# Patient Record
Sex: FEMALE | Race: WHITE | NOT HISPANIC OR LATINO | Employment: UNEMPLOYED | URBAN - METROPOLITAN AREA
[De-identification: names, ages, dates, MRNs, and addresses within clinical notes are randomized per-mention and may not be internally consistent; named-entity substitution may affect disease eponyms.]

---

## 2017-11-21 ENCOUNTER — HOSPITAL ENCOUNTER (EMERGENCY)
Facility: HOSPITAL | Age: 62
Discharge: HOME | End: 2017-11-21
Attending: EMERGENCY MEDICINE

## 2017-11-21 VITALS
OXYGEN SATURATION: 98 % | TEMPERATURE: 98 F | DIASTOLIC BLOOD PRESSURE: 78 MMHG | HEIGHT: 67 IN | SYSTOLIC BLOOD PRESSURE: 148 MMHG | WEIGHT: 222 LBS | RESPIRATION RATE: 18 BRPM | BODY MASS INDEX: 34.84 KG/M2 | HEART RATE: 75 BPM

## 2017-11-21 DIAGNOSIS — M25.521 RIGHT ELBOW PAIN: ICD-10-CM
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
## 2017-11-21 DIAGNOSIS — S80.212A ABRASION OF LEFT KNEE, INITIAL ENCOUNTER: ICD-10-CM

## 2017-11-21 DIAGNOSIS — M25.511 ACUTE PAIN OF RIGHT SHOULDER: ICD-10-CM

## 2017-11-21 DIAGNOSIS — S80.02XA CONTUSION OF LEFT KNEE, INITIAL ENCOUNTER: ICD-10-CM

## 2017-11-21 PROCEDURE — 29505 APPLICATION LONG LEG SPLINT: CPT | Mod: LT

## 2017-11-21 PROCEDURE — 25000003 PHARM REV CODE 250: Performed by: EMERGENCY MEDICINE

## 2017-11-21 PROCEDURE — 99284 EMERGENCY DEPT VISIT MOD MDM: CPT | Mod: 25

## 2017-11-21 RX ORDER — HYDROCODONE BITARTRATE AND ACETAMINOPHEN 5; 325 MG/1; MG/1
2 TABLET ORAL
Status: DISCONTINUED | OUTPATIENT
Start: 2017-11-21 | End: 2017-11-21

## 2017-11-21 RX ORDER — MUPIROCIN 20 MG/G
1 OINTMENT TOPICAL
Status: COMPLETED | OUTPATIENT
Start: 2017-11-21 | End: 2017-11-21

## 2017-11-21 RX ORDER — ONDANSETRON 4 MG/1
4 TABLET, ORALLY DISINTEGRATING ORAL
Status: COMPLETED | OUTPATIENT
Start: 2017-11-21 | End: 2017-11-21

## 2017-11-21 RX ORDER — METHOCARBAMOL 500 MG/1
1000 TABLET, FILM COATED ORAL 3 TIMES DAILY
Qty: 30 TABLET | Refills: 0 | Status: SHIPPED | OUTPATIENT
Start: 2017-11-21 | End: 2017-11-26

## 2017-11-21 RX ORDER — ONDANSETRON 4 MG/1
4 TABLET, ORALLY DISINTEGRATING ORAL
Status: DISCONTINUED | OUTPATIENT
Start: 2017-11-21 | End: 2017-11-21

## 2017-11-21 RX ORDER — METHOCARBAMOL 500 MG/1
1000 TABLET, FILM COATED ORAL
Status: DISCONTINUED | OUTPATIENT
Start: 2017-11-21 | End: 2017-11-21

## 2017-11-21 RX ORDER — OXYCODONE AND ACETAMINOPHEN 5; 325 MG/1; MG/1
2 TABLET ORAL
Status: COMPLETED | OUTPATIENT
Start: 2017-11-21 | End: 2017-11-21

## 2017-11-21 RX ADMIN — OXYCODONE AND ACETAMINOPHEN 2 TABLET: 5; 325 TABLET ORAL at 01:11

## 2017-11-21 RX ADMIN — MUPIROCIN 22 G: 20 OINTMENT TOPICAL at 01:11

## 2017-11-21 RX ADMIN — ONDANSETRON 4 MG: 4 TABLET, ORALLY DISINTEGRATING ORAL at 01:11

## 2017-11-21 NOTE — ED PROVIDER NOTES
"Encounter Date: 2017    SCRIBE #1 NOTE: I, Carole Kennedy, am scribing for, and in the presence of,  Katie Sevilla PA-C. I have scribed the following portions of the note - Other sections scribed: HPI and ROS.       History     Chief Complaint   Patient presents with    Fall     No LOC. Tripped down 1 step at a restaurant. Pain to Left knee and skin tear to Right hand.       CC: Fall    HPI: This 62 y.o female who has HTN, Arthritis presents to the ED c/o acute, constant, 10/10 "sharp" right shoulder pain, right elbow pain, and left knee pain s/p a fall that occurred just prior to arrival.  Patient also reports of right forearm pain and resolved right side pain.  Patient also repots of skin tears to her right hand and forearm.  Patient reports fall occurred after she did not notice a step while exiting a restaurant.  Patient denies head trauma, loss of consciousness, neck pain, back pain, chest pain, or any other associated symptoms.  Patient reports her last updated tetanus 2 years ago.  No prior tx.  No alleviating factors.      The history is provided by the patient. No  was used.     Review of patient's allergies indicates:   Allergen Reactions    Ace inhibitors Other (See Comments)     Cough      Sulfa (sulfonamide antibiotics)      Past Medical History:   Diagnosis Date    Allergy     Anxiety     Arthritis     Depressive disorder, not elsewhere classified     Esophageal reflux     Hypertension     Impaired fasting glucose     Joint pain     Obesity, unspecified     Other and unspecified hyperlipidemia      Past Surgical History:   Procedure Laterality Date     SECTION      HYSTERECTOMY      R knee replacement      right  arm  surgery       Family History   Problem Relation Age of Onset    Cancer Mother      lung    Liver disease Father      Social History   Substance Use Topics    Smoking status: Current Every Day Smoker     Packs/day: 1.00     Years: 40.00 "     Types: Cigarettes    Smokeless tobacco: Never Used    Alcohol use 0.0 oz/week      Comment: occ     Review of Systems   Constitutional: Negative for chills and fever.   HENT: Negative for ear pain and sore throat.    Eyes: Negative for pain.   Respiratory: Negative for cough and shortness of breath.    Cardiovascular: Negative for chest pain.   Gastrointestinal: Negative for abdominal pain, diarrhea, nausea and vomiting.   Genitourinary: Negative for dysuria.   Musculoskeletal: Positive for arthralgias. Negative for back pain.   Skin: Positive for wound. Negative for rash.   Neurological: Negative for headaches.        (-) head trauma  (-) loss of consciousness       Physical Exam     Initial Vitals [11/21/17 1224]   BP Pulse Resp Temp SpO2   (!) 150/80 70 16 98.1 °F (36.7 °C) 96 %      MAP       103.33         Physical Exam    Nursing note and vitals reviewed.  Constitutional: She appears well-developed and well-nourished.   This patient is sitting in a wheelchair, she appears in pain.  She is able to walk with a slow, but steady gait.   HENT:   Head: Normocephalic and atraumatic.   Eyes: EOM are normal.   Neck: Normal range of motion. Neck supple.   There is no midline cervical spinal tenderness to palpation.   Cardiovascular: Normal rate and regular rhythm.   No murmur heard.  Pulmonary/Chest: Breath sounds normal. No respiratory distress. She has no wheezes. She has no rhonchi. She has no rales.   Abdominal: Soft. Bowel sounds are normal. She exhibits no distension. There is no tenderness. There is no rebound and no guarding.   Musculoskeletal:        Back:         Hands:  There is limited ROM of the right arm secondary to shoulder pain.  She clutches the right arm and holds it close to her body.  There is tenderness to palpation of the right, medial elbow.      There is pain to palpation of the right knee with an abrasion noted to the anterior patella.  No active bleeding.  There is no ligamentous laxity  with varus or valgus stress.    There are three small, superficial avulsion lacerations noted to the right posterior hand.  No foreign bodies noted.  There is no bony tenderness to palpation of the bilateral wrists and hands.   Neurological: She is alert and oriented to person, place, and time.   Skin: Skin is warm.         ED Course   Procedures  Labs Reviewed - No data to display          Medical Decision Making:   Differential Diagnosis:   This is an emergent evaluation of a 62-year-old female who presents to the emergency department after a fall that occurred just prior to arrival.  She states she had a mechanical trip and fall where she tripped over a step and fell on the right side of her body.  She complains of right shoulder pain, right elbow pain, lacerations on her right hand, left knee pain.  She states that she initially had right hip pain but that improved since arrival.    Previous medical records were obtained and reviewed.  This patient has a history of hypertension, obesity, anxiety, arthritis.    The patient is currently afebrile and nontoxic in appearance.  Her blood pressure is elevated at 150/80.  Her remaining vital signs are stable.  On physical exam, the patient appears in pain.  She is able to walk with a slow but steady gait.  There is no midline cervical spinal tenderness to palpation.  There are 3 small avulsion lacerations noted to the right posterior hand.  Bleeding is controlled.  No foreign bodies noted with thorough exploration of the wounds.  Patient has full range of motion, strength and sensation in the bilateral hands.  There is no bony tenderness noted to palpation of the right hand and right wrist.  There is pain to palpation of the right knee with an abrasion noted to the anterior patella.  There is no active bleeding.  There is no ligamentous laxity with varus or valgus stress.  There is no saddle anesthesia.  There is some minor tenderness to the midline lumbar spine.  There  is no bony step-off noted.  The remaining physical exam is unremarkable.  An x-ray of the lumbar spine, right elbow, right shoulder, left knee was performed which revealed no acute fractures.  The lacerations were cleaned with normal saline.  Bactroban ointment was placed.  I do not believe that these need to be approximated with sutures as they are avulsion lacerations.  X-ray findings were discussed with the patient.  Tetanus is up-to-date.  I will give pain medication and muscle relaxers while in the emergency department.  I will prescribe muscle relaxants.  Orthopedist follow-up is suggested.  The patient verbalized understanding and agreement and the treatment plan and all questions were answered.  She stable for discharge at this time.  This case was discussed with Dr. Higginbotham and he is in agreement with the assessment and treatment plan.            Scribe Attestation:   Scribe #1: I performed the above scribed service and the documentation accurately describes the services I performed. I attest to the accuracy of the note.    Attending Attestation:           Physician Attestation for Scribe:  Physician Attestation Statement for Scribe #1: I, Katie Sevilla PA-C, reviewed documentation, as scribed by Carole Kennedy in my presence, and it is both accurate and complete.                 ED Course      Clinical Impression:   The primary encounter diagnosis was Fall. Diagnoses of Abrasion of left knee, initial encounter, Contusion of left knee, initial encounter, Acute pain of right shoulder, and Right elbow pain were also pertinent to this visit.    Disposition:   Disposition: Discharged  Condition: Stable                        Katie Sevilla PA-C  11/21/17 1501

## 2017-11-21 NOTE — ED TRIAGE NOTES
Pt. stated she fell about an hour ago, stated she was walking out of an area and missed her step. C/o left, knee, right shoulder, right side and right hand. Pain is a 10/10

## 2018-06-18 ENCOUNTER — LAB VISIT (OUTPATIENT)
Dept: LAB | Facility: HOSPITAL | Age: 63
End: 2018-06-18
Attending: NURSE PRACTITIONER

## 2018-06-18 DIAGNOSIS — Z12.11 COLON CANCER SCREENING: ICD-10-CM

## 2018-06-18 LAB — HEMOCCULT STL QL IA: NEGATIVE

## 2018-06-18 PROCEDURE — 82274 ASSAY TEST FOR BLOOD FECAL: CPT

## 2018-08-03 ENCOUNTER — HOSPITAL ENCOUNTER (EMERGENCY)
Facility: HOSPITAL | Age: 63
Discharge: HOME | End: 2018-08-03
Attending: EMERGENCY MEDICINE

## 2018-08-03 VITALS
HEART RATE: 60 BPM | SYSTOLIC BLOOD PRESSURE: 141 MMHG | TEMPERATURE: 98 F | HEIGHT: 66 IN | RESPIRATION RATE: 16 BRPM | BODY MASS INDEX: 35.36 KG/M2 | DIASTOLIC BLOOD PRESSURE: 76 MMHG | WEIGHT: 220 LBS | OXYGEN SATURATION: 96 %

## 2018-08-03 DIAGNOSIS — N39.0 URINARY TRACT INFECTION WITH HEMATURIA, SITE UNSPECIFIED: Primary | ICD-10-CM

## 2018-08-03 DIAGNOSIS — R31.9 URINARY TRACT INFECTION WITH HEMATURIA, SITE UNSPECIFIED: Primary | ICD-10-CM

## 2018-08-03 LAB
BACTERIA #/AREA URNS HPF: ABNORMAL /HPF
BILIRUB UR QL STRIP: NEGATIVE
CLARITY UR: ABNORMAL
COLOR UR: YELLOW
GLUCOSE UR QL STRIP: NEGATIVE
HGB UR QL STRIP: ABNORMAL
HYALINE CASTS #/AREA URNS LPF: 0 /LPF
KETONES UR QL STRIP: NEGATIVE
LEUKOCYTE ESTERASE UR QL STRIP: ABNORMAL
MICROSCOPIC COMMENT: ABNORMAL
NITRITE UR QL STRIP: NEGATIVE
PH UR STRIP: 5 [PH] (ref 5–8)
PROT UR QL STRIP: ABNORMAL
RBC #/AREA URNS HPF: 20 /HPF (ref 0–4)
SP GR UR STRIP: 1.02 (ref 1–1.03)
SQUAMOUS #/AREA URNS HPF: 4 /HPF
URN SPEC COLLECT METH UR: ABNORMAL
UROBILINOGEN UR STRIP-ACNC: NEGATIVE EU/DL
WBC #/AREA URNS HPF: >100 /HPF (ref 0–5)

## 2018-08-03 PROCEDURE — 81000 URINALYSIS NONAUTO W/SCOPE: CPT

## 2018-08-03 PROCEDURE — 87086 URINE CULTURE/COLONY COUNT: CPT

## 2018-08-03 PROCEDURE — 99283 EMERGENCY DEPT VISIT LOW MDM: CPT

## 2018-08-03 RX ORDER — CEPHALEXIN 500 MG/1
500 CAPSULE ORAL EVERY 12 HOURS
Qty: 20 CAPSULE | Refills: 0 | Status: SHIPPED | OUTPATIENT
Start: 2018-08-03 | End: 2018-08-13

## 2018-08-03 RX ORDER — PHENAZOPYRIDINE HYDROCHLORIDE 200 MG/1
200 TABLET, FILM COATED ORAL 3 TIMES DAILY
Qty: 6 TABLET | Refills: 0 | Status: SHIPPED | OUTPATIENT
Start: 2018-08-03 | End: 2018-08-05

## 2018-08-03 NOTE — ED TRIAGE NOTES
"62 y.o. Female presents to the ED with chief complaint of dysuria. Patient states burning while urinating began this am. Patient reports having the "urge to go very badly." Patient currently resting in chair, calm. Patient believes she may have a UTI.  "

## 2018-08-03 NOTE — ED PROVIDER NOTES
"Encounter Date: 8/3/2018        History     Chief Complaint   Patient presents with    Dysuria     pt stated "I think i have a UTI" pt reports painful and more frequent urinations, also reports lower back and abdominla pain     This is a 62 y.o. female complaining of suprapubic pressure and urinary frequency. Denies flank pain. Denies dysuria, fever. Symptoms began this morning. No medications taken for her symptoms prior to arrival.          Review of patient's allergies indicates:   Allergen Reactions    Ace inhibitors Other (See Comments)     Cough      Sulfa (sulfonamide antibiotics)      Past Medical History:   Diagnosis Date    Allergy     Anxiety     Arthritis     Depressive disorder, not elsewhere classified     Esophageal reflux     Hypertension     Impaired fasting glucose     Joint pain     Obesity, unspecified     Other and unspecified hyperlipidemia      Past Surgical History:   Procedure Laterality Date     SECTION      HYSTERECTOMY      R knee replacement      right  arm  surgery       Family History   Problem Relation Age of Onset    Cancer Mother         lung    Liver disease Father      Social History   Substance Use Topics    Smoking status: Current Every Day Smoker     Packs/day: 1.00     Years: 40.00     Types: Cigarettes    Smokeless tobacco: Never Used    Alcohol use 0.0 oz/week      Comment: occ     Review of Systems   Constitutional: Negative for chills, fatigue and fever.   HENT: Negative for congestion, ear pain, nosebleeds, postnasal drip, rhinorrhea, sinus pressure and sore throat.    Eyes: Negative for photophobia and pain.   Respiratory: Negative for apnea, cough, choking, chest tightness, shortness of breath, wheezing and stridor.    Cardiovascular: Negative for chest pain, palpitations and leg swelling.   Gastrointestinal: Negative for abdominal pain, constipation, diarrhea, nausea and vomiting.   Genitourinary: Positive for frequency, pelvic pain " (pressure) and urgency. Negative for decreased urine volume, difficulty urinating, dysuria, flank pain, genital sores, hematuria, vaginal bleeding, vaginal discharge and vaginal pain.   Musculoskeletal: Negative for arthralgias, back pain, gait problem and neck pain.   Skin: Negative for color change, pallor, rash and wound.   Neurological: Negative for dizziness, seizures, syncope, facial asymmetry, light-headedness and headaches.   Hematological: Negative for adenopathy.   Psychiatric/Behavioral: Negative for confusion. The patient is not nervous/anxious.        Physical Exam     Initial Vitals [08/03/18 0947]   BP Pulse Resp Temp SpO2   (!) 188/84 72 16 98.2 °F (36.8 °C) 97 %      MAP       --         Physical Exam    Nursing note and vitals reviewed.  Constitutional: She appears well-developed and well-nourished.   HENT:   Head: Normocephalic and atraumatic.   Right Ear: External ear normal.   Left Ear: External ear normal.   Nose: Nose normal.   Eyes: Conjunctivae and EOM are normal. Right eye exhibits no discharge. Left eye exhibits no discharge.   Neck: Normal range of motion. Neck supple. No tracheal deviation present.   Cardiovascular: Normal rate, regular rhythm and intact distal pulses.   Pulmonary/Chest: Breath sounds normal. No stridor. No respiratory distress.   Abdominal: Soft. Bowel sounds are normal. She exhibits no distension. There is no tenderness. There is no rigidity, no rebound, no guarding, no CVA tenderness, no tenderness at McBurney's point and negative Alvares's sign.   Musculoskeletal: Normal range of motion. She exhibits no tenderness.   Neurological: She is alert and oriented to person, place, and time. She has normal strength. No cranial nerve deficit or sensory deficit.   Skin: Skin is warm and dry.   Psychiatric: She has a normal mood and affect. Her behavior is normal. Judgment and thought content normal.         ED Course   Procedures  Labs Reviewed   URINALYSIS, REFLEX TO URINE  CULTURE - Abnormal; Notable for the following:        Result Value    Appearance, UA Cloudy (*)     Protein, UA 1+ (*)     Occult Blood UA 2+ (*)     Leukocytes, UA 3+ (*)     All other components within normal limits    Narrative:     Preferred Collection Type->Urine, Clean Catch   URINALYSIS MICROSCOPIC - Abnormal; Notable for the following:     RBC, UA 20 (*)     WBC, UA >100 (*)     Bacteria, UA Few (*)     All other components within normal limits    Narrative:     Preferred Collection Type->Urine, Clean Catch   CULTURE, URINE          Imaging Results    None          Medical Decision Making:   Differential Diagnosis:   UTI, pyelonephritis, nephrolithiasis, appendicitis, others  Clinical Tests:   Lab Tests: Ordered and Reviewed  ED Management:  62 y.o. Nontoxic female presenting for evaluation of suprapubic pressure and urinary frequency that began this morning. Reports that symptoms feel similar to past UTIs. Denies dysuria, abdominal pain, fever, flank pain, or any additional symptoms. Afebrile, well-appearing, and in no distress. Abdomen is soft and non-tender. No CVA tenderness. Physical exam is unremarkable. Urinalysis shows signs of infection. Will treat empirically with Keflex. Urine culture in process. Prescribed Pyridium for symptomatic relief. Advised follow up with PCP for urine recheck and further management. ED return precautions given. Patient expressed understanding of diagnosis, discharge instructions, and return precautions.    My attending physician was available for consultation during this case.                      Clinical Impression:   The encounter diagnosis was Urinary tract infection with hematuria, site unspecified.      Disposition:   Disposition: Discharged  Condition: Stable                        Garret Man NP  08/03/18 8890

## 2018-08-03 NOTE — DISCHARGE INSTRUCTIONS
Take antibiotics for 10 days as prescribed until they are gone. You should not have any pills left over.    Follow up with your primary provider for further evaluation and management and urine recheck.    Return to the emergency department for an for any new or worsening symptoms or as needed.

## 2018-08-05 LAB — BACTERIA UR CULT: NORMAL

## 2019-09-04 ENCOUNTER — LAB VISIT (OUTPATIENT)
Dept: LAB | Facility: HOSPITAL | Age: 64
End: 2019-09-04
Attending: NURSE PRACTITIONER

## 2019-09-04 DIAGNOSIS — Z00.00 HEALTH CARE MAINTENANCE: ICD-10-CM

## 2019-09-04 PROCEDURE — 82274 ASSAY TEST FOR BLOOD FECAL: CPT

## 2019-09-13 LAB — HEMOCCULT STL QL IA: NEGATIVE

## 2020-09-17 ENCOUNTER — LAB VISIT (OUTPATIENT)
Dept: LAB | Facility: HOSPITAL | Age: 65
End: 2020-09-17
Attending: NURSE PRACTITIONER
Payer: MEDICARE

## 2020-09-17 DIAGNOSIS — Z00.00 ENCOUNTER FOR PREVENTIVE HEALTH EXAMINATION: ICD-10-CM

## 2020-09-17 PROCEDURE — 82274 ASSAY TEST FOR BLOOD FECAL: CPT

## 2020-09-22 LAB — HEMOCCULT STL QL IA: NEGATIVE

## 2020-10-26 PROBLEM — H02.839 DERMATOCHALASIS: Status: ACTIVE | Noted: 2020-10-26

## 2020-10-26 PROBLEM — H25.13 NUCLEAR SCLEROSIS OF BOTH EYES: Status: ACTIVE | Noted: 2020-10-26

## 2021-03-03 PROBLEM — L71.9 ROSACEA: Status: ACTIVE | Noted: 2021-03-03

## 2021-04-15 ENCOUNTER — PATIENT MESSAGE (OUTPATIENT)
Dept: RESEARCH | Facility: HOSPITAL | Age: 66
End: 2021-04-15

## 2021-06-26 ENCOUNTER — HOSPITAL ENCOUNTER (EMERGENCY)
Facility: HOSPITAL | Age: 66
Discharge: HOME | End: 2021-06-26
Attending: EMERGENCY MEDICINE
Payer: MEDICARE

## 2021-06-26 VITALS
WEIGHT: 230 LBS | HEIGHT: 68 IN | RESPIRATION RATE: 18 BRPM | SYSTOLIC BLOOD PRESSURE: 133 MMHG | TEMPERATURE: 98 F | HEART RATE: 55 BPM | OXYGEN SATURATION: 95 % | BODY MASS INDEX: 34.86 KG/M2 | DIASTOLIC BLOOD PRESSURE: 63 MMHG

## 2021-06-26 DIAGNOSIS — J01.00 ACUTE NON-RECURRENT MAXILLARY SINUSITIS: Primary | ICD-10-CM

## 2021-06-26 PROCEDURE — 96372 THER/PROPH/DIAG INJ SC/IM: CPT

## 2021-06-26 PROCEDURE — 99284 EMERGENCY DEPT VISIT MOD MDM: CPT | Mod: 25

## 2021-06-26 PROCEDURE — 63600175 PHARM REV CODE 636 W HCPCS: Performed by: PHYSICIAN ASSISTANT

## 2021-06-26 RX ORDER — CETIRIZINE HYDROCHLORIDE, PSEUDOEPHEDRINE HYDROCHLORIDE 5; 120 MG/1; MG/1
1 TABLET, FILM COATED, EXTENDED RELEASE ORAL DAILY
Qty: 30 TABLET | Refills: 0 | Status: SHIPPED | OUTPATIENT
Start: 2021-06-26 | End: 2021-07-06

## 2021-06-26 RX ORDER — FLUTICASONE PROPIONATE 50 MCG
1 SPRAY, SUSPENSION (ML) NASAL 2 TIMES DAILY PRN
Qty: 15 G | Refills: 0 | Status: SHIPPED | OUTPATIENT
Start: 2021-06-26 | End: 2021-08-16 | Stop reason: ALTCHOICE

## 2021-06-26 RX ORDER — DEXAMETHASONE SODIUM PHOSPHATE 4 MG/ML
8 INJECTION, SOLUTION INTRA-ARTICULAR; INTRALESIONAL; INTRAMUSCULAR; INTRAVENOUS; SOFT TISSUE
Status: COMPLETED | OUTPATIENT
Start: 2021-06-26 | End: 2021-06-26

## 2021-06-26 RX ORDER — KETOTIFEN FUMARATE 0.35 MG/ML
1 SOLUTION/ DROPS OPHTHALMIC DAILY
Qty: 10 ML | Refills: 0 | Status: SHIPPED | OUTPATIENT
Start: 2021-06-26 | End: 2021-08-16 | Stop reason: ALTCHOICE

## 2021-06-26 RX ORDER — AMOXICILLIN AND CLAVULANATE POTASSIUM 875; 125 MG/1; MG/1
1 TABLET, FILM COATED ORAL 2 TIMES DAILY
Qty: 14 TABLET | Refills: 0 | Status: SHIPPED | OUTPATIENT
Start: 2021-06-26 | End: 2021-08-16 | Stop reason: ALTCHOICE

## 2021-06-26 RX ADMIN — DEXAMETHASONE SODIUM PHOSPHATE 8 MG: 4 INJECTION INTRA-ARTICULAR; INTRALESIONAL; INTRAMUSCULAR; INTRAVENOUS; SOFT TISSUE at 12:06

## 2021-08-16 ENCOUNTER — HOSPITAL ENCOUNTER (EMERGENCY)
Facility: HOSPITAL | Age: 66
Discharge: HOME | End: 2021-08-16
Attending: EMERGENCY MEDICINE
Payer: MEDICARE

## 2021-08-16 VITALS
TEMPERATURE: 99 F | HEIGHT: 68 IN | HEART RATE: 87 BPM | DIASTOLIC BLOOD PRESSURE: 66 MMHG | OXYGEN SATURATION: 98 % | RESPIRATION RATE: 17 BRPM | WEIGHT: 180 LBS | SYSTOLIC BLOOD PRESSURE: 116 MMHG | BODY MASS INDEX: 27.28 KG/M2

## 2021-08-16 DIAGNOSIS — N30.00 ACUTE CYSTITIS WITHOUT HEMATURIA: Primary | ICD-10-CM

## 2021-08-16 LAB
BILIRUB UR QL STRIP: NEGATIVE
CLARITY UR: CLEAR
COLOR UR: YELLOW
GLUCOSE UR QL STRIP: NEGATIVE
HGB UR QL STRIP: ABNORMAL
KETONES UR QL STRIP: NEGATIVE
LEUKOCYTE ESTERASE UR QL STRIP: ABNORMAL
MICROSCOPIC COMMENT: ABNORMAL
NITRITE UR QL STRIP: NEGATIVE
PH UR STRIP: 6 [PH] (ref 5–8)
PROT UR QL STRIP: NEGATIVE
RBC #/AREA URNS HPF: 0 /HPF (ref 0–4)
SP GR UR STRIP: 1.02 (ref 1–1.03)
URN SPEC COLLECT METH UR: ABNORMAL
UROBILINOGEN UR STRIP-ACNC: NEGATIVE EU/DL
WBC #/AREA URNS HPF: 8 /HPF (ref 0–5)

## 2021-08-16 PROCEDURE — 81000 URINALYSIS NONAUTO W/SCOPE: CPT | Performed by: PHYSICIAN ASSISTANT

## 2021-08-16 PROCEDURE — 25000003 PHARM REV CODE 250: Performed by: PHYSICIAN ASSISTANT

## 2021-08-16 PROCEDURE — 99284 EMERGENCY DEPT VISIT MOD MDM: CPT

## 2021-08-16 RX ORDER — CEPHALEXIN 500 MG/1
500 CAPSULE ORAL EVERY 12 HOURS
Qty: 20 CAPSULE | Refills: 0 | Status: SHIPPED | OUTPATIENT
Start: 2021-08-16 | End: 2021-08-26

## 2021-08-16 RX ORDER — IBUPROFEN 600 MG/1
600 TABLET ORAL EVERY 6 HOURS PRN
Qty: 20 TABLET | Refills: 0 | Status: SHIPPED | OUTPATIENT
Start: 2021-08-16 | End: 2021-08-21

## 2021-08-16 RX ORDER — ACETAMINOPHEN 500 MG
500 TABLET ORAL EVERY 4 HOURS PRN
Qty: 20 TABLET | Refills: 0 | Status: SHIPPED | OUTPATIENT
Start: 2021-08-16 | End: 2021-08-21

## 2021-08-16 RX ORDER — ONDANSETRON 4 MG/1
4 TABLET, ORALLY DISINTEGRATING ORAL EVERY 6 HOURS PRN
Qty: 15 TABLET | Refills: 0 | Status: SHIPPED | OUTPATIENT
Start: 2021-08-16 | End: 2021-08-21

## 2021-08-16 RX ORDER — ACETAMINOPHEN 500 MG
1000 TABLET ORAL
Status: COMPLETED | OUTPATIENT
Start: 2021-08-16 | End: 2021-08-16

## 2021-08-16 RX ADMIN — ACETAMINOPHEN 1000 MG: 500 TABLET, FILM COATED ORAL at 08:08

## 2021-10-06 ENCOUNTER — LAB VISIT (OUTPATIENT)
Dept: LAB | Facility: HOSPITAL | Age: 66
End: 2021-10-06
Payer: MEDICARE

## 2021-10-06 DIAGNOSIS — Z00.00 HEALTH CARE MAINTENANCE: ICD-10-CM

## 2021-10-06 PROCEDURE — 82274 ASSAY TEST FOR BLOOD FECAL: CPT | Performed by: NURSE PRACTITIONER

## 2021-10-12 ENCOUNTER — PATIENT OUTREACH (OUTPATIENT)
Dept: ADMINISTRATIVE | Facility: OTHER | Age: 66
End: 2021-10-12

## 2021-10-16 LAB — HEMOCCULT STL QL IA: NEGATIVE

## 2021-10-20 ENCOUNTER — HOSPITAL ENCOUNTER (EMERGENCY)
Facility: HOSPITAL | Age: 66
Discharge: HOME | End: 2021-10-20
Attending: EMERGENCY MEDICINE
Payer: MEDICARE

## 2021-10-20 DIAGNOSIS — M25.532 LEFT WRIST PAIN: Primary | ICD-10-CM

## 2021-10-20 PROCEDURE — 99283 EMERGENCY DEPT VISIT LOW MDM: CPT | Mod: 25

## 2021-10-21 VITALS
WEIGHT: 218 LBS | DIASTOLIC BLOOD PRESSURE: 67 MMHG | SYSTOLIC BLOOD PRESSURE: 136 MMHG | OXYGEN SATURATION: 95 % | TEMPERATURE: 98 F | RESPIRATION RATE: 16 BRPM | HEART RATE: 58 BPM | HEIGHT: 68 IN | BODY MASS INDEX: 33.04 KG/M2

## 2022-02-04 ENCOUNTER — TELEPHONE (OUTPATIENT)
Dept: OPHTHALMOLOGY | Facility: CLINIC | Age: 67
End: 2022-02-04
Payer: MEDICARE

## 2022-02-04 NOTE — TELEPHONE ENCOUNTER
----- Message from Dana Palacio MD sent at 2/4/2022  3:49 PM CST -----  Regarding: Urgent F/U Appt  Hi,    We did a procedure on this patient w Dr. Jaimes in the [facility] clinic. Can she please be booked for the retina clinic at 9 AM for follow up?

## 2022-02-07 ENCOUNTER — OFFICE VISIT (OUTPATIENT)
Dept: OPHTHALMOLOGY | Facility: CLINIC | Age: 67
End: 2022-02-07
Payer: MEDICARE

## 2022-02-07 DIAGNOSIS — H33.22 RETINAL DETACHMENT, LEFT: Primary | ICD-10-CM

## 2022-02-07 PROCEDURE — 1159F MED LIST DOCD IN RCRD: CPT | Mod: CPTII,S$GLB,, | Performed by: OPHTHALMOLOGY

## 2022-02-07 PROCEDURE — 1126F AMNT PAIN NOTED NONE PRSNT: CPT | Mod: CPTII,S$GLB,, | Performed by: OPHTHALMOLOGY

## 2022-02-07 PROCEDURE — 1160F RVW MEDS BY RX/DR IN RCRD: CPT | Mod: CPTII,S$GLB,, | Performed by: OPHTHALMOLOGY

## 2022-02-07 PROCEDURE — 99024 POSTOP FOLLOW-UP VISIT: CPT | Mod: S$GLB,,, | Performed by: OPHTHALMOLOGY

## 2022-02-07 PROCEDURE — 1126F PR PAIN SEVERITY QUANTIFIED, NO PAIN PRESENT: ICD-10-PCS | Mod: CPTII,S$GLB,, | Performed by: OPHTHALMOLOGY

## 2022-02-07 PROCEDURE — 99999 PR PBB SHADOW E&M-EST. PATIENT-LVL III: ICD-10-PCS | Mod: PBBFAC,,, | Performed by: OPHTHALMOLOGY

## 2022-02-07 PROCEDURE — 99024 PR POST-OP FOLLOW-UP VISIT: ICD-10-PCS | Mod: S$GLB,,, | Performed by: OPHTHALMOLOGY

## 2022-02-07 PROCEDURE — 1159F PR MEDICATION LIST DOCUMENTED IN MEDICAL RECORD: ICD-10-PCS | Mod: CPTII,S$GLB,, | Performed by: OPHTHALMOLOGY

## 2022-02-07 PROCEDURE — 1160F PR REVIEW ALL MEDS BY PRESCRIBER/CLIN PHARMACIST DOCUMENTED: ICD-10-PCS | Mod: CPTII,S$GLB,, | Performed by: OPHTHALMOLOGY

## 2022-02-07 PROCEDURE — 99999 PR PBB SHADOW E&M-EST. PATIENT-LVL III: CPT | Mod: PBBFAC,,, | Performed by: OPHTHALMOLOGY

## 2022-02-07 NOTE — PROGRESS NOTES
HPI     65 y/o female presents to clinic for retinal tear repair OS    Pt states seeing the gas bubble sometimes. Seeing a light quiver on the   side of VA OS. VA is very blurry   Using a drop TID OS. (chart states vigamox)    Last edited by Chel Madison on 2/7/2022  8:20 AM. (History)            Assessment /Plan     For exam results, see Encounter Report.    There are no diagnoses linked to this encounter.    1. OS Retinal Tear with Localized RD  - s/p pneumatic retinopexy Friday with C3F8, no flying while bubble in place d/w patient, RD/endophthalmitis precautions  - retina has flattened nicely, IOP OK   - additional laser retinopexy performed today, pt has very blonde fundus making ST small tear difficult to appreciate so PRP pattern performed superotemporally up to ora with depression  - RTC 1w, sooner PRN (discussed RD precautions extensively)    2. PVD OS  - see #1    3. ERM OS  - mild, NVS  - observe    4. NSC OU  - discussed possible progression s/p pneumatic, pt expressed understanding  - observe     5. Dermatochalasis OU  - see eval by Plastics (to return prn)      6. MGD  - LS/WC  - ATs 4-6 times per day      PROCEDURE NOTE  OS Barrier Laser  Indication: OS retinal tear x2  Surgeon: Oziel Jaimes MD  Spot Size: 200 um  Duration: 100 ms  Power: 200-280 mW  Total Shots: 1026  Noted to have good circumferential coverage around tear at 12:30, also PRP pattern of laser performed in superotemporal periphery where there was a small tear seen Friday

## 2022-02-14 ENCOUNTER — OFFICE VISIT (OUTPATIENT)
Dept: OPHTHALMOLOGY | Facility: CLINIC | Age: 67
End: 2022-02-14
Payer: MEDICARE

## 2022-02-14 DIAGNOSIS — H33.22 RETINAL DETACHMENT, LEFT: Primary | ICD-10-CM

## 2022-02-14 PROCEDURE — 1101F PT FALLS ASSESS-DOCD LE1/YR: CPT | Mod: CPTII,S$GLB,, | Performed by: OPHTHALMOLOGY

## 2022-02-14 PROCEDURE — 1125F AMNT PAIN NOTED PAIN PRSNT: CPT | Mod: CPTII,S$GLB,, | Performed by: OPHTHALMOLOGY

## 2022-02-14 PROCEDURE — 1125F PR PAIN SEVERITY QUANTIFIED, PAIN PRESENT: ICD-10-PCS | Mod: CPTII,S$GLB,, | Performed by: OPHTHALMOLOGY

## 2022-02-14 PROCEDURE — 1159F PR MEDICATION LIST DOCUMENTED IN MEDICAL RECORD: ICD-10-PCS | Mod: CPTII,S$GLB,, | Performed by: OPHTHALMOLOGY

## 2022-02-14 PROCEDURE — 99999 PR PBB SHADOW E&M-EST. PATIENT-LVL II: CPT | Mod: PBBFAC,,, | Performed by: OPHTHALMOLOGY

## 2022-02-14 PROCEDURE — 1101F PR PT FALLS ASSESS DOC 0-1 FALLS W/OUT INJ PAST YR: ICD-10-PCS | Mod: CPTII,S$GLB,, | Performed by: OPHTHALMOLOGY

## 2022-02-14 PROCEDURE — 3288F PR FALLS RISK ASSESSMENT DOCUMENTED: ICD-10-PCS | Mod: CPTII,S$GLB,, | Performed by: OPHTHALMOLOGY

## 2022-02-14 PROCEDURE — 1159F MED LIST DOCD IN RCRD: CPT | Mod: CPTII,S$GLB,, | Performed by: OPHTHALMOLOGY

## 2022-02-14 PROCEDURE — 99024 PR POST-OP FOLLOW-UP VISIT: ICD-10-PCS | Mod: S$GLB,,, | Performed by: OPHTHALMOLOGY

## 2022-02-14 PROCEDURE — 99024 POSTOP FOLLOW-UP VISIT: CPT | Mod: S$GLB,,, | Performed by: OPHTHALMOLOGY

## 2022-02-14 PROCEDURE — 3288F FALL RISK ASSESSMENT DOCD: CPT | Mod: CPTII,S$GLB,, | Performed by: OPHTHALMOLOGY

## 2022-02-14 PROCEDURE — 99999 PR PBB SHADOW E&M-EST. PATIENT-LVL II: ICD-10-PCS | Mod: PBBFAC,,, | Performed by: OPHTHALMOLOGY

## 2022-02-14 NOTE — PROGRESS NOTES
Subjective:       Patient ID: Patricia Ramirez is a 66 y.o. female      Chief Complaint   Patient presents with    Post-op Evaluation     History of Present Illness  HPI     1 wk PO laser OS    Pt states her vision is full, has glare off bubbles.  Floaters decreasing.       OS slightly tender with headaches. Pt took her biotic drop for 3 days then   discontinued.    Flashes OS  No flashes  Glare OS  Pain  No gtts      Last edited by Raul Villafana MD on 2/14/2022  9:18 AM. (History)            Assessment/Plan:     1. Retinal detachment, left  Attached, doing well s/p NM  Upright in day.  Can sleep R side or face down until bubble gone    RD precautions discussed in detail, patient expressed understanding  RTC immediately PRN (especially ANY change flashes, floaters, vision, visual field)      Follow up in about 4 days (around 2/18/2022), or if symptoms worsen or fail to improve, for Ana Maria pradoop.

## 2022-02-27 ENCOUNTER — HOSPITAL ENCOUNTER (EMERGENCY)
Facility: HOSPITAL | Age: 67
Discharge: HOME | End: 2022-02-27
Attending: EMERGENCY MEDICINE | Admitting: STUDENT IN AN ORGANIZED HEALTH CARE EDUCATION/TRAINING PROGRAM
Payer: MEDICARE

## 2022-02-27 VITALS
WEIGHT: 219 LBS | RESPIRATION RATE: 18 BRPM | OXYGEN SATURATION: 96 % | HEART RATE: 66 BPM | DIASTOLIC BLOOD PRESSURE: 66 MMHG | TEMPERATURE: 98 F | SYSTOLIC BLOOD PRESSURE: 128 MMHG | BODY MASS INDEX: 33.19 KG/M2 | HEIGHT: 68 IN

## 2022-02-27 DIAGNOSIS — H53.40 VISUAL FIELD DEFECT: ICD-10-CM

## 2022-02-27 DIAGNOSIS — H33.22 LEFT RETINAL DETACHMENT: Primary | ICD-10-CM

## 2022-02-27 DIAGNOSIS — H33.22 RETINAL DETACHMENT, LEFT: ICD-10-CM

## 2022-02-27 DIAGNOSIS — Z86.69 HISTORY OF RETINAL TEAR: ICD-10-CM

## 2022-02-27 LAB
CTP QC/QA: YES
SARS-COV-2 RDRP RESP QL NAA+PROBE: NEGATIVE

## 2022-02-27 PROCEDURE — U0002 COVID-19 LAB TEST NON-CDC: HCPCS | Performed by: PHYSICIAN ASSISTANT

## 2022-02-27 PROCEDURE — 25000003 PHARM REV CODE 250: Performed by: PHYSICIAN ASSISTANT

## 2022-02-27 PROCEDURE — 99283 EMERGENCY DEPT VISIT LOW MDM: CPT | Mod: 25

## 2022-02-27 RX ORDER — TETRACAINE HYDROCHLORIDE 5 MG/ML
1 SOLUTION OPHTHALMIC
Status: CANCELLED | OUTPATIENT
Start: 2022-02-27

## 2022-02-27 RX ORDER — SODIUM CHLORIDE 0.9 % (FLUSH) 0.9 %
10 SYRINGE (ML) INJECTION
Status: CANCELLED | OUTPATIENT
Start: 2022-02-27

## 2022-02-27 RX ORDER — PHENYLEPHRINE HYDROCHLORIDE 25 MG/ML
1 SOLUTION/ DROPS OPHTHALMIC
Status: CANCELLED | OUTPATIENT
Start: 2022-02-27

## 2022-02-27 RX ORDER — ATROPINE SULFATE 10 MG/ML
1 SOLUTION/ DROPS OPHTHALMIC
Status: CANCELLED | OUTPATIENT
Start: 2022-02-27

## 2022-02-27 RX ORDER — MOXIFLOXACIN 5 MG/ML
1 SOLUTION/ DROPS OPHTHALMIC
Status: CANCELLED | OUTPATIENT
Start: 2022-02-27

## 2022-02-27 RX ORDER — PREDNISOLONE ACETATE 10 MG/ML
1 SUSPENSION/ DROPS OPHTHALMIC
Status: CANCELLED | OUTPATIENT
Start: 2022-02-27

## 2022-02-27 RX ORDER — CYCLOPENTOLATE HYDROCHLORIDE 10 MG/ML
1 SOLUTION/ DROPS OPHTHALMIC
Status: CANCELLED | OUTPATIENT
Start: 2022-02-27

## 2022-02-27 RX ORDER — TETRACAINE HYDROCHLORIDE 5 MG/ML
2 SOLUTION OPHTHALMIC
Status: COMPLETED | OUTPATIENT
Start: 2022-02-27 | End: 2022-02-27

## 2022-02-27 RX ADMIN — FLUORESCEIN SODIUM 1 EACH: 1 STRIP OPHTHALMIC at 09:02

## 2022-02-27 RX ADMIN — TETRACAINE HYDROCHLORIDE 2 DROP: 5 SOLUTION OPHTHALMIC at 09:02

## 2022-02-27 NOTE — ED PROVIDER NOTES
"Encounter Date: 2/27/2022    SCRIBE #1 NOTE: I, Leonides Arteaga, am scribing for, and in the presence of,  Yvan Joiner PA-C. I have scribed the following portions of the note - Other sections scribed: HPI, ROS, PE.       History     Chief Complaint   Patient presents with    Blurred Vision     EMS called to 65yo female that had laser surgery for detached retina 3 weeks ago. Has a gas bubble in her left eye that causes a streak in her vision but yesterday she said she started having a shadow at the corner of her eye and her dr told her that any vision loss would start at the inside.      Patricia Ramirez is a 66 y. o female with a PMHx of HTN, anxiety, arthritis, and cataracts, that comes to the ED via EMS complaining of blurred vision beginning yesterday. Patient states she underwent eye surgery 10 days ago for a detached retina, and endorses she started having blurred vision described as a "streak in the corner of my eye" yesterday evening, which she thought was due to a gas bubble in her eye, but states her symptoms exacerbated this morning when she woke up around 5:30am becoming constant and the "streak" covering more of her vision prompting today's visit. No medications taken PTA. No alleviating or exacerbating factors noted. Denies headache, or eye pain. Allergic to sulfa, and ace inhibitors.    The history is provided by the patient. No  was used.     Review of patient's allergies indicates:   Allergen Reactions    Sulfa (sulfonamide antibiotics) Hives    Ace inhibitors Other (See Comments)     Cough       Past Medical History:   Diagnosis Date    Allergy     Anxiety     Arthritis     Cataract     Depressive disorder, not elsewhere classified     Esophageal reflux     Hypertension     Impaired fasting glucose     Joint pain     Obesity, unspecified     Other and unspecified hyperlipidemia      Past Surgical History:   Procedure Laterality Date    BLEPHAROPLASTY Bilateral " 3/3/2021    Procedure: BLEPHAROPLASTY;  Surgeon: Maite Acuna MD;  [facility];  Service: Ophthalmology;  Laterality: Bilateral;     SECTION  1973    HYSTERECTOMY      without BSO    R knee replacement      right  arm  surgery       Family History   Problem Relation Age of Onset    Cancer Mother         lung    Liver disease Father     Thyroid disease Sister     Cervical cancer Sister     Tremor Sister      Social History     Tobacco Use    Smoking status: Current Every Day Smoker     Packs/day: 0.75     Years: 40.00     Pack years: 30.00     Types: Cigarettes    Smokeless tobacco: Never Used   Substance Use Topics    Alcohol use: Yes     Alcohol/week: 0.0 standard drinks     Comment: occasionally    Drug use: No     Review of Systems   Constitutional: Negative for fever.   HENT: Negative for congestion, sore throat and trouble swallowing.    Eyes: Positive for visual disturbance. Negative for pain.   Respiratory: Negative for cough and shortness of breath.    Cardiovascular: Negative for chest pain.   Gastrointestinal: Negative for abdominal pain, constipation, diarrhea, nausea and vomiting.   Genitourinary: Negative for dysuria, flank pain, frequency and urgency.   Musculoskeletal: Negative for back pain.   Skin: Negative for rash.   Neurological: Negative for headaches.   All other systems reviewed and are negative.      Physical Exam     Initial Vitals [22 0900]   BP Pulse Resp Temp SpO2   121/69 63 16 98.3 °F (36.8 °C) 96 %      MAP       --         Physical Exam    Nursing note and vitals reviewed.  Constitutional: She appears well-developed and well-nourished. She is not diaphoretic. No distress.   HENT:   Head: Normocephalic and atraumatic.   Nose: Nose normal.   Eyes:   Visual Acuity:  R:  20/40  L:  Cannot visualize chart  Both: 20/40.     IOP L:  14     L EYE:  Visual field deficits to the superiorly. Visual fields remain intact to the inferiorly.  No tearing,  injection, or flush.  No photophobia.  Pupils equal and reactive to light with direct and consensual light reflexes. No hyphema or subconjunctival hemorrhage. No cells or flares in the anterior chamber.    No periorbital ecchymosis, lacerations, abrasions, swelling, warmth, erythema, or tenderness to palpation. Able to fully open eyelids with extraocular movements intact in all directions.     Neck: No tracheal deviation present. No JVD present.   Normal range of motion.  Cardiovascular: Normal rate, regular rhythm and normal heart sounds. Exam reveals no friction rub.    No murmur heard.  Pulmonary/Chest: Breath sounds normal. No stridor. No respiratory distress. She has no wheezes. She has no rhonchi. She has no rales. She exhibits no tenderness.   Musculoskeletal:         General: No tenderness or edema.      Cervical back: Normal range of motion.     Neurological: She is alert and oriented to person, place, and time.   Skin: Skin is warm and dry. No rash and no abscess noted. No erythema. No pallor.   Psychiatric: She has a normal mood and affect. Thought content normal.         ED Course   Procedures  Labs Reviewed   SARS-COV-2 RDRP GENE          Imaging Results    None          Medications   fluorescein ophthalmic strip 1 each (1 each Left Eye Given 2/27/22 0915)   TETRAcaine HCl (PF) 0.5 % Drop 2 drop (2 drops Left Eye Given 2/27/22 0914)     Medical Decision Making:   History:   Old Medical Records: I decided to obtain old medical records.  Clinical Tests:   Lab Tests: Ordered and Reviewed  ED Management:  Hx of OS Retinal Tear with Localized RD  - s/p pneumatic retinopexy 2/4/22 with C3F8  - s/p laser retinopexy 2/7/22  - Retina remains flat on exam 2/18/22    - new visual disturbance yesterday evening. Superior visual field loss upon awakening this morning and cannot visual eye chart; concerning for worsening RD. Normal IOP. No fluorescin uptake. No pain. Discussed with transfer center; accepted by  ophthalmologist Dr. Martinez. Family/friend will drive her to [facility] ED. Pt agreeable to plan.           Scribe Attestation:   Scribe #1: I performed the above scribed service and the documentation accurately describes the services I performed. I attest to the accuracy of the note.                 Clinical Impression:   Final diagnoses:  [Z86.69] History of retinal tear  [H53.40] Visual field defect (Primary)       I, Rhys Santoro PA-C, personally performed the services described in this documentation. All medical record entries made by the scribe were at my direction and in my presence. I have reviewed the chart and agree that the record reflects my personal performance and is accurate and complete.     ED Disposition Condition    Transfer to Another Facility Stable              Rhys Santoro PA-C  02/27/22 0956

## 2022-02-27 NOTE — ED PROVIDER NOTES
Encounter Date: 2/27/2022       History     Chief Complaint   Patient presents with    Blurred Vision     EMS called to 65yo female that had laser surgery for detached retina 3 weeks ago. Has a gas bubble in her left eye that causes a streak in her vision but yesterday she said she started having a shadow at the corner of her eye and her dr told her that any vision loss would start at the inside.     ophthalmology transfer     Recently had eye surgery secondary to tears of her retina in her left eye. Pt. Was told that if she starts to see shadows to come to the ER. Pt. Started to see shadows on yesterday afternoon. Pt. Denies any trauma. Pt. Denies any pain at this time. Pt was told to come from [facility]      66 y. o female with a PMHx of HTN, anxiety, arthritis, and cataracts presents to the ED as a transfer for for urgent ophthalmology evaluation for acute visual changes.  She has a few weeks postop retinal detachment surgery.  Patient states that her vision has changed more so since this morning.  She reports complete vision loss to the medial visual field in her left eye.  She reports a slight headache, but denies eye pain.     Patient briefly evaluated by Ophthalmology prior to my initial evaluation of the patient.        Review of patient's allergies indicates:   Allergen Reactions    Sulfa (sulfonamide antibiotics) Hives    Ace inhibitors Other (See Comments)     Cough       Past Medical History:   Diagnosis Date    Allergy     Anxiety     Arthritis     Cataract     Depressive disorder, not elsewhere classified     Esophageal reflux     Hypertension     Impaired fasting glucose     Joint pain     Obesity, unspecified     Other and unspecified hyperlipidemia      Past Surgical History:   Procedure Laterality Date    BLEPHAROPLASTY Bilateral 3/3/2021    Procedure: BLEPHAROPLASTY;  Surgeon: Maite Acuna MD;  Location: [facility];  Service: Ophthalmology;  Laterality: Bilateral;      SECTION  1973    HYSTERECTOMY      without BSO    R knee replacement      right  arm  surgery       Family History   Problem Relation Age of Onset    Cancer Mother         lung    Liver disease Father     Thyroid disease Sister     Cervical cancer Sister     Tremor Sister      Social History     Tobacco Use    Smoking status: Current Every Day Smoker     Packs/day: 0.75     Years: 40.00     Pack years: 30.00     Types: Cigarettes    Smokeless tobacco: Never Used   Substance Use Topics    Alcohol use: Yes     Alcohol/week: 0.0 standard drinks     Comment: occasionally    Drug use: No     Review of Systems   Constitutional: Negative for fever.   HENT: Negative for sore throat.    Eyes: Positive for visual disturbance.   Respiratory: Negative for shortness of breath.    Cardiovascular: Negative for chest pain.   Gastrointestinal: Negative for nausea.   Genitourinary: Negative for dysuria.   Musculoskeletal: Negative for back pain.   Skin: Negative for rash.   Neurological: Negative for weakness.   Hematological: Does not bruise/bleed easily.       Physical Exam     Initial Vitals [22 0900]   BP Pulse Resp Temp SpO2   121/69 63 16 98.3 °F (36.8 °C) 96 %      MAP       --         Physical Exam    Nursing note and vitals reviewed.  Constitutional: She appears well-developed and well-nourished. She is not diaphoretic.  Non-toxic appearance. She does not appear ill. No distress.   HENT:   Head: Normocephalic and atraumatic.   Eyes: EOM are normal.   Pupils chemically dilated   Neck: Neck supple.   Cardiovascular: Normal rate and regular rhythm. Exam reveals no gallop and no friction rub.    No murmur heard.  Pulmonary/Chest: Effort normal and breath sounds normal. No accessory muscle usage. No tachypnea. No respiratory distress. She has no decreased breath sounds. She has no wheezes. She has no rhonchi. She has no rales.   Abdominal: She exhibits no distension.   Musculoskeletal:      Cervical  back: Neck supple.     Neurological: She is alert.   Skin: No rash noted.   Psychiatric: She has a normal mood and affect. Her behavior is normal.         ED Course   Procedures  Labs Reviewed   SARS-COV-2 RDRP GENE          Imaging Results    None          Medications   fluorescein ophthalmic strip 1 each (1 each Left Eye Given 2/27/22 0915)   TETRAcaine HCl (PF) 0.5 % Drop 2 drop (2 drops Left Eye Given 2/27/22 0914)     Medical Decision Making:   History:   Old Medical Records: I decided to obtain old medical records.  Initial Assessment:   66-year-old female transferred for urgent ophthalmology evaluation of suspected retinal detachment.  Hemodynamically stable.  No acute distress.  Differential Diagnosis:   My differential diagnosis includes but is not limited to:  Retinal detachment, arterial occlusion, less likely CVA, intracranial mass   ED Management:  See separate full ophthalmology evaluation and consult note.  Developed patient was ultimately stable for discharge to return tomorrow for definitive procedure.    I have reviewed the patient's records and discussed this case with my supervising physician.               Attending Attestation:     Physician Attestation Statement for NP/PA:   I discussed this assessment and plan of this patient with the NP/PA, but I did not personally examine the patient. The face to face encounter was performed by the NP/PA.    Other NP/PA Attestation Additions:      Medical Decision Making: Patient evaluated by ophtho just after arrival due to ophthalmologic concerns (particularly with the retina).                       Clinical Impression:   Final diagnoses:  [Z86.69] History of retinal tear  [H53.40] Visual field defect  [H33.22] Left retinal detachment (Primary)          ED Disposition Condition    Discharge Stable        ED Prescriptions     None        Follow-up Information    None          Yamilet Kimball PA-C  02/28/22 0933       Adriana Manzano MD  03/03/22 1449

## 2022-02-27 NOTE — ED TRIAGE NOTES
Pt. Recently had eye surgery secondary to tears of her retina in her left eye. Pt. Was told that if she starts to see shadows to come to the ER. Pt. Started to see shadows on yesterday afternoon. Pt. Denies any trauma. Pt. Denies any pain at this time.

## 2022-02-27 NOTE — CONSULTS
Consultation Report  Ophthalmology Service    Date: 2022    Chief complaint/Reason for Consult: RD - request per primary     History of Present Illness: Patricia Ramirez is a 66 y.o. female with h/o OS retinal tear w/ localized RD s/p pneumatic retinopexy with C3F8 22 with additional laser retinopexy 22 (POcularHx) who presents with blurred vision on her right half of her visual field in the left eye that has been getting progressively worse since yesterday evening. It is currently obstructing her central vision.    POcularHx: as above    Current eye gtts: Denies     Family Hx: Denies family history of glaucoma, macular degeneration, or blindness. family history includes Cancer in her mother; Cervical cancer in her sister; Liver disease in her father; Thyroid disease in her sister; Tremor in her sister.     PMHx:  has a past medical history of Allergy, Anxiety, Arthritis, Cataract, Depressive disorder, not elsewhere classified, Esophageal reflux, Hypertension, Impaired fasting glucose, Joint pain, Obesity, unspecified, and Other and unspecified hyperlipidemia.     PSurgHx:  has a past surgical history that includes right  arm  surgery; R knee replacement;  section (); Hysterectomy; and Blepharoplasty (Bilateral, 3/3/2021).     Home Medications:   Prior to Admission medications    Medication Sig Start Date End Date Taking? Authorizing Provider   acetaminophen (TYLENOL) 500 MG tablet Take 500 mg by mouth every 6 (six) hours as needed for Pain.    Historical Provider   aspirin (ECOTRIN) 81 MG EC tablet Take 81 mg by mouth once daily.    Historical Provider   atorvastatin (LIPITOR) 40 MG tablet Take 1 tablet by mouth once daily 21   Fabienne Erwin NP   BIOTIN ORAL Take by mouth.    Historical Provider   busPIRone (BUSPAR) 15 MG tablet TAKE 1 TABLET BY MOUTH THREE TIMES DAILY 21   Fabienne Erwin NP   citalopram (CELEXA) 40 MG tablet Take 1 tablet by mouth once daily 21   Fabienne  MADDI Erwin   cyclobenzaprine (FLEXERIL) 5 MG tablet TAKE 1 TABLET BY MOUTH TWICE DAILY AS NEEDED FOR MUSCLE SPASM 1/31/22   Fabienne Erwin NP   ergocalciferol (ERGOCALCIFEROL) 50,000 unit Cap Take 1 capsule by mouth once a week 2/19/22   Fabienne Erwin NP   olmesartan (BENICAR) 20 MG tablet Take 1/2 (one-half) tablet by mouth once daily 11/1/21   Fabienne Erwin NP   traZODone (DESYREL) 100 MG tablet Take 1 tablet (100 mg total) by mouth every evening. 9/16/21 9/16/22  Fabienne Erwin NP        Medications this encounter:     Allergies: is allergic to sulfa (sulfonamide antibiotics) and ace inhibitors.     Social:  reports that she has been smoking cigarettes. She has a 30.00 pack-year smoking history. She has never used smokeless tobacco. She reports current alcohol use. She reports that she does not use drugs.     ROS: As per HPI    Ocular examination/Dilated fundus examination:  Base Eye Exam     Visual Acuity (Snellen - Linear)       Right Left    Dist sc 20/40 HM    Dist ph sc 20/20           Tonometry (Tonopen, 11:44 AM)       Right Left    Pressure 17 11          Pupils       Dark Light Shape React APD    Right 4 2 Round Brisk None    Left 4 2 Round Brisk None          Visual Fields (Counting fingers)       Right Left     Full     Restrictions  Total superior nasal, inferior nasal deficiencies          Extraocular Movement       Right Left     Full, Ortho Full, Ortho          Neuro/Psych     Oriented x3: Yes    Mood/Affect: Normal          Dilation     Both eyes: 1% Mydriacyl, 2.5% Phenylephrine @ 11:44 AM            Slit Lamp and Fundus Exam     External Exam       Right Left    External scarring at previous surgical site scarring at previous surgical site, brow ptosis          Pen Light Exam       Right Left    Lids/Lashes Normal Normal    Conjunctiva/Sclera White and quiet White and quiet    Cornea Clear Clear    Anterior Chamber Deep and quiet Deep and quiet    Iris Round and reactive, iris vessel running  vertically  Round and reactive    Lens 1+ Nuclear sclerosis 1+ Nuclear sclerosis    Anterior Vitreous Vitreous syneresis Vitreous syneresis with PVD. <10% gas fill          Fundus Exam       Right Left    Disc Pink and sharp Normal    C/D Ratio 0.15 0.15    Macula Flat macula off retinal detachment    Vessels WNL Normal    Periphery Flat 360, no holes/tears/detachments Superior laser surrounding tear and scattered ST laser scars, Blonde fundus. Retinal detachment extending from area of lasers over temporal half of retina. Some lattice ST, possible break?                  Assessment/Plan:     1. Retinal detachment, left eye  - h/o OS superior retinal tear w/ localized RD s/p pneumatic retinopexy with C3F8 2/4/22 with additional laser retinopexy 2/8/22  - 2/26/22 late PM noticed the right portion of her left visual axis become darker which progressed over time and now obscures her central vision.  - [facility] HM, IOP 11. No APD.  - Macula off RD on exam. Possible break ST along lattice, but difficult to visualize with blonde fundus.   - R/B/A discussed for PPV/endolaser/gas or fluid exchange. Patient agreed to proceed and consent signed.   - Plan for the above tomorrow PM. NPO per retina.    Seen with Dr. Jaimes.    Patient's Best Contact Number: [phone]    Romeo Loyd MD PGY-2  [employer] Ophthalmology Resident  02/27/2022  11:45 AM

## 2022-02-27 NOTE — ED NOTES
Patient identifiers verified and correct for Patricia Ramirez  C/C: Patient presents to the ED as a transfer for ophthalmology evaluation, seeing shadows left eye, s/p eye surgery   APPEARANCE: awake and alert in NAD.  SKIN: warm, dry and intact. No breakdown or bruising.  MUSCULOSKELETAL: Patient moving all extremities spontaneously, no obvious swelling or deformities noted. Ambulates independently.  RESPIRATORY: Denies shortness of breath.Respirations unlabored.   CARDIAC: Denies CP, 2+ distal pulses; no peripheral edema  ABDOMEN: S/ND/NT, Denies nausea  : voids spontaneously, denies difficulty  Neurologic: AAO x 4; follows commands equal strength in all extremities; denies numbness/tingling. Denies dizziness

## 2022-02-28 ENCOUNTER — ANESTHESIA (OUTPATIENT)
Dept: SURGERY | Facility: HOSPITAL | Age: 67
End: 2022-02-28
Payer: MEDICARE

## 2022-02-28 ENCOUNTER — ANESTHESIA EVENT (OUTPATIENT)
Dept: SURGERY | Facility: HOSPITAL | Age: 67
End: 2022-02-28
Payer: MEDICARE

## 2022-02-28 ENCOUNTER — HOSPITAL ENCOUNTER (OUTPATIENT)
Facility: HOSPITAL | Age: 67
Discharge: HOME | End: 2022-02-28
Attending: OPHTHALMOLOGY | Admitting: OPHTHALMOLOGY
Payer: MEDICARE

## 2022-02-28 VITALS
SYSTOLIC BLOOD PRESSURE: 171 MMHG | DIASTOLIC BLOOD PRESSURE: 73 MMHG | TEMPERATURE: 97 F | OXYGEN SATURATION: 93 % | RESPIRATION RATE: 15 BRPM | HEART RATE: 78 BPM

## 2022-02-28 DIAGNOSIS — H33.22 LEFT RETINAL DETACHMENT: ICD-10-CM

## 2022-02-28 DIAGNOSIS — H33.022 RETINAL DETACHMENT OF LEFT EYE WITH MULTIPLE BREAKS: Primary | ICD-10-CM

## 2022-02-28 PROCEDURE — 27200651 HC AIRWAY, LMA: Performed by: ANESTHESIOLOGY

## 2022-02-28 PROCEDURE — 63600175 PHARM REV CODE 636 W HCPCS: Performed by: NURSE ANESTHETIST, CERTIFIED REGISTERED

## 2022-02-28 PROCEDURE — 25000003 PHARM REV CODE 250: Performed by: OPHTHALMOLOGY

## 2022-02-28 PROCEDURE — 67108 PR REPAIR DETACH RETINA,W VITRECTOMY: ICD-10-PCS | Mod: LT,,, | Performed by: OPHTHALMOLOGY

## 2022-02-28 PROCEDURE — 99499 UNLISTED E&M SERVICE: CPT | Mod: ,,, | Performed by: STUDENT IN AN ORGANIZED HEALTH CARE EDUCATION/TRAINING PROGRAM

## 2022-02-28 PROCEDURE — 25000003 PHARM REV CODE 250: Performed by: NURSE ANESTHETIST, CERTIFIED REGISTERED

## 2022-02-28 PROCEDURE — 37000009 HC ANESTHESIA EA ADD 15 MINS: Performed by: OPHTHALMOLOGY

## 2022-02-28 PROCEDURE — 25000003 PHARM REV CODE 250: Performed by: STUDENT IN AN ORGANIZED HEALTH CARE EDUCATION/TRAINING PROGRAM

## 2022-02-28 PROCEDURE — 63600175 PHARM REV CODE 636 W HCPCS: Performed by: OPHTHALMOLOGY

## 2022-02-28 PROCEDURE — C1784 OCULAR DEV, INTRAOP, DET RET: HCPCS | Performed by: OPHTHALMOLOGY

## 2022-02-28 PROCEDURE — 71000044 HC DOSC ROUTINE RECOVERY FIRST HOUR: Performed by: OPHTHALMOLOGY

## 2022-02-28 PROCEDURE — 67108 REPAIR DETACHED RETINA: CPT | Mod: LT,,, | Performed by: OPHTHALMOLOGY

## 2022-02-28 PROCEDURE — D9220A PRA ANESTHESIA: Mod: CRNA,,, | Performed by: NURSE ANESTHETIST, CERTIFIED REGISTERED

## 2022-02-28 PROCEDURE — D9220A PRA ANESTHESIA: ICD-10-PCS | Mod: ANES,,, | Performed by: ANESTHESIOLOGY

## 2022-02-28 PROCEDURE — 37000008 HC ANESTHESIA 1ST 15 MINUTES: Performed by: OPHTHALMOLOGY

## 2022-02-28 PROCEDURE — 99499 NO LOS: ICD-10-PCS | Mod: ,,, | Performed by: STUDENT IN AN ORGANIZED HEALTH CARE EDUCATION/TRAINING PROGRAM

## 2022-02-28 PROCEDURE — 36000707: Performed by: OPHTHALMOLOGY

## 2022-02-28 PROCEDURE — 36000706: Performed by: OPHTHALMOLOGY

## 2022-02-28 PROCEDURE — D9220A PRA ANESTHESIA: Mod: ANES,,, | Performed by: ANESTHESIOLOGY

## 2022-02-28 PROCEDURE — 27201423 OPTIME MED/SURG SUP & DEVICES STERILE SUPPLY: Performed by: OPHTHALMOLOGY

## 2022-02-28 PROCEDURE — D9220A PRA ANESTHESIA: ICD-10-PCS | Mod: CRNA,,, | Performed by: NURSE ANESTHETIST, CERTIFIED REGISTERED

## 2022-02-28 PROCEDURE — 71000015 HC POSTOP RECOV 1ST HR: Performed by: OPHTHALMOLOGY

## 2022-02-28 RX ORDER — FENTANYL CITRATE 50 UG/ML
INJECTION, SOLUTION INTRAMUSCULAR; INTRAVENOUS
Status: DISCONTINUED | OUTPATIENT
Start: 2022-02-28 | End: 2022-02-28

## 2022-02-28 RX ORDER — ACETAMINOPHEN 325 MG/1
650 TABLET ORAL EVERY 4 HOURS PRN
Status: DISCONTINUED | OUTPATIENT
Start: 2022-02-28 | End: 2022-02-28 | Stop reason: HOSPADM

## 2022-02-28 RX ORDER — ATROPINE SULFATE 10 MG/ML
1 SOLUTION/ DROPS OPHTHALMIC
Status: DISCONTINUED | OUTPATIENT
Start: 2022-02-28 | End: 2022-02-28 | Stop reason: HOSPADM

## 2022-02-28 RX ORDER — DEXAMETHASONE SODIUM PHOSPHATE 4 MG/ML
INJECTION, SOLUTION INTRA-ARTICULAR; INTRALESIONAL; INTRAMUSCULAR; INTRAVENOUS; SOFT TISSUE
Status: DISCONTINUED | OUTPATIENT
Start: 2022-02-28 | End: 2022-02-28 | Stop reason: HOSPADM

## 2022-02-28 RX ORDER — MOXIFLOXACIN 5 MG/ML
1 SOLUTION/ DROPS OPHTHALMIC
Status: DISCONTINUED | OUTPATIENT
Start: 2022-02-28 | End: 2022-02-28 | Stop reason: HOSPADM

## 2022-02-28 RX ORDER — INDOCYANINE GREEN AND WATER 25 MG
KIT INJECTION
Status: DISCONTINUED
Start: 2022-02-28 | End: 2022-02-28 | Stop reason: HOSPADM

## 2022-02-28 RX ORDER — LIDOCAINE HYDROCHLORIDE 20 MG/ML
INJECTION, SOLUTION EPIDURAL; INFILTRATION; INTRACAUDAL; PERINEURAL
Status: DISCONTINUED | OUTPATIENT
Start: 2022-02-28 | End: 2022-02-28 | Stop reason: HOSPADM

## 2022-02-28 RX ORDER — FENTANYL CITRATE 50 UG/ML
25 INJECTION, SOLUTION INTRAMUSCULAR; INTRAVENOUS EVERY 5 MIN PRN
Status: DISCONTINUED | OUTPATIENT
Start: 2022-02-28 | End: 2022-02-28 | Stop reason: HOSPADM

## 2022-02-28 RX ORDER — HYDROCODONE BITARTRATE AND ACETAMINOPHEN 5; 325 MG/1; MG/1
1 TABLET ORAL EVERY 4 HOURS PRN
Status: DISCONTINUED | OUTPATIENT
Start: 2022-02-28 | End: 2022-02-28 | Stop reason: HOSPADM

## 2022-02-28 RX ORDER — BUPIVACAINE HYDROCHLORIDE 7.5 MG/ML
INJECTION, SOLUTION EPIDURAL; RETROBULBAR
Status: DISCONTINUED | OUTPATIENT
Start: 2022-02-28 | End: 2022-02-28 | Stop reason: HOSPADM

## 2022-02-28 RX ORDER — MIDAZOLAM HYDROCHLORIDE 1 MG/ML
INJECTION, SOLUTION INTRAMUSCULAR; INTRAVENOUS
Status: DISCONTINUED | OUTPATIENT
Start: 2022-02-28 | End: 2022-02-28

## 2022-02-28 RX ORDER — PREDNISOLONE ACETATE 10 MG/ML
1 SUSPENSION/ DROPS OPHTHALMIC
Status: DISCONTINUED | OUTPATIENT
Start: 2022-02-28 | End: 2022-02-28 | Stop reason: HOSPADM

## 2022-02-28 RX ORDER — NEOMYCIN SULFATE, POLYMYXIN B SULFATE, AND DEXAMETHASONE 3.5; 10000; 1 MG/G; [USP'U]/G; MG/G
OINTMENT OPHTHALMIC
Status: DISCONTINUED | OUTPATIENT
Start: 2022-02-28 | End: 2022-02-28 | Stop reason: HOSPADM

## 2022-02-28 RX ORDER — EPINEPHRINE 1 MG/ML
INJECTION, SOLUTION INTRACARDIAC; INTRAMUSCULAR; INTRAVENOUS; SUBCUTANEOUS
Status: DISCONTINUED | OUTPATIENT
Start: 2022-02-28 | End: 2022-02-28 | Stop reason: HOSPADM

## 2022-02-28 RX ORDER — ONDANSETRON 2 MG/ML
4 INJECTION INTRAMUSCULAR; INTRAVENOUS DAILY PRN
Status: DISCONTINUED | OUTPATIENT
Start: 2022-02-28 | End: 2022-02-28 | Stop reason: HOSPADM

## 2022-02-28 RX ORDER — LIDOCAINE HYDROCHLORIDE 20 MG/ML
INJECTION, SOLUTION EPIDURAL; INFILTRATION; INTRACAUDAL; PERINEURAL
Status: DISCONTINUED
Start: 2022-02-28 | End: 2022-02-28 | Stop reason: HOSPADM

## 2022-02-28 RX ORDER — DEXAMETHASONE SODIUM PHOSPHATE 4 MG/ML
INJECTION, SOLUTION INTRA-ARTICULAR; INTRALESIONAL; INTRAMUSCULAR; INTRAVENOUS; SOFT TISSUE
Status: DISCONTINUED
Start: 2022-02-28 | End: 2022-02-28 | Stop reason: HOSPADM

## 2022-02-28 RX ORDER — ONDANSETRON 4 MG/1
4 TABLET, FILM COATED ORAL EVERY 8 HOURS PRN
Qty: 12 TABLET | Refills: 0 | Status: SHIPPED | OUTPATIENT
Start: 2022-02-28 | End: 2022-09-06

## 2022-02-28 RX ORDER — ONDANSETRON 2 MG/ML
INJECTION INTRAMUSCULAR; INTRAVENOUS
Status: DISCONTINUED | OUTPATIENT
Start: 2022-02-28 | End: 2022-02-28

## 2022-02-28 RX ORDER — SODIUM CHLORIDE 0.9 % (FLUSH) 0.9 %
10 SYRINGE (ML) INJECTION
Status: DISCONTINUED | OUTPATIENT
Start: 2022-02-28 | End: 2022-02-28 | Stop reason: HOSPADM

## 2022-02-28 RX ORDER — TETRACAINE HYDROCHLORIDE 5 MG/ML
1 SOLUTION OPHTHALMIC
Status: DISCONTINUED | OUTPATIENT
Start: 2022-02-28 | End: 2022-02-28 | Stop reason: HOSPADM

## 2022-02-28 RX ORDER — NEOMYCIN SULFATE, POLYMYXIN B SULFATE, AND DEXAMETHASONE 3.5; 10000; 1 MG/G; [USP'U]/G; MG/G
OINTMENT OPHTHALMIC
Status: DISCONTINUED
Start: 2022-02-28 | End: 2022-02-28 | Stop reason: HOSPADM

## 2022-02-28 RX ORDER — DEXAMETHASONE SODIUM PHOSPHATE 4 MG/ML
INJECTION, SOLUTION INTRA-ARTICULAR; INTRALESIONAL; INTRAMUSCULAR; INTRAVENOUS; SOFT TISSUE
Status: DISCONTINUED | OUTPATIENT
Start: 2022-02-28 | End: 2022-02-28

## 2022-02-28 RX ORDER — ACETAMINOPHEN 10 MG/ML
INJECTION, SOLUTION INTRAVENOUS
Status: DISCONTINUED | OUTPATIENT
Start: 2022-02-28 | End: 2022-02-28

## 2022-02-28 RX ORDER — LIDOCAINE HYDROCHLORIDE 10 MG/ML
INJECTION, SOLUTION INTRAVENOUS
Status: DISCONTINUED | OUTPATIENT
Start: 2022-02-28 | End: 2022-02-28

## 2022-02-28 RX ORDER — OXYCODONE AND ACETAMINOPHEN 5; 325 MG/1; MG/1
1 TABLET ORAL EVERY 6 HOURS PRN
Qty: 12 TABLET | Refills: 0 | Status: SHIPPED | OUTPATIENT
Start: 2022-02-28 | End: 2022-08-08

## 2022-02-28 RX ORDER — BUPIVACAINE HYDROCHLORIDE 7.5 MG/ML
INJECTION, SOLUTION EPIDURAL; RETROBULBAR
Status: DISCONTINUED
Start: 2022-02-28 | End: 2022-02-28 | Stop reason: HOSPADM

## 2022-02-28 RX ORDER — CYCLOPENTOLATE HYDROCHLORIDE 10 MG/ML
1 SOLUTION/ DROPS OPHTHALMIC
Status: DISCONTINUED | OUTPATIENT
Start: 2022-02-28 | End: 2022-02-28 | Stop reason: HOSPADM

## 2022-02-28 RX ORDER — PROPOFOL 10 MG/ML
VIAL (ML) INTRAVENOUS
Status: DISCONTINUED | OUTPATIENT
Start: 2022-02-28 | End: 2022-02-28

## 2022-02-28 RX ORDER — VANCOMYCIN HYDROCHLORIDE 500 MG/10ML
INJECTION, POWDER, LYOPHILIZED, FOR SOLUTION INTRAVENOUS
Status: DISCONTINUED | OUTPATIENT
Start: 2022-02-28 | End: 2022-02-28 | Stop reason: HOSPADM

## 2022-02-28 RX ORDER — PHENYLEPHRINE HYDROCHLORIDE 25 MG/ML
1 SOLUTION/ DROPS OPHTHALMIC
Status: DISCONTINUED | OUTPATIENT
Start: 2022-02-28 | End: 2022-02-28 | Stop reason: HOSPADM

## 2022-02-28 RX ORDER — SODIUM CHLORIDE 0.9 % (FLUSH) 0.9 %
3 SYRINGE (ML) INJECTION EVERY 30 MIN PRN
Status: DISCONTINUED | OUTPATIENT
Start: 2022-02-28 | End: 2022-02-28 | Stop reason: HOSPADM

## 2022-02-28 RX ORDER — VANCOMYCIN HYDROCHLORIDE 500 MG/10ML
INJECTION, POWDER, LYOPHILIZED, FOR SOLUTION INTRAVENOUS
Status: DISCONTINUED
Start: 2022-02-28 | End: 2022-02-28 | Stop reason: HOSPADM

## 2022-02-28 RX ORDER — EPINEPHRINE 1 MG/ML
INJECTION, SOLUTION INTRACARDIAC; INTRAMUSCULAR; INTRAVENOUS; SUBCUTANEOUS
Status: DISCONTINUED
Start: 2022-02-28 | End: 2022-02-28 | Stop reason: HOSPADM

## 2022-02-28 RX ADMIN — LIDOCAINE HYDROCHLORIDE 50 MG: 10 INJECTION, SOLUTION INTRAVENOUS at 04:02

## 2022-02-28 RX ADMIN — CYCLOPENTOLATE HYDROCHLORIDE 1 DROP: 10 SOLUTION OPHTHALMIC at 02:02

## 2022-02-28 RX ADMIN — MOXIFLOXACIN 1 DROP: 5 SOLUTION/ DROPS OPHTHALMIC at 02:02

## 2022-02-28 RX ADMIN — DEXAMETHASONE SODIUM PHOSPHATE 8 MG: 4 INJECTION, SOLUTION INTRAMUSCULAR; INTRAVENOUS at 04:02

## 2022-02-28 RX ADMIN — PHENYLEPHRINE HYDROCHLORIDE 1 DROP: 25 SOLUTION/ DROPS OPHTHALMIC at 02:02

## 2022-02-28 RX ADMIN — ATROPINE SULFATE 1 DROP: 10 SOLUTION OPHTHALMIC at 02:02

## 2022-02-28 RX ADMIN — PREDNISOLONE ACETATE 1 DROP: 10 SUSPENSION OPHTHALMIC at 02:02

## 2022-02-28 RX ADMIN — SODIUM CHLORIDE: 0.9 INJECTION, SOLUTION INTRAVENOUS at 04:02

## 2022-02-28 RX ADMIN — ACETAMINOPHEN 1000 MG: 10 INJECTION INTRAVENOUS at 05:02

## 2022-02-28 RX ADMIN — MIDAZOLAM 2 MG: 1 INJECTION INTRAMUSCULAR; INTRAVENOUS at 04:02

## 2022-02-28 RX ADMIN — FENTANYL CITRATE 50 MCG: 50 INJECTION INTRAMUSCULAR; INTRAVENOUS at 04:02

## 2022-02-28 RX ADMIN — TETRACAINE HYDROCHLORIDE 1 DROP: 5 SOLUTION OPHTHALMIC at 02:02

## 2022-02-28 RX ADMIN — ONDANSETRON 4 MG: 2 INJECTION INTRAMUSCULAR; INTRAVENOUS at 04:02

## 2022-02-28 RX ADMIN — PROPOFOL 200 MG: 10 INJECTION, EMULSION INTRAVENOUS at 04:02

## 2022-02-28 NOTE — OP NOTE
Preoperative diagnosis: RRD multiple breaks,  macula involving OS    Post op Dx: same    Procedure performed:  25g PPV/AFx/EL/SF6 14% OS    Attending surgeons:  MINO Martinez    Anesthesia:  LMA with retrobulbar injection 4.5cc mixture of 2% lidocaine, 0.75% marcaine    Estimated blood loss: minimal    Complications:  None    DOS: 2/28/22    Disposition: stable to recovery then home    Indications for surgery:   This is a 65 yo patient who underwent a pneumatic retinopexy for RRD.  As the gas bubble dissipated, she noted recurrent progressive blind spot and a temporal break inferior to prior laser.  Decision was made to take the patient to surgery to repair the RD, prevent further vision loss and hopefully improved some vision.  Risks, benefits, and alternatives to surgery were discussed in detail. Risks including loss of vision, loss of eye, retina detachment, hemorrhage, infection, lens dislocation, glaucoma, hypotony, ptosis, diplopia    Description of procedure:  After proper informed consent was obtained, the patient was brought back to the operating room at [facility] where monitored anesthesia care was induced.  A retrobulbar injection was provided above without complication.  The patient is prepped draped in normal sterile fashion for ophthalmic surgery and a lid speculum was placed in the left eye.  A standard 3 port 25 gauge pars plana vitrectomy setup with the infusion cannula inserted 4.0 mm posterior to the limbus.  The infusion cannula was turned on after it was observed free and clear of all underlying tissue.  Super nasal and superotemporal trocars were also placed  4.0 mm posterior to the limbus.  The vitrector and light pipe were introduced in the vitreous cavity and a core vitrectomy was performed.   A 360 degree cortical vitrectomy was performed.  Care was taken while removing the vitreous from around the retina breaks superotemporaly.  Inferior wisps of PVR were removed with  the vitrector.  The edges of the breaks were marked with diathermy.  A posterior retinotomy was created ST.  An air fluid exchange was performed.  The retina flattened nicely following this maneuver.  Endolaser was applied around the primary breaks and retinotomy.   The SN trocar was removed and not leaking after gentle massage. A SF6 gas mixture was created.  Approximately 40 cc were cycled through the eye.  The superotemporal and infusion trocars removed and not leaking after gentle massage.  The eye was normal pressure via palpation.  Subconjunctival injections of vancomycin and Decadron were given and  the patient's the drapes removed. the patient was washed free of Betadine prep solution,  ointment was placed in the eye then patched shielded, mac anesthesia was reversed and he was brought to recovery in stable condition tolerating the procedure well.

## 2022-02-28 NOTE — H&P
Pre-Operative History & Physical  Ophthalmology      SUBJECTIVE:     History of Present Illness:  Patient is a 66 y.o. female presents with left retinal detachment, macula off.     MEDICATIONS:   PTA Medications   Medication Sig    acetaminophen (TYLENOL) 500 MG tablet Take 500 mg by mouth every 6 (six) hours as needed for Pain.    aspirin (ECOTRIN) 81 MG EC tablet Take 81 mg by mouth once daily.    atorvastatin (LIPITOR) 40 MG tablet Take 1 tablet by mouth once daily    BIOTIN ORAL Take by mouth.    busPIRone (BUSPAR) 15 MG tablet TAKE 1 TABLET BY MOUTH THREE TIMES DAILY    citalopram (CELEXA) 40 MG tablet Take 1 tablet by mouth once daily    cyclobenzaprine (FLEXERIL) 5 MG tablet TAKE 1 TABLET BY MOUTH TWICE DAILY AS NEEDED FOR MUSCLE SPASM    ergocalciferol (ERGOCALCIFEROL) 50,000 unit Cap Take 1 capsule by mouth once a week    olmesartan (BENICAR) 20 MG tablet Take 1/2 (one-half) tablet by mouth once daily    traZODone (DESYREL) 100 MG tablet Take 1 tablet (100 mg total) by mouth every evening.       ALLERGIES:   Review of patient's allergies indicates:   Allergen Reactions    Sulfa (sulfonamide antibiotics) Hives    Ace inhibitors Other (See Comments)     Cough         PAST MEDICAL HISTORY:   Past Medical History:   Diagnosis Date    Allergy     Anxiety     Arthritis     Cataract     Depressive disorder, not elsewhere classified     Esophageal reflux     Hypertension     Impaired fasting glucose     Joint pain     Obesity, unspecified     Other and unspecified hyperlipidemia      PAST SURGICAL HISTORY:   Past Surgical History:   Procedure Laterality Date    BLEPHAROPLASTY Bilateral 3/3/2021    Procedure: BLEPHAROPLASTY;  Surgeon: Maite Acuna MD;  Location[facility];  Service: Ophthalmology;  Laterality: Bilateral;     SECTION      HYSTERECTOMY      without BSO    R knee replacement      right  arm  surgery       PAST FAMILY HISTORY:   Family History   Problem  Relation Age of Onset    Cancer Mother         lung    Liver disease Father     Thyroid disease Sister     Cervical cancer Sister     Tremor Sister      SOCIAL HISTORY:   Social History     Tobacco Use    Smoking status: Current Every Day Smoker     Packs/day: 0.75     Years: 40.00     Pack years: 30.00     Types: Cigarettes    Smokeless tobacco: Never Used   Substance Use Topics    Alcohol use: Yes     Alcohol/week: 0.0 standard drinks     Comment: occasionally    Drug use: No        MENTAL STATUS: Alert    REVIEW OF SYSTEMS: Negative    OBJECTIVE:     Vital Signs (Most Recent)  BP: 128/66 (02/28/22 1445)    Physical Exam:  General: NAD  HEENT: AT/NC, nuclear sclerosis  Lungs: Adequate respirations  Heart: + pulses  Abdomen: Soft    ASSESSMENT/PLAN:     Patient is a 66 y.o. female with macula off rhegmatogeous retinal detachment left eye     - Risks/benefits/alternatives of the procedure including, but not limited to, infection, bleeding, pain, ptosis, macular edema, corneal edema, persistent corneal defect, retinal tears, retinal detachment, epiretinal membrane, elevated intraocular pressure, hypotony, cataract formation, possible need for strict post-op head positioning, possible temporary avoidance of air travel, loss of vision, loss of the eye, paralysis, and death were discussed with the patient and/or family. The patient/family voiced good understanding, the informed consent was signed, witnessed, and placed in chart. All patient and family questions were answered.   - Will proceed with 25G PPV/AFx/EL/Gas vs oil, any other necessary procedures left eye  - Plan for general anesthesia   - Allergies reviewed:   Review of patient's allergies indicates:   Allergen Reactions    Sulfa (sulfonamide antibiotics) Hives    Ace inhibitors Other (See Comments)     Cough       - patient is taking ASA, OK to continue    Oziel Jaimes MD  Fellow, Retina Service

## 2022-02-28 NOTE — ANESTHESIA PREPROCEDURE EVALUATION
2022  Patricia Ramirez is a 66 y.o., female.    Past Medical History:   Diagnosis Date    Allergy     Anxiety     Arthritis     Cataract     Depressive disorder, not elsewhere classified     Esophageal reflux     Hypertension     Impaired fasting glucose     Joint pain     Obesity, unspecified     Other and unspecified hyperlipidemia        Past Surgical History:   Procedure Laterality Date    BLEPHAROPLASTY Bilateral 3/3/2021    Procedure: BLEPHAROPLASTY;  Surgeon: Maite Acuna MD;  Location: [facility];  Service: Ophthalmology;  Laterality: Bilateral;     SECTION  1973    HYSTERECTOMY      without BSO    R knee replacement      right  arm  surgery             Pre-op Assessment    I have reviewed the Patient Summary Reports.     I have reviewed the Nursing Notes. I have reviewed the NPO Status.      Review of Systems  Anesthesia Hx:  No problems with previous Anesthesia  History of prior surgery of interest to airway management or planning: Denies Family Hx of Anesthesia complications.   Denies Personal Hx of Anesthesia complications.   Social:  Smoker    Cardiovascular:   Hypertension Denies MI.    Denies Angina.    Pulmonary:  Pulmonary Normal  Denies Recent URI.    Hepatic/GI:   GERD (no symptoms currently), well controlled    Neurological:   Headaches    Psych:   depression          Physical Exam  General: Alert, Oriented and Well nourished    Airway:  Mallampati: III   Mouth Opening: Normal  TM Distance: Normal  Neck ROM: Normal ROM    Dental:  Dentures    Chest/Lungs:  Normal Respiratory Rate    Heart:  Rate: Normal  Rhythm: Regular Rhythm        Anesthesia Plan  Type of Anesthesia, risks & benefits discussed:    Anesthesia Type: Gen ETT, Gen Supraglottic Airway  Intra-op Monitoring Plan: Standard ASA Monitors  Post Op Pain Control Plan: multimodal analgesia and IV/PO  Opioids PRN  Informed Consent: Informed consent signed with the Patient and all parties understand the risks and agree with anesthesia plan.  All questions answered.   ASA Score: 3    Ready For Surgery From Anesthesia Perspective.     .

## 2022-02-28 NOTE — BRIEF OP NOTE
Preoperative diagnosis: RRD multiple breaks,  macula involving OS    Post op Dx: same    Procedure performed:  25g PPV/AFx/EL/SF6 14% OS    Attending Surgeon: Michelle    Assistant Surgeon: Theodore    Anesthesia: LMA, retrobulbar injection of 4.0cc mixture 0.75%Marcaine, 2% Xylocaine    Estimated blood loss: Minimal    Complication: None    Specimen: None    Disposition: Stable to recovery    Findings/Outcome: Temporal RRD with small break at 2:45 inferior to prior last.  Inferior wisps of PVR removed with vitrector.  ST posterior retinotomy created.    Date of Discharge: 2/28/22    Discharge Disposition: stable to recovery then home    F/U: tomorrow

## 2022-02-28 NOTE — DISCHARGE SUMMARY
[facility] - [facility] (2nd Fl)  Discharge Note  Short Stay    Procedure(s) (LRB):  REPAIR, RETINAL DETACHMENT, WITH VITRECTOMY (Left)  LASER (Left)    OUTCOME: Patient tolerated treatment/procedure well without complication and is now ready for discharge.    DISPOSITION: Home or Self Care    FINAL DIAGNOSIS:  Retina detachment multiple breaks OS    FOLLOWUP: In clinic    DISCHARGE INSTRUCTIONS:  No discharge procedures on file.     TIME SPENT ON DISCHARGE:  minutes

## 2022-03-01 ENCOUNTER — DOCUMENTATION ONLY (OUTPATIENT)
Dept: OPHTHALMOLOGY | Facility: CLINIC | Age: 67
End: 2022-03-01
Payer: MEDICARE

## 2022-03-01 NOTE — PLAN OF CARE
Patient and famly states they are ready to be discharged. Instructions and prescription given to patient and family. Both verbalize understanding. Patient tolerating po liquids with no difficulty. Patient states pain is at a tolerable level for them. Anesthesia consent and surgical consent in chart upon patient's discharge from [facility].

## 2022-03-01 NOTE — PROGRESS NOTES
Progress Note  Retina Service    Date: 2022    Chief complaint: mild ache left eye     History of Present Illness: Patricia Ramirez is a 66 y.o. female who presents for postop day 1. Was face downper our instructions last night.    PMHx:  has a past medical history of Allergy, Anxiety, Arthritis, Cataract, Depressive disorder, not elsewhere classified, Esophageal reflux, Hypertension, Impaired fasting glucose, Joint pain, Obesity, unspecified, and Other and unspecified hyperlipidemia.     PSHx:  has a past surgical history that includes right  arm  surgery; R knee replacement;  section (); Hysterectomy; and Blepharoplasty (Bilateral, 3/3/2021).     Home Medications:   Prior to Admission medications    Medication Sig Start Date End Date Taking? Authorizing Provider   acetaminophen (TYLENOL) 500 MG tablet Take 500 mg by mouth every 6 (six) hours as needed for Pain.    Historical Provider   aspirin (ECOTRIN) 81 MG EC tablet Take 81 mg by mouth once daily.    Historical Provider   atorvastatin (LIPITOR) 40 MG tablet Take 1 tablet by mouth once daily 21   Fabienne Erwin NP   BIOTIN ORAL Take by mouth.    Historical Provider   busPIRone (BUSPAR) 15 MG tablet TAKE 1 TABLET BY MOUTH THREE TIMES DAILY 21   Fabienne Erwin NP   citalopram (CELEXA) 40 MG tablet Take 1 tablet by mouth once daily 21   Fabienne Erwin NP   cyclobenzaprine (FLEXERIL) 5 MG tablet TAKE 1 TABLET BY MOUTH TWICE DAILY AS NEEDED FOR MUSCLE SPASM 22   Fabienne Erwin NP   ergocalciferol (ERGOCALCIFEROL) 50,000 unit Cap Take 1 capsule by mouth once a week 22   Fabienne Erwin NP   olmesartan (BENICAR) 20 MG tablet Take 1/2 (one-half) tablet by mouth once daily 21   Fabienne Erwin NP   ondansetron (ZOFRAN) 4 MG tablet Take 1 tablet (4 mg total) by mouth every 8 (eight) hours as needed for Nausea. 22   MINO Martinez MD   oxyCODONE-acetaminophen (PERCOCET) 5-325 mg per tablet Take 1 tablet by mouth every 6  (six) hours as needed for Pain. 2/28/22   MINO Martinez MD   traZODone (DESYREL) 100 MG tablet Take 1 tablet (100 mg total) by mouth every evening. 9/16/21 9/16/22  Fabienne Erwin NP        Medications this encounter:     Allergies: is allergic to sulfa (sulfonamide antibiotics) and ace inhibitors.     Social:  reports that she has been smoking cigarettes. She has a 30.00 pack-year smoking history. She has never used smokeless tobacco. She reports current alcohol use. She reports that she does not use drugs.     Family Hx: No family history of glaucoma, macular degeneration, or blindness. family history includes Cancer in her mother; Cervical cancer in her sister; Liver disease in her father; Thyroid disease in her sister; Tremor in her sister.     ROS: Negative x 10 except for complaints as described in HPI; negative for fever, chills, weight loss, nausea, vomiting, diarrhea, shortness of breath, nasal discharge, cough, abdominal pain, dyspnea, difficulty moving arms and legs, confusion, dysuria, palpitations, or chest pain     Ocular examination/Dilated fundus examination: OS only    Va: HM  Pupils: pharm dil  Tp: 14    SLE:  LLA: wnl  CS: WANDA  K: clear  AC: tr cell, tr flare  I: pharm dil  L: NS  V: SF6    DFE: 90D and PanRetinal with Indirect  D: PS  M: flat, good FLR  V: nml  P: superior tear well surrounded with laser, ST small breaks well surrounded with laser, additional laser temporally and IT in areas of pigment, posterior retinotomy flat and well surrounded with laser       Assessment/Plan:   1. POD #1 s/p PPV/AFx/EL/SF6 for RRD macula involving, left eye  - Doing well   - IOP OK at 14  - start PF QID, Vigamox QID, Atropine BID, Maxitrol lisa qHS  - No vigorous activity, no lifting, bending, etc.  - Gil shield when sleeping  - Gil shield, glasses, or sunglasses all times for protection   - No shower   - Face down, right side down positioning  - RD/Endophthalmitis precautions       RTC 1 wk, sooner  PRN if any problems (deepti pain, redness, dimming or loss of vision)      Discussed patient's history, physical, assessment and plan with retina staff.     Oziel Jaimes MD  Fellow, Retina Service  03/01/2022  9:06 AM

## 2022-03-01 NOTE — TRANSFER OF CARE
Anesthesia Transfer of Care Note    Patient: Patricia Ramirez    Procedure(s) Performed: Procedure(s) (LRB):  REPAIR, RETINAL DETACHMENT, WITH VITRECTOMY (Left)  LASER (Left)    Patient location: [facility]    Anesthesia Type: general    Transport from OR: Transported from OR on room air with adequate spontaneous ventilation    Post pain: adequate analgesia    Post assessment: no apparent anesthetic complications    Post vital signs: stable    Level of consciousness: awake, alert and oriented    Nausea/Vomiting: no nausea/vomiting    Complications: none    Transfer of care protocol was followed      Last vitals:   Visit Vitals  BP (!) 165/76   Breastfeeding No

## 2022-03-02 NOTE — ANESTHESIA POSTPROCEDURE EVALUATION
Anesthesia Post Evaluation    Patient: Patricia Ramirez    Procedure(s) Performed: Procedure(s) (LRB):  REPAIR, RETINAL DETACHMENT, WITH VITRECTOMY (Left)  LASER (Left)    Final Anesthesia Type: general      Patient location during evaluation: PACU  Patient participation: Yes- Able to Participate  Level of consciousness: awake and alert  Post-procedure vital signs: reviewed and stable  Pain management: adequate  Airway patency: patent    PONV status at discharge: No PONV  Anesthetic complications: no      Cardiovascular status: blood pressure returned to baseline  Respiratory status: unassisted, room air and spontaneous ventilation  Hydration status: euvolemic  Follow-up not needed.          Vitals Value Taken Time   /73 02/28/22 1817   Temp 36.2 °C (97.2 °F) 02/28/22 1805   Pulse 75 02/28/22 1821   Resp 29 02/28/22 1821   SpO2 90 % 02/28/22 1821   Vitals shown include unvalidated device data.      No case tracking events are documented in the log.      Pain/Ede Score: No data recorded

## 2022-03-08 ENCOUNTER — OFFICE VISIT (OUTPATIENT)
Dept: OPHTHALMOLOGY | Facility: CLINIC | Age: 67
End: 2022-03-08
Payer: MEDICARE

## 2022-03-08 DIAGNOSIS — H33.022 RETINAL DETACHMENT OF LEFT EYE WITH MULTIPLE BREAKS: Primary | ICD-10-CM

## 2022-03-08 PROCEDURE — 1160F RVW MEDS BY RX/DR IN RCRD: CPT | Mod: CPTII,S$GLB,, | Performed by: OPHTHALMOLOGY

## 2022-03-08 PROCEDURE — 3288F FALL RISK ASSESSMENT DOCD: CPT | Mod: CPTII,S$GLB,, | Performed by: OPHTHALMOLOGY

## 2022-03-08 PROCEDURE — 99999 PR PBB SHADOW E&M-EST. PATIENT-LVL III: CPT | Mod: PBBFAC,,, | Performed by: OPHTHALMOLOGY

## 2022-03-08 PROCEDURE — 1160F PR REVIEW ALL MEDS BY PRESCRIBER/CLIN PHARMACIST DOCUMENTED: ICD-10-PCS | Mod: CPTII,S$GLB,, | Performed by: OPHTHALMOLOGY

## 2022-03-08 PROCEDURE — 4010F ACE/ARB THERAPY RXD/TAKEN: CPT | Mod: CPTII,S$GLB,, | Performed by: OPHTHALMOLOGY

## 2022-03-08 PROCEDURE — 1101F PT FALLS ASSESS-DOCD LE1/YR: CPT | Mod: CPTII,S$GLB,, | Performed by: OPHTHALMOLOGY

## 2022-03-08 PROCEDURE — 1101F PR PT FALLS ASSESS DOC 0-1 FALLS W/OUT INJ PAST YR: ICD-10-PCS | Mod: CPTII,S$GLB,, | Performed by: OPHTHALMOLOGY

## 2022-03-08 PROCEDURE — 99024 PR POST-OP FOLLOW-UP VISIT: ICD-10-PCS | Mod: S$GLB,,, | Performed by: OPHTHALMOLOGY

## 2022-03-08 PROCEDURE — 1126F PR PAIN SEVERITY QUANTIFIED, NO PAIN PRESENT: ICD-10-PCS | Mod: CPTII,S$GLB,, | Performed by: OPHTHALMOLOGY

## 2022-03-08 PROCEDURE — 1126F AMNT PAIN NOTED NONE PRSNT: CPT | Mod: CPTII,S$GLB,, | Performed by: OPHTHALMOLOGY

## 2022-03-08 PROCEDURE — 99999 PR PBB SHADOW E&M-EST. PATIENT-LVL III: ICD-10-PCS | Mod: PBBFAC,,, | Performed by: OPHTHALMOLOGY

## 2022-03-08 PROCEDURE — 4010F PR ACE/ARB THEARPY RXD/TAKEN: ICD-10-PCS | Mod: CPTII,S$GLB,, | Performed by: OPHTHALMOLOGY

## 2022-03-08 PROCEDURE — 99024 POSTOP FOLLOW-UP VISIT: CPT | Mod: S$GLB,,, | Performed by: OPHTHALMOLOGY

## 2022-03-08 PROCEDURE — 3288F PR FALLS RISK ASSESSMENT DOCUMENTED: ICD-10-PCS | Mod: CPTII,S$GLB,, | Performed by: OPHTHALMOLOGY

## 2022-03-08 PROCEDURE — 1159F PR MEDICATION LIST DOCUMENTED IN MEDICAL RECORD: ICD-10-PCS | Mod: CPTII,S$GLB,, | Performed by: OPHTHALMOLOGY

## 2022-03-08 PROCEDURE — 1159F MED LIST DOCD IN RCRD: CPT | Mod: CPTII,S$GLB,, | Performed by: OPHTHALMOLOGY

## 2022-03-08 NOTE — PROGRESS NOTES
HPI     1 wk PO    Pt states her va OS has improved since surgery. No pain in eye but does   say she is having associated headaches.    No flashes  No floaters  No pain today  Gtts: Pred Forte QID OS          Atropine BID OS          Moxifloxacin QID OS          Maxitrol Oint QHS OS    Last edited by Mamie Beth on 3/8/2022  1:39 PM. (History)        A/P    1. RRD OS      Assessment/Plan:   1. s/p PPV/AFx/EL/SF6 for RRD macula involving, left eye  2/28/22  - Doing well   - IOP OK at 14  Retina Flat, no PVR    Taper PF 2/1/0  Keep restrictions x 1 week  Then ok for activities when bubble gone      1 month OCT

## 2022-04-12 ENCOUNTER — OFFICE VISIT (OUTPATIENT)
Dept: OPHTHALMOLOGY | Facility: CLINIC | Age: 67
End: 2022-04-12
Payer: MEDICARE

## 2022-04-12 DIAGNOSIS — H35.372 EPIRETINAL MEMBRANE, LEFT: ICD-10-CM

## 2022-04-12 DIAGNOSIS — H33.022 RETINAL DETACHMENT OF LEFT EYE WITH MULTIPLE BREAKS: Primary | ICD-10-CM

## 2022-04-12 PROCEDURE — 3288F FALL RISK ASSESSMENT DOCD: CPT | Mod: CPTII,S$GLB,, | Performed by: OPHTHALMOLOGY

## 2022-04-12 PROCEDURE — 99999 PR PBB SHADOW E&M-EST. PATIENT-LVL III: CPT | Mod: PBBFAC,,, | Performed by: OPHTHALMOLOGY

## 2022-04-12 PROCEDURE — 1126F AMNT PAIN NOTED NONE PRSNT: CPT | Mod: CPTII,S$GLB,, | Performed by: OPHTHALMOLOGY

## 2022-04-12 PROCEDURE — 4010F ACE/ARB THERAPY RXD/TAKEN: CPT | Mod: CPTII,S$GLB,, | Performed by: OPHTHALMOLOGY

## 2022-04-12 PROCEDURE — 1160F RVW MEDS BY RX/DR IN RCRD: CPT | Mod: CPTII,S$GLB,, | Performed by: OPHTHALMOLOGY

## 2022-04-12 PROCEDURE — 99024 POSTOP FOLLOW-UP VISIT: CPT | Mod: S$GLB,,, | Performed by: OPHTHALMOLOGY

## 2022-04-12 PROCEDURE — 3044F HG A1C LEVEL LT 7.0%: CPT | Mod: CPTII,S$GLB,, | Performed by: OPHTHALMOLOGY

## 2022-04-12 PROCEDURE — 1101F PT FALLS ASSESS-DOCD LE1/YR: CPT | Mod: CPTII,S$GLB,, | Performed by: OPHTHALMOLOGY

## 2022-04-12 PROCEDURE — 1101F PR PT FALLS ASSESS DOC 0-1 FALLS W/OUT INJ PAST YR: ICD-10-PCS | Mod: CPTII,S$GLB,, | Performed by: OPHTHALMOLOGY

## 2022-04-12 PROCEDURE — 1160F PR REVIEW ALL MEDS BY PRESCRIBER/CLIN PHARMACIST DOCUMENTED: ICD-10-PCS | Mod: CPTII,S$GLB,, | Performed by: OPHTHALMOLOGY

## 2022-04-12 PROCEDURE — 3044F PR MOST RECENT HEMOGLOBIN A1C LEVEL <7.0%: ICD-10-PCS | Mod: CPTII,S$GLB,, | Performed by: OPHTHALMOLOGY

## 2022-04-12 PROCEDURE — 4010F PR ACE/ARB THEARPY RXD/TAKEN: ICD-10-PCS | Mod: CPTII,S$GLB,, | Performed by: OPHTHALMOLOGY

## 2022-04-12 PROCEDURE — 3288F PR FALLS RISK ASSESSMENT DOCUMENTED: ICD-10-PCS | Mod: CPTII,S$GLB,, | Performed by: OPHTHALMOLOGY

## 2022-04-12 PROCEDURE — 92134 POSTERIOR SEGMENT OCT RETINA (OCULAR COHERENCE TOMOGRAPHY)-BOTH EYES: ICD-10-PCS | Mod: S$GLB,,, | Performed by: OPHTHALMOLOGY

## 2022-04-12 PROCEDURE — 92134 CPTRZ OPH DX IMG PST SGM RTA: CPT | Mod: S$GLB,,, | Performed by: OPHTHALMOLOGY

## 2022-04-12 PROCEDURE — 1159F PR MEDICATION LIST DOCUMENTED IN MEDICAL RECORD: ICD-10-PCS | Mod: CPTII,S$GLB,, | Performed by: OPHTHALMOLOGY

## 2022-04-12 PROCEDURE — 1126F PR PAIN SEVERITY QUANTIFIED, NO PAIN PRESENT: ICD-10-PCS | Mod: CPTII,S$GLB,, | Performed by: OPHTHALMOLOGY

## 2022-04-12 PROCEDURE — 99999 PR PBB SHADOW E&M-EST. PATIENT-LVL III: ICD-10-PCS | Mod: PBBFAC,,, | Performed by: OPHTHALMOLOGY

## 2022-04-12 PROCEDURE — 99024 PR POST-OP FOLLOW-UP VISIT: ICD-10-PCS | Mod: S$GLB,,, | Performed by: OPHTHALMOLOGY

## 2022-04-12 PROCEDURE — 1159F MED LIST DOCD IN RCRD: CPT | Mod: CPTII,S$GLB,, | Performed by: OPHTHALMOLOGY

## 2022-04-12 NOTE — PROGRESS NOTES
"HPI     Post-op Evaluation      Additional comments: 1 mon po chk              Comments     1 mon PO    Pt states her va OS has improved since surgery. Her vision is "clear but   distorted". No pain. No discharge.    No gtts        OCT - OD  - no ME OU  OS -  Superior ERM at [facility]        A/P    1. RRD OS      Assessment/Plan:   1. s/p PPV/AFx/EL/SF6 for RRD macula involving, left eye  2/28/22  2. Prior pneumatic early 2/22 ath [facility]  - Doing well   - IOP OK at 14  Retina Flat - superior PVR/ERM at [facility] - fovea OK    3. NS OU    2 months OCT/MRx          "

## 2022-05-02 ENCOUNTER — PATIENT MESSAGE (OUTPATIENT)
Dept: SMOKING CESSATION | Facility: CLINIC | Age: 67
End: 2022-05-02
Payer: MEDICARE

## 2022-06-14 ENCOUNTER — OFFICE VISIT (OUTPATIENT)
Dept: OPHTHALMOLOGY | Facility: CLINIC | Age: 67
End: 2022-06-14
Payer: MEDICARE

## 2022-06-14 DIAGNOSIS — H25.13 NUCLEAR SCLEROSIS OF BOTH EYES: ICD-10-CM

## 2022-06-14 DIAGNOSIS — H33.022 RETINAL DETACHMENT OF LEFT EYE WITH MULTIPLE BREAKS: Primary | ICD-10-CM

## 2022-06-14 DIAGNOSIS — H35.372 EPIRETINAL MEMBRANE, LEFT: ICD-10-CM

## 2022-06-14 PROCEDURE — 1160F PR REVIEW ALL MEDS BY PRESCRIBER/CLIN PHARMACIST DOCUMENTED: ICD-10-PCS | Mod: CPTII,S$GLB,, | Performed by: OPHTHALMOLOGY

## 2022-06-14 PROCEDURE — 1126F PR PAIN SEVERITY QUANTIFIED, NO PAIN PRESENT: ICD-10-PCS | Mod: CPTII,S$GLB,, | Performed by: OPHTHALMOLOGY

## 2022-06-14 PROCEDURE — 1126F AMNT PAIN NOTED NONE PRSNT: CPT | Mod: CPTII,S$GLB,, | Performed by: OPHTHALMOLOGY

## 2022-06-14 PROCEDURE — 3044F HG A1C LEVEL LT 7.0%: CPT | Mod: CPTII,S$GLB,, | Performed by: OPHTHALMOLOGY

## 2022-06-14 PROCEDURE — 1159F PR MEDICATION LIST DOCUMENTED IN MEDICAL RECORD: ICD-10-PCS | Mod: CPTII,S$GLB,, | Performed by: OPHTHALMOLOGY

## 2022-06-14 PROCEDURE — 1101F PT FALLS ASSESS-DOCD LE1/YR: CPT | Mod: CPTII,S$GLB,, | Performed by: OPHTHALMOLOGY

## 2022-06-14 PROCEDURE — 1160F RVW MEDS BY RX/DR IN RCRD: CPT | Mod: CPTII,S$GLB,, | Performed by: OPHTHALMOLOGY

## 2022-06-14 PROCEDURE — 92134 CPTRZ OPH DX IMG PST SGM RTA: CPT | Mod: S$GLB,,, | Performed by: OPHTHALMOLOGY

## 2022-06-14 PROCEDURE — 4010F ACE/ARB THERAPY RXD/TAKEN: CPT | Mod: CPTII,S$GLB,, | Performed by: OPHTHALMOLOGY

## 2022-06-14 PROCEDURE — 1101F PR PT FALLS ASSESS DOC 0-1 FALLS W/OUT INJ PAST YR: ICD-10-PCS | Mod: CPTII,S$GLB,, | Performed by: OPHTHALMOLOGY

## 2022-06-14 PROCEDURE — 4010F PR ACE/ARB THEARPY RXD/TAKEN: ICD-10-PCS | Mod: CPTII,S$GLB,, | Performed by: OPHTHALMOLOGY

## 2022-06-14 PROCEDURE — 3288F FALL RISK ASSESSMENT DOCD: CPT | Mod: CPTII,S$GLB,, | Performed by: OPHTHALMOLOGY

## 2022-06-14 PROCEDURE — 3044F PR MOST RECENT HEMOGLOBIN A1C LEVEL <7.0%: ICD-10-PCS | Mod: CPTII,S$GLB,, | Performed by: OPHTHALMOLOGY

## 2022-06-14 PROCEDURE — 3288F PR FALLS RISK ASSESSMENT DOCUMENTED: ICD-10-PCS | Mod: CPTII,S$GLB,, | Performed by: OPHTHALMOLOGY

## 2022-06-14 PROCEDURE — 92134 POSTERIOR SEGMENT OCT RETINA (OCULAR COHERENCE TOMOGRAPHY)-BOTH EYES: ICD-10-PCS | Mod: S$GLB,,, | Performed by: OPHTHALMOLOGY

## 2022-06-14 PROCEDURE — 92014 COMPRE OPH EXAM EST PT 1/>: CPT | Mod: S$GLB,,, | Performed by: OPHTHALMOLOGY

## 2022-06-14 PROCEDURE — 1159F MED LIST DOCD IN RCRD: CPT | Mod: CPTII,S$GLB,, | Performed by: OPHTHALMOLOGY

## 2022-06-14 PROCEDURE — 92201 OPSCPY EXTND RTA DRAW UNI/BI: CPT | Mod: S$GLB,,, | Performed by: OPHTHALMOLOGY

## 2022-06-14 PROCEDURE — 99999 PR PBB SHADOW E&M-EST. PATIENT-LVL III: ICD-10-PCS | Mod: PBBFAC,,, | Performed by: OPHTHALMOLOGY

## 2022-06-14 PROCEDURE — 99999 PR PBB SHADOW E&M-EST. PATIENT-LVL III: CPT | Mod: PBBFAC,,, | Performed by: OPHTHALMOLOGY

## 2022-06-14 PROCEDURE — 92014 PR EYE EXAM, EST PATIENT,COMPREHESV: ICD-10-PCS | Mod: S$GLB,,, | Performed by: OPHTHALMOLOGY

## 2022-06-14 PROCEDURE — 92201 PR OPHTHALMOSCOPY, EXT, W/RET DRAW/SCLERAL DEPR, I&R, UNI/BI: ICD-10-PCS | Mod: S$GLB,,, | Performed by: OPHTHALMOLOGY

## 2022-06-14 NOTE — PROGRESS NOTES
HPI     Patient here today for 2 mo f/u. Patient c/o eye strain and difficulty   reading. Patient also reports frequent ocular migraines 2-3 daily. No f/f.        Last edited by Ashly Carter on 6/14/2022  1:24 PM. (History)       OCT - OD  - no ME OU  OS -  Superior ERM at [facility], MPH at foveal, but OS/IS line improving        A/P    1. RRD OS      Assessment/Plan:   1. s/p PPV/AFx/EL/SF6 for RRD macula involving, left eye  2/28/22  2. Prior pneumatic early 2/22 ath [facility]    Doing well, reitna flat    3. ERM OS  With some MPH - though OS/IS improving    4. NS OU  Increasing OS post PPV Consult Dr. KING for CE OS      4 months OCT/MRx

## 2022-07-28 ENCOUNTER — HOSPITAL ENCOUNTER (INPATIENT)
Facility: HOSPITAL | Age: 67
LOS: 2 days | Discharge: HOME | DRG: 816 | End: 2022-07-30
Attending: EMERGENCY MEDICINE | Admitting: EMERGENCY MEDICINE
Payer: MEDICARE

## 2022-07-28 DIAGNOSIS — R07.9 CHEST PAIN: ICD-10-CM

## 2022-07-28 DIAGNOSIS — D68.59 HYPERCOAGULOPATHY: ICD-10-CM

## 2022-07-28 DIAGNOSIS — D73.5 SPLENIC INFARCTION: ICD-10-CM

## 2022-07-28 DIAGNOSIS — I51.7 LEFT ATRIAL ENLARGEMENT: ICD-10-CM

## 2022-07-28 DIAGNOSIS — R10.84 GENERALIZED ABDOMINAL PAIN: Primary | ICD-10-CM

## 2022-07-28 LAB
ALBUMIN SERPL BCP-MCNC: 3.2 G/DL (ref 3.5–5.2)
ALP SERPL-CCNC: 62 U/L (ref 55–135)
ALT SERPL W/O P-5'-P-CCNC: 13 U/L (ref 10–44)
ANION GAP SERPL CALC-SCNC: 9 MMOL/L (ref 8–16)
APTT BLDCRRT: 21.9 SEC (ref 21–32)
AST SERPL-CCNC: 11 U/L (ref 10–40)
BASOPHILS # BLD AUTO: 0.05 K/UL (ref 0–0.2)
BASOPHILS NFR BLD: 0.4 % (ref 0–1.9)
BILIRUB SERPL-MCNC: 0.4 MG/DL (ref 0.1–1)
BILIRUB UR QL STRIP: NEGATIVE
BUN SERPL-MCNC: 10 MG/DL (ref 8–23)
CALCIUM SERPL-MCNC: 8.9 MG/DL (ref 8.7–10.5)
CHLORIDE SERPL-SCNC: 106 MMOL/L (ref 95–110)
CLARITY UR: CLEAR
CO2 SERPL-SCNC: 26 MMOL/L (ref 23–29)
COLOR UR: YELLOW
CREAT SERPL-MCNC: 0.7 MG/DL (ref 0.5–1.4)
CTP QC/QA: YES
DIFFERENTIAL METHOD: ABNORMAL
EOSINOPHIL # BLD AUTO: 0.3 K/UL (ref 0–0.5)
EOSINOPHIL NFR BLD: 2.5 % (ref 0–8)
ERYTHROCYTE [DISTWIDTH] IN BLOOD BY AUTOMATED COUNT: 12.2 % (ref 11.5–14.5)
EST. GFR  (AFRICAN AMERICAN): >60 ML/MIN/1.73 M^2
EST. GFR  (NON AFRICAN AMERICAN): >60 ML/MIN/1.73 M^2
GLUCOSE SERPL-MCNC: 114 MG/DL (ref 70–110)
GLUCOSE UR QL STRIP: NEGATIVE
HCT VFR BLD AUTO: 43 % (ref 37–48.5)
HGB BLD-MCNC: 14.9 G/DL (ref 12–16)
HGB UR QL STRIP: NEGATIVE
IMM GRANULOCYTES # BLD AUTO: 0.04 K/UL (ref 0–0.04)
IMM GRANULOCYTES NFR BLD AUTO: 0.3 % (ref 0–0.5)
INR PPP: 1 (ref 0.8–1.2)
KETONES UR QL STRIP: ABNORMAL
LACTATE SERPL-SCNC: 0.7 MMOL/L (ref 0.5–2.2)
LEUKOCYTE ESTERASE UR QL STRIP: NEGATIVE
LIPASE SERPL-CCNC: 35 U/L (ref 4–60)
LYMPHOCYTES # BLD AUTO: 2.1 K/UL (ref 1–4.8)
LYMPHOCYTES NFR BLD: 18.2 % (ref 18–48)
MCH RBC QN AUTO: 32.5 PG (ref 27–31)
MCHC RBC AUTO-ENTMCNC: 34.7 G/DL (ref 32–36)
MCV RBC AUTO: 94 FL (ref 82–98)
MONOCYTES # BLD AUTO: 0.9 K/UL (ref 0.3–1)
MONOCYTES NFR BLD: 7.5 % (ref 4–15)
NEUTROPHILS # BLD AUTO: 8.2 K/UL (ref 1.8–7.7)
NEUTROPHILS NFR BLD: 71.1 % (ref 38–73)
NITRITE UR QL STRIP: NEGATIVE
NRBC BLD-RTO: 0 /100 WBC
PH UR STRIP: 6 [PH] (ref 5–8)
PLATELET # BLD AUTO: 169 K/UL (ref 150–450)
PMV BLD AUTO: 9.5 FL (ref 9.2–12.9)
POTASSIUM SERPL-SCNC: 3.6 MMOL/L (ref 3.5–5.1)
PROT SERPL-MCNC: 6.5 G/DL (ref 6–8.4)
PROT UR QL STRIP: NEGATIVE
PROTHROMBIN TIME: 10.8 SEC (ref 9–12.5)
RBC # BLD AUTO: 4.59 M/UL (ref 4–5.4)
SARS-COV-2 RDRP RESP QL NAA+PROBE: NEGATIVE
SODIUM SERPL-SCNC: 141 MMOL/L (ref 136–145)
SP GR UR STRIP: >1.03 (ref 1–1.03)
URN SPEC COLLECT METH UR: ABNORMAL
UROBILINOGEN UR STRIP-ACNC: NEGATIVE EU/DL
WBC # BLD AUTO: 11.51 K/UL (ref 3.9–12.7)

## 2022-07-28 PROCEDURE — 96374 THER/PROPH/DIAG INJ IV PUSH: CPT

## 2022-07-28 PROCEDURE — 25000003 PHARM REV CODE 250: Performed by: EMERGENCY MEDICINE

## 2022-07-28 PROCEDURE — 83605 ASSAY OF LACTIC ACID: CPT | Performed by: EMERGENCY MEDICINE

## 2022-07-28 PROCEDURE — 96376 TX/PRO/DX INJ SAME DRUG ADON: CPT

## 2022-07-28 PROCEDURE — U0002 COVID-19 LAB TEST NON-CDC: HCPCS | Performed by: EMERGENCY MEDICINE

## 2022-07-28 PROCEDURE — 85610 PROTHROMBIN TIME: CPT | Performed by: EMERGENCY MEDICINE

## 2022-07-28 PROCEDURE — 25500020 PHARM REV CODE 255: Performed by: EMERGENCY MEDICINE

## 2022-07-28 PROCEDURE — 63600175 PHARM REV CODE 636 W HCPCS: Performed by: EMERGENCY MEDICINE

## 2022-07-28 PROCEDURE — 83690 ASSAY OF LIPASE: CPT | Performed by: EMERGENCY MEDICINE

## 2022-07-28 PROCEDURE — 81003 URINALYSIS AUTO W/O SCOPE: CPT | Performed by: EMERGENCY MEDICINE

## 2022-07-28 PROCEDURE — 85730 THROMBOPLASTIN TIME PARTIAL: CPT | Performed by: EMERGENCY MEDICINE

## 2022-07-28 PROCEDURE — 85025 COMPLETE CBC W/AUTO DIFF WBC: CPT | Performed by: EMERGENCY MEDICINE

## 2022-07-28 PROCEDURE — 80053 COMPREHEN METABOLIC PANEL: CPT | Performed by: EMERGENCY MEDICINE

## 2022-07-28 PROCEDURE — 99285 EMERGENCY DEPT VISIT HI MDM: CPT | Mod: 25

## 2022-07-28 PROCEDURE — 12000002 HC ACUTE/MED SURGE SEMI-PRIVATE ROOM

## 2022-07-28 RX ORDER — PROCHLORPERAZINE EDISYLATE 5 MG/ML
5 INJECTION INTRAMUSCULAR; INTRAVENOUS EVERY 6 HOURS PRN
Status: DISCONTINUED | OUTPATIENT
Start: 2022-07-28 | End: 2022-07-30 | Stop reason: HOSPADM

## 2022-07-28 RX ORDER — CYCLOBENZAPRINE HCL 5 MG
5 TABLET ORAL 2 TIMES DAILY PRN
Status: DISCONTINUED | OUTPATIENT
Start: 2022-07-28 | End: 2022-07-30 | Stop reason: HOSPADM

## 2022-07-28 RX ORDER — MAG HYDROX/ALUMINUM HYD/SIMETH 200-200-20
30 SUSPENSION, ORAL (FINAL DOSE FORM) ORAL 4 TIMES DAILY PRN
Status: DISCONTINUED | OUTPATIENT
Start: 2022-07-28 | End: 2022-07-30 | Stop reason: HOSPADM

## 2022-07-28 RX ORDER — SODIUM CHLORIDE 0.9 % (FLUSH) 0.9 %
10 SYRINGE (ML) INJECTION EVERY 12 HOURS PRN
Status: DISCONTINUED | OUTPATIENT
Start: 2022-07-28 | End: 2022-07-30 | Stop reason: HOSPADM

## 2022-07-28 RX ORDER — ONDANSETRON 2 MG/ML
4 INJECTION INTRAMUSCULAR; INTRAVENOUS EVERY 8 HOURS PRN
Status: DISCONTINUED | OUTPATIENT
Start: 2022-07-28 | End: 2022-07-30 | Stop reason: HOSPADM

## 2022-07-28 RX ORDER — CITALOPRAM 20 MG/1
40 TABLET, FILM COATED ORAL DAILY
Status: DISCONTINUED | OUTPATIENT
Start: 2022-07-29 | End: 2022-07-30 | Stop reason: HOSPADM

## 2022-07-28 RX ORDER — TRAZODONE HYDROCHLORIDE 50 MG/1
100 TABLET ORAL NIGHTLY
Status: DISCONTINUED | OUTPATIENT
Start: 2022-07-28 | End: 2022-07-30 | Stop reason: HOSPADM

## 2022-07-28 RX ORDER — MORPHINE SULFATE 4 MG/ML
4 INJECTION, SOLUTION INTRAMUSCULAR; INTRAVENOUS
Status: COMPLETED | OUTPATIENT
Start: 2022-07-28 | End: 2022-07-28

## 2022-07-28 RX ORDER — ERGOCALCIFEROL 1.25 MG/1
50000 CAPSULE ORAL
Status: DISCONTINUED | OUTPATIENT
Start: 2022-07-29 | End: 2022-07-30 | Stop reason: HOSPADM

## 2022-07-28 RX ORDER — MORPHINE SULFATE 4 MG/ML
4 INJECTION, SOLUTION INTRAMUSCULAR; INTRAVENOUS EVERY 4 HOURS PRN
Status: DISCONTINUED | OUTPATIENT
Start: 2022-07-28 | End: 2022-07-30 | Stop reason: HOSPADM

## 2022-07-28 RX ORDER — ATORVASTATIN CALCIUM 40 MG/1
40 TABLET, FILM COATED ORAL DAILY
Status: DISCONTINUED | OUTPATIENT
Start: 2022-07-29 | End: 2022-07-30 | Stop reason: HOSPADM

## 2022-07-28 RX ORDER — LOSARTAN POTASSIUM 25 MG/1
25 TABLET ORAL DAILY
Refills: 0 | Status: DISCONTINUED | OUTPATIENT
Start: 2022-07-29 | End: 2022-07-30 | Stop reason: HOSPADM

## 2022-07-28 RX ORDER — DICYCLOMINE HYDROCHLORIDE 10 MG/1
20 CAPSULE ORAL
Status: COMPLETED | OUTPATIENT
Start: 2022-07-28 | End: 2022-07-28

## 2022-07-28 RX ORDER — IPRATROPIUM BROMIDE AND ALBUTEROL SULFATE 2.5; .5 MG/3ML; MG/3ML
3 SOLUTION RESPIRATORY (INHALATION) EVERY 4 HOURS PRN
Status: DISCONTINUED | OUTPATIENT
Start: 2022-07-28 | End: 2022-07-30 | Stop reason: HOSPADM

## 2022-07-28 RX ORDER — MORPHINE SULFATE 4 MG/ML
2 INJECTION, SOLUTION INTRAMUSCULAR; INTRAVENOUS EVERY 4 HOURS PRN
Status: DISCONTINUED | OUTPATIENT
Start: 2022-07-28 | End: 2022-07-30 | Stop reason: HOSPADM

## 2022-07-28 RX ORDER — ACETAMINOPHEN 325 MG/1
650 TABLET ORAL EVERY 4 HOURS PRN
Status: DISCONTINUED | OUTPATIENT
Start: 2022-07-28 | End: 2022-07-30 | Stop reason: HOSPADM

## 2022-07-28 RX ORDER — NALOXONE HCL 0.4 MG/ML
0.02 VIAL (ML) INJECTION
Status: DISCONTINUED | OUTPATIENT
Start: 2022-07-28 | End: 2022-07-30 | Stop reason: HOSPADM

## 2022-07-28 RX ORDER — ASPIRIN 81 MG/1
81 TABLET ORAL DAILY
Status: DISCONTINUED | OUTPATIENT
Start: 2022-07-29 | End: 2022-07-30 | Stop reason: HOSPADM

## 2022-07-28 RX ORDER — SIMETHICONE 80 MG
1 TABLET,CHEWABLE ORAL 4 TIMES DAILY PRN
Status: DISCONTINUED | OUTPATIENT
Start: 2022-07-28 | End: 2022-07-30 | Stop reason: HOSPADM

## 2022-07-28 RX ORDER — TALC
6 POWDER (GRAM) TOPICAL NIGHTLY PRN
Status: DISCONTINUED | OUTPATIENT
Start: 2022-07-28 | End: 2022-07-30 | Stop reason: HOSPADM

## 2022-07-28 RX ORDER — HEPARIN SODIUM,PORCINE/D5W 25000/250
0-40 INTRAVENOUS SOLUTION INTRAVENOUS CONTINUOUS
Status: DISCONTINUED | OUTPATIENT
Start: 2022-07-28 | End: 2022-07-30 | Stop reason: HOSPADM

## 2022-07-28 RX ORDER — MORPHINE SULFATE 4 MG/ML
2 INJECTION, SOLUTION INTRAMUSCULAR; INTRAVENOUS
Status: COMPLETED | OUTPATIENT
Start: 2022-07-28 | End: 2022-07-28

## 2022-07-28 RX ORDER — AMOXICILLIN 250 MG
1 CAPSULE ORAL 2 TIMES DAILY PRN
Status: DISCONTINUED | OUTPATIENT
Start: 2022-07-28 | End: 2022-07-30 | Stop reason: HOSPADM

## 2022-07-28 RX ADMIN — MORPHINE SULFATE 2 MG: 4 INJECTION INTRAVENOUS at 05:07

## 2022-07-28 RX ADMIN — MORPHINE SULFATE 4 MG: 4 INJECTION INTRAVENOUS at 09:07

## 2022-07-28 RX ADMIN — DICYCLOMINE HYDROCHLORIDE 20 MG: 10 CAPSULE ORAL at 05:07

## 2022-07-28 RX ADMIN — IOHEXOL 75 ML: 350 INJECTION, SOLUTION INTRAVENOUS at 06:07

## 2022-07-28 RX ADMIN — HEPARIN SODIUM 12 UNITS/KG/HR: 10000 INJECTION, SOLUTION INTRAVENOUS at 10:07

## 2022-07-28 NOTE — ED TRIAGE NOTES
Pt arrives to ED via EMS with complaints of bilateral lower quad  abdominal pain 10/10, and nausea since Sunday   Pt reports she  Has a HX of gastritis  Pt also reports not taking any of her home medication today because of nausea  Pt denies any other symptoms  Pt AAOX4

## 2022-07-28 NOTE — ED PROVIDER NOTES
Encounter Date: 2022       History     Chief Complaint   Patient presents with    Abdominal Pain     Pt arrived via ems, pt chief complaint is abdominal pain. Pt has had abd pain since  and 2 episodes of N/V over the last 6 days. Pt is also hypertensive but has not taken bp meds.      66-year-old female with a history of , hypertension presenting with lower abdominal pain.  Patient reports symptoms were intermittent since .  Started , notes pain across lower abdomen radiating into back.  Reports improved with passage of lattice or stool.  Reports symptoms improved on Tuesday.  Reports over the last 2 days symptoms have again worsened.  Notes intermittent mild nausea, denies vomiting.  Notes small bowel movement yesterday.  Reports no passage of gas since yesterday.  Denies black or bloody stool.  Reports history of similar, last episode many years ago.  Denies black or bloody stool, diarrhea.  Denies fevers, chills.  Otherwise in usual state of health.        Review of patient's allergies indicates:   Allergen Reactions    Sulfa (sulfonamide antibiotics) Hives    Ace inhibitors Other (See Comments)     Cough       Past Medical History:   Diagnosis Date    Allergy     Anxiety     Arthritis     Cataract     Depressive disorder, not elsewhere classified     Esophageal reflux     Hypertension     Impaired fasting glucose     Joint pain     Obesity, unspecified     Other and unspecified hyperlipidemia      Past Surgical History:   Procedure Laterality Date    BLEPHAROPLASTY Bilateral 3/3/2021    Procedure: BLEPHAROPLASTY;  Surgeon: Maite Acuna MD;  Location: [facility];  Service: Ophthalmology;  Laterality: Bilateral;     SECTION  1973    HYSTERECTOMY      without BSO    R knee replacement      REPAIR OF RETINAL DETACHMENT WITH VITRECTOMY Left 2022    Procedure: REPAIR, RETINAL DETACHMENT, WITH VITRECTOMY;  Surgeon: MINO Martinez MD;  Location:  [facility] OR 1ST FLR;  Service: Ophthalmology;  Laterality: Left;  Spoke with SID Garcia. Pt was instructed nothing to eat after 8am and to arrive at 2pm for preop. 430pm case start request.    right  arm  surgery       Family History   Problem Relation Age of Onset    Cancer Mother         lung    Liver disease Father     Thyroid disease Sister     Cervical cancer Sister     Tremor Sister      Social History     Tobacco Use    Smoking status: Current Every Day Smoker     Packs/day: 0.75     Years: 40.00     Pack years: 30.00     Types: Cigarettes    Smokeless tobacco: Never Used   Substance Use Topics    Alcohol use: Yes     Alcohol/week: 0.0 standard drinks     Comment: occasionally    Drug use: Yes     Types: Marijuana     Review of Systems   Constitutional: Negative for chills and fever.   HENT: Negative for sore throat.    Respiratory: Negative for shortness of breath.    Cardiovascular: Negative for chest pain.   Gastrointestinal: Positive for abdominal pain and nausea. Negative for anal bleeding, blood in stool, constipation, diarrhea and vomiting.   Genitourinary: Negative for dysuria.   Musculoskeletal: Negative for back pain.   Skin: Negative for rash.   Neurological: Negative for weakness.   Psychiatric/Behavioral: Negative for confusion.       Physical Exam     Initial Vitals [07/28/22 1711]   BP Pulse Resp Temp SpO2   (!) 189/77 62 16 98.5 °F (36.9 °C) 96 %      MAP       --         Physical Exam    Nursing note and vitals reviewed.  Constitutional: She appears well-developed and well-nourished. She is not diaphoretic. No distress.   HENT:   Head: Normocephalic and atraumatic.   Nose: Nose normal.   Eyes: EOM are normal. Pupils are equal, round, and reactive to light. No scleral icterus.   Neck: Neck supple.   Normal range of motion.  Cardiovascular: Normal rate, regular rhythm, normal heart sounds and intact distal pulses. Exam reveals no gallop and no friction rub.    No murmur  heard.  Pulmonary/Chest: Breath sounds normal. No stridor. No respiratory distress. She has no wheezes. She has no rhonchi. She has no rales.   Abdominal: Abdomen is soft. Bowel sounds are normal. She exhibits no distension. There is no abdominal tenderness.   Nonspecific lower abdominal tenderness without rebound guarding or peritoneal signs There is no rebound and no guarding.   Musculoskeletal:         General: No tenderness or edema. Normal range of motion.      Cervical back: Normal range of motion and neck supple.     Neurological: She is alert and oriented to person, place, and time. No cranial nerve deficit. GCS score is 15. GCS eye subscore is 4. GCS verbal subscore is 5. GCS motor subscore is 6.   Skin: Skin is warm and dry. No rash noted.   Psychiatric: She has a normal mood and affect. Her behavior is normal.         ED Course   Procedures  Labs Reviewed   CBC W/ AUTO DIFFERENTIAL - Abnormal; Notable for the following components:       Result Value    MCH 32.5 (*)     Gran # (ANC) 8.2 (*)     All other components within normal limits   COMPREHENSIVE METABOLIC PANEL - Abnormal; Notable for the following components:    Glucose 114 (*)     Albumin 3.2 (*)     All other components within normal limits   URINALYSIS, REFLEX TO URINE CULTURE - Abnormal; Notable for the following components:    Specific Gravity, UA >1.030 (*)     Ketones, UA 1+ (*)     All other components within normal limits    Narrative:     Specimen Source->Urine   LIPASE   LACTIC ACID, PLASMA   APTT    Narrative:     Draw baseline aPTT prior to starting the heparin bolus or  infusion  (if patient is on warfarin prior to heparin therapy)   PROTIME-INR    Narrative:     Draw baseline aPTT prior to starting the heparin bolus or  infusion  (if patient is on warfarin prior to heparin therapy)   HOMOCYSTEINE, SERUM   FACTOR 5 LEIDEN   ANTIPHOSPHOLIPID AB (ANTICARDIOLIPIN)   ANTITHROMBIN III   ANTITHROMBIN ANTIGEN, P   PROTHROMBIN GENE MUTATION    PROTEIN C ANTIGEN, TOTAL   PROTEIN S ANTIGEN, FREE & TOTAL   PROTEIN S ANTIGEN, TOTAL   COMPREHENSIVE METABOLIC PANEL   MAGNESIUM   PHOSPHORUS   SARS-COV-2 RDRP GENE          Imaging Results          US Mesenteric Ischemia Study (xpd) (Final result)  Result time 07/28/22 23:32:38    Final result by Ernie Aguayo MD (07/28/22 23:32:38)                 Impression:      See above.      Electronically signed by: Ernie Aguayo MD  Date:    07/28/2022  Time:    23:32             Narrative:    EXAMINATION:  US MESENTERIC ISCHEMIA STUDY (XPD)    CLINICAL HISTORY:  Splenic infarction;    TECHNIQUE:  Grayscale and color flow Doppler spectral analysis was performed of the aorta and mesenteric vessels.    COMPARISON:  CT abdomen and pelvis from the same date.    FINDINGS:  Evaluation is partially obscured due to overlying bowel gas.  Aorta, celiac artery, SMA, DUSTIN, and visualized iliac arteries are patent.  Arterial velocities appear within normal limits.  No evidence of significant focal stenosis or occlusion.  No aneurysm is seen.                                CT Abdomen Pelvis With Contrast (Final result)  Result time 07/28/22 18:53:21    Final result by Chandler Brandon MD (07/28/22 18:53:21)                 Impression:      1. Splenic wedge-shaped geographic hypoattenuation suggesting edema related splenic ischemia/infarction.  Recommend clinical correlation and follow-up.  2. Mild-moderate hiatal hernia.  3. Hepatomegaly.  4. Cholelithiasis.  5. Small fat containing umbilical hernia.  6. Diverticulosis of the sigmoid colon.  7.  This report was flagged in Epic as abnormal.      Electronically signed by: Chandler Brandon  Date:    07/28/2022  Time:    18:53             Narrative:    EXAMINATION:  CT ABDOMEN PELVIS WITH CONTRAST    CLINICAL HISTORY:  LLQ abdominal pain;    TECHNIQUE:  Low dose axial images, sagittal and coronal reformations were obtained from the lung bases to the pubic symphysis following the IV  administration of 75 mL of Omnipaque 350 .  Oral contrast was not administered.    COMPARISON:  None.    FINDINGS:  Abdomen:    - Lower thorax:Prominent mitral annular calcification.    Mild to moderate hiatal hernia.    - Lung bases: No infiltrates and no nodules.    - Liver: The liver is enlarged with no focal abnormality.    - Gallbladder: Gallbladder is mildly contracted.  There is mild sludge or small stones in the gallbladder.  No evidence of acute cholecystitis.    - Bile Ducts: No evidence of intra or extra hepatic biliary ductal dilation.    - Spleen: Spleen is normal size.  The geographic area of hypoattenuation suggesting edema related to a splenic ischemia or infarction.  Vascular atherosclerosis noted.  Recommend clinical correlation and follow-up.    - Kidneys: No mass or hydronephrosis.    - Adrenals: Unremarkable.    - Pancreas: No mass or peripancreatic fat stranding.  Pancreatic tail appears shortened.  This could be a postoperative change.  Recommend clinical correlation.    - Retroperitoneum:  No significant adenopathy.    - Vascular: No abdominal aortic aneurysm.    - Abdominal wall:  Small fat containing umbilical hernia.    Pelvis:    Urinary bladder is within normal limits.    Status post hysterectomy.    Bowel/Mesentery:    Diverticulosis of the sigmoid colon.  No evidence of acute diverticulitis.  Nondistention of the left colon.  Mild retained feces predominantly on the right.    The appendix is within normal limits.    Bones:  No acute osseous abnormality and no suspicious lytic or blastic lesion.                                 Medications   heparin 25,000 units in dextrose 5% 250 mL (100 units/mL) infusion LOW INTENSITY nomogram - [facility] (12 Units/kg/hr × 76.8 kg (Adjusted) Intravenous Handoff 7/29/22 0131)   heparin 25,000 units in dextrose 5% (100 units/ml) IV bolus from bag - ADDITIONAL PRN BOLUS - 60 units/kg (has no administration in time range)   heparin 25,000 units in dextrose 5%  (100 units/ml) IV bolus from bag - ADDITIONAL PRN BOLUS - 30 units/kg (has no administration in time range)   aspirin EC tablet 81 mg (has no administration in time range)   atorvastatin tablet 40 mg (has no administration in time range)   busPIRone tablet 15 mg (has no administration in time range)   citalopram tablet 40 mg (has no administration in time range)   cyclobenzaprine tablet 5 mg (has no administration in time range)   ergocalciferol capsule 50,000 Units (has no administration in time range)   losartan tablet 25 mg (has no administration in time range)   traZODone tablet 100 mg (100 mg Oral Given 7/29/22 0042)   sodium chloride 0.9% flush 10 mL (has no administration in time range)   naloxone 0.4 mg/mL injection 0.02 mg (has no administration in time range)   acetaminophen tablet 650 mg (has no administration in time range)   morphine injection 2 mg (has no administration in time range)   morphine injection 4 mg (4 mg Intravenous Given 7/29/22 0147)   senna-docusate 8.6-50 mg per tablet 1 tablet (has no administration in time range)   ondansetron injection 4 mg (has no administration in time range)   prochlorperazine injection Soln 5 mg (has no administration in time range)   albuterol-ipratropium 2.5 mg-0.5 mg/3 mL nebulizer solution 3 mL (has no administration in time range)   melatonin tablet 6 mg (has no administration in time range)   aluminum-magnesium hydroxide-simethicone 200-200-20 mg/5 mL suspension 30 mL (has no administration in time range)   simethicone chewable tablet 80 mg (has no administration in time range)   morphine injection 2 mg (2 mg Intravenous Given 7/28/22 1747)   dicyclomine capsule 20 mg (20 mg Oral Given 7/28/22 1748)   iohexoL (OMNIPAQUE 350) injection 75 mL (75 mLs Intravenous Given 7/28/22 1826)   heparin 25,000 units in dextrose 5% (100 units/ml) IV bolus from bag INITIAL BOLUS (4,610 Units Intravenous Bolus from Bag 7/28/22 2200)   morphine injection 4 mg (4 mg  Intravenous Given 7/28/22 2123)     Medical Decision Making:   Initial Assessment:   66-year-old female presenting with abdominal pain.  On exam patient uncomfortable appearing.  Well hypertension noted.  Nonspecific abdominal tenderness, no rebound guarding or peritoneal signs.  CT scan shows splenic infarct, wedge shaped.  Discussed with vascular surgery on call.  No other evidence of mesenteric ischemia.  Possible embolic source?  Have recommended admission for further evaluation of hypercoagulability and possible embolic source.  Patient started heparin drip.  Patient be admitted to Hospital Medicine for further evaluation and treatment.  Pain control with morphine.                      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

## 2022-07-29 LAB
ALBUMIN SERPL BCP-MCNC: 3.3 G/DL (ref 3.5–5.2)
ALP SERPL-CCNC: 90 U/L (ref 55–135)
ALT SERPL W/O P-5'-P-CCNC: 35 U/L (ref 10–44)
ANION GAP SERPL CALC-SCNC: 8 MMOL/L (ref 8–16)
APTT BLDCRRT: 29.1 SEC (ref 21–32)
APTT BLDCRRT: 31.6 SEC (ref 21–32)
APTT BLDCRRT: 71 SEC (ref 21–32)
AST SERPL-CCNC: 43 U/L (ref 10–40)
AV INDEX (PROSTH): 0.77
AV MEAN GRADIENT: 9 MMHG
AV PEAK GRADIENT: 20 MMHG
AV VALVE AREA: 1.8 CM2
AV VELOCITY RATIO: 0.79
BILIRUB SERPL-MCNC: 0.6 MG/DL (ref 0.1–1)
BSA FOR ECHO PROCEDURE: 2.19 M2
BUN SERPL-MCNC: 7 MG/DL (ref 8–23)
CALCIUM SERPL-MCNC: 9.1 MG/DL (ref 8.7–10.5)
CHLORIDE SERPL-SCNC: 105 MMOL/L (ref 95–110)
CO2 SERPL-SCNC: 29 MMOL/L (ref 23–29)
CREAT SERPL-MCNC: 0.7 MG/DL (ref 0.5–1.4)
CV ECHO LV RWT: 0.35 CM
DOP CALC AO PEAK VEL: 2.22 M/S
DOP CALC AO VTI: 45.03 CM
DOP CALC LVOT AREA: 2.3 CM2
DOP CALC LVOT DIAMETER: 1.73 CM
DOP CALC LVOT PEAK VEL: 1.75 M/S
DOP CALC LVOT STROKE VOLUME: 80.98 CM3
DOP CALCLVOT PEAK VEL VTI: 34.47 CM
E WAVE DECELERATION TIME: 256.63 MSEC
E/A RATIO: 0.91
E/E' RATIO: 17.87 M/S
ECHO LV POSTERIOR WALL: 0.93 CM (ref 0.6–1.1)
EJECTION FRACTION: 65 %
EST. GFR  (AFRICAN AMERICAN): >60 ML/MIN/1.73 M^2
EST. GFR  (NON AFRICAN AMERICAN): >60 ML/MIN/1.73 M^2
FRACTIONAL SHORTENING: 38 % (ref 28–44)
GLUCOSE SERPL-MCNC: 102 MG/DL (ref 70–110)
HCYS SERPL-SCNC: 6.6 UMOL/L (ref 4–15.5)
INTERVENTRICULAR SEPTUM: 1.02 CM (ref 0.6–1.1)
IVRT: 107.27 MSEC
LA MAJOR: 6.14 CM
LA MINOR: 5.84 CM
LA WIDTH: 5.07 CM
LEFT ATRIUM SIZE: 5.68 CM
LEFT ATRIUM VOLUME INDEX: 68.8 ML/M2
LEFT ATRIUM VOLUME: 146.53 CM3
LEFT INTERNAL DIMENSION IN SYSTOLE: 3.32 CM (ref 2.1–4)
LEFT VENTRICLE DIASTOLIC VOLUME INDEX: 65.74 ML/M2
LEFT VENTRICLE DIASTOLIC VOLUME: 140.03 ML
LEFT VENTRICLE MASS INDEX: 93 G/M2
LEFT VENTRICLE SYSTOLIC VOLUME INDEX: 21 ML/M2
LEFT VENTRICLE SYSTOLIC VOLUME: 44.69 ML
LEFT VENTRICULAR INTERNAL DIMENSION IN DIASTOLE: 5.38 CM (ref 3.5–6)
LEFT VENTRICULAR MASS: 198.71 G
LV LATERAL E/E' RATIO: 16.75 M/S
LV SEPTAL E/E' RATIO: 19.14 M/S
MAGNESIUM SERPL-MCNC: 1.9 MG/DL (ref 1.6–2.6)
MV PEAK A VEL: 1.47 M/S
MV PEAK E VEL: 1.34 M/S
MV STENOSIS PRESSURE HALF TIME: 74.42 MS
MV VALVE AREA P 1/2 METHOD: 2.96 CM2
PHOSPHATE SERPL-MCNC: 3.7 MG/DL (ref 2.7–4.5)
POTASSIUM SERPL-SCNC: 3.3 MMOL/L (ref 3.5–5.1)
PROT SERPL-MCNC: 6.8 G/DL (ref 6–8.4)
PULM VEIN S/D RATIO: 1.24
PV PEAK D VEL: 0.51 M/S
PV PEAK S VEL: 0.63 M/S
PV PEAK VELOCITY: 1.27 CM/S
RA MAJOR: 5.46 CM
RA PRESSURE: 3 MMHG
RA WIDTH: 3.9 CM
RIGHT VENTRICULAR END-DIASTOLIC DIMENSION: 3.56 CM
SINUS: 3.04 CM
SODIUM SERPL-SCNC: 142 MMOL/L (ref 136–145)
STJ: 2.23 CM
TDI LATERAL: 0.08 M/S
TDI SEPTAL: 0.07 M/S
TDI: 0.08 M/S
TRICUSPID ANNULAR PLANE SYSTOLIC EXCURSION: 2.3 CM

## 2022-07-29 PROCEDURE — 81241 F5 GENE: CPT | Performed by: INTERNAL MEDICINE

## 2022-07-29 PROCEDURE — 83090 ASSAY OF HOMOCYSTEINE: CPT | Performed by: INTERNAL MEDICINE

## 2022-07-29 PROCEDURE — 85301 ANTITHROMBIN III ANTIGEN: CPT | Performed by: INTERNAL MEDICINE

## 2022-07-29 PROCEDURE — 84100 ASSAY OF PHOSPHORUS: CPT | Performed by: INTERNAL MEDICINE

## 2022-07-29 PROCEDURE — 85306 CLOT INHIBIT PROT S FREE: CPT | Performed by: INTERNAL MEDICINE

## 2022-07-29 PROCEDURE — 83735 ASSAY OF MAGNESIUM: CPT | Performed by: INTERNAL MEDICINE

## 2022-07-29 PROCEDURE — 85300 ANTITHROMBIN III ACTIVITY: CPT | Performed by: INTERNAL MEDICINE

## 2022-07-29 PROCEDURE — 85302 CLOT INHIBIT PROT C ANTIGEN: CPT | Performed by: INTERNAL MEDICINE

## 2022-07-29 PROCEDURE — 86147 CARDIOLIPIN ANTIBODY EA IG: CPT | Mod: 59 | Performed by: INTERNAL MEDICINE

## 2022-07-29 PROCEDURE — 81240 F2 GENE: CPT | Performed by: INTERNAL MEDICINE

## 2022-07-29 PROCEDURE — 85305 CLOT INHIBIT PROT S TOTAL: CPT | Performed by: INTERNAL MEDICINE

## 2022-07-29 PROCEDURE — 25000003 PHARM REV CODE 250: Performed by: INTERNAL MEDICINE

## 2022-07-29 PROCEDURE — 36415 COLL VENOUS BLD VENIPUNCTURE: CPT | Performed by: STUDENT IN AN ORGANIZED HEALTH CARE EDUCATION/TRAINING PROGRAM

## 2022-07-29 PROCEDURE — 85730 THROMBOPLASTIN TIME PARTIAL: CPT | Mod: 91 | Performed by: STUDENT IN AN ORGANIZED HEALTH CARE EDUCATION/TRAINING PROGRAM

## 2022-07-29 PROCEDURE — 63600175 PHARM REV CODE 636 W HCPCS: Performed by: INTERNAL MEDICINE

## 2022-07-29 PROCEDURE — 80053 COMPREHEN METABOLIC PANEL: CPT | Performed by: INTERNAL MEDICINE

## 2022-07-29 PROCEDURE — 11000001 HC ACUTE MED/SURG PRIVATE ROOM

## 2022-07-29 RX ADMIN — BUSPIRONE HYDROCHLORIDE 15 MG: 10 TABLET ORAL at 09:07

## 2022-07-29 RX ADMIN — BUSPIRONE HYDROCHLORIDE 15 MG: 10 TABLET ORAL at 02:07

## 2022-07-29 RX ADMIN — BUSPIRONE HYDROCHLORIDE 15 MG: 10 TABLET ORAL at 08:07

## 2022-07-29 RX ADMIN — LOSARTAN POTASSIUM 25 MG: 25 TABLET, FILM COATED ORAL at 09:07

## 2022-07-29 RX ADMIN — MORPHINE SULFATE 4 MG: 4 INJECTION INTRAVENOUS at 07:07

## 2022-07-29 RX ADMIN — MORPHINE SULFATE 4 MG: 4 INJECTION INTRAVENOUS at 01:07

## 2022-07-29 RX ADMIN — CITALOPRAM HYDROBROMIDE 40 MG: 20 TABLET ORAL at 09:07

## 2022-07-29 RX ADMIN — ATORVASTATIN CALCIUM 40 MG: 40 TABLET, FILM COATED ORAL at 09:07

## 2022-07-29 RX ADMIN — HEPARIN SODIUM 15 UNITS/KG/HR: 10000 INJECTION, SOLUTION INTRAVENOUS at 03:07

## 2022-07-29 RX ADMIN — TRAZODONE HYDROCHLORIDE 100 MG: 50 TABLET ORAL at 08:07

## 2022-07-29 RX ADMIN — ERGOCALCIFEROL 50000 UNITS: 1.25 CAPSULE ORAL at 09:07

## 2022-07-29 RX ADMIN — TRAZODONE HYDROCHLORIDE 100 MG: 50 TABLET ORAL at 12:07

## 2022-07-29 RX ADMIN — MORPHINE SULFATE 4 MG: 4 INJECTION INTRAVENOUS at 08:07

## 2022-07-29 RX ADMIN — ASPIRIN 81 MG: 81 TABLET, COATED ORAL at 09:07

## 2022-07-29 NOTE — ASSESSMENT & PLAN NOTE
"The case was discussed with Dr. Willoughby with Vascular Surgery on call via the ED  He recommended admission with "hypercoagulable work up" per the ED  Hypercoagulable labs drawn (unfortunately iv heparin had already been started about 30 minutes prior)  Ordered mesenteric dopplers  Ordered TTE with bubble  Consult for Vascular Surgery is placed  NPO  On IV heparin protocol for now  ASA 81mg po Qday    "

## 2022-07-29 NOTE — H&P
"[facility] Medicine  History & Physical    Patient Name: Patricia Ramirez  MRN: [id number]  Patient Class: IP- Inpatient  Admission Date: 7/28/2022  Attending Physician: Joseph Cook MD   Primary Care Provider: Fabienne Erwin NP         Patient information was obtained from patient, past medical records and ER records.     Subjective:     Principal Problem:Splenic infarction    Chief Complaint:   Chief Complaint   Patient presents with    Abdominal Pain     Pt arrived via ems, pt chief complaint is abdominal pain. Pt has had abd pain since Sunday and 2 episodes of N/V over the last 6 days. Pt is also hypertensive but has not taken bp meds.         HPI: Ms. Ramirez is a 67yo lady with a past medical history of anxiety, depression, GERD, HTN and obesity.  She has smoked 1ppd x 50 years.    She normally plays cards with her friends on Sunday.  While doing this, she suddenly developed a severe pain to her abdomen, hard to localize, but centered more to the left.  The pain was described as, "a severe hunger pain.  I guess it's like a sharp ache."  It did radiate around her left side to her back as well.  Shortly after the pain started she developed acute nausea.  The pain with nausea continued all week until this past Tuesday she stuck her finger down her throat to try to make herself throw up. Finally the pain became so bad today that she decided to come to the ED.  BM's have been normal.    In the ED her VS's were BP (!) 172/68   Pulse 63   Temp 98.5 °F (36.9 °C) (Oral)   Resp 15   Ht 5' 8" (1.727 m)   Wt 96.2 kg (212 lb)   SpO2 92-96%  RA  Breastfeeding No   BMI 32.23 kg/m².  Labs showed normal CBC, normal PTT and INR, Cr 0.7, normal LFT's.  COVID NEGATIVE. LA 0.7.  UA normal.      CT abdomen and pelvis with contrast showed a splenic wedge-shaped geographic hypoattenuation suggesting edema related splenic ischemia/infarction.    In the ED she was treated with Bentyl 20mg 1748, IV heparin " infusion protocol 0, Morphine 2mg iv 174, and Morphine 4mg iv .              Past Medical History:   Diagnosis Date    Allergy     Anxiety     Arthritis     Cataract     Depressive disorder, not elsewhere classified     Esophageal reflux     Hypertension     Impaired fasting glucose     Joint pain     Obesity, unspecified     Other and unspecified hyperlipidemia        Past Surgical History:   Procedure Laterality Date    BLEPHAROPLASTY Bilateral 3/3/2021    Procedure: BLEPHAROPLASTY;  Surgeon: Maite Acuna MD;  Location: [facility];  Service: Ophthalmology;  Laterality: Bilateral;     SECTION  1973    HYSTERECTOMY      without BSO    R knee replacement      REPAIR OF RETINAL DETACHMENT WITH VITRECTOMY Left 2022    Procedure: REPAIR, RETINAL DETACHMENT, WITH VITRECTOMY;  Surgeon: MINO Martinez MD;  Location: [address];  Service: Ophthalmology;  Laterality: Left;  Spoke with SID Garcia. Pt was instructed nothing to eat after 8am and to arrive at 2pm for preop. 430pm case start request.    right  arm  surgery         Review of patient's allergies indicates:   Allergen Reactions    Sulfa (sulfonamide antibiotics) Hives    Ace inhibitors Other (See Comments)     Cough         No current facility-administered medications on file prior to encounter.     Current Outpatient Medications on File Prior to Encounter   Medication Sig    aspirin (ECOTRIN) 81 MG EC tablet Take 81 mg by mouth once daily.    atorvastatin (LIPITOR) 40 MG tablet Take 1 tablet by mouth once daily    BIOTIN ORAL Take by mouth.    busPIRone (BUSPAR) 15 MG tablet TAKE 1 TABLET BY MOUTH THREE TIMES DAILY    citalopram (CELEXA) 40 MG tablet Take 1 tablet by mouth once daily    cyclobenzaprine (FLEXERIL) 5 MG tablet TAKE 1 TABLET BY MOUTH TWICE DAILY AS NEEDED FOR MUSCLE SPASM    ergocalciferol (ERGOCALCIFEROL) 50,000 unit Cap Take 1 capsule by mouth once a week    olmesartan (BENICAR) 20 MG  tablet Take 1/2 (one-half) tablet by mouth once daily    traZODone (DESYREL) 100 MG tablet Take 1 tablet (100 mg total) by mouth every evening.    acetaminophen (TYLENOL) 500 MG tablet Take 500 mg by mouth every 6 (six) hours as needed for Pain.    ondansetron (ZOFRAN) 4 MG tablet Take 1 tablet (4 mg total) by mouth every 8 (eight) hours as needed for Nausea.    oxyCODONE-acetaminophen (PERCOCET) 5-325 mg per tablet Take 1 tablet by mouth every 6 (six) hours as needed for Pain.     Family History       Problem Relation (Age of Onset)    Cancer Mother    Cervical cancer Sister    Liver disease Father    Thyroid disease Sister    Tremor Sister          Tobacco Use    Smoking status: Current Every Day Smoker     Packs/day: 0.75     Years: 40.00     Pack years: 30.00     Types: Cigarettes    Smokeless tobacco: Never Used   Substance and Sexual Activity    Alcohol use: Yes     Alcohol/week: 0.0 standard drinks     Comment: occasionally    Drug use: Yes     Types: Marijuana    Sexual activity: Yes     Partners: Male     Birth control/protection: See Surgical Hx     Review of Systems   Constitutional:  Positive for diaphoresis. Negative for fever.   HENT:  Negative for congestion.    Eyes:  Negative for visual disturbance.   Respiratory:  Positive for wheezing. Negative for shortness of breath.    Cardiovascular:  Negative for chest pain and leg swelling.   Gastrointestinal:  Positive for abdominal pain, nausea and vomiting.   Endocrine: Negative for heat intolerance.   Genitourinary:  Negative for dysuria.   Musculoskeletal:  Negative for myalgias.   Skin:  Negative for rash.   Allergic/Immunologic: Negative for immunocompromised state.   Neurological:  Negative for light-headedness.   Hematological:  Does not bruise/bleed easily.   Psychiatric/Behavioral:  Negative for dysphoric mood.    Objective:     Vital Signs (Most Recent):  Temp: 98.3 °F (36.8 °C) (07/29/22 0151)  Pulse: (!) 57 (07/29/22 0151)  Resp: 16  (07/29/22 0151)  BP: (!) 159/71 (07/29/22 0221)  SpO2: 96 % (07/29/22 0151)   Vital Signs (24h Range):  Temp:  [98.3 °F (36.8 °C)-98.8 °F (37.1 °C)] 98.3 °F (36.8 °C)  Pulse:  [55-63] 57  Resp:  [13-18] 16  SpO2:  [92 %-96 %] 96 %  BP: (129-189)/(68-78) 159/71     Weight: 99.8 kg (220 lb)  Body mass index is 33.45 kg/m².    Physical Exam  Constitutional:       General: She is not in acute distress.     Appearance: Normal appearance. She is well-developed. She is obese. She is not ill-appearing, toxic-appearing or diaphoretic.   HENT:      Head: Normocephalic and atraumatic.   Eyes:      Conjunctiva/sclera:      Right eye: Right conjunctiva is not injected.      Left eye: Left conjunctiva is not injected.   Neck:      Thyroid: No thyromegaly.   Cardiovascular:      Rate and Rhythm: Normal rate and regular rhythm.      Heart sounds:   No systolic murmur is present.   Pulmonary:      Effort: Pulmonary effort is normal. No tachypnea or bradypnea.      Breath sounds: Wheezing present. No decreased breath sounds, rhonchi or rales.   Chest:      Chest wall: No swelling or tenderness.   Abdominal:      General: Abdomen is protuberant. Bowel sounds are decreased. There is no distension.      Palpations: Abdomen is soft.      Tenderness: There is no abdominal tenderness.   Genitourinary:     Comments: No madden in place  Musculoskeletal:      Cervical back: Neck supple.   Lymphadenopathy:      Comments: No peripheral edema   Skin:     Coloration: Skin is sallow.      Comments: Few tattoos on her leg   Neurological:      General: No focal deficit present.      Mental Status: She is alert and oriented to person, place, and time.      GCS: GCS eye subscore is 4. GCS verbal subscore is 5. GCS motor subscore is 6.   Psychiatric:         Attention and Perception: Attention and perception normal.         Mood and Affect: Mood and affect normal.         Behavior: Behavior is cooperative.         Cognition and Memory: Cognition and  memory normal.           Significant Labs: All pertinent labs within the past 24 hours have been reviewed.    Recent Results (from the past 24 hour(s))   CBC W/ AUTO DIFFERENTIAL    Collection Time: 07/28/22  5:31 PM   Result Value Ref Range    WBC 11.51 3.90 - 12.70 K/uL    RBC 4.59 4.00 - 5.40 M/uL    Hemoglobin 14.9 12.0 - 16.0 g/dL    Hematocrit 43.0 37.0 - 48.5 %    MCV 94 82 - 98 fL    MCH 32.5 (H) 27.0 - 31.0 pg    MCHC 34.7 32.0 - 36.0 g/dL    RDW 12.2 11.5 - 14.5 %    Platelets 169 150 - 450 K/uL    MPV 9.5 9.2 - 12.9 fL    Immature Granulocytes 0.3 0.0 - 0.5 %    Gran # (ANC) 8.2 (H) 1.8 - 7.7 K/uL    Immature Grans (Abs) 0.04 0.00 - 0.04 K/uL    Lymph # 2.1 1.0 - 4.8 K/uL    Mono # 0.9 0.3 - 1.0 K/uL    Eos # 0.3 0.0 - 0.5 K/uL    Baso # 0.05 0.00 - 0.20 K/uL    nRBC 0 0 /100 WBC    Gran % 71.1 38.0 - 73.0 %    Lymph % 18.2 18.0 - 48.0 %    Mono % 7.5 4.0 - 15.0 %    Eosinophil % 2.5 0.0 - 8.0 %    Basophil % 0.4 0.0 - 1.9 %    Differential Method Automated    Comp. Metabolic Panel    Collection Time: 07/28/22  5:31 PM   Result Value Ref Range    Sodium 141 136 - 145 mmol/L    Potassium 3.6 3.5 - 5.1 mmol/L    Chloride 106 95 - 110 mmol/L    CO2 26 23 - 29 mmol/L    Glucose 114 (H) 70 - 110 mg/dL    BUN 10 8 - 23 mg/dL    Creatinine 0.7 0.5 - 1.4 mg/dL    Calcium 8.9 8.7 - 10.5 mg/dL    Total Protein 6.5 6.0 - 8.4 g/dL    Albumin 3.2 (L) 3.5 - 5.2 g/dL    Total Bilirubin 0.4 0.1 - 1.0 mg/dL    Alkaline Phosphatase 62 55 - 135 U/L    AST 11 10 - 40 U/L    ALT 13 10 - 44 U/L    Anion Gap 9 8 - 16 mmol/L    eGFR if African American >60 >60 mL/min/1.73 m^2    eGFR if non African American >60 >60 mL/min/1.73 m^2   Lipase    Collection Time: 07/28/22  5:31 PM   Result Value Ref Range    Lipase 35 4 - 60 U/L   Urinalysis, Reflex to Urine Culture Urine, Clean Catch    Collection Time: 07/28/22  6:46 PM    Specimen: Urine   Result Value Ref Range    Specimen UA Urine, Clean Catch     Color, UA Yellow Yellow,  "Straw, Caro    Appearance, UA Clear Clear    pH, UA 6.0 5.0 - 8.0    Specific Gravity, UA >1.030 (A) 1.005 - 1.030    Protein, UA Negative Negative    Glucose, UA Negative Negative    Ketones, UA 1+ (A) Negative    Bilirubin (UA) Negative Negative    Occult Blood UA Negative Negative    Nitrite, UA Negative Negative    Urobilinogen, UA Negative <2.0 EU/dL    Leukocytes, UA Negative Negative   Lactic acid, plasma    Collection Time: 07/28/22  7:40 PM   Result Value Ref Range    Lactate (Lactic Acid) 0.7 0.5 - 2.2 mmol/L   APTT    Collection Time: 07/28/22  9:20 PM   Result Value Ref Range    aPTT 21.9 21.0 - 32.0 sec   Protime-INR    Collection Time: 07/28/22  9:20 PM   Result Value Ref Range    Prothrombin Time 10.8 9.0 - 12.5 sec    INR 1.0 0.8 - 1.2   POCT COVID-19 Rapid Screening    Collection Time: 07/28/22  9:56 PM   Result Value Ref Range    POC Rapid COVID Negative Negative     Acceptable Yes          Significant Imaging: I have reviewed all pertinent imaging results/findings within the past 24 hours.    Assessment/Plan:     * Splenic infarction  The case was discussed with Dr. Willoughby with Vascular Surgery on call via the ED  He recommended admission with "hypercoagulable work up" per the ED  Hypercoagulable labs drawn (unfortunately iv heparin had already been started about 30 minutes prior)  Ordered mesenteric dopplers  Ordered TTE with bubble  Consult for Vascular Surgery is placed  NPO  On IV heparin protocol for now  ASA 81mg po Qday      Depression with anxiety    busPIRone tablet 15 mg, 15 mg, Oral, TID     citalopram tablet 40 mg, 40 mg, Oral, Daily      traZODone tablet 100 mg, 100 mg, Oral, QHS         Hyperlipidemia    atorvastatin tablet 40 mg, 40 mg, Oral, Daily     Essential hypertension    losartan tablet 25 mg, 25 mg, Oral, Daily         VTE Risk Mitigation (From admission, onward)         Ordered     Place TOD hose  Until discontinued         07/28/22 0970     Reason for " No Pharmacological VTE Prophylaxis  Once        Question:  Reasons:  Answer:  IV Heparin w/in 24 hrs. Pre or Post-Op    07/28/22 2241     IP VTE HIGH RISK PATIENT  Once         07/28/22 2241     Place sequential compression device  Until discontinued         07/28/22 2241     heparin 25,000 units in dextrose 5% (100 units/ml) IV bolus from bag - ADDITIONAL PRN BOLUS - 60 units/kg  As needed (PRN)        Question:  Heparin Infusion Adjustment (DO NOT MODIFY ANSWER)  Answer:  [email or web address]\epic\Images\Pharmacy\HeparinInfusions\heparin LOW INTENSITY nomogram for [facility] [id number]    07/28/22 2026     heparin 25,000 units in dextrose 5% (100 units/ml) IV bolus from bag - ADDITIONAL PRN BOLUS - 30 units/kg  As needed (PRN)        Question:  Heparin Infusion Adjustment (DO NOT MODIFY ANSWER)  Answer:  [email or web address]\epic\Images\Pharmacy\HeparinInfusions\heparin LOW INTENSITY nomogram for [facility] [id number]    07/28/22 2026     heparin 25,000 units in dextrose 5% 250 mL (100 units/mL) infusion LOW INTENSITY nomogram - [facility]  Continuous        Question Answer Comment   Heparin Infusion Adjustment (DO NOT MODIFY ANSWER) [email or web address]\epic\Images\Pharmacy\HeparinInfusions\heparin LOW INTENSITY nomogram for [facility] [id number]    Begin at (in units/kg/hr) 12        07/28/22 2026                   TOM Wren MD  Department of Hospital Medicine   [facility]

## 2022-07-29 NOTE — NURSING
Changed heparin rate per nomogram. PTT 31.6. Rate increased to 18 units per kg with bolus of 60 units per kg. Bleeding  Precautions continues.

## 2022-07-29 NOTE — SUBJECTIVE & OBJECTIVE
Past Medical History:   Diagnosis Date    Allergy     Anxiety     Arthritis     Cataract     Depressive disorder, not elsewhere classified     Esophageal reflux     Hypertension     Impaired fasting glucose     Joint pain     Obesity, unspecified     Other and unspecified hyperlipidemia        Past Surgical History:   Procedure Laterality Date    BLEPHAROPLASTY Bilateral 3/3/2021    Procedure: BLEPHAROPLASTY;  Surgeon: Maite Acuna MD;  Location: [facility];  Service: Ophthalmology;  Laterality: Bilateral;     SECTION      HYSTERECTOMY      without BSO    R knee replacement      REPAIR OF RETINAL DETACHMENT WITH VITRECTOMY Left 2022    Procedure: REPAIR, RETINAL DETACHMENT, WITH VITRECTOMY;  Surgeon: MINO Martinez MD;  Location: [address];  Service: Ophthalmology;  Laterality: Left;  Spoke with SID Garcia. Pt was instructed nothing to eat after 8am and to arrive at 2pm for preop. 430pm case start request.    right  arm  surgery         Review of patient's allergies indicates:   Allergen Reactions    Sulfa (sulfonamide antibiotics) Hives    Ace inhibitors Other (See Comments)     Cough         No current facility-administered medications on file prior to encounter.     Current Outpatient Medications on File Prior to Encounter   Medication Sig    aspirin (ECOTRIN) 81 MG EC tablet Take 81 mg by mouth once daily.    atorvastatin (LIPITOR) 40 MG tablet Take 1 tablet by mouth once daily    BIOTIN ORAL Take by mouth.    busPIRone (BUSPAR) 15 MG tablet TAKE 1 TABLET BY MOUTH THREE TIMES DAILY    citalopram (CELEXA) 40 MG tablet Take 1 tablet by mouth once daily    cyclobenzaprine (FLEXERIL) 5 MG tablet TAKE 1 TABLET BY MOUTH TWICE DAILY AS NEEDED FOR MUSCLE SPASM    ergocalciferol (ERGOCALCIFEROL) 50,000 unit Cap Take 1 capsule by mouth once a week    olmesartan (BENICAR) 20 MG tablet Take 1/2 (one-half) tablet by mouth once daily    traZODone (DESYREL) 100 MG tablet Take 1 tablet (100 mg  total) by mouth every evening.    acetaminophen (TYLENOL) 500 MG tablet Take 500 mg by mouth every 6 (six) hours as needed for Pain.    ondansetron (ZOFRAN) 4 MG tablet Take 1 tablet (4 mg total) by mouth every 8 (eight) hours as needed for Nausea.    oxyCODONE-acetaminophen (PERCOCET) 5-325 mg per tablet Take 1 tablet by mouth every 6 (six) hours as needed for Pain.     Family History       Problem Relation (Age of Onset)    Cancer Mother    Cervical cancer Sister    Liver disease Father    Thyroid disease Sister    Tremor Sister          Tobacco Use    Smoking status: Current Every Day Smoker     Packs/day: 0.75     Years: 40.00     Pack years: 30.00     Types: Cigarettes    Smokeless tobacco: Never Used   Substance and Sexual Activity    Alcohol use: Yes     Alcohol/week: 0.0 standard drinks     Comment: occasionally    Drug use: Yes     Types: Marijuana    Sexual activity: Yes     Partners: Male     Birth control/protection: See Surgical Hx     Review of Systems   Constitutional:  Positive for diaphoresis. Negative for fever.   HENT:  Negative for congestion.    Eyes:  Negative for visual disturbance.   Respiratory:  Positive for wheezing. Negative for shortness of breath.    Cardiovascular:  Negative for chest pain and leg swelling.   Gastrointestinal:  Positive for abdominal pain, nausea and vomiting.   Endocrine: Negative for heat intolerance.   Genitourinary:  Negative for dysuria.   Musculoskeletal:  Negative for myalgias.   Skin:  Negative for rash.   Allergic/Immunologic: Negative for immunocompromised state.   Neurological:  Negative for light-headedness.   Hematological:  Does not bruise/bleed easily.   Psychiatric/Behavioral:  Negative for dysphoric mood.    Objective:     Vital Signs (Most Recent):  Temp: 98.3 °F (36.8 °C) (07/29/22 0151)  Pulse: (!) 57 (07/29/22 0151)  Resp: 16 (07/29/22 0151)  BP: (!) 159/71 (07/29/22 0221)  SpO2: 96 % (07/29/22 0151)   Vital Signs (24h Range):  Temp:  [98.3 °F  (36.8 °C)-98.8 °F (37.1 °C)] 98.3 °F (36.8 °C)  Pulse:  [55-63] 57  Resp:  [13-18] 16  SpO2:  [92 %-96 %] 96 %  BP: (129-189)/(68-78) 159/71     Weight: 99.8 kg (220 lb)  Body mass index is 33.45 kg/m².    Physical Exam  Constitutional:       General: She is not in acute distress.     Appearance: Normal appearance. She is well-developed. She is obese. She is not ill-appearing, toxic-appearing or diaphoretic.   HENT:      Head: Normocephalic and atraumatic.   Eyes:      Conjunctiva/sclera:      Right eye: Right conjunctiva is not injected.      Left eye: Left conjunctiva is not injected.   Neck:      Thyroid: No thyromegaly.   Cardiovascular:      Rate and Rhythm: Normal rate and regular rhythm.      Heart sounds:   No systolic murmur is present.   Pulmonary:      Effort: Pulmonary effort is normal. No tachypnea or bradypnea.      Breath sounds: Wheezing present. No decreased breath sounds, rhonchi or rales.   Chest:      Chest wall: No swelling or tenderness.   Abdominal:      General: Abdomen is protuberant. Bowel sounds are decreased. There is no distension.      Palpations: Abdomen is soft.      Tenderness: There is no abdominal tenderness.   Genitourinary:     Comments: No madden in place  Musculoskeletal:      Cervical back: Neck supple.   Lymphadenopathy:      Comments: No peripheral edema   Skin:     Coloration: Skin is sallow.      Comments: Few tattoos on her leg   Neurological:      General: No focal deficit present.      Mental Status: She is alert and oriented to person, place, and time.      GCS: GCS eye subscore is 4. GCS verbal subscore is 5. GCS motor subscore is 6.   Psychiatric:         Attention and Perception: Attention and perception normal.         Mood and Affect: Mood and affect normal.         Behavior: Behavior is cooperative.         Cognition and Memory: Cognition and memory normal.           Significant Labs: All pertinent labs within the past 24 hours have been reviewed.    Recent  Results (from the past 24 hour(s))   CBC W/ AUTO DIFFERENTIAL    Collection Time: 07/28/22  5:31 PM   Result Value Ref Range    WBC 11.51 3.90 - 12.70 K/uL    RBC 4.59 4.00 - 5.40 M/uL    Hemoglobin 14.9 12.0 - 16.0 g/dL    Hematocrit 43.0 37.0 - 48.5 %    MCV 94 82 - 98 fL    MCH 32.5 (H) 27.0 - 31.0 pg    MCHC 34.7 32.0 - 36.0 g/dL    RDW 12.2 11.5 - 14.5 %    Platelets 169 150 - 450 K/uL    MPV 9.5 9.2 - 12.9 fL    Immature Granulocytes 0.3 0.0 - 0.5 %    Gran # (ANC) 8.2 (H) 1.8 - 7.7 K/uL    Immature Grans (Abs) 0.04 0.00 - 0.04 K/uL    Lymph # 2.1 1.0 - 4.8 K/uL    Mono # 0.9 0.3 - 1.0 K/uL    Eos # 0.3 0.0 - 0.5 K/uL    Baso # 0.05 0.00 - 0.20 K/uL    nRBC 0 0 /100 WBC    Gran % 71.1 38.0 - 73.0 %    Lymph % 18.2 18.0 - 48.0 %    Mono % 7.5 4.0 - 15.0 %    Eosinophil % 2.5 0.0 - 8.0 %    Basophil % 0.4 0.0 - 1.9 %    Differential Method Automated    Comp. Metabolic Panel    Collection Time: 07/28/22  5:31 PM   Result Value Ref Range    Sodium 141 136 - 145 mmol/L    Potassium 3.6 3.5 - 5.1 mmol/L    Chloride 106 95 - 110 mmol/L    CO2 26 23 - 29 mmol/L    Glucose 114 (H) 70 - 110 mg/dL    BUN 10 8 - 23 mg/dL    Creatinine 0.7 0.5 - 1.4 mg/dL    Calcium 8.9 8.7 - 10.5 mg/dL    Total Protein 6.5 6.0 - 8.4 g/dL    Albumin 3.2 (L) 3.5 - 5.2 g/dL    Total Bilirubin 0.4 0.1 - 1.0 mg/dL    Alkaline Phosphatase 62 55 - 135 U/L    AST 11 10 - 40 U/L    ALT 13 10 - 44 U/L    Anion Gap 9 8 - 16 mmol/L    eGFR if African American >60 >60 mL/min/1.73 m^2    eGFR if non African American >60 >60 mL/min/1.73 m^2   Lipase    Collection Time: 07/28/22  5:31 PM   Result Value Ref Range    Lipase 35 4 - 60 U/L   Urinalysis, Reflex to Urine Culture Urine, Clean Catch    Collection Time: 07/28/22  6:46 PM    Specimen: Urine   Result Value Ref Range    Specimen UA Urine, Clean Catch     Color, UA Yellow Yellow, Straw, Caro    Appearance, UA Clear Clear    pH, UA 6.0 5.0 - 8.0    Specific Gravity, UA >1.030 (A) 1.005 - 1.030     Protein, UA Negative Negative    Glucose, UA Negative Negative    Ketones, UA 1+ (A) Negative    Bilirubin (UA) Negative Negative    Occult Blood UA Negative Negative    Nitrite, UA Negative Negative    Urobilinogen, UA Negative <2.0 EU/dL    Leukocytes, UA Negative Negative   Lactic acid, plasma    Collection Time: 07/28/22  7:40 PM   Result Value Ref Range    Lactate (Lactic Acid) 0.7 0.5 - 2.2 mmol/L   APTT    Collection Time: 07/28/22  9:20 PM   Result Value Ref Range    aPTT 21.9 21.0 - 32.0 sec   Protime-INR    Collection Time: 07/28/22  9:20 PM   Result Value Ref Range    Prothrombin Time 10.8 9.0 - 12.5 sec    INR 1.0 0.8 - 1.2   POCT COVID-19 Rapid Screening    Collection Time: 07/28/22  9:56 PM   Result Value Ref Range    POC Rapid COVID Negative Negative     Acceptable Yes          Significant Imaging: I have reviewed all pertinent imaging results/findings within the past 24 hours.

## 2022-07-29 NOTE — PLAN OF CARE
[facility] - Med Surg  Initial Discharge Assessment    Patient independent from home and lives with spouse and son, who will be her help at home. No current dme needs. Per physician, vascular consulted. TN to continue to follow for discharge needs.        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]  Generalized abdominal pain [R10.84]  Chest pain [R07.9]  Hypercoagulopathy [D68.59]    Admission Date: 7/28/2022  Expected Discharge Date:     Discharge Barriers Identified: None    Payor: [employer] MEDICARE / Plan: [place] / Product Type: Medicare Advantage /     Extended Emergency Contact Information  Primary Emergency Contact: Mynor Ramirez   [facility]  Home Phone: [phone]  Mobile Phone: [phone]  Relation: Son  Secondary Emergency Contact: Carmina Dyer   [facility]  Mobile Phone: [phone]  Relation: Sister    Discharge Plan A: Home  Discharge Plan B: Home with family      [id number] [place] - [address]  [address]  [place] [address]  Phone: [phone] Fax: [phone]    [facility] - [place], [place] - [address]  [address]  [place] [address]  Phone: [phone] Fax: [phone]      Initial Assessment (most recent)     Adult Discharge Assessment - 07/29/22 1235        Discharge Assessment    Assessment Type Discharge Planning Assessment     Confirmed/corrected address, phone number and insurance Yes     Confirmed Demographics Correct on Facesheet     Source of Information patient     When was your last doctors appointment? --   Few months ago    Does patient/caregiver understand observation status No     Was observation education provided? No     Communicated STUART with patient/caregiver Yes     Reason For Admission Splenic infarction     Lives With child(kalli), adult;spouse     Facility Arrived From: Home     Do you expect to return to your  current living situation? Yes     Do you have help at home or someone to help you manage your care at home? No     Prior to hospitilization cognitive status: Alert/Oriented     Current cognitive status: Alert/Oriented     Walking or Climbing Stairs Difficulty none     Dressing/Bathing Difficulty none     Equipment Currently Used at Home none     Readmission within 30 days? No     Patient currently being followed by outpatient case management? No     Do you currently have service(s) that help you manage your care at home? No     Do you take prescription medications? Yes     Do you have prescription coverage? Yes     Coverage Medicare     Do you have any problems affording any of your prescribed medications? No     Is the patient taking medications as prescribed? yes     Who is going to help you get home at discharge? Mynor Ramirez (Son)   [phone]     How do you get to doctors appointments? family or friend will provide;car, drives self     Are you on dialysis? No     Do you take coumadin? No     Discharge Plan A Home     Discharge Plan B Home with family     DME Needed Upon Discharge  none     Discharge Plan discussed with: Patient     Discharge Barriers Identified None        Relationship/Environment    Name(s) of Who Lives With Patient Spouse and son

## 2022-07-29 NOTE — HPI
"Ms. Ramirez is a 67yo lady with a past medical history of anxiety, depression, GERD, HTN and obesity.  She has smoked 1ppd x 50 years.    She normally plays cards with her friends on Sunday.  While doing this, she suddenly developed a severe pain to her abdomen, hard to localize, but centered more to the left.  The pain was described as, "a severe hunger pain.  I guess it's like a sharp ache."  It did radiate around her left side to her back as well.  Shortly after the pain started she developed acute nausea.  The pain with nausea continued all week until this past Tuesday she stuck her finger down her throat to try to make herself throw up. Finally the pain became so bad today that she decided to come to the ED.  BM's have been normal.    In the ED her VS's were BP (!) 172/68   Pulse 63   Temp 98.5 °F (36.9 °C) (Oral)   Resp 15   Ht 5' 8" (1.727 m)   Wt 96.2 kg (212 lb)   SpO2 92-96%  RA  Breastfeeding No   BMI 32.23 kg/m².  Labs showed normal CBC, normal PTT and INR, Cr 0.7, normal LFT's.  COVID NEGATIVE. [place] 0.7.  UA normal.      CT abdomen and pelvis with contrast showed a splenic wedge-shaped geographic hypoattenuation suggesting edema related splenic ischemia/infarction.    In the ED she was treated with Bentyl 20mg 1748, IV heparin infusion protocol 2200, Morphine 2mg iv 1747, and Morphine 4mg iv 2126.          "

## 2022-07-29 NOTE — ASSESSMENT & PLAN NOTE
  busPIRone tablet 15 mg, 15 mg, Oral, TID     citalopram tablet 40 mg, 40 mg, Oral, Daily      traZODone tablet 100 mg, 100 mg, Oral, QHS

## 2022-07-29 NOTE — NURSING
Notified Dr. Cook that Dr. Whalen is off and pt may not be seen today and possibly not seen over the weekend.

## 2022-07-29 NOTE — CARE UPDATE
Dr. Wren's H&P reviewed, pt seen and examined.     66y F admitted with abdominal pain, workup shows splenic infarct.     Patient without personal or family hx of blood clots, making hypercoagulability less likely. TTE w/ contrast ordered to evaluate for cardioembolic source. No evidence of infection or significant concern for endocarditis, however will get some visualization of the valves w/ TTE.

## 2022-07-29 NOTE — NURSING
Patient arrived to unit via wheelchair.  No acute changes in status.  Patient oriented to room.  Bed locked in lowest position.  Side rails up x2.  Call bell within reach.  Will continue to monitor.

## 2022-07-30 VITALS
WEIGHT: 220 LBS | TEMPERATURE: 99 F | DIASTOLIC BLOOD PRESSURE: 60 MMHG | SYSTOLIC BLOOD PRESSURE: 132 MMHG | HEART RATE: 55 BPM | OXYGEN SATURATION: 93 % | RESPIRATION RATE: 17 BRPM | HEIGHT: 68 IN | BODY MASS INDEX: 33.34 KG/M2

## 2022-07-30 LAB
ALBUMIN SERPL BCP-MCNC: 2.9 G/DL (ref 3.5–5.2)
ALP SERPL-CCNC: 81 U/L (ref 55–135)
ALT SERPL W/O P-5'-P-CCNC: 61 U/L (ref 10–44)
ANION GAP SERPL CALC-SCNC: 11 MMOL/L (ref 8–16)
APTT BLDCRRT: 51.4 SEC (ref 21–32)
AST SERPL-CCNC: 60 U/L (ref 10–40)
AT III AG ACT/NOR PPP IA: 83 % (ref 80–120)
BILIRUB SERPL-MCNC: 0.5 MG/DL (ref 0.1–1)
BUN SERPL-MCNC: 8 MG/DL (ref 8–23)
CALCIUM SERPL-MCNC: 8.7 MG/DL (ref 8.7–10.5)
CHLORIDE SERPL-SCNC: 103 MMOL/L (ref 95–110)
CO2 SERPL-SCNC: 29 MMOL/L (ref 23–29)
CREAT SERPL-MCNC: 0.7 MG/DL (ref 0.5–1.4)
EST. GFR  (AFRICAN AMERICAN): >60 ML/MIN/1.73 M^2
EST. GFR  (NON AFRICAN AMERICAN): >60 ML/MIN/1.73 M^2
GLUCOSE SERPL-MCNC: 106 MG/DL (ref 70–110)
MAGNESIUM SERPL-MCNC: 1.9 MG/DL (ref 1.6–2.6)
PHOSPHATE SERPL-MCNC: 3.9 MG/DL (ref 2.7–4.5)
POCT GLUCOSE: 97 MG/DL (ref 70–110)
POCT GLUCOSE: 99 MG/DL (ref 70–110)
POTASSIUM SERPL-SCNC: 3.7 MMOL/L (ref 3.5–5.1)
PROT SERPL-MCNC: 6 G/DL (ref 6–8.4)
SODIUM SERPL-SCNC: 143 MMOL/L (ref 136–145)

## 2022-07-30 PROCEDURE — 84100 ASSAY OF PHOSPHORUS: CPT | Performed by: INTERNAL MEDICINE

## 2022-07-30 PROCEDURE — 63600175 PHARM REV CODE 636 W HCPCS: Performed by: INTERNAL MEDICINE

## 2022-07-30 PROCEDURE — 93010 ELECTROCARDIOGRAM REPORT: CPT | Mod: ,,, | Performed by: INTERNAL MEDICINE

## 2022-07-30 PROCEDURE — 93010 EKG 12-LEAD: ICD-10-PCS | Mod: ,,, | Performed by: INTERNAL MEDICINE

## 2022-07-30 PROCEDURE — 36415 COLL VENOUS BLD VENIPUNCTURE: CPT | Performed by: STUDENT IN AN ORGANIZED HEALTH CARE EDUCATION/TRAINING PROGRAM

## 2022-07-30 PROCEDURE — 85730 THROMBOPLASTIN TIME PARTIAL: CPT | Performed by: STUDENT IN AN ORGANIZED HEALTH CARE EDUCATION/TRAINING PROGRAM

## 2022-07-30 PROCEDURE — 80053 COMPREHEN METABOLIC PANEL: CPT | Performed by: INTERNAL MEDICINE

## 2022-07-30 PROCEDURE — 25000003 PHARM REV CODE 250: Performed by: STUDENT IN AN ORGANIZED HEALTH CARE EDUCATION/TRAINING PROGRAM

## 2022-07-30 PROCEDURE — 25000003 PHARM REV CODE 250: Performed by: INTERNAL MEDICINE

## 2022-07-30 PROCEDURE — 93005 ELECTROCARDIOGRAM TRACING: CPT

## 2022-07-30 PROCEDURE — 83735 ASSAY OF MAGNESIUM: CPT | Performed by: INTERNAL MEDICINE

## 2022-07-30 RX ADMIN — BUSPIRONE HYDROCHLORIDE 15 MG: 10 TABLET ORAL at 09:07

## 2022-07-30 RX ADMIN — MORPHINE SULFATE 4 MG: 4 INJECTION INTRAVENOUS at 03:07

## 2022-07-30 RX ADMIN — ATORVASTATIN CALCIUM 40 MG: 40 TABLET, FILM COATED ORAL at 09:07

## 2022-07-30 RX ADMIN — APIXABAN 5 MG: 5 TABLET, FILM COATED ORAL at 01:07

## 2022-07-30 RX ADMIN — CITALOPRAM HYDROBROMIDE 40 MG: 20 TABLET ORAL at 09:07

## 2022-07-30 RX ADMIN — ASPIRIN 81 MG: 81 TABLET, COATED ORAL at 09:07

## 2022-07-30 RX ADMIN — LOSARTAN POTASSIUM 25 MG: 25 TABLET, FILM COATED ORAL at 09:07

## 2022-07-30 RX ADMIN — MORPHINE SULFATE 4 MG: 4 INJECTION INTRAVENOUS at 09:07

## 2022-07-30 NOTE — PLAN OF CARE
[facility] - Med Surg  Discharge Final Note    Primary Care Provider: Fabienne Erwin NP    Expected Discharge Date: 7/30/2022     All Case Management (CM) needs have been addressed; Nurse Amy informed that patient is cleared from CM standpoint      Final Discharge Note (most recent)     Final Note - 07/30/22 1031        Final Note    Assessment Type Final Discharge Note     Anticipated Discharge Disposition Home or Self Care     What phone number can be called within the next 1-3 days to see how you are doing after discharge? [phone]     Hospital Resources/Appts/Education Provided Provided patient/caregiver with written discharge plan information;Appointments scheduled and added to AVS        Post-Acute Status    Post-Acute Authorization Other     Other Status No Post-Acute Service Needs     Discharge Delays None known at this time                 Important Message from Medicare             Contact Info     Fabienne Erwin NP   Specialty: Family Medicine   Relationship: PCP - General    [address]  [place] [address]   Phone: [phone]       Next Steps: Follow up on 8/8/2022    [facility] - Cardiology   Specialty: Cardiology    [address] [facility] [address]  [place] [address]   Phone: [phone]       Next Steps: Follow up    Instructions: Ambualatory referral has been placed to follow up with Cardiology; please expect call from office to arrange; please call office on Tuesday if you have not heard from them by then

## 2022-07-30 NOTE — PLAN OF CARE
Problem: Adult Inpatient Plan of Care  Goal: Plan of Care Review  Outcome: Ongoing, Progressing  Goal: Patient-Specific Goal (Individualized)  Outcome: Ongoing, Progressing  Goal: Absence of Hospital-Acquired Illness or Injury  Outcome: Ongoing, Progressing  Goal: Optimal Comfort and Wellbeing  Outcome: Ongoing, Progressing  Goal: Readiness for Transition of Care  Outcome: Ongoing, Progressing     Problem: Infection  Goal: Absence of Infection Signs and Symptoms  Outcome: Ongoing, Progressing       Patient remained free from falls/injury throughout shift.  No acute changes in status.  Bed locked in lowest position.  Side rails up x2.  Call bell within reach.  Purposeful rounding maintained throughout shift.

## 2022-07-30 NOTE — DISCHARGE INSTRUCTIONS
WRITTEN HEALTHCARE DISCHARGE INFORMATION      Things that YOU are responsible for to Manage Your Care At Home:  1. Getting your prescriptions filled.  2. Taking you medications as directed. DO NOT MISS ANY DOSES!  3. Going to your follow-up doctor appointments. This is important because it allows the doctor to monitor your progress and to determine if any changes need to be made to your treatment plan.     If you are unable to make your follow up appointments, please call the number listed and reschedule this appointment.      After discharge, if you need assistance, you can call [facility] On Call Nurse Care Line for 24/7 assistance at [phone]     If you are experience any signs or symptoms, Call your doctor or Call 911 and come to your nearest Emergency Room.     Thank you for choosing [facility] and allowing us to care for you.        You should receive a call from [facility] Discharge Department within 48-72 hours to help manage your care after discharge. Please try to make sure that you answer your phone for this important phone call.       Follow-up Information       Fabienne Erwin NP Follow up on 8/8/2022.    Specialty: Family Medicine  Contact information:  [address]  [place] [address]  [phone]               [facility] - Cardiology Follow up.    Specialty: Cardiology  Why: Ambualatory referral has been placed to follow up with Cardiology; please expect call from office to arrange; please call office on Tuesday if you have not heard from them by then  Contact information:  [address] [facility]  [facility] [address] [phone]  Additional information:  Please park in garage or Medical Office Bldg. surface lot and use Medical Ofc Bldg elevator. [place] [facility] [address].

## 2022-07-30 NOTE — PLAN OF CARE
WRITTEN HEALTHCARE DISCHARGE INFORMATION      Things that YOU are responsible for to Manage Your Care At Home:  1. Getting your prescriptions filled.  2. Taking you medications as directed. DO NOT MISS ANY DOSES!  3. Going to your follow-up doctor appointments. This is important because it allows the doctor to monitor your progress and to determine if any changes need to be made to your treatment plan.     If you are unable to make your follow up appointments, please call the number listed and reschedule this appointment.      After discharge, if you need assistance, you can call [facility] On Call Nurse Care Line for 24/7 assistance at [phone]     If you are experience any signs or symptoms, Call your doctor or Call 911 and come to your nearest Emergency Room.     Thank you for choosing [facility] and allowing us to care for you.        You should receive a call from [facility] Discharge Department within 48-72 hours to help manage your care after discharge. Please try to make sure that you answer your phone for this important phone call.       Follow-up Information       Fabienne Erwin NP Follow up on 8/8/2022.    Specialty: Family Medicine  Contact information:  [address]  [place] [address]  [phone]               [facility] - Cardiology Follow up.    Specialty: Cardiology  Why: Ambualatory referral has been placed to follow up with Cardiology; please expect call from office to arrange; please call office on Tuesday if you have not heard from them by then  Contact information:  [address] [facility]  [facility] [address] [phone]  Additional information:  Please park in garage or Medical Office Bldg. surface lot and use Medical Ofc Bldg elevator. Check in at [facility] [address].

## 2022-08-01 LAB
AT III ACT/NOR PPP CHRO: 88 % (ref 83–118)
CARDIOLIPIN IGG SER IA-ACNC: <9.4 GPL (ref 0–14.99)
CARDIOLIPIN IGM SER IA-ACNC: <9.4 MPL (ref 0–12.49)
F2 C.20210G>A GENO BLD/T: ABNORMAL
F5 P.R506Q BLD/T QL: NEGATIVE
PROT C AG ACT/NOR PPP IA: 87 % (ref 63–153)
PROT S AG ACT/NOR PPP IA: 100 % (ref 50–140)

## 2022-08-02 LAB — PROT S FREE AG ACT/NOR PPP IA: 71 % (ref 50–147)

## 2022-08-12 NOTE — HOSPITAL COURSE
"Ms Ramirez is a 66y F admitted for splenic infarction. The patient was started on anticoagulation. Vascular surgery not available over the weekend of the patient's admission, however [facility] Vascular recommended "hypercoagulability workup".    The patient was started on a heparin drip. She received an echocardiogram which showed no interatrial shunt but did reveal severe left atrial enlargement. The patient was monitored on telemetry but did not have any episodes of atrial fibrillation. She showed no signs of infection, TTE without vegetation. LV without thrombus.     At discharge she was prescribed eliquis for continued anticoagulation while workup for hypercoagulability and atrial fibrillation was pursued. She was referred to cardiology for ambulatory rhythm monitoring. She will follow up with pcp, vascular, and cardiology in the outpatient setting.    "

## 2022-08-17 NOTE — DISCHARGE SUMMARY
"[facility] Medicine  Discharge Summary      Patient Name: Patricia Ramirez  MRN: [id number]  Patient Class: IP- Inpatient  Admission Date: 7/28/2022  Hospital Length of Stay: 2 days  Discharge Date and Time: 7/30/2022  1:43 PM  Attending Physician: No att. providers found   Discharging Provider: Joseph Cook MD  Primary Care Provider: Fabienne Erwin NP      HPI:   Ms. Ramirez is a 67yo lady with a past medical history of anxiety, depression, GERD, HTN and obesity.  She has smoked 1ppd x 50 years.    She normally plays cards with her friends on Sunday.  While doing this, she suddenly developed a severe pain to her abdomen, hard to localize, but centered more to the left.  The pain was described as, "a severe hunger pain.  I guess it's like a sharp ache."  It did radiate around her left side to her back as well.  Shortly after the pain started she developed acute nausea.  The pain with nausea continued all week until this past Tuesday she stuck her finger down her throat to try to make herself throw up. Finally the pain became so bad today that she decided to come to the ED.  BM's have been normal.    In the ED her VS's were BP (!) 172/68   Pulse 63   Temp 98.5 °F (36.9 °C) (Oral)   Resp 15   Ht 5' 8" (1.727 m)   Wt 96.2 kg (212 lb)   SpO2 92-96%  RA  Breastfeeding No   BMI 32.23 kg/m².  Labs showed normal CBC, normal PTT and INR, Cr 0.7, normal LFT's.  COVID NEGATIVE. [place] 0.7.  UA normal.      CT abdomen and pelvis with contrast showed a splenic wedge-shaped geographic hypoattenuation suggesting edema related splenic ischemia/infarction.    In the ED she was treated with Bentyl 20mg 1748, IV heparin infusion protocol 2200, Morphine 2mg iv 1747, and Morphine 4mg iv 2126.              * No surgery found *      Hospital Course:   Ms Ramirez is a 66y F admitted for splenic infarction. The patient was started on anticoagulation. Vascular surgery not available over the weekend of the patient's " "admission, however [facility] Vascular recommended "hypercoagulability workup".    The patient was started on a heparin drip. She received an echocardiogram which showed no interatrial shunt but did reveal severe left atrial enlargement. The patient was monitored on telemetry but did not have any episodes of atrial fibrillation. She showed no signs of infection, TTE without vegetation. LV without thrombus.     At discharge she was prescribed eliquis for continued anticoagulation while workup for hypercoagulability and atrial fibrillation was pursued. She was referred to cardiology for ambulatory rhythm monitoring. She will follow up with pcp, vascular, and cardiology in the outpatient setting.         Goals of Care Treatment Preferences:  Code Status: Full Code      Consults:     No new Assessment & Plan notes have been filed under this hospital service since the last note was generated.  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    Specialty: Family Medicine  Contact information:  [address]  [place] [address]  [phone]             [facility] - Cardiology Follow up.    Specialty: Cardiology  Why: Ambualatory referral has been placed to follow up with Cardiology; please expect call from office to arrange; please call office on Tuesday if you have not heard from them by then  Contact information:  [address] [facility] [address]  [facility] [address]  [phone]  Additional information:  Please park in garage or Medical Office Bldg. surface lot and use Medical Ofc Bldg elevator. Check in at [facility] [address].                     Patient Instructions:      Ambulatory referral/consult to Cardiology   Standing Status: Future   Referral Priority: Routine Referral Type: Consultation   Referral Reason: Specialty Services Required   Requested Specialty: Cardiology   Number of Visits Requested: 1       Significant Diagnostic Studies: as above    Pending Diagnostic Studies:     None         Medications:  Reconciled Home Medications:      Medication List      START taking these medications    apixaban 5 mg Tab  Commonly known as: ELIQUIS  Take 1 tablet (5 mg total) by mouth 2 (two) times daily.        CONTINUE taking these medications    acetaminophen 500 MG tablet  Commonly known as: TYLENOL  Take 500 mg by mouth every 6 (six) hours as needed for Pain.     aspirin 81 MG EC tablet  Commonly known as: ECOTRIN  Take 81 mg by mouth once daily.     atorvastatin 40 MG tablet  Commonly known as: LIPITOR  Take 1 tablet by mouth once daily     BIOTIN ORAL  Take by mouth.     busPIRone 15 MG tablet  Commonly known as: BUSPAR  TAKE 1 TABLET BY MOUTH THREE TIMES DAILY     citalopram 40 MG tablet  Commonly known as: CeleXA  Take 1 tablet by mouth once daily     ergocalciferol 50,000 unit Cap  Commonly known as: ERGOCALCIFEROL  Take 1 capsule by mouth once a week     olmesartan 20 MG tablet  Commonly known as: BENICAR  Take 1/2 (one-half) tablet by mouth once daily     ondansetron 4 MG tablet  Commonly known as: ZOFRAN  Take 1 tablet (4 mg total) by mouth every 8 (eight) hours as needed for Nausea.     traZODone 100 MG tablet  Commonly known as: DESYREL  Take 1 tablet (100 mg total) by mouth every evening.        STOP taking these medications    cyclobenzaprine 5 MG tablet  Commonly known as: FLEXERIL            Indwelling Lines/Drains at time of discharge:    Lines/Drains/Airways     None                 Time spent on the discharge of patient: 45 minutes         Joseph Cook MD  Department of Hospital Medicine  [facility] Surg

## 2022-08-22 ENCOUNTER — TELEPHONE (OUTPATIENT)
Dept: SMOKING CESSATION | Facility: CLINIC | Age: 67
End: 2022-08-22
Payer: MEDICARE

## 2022-08-23 ENCOUNTER — CLINICAL SUPPORT (OUTPATIENT)
Dept: SMOKING CESSATION | Facility: CLINIC | Age: 67
End: 2022-08-23
Payer: COMMERCIAL

## 2022-08-23 DIAGNOSIS — F17.200 NICOTINE DEPENDENCE: Primary | ICD-10-CM

## 2022-08-23 PROCEDURE — 99999 PR PBB SHADOW E&M-EST. PATIENT-LVL II: ICD-10-PCS | Mod: PBBFAC,,,

## 2022-08-23 PROCEDURE — 99999 PR PBB SHADOW E&M-EST. PATIENT-LVL II: CPT | Mod: PBBFAC,,,

## 2022-08-23 PROCEDURE — 99404 PR PREVENT COUNSEL,INDIV,60 MIN: ICD-10-PCS | Mod: S$GLB,,,

## 2022-08-23 PROCEDURE — 99404 PREV MED CNSL INDIV APPRX 60: CPT | Mod: S$GLB,,,

## 2022-08-23 RX ORDER — IBUPROFEN 200 MG
1 TABLET ORAL DAILY
Qty: 28 PATCH | Refills: 0 | Status: SHIPPED | OUTPATIENT
Start: 2022-08-23 | End: 2022-09-06

## 2022-08-23 RX ORDER — BUPROPION HYDROCHLORIDE 150 MG/1
150 TABLET, EXTENDED RELEASE ORAL 2 TIMES DAILY
Qty: 60 TABLET | Refills: 0 | Status: SHIPPED | OUTPATIENT
Start: 2022-08-23 | End: 2022-09-06

## 2022-08-23 NOTE — PROGRESS NOTES
Patient presented to clinic for initial intake visit, name and date of birth verified as two patient identifiers.  Patients FTND score of 6 is indicative of a high level of nicotine dependence, and her CESD score of 15 is perceived as no mental distress noted.  Patient reported she currently smokes 1/2 to 1 pack of cpd contingent upon her day.  Counselor discussed nicotine replacement options and packet 1 with patient.  Counselor outlined cues and triggers, differentiating between urge and habit, behavior modification, initiating a smoking journal, waiting 15 minutes prior to smoking, and engaging in positive outlets such as purging unwanted things and taking short walks in lieu of smoking.  Patient will begin biweekly smoking cessation counseling sessions, and she will also begin NRT with 21 mg nicotine patch in conjunction with 150 mg Wellbutrin SR.  Counselor allotted time for questions, and she provided patient with her contact information.  Follow-up visit scheduled in one week.  Counselor will remain available should any further needs arise.

## 2022-09-02 ENCOUNTER — OFFICE VISIT (OUTPATIENT)
Dept: OPHTHALMOLOGY | Facility: CLINIC | Age: 67
End: 2022-09-02
Payer: MEDICARE

## 2022-09-02 DIAGNOSIS — H33.022 RETINAL DETACHMENT OF LEFT EYE WITH MULTIPLE BREAKS: ICD-10-CM

## 2022-09-02 DIAGNOSIS — H52.7 REFRACTIVE ERROR: ICD-10-CM

## 2022-09-02 DIAGNOSIS — H25.13 NUCLEAR SCLEROSIS OF BOTH EYES: Primary | ICD-10-CM

## 2022-09-02 DIAGNOSIS — H35.372 EPIRETINAL MEMBRANE, LEFT: ICD-10-CM

## 2022-09-02 PROCEDURE — 1126F PR PAIN SEVERITY QUANTIFIED, NO PAIN PRESENT: ICD-10-PCS | Mod: CPTII,S$GLB,, | Performed by: OPHTHALMOLOGY

## 2022-09-02 PROCEDURE — 92014 COMPRE OPH EXAM EST PT 1/>: CPT | Mod: S$GLB,,, | Performed by: OPHTHALMOLOGY

## 2022-09-02 PROCEDURE — 3288F FALL RISK ASSESSMENT DOCD: CPT | Mod: CPTII,S$GLB,, | Performed by: OPHTHALMOLOGY

## 2022-09-02 PROCEDURE — 99999 PR PBB SHADOW E&M-EST. PATIENT-LVL III: CPT | Mod: PBBFAC,,, | Performed by: OPHTHALMOLOGY

## 2022-09-02 PROCEDURE — 1101F PT FALLS ASSESS-DOCD LE1/YR: CPT | Mod: CPTII,S$GLB,, | Performed by: OPHTHALMOLOGY

## 2022-09-02 PROCEDURE — 4010F PR ACE/ARB THEARPY RXD/TAKEN: ICD-10-PCS | Mod: CPTII,S$GLB,, | Performed by: OPHTHALMOLOGY

## 2022-09-02 PROCEDURE — 99999 PR PBB SHADOW E&M-EST. PATIENT-LVL III: ICD-10-PCS | Mod: PBBFAC,,, | Performed by: OPHTHALMOLOGY

## 2022-09-02 PROCEDURE — 92014 PR EYE EXAM, EST PATIENT,COMPREHESV: ICD-10-PCS | Mod: S$GLB,,, | Performed by: OPHTHALMOLOGY

## 2022-09-02 PROCEDURE — 92136 OPHTHALMIC BIOMETRY: CPT | Mod: LT,S$GLB,, | Performed by: OPHTHALMOLOGY

## 2022-09-02 PROCEDURE — 1160F RVW MEDS BY RX/DR IN RCRD: CPT | Mod: CPTII,S$GLB,, | Performed by: OPHTHALMOLOGY

## 2022-09-02 PROCEDURE — 1159F PR MEDICATION LIST DOCUMENTED IN MEDICAL RECORD: ICD-10-PCS | Mod: CPTII,S$GLB,, | Performed by: OPHTHALMOLOGY

## 2022-09-02 PROCEDURE — 1160F PR REVIEW ALL MEDS BY PRESCRIBER/CLIN PHARMACIST DOCUMENTED: ICD-10-PCS | Mod: CPTII,S$GLB,, | Performed by: OPHTHALMOLOGY

## 2022-09-02 PROCEDURE — 3044F HG A1C LEVEL LT 7.0%: CPT | Mod: CPTII,S$GLB,, | Performed by: OPHTHALMOLOGY

## 2022-09-02 PROCEDURE — 1159F MED LIST DOCD IN RCRD: CPT | Mod: CPTII,S$GLB,, | Performed by: OPHTHALMOLOGY

## 2022-09-02 PROCEDURE — 1126F AMNT PAIN NOTED NONE PRSNT: CPT | Mod: CPTII,S$GLB,, | Performed by: OPHTHALMOLOGY

## 2022-09-02 PROCEDURE — 3288F PR FALLS RISK ASSESSMENT DOCUMENTED: ICD-10-PCS | Mod: CPTII,S$GLB,, | Performed by: OPHTHALMOLOGY

## 2022-09-02 PROCEDURE — 1101F PR PT FALLS ASSESS DOC 0-1 FALLS W/OUT INJ PAST YR: ICD-10-PCS | Mod: CPTII,S$GLB,, | Performed by: OPHTHALMOLOGY

## 2022-09-02 PROCEDURE — 4010F ACE/ARB THERAPY RXD/TAKEN: CPT | Mod: CPTII,S$GLB,, | Performed by: OPHTHALMOLOGY

## 2022-09-02 PROCEDURE — 92136 BIOMETRY: ICD-10-PCS | Mod: LT,S$GLB,, | Performed by: OPHTHALMOLOGY

## 2022-09-02 PROCEDURE — 3044F PR MOST RECENT HEMOGLOBIN A1C LEVEL <7.0%: ICD-10-PCS | Mod: CPTII,S$GLB,, | Performed by: OPHTHALMOLOGY

## 2022-09-02 NOTE — PROGRESS NOTES
Subjective:       Patient ID: Patricia Ramirez is a 67 y.o. female.    Chief Complaint: Cataract (Patricia Ramirez is a 68 y/o female)    HPI     Cataract     Additional comments: Patricia Ramirez is a 68 y/o female           Comments    Pt here for Cataract Evaluation  OS per Dr. Martinez.  Pt denies flashes and floaters. Pt denies itching, tearing and burning.  Pt state blurry vision, distortion..    Eye meds:Na                Last edited by Lela Jacob on 9/2/2022  2:47 PM.             Assessment:       1. Nuclear sclerosis of both eyes    2. 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 Epiretinal membrane, left    4. Refractive error          Plan:       Visually significant cataract OS -Pt. Wants Sx.     Cataract OD- Not visually significant.  RRD OS s/p PPV/AFx/EL/SF6-Doing well per Dr Martinez.  ERM OS-Mild per Dr Martinez.  RE      Cataract Surgery Consent: Patient with a visually significant cataract with difficulties of ADLs, reading, driving, night vision, glare (any and all).  Discussed with Patient/Family/Caregiver: options, risks and benefits, expectations of cataract surgery, utilized an eye model with questions and answers to facilitate discussion.  Discussed lens options and patient understands that glasses may be required for optimal vision for distance and/or near vision after cataract surgery.  The Patient/Family/Caregiver  voice good understanding and patient wishes to proceed with surgery.  The patient will likely benefit from surgery and patient signed consent for Left Eye.   Will call Pt to schedule CE OS [place] 21.0.

## 2022-09-06 ENCOUNTER — TELEPHONE (OUTPATIENT)
Dept: OPHTHALMOLOGY | Facility: CLINIC | Age: 67
End: 2022-09-06
Payer: MEDICARE

## 2022-09-07 ENCOUNTER — CLINICAL SUPPORT (OUTPATIENT)
Dept: SMOKING CESSATION | Facility: CLINIC | Age: 67
End: 2022-09-07
Payer: COMMERCIAL

## 2022-09-07 ENCOUNTER — TELEPHONE (OUTPATIENT)
Dept: OPHTHALMOLOGY | Facility: CLINIC | Age: 67
End: 2022-09-07
Payer: MEDICARE

## 2022-09-07 DIAGNOSIS — H25.12 NS (NUCLEAR SCLEROSIS), LEFT: Primary | ICD-10-CM

## 2022-09-07 DIAGNOSIS — H25.13 NUCLEAR SCLEROTIC CATARACT OF BOTH EYES: Primary | ICD-10-CM

## 2022-09-07 DIAGNOSIS — F17.200 NICOTINE DEPENDENCE: Primary | ICD-10-CM

## 2022-09-07 PROCEDURE — 99999 PR PBB SHADOW E&M-EST. PATIENT-LVL II: CPT | Mod: PBBFAC,,,

## 2022-09-07 PROCEDURE — 99404 PREV MED CNSL INDIV APPRX 60: CPT | Mod: S$PBB,,,

## 2022-09-07 PROCEDURE — 99999 PR PBB SHADOW E&M-EST. PATIENT-LVL II: ICD-10-PCS | Mod: PBBFAC,,,

## 2022-09-07 PROCEDURE — 99404 PR PREVENT COUNSEL,INDIV,60 MIN: ICD-10-PCS | Mod: S$PBB,,,

## 2022-09-07 RX ORDER — VARENICLINE TARTRATE 1 MG/1
TABLET, FILM COATED ORAL
Qty: 56 TABLET | Refills: 0 | Status: SHIPPED | OUTPATIENT
Start: 2022-09-07 | End: 2023-03-31

## 2022-09-07 RX ORDER — PREDNISOLONE ACETATE 10 MG/ML
1 SUSPENSION/ DROPS OPHTHALMIC 4 TIMES DAILY
Qty: 5 ML | Refills: 1 | Status: SHIPPED | OUTPATIENT
Start: 2022-10-27 | End: 2022-11-26

## 2022-09-07 RX ORDER — KETOROLAC TROMETHAMINE 5 MG/ML
1 SOLUTION OPHTHALMIC 4 TIMES DAILY
Qty: 5 ML | Refills: 1 | Status: SHIPPED | OUTPATIENT
Start: 2022-10-24 | End: 2022-11-23

## 2022-09-07 RX ORDER — OFLOXACIN 3 MG/ML
1 SOLUTION/ DROPS OPHTHALMIC 4 TIMES DAILY
Qty: 5 ML | Refills: 1 | Status: SHIPPED | OUTPATIENT
Start: 2022-10-24 | End: 2022-11-03

## 2022-09-07 NOTE — PROGRESS NOTES
Individual Follow-Up Form    9/7/2022    Quit Date: TBD    Clinical Status of Patient: Outpatient    Length of Service: 60 minutes    Continuing Medication: yes  Wellbutrin    Other Medications: None      Target Symptoms: Withdrawal and medication side effects. The following were  rated moderate (3) to severe (4) on TCRS:  Moderate (3): Crave and Desire   Severe (4): None     Comments: Patient presented to clinic for follow-up visit, name and date of birth verified as two patient identifiers.   Patient reported she is still smoking about 17 CPD, and she started using 150 mg Wellbutrin, but she began to have anxiety really bad.  Counselor discussed alternate replacement options for NRT, and patient verbalized understanding.  Counselor will discontinue 150 mg Wellbutrin SR and she will order 1 mg Varenicline as alternate NRT option.  Counselor reviewed medication usage and side effects with patient and she verbalized understanding.  Counselor also reviewed packet 2 with patient outlining the benefits on one's health once she achieves tobacco free status.  Follow-up visit scheduled in two weeks.  Counselor will remain available should any further needs arise.      Diagnosis: F17.200    Next Visit: 2 weeks

## 2022-09-23 ENCOUNTER — TELEPHONE (OUTPATIENT)
Dept: SMOKING CESSATION | Facility: CLINIC | Age: 67
End: 2022-09-23
Payer: MEDICARE

## 2022-09-23 NOTE — TELEPHONE ENCOUNTER
Smoking Cessation counselor attempted to contact patient regarding her smoking progress, but patient was unavailable.  Counselor left a message requesting a return call.      Teresa Olea RRT,MSW,LMSW,TTS  [phone]

## 2022-09-26 PROBLEM — R73.01 IFG (IMPAIRED FASTING GLUCOSE): Status: ACTIVE | Noted: 2022-09-26

## 2022-09-26 PROBLEM — I45.10 RBBB: Status: ACTIVE | Noted: 2022-09-26

## 2022-09-26 PROBLEM — I50.30 HEART FAILURE WITH PRESERVED EJECTION FRACTION: Status: ACTIVE | Noted: 2022-09-26

## 2022-09-26 PROBLEM — R94.31 ABNORMAL EKG: Status: ACTIVE | Noted: 2022-09-26

## 2022-09-26 PROBLEM — E66.811 CLASS 1 OBESITY DUE TO EXCESS CALORIES WITHOUT SERIOUS COMORBIDITY WITH BODY MASS INDEX (BMI) OF 31.0 TO 31.9 IN ADULT: Status: ACTIVE | Noted: 2022-09-26

## 2022-09-26 PROBLEM — Z87.891 HISTORY OF TOBACCO ABUSE: Status: ACTIVE | Noted: 2022-09-26

## 2022-09-26 PROBLEM — I51.89 DIASTOLIC DYSFUNCTION: Status: ACTIVE | Noted: 2022-09-26

## 2022-09-26 PROBLEM — R06.09 DYSPNEA ON EXERTION: Status: ACTIVE | Noted: 2022-09-26

## 2022-09-26 PROBLEM — I51.7 LEFT ATRIAL ENLARGEMENT: Status: ACTIVE | Noted: 2022-09-26

## 2022-09-26 PROBLEM — I44.4 LEFT ANTERIOR FASCICULAR BLOCK: Status: ACTIVE | Noted: 2022-09-26

## 2022-09-26 PROBLEM — D73.5 SPLENIC INFARCT: Status: ACTIVE | Noted: 2022-09-26

## 2022-09-26 PROBLEM — Z91.89 MULTIPLE RISK FACTORS FOR CORONARY ARTERY DISEASE: Status: ACTIVE | Noted: 2022-09-26

## 2022-09-26 PROBLEM — E66.09 CLASS 1 OBESITY DUE TO EXCESS CALORIES WITHOUT SERIOUS COMORBIDITY WITH BODY MASS INDEX (BMI) OF 31.0 TO 31.9 IN ADULT: Status: ACTIVE | Noted: 2022-09-26

## 2022-09-26 PROBLEM — Z79.01 CHRONIC ANTICOAGULATION: Status: ACTIVE | Noted: 2022-09-26

## 2022-09-28 ENCOUNTER — TELEPHONE (OUTPATIENT)
Dept: SMOKING CESSATION | Facility: CLINIC | Age: 67
End: 2022-09-28
Payer: MEDICARE

## 2022-09-28 NOTE — TELEPHONE ENCOUNTER
Smoking Cessation counselor attempted to contact patient regarding her smoking progress, phone was answered but no response was received.         Teresa Olea RRT,MSW,LMSW,TTS  [phone]

## 2022-10-12 ENCOUNTER — CLINICAL SUPPORT (OUTPATIENT)
Dept: SMOKING CESSATION | Facility: CLINIC | Age: 67
End: 2022-10-12
Payer: COMMERCIAL

## 2022-10-12 DIAGNOSIS — F17.200 NICOTINE DEPENDENCE: Primary | ICD-10-CM

## 2022-10-12 PROCEDURE — 99402 PREV MED CNSL INDIV APPRX 30: CPT | Mod: S$GLB,,,

## 2022-10-12 PROCEDURE — 99999 PR PBB SHADOW E&M-EST. PATIENT-LVL I: ICD-10-PCS | Mod: PBBFAC,,,

## 2022-10-12 PROCEDURE — 99999 PR PBB SHADOW E&M-EST. PATIENT-LVL I: CPT | Mod: PBBFAC,,,

## 2022-10-12 PROCEDURE — 99402 PR PREVENT COUNSEL,INDIV,30 MIN: ICD-10-PCS | Mod: S$GLB,,,

## 2022-10-12 NOTE — PROGRESS NOTES
Individual Follow-Up Form    10/12/2022    Quit Date: TBD    Clinical Status of Patient: Outpatient    Length of Service: 30 minutes    Continuing Medication: no    Other Medications: None      Target Symptoms: Withdrawal and medication side effects. The following were  rated moderate (3) to severe (4) on TCRS:  Moderate (3): Crave and Desire   Severe (4): None     Comments: Counselor spoke with patient for telephonic visit, name and date of birth verified as two patient identifiers.   Patient reported she quit smoking for three weeks, but she started smoking again.  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  Counselor acknowledged patient's wish, and she explained to patient services remain available to her if she changes her mind.  Counselor will remain available should any further needs arise.      Diagnosis: F17.200    Next Visit: Patient decided not to continue program

## 2022-10-18 ENCOUNTER — OFFICE VISIT (OUTPATIENT)
Dept: OPHTHALMOLOGY | Facility: CLINIC | Age: 67
End: 2022-10-18
Payer: MEDICARE

## 2022-10-18 DIAGNOSIS — H33.022 RETINAL DETACHMENT OF LEFT EYE WITH MULTIPLE BREAKS: ICD-10-CM

## 2022-10-18 DIAGNOSIS — H25.13 NUCLEAR SCLEROSIS OF BOTH EYES: ICD-10-CM

## 2022-10-18 DIAGNOSIS — H35.372 EPIRETINAL MEMBRANE, LEFT: Primary | ICD-10-CM

## 2022-10-18 PROCEDURE — 92201 OPSCPY EXTND RTA DRAW UNI/BI: CPT | Mod: S$GLB,,, | Performed by: OPHTHALMOLOGY

## 2022-10-18 PROCEDURE — 1159F MED LIST DOCD IN RCRD: CPT | Mod: CPTII,S$GLB,, | Performed by: OPHTHALMOLOGY

## 2022-10-18 PROCEDURE — 4010F PR ACE/ARB THEARPY RXD/TAKEN: ICD-10-PCS | Mod: CPTII,S$GLB,, | Performed by: OPHTHALMOLOGY

## 2022-10-18 PROCEDURE — 92134 CPTRZ OPH DX IMG PST SGM RTA: CPT | Mod: S$GLB,,, | Performed by: OPHTHALMOLOGY

## 2022-10-18 PROCEDURE — 99999 PR PBB SHADOW E&M-EST. PATIENT-LVL III: ICD-10-PCS | Mod: PBBFAC,,, | Performed by: OPHTHALMOLOGY

## 2022-10-18 PROCEDURE — 3044F PR MOST RECENT HEMOGLOBIN A1C LEVEL <7.0%: ICD-10-PCS | Mod: CPTII,S$GLB,, | Performed by: OPHTHALMOLOGY

## 2022-10-18 PROCEDURE — 92201 PR OPHTHALMOSCOPY, EXT, W/RET DRAW/SCLERAL DEPR, I&R, UNI/BI: ICD-10-PCS | Mod: S$GLB,,, | Performed by: OPHTHALMOLOGY

## 2022-10-18 PROCEDURE — 1126F PR PAIN SEVERITY QUANTIFIED, NO PAIN PRESENT: ICD-10-PCS | Mod: CPTII,S$GLB,, | Performed by: OPHTHALMOLOGY

## 2022-10-18 PROCEDURE — 92014 PR EYE EXAM, EST PATIENT,COMPREHESV: ICD-10-PCS | Mod: S$GLB,,, | Performed by: OPHTHALMOLOGY

## 2022-10-18 PROCEDURE — 99999 PR PBB SHADOW E&M-EST. PATIENT-LVL III: CPT | Mod: PBBFAC,,, | Performed by: OPHTHALMOLOGY

## 2022-10-18 PROCEDURE — 4010F ACE/ARB THERAPY RXD/TAKEN: CPT | Mod: CPTII,S$GLB,, | Performed by: OPHTHALMOLOGY

## 2022-10-18 PROCEDURE — 1160F RVW MEDS BY RX/DR IN RCRD: CPT | Mod: CPTII,S$GLB,, | Performed by: OPHTHALMOLOGY

## 2022-10-18 PROCEDURE — 92014 COMPRE OPH EXAM EST PT 1/>: CPT | Mod: S$GLB,,, | Performed by: OPHTHALMOLOGY

## 2022-10-18 PROCEDURE — 1126F AMNT PAIN NOTED NONE PRSNT: CPT | Mod: CPTII,S$GLB,, | Performed by: OPHTHALMOLOGY

## 2022-10-18 PROCEDURE — 3288F FALL RISK ASSESSMENT DOCD: CPT | Mod: CPTII,S$GLB,, | Performed by: OPHTHALMOLOGY

## 2022-10-18 PROCEDURE — 3288F PR FALLS RISK ASSESSMENT DOCUMENTED: ICD-10-PCS | Mod: CPTII,S$GLB,, | Performed by: OPHTHALMOLOGY

## 2022-10-18 PROCEDURE — 3044F HG A1C LEVEL LT 7.0%: CPT | Mod: CPTII,S$GLB,, | Performed by: OPHTHALMOLOGY

## 2022-10-18 PROCEDURE — 92134 POSTERIOR SEGMENT OCT RETINA (OCULAR COHERENCE TOMOGRAPHY)-BOTH EYES: ICD-10-PCS | Mod: S$GLB,,, | Performed by: OPHTHALMOLOGY

## 2022-10-18 PROCEDURE — 1101F PT FALLS ASSESS-DOCD LE1/YR: CPT | Mod: CPTII,S$GLB,, | Performed by: OPHTHALMOLOGY

## 2022-10-18 PROCEDURE — 1159F PR MEDICATION LIST DOCUMENTED IN MEDICAL RECORD: ICD-10-PCS | Mod: CPTII,S$GLB,, | Performed by: OPHTHALMOLOGY

## 2022-10-18 PROCEDURE — 1101F PR PT FALLS ASSESS DOC 0-1 FALLS W/OUT INJ PAST YR: ICD-10-PCS | Mod: CPTII,S$GLB,, | Performed by: OPHTHALMOLOGY

## 2022-10-18 PROCEDURE — 1160F PR REVIEW ALL MEDS BY PRESCRIBER/CLIN PHARMACIST DOCUMENTED: ICD-10-PCS | Mod: CPTII,S$GLB,, | Performed by: OPHTHALMOLOGY

## 2022-10-18 NOTE — PROGRESS NOTES
HPI    Patient here today for 1 mo f/u PPV OS. C/o blurry and distorted VA OS, no   pain or f/f.  Last edited by Ashly Carter on 10/18/2022  1:45 PM.          OCT - OD  - no ME OU  OS -  Superior ERM at arcade, MPH at foveal, but OS/IS line improving        A/P    1. RRD OS      Assessment/Plan:   s/p PPV/AFx/EL/SF6 for RRD macula involving, left eye  2/28/22  Prior pneumatic early 2/22 ath [facility]    Doing well, reitna flat    3. ERM OS  With some MPH - though OS/IS improving  Monitor for now, given macula involving RD, may not improve much with MP    4. NS OU  Increasing OS post PPV Consult Dr. KING for CE OS      6months OCT/MRx

## 2022-10-19 DIAGNOSIS — Z12.11 COLON CANCER SCREENING: ICD-10-CM

## 2022-10-23 NOTE — H&P
[facility] [place] Physical    Subjective:      Chief Complaint/Reason for Admission:     Patricia Ramirez is a 67 y.o. female.    Past Medical History:   Diagnosis Date    Allergy     Anxiety     Arthritis     Cataract     Depressive disorder, not elsewhere classified     Esophageal reflux     Hypertension     Impaired fasting glucose     Joint pain     Obesity, unspecified     Other and unspecified hyperlipidemia      Past Surgical History:   Procedure Laterality Date    BLEPHAROPLASTY Bilateral 3/3/2021    Procedure: BLEPHAROPLASTY;  Surgeon: Maite Acuna MD;  Location: [facility];  Service: Ophthalmology;  Laterality: Bilateral;     SECTION  1973    HYSTERECTOMY      without BSO    R knee replacement      REPAIR OF RETINAL DETACHMENT WITH VITRECTOMY Left 2022    Procedure: REPAIR, RETINAL DETACHMENT, WITH VITRECTOMY;  Surgeon: MINO Martinez MD;  Location: [address];  Service: Ophthalmology;  Laterality: Left;  Spoke with SID Garcia. Pt was instructed nothing to eat after 8am and to arrive at 2pm for preop. 430pm case start request.    right  arm  surgery       Family History   Problem Relation Age of Onset    Cancer Mother         lung    Liver disease Father     Thyroid disease Sister     Cervical cancer Sister     Tremor Sister      Social History     Tobacco Use    Smoking status: Every Day     Packs/day: 0.75     Years: 40.00     Pack years: 30.00     Types: Cigarettes    Smokeless tobacco: Never   Substance Use Topics    Alcohol use: Yes     Alcohol/week: 0.0 standard drinks     Comment: occasionally    Drug use: Yes     Types: Marijuana       No medications prior to admission.     Review of patient's allergies indicates:   Allergen Reactions    Sulfa (sulfonamide antibiotics) Hives    Ace inhibitors Other (See Comments)     Cough          Review of Systems   Eyes:  Positive for blurred vision.   All other systems reviewed and are negative.    Objective:      Vital  Signs (Most Recent)       Vital Signs Range (Last 24H):       Physical Exam  Constitutional:       Appearance: She is well-developed.   HENT:      Head: Normocephalic.   Eyes:      Conjunctiva/sclera: Conjunctivae normal.      Pupils: Pupils are equal, round, and reactive to light.   Cardiovascular:      Rate and Rhythm: Normal rate and regular rhythm.      Heart sounds: Normal heart sounds.   Pulmonary:      Effort: Pulmonary effort is normal.      Breath sounds: Normal breath sounds.   Abdominal:      General: Bowel sounds are normal.      Palpations: Abdomen is soft.   Musculoskeletal:         General: Normal range of motion.      Cervical back: Normal range of motion and neck supple.   Skin:     General: Skin is warm.   Neurological:      Mental Status: She is alert and oriented to person, place, and time.       Data Review:     ECG:     Assessment:      Cataract OS.    Plan:    CE OS.

## 2022-10-26 ENCOUNTER — ANESTHESIA EVENT (OUTPATIENT)
Dept: SURGERY | Facility: HOSPITAL | Age: 67
End: 2022-10-26
Payer: MEDICARE

## 2022-10-27 ENCOUNTER — HOSPITAL ENCOUNTER (OUTPATIENT)
Facility: HOSPITAL | Age: 67
Discharge: HOME | End: 2022-10-27
Attending: OPHTHALMOLOGY | Admitting: OPHTHALMOLOGY
Payer: MEDICARE

## 2022-10-27 ENCOUNTER — ANESTHESIA (OUTPATIENT)
Dept: SURGERY | Facility: HOSPITAL | Age: 67
End: 2022-10-27
Payer: MEDICARE

## 2022-10-27 VITALS
OXYGEN SATURATION: 98 % | RESPIRATION RATE: 16 BRPM | HEIGHT: 67 IN | WEIGHT: 210 LBS | TEMPERATURE: 98 F | DIASTOLIC BLOOD PRESSURE: 76 MMHG | HEART RATE: 54 BPM | BODY MASS INDEX: 32.96 KG/M2 | SYSTOLIC BLOOD PRESSURE: 184 MMHG

## 2022-10-27 DIAGNOSIS — H25.12 NUCLEAR SCLEROTIC CATARACT OF LEFT EYE: Primary | ICD-10-CM

## 2022-10-27 PROCEDURE — 71000015 HC POSTOP RECOV 1ST HR: Performed by: OPHTHALMOLOGY

## 2022-10-27 PROCEDURE — D9220A PRA ANESTHESIA: ICD-10-PCS | Mod: CRNA,,, | Performed by: NURSE ANESTHETIST, CERTIFIED REGISTERED

## 2022-10-27 PROCEDURE — 66984 PR REMOVAL, CATARACT, W/INSRT INTRAOC LENS, W/O ENDO CYCLO: ICD-10-PCS | Mod: LT,,, | Performed by: OPHTHALMOLOGY

## 2022-10-27 PROCEDURE — D9220A PRA ANESTHESIA: ICD-10-PCS | Mod: ANES,,, | Performed by: ANESTHESIOLOGY

## 2022-10-27 PROCEDURE — 63600175 PHARM REV CODE 636 W HCPCS: Performed by: NURSE ANESTHETIST, CERTIFIED REGISTERED

## 2022-10-27 PROCEDURE — 37000009 HC ANESTHESIA EA ADD 15 MINS: Performed by: OPHTHALMOLOGY

## 2022-10-27 PROCEDURE — V2632 POST CHMBR INTRAOCULAR LENS: HCPCS | Performed by: OPHTHALMOLOGY

## 2022-10-27 PROCEDURE — 63600175 PHARM REV CODE 636 W HCPCS: Performed by: OPHTHALMOLOGY

## 2022-10-27 PROCEDURE — 25000003 PHARM REV CODE 250

## 2022-10-27 PROCEDURE — D9220A PRA ANESTHESIA: Mod: CRNA,,, | Performed by: NURSE ANESTHETIST, CERTIFIED REGISTERED

## 2022-10-27 PROCEDURE — 36000706: Performed by: OPHTHALMOLOGY

## 2022-10-27 PROCEDURE — 66984 XCAPSL CTRC RMVL W/O ECP: CPT | Mod: LT,,, | Performed by: OPHTHALMOLOGY

## 2022-10-27 PROCEDURE — D9220A PRA ANESTHESIA: Mod: ANES,,, | Performed by: ANESTHESIOLOGY

## 2022-10-27 PROCEDURE — 25000003 PHARM REV CODE 250: Performed by: ANESTHESIOLOGY

## 2022-10-27 PROCEDURE — 25000003 PHARM REV CODE 250: Performed by: OPHTHALMOLOGY

## 2022-10-27 PROCEDURE — 37000008 HC ANESTHESIA 1ST 15 MINUTES: Performed by: OPHTHALMOLOGY

## 2022-10-27 PROCEDURE — 36000707: Performed by: OPHTHALMOLOGY

## 2022-10-27 DEVICE — LENS CLAREON AUTONOME 21.0D: Type: IMPLANTABLE DEVICE | Site: EYE | Status: FUNCTIONAL

## 2022-10-27 RX ORDER — OFLOXACIN 3 MG/ML
1 SOLUTION/ DROPS OPHTHALMIC
Status: DISPENSED | OUTPATIENT
Start: 2022-10-27

## 2022-10-27 RX ORDER — LIDOCAINE HYDROCHLORIDE 20 MG/ML
INJECTION, SOLUTION INFILTRATION; PERINEURAL
Status: DISCONTINUED | OUTPATIENT
Start: 2022-10-27 | End: 2022-10-27 | Stop reason: HOSPADM

## 2022-10-27 RX ORDER — PHENYLEPHRINE HYDROCHLORIDE 25 MG/ML
1 SOLUTION/ DROPS OPHTHALMIC
Status: COMPLETED | OUTPATIENT
Start: 2022-10-27 | End: 2022-10-27

## 2022-10-27 RX ORDER — ACETAMINOPHEN 325 MG/1
650 TABLET ORAL EVERY 4 HOURS PRN
Status: DISCONTINUED | OUTPATIENT
Start: 2022-10-27 | End: 2022-10-27 | Stop reason: HOSPADM

## 2022-10-27 RX ORDER — SODIUM CHLORIDE 0.9 % (FLUSH) 0.9 %
10 SYRINGE (ML) INJECTION
Status: ACTIVE | OUTPATIENT
Start: 2022-10-27

## 2022-10-27 RX ORDER — FENTANYL CITRATE 50 UG/ML
INJECTION, SOLUTION INTRAMUSCULAR; INTRAVENOUS
Status: DISCONTINUED | OUTPATIENT
Start: 2022-10-27 | End: 2022-10-28

## 2022-10-27 RX ORDER — SODIUM CHLORIDE 9 MG/ML
INJECTION, SOLUTION INTRAVENOUS CONTINUOUS
Status: DISCONTINUED | OUTPATIENT
Start: 2022-10-27 | End: 2022-10-27 | Stop reason: HOSPADM

## 2022-10-27 RX ORDER — CYCLOPENTOLATE HYDROCHLORIDE 10 MG/ML
1 SOLUTION/ DROPS OPHTHALMIC
Status: DISPENSED | OUTPATIENT
Start: 2022-10-27

## 2022-10-27 RX ORDER — HYDROCODONE BITARTRATE AND ACETAMINOPHEN 5; 325 MG/1; MG/1
1 TABLET ORAL EVERY 4 HOURS PRN
Status: DISCONTINUED | OUTPATIENT
Start: 2022-10-27 | End: 2022-10-27 | Stop reason: HOSPADM

## 2022-10-27 RX ORDER — PREDNISOLONE ACETATE 10 MG/ML
SUSPENSION/ DROPS OPHTHALMIC
Status: DISCONTINUED | OUTPATIENT
Start: 2022-10-27 | End: 2022-10-27 | Stop reason: HOSPADM

## 2022-10-27 RX ORDER — LIDOCAINE HYDROCHLORIDE 10 MG/ML
1 INJECTION, SOLUTION EPIDURAL; INFILTRATION; INTRACAUDAL; PERINEURAL ONCE
Status: DISCONTINUED | OUTPATIENT
Start: 2022-10-27 | End: 2022-10-27 | Stop reason: HOSPADM

## 2022-10-27 RX ORDER — TETRACAINE HYDROCHLORIDE 5 MG/ML
1 SOLUTION OPHTHALMIC
Status: COMPLETED | OUTPATIENT
Start: 2022-10-27 | End: 2022-10-27

## 2022-10-27 RX ORDER — TROPICAMIDE 10 MG/ML
1 SOLUTION/ DROPS OPHTHALMIC
Status: COMPLETED | OUTPATIENT
Start: 2022-10-27 | End: 2022-10-27

## 2022-10-27 RX ORDER — POVIDONE-IODINE 5 %
SOLUTION, NON-ORAL OPHTHALMIC (EYE)
Status: DISCONTINUED | OUTPATIENT
Start: 2022-10-27 | End: 2022-10-27 | Stop reason: HOSPADM

## 2022-10-27 RX ADMIN — FENTANYL CITRATE 50 MCG: 50 INJECTION, SOLUTION INTRAMUSCULAR; INTRAVENOUS at 12:10

## 2022-10-27 RX ADMIN — SODIUM CHLORIDE: 0.9 INJECTION, SOLUTION INTRAVENOUS at 09:10

## 2022-10-27 RX ADMIN — TETRACAINE HYDROCHLORIDE 1 DROP: 5 SOLUTION OPHTHALMIC at 09:10

## 2022-10-27 RX ADMIN — TETRACAINE HYDROCHLORIDE 1 DROP: 5 SOLUTION OPHTHALMIC at 10:10

## 2022-10-27 RX ADMIN — OFLOXACIN 1 DROP: 3 SOLUTION OPHTHALMIC at 10:10

## 2022-10-27 RX ADMIN — OFLOXACIN 1 DROP: 3 SOLUTION OPHTHALMIC at 09:10

## 2022-10-27 RX ADMIN — PHENYLEPHRINE HYDROCHLORIDE 1 DROP: 25 SOLUTION/ DROPS OPHTHALMIC at 09:10

## 2022-10-27 RX ADMIN — TROPICAMIDE 1 DROP: 10 SOLUTION/ DROPS OPHTHALMIC at 09:10

## 2022-10-27 RX ADMIN — ACETAMINOPHEN 650 MG: 325 TABLET ORAL at 01:10

## 2022-10-27 RX ADMIN — CYCLOPENTOLATE HYDROCHLORIDE 1 DROP: 10 SOLUTION/ DROPS OPHTHALMIC at 10:10

## 2022-10-27 RX ADMIN — CYCLOPENTOLATE HYDROCHLORIDE 1 DROP: 10 SOLUTION/ DROPS OPHTHALMIC at 09:10

## 2022-10-27 RX ADMIN — TROPICAMIDE 1 DROP: 10 SOLUTION/ DROPS OPHTHALMIC at 10:10

## 2022-10-27 RX ADMIN — PHENYLEPHRINE HYDROCHLORIDE 1 DROP: 25 SOLUTION/ DROPS OPHTHALMIC at 10:10

## 2022-10-27 NOTE — PLAN OF CARE
Pt verbalized readiness to go home and understanding of discharge instructions.  Pt provided with gray pouch, sunglasses, paper tape and implant ID card.

## 2022-10-27 NOTE — DISCHARGE INSTRUCTIONS
ACTIVITY LEVEL:  If you received sedation or an anesthetic, you may feel sleepy for several hours. Rest until you are more awake. Gradually resume your normal activities. Normal activity will cause no undue risk to your eye. The white part of your eye might be red - this is nothing to worry about. Wear your old glasses or sunglasses that were given to you for protection during the day.    RESTRICTIONS - for the next 7 days  · Do not lift anything over 10 pounds.  · When bending, bend at the knees not the waist.  · Do not rub the eye.  · Do not get water in the eye.  · Do not sleep on the side that had surgery.  · Protect your eye at bedtime with the shield provided.    DIET: At home, continue with liquids. If there is no nausea, you may eat a soft diet and gradually resume your normal diet. Limit alcohol intake for 24 hours.    BATHING: You may shower or bathe as normal. You may take a warm wash cloth, which has been wrung out to remove excess water, and gently remove crusting on the lashes.    MEDICATIONS: You may take Extra Strength Tylenol every 4 hours for pain/headache.     Use your drops     Today: Use Ketorolac, Prednisolone, and ofloxacin three times today. At 3pm, 6pm, and 9pm    Tomorrow:  Resume all drops four times a day.      Place one drop in the eye that had surgery from the first bottle. Wait 5 minutes and then use the second bottle. (It does not matter which bottle is used first.) CONTINUE all glaucoma drops.  No driving, alcoholic beverages or signing legal documents for next 24 hours or while taking pain medication      WHEN TO CALL THE DOCTOR:  · Redness that increases significantly  · Pain not relieved by Tylenol  RETURN APPOINTMENT:  You will need to see Dr. Berrios on Friday at the [facility] .  Bring your eye kit with you on your follow-up visit. Your kit contains sunglasses, eye shield, tape.  FOR EMERGENCIES:  If any unusual problems or difficulties occur, contact Dr. Berrios at  the Clinic office at [phone] during normal business hours. If after hours, call [phone].       Fall Prevention  Millions of people fall every year and injure themselves. You may have had anesthesia or sedation which may increase your risk of falling. You may have health issues that put you at an increased risk of falling.     Here are ways to reduce your risk of falling.    Make your home safe by keeping walkways clear of objects you may trip over.  Use non-slip pads under rugs. Do not use area rugs or small throw rugs.  Use non-slip mats in bathtubs and showers.  Install handrails and lights on staircases.  Do not walk in poorly lit areas.  Do not stand on chairs or wobbly ladders.  Use caution when reaching overhead or looking upward. This position can cause a loss of balance.  Be sure your shoes fit properly, have non-slip bottoms and are in good condition.   Wear shoes both inside and out. Avoid going barefoot or wearing slippers.  Be cautious when going up and down stairs, curbs, and when walking on uneven sidewalks.  If your balance is poor, consider using a cane or walker.  If your fall was related to alcohol use, stop or limit alcohol intake.   If your fall was related to use of sleeping medicines, talk to your doctor about this. You may need to reduce your dosage at bedtime if you awaken during the night to go to the bathroom.    To reduce the need for nighttime bathroom trips:  Avoid drinking fluids for several hours before going to bed  Empty your bladder before going to bed  Men can keep a urinal at the bedside  Stay as active as you can. Balance, flexibility, strength, and endurance all come from exercise. They all play a role in preventing falls. Ask your healthcare provider which types of activity are right for you.  Get your vision checked on a regular basis.  If you have pets, know where they are before you stand up or walk so you don't trip over them.  Use night lights.

## 2022-10-27 NOTE — BRIEF OP NOTE
[facility] - Surgery  Brief Operative Note    Surgery Date: 10/27/2022     Surgeon(s) and Role:     * Palmer Berrios MD - Primary    Assisting Surgeon: None    Pre-op Diagnosis:  NS (nuclear sclerosis), left [H25.12]    Post-op Diagnosis:  Post-Op Diagnosis Codes:     * NS (nuclear sclerosis), left [H25.12]    Procedure(s) (LRB):  PHACOEMULSIFICATION, CATARACT (Left)  INSERTION, IOL PROSTHESIS (Left)    Anesthesia: Local MAC    Operative Findings:     Estimated Blood Loss: * No values recorded between 10/27/2022 12:32 PM and 10/27/2022 12:55 PM *         Specimens:   Specimen (24h ago, onward)      None              Discharge Note    OUTCOME: Patient tolerated treatment/procedure well without complication and is now ready for discharge.    DISPOSITION: Home or Self Care    FINAL DIAGNOSIS:  Nuclear sclerotic cataract of left eye    FOLLOWUP: In clinic    DISCHARGE INSTRUCTIONS:    Discharge Procedure Orders   Other restrictions (specify):   Order Comments: No heavy lifting or bending for 1 week.

## 2022-10-27 NOTE — ANESTHESIA PREPROCEDURE EVALUATION
10/27/2022  Patricia Ramirez is a 67 y.o., female.      Pre-op Assessment    I have reviewed the Patient Summary Reports.     I have reviewed the Nursing Notes.       Review of Systems  Anesthesia Hx:  No problems with previous Anesthesia  Denies Family Hx of Anesthesia complications.   Denies Personal Hx of Anesthesia complications.   Social:  Smoker marijuana   Hematology/Oncology:        Hematology Comments: Eliquis for splenic infarct-last dose 10/27   EENT/Dental:   cataracts   Cardiovascular:   Hypertension Dysrhythmias  Denies Angina. CHF 2022 Echo:  ? There is no evidence of intracardiac shunting.  ? The left ventricle is normal in size with normal systolic function.  ? The estimated ejection fraction is 65%.  ? Grade II left ventricular diastolic dysfunction.  ? Severe left atrial enlargement.  ? Normal right ventricular size with normal right ventricular systolic function.  ? Mild right atrial enlargement.  ? Normal central venous pressure (3 mmHg).   Pulmonary:  Pulmonary Normal    Renal/:  Renal/ Normal     Hepatic/GI:   GERD, well controlled Splenic infarction   Neurological:   Headaches    Psych:   Psychiatric History          Physical Exam  General: Well nourished, Cooperative, Alert and Oriented    Airway:  Mallampati: II   Mouth Opening: Normal  TM Distance: 4 - 6 cm  Tongue: Normal  Neck ROM: Normal ROM    Dental:  Edentulous    Chest/Lungs:  Clear to auscultation, Normal Respiratory Rate    Heart:  Rate: Normal  Rhythm: Regular Rhythm      Wt Readings from Last 3 Encounters:   10/27/22 95.3 kg (210 lb)   10/12/22 94.4 kg (208 lb 1.8 oz)   09/26/22 93.5 kg (206 lb 2.1 oz)     Temp Readings from Last 3 Encounters:   10/27/22 36.5 °C (97.7 °F) (Oral)   10/12/22 36.8 °C (98.2 °F) (Oral)   09/06/22 36.7 °C (98 °F) (Oral)     BP Readings from Last 3 Encounters:   10/27/22 132/61   10/12/22  138/74   09/26/22 138/63     Pulse Readings from Last 3 Encounters:   10/27/22 (!) 57   10/12/22 62   09/26/22 (!) 52         Anesthesia Plan  Type of Anesthesia, risks & benefits discussed:    Anesthesia Type: Gen Natural Airway, MAC  Intra-op Monitoring Plan: Standard ASA Monitors  Post Op Pain Control Plan: multimodal analgesia  Induction:  IV  Informed Consent: Informed consent signed with the Patient and all parties understand the risks and agree with anesthesia plan.  All questions answered.   ASA Score: 3  Day of Surgery Review of History & Physical: H&P Update referred to the surgeon/provider.    Ready For Surgery From Anesthesia Perspective.     .

## 2022-10-27 NOTE — OP NOTE
Operative Date:  10/27/2022    Discharge Date:  10/27/2022    Discharge Patient Home    Report Title: Operative Note      SURGEON: Palmer Berrios MD     ASSISTANT:     PREOPERATIVE DIAGNOSIS: Visually significant NSC cataract,  Left Eye.    POSTOPERATIVE DIAGNOSIS: Visually-significant NSC cataract,  Left Eye.    PROCEDURE PERFORMED: Phacoemulsification of the cataract with posterior chamber intraocular lens Left Eye.    ANESTHESIA: Topical with MAC     COMPLICATIONS: None    ESTIMATED BLOOD LOSS: Minimal    INDICATIONS FOR PROCEDURE:   The patient is a pleasant 67 year old woman with increasing difficulties with activities of daily living secondary to a dense visually significant cataract in the Left Eye.  Discussions have been carried out with this patient concerning the options to surgery, risks, benefits and expectations.  The patient voiced good understanding and wished to proceed with the above procedure.    PROCEDURE IN DETAIL: The patient was brought to the operating room and received topical anesthetic to the eye and then was prepped and draped in the usual sterile fashion.  Using the Blount ring and the guarded sunshine blade set at 0.37 mm, a partial thickness clear cornea incision was made temporally.  The paracentesis site was made at the six o'clock position.  Omidria was injected into the anterior chamber through the paracentesis.  Viscoat was then injected into the anterior chamber.  The eye was then reentered at the primary surgical site with a 2.4 mm keratome followed by continuous capsulotomy, hydrodissection, hydrodelineation and phacoemulsification of the cataract.  Residual cortical material was removed using automated irrigation-aspiration technique.  Healon was injected into the posterior chamber and a Clareon 21.0 diopter lens was placed in the bag without difficulty. Residual viscoelastic was removed using automated irrigation-aspiration technique. The eye was re-pressurized using  BSS solution and both the paracentesis site and the primary surgical site were demonstrated to be watertight at the end of the case with Weck--Tracy manipulation.  One drop of Ofloxacin and one drop of Pred acetate 1% was applied to the Left Eye .The eye was closed, patched and a Gil shield placed.  The patient was taken to the recovery room in good and stable condition.  The patient tolerated the procedure well.  The patient was instructed to refrain from any heavy lifting, bending, stooping or straining activities, discharged home  and to follow-up in the morning for routine postoperative care with Palmer Berrios MD.

## 2022-10-28 ENCOUNTER — OFFICE VISIT (OUTPATIENT)
Dept: OPHTHALMOLOGY | Facility: CLINIC | Age: 67
End: 2022-10-28
Payer: MEDICARE

## 2022-10-28 DIAGNOSIS — Z98.890 POST-OPERATIVE STATE: Primary | ICD-10-CM

## 2022-10-28 PROCEDURE — 4010F PR ACE/ARB THEARPY RXD/TAKEN: ICD-10-PCS | Mod: CPTII,S$GLB,, | Performed by: OPHTHALMOLOGY

## 2022-10-28 PROCEDURE — 4010F ACE/ARB THERAPY RXD/TAKEN: CPT | Mod: CPTII,S$GLB,, | Performed by: OPHTHALMOLOGY

## 2022-10-28 PROCEDURE — 1126F AMNT PAIN NOTED NONE PRSNT: CPT | Mod: CPTII,S$GLB,, | Performed by: OPHTHALMOLOGY

## 2022-10-28 PROCEDURE — 99999 PR PBB SHADOW E&M-EST. PATIENT-LVL III: ICD-10-PCS | Mod: PBBFAC,,, | Performed by: OPHTHALMOLOGY

## 2022-10-28 PROCEDURE — 99024 PR POST-OP FOLLOW-UP VISIT: ICD-10-PCS | Mod: S$GLB,,, | Performed by: OPHTHALMOLOGY

## 2022-10-28 PROCEDURE — 1101F PT FALLS ASSESS-DOCD LE1/YR: CPT | Mod: CPTII,S$GLB,, | Performed by: OPHTHALMOLOGY

## 2022-10-28 PROCEDURE — 1159F MED LIST DOCD IN RCRD: CPT | Mod: CPTII,S$GLB,, | Performed by: OPHTHALMOLOGY

## 2022-10-28 PROCEDURE — 1160F RVW MEDS BY RX/DR IN RCRD: CPT | Mod: CPTII,S$GLB,, | Performed by: OPHTHALMOLOGY

## 2022-10-28 PROCEDURE — 1160F PR REVIEW ALL MEDS BY PRESCRIBER/CLIN PHARMACIST DOCUMENTED: ICD-10-PCS | Mod: CPTII,S$GLB,, | Performed by: OPHTHALMOLOGY

## 2022-10-28 PROCEDURE — 3044F PR MOST RECENT HEMOGLOBIN A1C LEVEL <7.0%: ICD-10-PCS | Mod: CPTII,S$GLB,, | Performed by: OPHTHALMOLOGY

## 2022-10-28 PROCEDURE — 1101F PR PT FALLS ASSESS DOC 0-1 FALLS W/OUT INJ PAST YR: ICD-10-PCS | Mod: CPTII,S$GLB,, | Performed by: OPHTHALMOLOGY

## 2022-10-28 PROCEDURE — 99999 PR PBB SHADOW E&M-EST. PATIENT-LVL III: CPT | Mod: PBBFAC,,, | Performed by: OPHTHALMOLOGY

## 2022-10-28 PROCEDURE — 3044F HG A1C LEVEL LT 7.0%: CPT | Mod: CPTII,S$GLB,, | Performed by: OPHTHALMOLOGY

## 2022-10-28 PROCEDURE — 3288F FALL RISK ASSESSMENT DOCD: CPT | Mod: CPTII,S$GLB,, | Performed by: OPHTHALMOLOGY

## 2022-10-28 PROCEDURE — 99024 POSTOP FOLLOW-UP VISIT: CPT | Mod: S$GLB,,, | Performed by: OPHTHALMOLOGY

## 2022-10-28 PROCEDURE — 3288F PR FALLS RISK ASSESSMENT DOCUMENTED: ICD-10-PCS | Mod: CPTII,S$GLB,, | Performed by: OPHTHALMOLOGY

## 2022-10-28 PROCEDURE — 1159F PR MEDICATION LIST DOCUMENTED IN MEDICAL RECORD: ICD-10-PCS | Mod: CPTII,S$GLB,, | Performed by: OPHTHALMOLOGY

## 2022-10-28 PROCEDURE — 1126F PR PAIN SEVERITY QUANTIFIED, NO PAIN PRESENT: ICD-10-PCS | Mod: CPTII,S$GLB,, | Performed by: OPHTHALMOLOGY

## 2022-10-28 NOTE — ANESTHESIA POSTPROCEDURE EVALUATION
Anesthesia Post Evaluation    Patient: Patricia Ramirez    Procedure(s) Performed: Procedure(s) (LRB):  PHACOEMULSIFICATION, CATARACT (Left)  INSERTION, IOL PROSTHESIS (Left)    Final Anesthesia Type: MAC      Patient location during evaluation: PACU  Patient participation: Yes- Able to Participate  Level of consciousness: awake and alert  Post-procedure vital signs: reviewed and stable  Pain management: adequate  Airway patency: patent    PONV status at discharge: No PONV  Anesthetic complications: no      Cardiovascular status: blood pressure returned to baseline and hemodynamically stable  Respiratory status: unassisted and spontaneous ventilation  Hydration status: euvolemic  Follow-up not needed.          Vitals Value Taken Time   /76 10/27/22 1302   Temp 36.5 °C (97.7 °F) 10/27/22 1302   Pulse 54 10/27/22 1302   Resp 16 10/27/22 1302   SpO2 98 % 10/27/22 1302         No case tracking events are documented in the log.      Pain/Ede Score: Pain Rating Prior to Med Admin: 3 (10/27/2022  1:09 PM)  Ede Score: 10 (10/27/2022  1:50 PM)  Modified Ede Score: 19 (10/27/2022  1:50 PM)

## 2022-10-29 NOTE — PROGRESS NOTES
Subjective:       Patient ID: Patricia Ramirez is a 67 y.o. female.    Chief Complaint: No chief complaint on file.    HPI    PO CE OS ck    DLS 10/18/2022 by Dr. KATHLEEN Martinez MD    CC:pt reports  vision is good and seeing well    -eye pain  -blurred Va  -headaches  -diplopia    Eye Meds :PO meds review and instruction attached.     POHx  1. NSC OU   S/P CE OS   Last edited by Bethanie Nuñez MA on 10/28/2022 10:26 AM.             Assessment:       1. Post-operative state          Plan:       S/p CE OS- Doing well.           CPM OS.  RTC 1 wk.

## 2022-10-31 ENCOUNTER — HOSPITAL ENCOUNTER (OUTPATIENT)
Dept: RADIOLOGY | Facility: CLINIC | Age: 67
Discharge: HOME | End: 2022-10-31
Attending: NURSE PRACTITIONER
Payer: MEDICARE

## 2022-10-31 DIAGNOSIS — Z78.0 POST-MENOPAUSAL: ICD-10-CM

## 2022-10-31 PROCEDURE — 77080 DXA BONE DENSITY AXIAL: CPT | Mod: 26,,, | Performed by: INTERNAL MEDICINE

## 2022-10-31 PROCEDURE — 77080 DEXA BONE DENSITY SPINE HIP: ICD-10-PCS | Mod: 26,,, | Performed by: INTERNAL MEDICINE

## 2022-10-31 PROCEDURE — 77080 DXA BONE DENSITY AXIAL: CPT | Mod: TC,PO

## 2022-11-03 ENCOUNTER — OFFICE VISIT (OUTPATIENT)
Dept: OPHTHALMOLOGY | Facility: CLINIC | Age: 67
End: 2022-11-03
Payer: MEDICARE

## 2022-11-03 DIAGNOSIS — H33.022 RETINAL DETACHMENT OF LEFT EYE WITH MULTIPLE BREAKS: ICD-10-CM

## 2022-11-03 DIAGNOSIS — Z98.890 POST-OPERATIVE STATE: Primary | ICD-10-CM

## 2022-11-03 PROCEDURE — 1160F RVW MEDS BY RX/DR IN RCRD: CPT | Mod: CPTII,S$GLB,, | Performed by: OPHTHALMOLOGY

## 2022-11-03 PROCEDURE — 1159F MED LIST DOCD IN RCRD: CPT | Mod: CPTII,S$GLB,, | Performed by: OPHTHALMOLOGY

## 2022-11-03 PROCEDURE — 99999 PR PBB SHADOW E&M-EST. PATIENT-LVL II: ICD-10-PCS | Mod: PBBFAC,,, | Performed by: OPHTHALMOLOGY

## 2022-11-03 PROCEDURE — 3044F PR MOST RECENT HEMOGLOBIN A1C LEVEL <7.0%: ICD-10-PCS | Mod: CPTII,S$GLB,, | Performed by: OPHTHALMOLOGY

## 2022-11-03 PROCEDURE — 4010F ACE/ARB THERAPY RXD/TAKEN: CPT | Mod: CPTII,S$GLB,, | Performed by: OPHTHALMOLOGY

## 2022-11-03 PROCEDURE — 1159F PR MEDICATION LIST DOCUMENTED IN MEDICAL RECORD: ICD-10-PCS | Mod: CPTII,S$GLB,, | Performed by: OPHTHALMOLOGY

## 2022-11-03 PROCEDURE — 99024 PR POST-OP FOLLOW-UP VISIT: ICD-10-PCS | Mod: S$GLB,,, | Performed by: OPHTHALMOLOGY

## 2022-11-03 PROCEDURE — 99024 POSTOP FOLLOW-UP VISIT: CPT | Mod: S$GLB,,, | Performed by: OPHTHALMOLOGY

## 2022-11-03 PROCEDURE — 99999 PR PBB SHADOW E&M-EST. PATIENT-LVL II: CPT | Mod: PBBFAC,,, | Performed by: OPHTHALMOLOGY

## 2022-11-03 PROCEDURE — 3044F HG A1C LEVEL LT 7.0%: CPT | Mod: CPTII,S$GLB,, | Performed by: OPHTHALMOLOGY

## 2022-11-03 PROCEDURE — 4010F PR ACE/ARB THEARPY RXD/TAKEN: ICD-10-PCS | Mod: CPTII,S$GLB,, | Performed by: OPHTHALMOLOGY

## 2022-11-03 PROCEDURE — 1160F PR REVIEW ALL MEDS BY PRESCRIBER/CLIN PHARMACIST DOCUMENTED: ICD-10-PCS | Mod: CPTII,S$GLB,, | Performed by: OPHTHALMOLOGY

## 2022-11-17 ENCOUNTER — TELEPHONE (OUTPATIENT)
Dept: OPHTHALMOLOGY | Facility: CLINIC | Age: 67
End: 2022-11-17
Payer: MEDICARE

## 2022-11-17 ENCOUNTER — OFFICE VISIT (OUTPATIENT)
Dept: OPHTHALMOLOGY | Facility: CLINIC | Age: 67
End: 2022-11-17
Payer: MEDICARE

## 2022-11-17 DIAGNOSIS — H33.022 RETINAL DETACHMENT OF LEFT EYE WITH MULTIPLE BREAKS: ICD-10-CM

## 2022-11-17 DIAGNOSIS — Z98.890 POST-OPERATIVE STATE: Primary | ICD-10-CM

## 2022-11-17 DIAGNOSIS — H16.102 SUPERFICIAL KERATITIS OF LEFT EYE: ICD-10-CM

## 2022-11-17 PROCEDURE — 4010F ACE/ARB THERAPY RXD/TAKEN: CPT | Mod: CPTII,S$GLB,, | Performed by: OPHTHALMOLOGY

## 2022-11-17 PROCEDURE — 1125F PR PAIN SEVERITY QUANTIFIED, PAIN PRESENT: ICD-10-PCS | Mod: CPTII,S$GLB,, | Performed by: OPHTHALMOLOGY

## 2022-11-17 PROCEDURE — 1159F MED LIST DOCD IN RCRD: CPT | Mod: CPTII,S$GLB,, | Performed by: OPHTHALMOLOGY

## 2022-11-17 PROCEDURE — 3044F HG A1C LEVEL LT 7.0%: CPT | Mod: CPTII,S$GLB,, | Performed by: OPHTHALMOLOGY

## 2022-11-17 PROCEDURE — 1101F PR PT FALLS ASSESS DOC 0-1 FALLS W/OUT INJ PAST YR: ICD-10-PCS | Mod: CPTII,S$GLB,, | Performed by: OPHTHALMOLOGY

## 2022-11-17 PROCEDURE — 3288F FALL RISK ASSESSMENT DOCD: CPT | Mod: CPTII,S$GLB,, | Performed by: OPHTHALMOLOGY

## 2022-11-17 PROCEDURE — 1159F PR MEDICATION LIST DOCUMENTED IN MEDICAL RECORD: ICD-10-PCS | Mod: CPTII,S$GLB,, | Performed by: OPHTHALMOLOGY

## 2022-11-17 PROCEDURE — 99999 PR PBB SHADOW E&M-EST. PATIENT-LVL III: CPT | Mod: PBBFAC,,, | Performed by: OPHTHALMOLOGY

## 2022-11-17 PROCEDURE — 99999 PR PBB SHADOW E&M-EST. PATIENT-LVL III: ICD-10-PCS | Mod: PBBFAC,,, | Performed by: OPHTHALMOLOGY

## 2022-11-17 PROCEDURE — 99024 PR POST-OP FOLLOW-UP VISIT: ICD-10-PCS | Mod: S$GLB,,, | Performed by: OPHTHALMOLOGY

## 2022-11-17 PROCEDURE — 3044F PR MOST RECENT HEMOGLOBIN A1C LEVEL <7.0%: ICD-10-PCS | Mod: CPTII,S$GLB,, | Performed by: OPHTHALMOLOGY

## 2022-11-17 PROCEDURE — 99024 POSTOP FOLLOW-UP VISIT: CPT | Mod: S$GLB,,, | Performed by: OPHTHALMOLOGY

## 2022-11-17 PROCEDURE — 1160F PR REVIEW ALL MEDS BY PRESCRIBER/CLIN PHARMACIST DOCUMENTED: ICD-10-PCS | Mod: CPTII,S$GLB,, | Performed by: OPHTHALMOLOGY

## 2022-11-17 PROCEDURE — 1101F PT FALLS ASSESS-DOCD LE1/YR: CPT | Mod: CPTII,S$GLB,, | Performed by: OPHTHALMOLOGY

## 2022-11-17 PROCEDURE — 4010F PR ACE/ARB THEARPY RXD/TAKEN: ICD-10-PCS | Mod: CPTII,S$GLB,, | Performed by: OPHTHALMOLOGY

## 2022-11-17 PROCEDURE — 3288F PR FALLS RISK ASSESSMENT DOCUMENTED: ICD-10-PCS | Mod: CPTII,S$GLB,, | Performed by: OPHTHALMOLOGY

## 2022-11-17 PROCEDURE — 1160F RVW MEDS BY RX/DR IN RCRD: CPT | Mod: CPTII,S$GLB,, | Performed by: OPHTHALMOLOGY

## 2022-11-17 PROCEDURE — 1125F AMNT PAIN NOTED PAIN PRSNT: CPT | Mod: CPTII,S$GLB,, | Performed by: OPHTHALMOLOGY

## 2022-11-17 NOTE — TELEPHONE ENCOUNTER
----- Message from Yara Shaffer sent at 11/17/2022  8:05 AM CST -----  Regarding: Schedule  Patient called about being seen today for pain in left eye with feeling of something in eye. Pt states she had surgery in eye 4 weeks ago with Dr. Herrera      Requesting Call back number:[phone]

## 2022-11-17 NOTE — TELEPHONE ENCOUNTER
----- Message from Yara Shaffer sent at 11/17/2022  8:05 AM CST -----  Regarding: Schedule  Patient called about being seen today for pain in left eye with feeling of something in eye. Pt states she had surgery in eye 4 weeks ago with Dr. Herrera      Requesting Call back number:[phone]

## 2022-11-18 NOTE — PROGRESS NOTES
Subjective:       Patient ID: Patricia Ramirez is a 67 y.o. female.    Chief Complaint: Eye Pain    HPI    Patient is here today for urgent visit    DLS 10/18/2022    CC:pt reports pain left eye ( pain level 7) . Patient stated she woke up   this morning barely could keep her left eye open. Patient vision in left   eye is very blurry she had to hold her eyes open to get it open    +eye pain  +blurred Va  -headaches  -diplopia    Eye drops:Pred forte BID OS                    Ketorolac BID OS      POHx  1. NSC OU   S/P CE OS   Last edited by Palmer Berrios MD on 11/17/2022  7:08 PM.             Assessment:       1. Post-operative state    2. Superficial keratitis of left eye    3. Retinal detachment of left eye with multiple breaks          Plan:       S/p CE OS- Doing well.     Superficial keratitis OS-Most likely from ketorolac gtts.  RRD OS macula involving s/p repair 2/22-Stable per Dr Martinez.      D/c ketorolac OS.  Cont PF bid OS x 1 wk, then 1d x 1 wk.  Start EES lisa qhs OS x 2 nights.  RTC Dr Barros next week as scheduled.

## 2022-11-23 ENCOUNTER — OFFICE VISIT (OUTPATIENT)
Dept: OPTOMETRY | Facility: CLINIC | Age: 67
End: 2022-11-23
Payer: MEDICARE

## 2022-11-23 DIAGNOSIS — H16.102 SUPERFICIAL KERATITIS, LEFT: ICD-10-CM

## 2022-11-23 DIAGNOSIS — Z98.890 POST-OPERATIVE STATE: Primary | ICD-10-CM

## 2022-11-23 PROCEDURE — 4010F ACE/ARB THERAPY RXD/TAKEN: CPT | Mod: CPTII,S$GLB,, | Performed by: OPTOMETRIST

## 2022-11-23 PROCEDURE — 1159F MED LIST DOCD IN RCRD: CPT | Mod: CPTII,S$GLB,, | Performed by: OPTOMETRIST

## 2022-11-23 PROCEDURE — 3044F PR MOST RECENT HEMOGLOBIN A1C LEVEL <7.0%: ICD-10-PCS | Mod: CPTII,S$GLB,, | Performed by: OPTOMETRIST

## 2022-11-23 PROCEDURE — 1160F PR REVIEW ALL MEDS BY PRESCRIBER/CLIN PHARMACIST DOCUMENTED: ICD-10-PCS | Mod: CPTII,S$GLB,, | Performed by: OPTOMETRIST

## 2022-11-23 PROCEDURE — 99024 POSTOP FOLLOW-UP VISIT: CPT | Mod: S$GLB,,, | Performed by: OPTOMETRIST

## 2022-11-23 PROCEDURE — 99024 PR POST-OP FOLLOW-UP VISIT: ICD-10-PCS | Mod: S$GLB,,, | Performed by: OPTOMETRIST

## 2022-11-23 PROCEDURE — 4010F PR ACE/ARB THEARPY RXD/TAKEN: ICD-10-PCS | Mod: CPTII,S$GLB,, | Performed by: OPTOMETRIST

## 2022-11-23 PROCEDURE — 3044F HG A1C LEVEL LT 7.0%: CPT | Mod: CPTII,S$GLB,, | Performed by: OPTOMETRIST

## 2022-11-23 PROCEDURE — 1159F PR MEDICATION LIST DOCUMENTED IN MEDICAL RECORD: ICD-10-PCS | Mod: CPTII,S$GLB,, | Performed by: OPTOMETRIST

## 2022-11-23 PROCEDURE — 1160F RVW MEDS BY RX/DR IN RCRD: CPT | Mod: CPTII,S$GLB,, | Performed by: OPTOMETRIST

## 2022-11-23 NOTE — PROGRESS NOTES
Subjective:       Patient ID: Patricia Ramirez is a 67 y.o. female      Chief Complaint   Patient presents with    Post-op Evaluation     Patricia Ramirez is a 68 y/o female      History of Present Illness     Pt here for 3 week PCIOL OS.  Pt state blurry VA OS. Scratchy OS  Pt state no other complaints at this time     Romycin BID OS  PF BID OS             Assessment/Plan:     1. Post-operative state  S/p CEIOL OS    2. Superficial keratitis, left  Patchy area of central SPK. Continue PF BID til follow up with Dr. Berrios. Can use lisa BID - QID OS - per pt lisa helps. Recommend AT QID OS.     Follow up in about 1 week (around 11/30/2022) for Dr. Berrios.

## 2022-12-01 ENCOUNTER — OFFICE VISIT (OUTPATIENT)
Dept: OPHTHALMOLOGY | Facility: CLINIC | Age: 67
End: 2022-12-01
Payer: MEDICARE

## 2022-12-01 DIAGNOSIS — H33.022 RETINAL DETACHMENT OF LEFT EYE WITH MULTIPLE BREAKS: ICD-10-CM

## 2022-12-01 DIAGNOSIS — H25.11 NUCLEAR SCLEROSIS OF RIGHT EYE: ICD-10-CM

## 2022-12-01 DIAGNOSIS — Z98.890 POST-OPERATIVE STATE: Primary | ICD-10-CM

## 2022-12-01 DIAGNOSIS — H35.372 EPIRETINAL MEMBRANE, LEFT: ICD-10-CM

## 2022-12-01 DIAGNOSIS — H52.7 REFRACTIVE ERROR: ICD-10-CM

## 2022-12-01 PROCEDURE — 1159F PR MEDICATION LIST DOCUMENTED IN MEDICAL RECORD: ICD-10-PCS | Mod: CPTII,S$GLB,, | Performed by: OPHTHALMOLOGY

## 2022-12-01 PROCEDURE — 1160F RVW MEDS BY RX/DR IN RCRD: CPT | Mod: CPTII,S$GLB,, | Performed by: OPHTHALMOLOGY

## 2022-12-01 PROCEDURE — 1160F PR REVIEW ALL MEDS BY PRESCRIBER/CLIN PHARMACIST DOCUMENTED: ICD-10-PCS | Mod: CPTII,S$GLB,, | Performed by: OPHTHALMOLOGY

## 2022-12-01 PROCEDURE — 4010F PR ACE/ARB THEARPY RXD/TAKEN: ICD-10-PCS | Mod: CPTII,S$GLB,, | Performed by: OPHTHALMOLOGY

## 2022-12-01 PROCEDURE — 3044F PR MOST RECENT HEMOGLOBIN A1C LEVEL <7.0%: ICD-10-PCS | Mod: CPTII,S$GLB,, | Performed by: OPHTHALMOLOGY

## 2022-12-01 PROCEDURE — 99024 PR POST-OP FOLLOW-UP VISIT: ICD-10-PCS | Mod: S$GLB,,, | Performed by: OPHTHALMOLOGY

## 2022-12-01 PROCEDURE — 99999 PR PBB SHADOW E&M-EST. PATIENT-LVL I: CPT | Mod: PBBFAC,,, | Performed by: OPHTHALMOLOGY

## 2022-12-01 PROCEDURE — 4010F ACE/ARB THERAPY RXD/TAKEN: CPT | Mod: CPTII,S$GLB,, | Performed by: OPHTHALMOLOGY

## 2022-12-01 PROCEDURE — 1159F MED LIST DOCD IN RCRD: CPT | Mod: CPTII,S$GLB,, | Performed by: OPHTHALMOLOGY

## 2022-12-01 PROCEDURE — 99024 POSTOP FOLLOW-UP VISIT: CPT | Mod: S$GLB,,, | Performed by: OPHTHALMOLOGY

## 2022-12-01 PROCEDURE — 3044F HG A1C LEVEL LT 7.0%: CPT | Mod: CPTII,S$GLB,, | Performed by: OPHTHALMOLOGY

## 2022-12-01 PROCEDURE — 99999 PR PBB SHADOW E&M-EST. PATIENT-LVL I: ICD-10-PCS | Mod: PBBFAC,,, | Performed by: OPHTHALMOLOGY

## 2022-12-01 NOTE — PROGRESS NOTES
Subjective:       Patient ID: Patricia Ramirez is a 67 y.o. female.    Chief Complaint: Post-op Evaluation (Patricia Ramirez is a 68 y/o female )    HPI     Post-op Evaluation     Additional comments: Patricia Ramirez is a 68 y/o female            Comments    Pt here today for 3 week pciol os.    Pt state no complaints at this time\  Pt state little distortion     Romycin BID OS   PF BID OS             Last edited by Lela Jacob on 12/1/2022  2:02 PM.             Assessment:       1. Post-operative state    2. 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 Epiretinal membrane, left    4. Nuclear sclerosis of right eye    5. Refractive error          Plan:       S/p CE OU- Doing well.   Hx/o RRD OS s/p repair per Dr Martinez-Doing well.  ERM OS-Mild per Dr Martinez.  Cataract OD- Not visually significant.    RE-Pt wants MRx.      AT's.  Give MRx.  RTC Dr Martinez in 4 mos as scheduled.  RTC me prn.

## 2022-12-07 ENCOUNTER — CLINICAL SUPPORT (OUTPATIENT)
Dept: SMOKING CESSATION | Facility: CLINIC | Age: 67
End: 2022-12-07
Payer: MEDICARE

## 2022-12-07 DIAGNOSIS — F17.200 NICOTINE DEPENDENCE: Primary | ICD-10-CM

## 2022-12-07 PROCEDURE — 99407 PR TOBACCO USE CESSATION INTENSIVE >10 MINUTES: ICD-10-PCS | Mod: S$GLB,,,

## 2022-12-07 PROCEDURE — 99407 BEHAV CHNG SMOKING > 10 MIN: CPT | Mod: S$GLB,,,

## 2022-12-07 NOTE — PROGRESS NOTES
Spoke with patient today in regard to smoking cessation progress for 3 month telephone follow up, she states not tobacco free. Patient states no interest in returning to the program at this time and would like to be called at a later date. Informed patient of benefit period, future follow ups, and contact information if any further help or support is needed. Will complete smart form for 3 month follow up on Quit attempt #1.

## 2023-03-29 ENCOUNTER — PATIENT OUTREACH (OUTPATIENT)
Dept: ADMINISTRATIVE | Facility: HOSPITAL | Age: 68
End: 2023-03-29
Payer: MEDICARE

## 2023-05-03 ENCOUNTER — PATIENT MESSAGE (OUTPATIENT)
Dept: ADMINISTRATIVE | Facility: HOSPITAL | Age: 68
End: 2023-05-03
Payer: MEDICARE

## 2023-05-19 ENCOUNTER — PATIENT OUTREACH (OUTPATIENT)
Dept: ADMINISTRATIVE | Facility: HOSPITAL | Age: 68
End: 2023-05-19
Payer: MEDICARE

## 2023-05-19 DIAGNOSIS — Z12.31 ENCOUNTER FOR SCREENING MAMMOGRAM FOR BREAST CANCER: Primary | ICD-10-CM

## 2023-05-25 ENCOUNTER — LAB VISIT (OUTPATIENT)
Dept: LAB | Facility: HOSPITAL | Age: 68
End: 2023-05-25
Payer: MEDICARE

## 2023-05-25 DIAGNOSIS — Z12.11 COLON CANCER SCREENING: ICD-10-CM

## 2023-05-25 PROCEDURE — 82274 ASSAY TEST FOR BLOOD FECAL: CPT

## 2023-06-06 LAB — HEMOCCULT STL QL IA: POSITIVE

## 2023-08-09 ENCOUNTER — TELEPHONE (OUTPATIENT)
Dept: GASTROENTEROLOGY | Facility: CLINIC | Age: 68
End: 2023-08-09
Payer: MEDICARE

## 2023-08-09 DIAGNOSIS — Z12.11 COLON CANCER SCREENING: Primary | ICD-10-CM

## 2023-08-10 ENCOUNTER — TELEPHONE (OUTPATIENT)
Dept: GASTROENTEROLOGY | Facility: CLINIC | Age: 68
End: 2023-08-10
Payer: MEDICARE

## 2023-08-10 NOTE — TELEPHONE ENCOUNTER
----- Message from Fabienne Erwin NP sent at 8/10/2023  3:51 PM CDT -----  Regarding: RE: Anticoagulant clearance  Hi, yes that if fine for her to hold. Thanks.   ----- Message -----  From: Missy Duran LPN  Sent: 8/9/2023   9:20 AM CDT  To: Fabienne Erwin NP  Subject: Anticoagulant clearance                          Your patient has a case request for a/an (  colonoscopy  ) due to a positive fit result.     Your permission is needed to hold (  Eliquis  ) for (  2 ) days prior to the procedure.     Thank you,    Dr KATHLEEN Sterling    PH [phone]  Fax [phone]

## 2023-08-16 ENCOUNTER — TELEPHONE (OUTPATIENT)
Dept: SMOKING CESSATION | Facility: CLINIC | Age: 68
End: 2023-08-16
Payer: MEDICARE

## 2023-09-01 ENCOUNTER — TELEPHONE (OUTPATIENT)
Dept: GASTROENTEROLOGY | Facility: CLINIC | Age: 68
End: 2023-09-01
Payer: MEDICARE

## 2023-09-06 ENCOUNTER — ANESTHESIA (OUTPATIENT)
Dept: ENDOSCOPY | Facility: HOSPITAL | Age: 68
End: 2023-09-06
Payer: MEDICARE

## 2023-09-06 ENCOUNTER — HOSPITAL ENCOUNTER (OUTPATIENT)
Facility: HOSPITAL | Age: 68
Discharge: HOME | End: 2023-09-06
Attending: INTERNAL MEDICINE | Admitting: INTERNAL MEDICINE
Payer: MEDICARE

## 2023-09-06 ENCOUNTER — ANESTHESIA EVENT (OUTPATIENT)
Dept: ENDOSCOPY | Facility: HOSPITAL | Age: 68
End: 2023-09-06
Payer: MEDICARE

## 2023-09-06 VITALS
DIASTOLIC BLOOD PRESSURE: 82 MMHG | SYSTOLIC BLOOD PRESSURE: 158 MMHG | RESPIRATION RATE: 18 BRPM | OXYGEN SATURATION: 99 % | TEMPERATURE: 98 F | HEART RATE: 65 BPM

## 2023-09-06 DIAGNOSIS — Z12.11 COLON CANCER SCREENING: ICD-10-CM

## 2023-09-06 PROCEDURE — 45380 PR COLONOSCOPY,BIOPSY: ICD-10-PCS | Mod: PT,59,, | Performed by: INTERNAL MEDICINE

## 2023-09-06 PROCEDURE — 88305 TISSUE EXAM BY PATHOLOGIST: CPT | Mod: 26,,, | Performed by: PATHOLOGY

## 2023-09-06 PROCEDURE — D9220A PRA ANESTHESIA: Mod: PT,ANES,, | Performed by: ANESTHESIOLOGY

## 2023-09-06 PROCEDURE — 88305 TISSUE EXAM BY PATHOLOGIST: CPT | Performed by: PATHOLOGY

## 2023-09-06 PROCEDURE — 27201089 HC SNARE, DISP (ANY): Performed by: INTERNAL MEDICINE

## 2023-09-06 PROCEDURE — D9220A PRA ANESTHESIA: Mod: PT,CRNA,, | Performed by: NURSE ANESTHETIST, CERTIFIED REGISTERED

## 2023-09-06 PROCEDURE — 45385 COLONOSCOPY W/LESION REMOVAL: CPT | Mod: PT | Performed by: INTERNAL MEDICINE

## 2023-09-06 PROCEDURE — 45380 COLONOSCOPY AND BIOPSY: CPT | Mod: PT,59 | Performed by: INTERNAL MEDICINE

## 2023-09-06 PROCEDURE — 63600175 PHARM REV CODE 636 W HCPCS: Performed by: NURSE ANESTHETIST, CERTIFIED REGISTERED

## 2023-09-06 PROCEDURE — 45385 PR COLONOSCOPY,REMV LESN,SNARE: ICD-10-PCS | Mod: PT,,, | Performed by: INTERNAL MEDICINE

## 2023-09-06 PROCEDURE — 88305 TISSUE EXAM BY PATHOLOGIST: ICD-10-PCS | Mod: 26,,, | Performed by: PATHOLOGY

## 2023-09-06 PROCEDURE — 45385 COLONOSCOPY W/LESION REMOVAL: CPT | Mod: PT,,, | Performed by: INTERNAL MEDICINE

## 2023-09-06 PROCEDURE — 45380 COLONOSCOPY AND BIOPSY: CPT | Mod: PT,59,, | Performed by: INTERNAL MEDICINE

## 2023-09-06 PROCEDURE — 25000003 PHARM REV CODE 250: Performed by: INTERNAL MEDICINE

## 2023-09-06 PROCEDURE — D9220A PRA ANESTHESIA: ICD-10-PCS | Mod: PT,CRNA,, | Performed by: NURSE ANESTHETIST, CERTIFIED REGISTERED

## 2023-09-06 PROCEDURE — D9220A PRA ANESTHESIA: ICD-10-PCS | Mod: PT,ANES,, | Performed by: ANESTHESIOLOGY

## 2023-09-06 PROCEDURE — 25000003 PHARM REV CODE 250: Performed by: NURSE ANESTHETIST, CERTIFIED REGISTERED

## 2023-09-06 PROCEDURE — 27201012 HC FORCEPS, HOT/COLD, DISP: Performed by: INTERNAL MEDICINE

## 2023-09-06 PROCEDURE — 37000009 HC ANESTHESIA EA ADD 15 MINS: Performed by: INTERNAL MEDICINE

## 2023-09-06 PROCEDURE — 37000008 HC ANESTHESIA 1ST 15 MINUTES: Performed by: INTERNAL MEDICINE

## 2023-09-06 RX ORDER — PROPOFOL 10 MG/ML
INJECTION, EMULSION INTRAVENOUS
Status: DISCONTINUED
Start: 2023-09-06 | End: 2023-09-06 | Stop reason: HOSPADM

## 2023-09-06 RX ORDER — PROPOFOL 10 MG/ML
VIAL (ML) INTRAVENOUS
Status: DISCONTINUED | OUTPATIENT
Start: 2023-09-06 | End: 2023-09-06

## 2023-09-06 RX ORDER — LIDOCAINE HYDROCHLORIDE 20 MG/ML
INJECTION, SOLUTION EPIDURAL; INFILTRATION; INTRACAUDAL; PERINEURAL
Status: DISCONTINUED
Start: 2023-09-06 | End: 2023-09-06 | Stop reason: HOSPADM

## 2023-09-06 RX ORDER — SODIUM CHLORIDE 9 MG/ML
INJECTION, SOLUTION INTRAVENOUS CONTINUOUS
Status: DISCONTINUED | OUTPATIENT
Start: 2023-09-06 | End: 2023-09-06 | Stop reason: HOSPADM

## 2023-09-06 RX ORDER — LIDOCAINE HYDROCHLORIDE 20 MG/ML
INJECTION INTRAVENOUS
Status: DISCONTINUED | OUTPATIENT
Start: 2023-09-06 | End: 2023-09-06

## 2023-09-06 RX ADMIN — PROPOFOL 20 MG: 10 INJECTION, EMULSION INTRAVENOUS at 10:09

## 2023-09-06 RX ADMIN — LIDOCAINE HYDROCHLORIDE 100 MG: 20 INJECTION, SOLUTION INTRAVENOUS at 09:09

## 2023-09-06 RX ADMIN — PROPOFOL 40 MG: 10 INJECTION, EMULSION INTRAVENOUS at 10:09

## 2023-09-06 RX ADMIN — PROPOFOL 40 MG: 10 INJECTION, EMULSION INTRAVENOUS at 09:09

## 2023-09-06 RX ADMIN — SODIUM CHLORIDE: 0.9 INJECTION, SOLUTION INTRAVENOUS at 09:09

## 2023-09-06 RX ADMIN — PROPOFOL 80 MG: 10 INJECTION, EMULSION INTRAVENOUS at 09:09

## 2023-09-06 NOTE — TRANSFER OF CARE
Anesthesia Transfer of Care Note    Patient: Patricia Ramirez    Procedure(s) Performed: Procedure(s) (LRB):  COLONOSCOPY (N/A)    Patient location: GI    Anesthesia Type: general    Transport from OR: Transported from OR on room air with adequate spontaneous ventilation    Post pain: adequate analgesia    Post assessment: no apparent anesthetic complications and tolerated procedure well    Post vital signs: stable    Level of consciousness: awake    Nausea/Vomiting: no nausea/vomiting    Complications: none    Transfer of care protocol was followed      Last vitals:   Visit Vitals  /66 (BP Location: Left arm, Patient Position: Lying)   Pulse 68   Temp 36.5 °C (97.7 °F) (Oral)   Resp 17   SpO2 100%   Breastfeeding No

## 2023-09-06 NOTE — ANESTHESIA PREPROCEDURE EVALUATION
09/06/2023  Patricia Ramirez is a 68 y.o., female.      Pre-op Assessment    I have reviewed the Patient Summary Reports.     I have reviewed the Nursing Notes.    I have reviewed the Medications.     Review of Systems  Anesthesia Hx:  Denies Family Hx of Anesthesia complications.   Denies Personal Hx of Anesthesia complications.        Patient Active Problem List   Diagnosis    Essential hypertension    Hyperlipidemia    GERD (gastroesophageal reflux disease)    Depression with anxiety    Migraine with aura and without status migrainosus, not intractable    Nuclear sclerosis of both eyes    Dermatochalasis    Rosacea    Brow ptosis, bilateral    Retinal detachment of left eye with multiple breaks    Epiretinal membrane, left    Splenic infarction    Heart failure with preserved ejection fraction    Diastolic dysfunction    IFG (impaired fasting glucose)    Class 1 obesity due to excess calories without serious comorbidity with body mass index (BMI) of 31.0 to 31.9 in adult    RBBB    Left atrial enlargement    Left anterior fascicular block    Abnormal EKG    Dyspnea on exertion    Chronic anticoagulation    Multiple risk factors for coronary artery disease    Splenic infarct    History of tobacco abuse    Nuclear sclerotic cataract of left eye       Past Medical History:   Diagnosis Date    Allergy     Anxiety     Arthritis     Cataract     Depressive disorder, not elsewhere classified     Esophageal reflux     Hypertension     Impaired fasting glucose     Joint pain     Obesity, unspecified     Other and unspecified hyperlipidemia        ECHO: Results for orders placed during the hospital encounter of 07/28/22    Echo Saline Bubble? Yes    Interpretation Summary  · There is no evidence of intracardiac shunting.  · The left ventricle is normal in size with normal systolic  function.  · The estimated ejection fraction is 65%.  · Grade II left ventricular diastolic dysfunction.  · Severe left atrial enlargement.  · Normal right ventricular size with normal right ventricular systolic function.  · Mild right atrial enlargement.  · Normal central venous pressure (3 mmHg).      There is no height or weight on file to calculate BMI.    Tobacco Use: High Risk (9/5/2023)    Patient History     Smoking Tobacco Use: Every Day     Smokeless Tobacco Use: Never     Passive Exposure: Not on file       Social History     Substance and Sexual Activity   Drug Use Yes    Types: Marijuana        Alcohol Use: Not on file       Review of patient's allergies indicates:   Allergen Reactions    Sulfa (sulfonamide antibiotics) Hives    Ace inhibitors Other (See Comments)     Cough           Airway:  No value filed.     Physical Exam  General: Well nourished, Cooperative, Alert and Oriented    Airway:  Mallampati: II   Mouth Opening: Normal  TM Distance: 4 - 6 cm  Tongue: Normal  Neck ROM: Normal ROM    Dental:  Edentulous        Anesthesia Plan  Type of Anesthesia, risks & benefits discussed:    Anesthesia Type: Gen Natural Airway  Intra-op Monitoring Plan: Standard ASA Monitors  Post Op Pain Control Plan: multimodal analgesia  Induction:  IV  Informed Consent: Informed consent signed with the Patient and all parties understand the risks and agree with anesthesia plan.  All questions answered.   ASA Score: 2  Day of Surgery Review of History & Physical: H&P Update referred to the surgeon/provider.    Ready For Surgery From Anesthesia Perspective.     .

## 2023-09-06 NOTE — PROVATION PATIENT INSTRUCTIONS
Discharge Summary/Instructions after an Endoscopic Procedure  Patient Name: Patricia Ramirez  Patient MRN: [id number]  Patient YOB: 1955 Wednesday, September 6, 2023  Briana Sterling MD  Dear patient,  As a result of recent federal legislation (The Federal Cures Act), you may   receive lab or pathology results from your procedure in your [facility]   account before your physician is able to contact you. Your physician or   their representative will relay the results to you with their   recommendations at their soonest availability.  Thank you,  RESTRICTIONS:  During your procedure today, you received medications for sedation.  These   medications may affect your judgment, balance and coordination.  Therefore,   for 24 hours, you have the following restrictions:   - DO NOT drive a car, operate machinery, make legal/financial decisions,   sign important papers or drink alcohol.    ACTIVITY:  Today: no heavy lifting, straining or running due to procedural   sedation/anesthesia.  The following day: return to full activity including work.  DIET:  Eat and drink normally unless instructed otherwise.     TREATMENT FOR COMMON SIDE EFFECTS:  - Mild abdominal pain, nausea, belching, bloating or excessive gas:  rest,   eat lightly and use a heating pad.  - Sore Throat: treat with throat lozenges and/or gargle with warm salt   water.  - Because air was used during the procedure, expelling large amounts of air   from your rectum or belching is normal.  - If a bowel prep was taken, you may not have a bowel movement for 1-3 days.    This is normal.  SYMPTOMS TO WATCH FOR AND REPORT TO YOUR PHYSICIAN:  1. Abdominal pain or bloating, other than gas cramps.  2. Chest pain.  3. Back pain.  4. Signs of infection such as: chills or fever occurring within 24 hours   after the procedure.  5. Rectal bleeding, which would show as bright red, maroon, or black stools.   (A tablespoon of blood from the rectum is not serious,  especially if   hemorrhoids are present.)  6. Vomiting.  7. Weakness or dizziness.  GO DIRECTLY TO THE NEAREST EMERGENCY ROOM IF YOU HAVE ANY OF THE FOLLOWING:      Difficulty breathing              Chills and/or fever over 101 F   Persistent vomiting and/or vomiting blood   Severe abdominal pain   Severe chest pain   Black, tarry stools   Bleeding- more than one tablespoon   Any other symptom or condition that you feel may need urgent attention  Your doctor recommends these additional instructions:  If any biopsies were taken, your doctors clinic will contact you in 1 to 2   weeks with any results.  - Await pathology results.   - Repeat colonoscopy in 1 year for surveillance.   - Discharge patient to home.  For questions, problems or results please call your physician - Briana Sterling MD at Work:  [phone].  [facility] can be reached at [phone]     IF A COMPLICATION OR EMERGENCY SITUATION ARISES AND YOU ARE UNABLE TO REACH   YOUR PHYSICIAN - GO DIRECTLY TO THE EMERGENCY ROOM.  Briana Sterling MD  9/6/2023 11:45:18 AM  This report has been verified and signed electronically.  Dear patient,  As a result of recent federal legislation (The Federal Cures Act), you may   receive lab or pathology results from your procedure in your [facility]   account before your physician is able to contact you. Your physician or   their representative will relay the results to you with their   recommendations at their soonest availability.  Thank you,  PROVATION

## 2023-09-06 NOTE — H&P
Consult Note      Chief complaints:Positive iFOBT    History of Presenting Illness   No complaints today    Review of Systems   Constitutional: Negative for fever and appetite change.   HENT: Negative for sore throat, trouble swallowing and neck pain.   Eyes: Negative for photophobia and visual disturbance.   Respiratory: Negative for wheezing.   Cardiovascular: Negative for chest pain and palpitations.   Gastrointestinal: See HPI for details   Musculoskeletal: Negative for joint swelling and arthralgias.   Skin: Negative for rash and wound.   Neurological: Negative for dizziness, tremors and weakness.   Hematological: Negative.   Psychiatric/Behavioral: Negative for suicidal ideas and behavioral problems.     Past Medical History:   Diagnosis Date    Allergy     Anxiety     Arthritis     Cataract     Depressive disorder, not elsewhere classified     Esophageal reflux     Hypertension     Impaired fasting glucose     Joint pain     Obesity, unspecified     Other and unspecified hyperlipidemia        Past Surgical History:   Procedure Laterality Date    BLEPHAROPLASTY Bilateral 3/3/2021    Procedure: BLEPHAROPLASTY;  Surgeon: Maite Acuna MD;  Location: [facility];  Service: Ophthalmology;  Laterality: Bilateral;     SECTION  1973    HYSTERECTOMY      without BSO    INTRAOCULAR PROSTHESES INSERTION Left 10/27/2022    Procedure: INSERTION, IOL PROSTHESIS;  Surgeon: Palmer Berrios MD;  Location: [facility];  Service: Ophthalmology;  Laterality: Left;    PHACOEMULSIFICATION OF CATARACT Left 10/27/2022    Procedure: PHACOEMULSIFICATION, CATARACT;  Surgeon: Palmer Berrios MD;  Location: [facility];  Service: Ophthalmology;  Laterality: Left;  RN PHONE PREOP 10/21/2022   ARRIVAL 9:00 AM    R knee replacement      REPAIR OF RETINAL DETACHMENT WITH VITRECTOMY Left 2022    Procedure: REPAIR, RETINAL DETACHMENT, WITH VITRECTOMY;  Surgeon: MINO Martinez MD;  Location: [address];   Service: Ophthalmology;  Laterality: Left;  Spoke with SID Garcia. Pt was instructed nothing to eat after 8am and to arrive at 2pm for preop. 430pm case start request.    right  arm  surgery         Family History   Problem Relation Age of Onset    Cancer Mother         lung    Liver disease Father     Thyroid disease Sister     Cervical cancer Sister     Tremor Sister        Social History     Socioeconomic History    Marital status:     Years of education: 12   Tobacco Use    Smoking status: Every Day     Current packs/day: 0.75     Average packs/day: 0.8 packs/day for 40.0 years (30.0 ttl pk-yrs)     Types: Cigarettes    Smokeless tobacco: Never   Substance and Sexual Activity    Alcohol use: Yes     Alcohol/week: 0.0 standard drinks of alcohol     Comment: occasionally    Drug use: Yes     Types: Marijuana    Sexual activity: Yes     Partners: Male     Birth control/protection: See Surgical Hx   Other Topics Concern    Are you pregnant or think you may be? No    Breast-feeding No       Current Facility-Administered Medications   Medication Dose Route Frequency Provider Last Rate Last Admin    0.9%  NaCl infusion   Intravenous Continuous Briana Sterling MD         Facility-Administered Medications Ordered in Other Encounters   Medication Dose Route Frequency Provider Last Rate Last Admin    cyclopentolate 1% ophthalmic solution 1 drop  1 drop Left Eye On Call Procedure Palmer Berrois MD   1 drop at 10/27/22 1003    ofloxacin 0.3 % ophthalmic solution 1 drop  1 drop Left Eye On Call Procedure Palmer Berrios MD   2 drop at 10/27/22 1238    sodium chloride 0.9% flush 10 mL  10 mL Intravenous PRN Palmer Berrios MD           Review of patient's allergies indicates:   Allergen Reactions    Sulfa (sulfonamide antibiotics) Hives    Ace inhibitors Other (See Comments)     Cough       Objective:      Vitals:    09/06/23 0856   BP: (!) 159/67   Pulse: 63   Resp: 18   Temp: 98.1 °F  (36.7 °C)     Physical Exam   Constitutional: Patient is oriented to person, place, and time. Appears well-nourished.   HENT:   Mouth/Throat: Oropharynx is clear and moist.   Eyes: Pupils are equal, round, and reactive to light.   Neck: Neck supple.   Cardiovascular: Normal heart sounds.   Pulmonary/Chest: Effort normal and breath sounds normal.   Abdominal: Soft. Exhibits no mass. There is no tenderness. There is no guarding.   Musculoskeletal: Normal range of motion.   Lymphadenopathy: Has no cervical adenopathy.   Neurological:Alert and oriented to person, place, and time.   Skin: Skin is warm. No rash noted.   Psychiatric: Has a normal mood and affect.     Assessment:  Positive iFOBT    Plan:  Colonoscopy       I have reviewed the patient's medical history in detail and updated the computerized patient record

## 2023-09-06 NOTE — OR NURSING
PROCEDURE AND RECOVERY COMPLETED. DR. MONDRAGON DISCUSSED FINDINGS AND DISCHARGE INSTRUCTIONS GIVEN; UNDERSTANDING VERBALIZED; DENIES ANY CONCERNS; ASSISTED UP WITHOUT UNTOWARD EFFECTS; INSTRUCTED TO RESUME ELIQUIS ON SATURDAY; PATIENT READY FOR DISCHARGE.

## 2023-09-06 NOTE — ANESTHESIA POSTPROCEDURE EVALUATION
Anesthesia Post Evaluation    Patient: Patricia Ramirez    Procedure(s) Performed: Procedure(s) (LRB):  COLONOSCOPY (N/A)    Final Anesthesia Type: general      Patient location during evaluation: PACU  Patient participation: Yes- Able to Participate  Level of consciousness: awake and alert  Post-procedure vital signs: reviewed and stable  Pain management: adequate  Airway patency: patent    PONV status at discharge: No PONV  Anesthetic complications: no      Cardiovascular status: stable  Respiratory status: spontaneous ventilation  Hydration status: euvolemic  Follow-up not needed.          Vitals Value Taken Time   /71 09/06/23 1118   Temp 36.5 °C (97.7 °F) 09/06/23 1103   Pulse 65 09/06/23 1118   Resp 18 09/06/23 1118   SpO2 100 % 09/06/23 1118         No case tracking events are documented in the log.      Pain/Ede Score: Ede Score: 10 (9/6/2023 11:18 AM)

## 2023-09-08 LAB
FINAL PATHOLOGIC DIAGNOSIS: NORMAL
GROSS: NORMAL
Lab: NORMAL

## 2023-09-13 ENCOUNTER — PATIENT MESSAGE (OUTPATIENT)
Dept: GASTROENTEROLOGY | Facility: CLINIC | Age: 68
End: 2023-09-13
Payer: MEDICARE

## 2023-10-24 NOTE — DISCHARGE INSTRUCTIONS
Follow up with your doctor.  Return to the ED for any changing or concerning symptoms.  Rest.  Apply ice and heat to the areas for pain relief.  Follow up with the orthopedist listed.   Shaun Villar

## 2024-08-29 ENCOUNTER — OFFICE VISIT (OUTPATIENT)
Dept: OPHTHALMOLOGY | Facility: CLINIC | Age: 69
End: 2024-08-29
Payer: MEDICARE

## 2024-08-29 DIAGNOSIS — H25.11 NUCLEAR SCLEROSIS OF RIGHT EYE: ICD-10-CM

## 2024-08-29 DIAGNOSIS — H26.492 PCO (POSTERIOR CAPSULAR OPACIFICATION), LEFT: Primary | ICD-10-CM

## 2024-08-29 DIAGNOSIS — H35.372 EPIRETINAL MEMBRANE, LEFT: ICD-10-CM

## 2024-08-29 DIAGNOSIS — H33.022 RETINAL DETACHMENT OF LEFT EYE WITH MULTIPLE BREAKS: ICD-10-CM

## 2024-08-29 PROCEDURE — 92014 COMPRE OPH EXAM EST PT 1/>: CPT | Mod: S$GLB,,, | Performed by: OPHTHALMOLOGY

## 2024-08-29 PROCEDURE — 1101F PT FALLS ASSESS-DOCD LE1/YR: CPT | Mod: CPTII,S$GLB,, | Performed by: OPHTHALMOLOGY

## 2024-08-29 PROCEDURE — 1159F MED LIST DOCD IN RCRD: CPT | Mod: CPTII,S$GLB,, | Performed by: OPHTHALMOLOGY

## 2024-08-29 PROCEDURE — 99999 PR PBB SHADOW E&M-EST. PATIENT-LVL III: CPT | Mod: PBBFAC,,, | Performed by: OPHTHALMOLOGY

## 2024-08-29 PROCEDURE — 4010F ACE/ARB THERAPY RXD/TAKEN: CPT | Mod: CPTII,S$GLB,, | Performed by: OPHTHALMOLOGY

## 2024-08-29 PROCEDURE — 1126F AMNT PAIN NOTED NONE PRSNT: CPT | Mod: CPTII,S$GLB,, | Performed by: OPHTHALMOLOGY

## 2024-08-29 PROCEDURE — 3288F FALL RISK ASSESSMENT DOCD: CPT | Mod: CPTII,S$GLB,, | Performed by: OPHTHALMOLOGY

## 2024-08-29 NOTE — PROGRESS NOTES
Subjective:       Patient ID: Patricia Ramirez is a 69 y.o. female.    Chief Complaint: Concerns About Ocular Health    HPI    Here for Floaters OS and foggy vision    DLS: 12-01-22 Dr Berrios     Eye meds: None    69 year old female states she has clody vision for the last 6 month.   States she sees dark floaters OS>OD Pt states both eyes are itchy and dry.   Pt states she had retinal detachment OS in the past   Last edited by Grisel Sawyer on 8/29/2024  2:40 PM.             Assessment:       1. PCO (posterior capsular opacification), left    2. Nuclear sclerosis of right eye    3. Retinal detachment of left eye with multiple breaks    4. Epiretinal membrane, left        Plan:       Visually significant  PCO OS- Pt. Wants Laser.  NSC OD-Pt is scheduled for cataract eval on 9/6/24.  RD OS with multiple breaks s/p laser tx-Stable.  ERM OS-Mild, NVS.      RTC 9/6/24 for MRx & BAT OU.  Will do YAG CAP OS on 9/6/24 in AM.

## 2024-09-06 ENCOUNTER — OFFICE VISIT (OUTPATIENT)
Dept: OPHTHALMOLOGY | Facility: CLINIC | Age: 69
End: 2024-09-06
Payer: MEDICARE

## 2024-09-06 DIAGNOSIS — H33.022 RETINAL DETACHMENT OF LEFT EYE WITH MULTIPLE BREAKS: ICD-10-CM

## 2024-09-06 DIAGNOSIS — H35.372 EPIRETINAL MEMBRANE, LEFT: ICD-10-CM

## 2024-09-06 DIAGNOSIS — H25.11 NUCLEAR SCLEROSIS OF RIGHT EYE: ICD-10-CM

## 2024-09-06 DIAGNOSIS — H26.492 PCO (POSTERIOR CAPSULAR OPACIFICATION), LEFT: Primary | ICD-10-CM

## 2024-09-06 PROCEDURE — 1101F PT FALLS ASSESS-DOCD LE1/YR: CPT | Mod: CPTII,S$GLB,, | Performed by: OPHTHALMOLOGY

## 2024-09-06 PROCEDURE — 1126F AMNT PAIN NOTED NONE PRSNT: CPT | Mod: CPTII,S$GLB,, | Performed by: OPHTHALMOLOGY

## 2024-09-06 PROCEDURE — 99999 PR PBB SHADOW E&M-EST. PATIENT-LVL III: CPT | Mod: PBBFAC,,, | Performed by: OPHTHALMOLOGY

## 2024-09-06 PROCEDURE — 1159F MED LIST DOCD IN RCRD: CPT | Mod: CPTII,S$GLB,, | Performed by: OPHTHALMOLOGY

## 2024-09-06 PROCEDURE — 4010F ACE/ARB THERAPY RXD/TAKEN: CPT | Mod: CPTII,S$GLB,, | Performed by: OPHTHALMOLOGY

## 2024-09-06 PROCEDURE — 92014 COMPRE OPH EXAM EST PT 1/>: CPT | Mod: 57,S$GLB,, | Performed by: OPHTHALMOLOGY

## 2024-09-06 PROCEDURE — 3288F FALL RISK ASSESSMENT DOCD: CPT | Mod: CPTII,S$GLB,, | Performed by: OPHTHALMOLOGY

## 2024-09-06 PROCEDURE — 66821 AFTER CATARACT LASER SURGERY: CPT | Mod: LT,S$GLB,, | Performed by: OPHTHALMOLOGY

## 2024-09-06 PROCEDURE — 1160F RVW MEDS BY RX/DR IN RCRD: CPT | Mod: CPTII,S$GLB,, | Performed by: OPHTHALMOLOGY

## 2024-09-07 NOTE — PROGRESS NOTES
Subjective:       Patient ID: Patricia Ramirez is a 69 y.o. female.    Chief Complaint: Procedure    HPI    Here for possible yag cap OS    Eye meds: None     69 year old female states her vision in the left eye has been blurry for   a few years. Denies ocular pain. Pt sees floaters OU   Last edited by Palmer Berrios MD on 9/6/2024  8:44 AM.             Assessment:       1. PCO (posterior capsular opacification), left    2. Nuclear sclerosis of right eye    3. Retinal detachment of left eye with multiple breaks    4. Epiretinal membrane, left        Plan:       Visually significant  PCO OS- Pt. Wants Laser.    Cataract OD- Not visually significant.  RD OS with multiple breaks s/p laser tx-Stable.  ERM OS-NVS.      YAG CAP left eye (OS) today. TE=   126 mj, Avg. 2.0 mj, 59 pulses.  PF taper OS.  RTC 2-3 wks.    YAG laser Capsulotomy Procedure Note:  Informed consent was obtained and correct eye was verified with patient.   One drop of topical Proparacaine 1% was instilled.  YAG laser applied to posterior capsule in cruciate pattern.  One drop of Apraclonidine 0.5% and 1 drop of Pred Acetate 1% applied to eye after laser.  Pt tolerated procedure well. No complications.  Follow up in 2-3 weeks.

## 2024-09-27 ENCOUNTER — OFFICE VISIT (OUTPATIENT)
Dept: OPHTHALMOLOGY | Facility: CLINIC | Age: 69
End: 2024-09-27
Payer: MEDICARE

## 2024-09-27 DIAGNOSIS — H52.7 REFRACTIVE ERROR: ICD-10-CM

## 2024-09-27 DIAGNOSIS — Z98.890 POST-OPERATIVE STATE: Primary | ICD-10-CM

## 2024-09-27 DIAGNOSIS — H25.11 NUCLEAR SCLEROSIS OF RIGHT EYE: ICD-10-CM

## 2024-09-27 PROCEDURE — 99999 PR PBB SHADOW E&M-EST. PATIENT-LVL II: CPT | Mod: PBBFAC,,, | Performed by: OPHTHALMOLOGY

## 2024-09-27 NOTE — PROGRESS NOTES
Subjective:       Patient ID: Patricia Ramirez is a 69 y.o. female.    Chief Complaint: Post-op Evaluation    HPI    Here for f/u after yag cap OS    Eye meds: None     69 year old female states her vision has improved in the left eye.    Denies ocular pain. Deneis flashes, floaters an ddiplopia. Pt wants   updated MRX  Last edited by Grisel Sawyer on 9/27/2024 11:16 AM.             Assessment:       1. Post-operative state    2. Nuclear sclerosis of right eye    3. Refractive error        Plan:       S/p YAG CAP OS- Doing well.  Cataract OD- Not visually significant.    RE-Pt wants MRx.      Give MRx.  RTC 6 mos.

## 2024-10-06 ENCOUNTER — HOSPITAL ENCOUNTER (INPATIENT)
Facility: HOSPITAL | Age: 69
LOS: 6 days | Discharge: HOME | DRG: 418 | End: 2024-10-12
Attending: EMERGENCY MEDICINE | Admitting: STUDENT IN AN ORGANIZED HEALTH CARE EDUCATION/TRAINING PROGRAM
Payer: MEDICARE

## 2024-10-06 DIAGNOSIS — F32.1 MODERATE MAJOR DEPRESSION, SINGLE EPISODE: ICD-10-CM

## 2024-10-06 DIAGNOSIS — K81.9 CHOLECYSTITIS: ICD-10-CM

## 2024-10-06 DIAGNOSIS — K80.50 CHOLEDOCHOLITHIASIS: ICD-10-CM

## 2024-10-06 DIAGNOSIS — R07.9 CHEST PAIN: ICD-10-CM

## 2024-10-06 DIAGNOSIS — F32.A DEPRESSION, UNSPECIFIED DEPRESSION TYPE: Primary | ICD-10-CM

## 2024-10-06 DIAGNOSIS — R10.9 ABDOMINAL PAIN: ICD-10-CM

## 2024-10-06 DIAGNOSIS — F43.81 PROLONGED GRIEF DISORDER: ICD-10-CM

## 2024-10-06 DIAGNOSIS — I50.9 CHF (CONGESTIVE HEART FAILURE): ICD-10-CM

## 2024-10-06 PROBLEM — F17.200 TOBACCO DEPENDENCY: Status: ACTIVE | Noted: 2024-10-06

## 2024-10-06 LAB
ALBUMIN SERPL BCP-MCNC: 4 G/DL (ref 3.5–5.2)
ALLENS TEST: NORMAL
ALP SERPL-CCNC: 52 U/L (ref 55–135)
ALT SERPL W/O P-5'-P-CCNC: 24 U/L (ref 10–44)
ANION GAP SERPL CALC-SCNC: 12 MMOL/L (ref 8–16)
ANION GAP SERPL CALC-SCNC: 15 MMOL/L (ref 8–16)
AST SERPL-CCNC: 21 U/L (ref 10–40)
BACTERIA #/AREA URNS HPF: ABNORMAL /HPF
BASOPHILS # BLD AUTO: 0.09 K/UL (ref 0–0.2)
BASOPHILS NFR BLD: 0.9 % (ref 0–1.9)
BILIRUB SERPL-MCNC: 0.5 MG/DL (ref 0.1–1)
BILIRUB UR QL STRIP: NEGATIVE
BUN SERPL-MCNC: 10 MG/DL (ref 6–30)
BUN SERPL-MCNC: 9 MG/DL (ref 8–23)
CALCIUM SERPL-MCNC: 9 MG/DL (ref 8.7–10.5)
CHLORIDE SERPL-SCNC: 101 MMOL/L (ref 95–110)
CHLORIDE SERPL-SCNC: 104 MMOL/L (ref 95–110)
CLARITY UR: ABNORMAL
CO2 SERPL-SCNC: 21 MMOL/L (ref 23–29)
COLOR UR: YELLOW
CREAT SERPL-MCNC: 0.7 MG/DL (ref 0.5–1.4)
CREAT SERPL-MCNC: 0.7 MG/DL (ref 0.5–1.4)
DIFFERENTIAL METHOD BLD: ABNORMAL
EOSINOPHIL # BLD AUTO: 0.2 K/UL (ref 0–0.5)
EOSINOPHIL NFR BLD: 2.4 % (ref 0–8)
ERYTHROCYTE [DISTWIDTH] IN BLOOD BY AUTOMATED COUNT: 13 % (ref 11.5–14.5)
EST. GFR  (NO RACE VARIABLE): >60 ML/MIN/1.73 M^2
GLUCOSE SERPL-MCNC: 100 MG/DL (ref 70–110)
GLUCOSE SERPL-MCNC: 99 MG/DL (ref 70–110)
GLUCOSE UR QL STRIP: NEGATIVE
HCT VFR BLD AUTO: 45.5 % (ref 37–48.5)
HCT VFR BLD CALC: 49 %PCV (ref 36–54)
HGB BLD-MCNC: 15.2 G/DL (ref 12–16)
HGB UR QL STRIP: NEGATIVE
IMM GRANULOCYTES # BLD AUTO: 0.02 K/UL (ref 0–0.04)
IMM GRANULOCYTES NFR BLD AUTO: 0.2 % (ref 0–0.5)
KETONES UR QL STRIP: NEGATIVE
LACTATE SERPL-SCNC: 1.5 MMOL/L (ref 0.5–2.2)
LEUKOCYTE ESTERASE UR QL STRIP: ABNORMAL
LIPASE SERPL-CCNC: 11 U/L (ref 4–60)
LYMPHOCYTES # BLD AUTO: 2.3 K/UL (ref 1–4.8)
LYMPHOCYTES NFR BLD: 23.7 % (ref 18–48)
MCH RBC QN AUTO: 33.1 PG (ref 27–31)
MCHC RBC AUTO-ENTMCNC: 33.4 G/DL (ref 32–36)
MCV RBC AUTO: 99 FL (ref 82–98)
MICROSCOPIC COMMENT: ABNORMAL
MONOCYTES # BLD AUTO: 0.6 K/UL (ref 0.3–1)
MONOCYTES NFR BLD: 6.7 % (ref 4–15)
NEUTROPHILS # BLD AUTO: 6.3 K/UL (ref 1.8–7.7)
NEUTROPHILS NFR BLD: 66.1 % (ref 38–73)
NITRITE UR QL STRIP: NEGATIVE
NRBC BLD-RTO: 0 /100 WBC
PH UR STRIP: 6 [PH] (ref 5–8)
PLATELET # BLD AUTO: 231 K/UL (ref 150–450)
PMV BLD AUTO: 9.8 FL (ref 9.2–12.9)
POC IONIZED CALCIUM: 1.18 MMOL/L (ref 1.06–1.42)
POC TCO2 (MEASURED): 29 MMOL/L (ref 23–29)
POTASSIUM BLD-SCNC: 4.2 MMOL/L (ref 3.5–5.1)
POTASSIUM SERPL-SCNC: 4.3 MMOL/L (ref 3.5–5.1)
PROT SERPL-MCNC: 7.2 G/DL (ref 6–8.4)
PROT UR QL STRIP: ABNORMAL
RBC # BLD AUTO: 4.59 M/UL (ref 4–5.4)
SAMPLE: NORMAL
SITE: NORMAL
SODIUM BLD-SCNC: 140 MMOL/L (ref 136–145)
SODIUM SERPL-SCNC: 137 MMOL/L (ref 136–145)
SP GR UR STRIP: 1.02 (ref 1–1.03)
SQUAMOUS #/AREA URNS HPF: 44 /HPF
T4 FREE SERPL-MCNC: 0.92 NG/DL (ref 0.71–1.51)
TSH SERPL DL<=0.005 MIU/L-ACNC: 2.52 UIU/ML (ref 0.4–4)
URN SPEC COLLECT METH UR: ABNORMAL
UROBILINOGEN UR STRIP-ACNC: ABNORMAL EU/DL
WBC # BLD AUTO: 9.53 K/UL (ref 3.9–12.7)
WBC #/AREA URNS HPF: 16 /HPF (ref 0–5)

## 2024-10-06 PROCEDURE — 87086 URINE CULTURE/COLONY COUNT: CPT | Performed by: EMERGENCY MEDICINE

## 2024-10-06 PROCEDURE — 82607 VITAMIN B-12: CPT | Mod: 91 | Performed by: EMERGENCY MEDICINE

## 2024-10-06 PROCEDURE — 84443 ASSAY THYROID STIM HORMONE: CPT | Performed by: EMERGENCY MEDICINE

## 2024-10-06 PROCEDURE — 85025 COMPLETE CBC W/AUTO DIFF WBC: CPT | Performed by: EMERGENCY MEDICINE

## 2024-10-06 PROCEDURE — 96361 HYDRATE IV INFUSION ADD-ON: CPT

## 2024-10-06 PROCEDURE — 25500020 PHARM REV CODE 255: Performed by: EMERGENCY MEDICINE

## 2024-10-06 PROCEDURE — 84132 ASSAY OF SERUM POTASSIUM: CPT

## 2024-10-06 PROCEDURE — 99285 EMERGENCY DEPT VISIT HI MDM: CPT | Mod: 25

## 2024-10-06 PROCEDURE — 83036 HEMOGLOBIN GLYCOSYLATED A1C: CPT | Performed by: STUDENT IN AN ORGANIZED HEALTH CARE EDUCATION/TRAINING PROGRAM

## 2024-10-06 PROCEDURE — 25500020 PHARM REV CODE 255: Performed by: STUDENT IN AN ORGANIZED HEALTH CARE EDUCATION/TRAINING PROGRAM

## 2024-10-06 PROCEDURE — 83921 ORGANIC ACID SINGLE QUANT: CPT | Performed by: EMERGENCY MEDICINE

## 2024-10-06 PROCEDURE — 21400001 HC TELEMETRY ROOM

## 2024-10-06 PROCEDURE — 63600175 PHARM REV CODE 636 W HCPCS: Performed by: STUDENT IN AN ORGANIZED HEALTH CARE EDUCATION/TRAINING PROGRAM

## 2024-10-06 PROCEDURE — 80053 COMPREHEN METABOLIC PANEL: CPT | Performed by: EMERGENCY MEDICINE

## 2024-10-06 PROCEDURE — 63600175 PHARM REV CODE 636 W HCPCS: Performed by: EMERGENCY MEDICINE

## 2024-10-06 PROCEDURE — 25000003 PHARM REV CODE 250: Performed by: STUDENT IN AN ORGANIZED HEALTH CARE EDUCATION/TRAINING PROGRAM

## 2024-10-06 PROCEDURE — 83690 ASSAY OF LIPASE: CPT | Performed by: EMERGENCY MEDICINE

## 2024-10-06 PROCEDURE — 93010 ELECTROCARDIOGRAM REPORT: CPT | Mod: ,,, | Performed by: INTERNAL MEDICINE

## 2024-10-06 PROCEDURE — 96375 TX/PRO/DX INJ NEW DRUG ADDON: CPT

## 2024-10-06 PROCEDURE — 25000003 PHARM REV CODE 250: Performed by: EMERGENCY MEDICINE

## 2024-10-06 PROCEDURE — 96365 THER/PROPH/DIAG IV INF INIT: CPT

## 2024-10-06 PROCEDURE — 82330 ASSAY OF CALCIUM: CPT

## 2024-10-06 PROCEDURE — 84295 ASSAY OF SERUM SODIUM: CPT

## 2024-10-06 PROCEDURE — 82565 ASSAY OF CREATININE: CPT

## 2024-10-06 PROCEDURE — 93005 ELECTROCARDIOGRAM TRACING: CPT

## 2024-10-06 PROCEDURE — A4216 STERILE WATER/SALINE, 10 ML: HCPCS | Performed by: STUDENT IN AN ORGANIZED HEALTH CARE EDUCATION/TRAINING PROGRAM

## 2024-10-06 PROCEDURE — 85014 HEMATOCRIT: CPT

## 2024-10-06 PROCEDURE — 82607 VITAMIN B-12: CPT | Performed by: EMERGENCY MEDICINE

## 2024-10-06 PROCEDURE — 99900035 HC TECH TIME PER 15 MIN (STAT)

## 2024-10-06 PROCEDURE — 84439 ASSAY OF FREE THYROXINE: CPT | Performed by: EMERGENCY MEDICINE

## 2024-10-06 PROCEDURE — G0426 INPT/ED TELECONSULT50: HCPCS | Mod: GT,,, | Performed by: PSYCHIATRY & NEUROLOGY

## 2024-10-06 PROCEDURE — A9585 GADOBUTROL INJECTION: HCPCS | Performed by: STUDENT IN AN ORGANIZED HEALTH CARE EDUCATION/TRAINING PROGRAM

## 2024-10-06 PROCEDURE — 82746 ASSAY OF FOLIC ACID SERUM: CPT | Performed by: EMERGENCY MEDICINE

## 2024-10-06 PROCEDURE — 83605 ASSAY OF LACTIC ACID: CPT | Performed by: EMERGENCY MEDICINE

## 2024-10-06 PROCEDURE — 87040 BLOOD CULTURE FOR BACTERIA: CPT | Performed by: STUDENT IN AN ORGANIZED HEALTH CARE EDUCATION/TRAINING PROGRAM

## 2024-10-06 PROCEDURE — 81000 URINALYSIS NONAUTO W/SCOPE: CPT | Performed by: EMERGENCY MEDICINE

## 2024-10-06 RX ORDER — IBUPROFEN 200 MG
16 TABLET ORAL
Status: DISCONTINUED | OUTPATIENT
Start: 2024-10-06 | End: 2024-10-12 | Stop reason: HOSPADM

## 2024-10-06 RX ORDER — MORPHINE SULFATE 4 MG/ML
4 INJECTION, SOLUTION INTRAMUSCULAR; INTRAVENOUS
Status: COMPLETED | OUTPATIENT
Start: 2024-10-06 | End: 2024-10-06

## 2024-10-06 RX ORDER — NALOXONE HCL 0.4 MG/ML
0.02 VIAL (ML) INJECTION
Status: DISCONTINUED | OUTPATIENT
Start: 2024-10-06 | End: 2024-10-12 | Stop reason: HOSPADM

## 2024-10-06 RX ORDER — CITALOPRAM 20 MG/1
20 TABLET, FILM COATED ORAL DAILY
Status: DISCONTINUED | OUTPATIENT
Start: 2024-10-07 | End: 2024-10-12 | Stop reason: HOSPADM

## 2024-10-06 RX ORDER — MIRTAZAPINE 15 MG/1
15 TABLET, FILM COATED ORAL NIGHTLY
Qty: 30 TABLET | Refills: 0 | Status: SHIPPED | OUTPATIENT
Start: 2024-10-06 | End: 2024-11-05

## 2024-10-06 RX ORDER — IBUPROFEN 200 MG
1 TABLET ORAL DAILY
Status: DISCONTINUED | OUTPATIENT
Start: 2024-10-07 | End: 2024-10-12 | Stop reason: HOSPADM

## 2024-10-06 RX ORDER — PANTOPRAZOLE SODIUM 40 MG/10ML
40 INJECTION, POWDER, LYOPHILIZED, FOR SOLUTION INTRAVENOUS DAILY
Status: DISCONTINUED | OUTPATIENT
Start: 2024-10-06 | End: 2024-10-12 | Stop reason: HOSPADM

## 2024-10-06 RX ORDER — ONDANSETRON HYDROCHLORIDE 2 MG/ML
8 INJECTION, SOLUTION INTRAVENOUS
Status: COMPLETED | OUTPATIENT
Start: 2024-10-06 | End: 2024-10-06

## 2024-10-06 RX ORDER — SODIUM CHLORIDE 0.9 % (FLUSH) 0.9 %
10 SYRINGE (ML) INJECTION EVERY 12 HOURS PRN
Status: DISCONTINUED | OUTPATIENT
Start: 2024-10-06 | End: 2024-10-12 | Stop reason: HOSPADM

## 2024-10-06 RX ORDER — ALPRAZOLAM 0.5 MG/1
0.5 TABLET ORAL 2 TIMES DAILY PRN
Status: DISCONTINUED | OUTPATIENT
Start: 2024-10-06 | End: 2024-10-12 | Stop reason: HOSPADM

## 2024-10-06 RX ORDER — GLUCAGON 1 MG
1 KIT INJECTION
Status: DISCONTINUED | OUTPATIENT
Start: 2024-10-06 | End: 2024-10-12 | Stop reason: HOSPADM

## 2024-10-06 RX ORDER — ACETAMINOPHEN 500 MG
500 TABLET ORAL EVERY 6 HOURS PRN
Status: DISCONTINUED | OUTPATIENT
Start: 2024-10-06 | End: 2024-10-12 | Stop reason: HOSPADM

## 2024-10-06 RX ORDER — PANTOPRAZOLE SODIUM 40 MG/10ML
40 INJECTION, POWDER, LYOPHILIZED, FOR SOLUTION INTRAVENOUS DAILY
Status: DISCONTINUED | OUTPATIENT
Start: 2024-10-07 | End: 2024-10-06

## 2024-10-06 RX ORDER — HYDROMORPHONE HYDROCHLORIDE 1 MG/ML
1 INJECTION, SOLUTION INTRAMUSCULAR; INTRAVENOUS; SUBCUTANEOUS EVERY 4 HOURS PRN
Status: DISCONTINUED | OUTPATIENT
Start: 2024-10-06 | End: 2024-10-07

## 2024-10-06 RX ORDER — ACETAMINOPHEN 325 MG/1
650 TABLET ORAL EVERY 4 HOURS PRN
Status: DISCONTINUED | OUTPATIENT
Start: 2024-10-06 | End: 2024-10-12 | Stop reason: HOSPADM

## 2024-10-06 RX ORDER — ONDANSETRON HYDROCHLORIDE 2 MG/ML
4 INJECTION, SOLUTION INTRAVENOUS EVERY 8 HOURS PRN
Status: DISCONTINUED | OUTPATIENT
Start: 2024-10-06 | End: 2024-10-12 | Stop reason: HOSPADM

## 2024-10-06 RX ORDER — KETOROLAC TROMETHAMINE 30 MG/ML
15 INJECTION, SOLUTION INTRAMUSCULAR; INTRAVENOUS
Status: COMPLETED | OUTPATIENT
Start: 2024-10-06 | End: 2024-10-06

## 2024-10-06 RX ORDER — IBUPROFEN 200 MG
24 TABLET ORAL
Status: DISCONTINUED | OUTPATIENT
Start: 2024-10-06 | End: 2024-10-12 | Stop reason: HOSPADM

## 2024-10-06 RX ORDER — GADOBUTROL 604.72 MG/ML
6 INJECTION INTRAVENOUS
Status: COMPLETED | OUTPATIENT
Start: 2024-10-06 | End: 2024-10-06

## 2024-10-06 RX ADMIN — SODIUM CHLORIDE 1000 ML: 9 INJECTION, SOLUTION INTRAVENOUS at 09:10

## 2024-10-06 RX ADMIN — IOHEXOL 75 ML: 350 INJECTION, SOLUTION INTRAVENOUS at 11:10

## 2024-10-06 RX ADMIN — GADOBUTROL 6 ML: 604.72 INJECTION INTRAVENOUS at 04:10

## 2024-10-06 RX ADMIN — HYDROMORPHONE HYDROCHLORIDE 1 MG: 1 INJECTION, SOLUTION INTRAMUSCULAR; INTRAVENOUS; SUBCUTANEOUS at 06:10

## 2024-10-06 RX ADMIN — MORPHINE SULFATE 4 MG: 4 INJECTION, SOLUTION INTRAMUSCULAR; INTRAVENOUS at 01:10

## 2024-10-06 RX ADMIN — Medication 10 ML: at 06:10

## 2024-10-06 RX ADMIN — ONDANSETRON 4 MG: 2 INJECTION INTRAMUSCULAR; INTRAVENOUS at 07:10

## 2024-10-06 RX ADMIN — BUSPIRONE HYDROCHLORIDE 15 MG: 10 TABLET ORAL at 09:10

## 2024-10-06 RX ADMIN — CEFTRIAXONE 1 G: 1 INJECTION, POWDER, FOR SOLUTION INTRAMUSCULAR; INTRAVENOUS at 11:10

## 2024-10-06 RX ADMIN — PIPERACILLIN AND TAZOBACTAM 4.5 G: 4; .5 INJECTION, POWDER, LYOPHILIZED, FOR SOLUTION INTRAVENOUS; PARENTERAL at 06:10

## 2024-10-06 RX ADMIN — KETOROLAC TROMETHAMINE 15 MG: 30 INJECTION, SOLUTION INTRAMUSCULAR at 09:10

## 2024-10-06 RX ADMIN — PANTOPRAZOLE SODIUM 40 MG: 40 INJECTION, POWDER, LYOPHILIZED, FOR SOLUTION INTRAVENOUS at 06:10

## 2024-10-06 RX ADMIN — ONDANSETRON 8 MG: 2 INJECTION INTRAMUSCULAR; INTRAVENOUS at 09:10

## 2024-10-06 NOTE — H&P
[facility] Emergency Dept  [facility] Medicine  History & Physical    Patient Name: Patricia Ramirez  MRN: [id number]  Patient Class: IP- Inpatient  Admission Date: 10/6/2024  Attending Physician: Louis Marie,*   Primary Care Provider: Fabienne Erwin NP         Patient information was obtained from patient, past medical records, and ER records.     Subjective:     Principal Problem:Choledocholithiasis    Chief Complaint:   Chief Complaint   Patient presents with    Abdominal Pain     Pt reports bilateral lower abd pain that radiates to her left lower back since yesterday. Pt denies N/V/D, urinary symptoms.         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  Patient reports 10/10 intensity cramping abdominal pain for 1 day duration.  Patient reports relief with Gas-X.  Denied nausea/vomiting/fever/melena/hematochezia/diarrhea/chest pain/shortness of breath.  Patient reports not taking Eliquis for months    During evaluation in the ER, patient was found to be hypertensive (163/67, sinus Jason (58).  Labs revealed WBC 9.53, hemoglobin 15.2, platelets 231, sodium 137, potassium 4.3, CO2 21, creatinine 0.7, AST 21, ALT 24, lipase 11, total bilirubin 0.5, ALP 52.  Lactic acid 1.5.  TSH 2.52.  Urinalysis negative for UTI with rare bacteria and 16 WBC.Cholelithiasis with choledocholithiasis.  There is mild intrahepatic biliary ductal dilatation.  No inflammation about the gallbladder. Small hiatal hernia and small to moderate-sized duodenal diverticulum.  Pending ultrasound abdomen.  General surgery/GI was consulted.  Recommended MRCP.    Patient was given 1 g ceftriaxone, 4 mg IV morphine, 75 mL  iohexol.  Psychiatry was consulted in ED for prolonged grief reaction with significant weight loss.  Admitted to hospital medicine for further management        Past Medical History:   Diagnosis Date    Allergy     Anxiety     Arthritis     Cataract     Depressive disorder, not elsewhere classified     Esophageal reflux     Hypertension     Impaired fasting glucose     Joint pain     Obesity, unspecified     Other and unspecified hyperlipidemia        Past Surgical History:   Procedure Laterality Date    BLEPHAROPLASTY Bilateral 3/3/2021    Procedure: BLEPHAROPLASTY;  Surgeon: Maite Acuna MD;  Location: [facility] OR;  Service: Ophthalmology;  Laterality: Bilateral;     SECTION  1975    COLONOSCOPY N/A 2023    Procedure: COLONOSCOPY;  Surgeon: Briana Sterling MD;  Location: [facility] ENDO;  Service: Endoscopy;  Laterality: N/A;    HYSTERECTOMY      without BSO    INTRAOCULAR PROSTHESES INSERTION Left 10/27/2022    Procedure: INSERTION, IOL PROSTHESIS;  Surgeon: Palmer Berrios MD;  Location: [facility] OR;  Service: Ophthalmology;  Laterality: Left;    PHACOEMULSIFICATION OF CATARACT Left 10/27/2022    Procedure: PHACOEMULSIFICATION, CATARACT;  Surgeon: Palmer Berrios MD;  Location: [facility] OR;  Service: Ophthalmology;  Laterality: Left;  RN PHONE PREOP 10/21/2022   ARRIVAL 9:00 AM    R knee replacement      REPAIR OF RETINAL DETACHMENT WITH VITRECTOMY Left 2022    Procedure: REPAIR, RETINAL DETACHMENT, WITH VITRECTOMY;  Surgeon: MINO Martinez MD;  Location: [address];  Service: Ophthalmology;  Laterality: Left;  Spoke with SID Garcia. Pt was instructed nothing to eat after 8am and to arrive at 2pm for preop. 430pm case start request.    right  arm  surgery         Review of patient's allergies indicates:   Allergen Reactions    Sulfa (sulfonamide antibiotics) Hives    Ace inhibitors Other (See Comments)     Cough         Current Facility-Administered Medications on File Prior  to Encounter   Medication    cyclopentolate 1% ophthalmic solution 1 drop    ofloxacin 0.3 % ophthalmic solution 1 drop    sodium chloride 0.9% flush 10 mL     Current Outpatient Medications on File Prior to Encounter   Medication Sig    ALPRAZolam (XANAX) 0.5 MG tablet Take 1 tablet (0.5 mg total) by mouth 2 (two) times daily as needed for Anxiety.    aspirin (ECOTRIN) 81 MG EC tablet Take 81 mg by mouth once daily.    atorvastatin (LIPITOR) 40 MG tablet Take 1 tablet (40 mg total) by mouth once daily.    busPIRone (BUSPAR) 15 MG tablet TAKE 1 TABLET BY MOUTH THREE TIMES DAILY    citalopram (CELEXA) 40 MG tablet Take 1 tablet by mouth once daily    cyclobenzaprine (FLEXERIL) 5 MG tablet TAKE 1 TABLET BY MOUTH TWICE DAILY AS NEEDED FOR MUSCLE SPASM    ergocalciferol (ERGOCALCIFEROL) 50,000 unit Cap Take 1 capsule (50,000 Units total) by mouth every 7 days.    olmesartan (BENICAR) 20 MG tablet TAKE 1 TABLET BY MOUTH ONCE DAILY IN THE EVENING    acetaminophen (TYLENOL) 500 MG tablet Take 500 mg by mouth every 6 (six) hours as needed for Pain.    BIOTIN ORAL Take by mouth.    [DISCONTINUED] ELIQUIS 5 mg Tab Take 1 tablet by mouth twice daily    [DISCONTINUED] traZODone (DESYREL) 100 MG tablet TAKE 1 TABLET BY MOUTH ONCE DAILY IN THE EVENING    [DISCONTINUED] traZODone (DESYREL) 50 MG tablet Take 1 tablet by mouth once daily with breakfast     Family History       Problem Relation (Age of Onset)    Cancer Mother    Cervical cancer Sister    Liver disease Father    Thyroid disease Sister    Tremor Sister          Tobacco Use    Smoking status: Every Day     Current packs/day: 0.75     Average packs/day: 0.8 packs/day for 40.0 years (30.0 ttl pk-yrs)     Types: Cigarettes    Smokeless tobacco: Never   Substance and Sexual Activity    Alcohol use: Yes     Alcohol/week: 0.0 standard drinks of alcohol     Comment: occasionally    Drug use: Yes     Types: Marijuana    Sexual activity: Yes     Partners: Male     Birth  control/protection: See Surgical Hx     Review of Systems   Constitutional: Negative.    Respiratory: Negative.     Cardiovascular: Negative.    Gastrointestinal:  Positive for abdominal pain.   Genitourinary: Negative.    Musculoskeletal: Negative.    Neurological: Negative.      Objective:     Vital Signs (Most Recent):  Temp: 98.4 °F (36.9 °C) (10/06/24 0920)  Pulse: 60 (10/06/24 1517)  Resp: 18 (10/06/24 1517)  BP: (!) 150/67 (10/06/24 1502)  SpO2: 99 % (10/06/24 1517) Vital Signs (24h Range):  Temp:  [98.4 °F (36.9 °C)] 98.4 °F (36.9 °C)  Pulse:  [53-77] 60  Resp:  [13-22] 18  SpO2:  [95 %-100 %] 99 %  BP: (134-163)/(60-67) 150/67     Weight: 63 kg (139 lb)  Body mass index is 23.86 kg/m².     Physical Exam  Constitutional:       General: She is not in acute distress.  Cardiovascular:      Rate and Rhythm: Normal rate.      Pulses: Normal pulses.   Pulmonary:      Effort: No respiratory distress.      Breath sounds: Normal breath sounds. No wheezing.   Abdominal:      General: Bowel sounds are normal. There is no distension.      Palpations: Abdomen is soft.      Tenderness: There is abdominal tenderness.   Musculoskeletal:      Right lower leg: No edema.   Skin:     General: Skin is warm.   Neurological:      Mental Status: She is alert and oriented to person, place, and time. Mental status is at baseline.   Psychiatric:         Mood and Affect: Mood normal.                Significant Labs: All pertinent labs within the past 24 hours have been reviewed.    Significant Imaging: I have reviewed all pertinent imaging results/findings within the past 24 hours.  Assessment/Plan:     * Choledocholithiasis    Evidence of choledocholithiasis on CT.  GI/general surgery consulted.  Initiate on IV Zosyn.  Plan for MRCP today.  We will likely need further evaluation with ERCP.  Ultrasound abdomen pending.  Obtain blood cultures    Tobacco dependency  Dangers of cigarette smoking were reviewed with patient in detail.  Patient was Counseled for 3-10 minutes. Nicotine replacement options were discussed. 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  She can not afford    Chronic anticoagulation    Patient was supposed to be on Eliquis for history of splenic infarct.  Patient reports not taking Eliquis for months    IFG (impaired fasting glucose)    Obtain A1c    Heart failure with preserved ejection fraction    Obtain echo.  Patient appears euvolemic.  Start being on diuretics at home.  Appears euvolemic.    GERD (gastroesophageal reflux disease)  Initiate on Protonix      Hyperlipidemia    On statin at home, held in the setting of choledocholithiasis    Essential hypertension  Patients blood pressure range in the last 24 hours was: BP  134/60  .The patient's inpatient anti-hypertensive regimen is listed below:  Current Antihypertensives       Plan  BP is controlled.  Hold home antihypertensives for now      VTE Risk Mitigation (From admission, onward)           Ordered     IP VTE HIGH RISK PATIENT  Once         10/06/24 1525     Place sequential compression device  Until discontinued         10/06/24 1525                                    Louis Marie MD  Department of Hospital Medicine  [facility] - Emergency Dept

## 2024-10-06 NOTE — CONSULTS
"[facility]  Psychiatry  Telepsychiatry Consult Note    Please see previous notes:    Patient agreeable to consultation via telepsychiatry.    Tele-Consultation from Psychiatry started: 10/6/2024 at 11:04am  The chief complaint leading to psychiatric consultation is: depression, not eating  This consultation was requested by Dr Correa, the Emergency Department attending physician.  The location of the consulting psychiatrist is  [place] .  The patient location is  [facility] EMERGENCY DEPARTMENT   The patient arrived at the ED at: [facility]    Also present with the patient at the time of the consultation: nobody    Patient Identification:   Patricia Ramirez is a 69 y.o. female.    Patient information was obtained from patient and past medical records.  Patient presented voluntarily to the Emergency Department     Consults  Teleconsult Time Documentation  Subjective:     History of Present Illness:  70yo F with recently diagnosed depression and anxiety presents to the ED with abdominal pain.    Per ED note-  "    Pt reports bilateral lower abd pain that radiates to her left lower back since yesterday. Pt denies N/V/D, urinary symptoms.       Patricia Ramirez is a 69 y.o. female with Hx of HTN, HLD, GERD, splenic infarct, HF w/ preserved EF, chronic anticoagulant use (apixaban,) antiplatelet use (aspirin 81 ECG) who presents to the ED for chief complaint of intermittent abdominal pain that began yesterday.  Patient reports the pain is sharp, crampy, and gassy-feeling. She states the pain is most concentrated in her upper and lower abdomen. Patient reports she suddenly noticed the pain while moving around, reports drinking coffee, it easing up with rest, beginning again with movement, and experiencing temporary relief after taking two GasX pills.  She rates the pain with activity a 10/10, subsides to a 0/10 at rest. Last BM was yesterday and normal. Patient denies associated dysuria, diarrhea, frequent stools, leg swelling, " "black/bloody BM, nausea, vomiting, or other associated symptoms.      Patient reports that she lost her  6 months ago, has lost a lot of weight over 250 lbs to 139, has been feeling depressed. She reports that she has never spoken with a psychiatrist, saw her doctor the end of last month, was given a referral for counseling, put her on citalopram, buspirone, and alprazolam as needed. She reports she sometimes feels anxious as well. Notes that her son is going through similar things and that she doesn't have many others to rely on.      The history is provided by the patient. No  was used."    On interview, patient reports she had abdominal pain x 1 day which has improved since then. Concerning her depression, it started a few months before  passed away in April. Reports she took care of her  OTC prior to him passing away. Since then has had decreased appetite, guilt because she would lose her patience with her , sleep is poor, falls asleep at 8pm and wakes up at 11pm or midnight. Does not sleep past 3-4am, feels sad on some days and other days she feels okay. Denied SI or hopelessness. Cites family as reason to live, fatigue, feeling guilty excessively at times, trouble with focus, anhedonia, medication has helped partially with sx.  Denied HI  Takes xanax 1x per week at most. Helps with anxiety or panic sx when she gets them  Takes trazodone for sleep- does not really help much.  Also takes a smaller dose of trazodone in the AM  Denied AVH. No delsuions noted  Denied hopelessness- has family to live for  Previously was having panic sx after  passed away. Would go outside and throw a coffee cup because she was angry and sad at the time.   Denied reexperiencing sx.  Denied manic sx  This is the extent of patients complaints at this time.  12 pt ros was negative aside from sx noted above      Psychiatric History:   Previous Psychiatric Hospitalizations: No " "  Previous Medication Trials: No   Previous Suicide Attempts: no   History of Violence: no  History of Depression: no  History of Mariana: no  History of Auditory/Visual Hallucination no  History of Delusions: no  Outpatient psychiatrist (current & past): No    Substance Abuse History:  Tobacco:No  Alcohol: No  Illicit Substances:No  Detox/Rehab: No    Legal History: Past charges/incarcerations: denied     Family Psychiatric History: denied      Social History:  2 adult children, one son who lives with her. Retired. Denied access to firearms. 6 grand children. Reports good social support.    Psychiatric Mental Status Exam:  Arousal: alert  Sensorium/Orientation: oriented to grossly intact  Behavior/Cooperation: normal, cooperative   Speech: normal tone, normal rate, normal pitch, normal volume  Language: grossly intact  Mood: " depressed "   Affect: constricted  Thought Process: normal and logical  Thought Content:   Auditory hallucinations: NO  Visual hallucinations: NO  Paranoia: NO  Delusions:  NO  Suicidal ideation: NO  Homicidal ideation: NO  Attention/Concentration:  intact  Memory:    Recent:  Intact   Remote: Intact     Fund of Knowledge: Aware of current events   Abstract reasoning: similarities were abstract  Insight: intact  Judgment: behavior is adequate to circumstances      Past Medical History:   Past Medical History:   Diagnosis Date    Allergy     Anxiety     Arthritis     Cataract     Depressive disorder, not elsewhere classified     Esophageal reflux     Hypertension     Impaired fasting glucose     Joint pain     Obesity, unspecified     Other and unspecified hyperlipidemia       Laboratory Data:   Labs Reviewed   CBC W/ AUTO DIFFERENTIAL - Abnormal       Result Value    WBC 9.53      RBC 4.59      Hemoglobin 15.2      Hematocrit 45.5      MCV 99 (*)     MCH 33.1 (*)     MCHC 33.4      RDW 13.0      Platelets 231      MPV 9.8      Immature Granulocytes 0.2      Gran # (ANC) 6.3      Immature Grans " (Abs) 0.02      Lymph # 2.3      Mono # 0.6      Eos # 0.2      Baso # 0.09      nRBC 0      Gran % 66.1      Lymph % 23.7      Mono % 6.7      Eosinophil % 2.4      Basophil % 0.9      Differential Method Automated     COMPREHENSIVE METABOLIC PANEL - Abnormal    Sodium 137      Potassium 4.3      Chloride 104      CO2 21 (*)     Glucose 99      BUN 9      Creatinine 0.7      Calcium 9.0      Total Protein 7.2      Albumin 4.0      Total Bilirubin 0.5      Alkaline Phosphatase 52 (*)     AST 21      ALT 24      eGFR >60      Anion Gap 12     URINALYSIS, REFLEX TO URINE CULTURE - Abnormal    Specimen UA Urine, Clean Catch      Color, UA Yellow      Appearance, UA Hazy (*)     pH, UA 6.0      Specific Gravity, UA 1.020      Protein, UA Trace (*)     Glucose, UA Negative      Ketones, UA Negative      Bilirubin (UA) Negative      Occult Blood UA Negative      Nitrite, UA Negative      Urobilinogen, UA 2.0-3.0 (*)     Leukocytes, UA Trace (*)     Narrative:     Specimen Source->Urine   URINALYSIS MICROSCOPIC - Abnormal    WBC, UA 16 (*)     Bacteria Rare      Squam Epithel, UA 44      Microscopic Comment SEE COMMENT      Narrative:     Specimen Source->Urine   CULTURE, URINE   LIPASE    Lipase 11     LACTIC ACID, PLASMA    Lactate (Lactic Acid) 1.5     ISTAT PROCEDURE    POC Glucose 100      POC BUN 10      POC Creatinine 0.7      POC Sodium 140      POC Potassium 4.2      POC Chloride 101      POC TCO2 (MEASURED) 29      POC Anion Gap 15      POC Ionized Calcium 1.18      POC Hematocrit 49      Sample VENOUS      Site Other      Allens Test N/A     ISTAT CHEM8       Allergies:   Review of patient's allergies indicates:   Allergen Reactions    Sulfa (sulfonamide antibiotics) Hives    Ace inhibitors Other (See Comments)     Cough         Medications in ER:   Medications   sodium chloride 0.9% bolus 1,000 mL 1,000 mL (0 mLs Intravenous Stopped 10/6/24 1106)   ondansetron injection 8 mg (8 mg Intravenous Given 10/6/24  2978)   ketorolac injection 15 mg (15 mg Intravenous Given 10/6/24 9358)       Medications at home: reviewed with patient and in MAR    No new subjective & objective note has been filed under this hospital service since the last note was generated.      Assessment - Diagnosis - Goals:     Diagnosis/Impression:   MDD-single episode - moderate    Modified SAD PERSONS Scale     Suicide Risk Assessment (if applicable)-  A: Access to lethal means ? denied  Risk Factors:  S: Male sex ? 0  A: Age 15-25 or 59+ years ?0  D: Depression or hopelessness ?1  P: Previous suicidal attempts or psychiatric care ?0  E: Excessive ethanol or drug use ? 0  R: Rational thinking loss (psychotic or organic illness) ?0  S: Single,  or  ?1  O: Organized or serious attempt ? 0  N: No social support ? 0  S: Stated future intent (determined to repeat or ambivalent) ? 0  Protective Factors:  C: Contracts for safety ? yes  H: Hopeful for the future ? yes  O: Oriented to the future ? yes   I:  Intent- denies ?yes has protective factor  R: Reasons not to attempt (namely, impact on loved ones and Baptism) ?family      Rec:   At this time based on data that was available to me at the time of consultation, I believe that with reasonable medical certainty that patient does not appear to be a PEC candidate. Patient does not appear to have SI/HI nor is she gravely disabled. She has a supportive social environment. She did not want to pursue voluntary inpatient psychiatric hospitalization that was discussed as part of informed consent.  Safety planning. Patient contracts for safety.  Stop trazodone given modest effect at best and persistent insomnia  Start Remeron 15mg po qhs for mood, sleep off label. Provide 30 day script. Patient will continue Celexa, buspar, Xanax at current dosing  Please check serum B12, folic acid, vitamin d panel, TSH and Free T4  Informed consent provided. Patient conveyed understanding of risks including worsened  psychiatric sx, falls, pancreatic, cardiac and wanted to proceed with tx plan.  Patient will need referral to outpatient psychiatric service for ongoing management. She reports she already has a referral to counseling.    Plan of Care communicated to: ED provider    Time with patient, coordinating care: 51min      More than 50% of the time was spent counseling/coordinating care    Consulting clinician was informed of the encounter and consult note.    Consultation ended: 10/6/2024 at 12:58pm    Bi Carter MD  Psychiatry  [facility]

## 2024-10-06 NOTE — ED TRIAGE NOTES
Patient reports abdominal pian that started under left breast then moves around abdomen, denies N/V/D, denies fall or injury, per provider patient states poor appetite after loss of spouse, with weight loss,  was given pamphlets by PCP  to assist denies SI, is unsure where to go for grief assistance

## 2024-10-06 NOTE — ED PROVIDER NOTES
Encounter Date: 10/6/2024    SCRIBE #1 NOTE: I, Kinsey Elaine, am scribing for, and in the presence of,  Verna Correa DO. I have scribed the following portions of the note - Other sections scribed: HPI, ROS, PE, MDM.       History     Chief Complaint   Patient presents with    Abdominal Pain     Pt reports bilateral lower abd pain that radiates to her left lower back since yesterday. Pt denies N/V/D, urinary symptoms.      Patricia Ramirez is a 69 y.o. female with Hx of HTN, HLD, GERD, splenic infarct, HF w/ preserved EF, on dapt (eliquis and baby ASA) who presents to the ED for chief complaint of intermittent abdominal pain that began yesterday.  Patient reports the pain is sharp, crampy, and gassy-feeling. She states the pain is most concentrated in her upper and lower abdomen. Patient reports she suddenly noticed the pain while moving around, reports drinking coffee, it easing up with rest, beginning again with movement, and experiencing temporary relief after taking two GasX pills.  She rates the pain with activity a 10/10, subsides to a 0/10 at rest. Last BM was yesterday and normal. Patient denies associated dysuria, diarrhea, frequent stools, leg swelling, black/bloody BM, nausea, vomiting, or other associated symptoms.     Patient reports that she lost her  6 months ago, has lost a lot of weight over 250 lbs to 139, has been feeling depressed. She reports that she has never spoken with a psychiatrist, saw her doctor the end of last month, was given a referral for counseling, put her on citalopram, buspirone, and alprazolam as needed. She reports she sometimes feels anxious as well. Notes that her son is going through similar things and that she doesn't have many others to rely on.     The history is provided by the patient. No  was used.     Review of patient's allergies indicates:   Allergen Reactions    Sulfa (sulfonamide antibiotics) Hives    Ace inhibitors Other (See  Comments)     Cough       Past Medical History:   Diagnosis Date    Allergy     Anxiety     Arthritis     Cataract     Depressive disorder, not elsewhere classified     Esophageal reflux     Hypertension     Impaired fasting glucose     Joint pain     Obesity, unspecified     Other and unspecified hyperlipidemia      Past Surgical History:   Procedure Laterality Date    BLEPHAROPLASTY Bilateral 3/3/2021    Procedure: BLEPHAROPLASTY;  Surgeon: Maite Acuna MD;  Location: [facility] OR;  Service: Ophthalmology;  Laterality: Bilateral;     SECTION  1973    COLONOSCOPY N/A 2023    Procedure: COLONOSCOPY;  Surgeon: Briana Sterling MD;  Location: [facility] ENDO;  Service: Endoscopy;  Laterality: N/A;    HYSTERECTOMY      without BSO    INTRAOCULAR PROSTHESES INSERTION Left 10/27/2022    Procedure: INSERTION, IOL PROSTHESIS;  Surgeon: Palmer Berrios MD;  Location: [facility] OR;  Service: Ophthalmology;  Laterality: Left;    PHACOEMULSIFICATION OF CATARACT Left 10/27/2022    Procedure: PHACOEMULSIFICATION, CATARACT;  Surgeon: Palmer Berrios MD;  Location: [facility] OR;  Service: Ophthalmology;  Laterality: Left;  RN PHONE PREOP 10/21/2022   ARRIVAL 9:00 AM    R knee replacement      REPAIR OF RETINAL DETACHMENT WITH VITRECTOMY Left 2022    Procedure: REPAIR, RETINAL DETACHMENT, WITH VITRECTOMY;  Surgeon: MINO Martinez MD;  Location: [address];  Service: Ophthalmology;  Laterality: Left;  Spoke with SID Garcia. Pt was instructed nothing to eat after 8am and to arrive at 2pm for preop. 430pm case start request.    right  arm  surgery       Family History   Problem Relation Name Age of Onset    Cancer Mother          lung    Liver disease Father      Thyroid disease Sister      Cervical cancer Sister      Tremor Sister       Social History     Tobacco Use    Smoking status: Every Day     Current packs/day: 0.75     Average packs/day: 0.8 packs/day for 40.0 years (30.0 ttl pk-yrs)      Types: Cigarettes    Smokeless tobacco: Never   Substance Use Topics    Alcohol use: Yes     Alcohol/week: 0.0 standard drinks of alcohol     Comment: occasionally    Drug use: Yes     Types: Marijuana     Review of Systems   Constitutional:  Negative for fever.   HENT:  Negative for rhinorrhea and sore throat.    Eyes:  Negative for redness.   Respiratory:  Negative for shortness of breath.    Cardiovascular:  Negative for chest pain and leg swelling.   Gastrointestinal:  Positive for abdominal pain. Negative for blood in stool, constipation, diarrhea, nausea and vomiting.   Genitourinary:  Negative for dysuria.   Musculoskeletal:  Negative for back pain.   Skin:  Negative for rash.   Neurological:  Negative for syncope and headaches.   Psychiatric/Behavioral:  Positive for dysphoric mood. The patient is nervous/anxious (intermittent).    All other systems reviewed and are negative.        Patient gave consent to have physical exam performed.      Physical Exam     Initial Vitals [10/06/24 0920]   BP Pulse Resp Temp SpO2   (!) 154/67 77 18 98.4 °F (36.9 °C) 95 %      MAP       --         Physical Exam    Nursing note and vitals reviewed.  Constitutional: She appears well-developed and well-nourished.   Tearful during exam.    HENT:   Head: Normocephalic and atraumatic.   Right Ear: External ear normal.   Left Ear: External ear normal.   Nose: Nose normal. Mouth/Throat: Oropharynx is clear and moist.   Eyes: Conjunctivae and EOM are normal. Pupils are equal, round, and reactive to light.   Neck: Phonation normal. Neck supple.   Normal range of motion.  Cardiovascular:  Normal rate, regular rhythm, normal heart sounds and intact distal pulses.     Exam reveals no gallop and no friction rub.       No murmur heard.  Pulmonary/Chest: Effort normal and breath sounds normal. No stridor. No respiratory distress. She has no wheezes. She has no rhonchi. She has no rales. She exhibits no tenderness.   Abdominal: Abdomen is  soft. Bowel sounds are normal. She exhibits no distension. There is abdominal tenderness in the right lower quadrant, epigastric area and left lower quadrant. There is no rigidity, no rebound and no guarding.   Musculoskeletal:         General: No tenderness or edema. Normal range of motion.      Cervical back: Normal range of motion and neck supple.     Neurological: She is alert and oriented to person, place, and time. She has normal strength. No cranial nerve deficit or sensory deficit. GCS score is 15. GCS eye subscore is 4. GCS verbal subscore is 5. GCS motor subscore is 6.   Skin: Skin is warm and dry. Capillary refill takes less than 2 seconds. No rash noted.   Psychiatric: She has a normal mood and affect. Her behavior is normal.         ED Course   Critical Care    Date/Time: 10/6/2024 5:23 PM    Performed by: Verna Correa DO  Authorized by: Verna Correa DO  Direct patient critical care time: 6 minutes  Additional history critical care time: 6 minutes  Ordering / reviewing critical care time: 6 minutes  Documentation critical care time: 8 minutes  Consulting other physicians critical care time: 12 minutes  Total critical care time (exclusive of procedural time) : 38 minutes  Critical care was necessary to treat or prevent imminent or life-threatening deterioration of the following conditions: circulatory failure and dehydration.  Critical care was time spent personally by me on the following activities: evaluation of patient's response to treatment, examination of patient, obtaining history from patient or surrogate, ordering and performing treatments and interventions, ordering and review of laboratory studies, ordering and review of radiographic studies, pulse oximetry, re-evaluation of patient's condition and review of old charts.        Labs Reviewed   CBC W/ AUTO DIFFERENTIAL - Abnormal       Result Value    WBC 9.53      RBC 4.59      Hemoglobin 15.2      Hematocrit 45.5      MCV 99 (*)     MCH 33.1  (*)     MCHC 33.4      RDW 13.0      Platelets 231      MPV 9.8      Immature Granulocytes 0.2      Gran # (ANC) 6.3      Immature Grans (Abs) 0.02      Lymph # 2.3      Mono # 0.6      Eos # 0.2      Baso # 0.09      nRBC 0      Gran % 66.1      Lymph % 23.7      Mono % 6.7      Eosinophil % 2.4      Basophil % 0.9      Differential Method Automated     COMPREHENSIVE METABOLIC PANEL - Abnormal    Sodium 137      Potassium 4.3      Chloride 104      CO2 21 (*)     Glucose 99      BUN 9      Creatinine 0.7      Calcium 9.0      Total Protein 7.2      Albumin 4.0      Total Bilirubin 0.5      Alkaline Phosphatase 52 (*)     AST 21      ALT 24      eGFR >60      Anion Gap 12     URINALYSIS, REFLEX TO URINE CULTURE - Abnormal    Specimen UA Urine, Clean Catch      Color, UA Yellow      Appearance, UA Hazy (*)     pH, UA 6.0      Specific Gravity, UA 1.020      Protein, UA Trace (*)     Glucose, UA Negative      Ketones, UA Negative      Bilirubin (UA) Negative      Occult Blood UA Negative      Nitrite, UA Negative      Urobilinogen, UA 2.0-3.0 (*)     Leukocytes, UA Trace (*)     Narrative:     Specimen Source->Urine   URINALYSIS MICROSCOPIC - Abnormal    WBC, UA 16 (*)     Bacteria Rare      Squam Epithel, UA 44      Microscopic Comment SEE COMMENT      Narrative:     Specimen Source->Urine   CULTURE, URINE   CULTURE, BLOOD   CULTURE, BLOOD   LIPASE    Lipase 11     LACTIC ACID, PLASMA    Lactate (Lactic Acid) 1.5     TSH    TSH 2.520     T4, FREE    Free T4 0.92     VITAMIN B12   FOLATE   VITAMIN B12 DEFICIENCY PANEL   HEMOGLOBIN A1C   ISTAT PROCEDURE    POC Glucose 100      POC BUN 10      POC Creatinine 0.7      POC Sodium 140      POC Potassium 4.2      POC Chloride 101      POC TCO2 (MEASURED) 29      POC Anion Gap 15      POC Ionized Calcium 1.18      POC Hematocrit 49      Sample VENOUS      Site Other      Allens Test N/A       EKG Readings: (Independently Interpreted)   EKG independently interpreted by  Foster, Verna, DO reads: No STEMI. Sinus Bradycardia. Ventricular rate of 59. Abnormal EKG. Leftward axis. QTc rate of 443.   External documents reviewed for previous EKG comparison: When compared to previous EKG July 30th, 2022, rate increased by 6 bpmsee ED course.           Imaging Results              MRI MRCP Abdomen W WO Contrast 3D WO Independent WS XPD (In process)                      US Abdomen Limited (Final result)  Result time 10/06/24 15:57:59      Final result by Zee Hines MD (10/06/24 15:57:59)                   Impression:      Cholelithiasis without evidence for cholecystitis..      Electronically signed by: Zee Hines MD  Date:    10/06/2024  Time:    15:57               Narrative:    EXAMINATION:  US ABDOMEN LIMITED    CLINICAL HISTORY:  ruq abd pain;    TECHNIQUE:  Limited ultrasound of the right upper quadrant of the abdomen (including pancreas, liver, gallbladder, common bile duct, and right kidney) was performed.    COMPARISON:  None    FINDINGS:  Liver: Normal in size. The liver demonstrates homogenous echotexture. no focal hepatic lesions are seen.    Biliary system: Multiple gallstones are noted, the largest measuring approximately 1.5 cm.  No gallbladder wall thickening.  No sonographic Alvares sign. No pericholecystic fluid. The common duct is not dilated, measuring 0.59 cm. No intrahepatic ductal dilatation.    Pancreas: Pancreas is obscured.    Right kidney: Normal in size measuring 9.3 cmwith no hydronephrosis, mass, or calculi.    Vascular: The portions of the aorta, vena cava, and portal vein appear free of acute abnormality.                                       CT Abdomen Pelvis With IV Contrast NO Oral Contrast (Final result)  Result time 10/06/24 12:19:59      Final result by Zee Hines MD (10/06/24 12:19:59)                   Impression:      Cholelithiasis with choledocholithiasis.  There is mild intrahepatic biliary ductal dilatation.  No inflammation about  the gallbladder.    Small hiatal hernia and small to moderate-sized duodenal diverticulum.      Electronically signed by: Zee Hines MD  Date:    10/06/2024  Time:    12:19               Narrative:    EXAMINATION:  CT ABDOMEN PELVIS WITH IV CONTRAST    CLINICAL HISTORY:  Epigastric pain;LLQ abdominal pain;Nausea/vomiting;    TECHNIQUE:  Low dose axial images, sagittal and coronal reformations were obtained from the lung bases to the pubic symphysis following the IV administration of 75 mL of Omnipaque 350 .  Oral contrast was not given.    COMPARISON:  07/28/2024    FINDINGS:  The lung bases are clear.  The base of the heart shows calcifications of the mitral annulus and aortic valve.  Calcified atheromatous disease affects the aorta and its branch vessels.    Cholelithiasis.  Mild prominence of the intrahepatic bile ducts, stable.  Suspected choledocholithiasis with high density structure seen in the region of the distal CBD, measuring 1.3 and 0.6 cm respectively as seen on coronal image 84.  The spleen, pancreas, adrenal glands and kidneys are normal in size, shape and contour.  No hydronephrosis or hydroureter.  The urinary bladder is well distended and appears normal.  The uterus has been removed.  Adnexal regions are unremarkable.    Small hiatal hernia.  Small to moderate-sized duodenal diverticulum.  No free air, free fluid or obstruction.  No pathologically enlarged abdominal or pelvic lymph nodes are seen.    The bones are osteopenic and show age-appropriate degenerative change.                                       Medications   nicotine 14 mg/24 hr 1 patch (has no administration in time range)   piperacillin-tazobactam (ZOSYN) 4.5 g in D5W 100 mL IVPB (MB+) (has no administration in time range)   acetaminophen tablet 500 mg (has no administration in time range)   ALPRAZolam tablet 0.5 mg (has no administration in time range)   busPIRone tablet 15 mg (has no administration in time range)   citalopram  tablet 20 mg (has no administration in time range)   sodium chloride 0.9% flush 10 mL (has no administration in time range)   naloxone 0.4 mg/mL injection 0.02 mg (has no administration in time range)   glucose chewable tablet 16 g (has no administration in time range)   glucose chewable tablet 24 g (has no administration in time range)   glucagon (human recombinant) injection 1 mg (has no administration in time range)   ondansetron injection 4 mg (has no administration in time range)   acetaminophen tablet 650 mg (has no administration in time range)   HYDROmorphone injection 1 mg (has no administration in time range)   dextrose 10% bolus 125 mL 125 mL (has no administration in time range)   dextrose 10% bolus 250 mL 250 mL (has no administration in time range)   pantoprazole injection 40 mg (has no administration in time range)   sodium chloride 0.9% bolus 1,000 mL 1,000 mL (0 mLs Intravenous Stopped 10/6/24 1106)   ondansetron injection 8 mg (8 mg Intravenous Given 10/6/24 0953)   ketorolac injection 15 mg (15 mg Intravenous Given 10/6/24 0954)   cefTRIAXone (Rocephin) 1 g in D5W 100 mL IVPB (MB+) (0 g Intravenous Stopped 10/6/24 1245)   iohexoL (OMNIPAQUE 350) injection 75 mL (75 mLs Intravenous Given 10/6/24 1155)   morphine injection 4 mg (4 mg Intravenous Given 10/6/24 1340)   gadobutroL (GADAVIST) injection 6 mL (6 mLs Intravenous Given 10/6/24 1631)     Medical Decision Making  Amount and/or Complexity of Data Reviewed  Labs: ordered. Decision-making details documented in ED Course.  Radiology: ordered.  ECG/medicine tests: ordered and independent interpretation performed. Decision-making details documented in ED Course.     Details:       Risk  Prescription drug management.            Scribe Attestation:   Scribe #1: I performed the above scribed service and the documentation accurately describes the services I performed. I attest to the accuracy of the note.        ED Course as of 10/06/24 6703   [place] Oct  06, 2024   0955 C [RF]   0956 Place tele psych consult.  Awaiting return call and tele psych evaluation by Dr. Carter [RF]   1141 Psych recommends B12,folic acid, tsh, vit panel, free t4 [RF]      ED Course User Index  [RF] Verna Correa DO                       Medical Decision Making:    This is an evaluation of a 69 y.o. female that presents to the Emergency Department for   Chief Complaint   Patient presents with    Abdominal Pain     Pt reports bilateral lower abd pain that radiates to her left lower back since yesterday. Pt denies N/V/D, urinary symptoms.      The patient is a non-toxic and well appearing patient. On physical exam, patient appears well hydrated with moist mucus membranes. Breath sounds are clear and equal bilaterally with no adventitious breath sounds, tachypnea or respiratory distress. Regular rate and rhythm. No murmurs. Abdomen soft .  Patient is tolerating PO without difficulty. Physical exam otherwise as above.     I have reviewed vital signs and nursing notes.   Vital Signs Are Reassuring.     Based on the patient's symptoms, I am considering and evaluating for the following differential diagnoses: abdominal pain, pancreatitis, diverticulitis, diverticulosis, prolonged grief reaction.       ED Course:Treatment in the ED included Physical Exam and medications given in ED  Medications   nicotine 14 mg/24 hr 1 patch (has no administration in time range)   piperacillin-tazobactam (ZOSYN) 4.5 g in D5W 100 mL IVPB (MB+) (has no administration in time range)   acetaminophen tablet 500 mg (has no administration in time range)   ALPRAZolam tablet 0.5 mg (has no administration in time range)   busPIRone tablet 15 mg (has no administration in time range)   citalopram tablet 20 mg (has no administration in time range)   sodium chloride 0.9% flush 10 mL (has no administration in time range)   naloxone 0.4 mg/mL injection 0.02 mg (has no administration in time range)   glucose chewable tablet 16 g  (has no administration in time range)   glucose chewable tablet 24 g (has no administration in time range)   glucagon (human recombinant) injection 1 mg (has no administration in time range)   ondansetron injection 4 mg (has no administration in time range)   acetaminophen tablet 650 mg (has no administration in time range)   HYDROmorphone injection 1 mg (has no administration in time range)   dextrose 10% bolus 125 mL 125 mL (has no administration in time range)   dextrose 10% bolus 250 mL 250 mL (has no administration in time range)   pantoprazole injection 40 mg (has no administration in time range)   sodium chloride 0.9% bolus 1,000 mL 1,000 mL (0 mLs Intravenous Stopped 10/6/24 1106)   ondansetron injection 8 mg (8 mg Intravenous Given 10/6/24 0953)   ketorolac injection 15 mg (15 mg Intravenous Given 10/6/24 0954)   cefTRIAXone (Rocephin) 1 g in D5W 100 mL IVPB (MB+) (0 g Intravenous Stopped 10/6/24 1245)   iohexoL (OMNIPAQUE 350) injection 75 mL (75 mLs Intravenous Given 10/6/24 1155)   morphine injection 4 mg (4 mg Intravenous Given 10/6/24 1340)   gadobutroL (GADAVIST) injection 6 mL (6 mLs Intravenous Given 10/6/24 1631)   .   Patient reports feeling better after treatment in the ER.       External Data/Documents Reviewed: Previous medical records and vital signs reviewed, see HPI and Physical exam.   Labs: ordered and reviewed.  Lactic acid within normal limits at 1.5.  Lipase within normal limits at 11.  Radiology: ordered as indicated and reviewed.  Per Radiology report cholelithiasis  ECG/medicine tests: ordered and independent interpretation performed by Dr. Verna Correa DO. Decision-making details documented in ED Course.   Cardiac monitor placed for abdominal pain. Monitor shows Normal Sinus Rhythm with  rate of 63. Interpreted by Dr. Verna Correa DO.    Risk  Diagnosis or treatment significantly limited by the following social determinants of health: Body mass index is 21.98 kg/m².     In shared  decision making with the patient, we discussed treatment, prescriptions, labs, and imaging results.  Consulted and discussed with general surgery, Dr. Cummings, at 1:20 PM. Patient presents with severe right upper quadrant abdominal pain. CT shows Impression:  Cholelithiasis with choledocholithiasis. There is mild intrahepatic biliary ductal dilatation. Dr Cummings Will see this patient. He needs medical admission and gi consult. Will likely have to take a trip to main [place] for ercp. GI is recommending ERCP today if possible.     I contacted  at time 1:53 PM, requesting consult with Hospital medicine for admission.  Discussed patient's presentation, past medical history, physical exam, labs, radiology results, vital signs, and ED course.      Patient is able to receive ERCP at this facility today per . Patient accepted by Dr. Marie for  for admission at time 2:08 PM.    At this time patient will be admitted.  Patient has been NPO today.     At time of admission patient is awake alert oriented x4 speaking clearly in full sentences and moving all 4 extremities.     The following labs and imaging were reviewed:      Admission on 10/06/2024   Component Date Value Ref Range Status    WBC 10/06/2024 9.53  3.90 - 12.70 K/uL Final    RBC 10/06/2024 4.59  4.00 - 5.40 M/uL Final    Hemoglobin 10/06/2024 15.2  12.0 - 16.0 g/dL Final    Hematocrit 10/06/2024 45.5  37.0 - 48.5 % Final    MCV 10/06/2024 99 (H)  82 - 98 fL Final    MCH 10/06/2024 33.1 (H)  27.0 - 31.0 pg Final    MCHC 10/06/2024 33.4  32.0 - 36.0 g/dL Final    RDW 10/06/2024 13.0  11.5 - 14.5 % Final    Platelets 10/06/2024 231  150 - 450 K/uL Final    MPV 10/06/2024 9.8  9.2 - 12.9 fL Final    Immature Granulocytes 10/06/2024 0.2  0.0 - 0.5 % Final    Gran # (ANC) 10/06/2024 6.3  1.8 - 7.7 K/uL Final    Immature Grans (Abs) 10/06/2024 0.02  0.00 - 0.04 K/uL Final    Comment: Mild elevation in immature granulocytes is non specific and   can be seen in a  variety of conditions including stress response,   acute inflammation, trauma and pregnancy. Correlation with other   laboratory and clinical findings is essential.      Lymph # 10/06/2024 2.3  1.0 - 4.8 K/uL Final    Mono # 10/06/2024 0.6  0.3 - 1.0 K/uL Final    Eos # 10/06/2024 0.2  0.0 - 0.5 K/uL Final    Baso # 10/06/2024 0.09  0.00 - 0.20 K/uL Final    nRBC 10/06/2024 0  0 /100 WBC Final    Gran % 10/06/2024 66.1  38.0 - 73.0 % Final    Lymph % 10/06/2024 23.7  18.0 - 48.0 % Final    Mono % 10/06/2024 6.7  4.0 - 15.0 % Final    Eosinophil % 10/06/2024 2.4  0.0 - 8.0 % Final    Basophil % 10/06/2024 0.9  0.0 - 1.9 % Final    Differential Method 10/06/2024 Automated   Final    Sodium 10/06/2024 137  136 - 145 mmol/L Final    Potassium 10/06/2024 4.3  3.5 - 5.1 mmol/L Final    Specimen moderately hemolyzed    Chloride 10/06/2024 104  95 - 110 mmol/L Final    CO2 10/06/2024 21 (L)  23 - 29 mmol/L Final    Glucose 10/06/2024 99  70 - 110 mg/dL Final    BUN 10/06/2024 9  8 - 23 mg/dL Final    Creatinine 10/06/2024 0.7  0.5 - 1.4 mg/dL Final    Calcium 10/06/2024 9.0  8.7 - 10.5 mg/dL Final    Total Protein 10/06/2024 7.2  6.0 - 8.4 g/dL Final    Albumin 10/06/2024 4.0  3.5 - 5.2 g/dL Final    Total Bilirubin 10/06/2024 0.5  0.1 - 1.0 mg/dL Final    Comment: For infants and newborns, interpretation of results should be based  on gestational age, weight and in agreement with clinical  observations.    Premature Infant recommended reference ranges:  Up to 24 hours.............<8.0 mg/dL  Up to 48 hours............<12.0 mg/dL  3-5 days..................<15.0 mg/dL  6-29 days.................<15.0 mg/dL      Alkaline Phosphatase 10/06/2024 52 (L)  55 - 135 U/L Final    AST 10/06/2024 21  10 - 40 U/L Final    ALT 10/06/2024 24  10 - 44 U/L Final    eGFR 10/06/2024 >60  >60 mL/min/1.73 m^2 Final    Anion Gap 10/06/2024 12  8 - 16 mmol/L Final    Lipase 10/06/2024 11  4 - 60 U/L Final    Specimen UA 10/06/2024 Urine, Clean  Catch   Final    Color, UA 10/06/2024 Yellow  Yellow, Straw, Caro Final    Appearance, UA 10/06/2024 Hazy (A)  Clear Final    pH, UA 10/06/2024 6.0  5.0 - 8.0 Final    Specific Gravity, UA 10/06/2024 1.020  1.005 - 1.030 Final    Protein, UA 10/06/2024 Trace (A)  Negative Final    Comment: Recommend a 24 hour urine protein or a urine   protein/creatinine ratio if globulin induced proteinuria is  clinically suspected.      Glucose, UA 10/06/2024 Negative  Negative Final    Ketones, UA 10/06/2024 Negative  Negative Final    Bilirubin (UA) 10/06/2024 Negative  Negative Final    Occult Blood UA 10/06/2024 Negative  Negative Final    Nitrite, UA 10/06/2024 Negative  Negative Final    Urobilinogen, UA 10/06/2024 2.0-3.0 (A)  <2.0 EU/dL Final    Leukocytes, UA 10/06/2024 Trace (A)  Negative Final    Lactate (Lactic Acid) 10/06/2024 1.5  0.5 - 2.2 mmol/L Final    Comment: Falsely low lactic acid results can be found in samples   containing >=13.0 mg/dL total bilirubin and/or >=3.5 mg/dL   direct bilirubin.      WBC, UA 10/06/2024 16 (H)  0 - 5 /hpf Final    Bacteria 10/06/2024 Rare  None-Occ /hpf Final    Squam Epithel, UA 10/06/2024 44  /hpf Final    Microscopic Comment 10/06/2024 SEE COMMENT   Final    Comment: Other formed elements not mentioned in the report are not   present in the microscopic examination.       POC Glucose 10/06/2024 100  70 - 110 mg/dL Final    POC BUN 10/06/2024 10  6 - 30 mg/dL Final    POC Creatinine 10/06/2024 0.7  0.5 - 1.4 mg/dL Final    POC Sodium 10/06/2024 140  136 - 145 mmol/L Final    POC Potassium 10/06/2024 4.2  3.5 - 5.1 mmol/L Final    POC Chloride 10/06/2024 101  95 - 110 mmol/L Final    POC TCO2 (MEASURED) 10/06/2024 29  23 - 29 mmol/L Final    POC Anion Gap 10/06/2024 15  8 - 16 mmol/L Final    POC Ionized Calcium 10/06/2024 1.18  1.06 - 1.42 mmol/L Final    POC Hematocrit 10/06/2024 49  36 - 54 %PCV Final    Sample 10/06/2024 VENOUS   Final    Site 10/06/2024 Other   Final     Allens Test 10/06/2024 N/A   Final    TSH 10/06/2024 2.520  0.400 - 4.000 uIU/mL Final    Free T4 10/06/2024 0.92  0.71 - 1.51 ng/dL Final        Imaging Results              MRI MRCP Abdomen W WO Contrast 3D WO Independent WS XPD (In process)                      US Abdomen Limited (Final result)  Result time 10/06/24 15:57:59      Final result by Zee Hines MD (10/06/24 15:57:59)                   Impression:      Cholelithiasis without evidence for cholecystitis..      Electronically signed by: Zee Hines MD  Date:    10/06/2024  Time:    15:57               Narrative:    EXAMINATION:  US ABDOMEN LIMITED    CLINICAL HISTORY:  ruq abd pain;    TECHNIQUE:  Limited ultrasound of the right upper quadrant of the abdomen (including pancreas, liver, gallbladder, common bile duct, and right kidney) was performed.    COMPARISON:  None    FINDINGS:  Liver: Normal in size. The liver demonstrates homogenous echotexture. no focal hepatic lesions are seen.    Biliary system: Multiple gallstones are noted, the largest measuring approximately 1.5 cm.  No gallbladder wall thickening.  No sonographic Alvares sign. No pericholecystic fluid. The common duct is not dilated, measuring 0.59 cm. No intrahepatic ductal dilatation.    Pancreas: Pancreas is obscured.    Right kidney: Normal in size measuring 9.3 cmwith no hydronephrosis, mass, or calculi.    Vascular: The portions of the aorta, vena cava, and portal vein appear free of acute abnormality.                                       CT Abdomen Pelvis With IV Contrast NO Oral Contrast (Final result)  Result time 10/06/24 12:19:59      Final result by Zee Hines MD (10/06/24 12:19:59)                   Impression:      Cholelithiasis with choledocholithiasis.  There is mild intrahepatic biliary ductal dilatation.  No inflammation about the gallbladder.    Small hiatal hernia and small to moderate-sized duodenal diverticulum.      Electronically signed by: Zee  MD Donny  Date:    10/06/2024  Time:    12:19               Narrative:    EXAMINATION:  CT ABDOMEN PELVIS WITH IV CONTRAST    CLINICAL HISTORY:  Epigastric pain;LLQ abdominal pain;Nausea/vomiting;    TECHNIQUE:  Low dose axial images, sagittal and coronal reformations were obtained from the lung bases to the pubic symphysis following the IV administration of 75 mL of Omnipaque 350 .  Oral contrast was not given.    COMPARISON:  07/28/2024    FINDINGS:  The lung bases are clear.  The base of the heart shows calcifications of the mitral annulus and aortic valve.  Calcified atheromatous disease affects the aorta and its branch vessels.    Cholelithiasis.  Mild prominence of the intrahepatic bile ducts, stable.  Suspected choledocholithiasis with high density structure seen in the region of the distal CBD, measuring 1.3 and 0.6 cm respectively as seen on coronal image 84.  The spleen, pancreas, adrenal glands and kidneys are normal in size, shape and contour.  No hydronephrosis or hydroureter.  The urinary bladder is well distended and appears normal.  The uterus has been removed.  Adnexal regions are unremarkable.    Small hiatal hernia.  Small to moderate-sized duodenal diverticulum.  No free air, free fluid or obstruction.  No pathologically enlarged abdominal or pelvic lymph nodes are seen.    The bones are osteopenic and show age-appropriate degenerative change.                                          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 Verna Correa, personally performed the services described in this documentation. This document was produced by a scribe under my direction and in my presence. All medical record entries made by the scribe were at my direction and in my presence.  I have reviewed the  chart and agree that the record reflects my personal performance and is accurate and complete. Verna Correa DO.     10/06/2024 5:25 PM       Verna Correa DO  10/06/24 [id number]

## 2024-10-06 NOTE — NURSING
Patient arrived to floor via transporter tech from ED. Patient transferred to bed independently. AAOX4. Patient was oriented to room, information on communication board, and medication regimen. Bed low adequate lighting provided, side rails x2 up, call bell in reach. Admission assessment completed.Vitals per chart. Patient denied having any acute distress at this time. None observed.     Pt placed on telebox [id number]. Notified [place].    [facility] [place]  Nurses Note -- 4 Eyes      10/6/2024       Skin assessed on: Admit      [x] No Pressure Injuries Present    []Prevention Measures Documented    [] Yes LDA  for Pressure Injury Previously documented     [] Yes New Pressure Injury Discovered   [] LDA for New Pressure Injury Added      Attending RN:  Michela Guzman RN     Second RN:  Braulio Tellez RN

## 2024-10-06 NOTE — ASSESSMENT & PLAN NOTE
Patient was supposed to be on Eliquis for history of splenic infarct.  Patient reports not taking Eliquis for months

## 2024-10-06 NOTE — ASSESSMENT & PLAN NOTE
Patients blood pressure range in the last 24 hours was: BP  134/60  .The patient's inpatient anti-hypertensive regimen is listed below:  Current Antihypertensives       Plan  BP is controlled.  Hold home antihypertensives for now

## 2024-10-06 NOTE — SUBJECTIVE & OBJECTIVE
Past Medical History:   Diagnosis Date    Allergy     Anxiety     Arthritis     Cataract     Depressive disorder, not elsewhere classified     Esophageal reflux     Hypertension     Impaired fasting glucose     Joint pain     Obesity, unspecified     Other and unspecified hyperlipidemia        Past Surgical History:   Procedure Laterality Date    BLEPHAROPLASTY Bilateral 3/3/2021    Procedure: BLEPHAROPLASTY;  Surgeon: Maite Acuna MD;  Location: [facility] OR;  Service: Ophthalmology;  Laterality: Bilateral;     SECTION  1973    COLONOSCOPY N/A 2023    Procedure: COLONOSCOPY;  Surgeon: Briana Sterling MD;  Location: [facility] ENDO;  Service: Endoscopy;  Laterality: N/A;    HYSTERECTOMY      without BSO    INTRAOCULAR PROSTHESES INSERTION Left 10/27/2022    Procedure: INSERTION, IOL PROSTHESIS;  Surgeon: Palmer Berrios MD;  Location: [facility] OR;  Service: Ophthalmology;  Laterality: Left;    PHACOEMULSIFICATION OF CATARACT Left 10/27/2022    Procedure: PHACOEMULSIFICATION, CATARACT;  Surgeon: Palmer Berrios MD;  Location: [facility] OR;  Service: Ophthalmology;  Laterality: Left;  RN PHONE PREOP 10/21/2022   ARRIVAL 9:00 AM    R knee replacement      REPAIR OF RETINAL DETACHMENT WITH VITRECTOMY Left 2022    Procedure: REPAIR, RETINAL DETACHMENT, WITH VITRECTOMY;  Surgeon: MINO Martinez MD;  Location: [address];  Service: Ophthalmology;  Laterality: Left;  Spoke with SID Garcia. Pt was instructed nothing to eat after 8am and to arrive at 2pm for preop. 430pm case start request.    right  arm  surgery         Review of patient's allergies indicates:   Allergen Reactions    Sulfa (sulfonamide antibiotics) Hives    Ace inhibitors Other (See Comments)     Cough         Current Facility-Administered Medications on File Prior to Encounter   Medication    cyclopentolate 1% ophthalmic solution 1 drop    ofloxacin 0.3 % ophthalmic solution 1 drop    sodium chloride 0.9% flush 10 mL      Current Outpatient Medications on File Prior to Encounter   Medication Sig    ALPRAZolam (XANAX) 0.5 MG tablet Take 1 tablet (0.5 mg total) by mouth 2 (two) times daily as needed for Anxiety.    aspirin (ECOTRIN) 81 MG EC tablet Take 81 mg by mouth once daily.    atorvastatin (LIPITOR) 40 MG tablet Take 1 tablet (40 mg total) by mouth once daily.    busPIRone (BUSPAR) 15 MG tablet TAKE 1 TABLET BY MOUTH THREE TIMES DAILY    citalopram (CELEXA) 40 MG tablet Take 1 tablet by mouth once daily    cyclobenzaprine (FLEXERIL) 5 MG tablet TAKE 1 TABLET BY MOUTH TWICE DAILY AS NEEDED FOR MUSCLE SPASM    ergocalciferol (ERGOCALCIFEROL) 50,000 unit Cap Take 1 capsule (50,000 Units total) by mouth every 7 days.    olmesartan (BENICAR) 20 MG tablet TAKE 1 TABLET BY MOUTH ONCE DAILY IN THE EVENING    acetaminophen (TYLENOL) 500 MG tablet Take 500 mg by mouth every 6 (six) hours as needed for Pain.    BIOTIN ORAL Take by mouth.    [DISCONTINUED] ELIQUIS 5 mg Tab Take 1 tablet by mouth twice daily    [DISCONTINUED] traZODone (DESYREL) 100 MG tablet TAKE 1 TABLET BY MOUTH ONCE DAILY IN THE EVENING    [DISCONTINUED] traZODone (DESYREL) 50 MG tablet Take 1 tablet by mouth once daily with breakfast     Family History       Problem Relation (Age of Onset)    Cancer Mother    Cervical cancer Sister    Liver disease Father    Thyroid disease Sister    Tremor Sister          Tobacco Use    Smoking status: Every Day     Current packs/day: 0.75     Average packs/day: 0.8 packs/day for 40.0 years (30.0 ttl pk-yrs)     Types: Cigarettes    Smokeless tobacco: Never   Substance and Sexual Activity    Alcohol use: Yes     Alcohol/week: 0.0 standard drinks of alcohol     Comment: occasionally    Drug use: Yes     Types: Marijuana    Sexual activity: Yes     Partners: Male     Birth control/protection: See Surgical Hx     Review of Systems   Constitutional: Negative.    Respiratory: Negative.     Cardiovascular: Negative.    Gastrointestinal:   Positive for abdominal pain.   Genitourinary: Negative.    Musculoskeletal: Negative.    Neurological: Negative.      Objective:     Vital Signs (Most Recent):  Temp: 98.4 °F (36.9 °C) (10/06/24 0920)  Pulse: 60 (10/06/24 1517)  Resp: 18 (10/06/24 1517)  BP: (!) 150/67 (10/06/24 1502)  SpO2: 99 % (10/06/24 1517) Vital Signs (24h Range):  Temp:  [98.4 °F (36.9 °C)] 98.4 °F (36.9 °C)  Pulse:  [53-77] 60  Resp:  [13-22] 18  SpO2:  [95 %-100 %] 99 %  BP: (134-163)/(60-67) 150/67     Weight: 63 kg (139 lb)  Body mass index is 23.86 kg/m².     Physical Exam  Constitutional:       General: She is not in acute distress.  Cardiovascular:      Rate and Rhythm: Normal rate.      Pulses: Normal pulses.   Pulmonary:      Effort: No respiratory distress.      Breath sounds: Normal breath sounds. No wheezing.   Abdominal:      General: Bowel sounds are normal. There is no distension.      Palpations: Abdomen is soft.      Tenderness: There is abdominal tenderness.   Musculoskeletal:      Right lower leg: No edema.   Skin:     General: Skin is warm.   Neurological:      Mental Status: She is alert and oriented to person, place, and time. Mental status is at baseline.   Psychiatric:         Mood and Affect: Mood normal.                Significant Labs: All pertinent labs within the past 24 hours have been reviewed.    Significant Imaging: I have reviewed all pertinent imaging results/findings within the past 24 hours.

## 2024-10-06 NOTE — ASSESSMENT & PLAN NOTE
History of splenic infarct on Eliquis, patient was not taking for the last several months.  She can not afford

## 2024-10-06 NOTE — ASSESSMENT & PLAN NOTE
Evidence of choledocholithiasis on CT.  GI/general surgery consulted.  Initiate on IV Zosyn.  Plan for MRCP today.  We will likely need further evaluation with ERCP.  Ultrasound abdomen pending.  Obtain blood cultures

## 2024-10-06 NOTE — ED NOTES
MRI called to get patient reviewed has TKR in the past, no other metal, US at bedside should be finished when transport comes.

## 2024-10-07 ENCOUNTER — ANESTHESIA EVENT (OUTPATIENT)
Dept: ENDOSCOPY | Facility: HOSPITAL | Age: 69
End: 2024-10-07
Payer: MEDICARE

## 2024-10-07 ENCOUNTER — ANESTHESIA (OUTPATIENT)
Dept: ENDOSCOPY | Facility: HOSPITAL | Age: 69
End: 2024-10-07
Payer: MEDICARE

## 2024-10-07 LAB
ALBUMIN SERPL BCP-MCNC: 3.5 G/DL (ref 3.5–5.2)
ALP SERPL-CCNC: 104 U/L (ref 55–135)
ALT SERPL W/O P-5'-P-CCNC: 186 U/L (ref 10–44)
ANION GAP SERPL CALC-SCNC: 10 MMOL/L (ref 8–16)
ASCENDING AORTA: 2.59 CM
AST SERPL-CCNC: 138 U/L (ref 10–40)
AV INDEX (PROSTH): 0.69
AV MEAN GRADIENT: 8.9 MMHG
AV PEAK GRADIENT: 16 MMHG
AV VALVE AREA BY VELOCITY RATIO: 2.4 CM²
AV VALVE AREA: 2 CM²
AV VELOCITY RATIO: 0.85
BASOPHILS # BLD AUTO: 0.09 K/UL (ref 0–0.2)
BASOPHILS NFR BLD: 1.2 % (ref 0–1.9)
BILIRUB SERPL-MCNC: 0.7 MG/DL (ref 0.1–1)
BSA FOR ECHO PROCEDURE: 1.66 M2
BUN SERPL-MCNC: 7 MG/DL (ref 8–23)
CALCIUM SERPL-MCNC: 8.8 MG/DL (ref 8.7–10.5)
CHLORIDE SERPL-SCNC: 105 MMOL/L (ref 95–110)
CO2 SERPL-SCNC: 26 MMOL/L (ref 23–29)
CREAT SERPL-MCNC: 0.7 MG/DL (ref 0.5–1.4)
CV ECHO LV RWT: 0.33 CM
DIFFERENTIAL METHOD BLD: ABNORMAL
DOP CALC AO PEAK VEL: 2 M/S
DOP CALC AO VTI: 48.5 CM
DOP CALC LVOT AREA: 2.8 CM2
DOP CALC LVOT DIAMETER: 1.9 CM
DOP CALC LVOT PEAK VEL: 1.7 M/S
DOP CALC LVOT STROKE VOLUME: 94.9 CM3
DOP CALC MV VTI: 59.3 CM
DOP CALCLVOT PEAK VEL VTI: 33.5 CM
E WAVE DECELERATION TIME: 306.12 MSEC
E/A RATIO: 1.02
E/E' RATIO: 19.14 M/S
ECHO LV POSTERIOR WALL: 0.8 CM (ref 0.6–1.1)
EOSINOPHIL # BLD AUTO: 0.2 K/UL (ref 0–0.5)
EOSINOPHIL NFR BLD: 3.3 % (ref 0–8)
ERYTHROCYTE [DISTWIDTH] IN BLOOD BY AUTOMATED COUNT: 12.7 % (ref 11.5–14.5)
EST. GFR  (NO RACE VARIABLE): >60 ML/MIN/1.73 M^2
ESTIMATED AVG GLUCOSE: 103 MG/DL (ref 68–131)
FOLATE SERPL-MCNC: 5.8 NG/ML (ref 4–24)
FRACTIONAL SHORTENING: 42.9 % (ref 28–44)
GLUCOSE SERPL-MCNC: 86 MG/DL (ref 70–110)
HBA1C MFR BLD: 5.2 % (ref 4–5.6)
HCT VFR BLD AUTO: 44.9 % (ref 37–48.5)
HGB BLD-MCNC: 14.7 G/DL (ref 12–16)
HR MV ECHO: 58 BPM
IMM GRANULOCYTES # BLD AUTO: 0.01 K/UL (ref 0–0.04)
IMM GRANULOCYTES NFR BLD AUTO: 0.1 % (ref 0–0.5)
INTERVENTRICULAR SEPTUM: 0.8 CM (ref 0.6–1.1)
IVC DIAMETER: 1.34 CM
IVRT: 97.05 MSEC
LA MAJOR: 5.22 CM
LA MINOR: 5.5 CM
LA WIDTH: 4.2 CM
LEFT ATRIUM SIZE: 4.63 CM
LEFT ATRIUM VOLUME INDEX: 53.3 ML/M2
LEFT ATRIUM VOLUME: 88.54 CM3
LEFT INTERNAL DIMENSION IN SYSTOLE: 2.8 CM (ref 2.1–4)
LEFT VENTRICLE DIASTOLIC VOLUME INDEX: 66.64 ML/M2
LEFT VENTRICLE DIASTOLIC VOLUME: 110.62 ML
LEFT VENTRICLE MASS INDEX: 79 G/M2
LEFT VENTRICLE SYSTOLIC VOLUME INDEX: 18.4 ML/M2
LEFT VENTRICLE SYSTOLIC VOLUME: 30.62 ML
LEFT VENTRICULAR INTERNAL DIMENSION IN DIASTOLE: 4.9 CM (ref 3.5–6)
LEFT VENTRICULAR MASS: 131.2 G
LV LATERAL E/E' RATIO: 19.14 M/S
LV SEPTAL E/E' RATIO: 19.14 M/S
LVED V (TEICH): 110.62 ML
LVES V (TEICH): 30.62 ML
LVOT MG: 4.27 MMHG
LVOT MV: 0.96 CM/S
LYMPHOCYTES # BLD AUTO: 1.9 K/UL (ref 1–4.8)
LYMPHOCYTES NFR BLD: 26.5 % (ref 18–48)
MAGNESIUM SERPL-MCNC: 2 MG/DL (ref 1.6–2.6)
MCH RBC QN AUTO: 33.3 PG (ref 27–31)
MCHC RBC AUTO-ENTMCNC: 32.7 G/DL (ref 32–36)
MCV RBC AUTO: 102 FL (ref 82–98)
MONOCYTES # BLD AUTO: 0.6 K/UL (ref 0.3–1)
MONOCYTES NFR BLD: 8.6 % (ref 4–15)
MV MEAN GRADIENT: 3 MMHG
MV PEAK A VEL: 1.31 M/S
MV PEAK E VEL: 1.34 M/S
MV PEAK GRADIENT: 8 MMHG
MV STENOSIS PRESSURE HALF TIME: 88.78 MS
MV VALVE AREA BY CONTINUITY EQUATION: 1.6 CM2
MV VALVE AREA P 1/2 METHOD: 2.48 CM2
NEUTROPHILS # BLD AUTO: 4.4 K/UL (ref 1.8–7.7)
NEUTROPHILS NFR BLD: 60.3 % (ref 38–73)
NRBC BLD-RTO: 0 /100 WBC
OHS CV RV/LV RATIO: 0.65 CM
OHS QRS DURATION: 132 MS
OHS QTC CALCULATION: 493 MS
PHOSPHATE SERPL-MCNC: 4.4 MG/DL (ref 2.7–4.5)
PISA TR MAX VEL: 2.6 M/S
PLATELET # BLD AUTO: 231 K/UL (ref 150–450)
PMV BLD AUTO: 10 FL (ref 9.2–12.9)
POTASSIUM SERPL-SCNC: 3.9 MMOL/L (ref 3.5–5.1)
PROT SERPL-MCNC: 6.2 G/DL (ref 6–8.4)
PULM VEIN S/D RATIO: 1.11
PV PEAK D VEL: 0.47 M/S
PV PEAK GRADIENT: 5 MMHG
PV PEAK S VEL: 0.52 M/S
PV PEAK VELOCITY: 1.14 M/S
RA MAJOR: 4.91 CM
RA PRESSURE ESTIMATED: 8 MMHG
RA WIDTH: 3.5 CM
RBC # BLD AUTO: 4.41 M/UL (ref 4–5.4)
RIGHT VENTRICLE DIASTOLIC BASEL DIMENSION: 3.2 CM
RIGHT VENTRICULAR END-DIASTOLIC DIMENSION: 3.23 CM
RV TB RVSP: 11 MMHG
RV TISSUE DOPPLER FREE WALL SYSTOLIC VELOCITY 1 (APICAL 4 CHAMBER VIEW): 16.03 CM/S
SINUS: 3.08 CM
SODIUM SERPL-SCNC: 141 MMOL/L (ref 136–145)
STJ: 2.38 CM
TDI LATERAL: 0.07 M/S
TDI SEPTAL: 0.07 M/S
TDI: 0.07 M/S
TR MAX PG: 27 MMHG
TRICUSPID ANNULAR PLANE SYSTOLIC EXCURSION: 2.57 CM
TV PEAK GRADIENT: 2 MMHG
TV REST PULMONARY ARTERY PRESSURE: 35 MMHG
VIT B12 SERPL-MCNC: 213 PG/ML (ref 210–950)
WBC # BLD AUTO: 7.22 K/UL (ref 3.9–12.7)
Z-SCORE OF LEFT VENTRICULAR DIMENSION IN END DIASTOLE: 0.52
Z-SCORE OF LEFT VENTRICULAR DIMENSION IN END SYSTOLE: -0.23

## 2024-10-07 PROCEDURE — 27201702 HC BASKET, RETRIEVAL/LITHOTRIPTOR: Performed by: INTERNAL MEDICINE

## 2024-10-07 PROCEDURE — 80053 COMPREHEN METABOLIC PANEL: CPT | Performed by: STUDENT IN AN ORGANIZED HEALTH CARE EDUCATION/TRAINING PROGRAM

## 2024-10-07 PROCEDURE — 63600175 PHARM REV CODE 636 W HCPCS: Performed by: STUDENT IN AN ORGANIZED HEALTH CARE EDUCATION/TRAINING PROGRAM

## 2024-10-07 PROCEDURE — BF131ZZ FLUOROSCOPY OF GALLBLADDER AND BILE DUCTS USING LOW OSMOLAR CONTRAST: ICD-10-PCS | Performed by: INTERNAL MEDICINE

## 2024-10-07 PROCEDURE — 0F798DZ DILATION OF COMMON BILE DUCT WITH INTRALUMINAL DEVICE, VIA NATURAL OR ARTIFICIAL OPENING ENDOSCOPIC: ICD-10-PCS | Performed by: INTERNAL MEDICINE

## 2024-10-07 PROCEDURE — 27201674 HC SPHINCTERTOME: Performed by: INTERNAL MEDICINE

## 2024-10-07 PROCEDURE — 25000003 PHARM REV CODE 250

## 2024-10-07 PROCEDURE — 74328 X-RAY BILE DUCT ENDOSCOPY: CPT | Mod: TC | Performed by: INTERNAL MEDICINE

## 2024-10-07 PROCEDURE — 37000009 HC ANESTHESIA EA ADD 15 MINS: Performed by: INTERNAL MEDICINE

## 2024-10-07 PROCEDURE — 99223 1ST HOSP IP/OBS HIGH 75: CPT | Mod: 25,,, | Performed by: NURSE PRACTITIONER

## 2024-10-07 PROCEDURE — 84100 ASSAY OF PHOSPHORUS: CPT | Performed by: STUDENT IN AN ORGANIZED HEALTH CARE EDUCATION/TRAINING PROGRAM

## 2024-10-07 PROCEDURE — 85025 COMPLETE CBC W/AUTO DIFF WBC: CPT | Performed by: STUDENT IN AN ORGANIZED HEALTH CARE EDUCATION/TRAINING PROGRAM

## 2024-10-07 PROCEDURE — 21400001 HC TELEMETRY ROOM

## 2024-10-07 PROCEDURE — 36415 COLL VENOUS BLD VENIPUNCTURE: CPT | Performed by: STUDENT IN AN ORGANIZED HEALTH CARE EDUCATION/TRAINING PROGRAM

## 2024-10-07 PROCEDURE — C2617 STENT, NON-COR, TEM W/O DEL: HCPCS | Performed by: INTERNAL MEDICINE

## 2024-10-07 PROCEDURE — 0F7D8DZ DILATION OF PANCREATIC DUCT WITH INTRALUMINAL DEVICE, VIA NATURAL OR ARTIFICIAL OPENING ENDOSCOPIC: ICD-10-PCS | Performed by: INTERNAL MEDICINE

## 2024-10-07 PROCEDURE — 25000003 PHARM REV CODE 250: Performed by: STUDENT IN AN ORGANIZED HEALTH CARE EDUCATION/TRAINING PROGRAM

## 2024-10-07 PROCEDURE — 37000008 HC ANESTHESIA 1ST 15 MINUTES: Performed by: INTERNAL MEDICINE

## 2024-10-07 PROCEDURE — 63600175 PHARM REV CODE 636 W HCPCS

## 2024-10-07 PROCEDURE — 83735 ASSAY OF MAGNESIUM: CPT | Performed by: STUDENT IN AN ORGANIZED HEALTH CARE EDUCATION/TRAINING PROGRAM

## 2024-10-07 PROCEDURE — C1769 GUIDE WIRE: HCPCS | Performed by: INTERNAL MEDICINE

## 2024-10-07 PROCEDURE — 25500020 PHARM REV CODE 255: Performed by: INTERNAL MEDICINE

## 2024-10-07 PROCEDURE — 43274 ERCP DUCT STENT PLACEMENT: CPT | Mod: 59 | Performed by: INTERNAL MEDICINE

## 2024-10-07 PROCEDURE — 27202127 HC STENT INTRODUCER: Performed by: INTERNAL MEDICINE

## 2024-10-07 PROCEDURE — 25000003 PHARM REV CODE 250: Performed by: INTERNAL MEDICINE

## 2024-10-07 PROCEDURE — S4991 NICOTINE PATCH NONLEGEND: HCPCS | Performed by: STUDENT IN AN ORGANIZED HEALTH CARE EDUCATION/TRAINING PROGRAM

## 2024-10-07 RX ORDER — PROPOFOL 10 MG/ML
VIAL (ML) INTRAVENOUS
Status: DISCONTINUED | OUTPATIENT
Start: 2024-10-07 | End: 2024-10-07

## 2024-10-07 RX ORDER — SUCCINYLCHOLINE CHLORIDE 20 MG/ML
INJECTION INTRAMUSCULAR; INTRAVENOUS
Status: DISCONTINUED | OUTPATIENT
Start: 2024-10-07 | End: 2024-10-07

## 2024-10-07 RX ORDER — ROCURONIUM BROMIDE 10 MG/ML
INJECTION, SOLUTION INTRAVENOUS
Status: DISCONTINUED | OUTPATIENT
Start: 2024-10-07 | End: 2024-10-07

## 2024-10-07 RX ORDER — DEXAMETHASONE SODIUM PHOSPHATE 4 MG/ML
INJECTION, SOLUTION INTRA-ARTICULAR; INTRALESIONAL; INTRAMUSCULAR; INTRAVENOUS; SOFT TISSUE
Status: DISCONTINUED | OUTPATIENT
Start: 2024-10-07 | End: 2024-10-07

## 2024-10-07 RX ORDER — FENTANYL CITRATE 50 UG/ML
INJECTION, SOLUTION INTRAMUSCULAR; INTRAVENOUS
Status: DISCONTINUED | OUTPATIENT
Start: 2024-10-07 | End: 2024-10-07

## 2024-10-07 RX ORDER — ONDANSETRON HYDROCHLORIDE 4 MG/5ML
4 SOLUTION ORAL ONCE
Status: DISCONTINUED | OUTPATIENT
Start: 2024-10-07 | End: 2024-10-12 | Stop reason: HOSPADM

## 2024-10-07 RX ORDER — ONDANSETRON HYDROCHLORIDE 2 MG/ML
4 INJECTION, SOLUTION INTRAVENOUS ONCE AS NEEDED
Status: DISCONTINUED | OUTPATIENT
Start: 2024-10-07 | End: 2024-10-07 | Stop reason: HOSPADM

## 2024-10-07 RX ORDER — LIDOCAINE HYDROCHLORIDE 20 MG/ML
INJECTION INTRAVENOUS
Status: DISCONTINUED | OUTPATIENT
Start: 2024-10-07 | End: 2024-10-07

## 2024-10-07 RX ORDER — PROPOFOL 10 MG/ML
VIAL (ML) INTRAVENOUS CONTINUOUS PRN
Status: DISCONTINUED | OUTPATIENT
Start: 2024-10-07 | End: 2024-10-07

## 2024-10-07 RX ORDER — INDOMETHACIN 50 MG/1
SUPPOSITORY RECTAL
Status: COMPLETED | OUTPATIENT
Start: 2024-10-07 | End: 2024-10-07

## 2024-10-07 RX ORDER — MORPHINE SULFATE 4 MG/ML
4 INJECTION, SOLUTION INTRAMUSCULAR; INTRAVENOUS EVERY 4 HOURS PRN
Status: DISCONTINUED | OUTPATIENT
Start: 2024-10-07 | End: 2024-10-11

## 2024-10-07 RX ORDER — MORPHINE SULFATE 4 MG/ML
2 INJECTION, SOLUTION INTRAMUSCULAR; INTRAVENOUS EVERY 4 HOURS PRN
Status: DISCONTINUED | OUTPATIENT
Start: 2024-10-07 | End: 2024-10-11

## 2024-10-07 RX ORDER — ONDANSETRON HYDROCHLORIDE 2 MG/ML
INJECTION, SOLUTION INTRAVENOUS
Status: DISCONTINUED | OUTPATIENT
Start: 2024-10-07 | End: 2024-10-07

## 2024-10-07 RX ORDER — ONDANSETRON HYDROCHLORIDE 2 MG/ML
4 INJECTION, SOLUTION INTRAVENOUS DAILY PRN
Status: DISCONTINUED | OUTPATIENT
Start: 2024-10-07 | End: 2024-10-07 | Stop reason: HOSPADM

## 2024-10-07 RX ADMIN — PIPERACILLIN AND TAZOBACTAM 4.5 G: 4; .5 INJECTION, POWDER, FOR SOLUTION INTRAVENOUS; PARENTERAL at 09:10

## 2024-10-07 RX ADMIN — BUSPIRONE HYDROCHLORIDE 15 MG: 10 TABLET ORAL at 09:10

## 2024-10-07 RX ADMIN — NICOTINE 1 PATCH: 14 PATCH TRANSDERMAL at 09:10

## 2024-10-07 RX ADMIN — ROCURONIUM BROMIDE 15 MG: 10 INJECTION INTRAVENOUS at 03:10

## 2024-10-07 RX ADMIN — HYDROMORPHONE HYDROCHLORIDE 1 MG: 1 INJECTION, SOLUTION INTRAMUSCULAR; INTRAVENOUS; SUBCUTANEOUS at 12:10

## 2024-10-07 RX ADMIN — PROPOFOL 150 MCG/KG/MIN: 10 INJECTION, EMULSION INTRAVENOUS at 02:10

## 2024-10-07 RX ADMIN — FENTANYL CITRATE 100 MCG: 50 INJECTION, SOLUTION INTRAMUSCULAR; INTRAVENOUS at 02:10

## 2024-10-07 RX ADMIN — ONDANSETRON 4 MG: 2 INJECTION INTRAMUSCULAR; INTRAVENOUS at 03:10

## 2024-10-07 RX ADMIN — ROCURONIUM BROMIDE 5 MG: 10 INJECTION INTRAVENOUS at 02:10

## 2024-10-07 RX ADMIN — SODIUM CHLORIDE: 9 INJECTION, SOLUTION INTRAVENOUS at 02:10

## 2024-10-07 RX ADMIN — PROPOFOL 20 MG: 10 INJECTION, EMULSION INTRAVENOUS at 03:10

## 2024-10-07 RX ADMIN — CITALOPRAM HYDROBROMIDE 20 MG: 20 TABLET ORAL at 09:10

## 2024-10-07 RX ADMIN — SUGAMMADEX 200 MG: 100 INJECTION, SOLUTION INTRAVENOUS at 03:10

## 2024-10-07 RX ADMIN — ROCURONIUM BROMIDE 30 MG: 10 INJECTION INTRAVENOUS at 03:10

## 2024-10-07 RX ADMIN — DEXAMETHASONE SODIUM PHOSPHATE 4 MG: 4 INJECTION, SOLUTION INTRAMUSCULAR; INTRAVENOUS at 03:10

## 2024-10-07 RX ADMIN — PROPOFOL 150 MG: 10 INJECTION, EMULSION INTRAVENOUS at 02:10

## 2024-10-07 RX ADMIN — PIPERACILLIN AND TAZOBACTAM 4.5 G: 4; .5 INJECTION, POWDER, LYOPHILIZED, FOR SOLUTION INTRAVENOUS; PARENTERAL at 12:10

## 2024-10-07 RX ADMIN — LIDOCAINE HYDROCHLORIDE 100 MG: 20 INJECTION INTRAVENOUS at 02:10

## 2024-10-07 RX ADMIN — SUCCINYLCHOLINE CHLORIDE 100 MG: 20 INJECTION, SOLUTION INTRAMUSCULAR; INTRAVENOUS at 02:10

## 2024-10-07 RX ADMIN — PANTOPRAZOLE SODIUM 40 MG: 40 INJECTION, POWDER, LYOPHILIZED, FOR SOLUTION INTRAVENOUS at 09:10

## 2024-10-07 RX ADMIN — ONDANSETRON 4 MG: 2 INJECTION INTRAMUSCULAR; INTRAVENOUS at 01:10

## 2024-10-07 NOTE — H&P
History & Physical -   Gastroenterology      SUBJECTIVE:     Procedure: ERCP    Chief Complaint/Indication for Procedure: Choledocholithiasis    History of Present Illness:  Patient is a 69 y.o. female presents with abnormal LFT's and choledocholithiasis on MRCP.   MRCP on 1/6/2024  Bile Ducts: There is a 1.8 cm choledocholith within the common bile duct. There is intra and extrahepatic biliary ductal dilation. The common bile duct measures up to approximately 1.4 cm in maximal diameter.   PTA Medications   Medication Sig    ALPRAZolam (XANAX) 0.5 MG tablet Take 1 tablet (0.5 mg total) by mouth 2 (two) times daily as needed for Anxiety.    aspirin (ECOTRIN) 81 MG EC tablet Take 81 mg by mouth once daily.    atorvastatin (LIPITOR) 40 MG tablet Take 1 tablet (40 mg total) by mouth once daily.    busPIRone (BUSPAR) 15 MG tablet TAKE 1 TABLET BY MOUTH THREE TIMES DAILY    citalopram (CELEXA) 40 MG tablet Take 1 tablet by mouth once daily    cyclobenzaprine (FLEXERIL) 5 MG tablet TAKE 1 TABLET BY MOUTH TWICE DAILY AS NEEDED FOR MUSCLE SPASM    ergocalciferol (ERGOCALCIFEROL) 50,000 unit Cap Take 1 capsule (50,000 Units total) by mouth every 7 days.    olmesartan (BENICAR) 20 MG tablet TAKE 1 TABLET BY MOUTH ONCE DAILY IN THE EVENING    acetaminophen (TYLENOL) 500 MG tablet Take 500 mg by mouth every 6 (six) hours as needed for Pain.    BIOTIN ORAL Take by mouth.       Review of patient's allergies indicates:   Allergen Reactions    Sulfa (sulfonamide antibiotics) Hives    Ace inhibitors Other (See Comments)     Cough          Past Medical History:   Diagnosis Date    Allergy     Anxiety     Arthritis     Cataract     Depressive disorder, not elsewhere classified     Esophageal reflux     Hypertension     Impaired fasting glucose     Joint pain     Obesity, unspecified     Other and unspecified hyperlipidemia      Past Surgical History:   Procedure Laterality Date    BLEPHAROPLASTY Bilateral 3/3/2021    Procedure:  BLEPHAROPLASTY;  Surgeon: Maite Acuna MD;  Location: [facility] OR;  Service: Ophthalmology;  Laterality: Bilateral;     SECTION  1973    COLONOSCOPY N/A 2023    Procedure: COLONOSCOPY;  Surgeon: Briana Sterling MD;  Location: [facility] ENDO;  Service: Endoscopy;  Laterality: N/A;    HYSTERECTOMY      without BSO    INTRAOCULAR PROSTHESES INSERTION Left 10/27/2022    Procedure: INSERTION, IOL PROSTHESIS;  Surgeon: Palmer Berrios MD;  Location: [facility] OR;  Service: Ophthalmology;  Laterality: Left;    PHACOEMULSIFICATION OF CATARACT Left 10/27/2022    Procedure: PHACOEMULSIFICATION, CATARACT;  Surgeon: Palmer Berrios MD;  Location: [facility] OR;  Service: Ophthalmology;  Laterality: Left;  RN PHONE PREOP 10/21/2022   ARRIVAL 9:00 AM    R knee replacement      REPAIR OF RETINAL DETACHMENT WITH VITRECTOMY Left 2022    Procedure: REPAIR, RETINAL DETACHMENT, WITH VITRECTOMY;  Surgeon: MINO Martinez MD;  Location: [address];  Service: Ophthalmology;  Laterality: Left;  Spoke with SID Garcia. Pt was instructed nothing to eat after 8am and to arrive at 2pm for preop. 430pm case start request.    right  arm  surgery       Family History   Problem Relation Name Age of Onset    Cancer Mother          lung    Liver disease Father      Thyroid disease Sister      Cervical cancer Sister      Tremor Sister       Social History     Tobacco Use    Smoking status: Every Day     Current packs/day: 0.75     Average packs/day: 0.8 packs/day for 40.0 years (30.0 ttl pk-yrs)     Types: Cigarettes    Smokeless tobacco: Never   Substance Use Topics    Alcohol use: Yes     Alcohol/week: 0.0 standard drinks of alcohol     Comment: occasionally    Drug use: Yes     Types: Marijuana       Review of Systems:  Constitutional: no fever or chills  Respiratory: no cough or shortness of breath  Cardiovascular: no chest pain or palpitations    OBJECTIVE:     Vital Signs (Most Recent)  Temp: 97.1 °F (36.2 °C)  "(10/07/24 1334)  Pulse: 77 (10/07/24 1334)  Resp: 16 (10/07/24 1334)  BP: (!) 195/80 (10/07/24 1334)  SpO2: 95 % (10/07/24 1334)    Physical Exam:  General: well developed, well nourished  Lungs:  normal respiratory effort  Heart: regular rate, S1, S2 normal    Laboratory  CBC:   Recent Labs   Lab 10/07/24  0423   WBC 7.22   RBC 4.41   HGB 14.7   HCT 44.9      *   MCH 33.3*   MCHC 32.7     CMP:   Recent Labs   Lab 10/07/24  0423   GLU 86   CALCIUM 8.8   ALBUMIN 3.5   PROT 6.2      K 3.9   CO2 26      BUN 7*   CREATININE 0.7   ALKPHOS 104   *   *   BILITOT 0.7     Coagulation: No results for input(s): "LABPROT", "INR", "APTT" in the last 168 hours.    Diagnostic Results:      ASSESSMENT/PLAN:     Choledocholithiasis    Plan: ERCP    Anesthesia Plan: MAC    ASA Grade: ASA 3 - Patient with moderate systemic disease with functional limitations    The impression and plan was discussed in detail with the patient. All questions have been answered and the patient voices understanding of our plan at this point. The risk of the procedure was discussed in detail which includes but not limited to bleeding, infection, perforation in some cases requiring surgery with its spectrum of complications.   "

## 2024-10-07 NOTE — NURSING TRANSFER
Nursing Transfer Note      10/7/2024   4:56 PM    Nurse giving handoff:VALERIE Mcconnell  Nurse receiving handoff:VALERIE Abernathy    Reason patient is being transferred: Returning as inpatient to [facility]    Transfer To: [facility] Room 410    Transfer via stretcher    Transfer with cardiac monitoring    Transported by [facility]    Transfer Vital Signs:  Blood Pressure:156/70 (100)  Heart Rate:69  O2:98% room air  Temperature:98.1  Respirations:12      Any special needs or follow-up needed: Patient will return to [facility] for possible lithrotripsy to remove stone.    Patient belongings transferred with patient: Yes    Chart send with patient: Yes    Notified: Floor RN    Patient reassessed at: 10/7/24, 16:30

## 2024-10-07 NOTE — NURSING
[facility]  Nurses Note -- 4 Eyes      10/7/2024       Skin assessed on: Q Shift      [x] No Pressure Injuries Present    []Prevention Measures Documented    [] Yes LDA  for Pressure Injury Previously documented     [] Yes New Pressure Injury Discovered   [] LDA for New Pressure Injury Added      Attending RN:  Michela Guzman, RN     Second RN:  Natalia Carney

## 2024-10-07 NOTE — ANESTHESIA PROCEDURE NOTES
Intubation    Date/Time: 10/7/2024 2:53 PM    Performed by: Khloe Barba CRNA  Authorized by: Raul Arnett MD    Intubation:     Induction:  Rapid sequence induction    Intubated:  Postinduction    Mask Ventilation:  Not attempted    Attempts:  1    Attempted By:  CRNA    Method of Intubation:  Video laryngoscopy    Blade:  Ramos 3    Laryngeal View Grade: Grade I - full view of cords      Difficult Airway Encountered?: No      Complications:  None    Airway Device:  Oral endotracheal tube    Airway Device Size:  7.0    Style/Cuff Inflation:  Cuffed (inflated to minimal occlusive pressure)    Inflation Amount (mL):  10    Tube secured:  21    Secured at:  The lips    Placement Verified By:  Capnometry    Complicating Factors:  None    Findings Post-Intubation:  BS equal bilateral and atraumatic/condition of teeth unchanged

## 2024-10-07 NOTE — ASSESSMENT & PLAN NOTE
Evidence of choledocholithiasis on CT.  GI/general surgery consulted.  Initiate on IV Zosyn.    On empiric Zosyn.  Blood cultures pending.  Noted to have transaminitis  MRCP with evidence of1.8 cm choledocholith in the common bile ducts, with resultant intra and extrahepatic biliary ductal dilation .  Plan for ERCP today

## 2024-10-07 NOTE — NURSING
Pt leaving unit via EMS transport for come and go ERCP procedure at main campus. Pt in no acute distress. IV patent running zosyn.

## 2024-10-07 NOTE — NURSING
End of shift summary:  Pt had an uneventful night.    Pt remained afebrile.  C/o nausea/dry heaving once.  Zofran was given x 1 this shift.  Pain medication given once prior to this shift.  Pt remains npo for possible MRCP

## 2024-10-07 NOTE — TRANSFER OF CARE
"Anesthesia Transfer of Care Note    Patient: Patricia Ramirez    Procedure(s) Performed: Procedure(s) (LRB):  ERCP (ENDOSCOPIC RETROGRADE CHOLANGIOPANCREATOGRAPHY) (N/A)    Patient location: PACU    Anesthesia Type: general    Transport from OR: Transported from OR on 6-10 L/min O2 by face mask with adequate spontaneous ventilation    Post pain: adequate analgesia    Post assessment: no apparent anesthetic complications    Post vital signs: stable    Level of consciousness: awake, alert and oriented    Nausea/Vomiting: no nausea/vomiting    Complications: none    Transfer of care protocol was followed      Last vitals: Visit Vitals  BP (!) 170/88 (BP Location: Right arm, Patient Position: Lying)   Pulse 77   Temp 36 °C (96.8 °F) (Temporal)   Resp 16   Ht 5' 5" (1.651 m)   Wt 59.9 kg (132 lb 0.9 oz)   SpO2 100%   Breastfeeding No   BMI 21.98 kg/m²     "

## 2024-10-07 NOTE — ANESTHESIA POSTPROCEDURE EVALUATION
Anesthesia Post Evaluation    Patient: Patricia Ramirez    Procedure(s) Performed: Procedure(s) (LRB):  ERCP (ENDOSCOPIC RETROGRADE CHOLANGIOPANCREATOGRAPHY) (N/A)    Final Anesthesia Type: general      Patient location during evaluation: PACU  Patient participation: Yes- Able to Participate  Level of consciousness: awake  Post-procedure vital signs: reviewed and stable  Pain management: adequate  Airway patency: patent    PONV status at discharge: No PONV  Anesthetic complications: no      Cardiovascular status: blood pressure returned to baseline  Respiratory status: unassisted  Hydration status: euvolemic  Follow-up not needed.              Vitals Value Taken Time   /70 10/07/24 1630   Temp 36.7 °C (98.1 °F) 10/07/24 1600   Pulse 69 10/07/24 1630   Resp 12 10/07/24 1630   SpO2 98 % 10/07/24 1630         No case tracking events are documented in the log.      Pain/Ede Score: Pain Rating Prior to Med Admin: 8 (10/7/2024 12:01 PM)  Pain Rating Post Med Admin: 0 (10/7/2024 12:27 PM)  Ede Score: 9 (10/7/2024  4:15 PM)

## 2024-10-07 NOTE — SUBJECTIVE & OBJECTIVE
Interval History:  Abdominal pain is improving.  No nausea/vomiting/fever    Review of Systems   Constitutional: Negative.    Cardiovascular: Negative.    Gastrointestinal:  Positive for abdominal pain. Negative for nausea and vomiting.   Genitourinary: Negative.    Musculoskeletal: Negative.    Neurological: Negative.      Objective:     Vital Signs (Most Recent):  Temp: 97.1 °F (36.2 °C) (10/07/24 1334)  Pulse: 77 (10/07/24 1334)  Resp: 16 (10/07/24 1334)  BP: (!) 195/80 (10/07/24 1334)  SpO2: 95 % (10/07/24 1334) Vital Signs (24h Range):  Temp:  [97.1 °F (36.2 °C)-98.5 °F (36.9 °C)] 97.1 °F (36.2 °C)  Pulse:  [49-77] 77  Resp:  [16-20] 16  SpO2:  [91 %-97 %] 95 %  BP: (145-195)/(62-80) 195/80     Weight: 59.9 kg (132 lb 0.9 oz)  Body mass index is 21.98 kg/m².    Intake/Output Summary (Last 24 hours) at 10/7/2024 1541  Last data filed at 10/7/2024 1409  Gross per 24 hour   Intake 220.68 ml   Output --   Net 220.68 ml         Physical Exam  Constitutional:       General: She is not in acute distress.     Appearance: She is normal weight.   Cardiovascular:      Rate and Rhythm: Normal rate.      Pulses: Normal pulses.   Pulmonary:      Effort: No respiratory distress.      Breath sounds: Normal breath sounds. No wheezing.   Abdominal:      General: Bowel sounds are normal. There is no distension.      Palpations: Abdomen is soft.      Tenderness: There is abdominal tenderness.   Musculoskeletal:      Right lower leg: No edema.      Left lower leg: No edema.   Skin:     General: Skin is warm.   Neurological:      Mental Status: She is alert and oriented to person, place, and time. Mental status is at baseline.   Psychiatric:         Mood and Affect: Mood normal.             Significant Labs: All pertinent labs within the past 24 hours have been reviewed.    Significant Imaging: I have reviewed all pertinent imaging results/findings within the past 24 hours.

## 2024-10-07 NOTE — PLAN OF CARE
Case Management Assessment     PCP: Fabienne Erwin NP    Pharmacy:   [id number]  [place] - [address]  [address]  [place] [address]  Phone: [phone] Fax: [phone]    [facility]  [address]  [place] [address]  Phone: [phone] Fax: [phone]    Patient Arrived From: Home  Existing Help at Home: son    Barriers to Discharge: none identified at this time    Discharge Plan:    A. Home with family   B. Home with family      CM met with pt at bedside for discharge planning assessment. Pt lives with her son. Pt reported being independent with ADLs and used no DME. Her son or sister will provide her transportation home on discharge.     10/07/24 1049   Discharge Assessment   Assessment Type Discharge Planning Assessment   Confirmed/corrected address, phone number and insurance Yes   Confirmed Demographics Correct on Facesheet   Source of Information patient   People in Home child(kalli), adult   Do you expect to return to your current living situation? Yes   Do you have help at home or someone to help you manage your care at home? No   Prior to hospitilization cognitive status: Alert/Oriented   Current cognitive status: Alert/Oriented   Walking or Climbing Stairs Difficulty no   Dressing/Bathing Difficulty no   Equipment Currently Used at Home none   Readmission within 30 days? No   Patient currently being followed by outpatient case management? No   Do you currently have service(s) that help you manage your care at home? No   Do you take prescription medications? Yes   Do you have prescription coverage? Yes   Coverage PHN   Do you have any problems affording any of your prescribed medications? No   Is the patient taking medications as prescribed? yes   Who is going to help you get home at discharge? son or sister   How do you get to doctors appointments? car, drives self   Are you on dialysis? No   Do you take coumadin? No   Discharge Plan A  Home with family   Discharge Plan B Home   DME Needed Upon Discharge  none   Discharge Plan discussed with: Patient   Transition of Care Barriers None

## 2024-10-07 NOTE — CONSULTS
"  [facility]  Adult Nutrition  Consult Note    SUMMARY     Recommendations    Recommendation:  1. When medically acceptable, start pt on diet with cardiac restrictions, texture per SLP recs  2. Monitor weight/labs  3. RD to follow to monitor nutrition status    Goals:  Pt will be started on a diet by RD follow-up  Nutrition Goal Status: new  Communication of RD Recs:  (POC)    Assessment and Plan  Nutrition Problem  Inadequate energy intake    Related to (etiology):   Depression    Signs and Symptoms (as evidenced by):   -18.5% wt loss x 7 months     Interventions/Recommendations (treatment strategy):  Collaboration with other providers    Nutrition Diagnosis Status:   New    Malnutrition Assessment  Weight Loss (Malnutrition): greater than 10% in 6 months     Reason for Assessment  Reason For Assessment: consult (decreased)  Diagnosis:  (Choledocholithiasis)  General Information Comments: Pt admitted with abdominal pain, diagnosed with Choledocholithiasis. Pt currentyl NPO. Unable to see pt at this time 2/2 pt off unit for endo. Shay 22- skin intact. Recetn wt loss of 29.9lb x 7 months. Unable to assess NFPE at this time, pt off unit. WIll follow up tomorrow 10/8. (PMH: HTN, HLD, GERD, CHF)  Nutrition Discharge Planning: d/c on cardiac diet per SLP recs    Nutrition Risk Screen  Nutrition Risk Screen: reduced oral intake over the last month    Nutrition/Diet History  Food Preferences: JOSE  Factors Affecting Nutritional Intake: decreased appetite    Anthropometrics  Temp: 97.1 °F (36.2 °C)  Height Method: Stated  Height: 5' 5" (165.1 cm)  Height (inches): 65 in  Weight Method: Bed Scale  Weight: 59.9 kg (132 lb 0.9 oz)  Weight (lb): 132.06 lb  Ideal Body Weight (IBW), Female: 125 lb  % Ideal Body Weight, Female (lb): 105.65 %  BMI (Calculated): 22  Usual Body Weight (UBW), k.5 kg  Weight Change Amount: 29 lb 15.7 oz  % Usual Body Weight: 81.67  % Weight Change From Usual Weight: -18.5 %   "   Lab/Procedures/Meds  Pertinent Labs Reviewed: reviewed  Pertinent Labs Comments: BUN 7(L), (H), (H), MCH 33.3(H), (H)  Pertinent Medications Reviewed: reviewed  Pertinent Medications Comments: pantoprazole, zosyn    Nutrition Related Social Determinants of Health:  SDOH: Unable to assess at this time.      Estimated/Assessed Needs  Weight Used For Calorie Calculations: 59.9 kg (132 lb 0.9 oz)  Energy Calorie Requirements (kcal): 1797kcal (30 kcal/kg)  Energy Need Method: Kcal/kg  Protein Requirements: 60-72g (1.0-1.2 g/kg)  Weight Used For Protein Calculations: 59.9 kg (132 lb 0.9 oz)  Estimated Fluid Requirement Method: RDA Method  RDA Method (mL): 1797     Nutrition Prescription Ordered  Current Diet Order: NPO    Evaluation of Received Nutrient/Fluid Intake  I/O: 1099.2/0  Energy Calories Required: not meeting needs  Protein Required: not meeting needs  Fluid Required: not meeting needs  Comments: LBM: 10/5  Tolerance: not tolerating  % Intake of Estimated Energy Needs: Other: NPO  % Meal Intake: NPO    Nutrition Risk  Level of Risk/Frequency of Follow-up:  (2x weekly)     Monitor and Evaluation  Food and Nutrient Intake: energy intake  Food and Nutrient Adminstration: diet order  Physical Activity and Function: nutrition-related ADLs and IADLs  Anthropometric Measurements: weight  Biochemical Data, Medical Tests and Procedures: inflammatory profile, gastrointestinal profile  Nutrition-Focused Physical Findings: overall appearance     Nutrition Follow-Up  RD Follow-up?: Yes

## 2024-10-07 NOTE — ANESTHESIA PREPROCEDURE EVALUATION
10/07/2024  Patricia Ramirez is a 69 y.o., female.      Pre-op Assessment          Review of Systems  Anesthesia Hx:  No problems with previous Anesthesia                Social:  Smoker       Cardiovascular:     Hypertension    Denies CAD.                                        Pulmonary:     Denies Asthma.     Denies Sleep Apnea.                Renal/:   Denies Chronic Renal Disease.                Hepatic/GI:   Denies PUD.  GERD Denies Liver Disease.            Neurological:    Denies CVA.    Denies Seizures.                                Endocrine:  Denies Diabetes. Denies Hypothyroidism.              Physical Exam  General: Alert    Airway:  Mallampati: II   Mouth Opening: Normal  TM Distance: Normal  Tongue: Normal  Neck ROM: Normal ROM    Dental:  Dentures        Anesthesia Plan  Type of Anesthesia, risks & benefits discussed:    Anesthesia Type: Gen ETT  Intra-op Monitoring Plan: Standard ASA Monitors  Post Op Pain Control Plan: multimodal analgesia and IV/PO Opioids PRN  Induction:  IV  Airway Plan: Direct  Informed Consent: Informed consent signed with the Patient and all parties understand the risks and agree with anesthesia plan.  All questions answered.   ASA Score: 2  Anesthesia Plan Notes:       N/V so will intubate for procedure            Ready For Surgery From Anesthesia Perspective.     .

## 2024-10-07 NOTE — PLAN OF CARE
Recommendation:  1. When medically acceptable, start pt on diet with cardiac restrictions, texture per SLP recs  2. Monitor weight/labs  3. RD to follow to monitor nutrition status    Goals:  Pt will be started on a diet by RD follow-up

## 2024-10-07 NOTE — PROGRESS NOTES
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  Patient reports 10/10 intensity cramping abdominal pain for 1 day duration.  Patient reports relief with Gas-X.  Denied nausea/vomiting/fever/melena/hematochezia/diarrhea/chest pain/shortness of breath.  Patient reports not taking Eliquis for months    During evaluation in the ER, patient was found to be hypertensive (163/67, sinus Jason (58).  Labs revealed WBC 9.53, hemoglobin 15.2, platelets 231, sodium 137, potassium 4.3, CO2 21, creatinine 0.7, AST 21, ALT 24, lipase 11, total bilirubin 0.5, ALP 52.  Lactic acid 1.5.  TSH 2.52.  Urinalysis negative for UTI with rare bacteria and 16 WBC.Cholelithiasis with choledocholithiasis.  There is mild intrahepatic biliary ductal dilatation.  No inflammation about the gallbladder. Small hiatal hernia and small to moderate-sized duodenal diverticulum.  Pending ultrasound abdomen.  General surgery/GI was consulted.  Recommended MRCP.    Patient was given 1 g ceftriaxone, 4 mg IV morphine, 75 mL iohexol.  Psychiatry was consulted in ED for prolonged grief reaction with significant weight loss.  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  GI /general surgery was consulted.  On empiric Zosyn.  Blood cultures pending.  Noted to have transaminitis  MRCP with evidence of1.8 cm choledocholith in the common bile ducts, with resultant intra and extrahepatic biliary ductal dilation .  Plan for ERCP today    Interval History:  Abdominal pain is improving.  No nausea/vomiting/fever    Review of Systems   Constitutional: Negative.    Cardiovascular: Negative.    Gastrointestinal:  Positive for abdominal pain. Negative for nausea and vomiting.   Genitourinary: Negative.    Musculoskeletal: Negative.    Neurological: Negative.      Objective:     Vital Signs (Most Recent):  Temp: 97.1 °F (36.2 °C) (10/07/24 1334)  Pulse: 77 (10/07/24 1334)  Resp: 16 (10/07/24 1334)  BP: (!) 195/80 (10/07/24 1334)  SpO2: 95 % (10/07/24 1334) Vital Signs (24h Range):  Temp:  [97.1 °F (36.2 °C)-98.5 °F (36.9 °C)] 97.1 °F (36.2 °C)  Pulse:  [49-77] 77  Resp:  [16-20] 16  SpO2:  [91 %-97 %] 95 %  BP: (145-195)/(62-80) 195/80     Weight: 59.9 kg (132 lb 0.9 oz)  Body mass index is 21.98 kg/m².    Intake/Output Summary (Last 24 hours) at 10/7/2024 1541  Last data filed at 10/7/2024 1409  Gross per 24 hour   Intake 220.68 ml   Output --   Net 220.68 ml         Physical Exam  Constitutional:       General: She is not in acute distress.     Appearance: She is normal weight.   Cardiovascular:      Rate and Rhythm: Normal rate.      Pulses: Normal pulses.   Pulmonary:      Effort: No respiratory distress.      Breath sounds: Normal breath sounds. No wheezing.   Abdominal:      General: Bowel sounds are normal. There is no distension.      Palpations: Abdomen is soft.      Tenderness: There is abdominal tenderness.   Musculoskeletal:      Right lower leg: No edema.      Left lower  leg: No edema.   Skin:     General: Skin is warm.   Neurological:      Mental Status: She is alert and oriented to person, place, and time. Mental status is at baseline.   Psychiatric:         Mood and Affect: Mood normal.             Significant Labs: All pertinent labs within the past 24 hours have been reviewed.    Significant Imaging: I have reviewed all pertinent imaging results/findings within the past 24 hours.      Assessment/Plan:      * Choledocholithiasis    Evidence of choledocholithiasis on CT.  GI/general surgery consulted.  Initiate on IV Zosyn.    On empiric Zosyn.  Blood cultures pending.  Noted to have transaminitis  MRCP with evidence of1.8 cm choledocholith in the common bile ducts, with resultant intra and extrahepatic biliary ductal dilation .  Plan for ERCP today    Tobacco dependency  Dangers of cigarette smoking were reviewed with patient in detail. Patient was Counseled for 3-10 minutes. Nicotine replacement options were discussed. 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  She can not afford    Chronic anticoagulation    Patient was supposed to be on Eliquis for history of splenic infarct.  Patient reports not taking Eliquis for months    IFG (impaired fasting glucose)    Obtain A1c    Heart failure with preserved ejection fraction    Obtain echo.  Patient appears euvolemic.  Start being on diuretics at home.  Appears euvolemic.    GERD (gastroesophageal reflux disease)  Initiate on Protonix      Hyperlipidemia    On statin at home, held in the setting of choledocholithiasis    Essential hypertension  Patients blood pressure range in the last 24 hours was: BP  134/60  .The patient's inpatient anti-hypertensive regimen is listed below:  Current Antihypertensives       Plan  BP is controlled.  Hold home  antihypertensives for now      VTE Risk Mitigation (From admission, onward)           Ordered     IP VTE HIGH RISK PATIENT  Once         10/06/24 1525     Place sequential compression device  Until discontinued         10/06/24 1525                    Discharge Planning   STUART:      Code Status: Full Code   Is the patient medically ready for discharge?:     Reason for patient still in hospital (select all that apply): Patient trending condition and Treatment  Discharge Plan A: Home with family                  Louis Marie MD  Department of Hospital Medicine   [facility]

## 2024-10-07 NOTE — HOSPITAL COURSE
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  GI /general surgery was consulted.  On empiric Zosyn.  Blood cultures pending.  Noted to have transaminitis  MRCP with evidence of1.8 cm choledocholith in the common bile ducts, with resultant intra and extrahepatic biliary ductal dilation .  Plan for ERCP.  Patient underwent ERCP.  She continued to be on antibiotics.  She was then underwent a cholecystectomy.  The gallbladder was so adherent to the stomach due to the inflammation.  Patient came out of the OR with percutaneous drain.  We continued antibiotics Zosyn.  White blood count trended down.  Patient is hemodynamically stable.  She tolerated oral intake.  Patient was cleared to be discharged on Augmentin for 7 days.  Anticoagulation was held.  Patient will follow up with General surgery for management of percutaneous drain and for the timing of resuming her anticoagulation.  Plan of care discussed with the patient.  Patient voiced an understanding.  Patient will be discharged to home.

## 2024-10-07 NOTE — CONSULTS
[facility] Consultation Note    Patient Complaint: abdominal pain    PCP:   Fabienne Erwin       LOS: 1        Initial History of Present Illness (HPI):  This is a 69 y.o. female consulted to GI service for RUQ pain. 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 Patient complaint of acute onset of mid abdominal pain with associated symptom of nausea that began 3 days ago. Denies fever, chills, dizziness, lightheadedness, vomiting, constipation or diarrhea. Denies any recent anticoagulation use and reports being off eliquis months ago. Denies bariatric surgery.    Lfts 138/186  MRCP with stone seen in cbd        Medical History:  has a past medical history of Allergy, Anxiety, Arthritis, Cataract, Depressive disorder, not elsewhere classified, Esophageal reflux, Hypertension, Impaired fasting glucose, Joint pain, Obesity, unspecified, and Other and unspecified hyperlipidemia.    Surgical History:  has a past surgical history that includes right  arm  surgery; R knee replacement;  section (); Hysterectomy; Blepharoplasty (Bilateral, 3/3/2021); Repair of retinal detachment with vitrectomy (Left, 2022); Phacoemulsification of cataract (Left, 10/27/2022); Intraocular prosthesis insertion (Left, 10/27/2022); and Colonoscopy (N/A, 2023).      Objective Findings:    Vital Signs:  Temp:  [97.9 °F (36.6 °C)-98.5 °F (36.9 °C)]   Pulse:  [49-63]   Resp:  [13-22]   BP: (137-184)/(62-74)   SpO2:  [91 %-100 %]   Body mass index is 21.98 kg/m².      Physical Exam  Vitals and nursing note reviewed.   Constitutional:       Appearance: Normal appearance.   HENT:      Head: Normocephalic.   Pulmonary:      Effort: Pulmonary effort is normal.   Abdominal:      General: Bowel sounds are normal.      Palpations: Abdomen is soft.   Skin:      General: Skin is warm and dry.   Neurological:      Mental Status: She is alert and oriented to person, place, and time.   Psychiatric:         Mood and Affect: Mood normal.         Behavior: Behavior normal.         Thought Content: Thought content normal.         Judgment: Judgment normal.               Labs:  Lab Results   Component Value Date    WBC 7.22 10/07/2024    HGB 14.7 10/07/2024    HCT 44.9 10/07/2024     10/07/2024    CHOL 120 09/17/2024    TRIG 90 09/17/2024    HDL 44 09/17/2024     (H) 10/07/2024     (H) 10/07/2024     10/07/2024    K 3.9 10/07/2024     10/07/2024    CREATININE 0.7 10/07/2024    BUN 7 (L) 10/07/2024    CO2 26 10/07/2024    TSH 2.520 10/06/2024    INR 1.0 07/28/2022    HGBA1C 5.0 09/05/2023             Imaging: 10/7 MRCP- 1.8 cm choledocholith in the common bile ducts, with resultant intra and extrahepatic biliary ductal dilation as above.   Cholelithiasis without evidence of acute cholecystitis.      Endoscopy:              Choledocholithiasis. Abdominal pain. Elevated lfts. Cbd dilation.   Plan/ Recommendations:  1.  Reports some abdominal pain this am, Plan for ERCP today via come n go procedure at [facility] with Dr Mitchell, patient acknowledged plan and voiced understanding. Lfts elevated from admit labs, suspect gallstone is contributory to elevation. INR normal. Acute hep panel ordered. Consider general surgery consult to eval gallbladder.        Thank you so much for allowing us to participate in the care of Patricia Ramirez . Please contact us if you have any additional questions.    Alicia Menjivar NP  Gastroenterology  [facility] [place] [facility]

## 2024-10-08 LAB
ALBUMIN SERPL BCP-MCNC: 3.5 G/DL (ref 3.5–5.2)
ALP SERPL-CCNC: 102 U/L (ref 55–135)
ALT SERPL W/O P-5'-P-CCNC: 174 U/L (ref 10–44)
ANION GAP SERPL CALC-SCNC: 6 MMOL/L (ref 8–16)
AST SERPL-CCNC: 90 U/L (ref 10–40)
BACTERIA UR CULT: NORMAL
BASOPHILS # BLD AUTO: 0.04 K/UL (ref 0–0.2)
BASOPHILS NFR BLD: 0.4 % (ref 0–1.9)
BILIRUB SERPL-MCNC: 0.5 MG/DL (ref 0.1–1)
BUN SERPL-MCNC: 13 MG/DL (ref 8–23)
CALCIUM SERPL-MCNC: 9.1 MG/DL (ref 8.7–10.5)
CHLORIDE SERPL-SCNC: 106 MMOL/L (ref 95–110)
CO2 SERPL-SCNC: 28 MMOL/L (ref 23–29)
CREAT SERPL-MCNC: 0.7 MG/DL (ref 0.5–1.4)
DIFFERENTIAL METHOD BLD: ABNORMAL
EOSINOPHIL # BLD AUTO: 0.1 K/UL (ref 0–0.5)
EOSINOPHIL NFR BLD: 0.5 % (ref 0–8)
ERYTHROCYTE [DISTWIDTH] IN BLOOD BY AUTOMATED COUNT: 12.2 % (ref 11.5–14.5)
EST. GFR  (NO RACE VARIABLE): >60 ML/MIN/1.73 M^2
GLUCOSE SERPL-MCNC: 107 MG/DL (ref 70–110)
HCT VFR BLD AUTO: 44.4 % (ref 37–48.5)
HGB BLD-MCNC: 14.6 G/DL (ref 12–16)
IMM GRANULOCYTES # BLD AUTO: 0.03 K/UL (ref 0–0.04)
IMM GRANULOCYTES NFR BLD AUTO: 0.3 % (ref 0–0.5)
LIPASE SERPL-CCNC: 835 U/L (ref 4–60)
LYMPHOCYTES # BLD AUTO: 1.4 K/UL (ref 1–4.8)
LYMPHOCYTES NFR BLD: 14.1 % (ref 18–48)
MAGNESIUM SERPL-MCNC: 2.1 MG/DL (ref 1.6–2.6)
MCH RBC QN AUTO: 32.7 PG (ref 27–31)
MCHC RBC AUTO-ENTMCNC: 32.9 G/DL (ref 32–36)
MCV RBC AUTO: 100 FL (ref 82–98)
MONOCYTES # BLD AUTO: 1.1 K/UL (ref 0.3–1)
MONOCYTES NFR BLD: 10.6 % (ref 4–15)
NEUTROPHILS # BLD AUTO: 7.3 K/UL (ref 1.8–7.7)
NEUTROPHILS NFR BLD: 74.1 % (ref 38–73)
NRBC BLD-RTO: 0 /100 WBC
PHOSPHATE SERPL-MCNC: 4 MG/DL (ref 2.7–4.5)
PLATELET # BLD AUTO: 217 K/UL (ref 150–450)
PMV BLD AUTO: 9.9 FL (ref 9.2–12.9)
POTASSIUM SERPL-SCNC: 3.9 MMOL/L (ref 3.5–5.1)
PROT SERPL-MCNC: 6.2 G/DL (ref 6–8.4)
RBC # BLD AUTO: 4.46 M/UL (ref 4–5.4)
SODIUM SERPL-SCNC: 140 MMOL/L (ref 136–145)
VIT B12 SERPL-MCNC: 185 NG/L (ref 180–914)
WBC # BLD AUTO: 9.9 K/UL (ref 3.9–12.7)

## 2024-10-08 PROCEDURE — 99232 SBSQ HOSP IP/OBS MODERATE 35: CPT | Mod: ,,, | Performed by: NURSE PRACTITIONER

## 2024-10-08 PROCEDURE — 63600175 PHARM REV CODE 636 W HCPCS: Performed by: INTERNAL MEDICINE

## 2024-10-08 PROCEDURE — 36415 COLL VENOUS BLD VENIPUNCTURE: CPT | Performed by: NURSE PRACTITIONER

## 2024-10-08 PROCEDURE — 25000003 PHARM REV CODE 250: Performed by: STUDENT IN AN ORGANIZED HEALTH CARE EDUCATION/TRAINING PROGRAM

## 2024-10-08 PROCEDURE — 21400001 HC TELEMETRY ROOM

## 2024-10-08 PROCEDURE — 84100 ASSAY OF PHOSPHORUS: CPT | Performed by: STUDENT IN AN ORGANIZED HEALTH CARE EDUCATION/TRAINING PROGRAM

## 2024-10-08 PROCEDURE — 83690 ASSAY OF LIPASE: CPT | Performed by: NURSE PRACTITIONER

## 2024-10-08 PROCEDURE — 36415 COLL VENOUS BLD VENIPUNCTURE: CPT | Performed by: STUDENT IN AN ORGANIZED HEALTH CARE EDUCATION/TRAINING PROGRAM

## 2024-10-08 PROCEDURE — 94761 N-INVAS EAR/PLS OXIMETRY MLT: CPT

## 2024-10-08 PROCEDURE — 83735 ASSAY OF MAGNESIUM: CPT | Performed by: STUDENT IN AN ORGANIZED HEALTH CARE EDUCATION/TRAINING PROGRAM

## 2024-10-08 PROCEDURE — 63600175 PHARM REV CODE 636 W HCPCS: Performed by: STUDENT IN AN ORGANIZED HEALTH CARE EDUCATION/TRAINING PROGRAM

## 2024-10-08 PROCEDURE — S4991 NICOTINE PATCH NONLEGEND: HCPCS | Performed by: STUDENT IN AN ORGANIZED HEALTH CARE EDUCATION/TRAINING PROGRAM

## 2024-10-08 PROCEDURE — 80053 COMPREHEN METABOLIC PANEL: CPT | Performed by: STUDENT IN AN ORGANIZED HEALTH CARE EDUCATION/TRAINING PROGRAM

## 2024-10-08 PROCEDURE — 85025 COMPLETE CBC W/AUTO DIFF WBC: CPT | Performed by: STUDENT IN AN ORGANIZED HEALTH CARE EDUCATION/TRAINING PROGRAM

## 2024-10-08 RX ORDER — SODIUM CHLORIDE, SODIUM LACTATE, POTASSIUM CHLORIDE, CALCIUM CHLORIDE 600; 310; 30; 20 MG/100ML; MG/100ML; MG/100ML; MG/100ML
INJECTION, SOLUTION INTRAVENOUS CONTINUOUS
Status: DISCONTINUED | OUTPATIENT
Start: 2024-10-08 | End: 2024-10-08

## 2024-10-08 RX ADMIN — MORPHINE SULFATE 2 MG: 4 INJECTION, SOLUTION INTRAMUSCULAR; INTRAVENOUS at 05:10

## 2024-10-08 RX ADMIN — PIPERACILLIN AND TAZOBACTAM 4.5 G: 4; .5 INJECTION, POWDER, FOR SOLUTION INTRAVENOUS; PARENTERAL at 05:10

## 2024-10-08 RX ADMIN — PANTOPRAZOLE SODIUM 40 MG: 40 INJECTION, POWDER, LYOPHILIZED, FOR SOLUTION INTRAVENOUS at 10:10

## 2024-10-08 RX ADMIN — BUSPIRONE HYDROCHLORIDE 15 MG: 10 TABLET ORAL at 10:10

## 2024-10-08 RX ADMIN — CITALOPRAM HYDROBROMIDE 20 MG: 20 TABLET ORAL at 10:10

## 2024-10-08 RX ADMIN — MORPHINE SULFATE 4 MG: 4 INJECTION, SOLUTION INTRAMUSCULAR; INTRAVENOUS at 05:10

## 2024-10-08 RX ADMIN — BUSPIRONE HYDROCHLORIDE 15 MG: 10 TABLET ORAL at 02:10

## 2024-10-08 RX ADMIN — NICOTINE 1 PATCH: 14 PATCH TRANSDERMAL at 10:10

## 2024-10-08 RX ADMIN — PIPERACILLIN AND TAZOBACTAM 4.5 G: 4; .5 INJECTION, POWDER, FOR SOLUTION INTRAVENOUS; PARENTERAL at 08:10

## 2024-10-08 RX ADMIN — ONDANSETRON 4 MG: 2 INJECTION INTRAMUSCULAR; INTRAVENOUS at 05:10

## 2024-10-08 RX ADMIN — ACETAMINOPHEN 500 MG: 500 TABLET ORAL at 08:10

## 2024-10-08 RX ADMIN — PIPERACILLIN AND TAZOBACTAM 4.5 G: 4; .5 INJECTION, POWDER, FOR SOLUTION INTRAVENOUS; PARENTERAL at 12:10

## 2024-10-08 RX ADMIN — BUSPIRONE HYDROCHLORIDE 15 MG: 10 TABLET ORAL at 08:10

## 2024-10-08 NOTE — PROVATION PATIENT INSTRUCTIONS
Discharge Summary/Instructions after an Endoscopic Procedure  Patient Name: Patricia Ramirez  Patient MRN: [id number]  Patient YOB: 1955 Monday, October 7, 2024  Catracho Mitchell MD  Dear patient,  As a result of recent federal legislation (The Federal Cures Act), you may   receive lab or pathology results from your procedure in your [facility]   account before your physician is able to contact you. Your physician or   their representative will relay the results to you with their   recommendations at their soonest availability.  Thank you,  RESTRICTIONS:  During your procedure today, you received medications for sedation.  These   medications may affect your judgment, balance and coordination.  Therefore,   for 24 hours, you have the following restrictions:   - DO NOT drive a car, operate machinery, make legal/financial decisions,   sign important papers or drink alcohol.    ACTIVITY:  Today: no heavy lifting, straining or running due to procedural   sedation/anesthesia.  The following day: return to full activity including work.  DIET:  Eat and drink normally unless instructed otherwise.     TREATMENT FOR COMMON SIDE EFFECTS:  - Mild abdominal pain, nausea, belching, bloating or excessive gas:  rest,   eat lightly and use a heating pad.  - Sore Throat: treat with throat lozenges and/or gargle with warm salt   water.  - Because air was used during the procedure, expelling large amounts of air   from your rectum or belching is normal.  - If a bowel prep was taken, you may not have a bowel movement for 1-3 days.    This is normal.  SYMPTOMS TO WATCH FOR AND REPORT TO YOUR PHYSICIAN:  1. Abdominal pain or bloating, other than gas cramps.  2. Chest pain.  3. Back pain.  4. Signs of infection such as: chills or fever occurring within 24 hours   after the procedure.  5. Rectal bleeding, which would show as bright red, maroon, or black stools.   (A tablespoon of blood from the rectum is not serious, especially if    hemorrhoids are present.)  6. Vomiting.  7. Weakness or dizziness.  GO DIRECTLY TO THE NEAREST EMERGENCY ROOM IF YOU HAVE ANY OF THE FOLLOWING:      Difficulty breathing              Chills and/or fever over 101 F   Persistent vomiting and/or vomiting blood   Severe abdominal pain   Severe chest pain   Black, tarry stools   Bleeding- more than one tablespoon   Any other symptom or condition that you feel may need urgent attention  Your doctor recommends these additional instructions:  If any biopsies were taken, your doctors clinic will contact you in 1 to 2   weeks with any results.  - Avoid aspirin and nonsteroidal anti-inflammatory medicines for 2 weeks.   - Return patient to hospital [facility] for ongoing care.   - Watch for pancreatitis, bleeding, perforation, and cholangitis.   - Repeat ERCP in 6 weeks for retreatment may need SpyGlass with [facility].   - Perform a flat plate abdominal x-ray in 2 weeks to confirm the passage of   the PD stent.  For questions, problems or results please call your physician - Catracho Mitchell MD at Work:  [phone].  [facility], EMERGENCY ROOM PHONE NUMBER: [phone]  IF A COMPLICATION OR EMERGENCY SITUATION ARISES AND YOU ARE UNABLE TO REACH   YOUR PHYSICIAN - GO DIRECTLY TO THE EMERGENCY ROOM.  Catracho Mitchell MD  10/7/2024 4:23:40 PM  This report has been verified and signed electronically.  Dear patient,  As a result of recent federal legislation (The Federal Cures Act), you may   receive lab or pathology results from your procedure in your [facility]   account before your physician is able to contact you. Your physician or   their representative will relay the results to you with their   recommendations at their soonest availability.  Thank you,  PROVATION

## 2024-10-08 NOTE — NURSING
[facility] [place] 4 [place]      10/8/2024       Skin assessed on: Q Shift      [x] No Pressure Injuries Present    []Prevention Measures Documented    [] Yes LDA  for Pressure Injury Previously documented     [] Yes New Pressure Injury Discovered   [] LDA for New Pressure Injury Added      Attending RN:  Kim Trent RN     Second LPN: Alonso

## 2024-10-08 NOTE — PROGRESS NOTES
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  Patient reports 10/10 intensity cramping abdominal pain for 1 day duration.  Patient reports relief with Gas-X.  Denied nausea/vomiting/fever/melena/hematochezia/diarrhea/chest pain/shortness of breath.  Patient reports not taking Eliquis for months    During evaluation in the ER, patient was found to be hypertensive (163/67, sinus Jason (58).  Labs revealed WBC 9.53, hemoglobin 15.2, platelets 231, sodium 137, potassium 4.3, CO2 21, creatinine 0.7, AST 21, ALT 24, lipase 11, total bilirubin 0.5, ALP 52.  Lactic acid 1.5.  TSH 2.52.  Urinalysis negative for UTI with rare bacteria and 16 WBC.Cholelithiasis with choledocholithiasis.  There is mild intrahepatic biliary ductal dilatation.  No inflammation about the gallbladder. Small hiatal hernia and small to moderate-sized duodenal diverticulum.  Pending ultrasound abdomen.  General surgery/GI was consulted.  Recommended MRCP.    Patient was given 1 g ceftriaxone, 4 mg IV morphine, 75 mL iohexol.  Psychiatry was consulted in ED for prolonged grief reaction with significant weight loss.  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  GI /general surgery was consulted.  On empiric Zosyn.  Blood cultures pending.  Noted to have transaminitis  MRCP with evidence of1.8 cm choledocholith in the common bile ducts, with resultant intra and extrahepatic biliary ductal dilation .  Plan for ERCP today    Interval History: Plan of care discussed with the patient. She has some discomfort in her abdomen. She is able to tolerate oral intake well    Review of Systems   Constitutional:  Negative for activity change and appetite change.   HENT:  Negative for congestion and dental problem.    Eyes:  Negative for discharge and itching.   Respiratory:  Negative for apnea, cough, choking, chest tightness and shortness of breath.    Cardiovascular:  Negative for chest pain and leg swelling.   Gastrointestinal:  Positive for abdominal pain. Negative for abdominal distention, anal bleeding and constipation.   Endocrine: Negative for cold intolerance and heat intolerance.   Genitourinary:  Negative for difficulty urinating, dyspareunia, dysuria and enuresis.   Musculoskeletal:  Negative for arthralgias and back pain.   Neurological:  Negative for dizziness, facial asymmetry and headaches.   Hematological:  Negative for adenopathy.   Psychiatric/Behavioral:  Negative for agitation and behavioral problems.          Vital Signs (Most Recent):  Temp: 98.6 °F (37 °C) (10/08/24 1158)  Pulse: 75 (10/08/24 1158)  Resp: 20 (10/08/24 1158)  BP: 118/68 (10/08/24 1158)  SpO2: 97 % (10/08/24 1158) Vital Signs (24h Range):  Temp:  [96.8 °F (36 °C)-98.6 °F (37 °C)] 98.6 °F (37 °C)  Pulse:  [59-78] 75  Resp:  [11-20] 20  SpO2:  [94 %-100 %] 97 %  BP: (118-170)/(65-88) 118/68     Weight: 59.9 kg (132 lb 0.9 oz)  Body mass index is 21.98 kg/m².    Intake/Output Summary (Last 24 hours)  at 10/8/2024 1403  Last data filed at 10/8/2024 0811  Gross per 24 hour   Intake 617.51 ml   Output --   Net 617.51 ml         Physical Exam  Constitutional:       General: She is not in acute distress.     Appearance: Normal appearance. She is not ill-appearing.   HENT:      Head: Normocephalic and atraumatic.      Right Ear: There is no impacted cerumen.      Nose: No congestion or rhinorrhea.      Mouth/Throat:      Pharynx: No oropharyngeal exudate or posterior oropharyngeal erythema.   Eyes:      General:         Right eye: No discharge.         Left eye: No discharge.   Cardiovascular:      Rate and Rhythm: Normal rate and regular rhythm.      Heart sounds: No murmur heard.     No friction rub.   Pulmonary:      Effort: No respiratory distress.      Breath sounds: No stridor.   Abdominal:      General: Abdomen is flat. There is no distension.      Palpations: Abdomen is soft. There is no mass.      Tenderness: There is no abdominal tenderness.      Hernia: No hernia is present.   Musculoskeletal:         General: No swelling or tenderness.      Cervical back: No rigidity or tenderness.   Neurological:      Mental Status: She is alert.             Significant Labs: All pertinent labs within the past 24 hours have been reviewed.  BMP:   Recent Labs   Lab 10/08/24  0600         K 3.9      CO2 28   BUN 13   CREATININE 0.7   CALCIUM 9.1   MG 2.1     CBC:   Recent Labs   Lab 10/07/24  0423 10/08/24  0600   WBC 7.22 9.90   HGB 14.7 14.6   HCT 44.9 44.4    217     CMP:   Recent Labs   Lab 10/07/24  0423 10/08/24  0600    140   K 3.9 3.9    106   CO2 26 28   GLU 86 107   BUN 7* 13   CREATININE 0.7 0.7   CALCIUM 8.8 9.1   PROT 6.2 6.2   ALBUMIN 3.5 3.5   BILITOT 0.7 0.5   ALKPHOS 104 102   * 90*   * 174*   ANIONGAP 10 6*       Significant Imaging: I have reviewed all pertinent imaging results/findings within the past 24 hours.    Assessment/Plan:      Today's  assessment and plan:   Choledocholithiasis  Tobacco abuse  Depression   History of hypertension   History of hyperlipidemia  Impaired fasting glucose  GERD    10/08/2024: LFTs are trending down. She is s/p ERCP and post stenting cleared from GI stand point however, Gen surgery would like to undergo Lap diane. Monitor morning AM labs of Lipase, CMP and CBC. Gen surg would like to see lipase trending down to perform the procedure. Labs were reviewed. CW Abx of Zosyn. Advance diet as tolerated.     * Choledocholithiasis    Evidence of choledocholithiasis on CT.  GI/general surgery consulted.  Initiate on IV Zosyn.    On empiric Zosyn.  Blood cultures pending.  Noted to have transaminitis  MRCP with evidence of1.8 cm choledocholith in the common bile ducts, with resultant intra and extrahepatic biliary ductal dilation .  Plan for ERCP       Tobacco dependency  Dangers of cigarette smoking were reviewed with patient in detail. Patient was Counseled for 3-10 minutes. Nicotine replacement options were discussed. 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  She can not afford    Chronic anticoagulation    Patient was supposed to be on Eliquis for history of splenic infarct.  Patient reports not taking Eliquis for months    IFG (impaired fasting glucose)    Obtain A1c    Heart failure with preserved ejection fraction    Obtain echo.  Patient appears euvolemic.  Start being on diuretics at home.  Appears euvolemic.    GERD (gastroesophageal reflux disease)  Initiate on Protonix      Hyperlipidemia    On statin at home, held in the setting of choledocholithiasis    Essential hypertension  Patients blood pressure range in the last 24 hours was: BP  134/60  .The patient's inpatient anti-hypertensive regimen is listed below:  Current  Antihypertensives       Plan  BP is controlled.  Hold home antihypertensives for now      VTE Risk Mitigation (From admission, onward)           Ordered     IP VTE HIGH RISK PATIENT  Once         10/06/24 1525     Place sequential compression device  Until discontinued         10/06/24 1525                    Discharge Planning   STUART: 10/8/2024     Code Status: Full Code   Is the patient medically ready for discharge?:     Reason for patient still in hospital (select all that apply): Treatment  Discharge Plan A: Home with family   Discharge Delays: None known at this time              Ez Reyna DO  Department of Hospital Medicine   [facility]

## 2024-10-08 NOTE — PROGRESS NOTES
[facility] Progress Note    Patient Complaint: abdominal pain    PCP:   Fabienne Erwin       LOS: 2        Initial History of Present Illness (HPI):  This is a 69 y.o. female consulted to GI service for RUQ pain. 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 Patient complaint of acute onset of mid abdominal pain with associated symptom of nausea that began 3 days ago. Denies fever, chills, dizziness, lightheadedness, vomiting, constipation or diarrhea. Denies any recent anticoagulation use and reports being off eliquis months ago. Denies bariatric surgery.    Lfts 138/186  MRCP with stone seen in cbd    Interval Hx  Patient s/p ERCP with stent placement, reports mod pain now relieved after med administration.     Medical History:  has a past medical history of Allergy, Anxiety, Arthritis, Cataract, Depressive disorder, not elsewhere classified, Esophageal reflux, Hypertension, Impaired fasting glucose, Joint pain, Obesity, unspecified, and Other and unspecified hyperlipidemia.    Surgical History:  has a past surgical history that includes right  arm  surgery; R knee replacement;  section (); Hysterectomy; Blepharoplasty (Bilateral, 3/3/2021); Repair of retinal detachment with vitrectomy (Left, 2022); Phacoemulsification of cataract (Left, 10/27/2022); Intraocular prosthesis insertion (Left, 10/27/2022); Colonoscopy (N/A, 2023); and ERCP (N/A, 10/7/2024).      Objective Findings:    Vital Signs:  Temp:  [96.8 °F (36 °C)-98.4 °F (36.9 °C)]   Pulse:  [59-78]   Resp:  [11-20]   BP: (118-195)/(65-88)   SpO2:  [94 %-100 %]   Body mass index is 21.98 kg/m².      Physical Exam  Vitals and nursing note reviewed.   Constitutional:       Appearance: Normal appearance.   HENT:      Head: Normocephalic.   Pulmonary:      Effort:  Pulmonary effort is normal.   Abdominal:      General: Bowel sounds are normal.      Palpations: Abdomen is soft.   Skin:     General: Skin is warm and dry.   Neurological:      Mental Status: She is alert and oriented to person, place, and time.   Psychiatric:         Mood and Affect: Mood normal.         Behavior: Behavior normal.         Thought Content: Thought content normal.         Judgment: Judgment normal.               Labs:  Lab Results   Component Value Date    WBC 9.90 10/08/2024    HGB 14.6 10/08/2024    HCT 44.4 10/08/2024     10/08/2024    CHOL 120 09/17/2024    TRIG 90 09/17/2024    HDL 44 09/17/2024     (H) 10/08/2024    AST 90 (H) 10/08/2024     10/08/2024    K 3.9 10/08/2024     10/08/2024    CREATININE 0.7 10/08/2024    BUN 13 10/08/2024    CO2 28 10/08/2024    TSH 2.520 10/06/2024    INR 1.0 07/28/2022    HGBA1C 5.2 10/06/2024             Imaging: 10/7 MRCP- 1.8 cm choledocholith in the common bile ducts, with resultant intra and extrahepatic biliary ductal dilation as above.   Cholelithiasis without evidence of acute cholecystitis.      Endoscopy: 10/7 ERCP  - The major papilla was on the rim of a                          diverticulum.                          - The common bile duct was dilated.                          - Choledocholithiasis was found. Removal was not                          attempted; a stent was inserted.                          - A biliary sphincterotomy was performed.                          - Lithotripsy was unsuccessful. Unable to get a                          good angle to passed the basket after multiple                          attempts.                          - One biliary stent was placed into the common                          bile duct (10 Fr by 7 cm).                          - One pancreatic stent ( 5 Fr by 4 cm single                          pigtail) was placed into the ventral pancreatic                          duct to  decreased the risk of post ERCP                          pancreatitis.              Choledocholithiasis. Abdominal pain. Elevated lfts. Cbd dilation.   Plan/ Recommendations:  1.  Reports some abdominal pain this am, s/p ERCP with stent placement. Rec   - Avoid aspirin and nonsteroidal anti-inflammatory                          medicines for 2 weeks.                           - Repeat ERCP in 6 weeks for retreatment may need                          SpyGlass with [facility]- appt desk will call patient with appt.                          - Perform a flat plate abdominal x-ray in 2 weeks                          to confirm the passage of the PD stent- please order prior to d/c .   - continue antibiotics for 7 days total.  -rec f/u with general surgery outpatient to eval for cholecystectomy  -if vannessa meals and pain controlled, ok to d/c from GI standpoint.       Thank you so much for allowing us to participate in the care of Patricia Ramirez . Please contact us if you have any additional questions.    Alicia Menjivar NP  Gastroenterology  [facility] - Med Surg

## 2024-10-08 NOTE — CONSULTS
Consult Note    SUBJECTIVE:     History of Present Illness:  Patient is a 69 y.o. female presents with choledocholithiasis. She underwent ERCP at the [facility] with placement of pancreatic and CBD stents. Today she is post procedure day 1. She does not complain of any abdominal pain, nausea or vomiting. She has had normal GI function.     Chief Complaint   Patient presents with    Abdominal Pain     Pt reports bilateral lower abd pain that radiates to her left lower back since yesterday. Pt denies N/V/D, urinary symptoms.        Review of patient's allergies indicates:   Allergen Reactions    Sulfa (sulfonamide antibiotics) Hives    Ace inhibitors Other (See Comments)     Cough         Current Facility-Administered Medications   Medication Dose Route Frequency Provider Last Rate Last Admin    acetaminophen tablet 500 mg  500 mg Oral Q6H PRN Louis Marie MD        acetaminophen tablet 650 mg  650 mg Oral Q4H PRN Louis Marie MD        ALPRAZolam tablet 0.5 mg  0.5 mg Oral BID PRN Louis Marie MD        busPIRone tablet 15 mg  15 mg Oral TID Louis Marie MD   15 mg at 10/08/24 1001    citalopram tablet 20 mg  20 mg Oral Daily Louis Marie MD   20 mg at 10/08/24 1001    dextrose 10% bolus 125 mL 125 mL  12.5 g Intravenous PRN Louis Marie MD        dextrose 10% bolus 250 mL 250 mL  25 g Intravenous PRN Louis Marie MD        glucagon (human recombinant) injection 1 mg  1 mg Intramuscular PRN Louis Marie MD        glucose chewable tablet 16 g  16 g Oral PRN Louis Marie MD        glucose chewable tablet 24 g  24 g Oral PRN Louis Marie MD        morphine injection 2 mg  2 mg Intravenous Q4H PRN ILENE Wren MD   2 mg at 10/08/24 0512    morphine injection 4 mg  4 mg Intravenous Q4H PRN ILENE Wren MD        naloxone 0.4 mg/mL injection 0.02 mg  0.02 mg Intravenous PRN  Louis Marie MD        nicotine 14 mg/24 hr 1 patch  1 patch Transdermal Daily Louis Marie MD   1 patch at 10/08/24 1002    ondansetron 4 mg/5 mL solution 4 mg  4 mg Oral Once Raul Arnett MD        ondansetron injection 4 mg  4 mg Intravenous Q8H PRN Louis Marie MD   4 mg at 10/08/24 0518    pantoprazole injection 40 mg  40 mg Intravenous Daily Louis Marie MD   40 mg at 10/08/24 1001    piperacillin-tazobactam (ZOSYN) 4.5 g in D5W 100 mL IVPB (MB+)  4.5 g Intravenous Q8H Louis Marie MD 25 mL/hr at 10/08/24 1255 4.5 g at 10/08/24 1255    sodium chloride 0.9% flush 10 mL  10 mL Intravenous Q12H PRN Louis Marie MD   10 mL at 10/06/24 1812     Facility-Administered Medications Ordered in Other Encounters   Medication Dose Route Frequency Provider Last Rate Last Admin    cyclopentolate 1% ophthalmic solution 1 drop  1 drop Left Eye On Call Procedure Palmer Berrios MD   1 drop at 10/27/22 1003    ofloxacin 0.3 % ophthalmic solution 1 drop  1 drop Left Eye On Call Procedure Palmer Berrios MD   2 drop at 10/27/22 1238    sodium chloride 0.9% flush 10 mL  10 mL Intravenous PRN Palmer Berrios MD           Past Medical History:   Diagnosis Date    Allergy     Anxiety     Arthritis     Cataract     Depressive disorder, not elsewhere classified     Esophageal reflux     Hypertension     Impaired fasting glucose     Joint pain     Obesity, unspecified     Other and unspecified hyperlipidemia      Past Surgical History:   Procedure Laterality Date    BLEPHAROPLASTY Bilateral 3/3/2021    Procedure: BLEPHAROPLASTY;  Surgeon: Maite Acuna MD;  Location: [facility];  Service: Ophthalmology;  Laterality: Bilateral;     SECTION  1973    COLONOSCOPY N/A 2023    Procedure: COLONOSCOPY;  Surgeon: Briana Sterling MD;  Location: [facility];  Service: Endoscopy;  Laterality: N/A;    ERCP N/A 10/7/2024     "Procedure: ERCP (ENDOSCOPIC RETROGRADE CHOLANGIOPANCREATOGRAPHY);  Surgeon: Catracho Mitchell MD;  Location: [facility] ENDO (2ND FLR);  Service: Endoscopy;  Laterality: N/A;    HYSTERECTOMY      without BSO    INTRAOCULAR PROSTHESES INSERTION Left 10/27/2022    Procedure: INSERTION, IOL PROSTHESIS;  Surgeon: Palmer Berrios MD;  Location: [facility] OR;  Service: Ophthalmology;  Laterality: Left;    PHACOEMULSIFICATION OF CATARACT Left 10/27/2022    Procedure: PHACOEMULSIFICATION, CATARACT;  Surgeon: Palmer Berrios MD;  Location: [facility] OR;  Service: Ophthalmology;  Laterality: Left;  RN PHONE PREOP 10/21/2022   ARRIVAL 9:00 AM    R knee replacement      REPAIR OF RETINAL DETACHMENT WITH VITRECTOMY Left 2/28/2022    Procedure: REPAIR, RETINAL DETACHMENT, WITH VITRECTOMY;  Surgeon: MINO Martinez MD;  Location: [facility] OR 1ST FLR;  Service: Ophthalmology;  Laterality: Left;  Spoke with SID Garcia. Pt was instructed nothing to eat after 8am and to arrive at 2pm for preop. 430pm case start request.    right  arm  surgery       Family History   Problem Relation Name Age of Onset    Cancer Mother          lung    Liver disease Father      Thyroid disease Sister      Cervical cancer Sister      Tremor Sister       Social History     Tobacco Use    Smoking status: Every Day     Current packs/day: 0.75     Average packs/day: 0.8 packs/day for 40.0 years (30.0 ttl pk-yrs)     Types: Cigarettes    Smokeless tobacco: Never   Substance Use Topics    Alcohol use: Yes     Alcohol/week: 0.0 standard drinks of alcohol     Comment: occasionally    Drug use: Yes     Types: Marijuana        Review of Systems:  Review of Systems      OBJECTIVE:     Vital Signs (Most Recent)  Temp: 98.6 °F (37 °C) (10/08/24 1158)  Pulse: 75 (10/08/24 1158)  Resp: 20 (10/08/24 1158)  BP: 118/68 (10/08/24 1158)  SpO2: 97 % (10/08/24 1158)  5' 5" (1.651 m)  59.9 kg (132 lb 0.9 oz)     Physical Exam:  Physical Exam      Laboratory  Component      " Latest Ref [facility] 10/8/2024   WBC      3.90 - 12.70 K/uL 9.90    RBC      4.00 - 5.40 M/uL 4.46    Hemoglobin      12.0 - 16.0 g/dL 14.6    Hematocrit      37.0 - 48.5 % 44.4    MCV      82 - 98 fL 100 (H)    MCH      27.0 - 31.0 pg 32.7 (H)    MCHC      32.0 - 36.0 g/dL 32.9    RDW      11.5 - 14.5 % 12.2    Platelet Count      150 - 450 K/uL 217    MPV      9.2 - 12.9 fL 9.9    Immature Granulocytes      0.0 - 0.5 % 0.3    Gran # (ANC)      1.8 - 7.7 K/uL 7.3    Immature Grans (Abs)      0.00 - 0.04 K/uL 0.03    Lymph #      1.0 - 4.8 K/uL 1.4    Mono #      0.3 - 1.0 K/uL 1.1 (H)    Eos #      0.0 - 0.5 K/uL 0.1    Baso #      0.00 - 0.20 K/uL 0.04    nRBC      0 /100 WBC 0    Gran %      38.0 - 73.0 % 74.1 (H)    Lymph %      18.0 - 48.0 % 14.1 (L)    Mono %      4.0 - 15.0 % 10.6    Eos %      0.0 - 8.0 % 0.5    Basophil %      0.0 - 1.9 % 0.4    Differential Method Automated       Legend:  (H) High  (L) Low  Component      Latest Ref Rn 10/8/2024   Sodium      136 - 145 mmol/L 140    Potassium      3.5 - 5.1 mmol/L 3.9    Chloride      95 - 110 mmol/L 106    CO2      23 - 29 mmol/L 28    Glucose      70 - 110 mg/dL 107    BUN      8 - 23 mg/dL 13    Creatinine      0.5 - 1.4 mg/dL 0.7    Calcium      8.7 - 10.5 mg/dL 9.1    PROTEIN TOTAL      6.0 - 8.4 g/dL 6.2    Albumin      3.5 - 5.2 g/dL 3.5    BILIRUBIN TOTAL      0.1 - 1.0 mg/dL 0.5    ALP      55 - 135 U/L 102    AST      10 - 40 U/L 90 (H)    ALT      10 - 44 U/L 174 (H)    eGFR      >60 mL/min/1.73 m^2 >60    Anion Gap      8 - 16 mmol/L 6 (L)       Legend:  (H) High  (L) Low    Component      Latest Ref Rng 10/8/2024   Lipase      4 - 60 U/L 835 (H)       Legend:  (H) High  Diagnostic Results:  EXAMINATION:  MRI MRCP ABDOMEN W WO CONTRAST 3D WO INDEPENDENT WS XPD     CLINICAL HISTORY:  Cholelithiasis;     TECHNIQUE:  Multiplanar, multisequence magnetic resonance images of the liver and upper abdomen before and after the uncomplicated intravenous  administration of 6 mL Gadavist.  Additionally 2D and 3D MRCP sequences are performed as well as MIP images.     COMPARISON:  CT abdomen and pelvis and ultrasound abdomen from earlier the same date.     FINDINGS:  Pulmonary Bases: No gross abnormality.     Liver: Normal.  No focal hepatic lesion.     Bile Ducts: There is a 1.8 cm choledocholith within the common bile duct.  There is intra and extrahepatic biliary ductal dilation.  The common bile duct measures up to approximately 1.4 cm in maximal diameter.     Gallbladder: There are multiple gallstones.  There is no gallbladder wall thickening or pericholecystic fluid.  There is a Phrygian cap.     Pancreas: Normal.  No pancreatic ductal dilatation.     Spleen: Normal.     Gastrointestinal tract: The small and large bowel loops are normal in caliber without evidence of obstruction.     Retroperitoneum and mesentery: Normal.  No lymphadenopathy.     Adrenal Glands: Normal.     Kidneys: Normal.     Aorta and Abdominal Vasculature: Normal.     Body wall: Normal.     Impression:     1. 1.8 cm choledocholith in the common bile ducts, with resultant intra and extrahepatic biliary ductal dilation as above.  2. Cholelithiasis without evidence of acute cholecystitis.    ASSESSMENT/PLAN:     69 year old woman presents with choledocholithiasis s/p ERCP and stent placement in CBD and pancreatic ducts.    PLAN:Plan   Plan for laparoscopic cholecystectomy on this admission  Trend Lipase, currently elevated at 835  Ideally would perform cholecystectomy following normalization or confirmation of downtrending lipase

## 2024-10-08 NOTE — PLAN OF CARE
Problem: Adult Inpatient Plan of Care  Goal: Plan of Care Review  Outcome: Progressing  Flowsheets (Taken 10/8/2024 0815)  Plan of Care Reviewed With: patient  Goal: Patient-Specific Goal (Individualized)  Outcome: Progressing     Problem: Adult Inpatient Plan of Care  Goal: Plan of Care Review  Outcome: Progressing  Flowsheets (Taken 10/8/2024 0815)  Plan of Care Reviewed With: patient     Problem: Adult Inpatient Plan of Care  Goal: Plan of Care Review  Outcome: Progressing  Flowsheets (Taken 10/8/2024 0815)  Plan of Care Reviewed With: patient     Problem: Adult Inpatient Plan of Care  Goal: Patient-Specific Goal (Individualized)  Outcome: Progressing     Problem: Adult Inpatient Plan of Care  Goal: Patient-Specific Goal (Individualized)  Outcome: Progressing

## 2024-10-08 NOTE — SUBJECTIVE & OBJECTIVE
Interval History: Plan of care discussed with the patient. She has some discomfort in her abdomen. She is able to tolerate oral intake well    Review of Systems   Constitutional:  Negative for activity change and appetite change.   HENT:  Negative for congestion and dental problem.    Eyes:  Negative for discharge and itching.   Respiratory:  Negative for apnea, cough, choking, chest tightness and shortness of breath.    Cardiovascular:  Negative for chest pain and leg swelling.   Gastrointestinal:  Positive for abdominal pain. Negative for abdominal distention, anal bleeding and constipation.   Endocrine: Negative for cold intolerance and heat intolerance.   Genitourinary:  Negative for difficulty urinating, dyspareunia, dysuria and enuresis.   Musculoskeletal:  Negative for arthralgias and back pain.   Neurological:  Negative for dizziness, facial asymmetry and headaches.   Hematological:  Negative for adenopathy.   Psychiatric/Behavioral:  Negative for agitation and behavioral problems.          Vital Signs (Most Recent):  Temp: 98.6 °F (37 °C) (10/08/24 1158)  Pulse: 75 (10/08/24 1158)  Resp: 20 (10/08/24 1158)  BP: 118/68 (10/08/24 1158)  SpO2: 97 % (10/08/24 1158) Vital Signs (24h Range):  Temp:  [96.8 °F (36 °C)-98.6 °F (37 °C)] 98.6 °F (37 °C)  Pulse:  [59-78] 75  Resp:  [11-20] 20  SpO2:  [94 %-100 %] 97 %  BP: (118-170)/(65-88) 118/68     Weight: 59.9 kg (132 lb 0.9 oz)  Body mass index is 21.98 kg/m².    Intake/Output Summary (Last 24 hours) at 10/8/2024 1403  Last data filed at 10/8/2024 0811  Gross per 24 hour   Intake 617.51 ml   Output --   Net 617.51 ml         Physical Exam  Constitutional:       General: She is not in acute distress.     Appearance: Normal appearance. She is not ill-appearing.   HENT:      Head: Normocephalic and atraumatic.      Right Ear: There is no impacted cerumen.      Nose: No congestion or rhinorrhea.      Mouth/Throat:      Pharynx: No oropharyngeal exudate or posterior  oropharyngeal erythema.   Eyes:      General:         Right eye: No discharge.         Left eye: No discharge.   Cardiovascular:      Rate and Rhythm: Normal rate and regular rhythm.      Heart sounds: No murmur heard.     No friction rub.   Pulmonary:      Effort: No respiratory distress.      Breath sounds: No stridor.   Abdominal:      General: Abdomen is flat. There is no distension.      Palpations: Abdomen is soft. There is no mass.      Tenderness: There is no abdominal tenderness.      Hernia: No hernia is present.   Musculoskeletal:         General: No swelling or tenderness.      Cervical back: No rigidity or tenderness.   Neurological:      Mental Status: She is alert.             Significant Labs: All pertinent labs within the past 24 hours have been reviewed.  BMP:   Recent Labs   Lab 10/08/24  0600         K 3.9      CO2 28   BUN 13   CREATININE 0.7   CALCIUM 9.1   MG 2.1     CBC:   Recent Labs   Lab 10/07/24  0423 10/08/24  0600   WBC 7.22 9.90   HGB 14.7 14.6   HCT 44.9 44.4    217     CMP:   Recent Labs   Lab 10/07/24  0423 10/08/24  0600    140   K 3.9 3.9    106   CO2 26 28   GLU 86 107   BUN 7* 13   CREATININE 0.7 0.7   CALCIUM 8.8 9.1   PROT 6.2 6.2   ALBUMIN 3.5 3.5   BILITOT 0.7 0.5   ALKPHOS 104 102   * 90*   * 174*   ANIONGAP 10 6*       Significant Imaging: I have reviewed all pertinent imaging results/findings within the past 24 hours.

## 2024-10-08 NOTE — ASSESSMENT & PLAN NOTE
Evidence of choledocholithiasis on CT.  GI/general surgery consulted.  Initiate on IV Zosyn.    On empiric Zosyn.  Blood cultures pending.  Noted to have transaminitis  MRCP with evidence of1.8 cm choledocholith in the common bile ducts, with resultant intra and extrahepatic biliary ductal dilation .  Plan for ERCP      Left 3rd [employer] IM Ligament Release G56.62, left plantar nerve lesion

## 2024-10-08 NOTE — PLAN OF CARE
10/08/24 0923   Medicare Message   Important Message from Medicare regarding Discharge Appeal Rights Given to patient/caregiver;Explained to patient/caregiver;Signed/date by patient/caregiver   Date IMM was signed 10/08/24   Time IMM was signed 0923

## 2024-10-09 ENCOUNTER — ANESTHESIA EVENT (OUTPATIENT)
Dept: SURGERY | Facility: HOSPITAL | Age: 69
End: 2024-10-09
Payer: MEDICARE

## 2024-10-09 ENCOUNTER — ANESTHESIA (OUTPATIENT)
Dept: SURGERY | Facility: HOSPITAL | Age: 69
End: 2024-10-09
Payer: MEDICARE

## 2024-10-09 PROBLEM — E44.0 MODERATE PROTEIN-CALORIE MALNUTRITION: Chronic | Status: ACTIVE | Noted: 2024-10-09

## 2024-10-09 LAB
ABO + RH BLD: NORMAL
ALBUMIN SERPL BCP-MCNC: 3.5 G/DL (ref 3.5–5.2)
ALP SERPL-CCNC: 83 U/L (ref 55–135)
ALT SERPL W/O P-5'-P-CCNC: 112 U/L (ref 10–44)
ANION GAP SERPL CALC-SCNC: 10 MMOL/L (ref 8–16)
AST SERPL-CCNC: 50 U/L (ref 10–40)
BASOPHILS # BLD AUTO: 0.07 K/UL (ref 0–0.2)
BASOPHILS NFR BLD: 0.6 % (ref 0–1.9)
BILIRUB SERPL-MCNC: 0.6 MG/DL (ref 0.1–1)
BLD GP AB SCN CELLS X3 SERPL QL: NORMAL
BUN SERPL-MCNC: 12 MG/DL (ref 8–23)
CALCIUM SERPL-MCNC: 9 MG/DL (ref 8.7–10.5)
CHLORIDE SERPL-SCNC: 100 MMOL/L (ref 95–110)
CO2 SERPL-SCNC: 30 MMOL/L (ref 23–29)
CREAT SERPL-MCNC: 0.6 MG/DL (ref 0.5–1.4)
DIFFERENTIAL METHOD BLD: ABNORMAL
EOSINOPHIL # BLD AUTO: 0.3 K/UL (ref 0–0.5)
EOSINOPHIL NFR BLD: 2.7 % (ref 0–8)
ERYTHROCYTE [DISTWIDTH] IN BLOOD BY AUTOMATED COUNT: 12.5 % (ref 11.5–14.5)
EST. GFR  (NO RACE VARIABLE): >60 ML/MIN/1.73 M^2
GLUCOSE SERPL-MCNC: 71 MG/DL (ref 70–110)
HCT VFR BLD AUTO: 47.7 % (ref 37–48.5)
HGB BLD-MCNC: 15.9 G/DL (ref 12–16)
IMM GRANULOCYTES # BLD AUTO: 0.03 K/UL (ref 0–0.04)
IMM GRANULOCYTES NFR BLD AUTO: 0.3 % (ref 0–0.5)
LIPASE SERPL-CCNC: 670 U/L (ref 4–60)
LYMPHOCYTES # BLD AUTO: 2.1 K/UL (ref 1–4.8)
LYMPHOCYTES NFR BLD: 19.3 % (ref 18–48)
MAGNESIUM SERPL-MCNC: 2 MG/DL (ref 1.6–2.6)
MCH RBC QN AUTO: 33.3 PG (ref 27–31)
MCHC RBC AUTO-ENTMCNC: 33.3 G/DL (ref 32–36)
MCV RBC AUTO: 100 FL (ref 82–98)
MONOCYTES # BLD AUTO: 1 K/UL (ref 0.3–1)
MONOCYTES NFR BLD: 8.9 % (ref 4–15)
NEUTROPHILS # BLD AUTO: 7.5 K/UL (ref 1.8–7.7)
NEUTROPHILS NFR BLD: 68.2 % (ref 38–73)
NRBC BLD-RTO: 0 /100 WBC
PHOSPHATE SERPL-MCNC: 3.2 MG/DL (ref 2.7–4.5)
PLATELET # BLD AUTO: 207 K/UL (ref 150–450)
PMV BLD AUTO: 10.1 FL (ref 9.2–12.9)
POTASSIUM SERPL-SCNC: 3.5 MMOL/L (ref 3.5–5.1)
PROT SERPL-MCNC: 6.3 G/DL (ref 6–8.4)
RBC # BLD AUTO: 4.78 M/UL (ref 4–5.4)
SODIUM SERPL-SCNC: 140 MMOL/L (ref 136–145)
WBC # BLD AUTO: 10.93 K/UL (ref 3.9–12.7)

## 2024-10-09 PROCEDURE — 47562 LAPAROSCOPIC CHOLECYSTECTOMY: CPT | Mod: 22,,, | Performed by: SURGERY

## 2024-10-09 PROCEDURE — 83690 ASSAY OF LIPASE: CPT | Performed by: INTERNAL MEDICINE

## 2024-10-09 PROCEDURE — 21400001 HC TELEMETRY ROOM

## 2024-10-09 PROCEDURE — 37000008 HC ANESTHESIA 1ST 15 MINUTES: Performed by: SURGERY

## 2024-10-09 PROCEDURE — 88304 TISSUE EXAM BY PATHOLOGIST: CPT | Mod: 26,,, | Performed by: PATHOLOGY

## 2024-10-09 PROCEDURE — 80053 COMPREHEN METABOLIC PANEL: CPT | Performed by: STUDENT IN AN ORGANIZED HEALTH CARE EDUCATION/TRAINING PROGRAM

## 2024-10-09 PROCEDURE — 63600175 PHARM REV CODE 636 W HCPCS

## 2024-10-09 PROCEDURE — 63600175 PHARM REV CODE 636 W HCPCS: Mod: JG | Performed by: SURGERY

## 2024-10-09 PROCEDURE — 88304 TISSUE EXAM BY PATHOLOGIST: CPT | Performed by: PATHOLOGY

## 2024-10-09 PROCEDURE — C1729 CATH, DRAINAGE: HCPCS | Performed by: SURGERY

## 2024-10-09 PROCEDURE — 85025 COMPLETE CBC W/AUTO DIFF WBC: CPT | Performed by: STUDENT IN AN ORGANIZED HEALTH CARE EDUCATION/TRAINING PROGRAM

## 2024-10-09 PROCEDURE — 71000033 HC RECOVERY, INTIAL HOUR: Performed by: SURGERY

## 2024-10-09 PROCEDURE — 25000003 PHARM REV CODE 250: Performed by: STUDENT IN AN ORGANIZED HEALTH CARE EDUCATION/TRAINING PROGRAM

## 2024-10-09 PROCEDURE — 25000003 PHARM REV CODE 250: Performed by: SURGERY

## 2024-10-09 PROCEDURE — 86901 BLOOD TYPING SEROLOGIC RH(D): CPT | Performed by: STUDENT IN AN ORGANIZED HEALTH CARE EDUCATION/TRAINING PROGRAM

## 2024-10-09 PROCEDURE — 27201423 OPTIME MED/SURG SUP & DEVICES STERILE SUPPLY: Performed by: SURGERY

## 2024-10-09 PROCEDURE — 36000710: Performed by: SURGERY

## 2024-10-09 PROCEDURE — 63600175 PHARM REV CODE 636 W HCPCS: Performed by: STUDENT IN AN ORGANIZED HEALTH CARE EDUCATION/TRAINING PROGRAM

## 2024-10-09 PROCEDURE — 0FB44ZZ EXCISION OF GALLBLADDER, PERCUTANEOUS ENDOSCOPIC APPROACH: ICD-10-PCS | Performed by: SURGERY

## 2024-10-09 PROCEDURE — 36415 COLL VENOUS BLD VENIPUNCTURE: CPT | Performed by: STUDENT IN AN ORGANIZED HEALTH CARE EDUCATION/TRAINING PROGRAM

## 2024-10-09 PROCEDURE — S4991 NICOTINE PATCH NONLEGEND: HCPCS | Performed by: STUDENT IN AN ORGANIZED HEALTH CARE EDUCATION/TRAINING PROGRAM

## 2024-10-09 PROCEDURE — 84100 ASSAY OF PHOSPHORUS: CPT | Performed by: STUDENT IN AN ORGANIZED HEALTH CARE EDUCATION/TRAINING PROGRAM

## 2024-10-09 PROCEDURE — 8E0W4CZ ROBOTIC ASSISTED PROCEDURE OF TRUNK REGION, PERCUTANEOUS ENDOSCOPIC APPROACH: ICD-10-PCS | Performed by: SURGERY

## 2024-10-09 PROCEDURE — C9290 INJ, BUPIVACAINE LIPOSOME: HCPCS

## 2024-10-09 PROCEDURE — 37000009 HC ANESTHESIA EA ADD 15 MINS: Performed by: SURGERY

## 2024-10-09 PROCEDURE — 63600175 PHARM REV CODE 636 W HCPCS: Performed by: INTERNAL MEDICINE

## 2024-10-09 PROCEDURE — 86850 RBC ANTIBODY SCREEN: CPT | Performed by: STUDENT IN AN ORGANIZED HEALTH CARE EDUCATION/TRAINING PROGRAM

## 2024-10-09 PROCEDURE — 83735 ASSAY OF MAGNESIUM: CPT | Performed by: STUDENT IN AN ORGANIZED HEALTH CARE EDUCATION/TRAINING PROGRAM

## 2024-10-09 PROCEDURE — 36000711: Performed by: SURGERY

## 2024-10-09 RX ORDER — GLUCAGON 1 MG
1 KIT INJECTION
Status: DISCONTINUED | OUTPATIENT
Start: 2024-10-09 | End: 2024-10-09 | Stop reason: HOSPADM

## 2024-10-09 RX ORDER — DEXAMETHASONE SODIUM PHOSPHATE 4 MG/ML
INJECTION, SOLUTION INTRA-ARTICULAR; INTRALESIONAL; INTRAMUSCULAR; INTRAVENOUS; SOFT TISSUE
Status: DISCONTINUED | OUTPATIENT
Start: 2024-10-09 | End: 2024-10-09

## 2024-10-09 RX ORDER — INDOCYANINE GREEN AND WATER 25 MG
1.25 KIT INJECTION ONCE
Status: COMPLETED | OUTPATIENT
Start: 2024-10-09 | End: 2024-10-09

## 2024-10-09 RX ORDER — LIDOCAINE HYDROCHLORIDE 20 MG/ML
INJECTION INTRAVENOUS
Status: DISCONTINUED | OUTPATIENT
Start: 2024-10-09 | End: 2024-10-09

## 2024-10-09 RX ORDER — ONDANSETRON HYDROCHLORIDE 2 MG/ML
4 INJECTION, SOLUTION INTRAVENOUS DAILY PRN
Status: DISCONTINUED | OUTPATIENT
Start: 2024-10-09 | End: 2024-10-09 | Stop reason: HOSPADM

## 2024-10-09 RX ORDER — HYDROMORPHONE HYDROCHLORIDE 2 MG/ML
0.2 INJECTION, SOLUTION INTRAMUSCULAR; INTRAVENOUS; SUBCUTANEOUS EVERY 5 MIN PRN
Status: DISCONTINUED | OUTPATIENT
Start: 2024-10-09 | End: 2024-10-09 | Stop reason: HOSPADM

## 2024-10-09 RX ORDER — PROCHLORPERAZINE EDISYLATE 5 MG/ML
5 INJECTION INTRAMUSCULAR; INTRAVENOUS EVERY 30 MIN PRN
Status: DISCONTINUED | OUTPATIENT
Start: 2024-10-09 | End: 2024-10-09 | Stop reason: HOSPADM

## 2024-10-09 RX ORDER — PROPOFOL 10 MG/ML
VIAL (ML) INTRAVENOUS
Status: DISCONTINUED | OUTPATIENT
Start: 2024-10-09 | End: 2024-10-09

## 2024-10-09 RX ORDER — ONDANSETRON HYDROCHLORIDE 2 MG/ML
INJECTION, SOLUTION INTRAVENOUS
Status: DISCONTINUED | OUTPATIENT
Start: 2024-10-09 | End: 2024-10-09

## 2024-10-09 RX ORDER — KETOROLAC TROMETHAMINE 30 MG/ML
INJECTION, SOLUTION INTRAMUSCULAR; INTRAVENOUS
Status: DISCONTINUED | OUTPATIENT
Start: 2024-10-09 | End: 2024-10-09

## 2024-10-09 RX ORDER — EPHEDRINE SULFATE 50 MG/ML
INJECTION, SOLUTION INTRAVENOUS
Status: DISCONTINUED | OUTPATIENT
Start: 2024-10-09 | End: 2024-10-09

## 2024-10-09 RX ORDER — BUPIVACAINE HYDROCHLORIDE 2.5 MG/ML
INJECTION, SOLUTION INFILTRATION; PERINEURAL
Status: DISCONTINUED | OUTPATIENT
Start: 2024-10-09 | End: 2024-10-09 | Stop reason: HOSPADM

## 2024-10-09 RX ORDER — SODIUM CHLORIDE 0.9 % (FLUSH) 0.9 %
10 SYRINGE (ML) INJECTION
Status: DISCONTINUED | OUTPATIENT
Start: 2024-10-09 | End: 2024-10-09 | Stop reason: HOSPADM

## 2024-10-09 RX ORDER — MIDAZOLAM HYDROCHLORIDE 1 MG/ML
INJECTION INTRAMUSCULAR; INTRAVENOUS
Status: DISCONTINUED | OUTPATIENT
Start: 2024-10-09 | End: 2024-10-09

## 2024-10-09 RX ORDER — ROCURONIUM BROMIDE 10 MG/ML
INJECTION, SOLUTION INTRAVENOUS
Status: DISCONTINUED | OUTPATIENT
Start: 2024-10-09 | End: 2024-10-09

## 2024-10-09 RX ORDER — FENTANYL CITRATE 50 UG/ML
INJECTION, SOLUTION INTRAMUSCULAR; INTRAVENOUS
Status: DISCONTINUED | OUTPATIENT
Start: 2024-10-09 | End: 2024-10-09

## 2024-10-09 RX ADMIN — FENTANYL CITRATE 75 MCG: 50 INJECTION, SOLUTION INTRAMUSCULAR; INTRAVENOUS at 12:10

## 2024-10-09 RX ADMIN — PROPOFOL 50 MG: 10 INJECTION, EMULSION INTRAVENOUS at 01:10

## 2024-10-09 RX ADMIN — ROCURONIUM BROMIDE 20 MG: 10 INJECTION INTRAVENOUS at 01:10

## 2024-10-09 RX ADMIN — MORPHINE SULFATE 4 MG: 4 INJECTION, SOLUTION INTRAMUSCULAR; INTRAVENOUS at 05:10

## 2024-10-09 RX ADMIN — PROPOFOL 60 MG: 10 INJECTION, EMULSION INTRAVENOUS at 12:10

## 2024-10-09 RX ADMIN — SODIUM CHLORIDE, SODIUM LACTATE, POTASSIUM CHLORIDE, AND CALCIUM CHLORIDE: .6; .31; .03; .02 INJECTION, SOLUTION INTRAVENOUS at 12:10

## 2024-10-09 RX ADMIN — NICOTINE 1 PATCH: 14 PATCH TRANSDERMAL at 08:10

## 2024-10-09 RX ADMIN — BUSPIRONE HYDROCHLORIDE 15 MG: 10 TABLET ORAL at 09:10

## 2024-10-09 RX ADMIN — FENTANYL CITRATE 25 MCG: 50 INJECTION, SOLUTION INTRAMUSCULAR; INTRAVENOUS at 12:10

## 2024-10-09 RX ADMIN — PIPERACILLIN AND TAZOBACTAM 4.5 G: 4; .5 INJECTION, POWDER, FOR SOLUTION INTRAVENOUS; PARENTERAL at 05:10

## 2024-10-09 RX ADMIN — MORPHINE SULFATE 4 MG: 4 INJECTION, SOLUTION INTRAMUSCULAR; INTRAVENOUS at 04:10

## 2024-10-09 RX ADMIN — ONDANSETRON 4 MG: 2 INJECTION, SOLUTION INTRAMUSCULAR; INTRAVENOUS at 02:10

## 2024-10-09 RX ADMIN — PANTOPRAZOLE SODIUM 40 MG: 40 INJECTION, POWDER, LYOPHILIZED, FOR SOLUTION INTRAVENOUS at 08:10

## 2024-10-09 RX ADMIN — SUGAMMADEX 200 MG: 100 INJECTION, SOLUTION INTRAVENOUS at 02:10

## 2024-10-09 RX ADMIN — FENTANYL CITRATE 50 MCG: 50 INJECTION, SOLUTION INTRAMUSCULAR; INTRAVENOUS at 01:10

## 2024-10-09 RX ADMIN — EPHEDRINE SULFATE 10 MG: 50 INJECTION INTRAVENOUS at 12:10

## 2024-10-09 RX ADMIN — LIDOCAINE HYDROCHLORIDE 100 MG: 20 INJECTION, SOLUTION INTRAVENOUS at 12:10

## 2024-10-09 RX ADMIN — PIPERACILLIN AND TAZOBACTAM 4.5 G: 4; .5 INJECTION, POWDER, FOR SOLUTION INTRAVENOUS; PARENTERAL at 09:10

## 2024-10-09 RX ADMIN — ROCURONIUM BROMIDE 50 MG: 10 INJECTION INTRAVENOUS at 12:10

## 2024-10-09 RX ADMIN — PROPOFOL 30 MG: 10 INJECTION, EMULSION INTRAVENOUS at 12:10

## 2024-10-09 RX ADMIN — INDOCYANINE GREEN AND WATER 1.25 MG: KIT at 08:10

## 2024-10-09 RX ADMIN — MIDAZOLAM HYDROCHLORIDE 2 MG: 1 INJECTION INTRAMUSCULAR; INTRAVENOUS at 12:10

## 2024-10-09 RX ADMIN — MORPHINE SULFATE 4 MG: 4 INJECTION, SOLUTION INTRAMUSCULAR; INTRAVENOUS at 08:10

## 2024-10-09 RX ADMIN — BUSPIRONE HYDROCHLORIDE 15 MG: 10 TABLET ORAL at 08:10

## 2024-10-09 RX ADMIN — DEXAMETHASONE SODIUM PHOSPHATE 4 MG: 4 INJECTION, SOLUTION INTRAMUSCULAR; INTRAVENOUS at 12:10

## 2024-10-09 RX ADMIN — CITALOPRAM HYDROBROMIDE 20 MG: 20 TABLET ORAL at 08:10

## 2024-10-09 RX ADMIN — KETOROLAC TROMETHAMINE 30 MG: 30 INJECTION, SOLUTION INTRAMUSCULAR at 02:10

## 2024-10-09 NOTE — NURSING
[facility] [place]  Nurses Note -- 4 Eyes      10/9/2024       Skin assessed on: Q Shift      [x] No Pressure Injuries Present    []Prevention Measures Documented    [] Yes LDA  for Pressure Injury Previously documented     [] Yes New Pressure Injury Discovered   [] LDA for New Pressure Injury Added      Attending RN:  Kim Trent RN     Second RN:  Clarence

## 2024-10-09 NOTE — TRANSFER OF CARE
"Anesthesia Transfer of Care Note    Patient: Patricia Ramirez    Procedure(s) Performed: Procedure(s) (LRB):  XI ROBOTIC SUB TOTAL CHOLECYSTECTOMY; DRAIN PLACEMENT (N/A)    Patient location: PACU    Anesthesia Type: general    Transport from OR: Transported from OR on room air with adequate spontaneous ventilation    Post pain: adequate analgesia    Post assessment: no apparent anesthetic complications and tolerated procedure well    Post vital signs: stable    Level of consciousness: alert, awake and oriented    Nausea/Vomiting: no nausea/vomiting    Complications: none    Transfer of care protocol was followed      Last vitals: Visit Vitals  BP (!) 164/70   Pulse 81   Temp 36.4 °C (97.5 °F)   Resp 13   Ht 5' 5" (1.651 m)   Wt 59.9 kg (132 lb 0.9 oz)   SpO2 95%   Breastfeeding No   BMI 21.98 kg/m²     "

## 2024-10-09 NOTE — PLAN OF CARE
Problem: Adult Inpatient Plan of Care  Goal: Plan of Care Review  Outcome: Progressing  Flowsheets (Taken 10/9/2024 0815)  Plan of Care Reviewed With: patient  Goal: Patient-Specific Goal (Individualized)  Outcome: Progressing     Problem: Skin Injury Risk Increased  Goal: Skin Health and Integrity  Outcome: Progressing  Intervention: Optimize Skin Protection  Flowsheets (Taken 10/9/2024 0815)  Pressure Reduction Techniques: frequent weight shift encouraged  Activity Management: Ambulated to bathroom - L4     Problem: Adult Inpatient Plan of Care  Goal: Plan of Care Review  Outcome: Progressing  Flowsheets (Taken 10/9/2024 0815)  Plan of Care Reviewed With: patient     Problem: Adult Inpatient Plan of Care  Goal: Plan of Care Review  Outcome: Progressing  Flowsheets (Taken 10/9/2024 0815)  Plan of Care Reviewed With: patient     Problem: Adult Inpatient Plan of Care  Goal: Patient-Specific Goal (Individualized)  Outcome: Progressing     Problem: Adult Inpatient Plan of Care  Goal: Patient-Specific Goal (Individualized)  Outcome: Progressing     Problem: Skin Injury Risk Increased  Goal: Skin Health and Integrity  Outcome: Progressing  Intervention: Optimize Skin Protection  Flowsheets (Taken 10/9/2024 0815)  Pressure Reduction Techniques: frequent weight shift encouraged  Activity Management: Ambulated to bathroom - L4     Problem: Skin Injury Risk Increased  Goal: Skin Health and Integrity  Outcome: Progressing     Problem: Skin Injury Risk Increased  Goal: Skin Health and Integrity  Intervention: Optimize Skin Protection  Flowsheets (Taken 10/9/2024 0815)  Pressure Reduction Techniques: frequent weight shift encouraged  Activity Management: Ambulated to bathroom - L4     Problem: Skin Injury Risk Increased  Goal: Skin Health and Integrity  Intervention: Optimize Skin Protection  Flowsheets (Taken 10/9/2024 0815)  Pressure Reduction Techniques: frequent weight shift encouraged  Activity Management: Ambulated to  bathroom - L4

## 2024-10-09 NOTE — ASSESSMENT & PLAN NOTE
Malnutrition Type:  Context: chronic illness  Level: moderate    Related to (etiology):   Depression    Signs and Symptoms (as evidenced by):   Unintended weight loss  30% weight loss x 1 year     Malnutrition Characteristic Summary:  Weight Loss (Malnutrition): greater than 20% in 1 year  Energy Intake (Malnutrition): less than 75% for greater than 7 days (30% wt loss x 1 year)  Subcutaneous Fat (Malnutrition): mild depletion  Muscle Mass (Malnutrition): mild depletion      Interventions:  Collaboration with other providers  [employer]    Nutrition Diagnosis Status:   New

## 2024-10-09 NOTE — SUBJECTIVE & OBJECTIVE
Interval History:  Patient just returned from the operating room.  She was continued to be under anesthesia.  She was awake.  She said that she feels the pressure and the tenderness in her abdomen.  Otherwise she requesting to start on a diet.  Sister was adamant side.  All questions were answered.  Sister was updated about the patient's condition.    Review of Systems   Reason unable to perform ROS: Unable to obtain detailed 1 due to patient coming back from the OR, patient complains of abdominal pain and distention.     Objective:     Vital Signs (Most Recent):  Temp: 98 °F (36.7 °C) (10/09/24 1716)  Pulse: 70 (10/09/24 1716)  Resp: 18 (10/09/24 1716)  BP: (!) 111/57 (10/09/24 1716)  SpO2: (!) 91 % (10/09/24 1716) Vital Signs (24h Range):  Temp:  [97.5 °F (36.4 °C)-98.4 °F (36.9 °C)] 98 °F (36.7 °C)  Pulse:  [54-81] 70  Resp:  [11-24] 18  SpO2:  [91 %-98 %] 91 %  BP: (111-164)/(57-72) 111/57     Weight: 59.9 kg (132 lb 0.9 oz)  Body mass index is 21.98 kg/m².    Intake/Output Summary (Last 24 hours) at 10/9/2024 1716  Last data filed at 10/9/2024 1446  Gross per 24 hour   Intake 1000 ml   Output 100 ml   Net 900 ml         Physical Exam  Constitutional:       General: She is not in acute distress.     Appearance: Normal appearance. She is not ill-appearing.   HENT:      Head: Normocephalic and atraumatic.      Right Ear: There is no impacted cerumen.      Nose: Nose normal. No congestion or rhinorrhea.      Mouth/Throat:      Mouth: Mucous membranes are dry.      Pharynx: No oropharyngeal exudate or posterior oropharyngeal erythema.   Eyes:      General:         Right eye: No discharge.         Left eye: No discharge.   Cardiovascular:      Rate and Rhythm: Normal rate.   Pulmonary:      Effort: No respiratory distress.      Breath sounds: No stridor. No wheezing or rhonchi.   Abdominal:      Palpations: There is no mass.      Tenderness: There is abdominal tenderness.      Hernia: No hernia is present.    Musculoskeletal:         General: No swelling or tenderness.      Cervical back: Neck supple. No rigidity or tenderness.   Neurological:      Mental Status: She is alert.             Significant Labs: All pertinent labs within the past 24 hours have been reviewed.  BMP:   Recent Labs   Lab 10/09/24  0455   GLU 71      K 3.5      CO2 30*   BUN 12   CREATININE 0.6   CALCIUM 9.0   MG 2.0     CBC:   Recent Labs   Lab 10/08/24  0600 10/09/24  0455   WBC 9.90 10.93   HGB 14.6 15.9   HCT 44.4 47.7    207     CMP:   Recent Labs   Lab 10/08/24  0600 10/09/24  0455    140   K 3.9 3.5    100   CO2 28 30*    71   BUN 13 12   CREATININE 0.7 0.6   CALCIUM 9.1 9.0   PROT 6.2 6.3   ALBUMIN 3.5 3.5   BILITOT 0.5 0.6   ALKPHOS 102 83   AST 90* 50*   * 112*   ANIONGAP 6* 10       Significant Imaging: I have reviewed all pertinent imaging results/findings within the past 24 hours.

## 2024-10-09 NOTE — NURSING
Patient arrived to floor in stable condition from surgery. Visualized patient and assessed patient's overall condition and appearance. Patient AAO, follows commands. No complaints. NAD noted. Family at bedside.

## 2024-10-09 NOTE — PROGRESS NOTES
[facility] - Surgery  Adult Nutrition  Progress Note    SUMMARY       Recommendations    Recommendation:  1. Add Boost BID  2. Add cardiac restrictions to diet  3. Monitor weight/labs  4. RD to follow to monitor PO intake    Goals:  Pt will be started on a diet by RD follow-up  Nutrition Goal Status: goal not met  Communication of RD Recs: reviewed with RN    Assessment and Plan  Endocrine  Moderate protein-calorie malnutrition  Malnutrition Type:  Context: chronic illness  Level: moderate    Related to (etiology):   Depression    Signs and Symptoms (as evidenced by):   Unintended weight loss  30% weight loss x 1 year     Malnutrition Characteristic Summary:  Weight Loss (Malnutrition): greater than 20% in 1 year  Energy Intake (Malnutrition): less than 75% for greater than 7 days (30% wt loss x 1 year)  Subcutaneous Fat (Malnutrition): mild depletion  Muscle Mass (Malnutrition): mild depletion    Interventions:  Collaboration with other providers  Commercial Beverage    Nutrition Diagnosis Status:   New    Malnutrition Assessment  Malnutrition Context: chronic illness  Malnutrition Level: moderate  Weight Loss (Malnutrition): greater than 20% in 1 year  Energy Intake (Malnutrition): less than 75% for greater than 7 days (30% wt loss x 1 year)  Subcutaneous Fat (Malnutrition): mild depletion  Muscle Mass (Malnutrition): mild depletion   Orbital Region (Subcutaneous Fat Loss): mild depletion  Upper Arm Region (Subcutaneous Fat Loss): moderate depletion  Thoracic and Lumbar Region: mild depletion   Baptism Region (Muscle Loss): mild depletion  Clavicle Bone Region (Muscle Loss): mild depletion  Clavicle and Acromion Bone Region (Muscle Loss): mild depletion  Scapular Bone Region (Muscle Loss): mild depletion  Dorsal Hand (Muscle Loss): mild depletion  Patellar Region (Muscle Loss): mild depletion  Anterior Thigh Region (Muscle Loss): mild depletion  Posterior Calf Region (Muscle Loss): moderate depletion     Reason for  "Assessment  Reason For Assessment: RD follow-up  Diagnosis:  (Choledocholithiasis)  General Information Comments: Pt presented with abdominal pain, diagnosed with Choledocholithiasis. Pt currently NPO. Shay 18- skin intact. NFPE assessed 10/8, mild depletion. Pt reports losing over 100lbs over the last year, weight history per chart is 29.9lb wt loss x 7 months, 58lb wt loss x 1 year. (PMH: HTN, HLD, GERD, CHF)  Nutrition Discharge Planning: d/c on cardiac diet    Nutrition Risk Screen  Nutrition Risk Screen: reduced oral intake over the last month    Nutrition/Diet History  Food Preferences: JOSE  Spiritual, Cultural Beliefs, [employer]ian Practices, Values that Affect Care: no  Factors Affecting Nutritional Intake: decreased appetite    Anthropometrics  Temp: 97.5 °F (36.4 °C)  Height Method: Stated  Height: 5' 5" (165.1 cm)  Height (inches): 65 in  Weight Method: Bed Scale  Weight: 59.9 kg (132 lb 0.9 oz)  Weight (lb): 132.06 lb  Ideal Body Weight (IBW), Female: 125 lb  % Ideal Body Weight, Female (lb): 105.65 %  BMI (Calculated): 22  Usual Body Weight (UBW), k.6 kg (10/24/23)  Weight Change Amount: 29 lb 15.7 oz  % Usual Body Weight: 69.31  % Weight Change From Usual Weight: -30.83 %     Lab/Procedures/Meds  Pertinent Labs Reviewed: reviewed  Pertinent Labs Comments: CO2 30(H), AST 50(H), (H), lipase 70(H)  Pertinent Medications Reviewed: reviewed  Pertinent Medications Comments: ondansetron, pantoprazole, zosyn    Nutrition Related Social Determinants of Health:  SDOH: Adequate food in home environment    Estimated/Assessed Needs  Weight Used For Calorie Calculations: 59.9 kg (132 lb 0.9 oz)  Energy Calorie Requirements (kcal): 1797kcal (30 kcal/kg)  Energy Need Method: Kcal/kg  Protein Requirements: 75g (1.2 g/kg)  Weight Used For Protein Calculations: 59.9 kg (132 lb 0.9 oz)  Estimated Fluid Requirement Method: RDA Method  RDA Method (mL): 1797     Nutrition Prescription Ordered  Current Diet Order: " NPO    Evaluation of Received Nutrient/Fluid Intake  I/O: 360/0  Energy Calories Required: not meeting needs  Protein Required: not meeting needs  Fluid Required: not meeting needs  Comments: LBM: 10/6  Tolerance: not tolerating  % Intake of Estimated Energy Needs: Other: NPO  % Meal Intake: NPO    Nutrition Risk  Level of Risk/Frequency of Follow-up:  (1x weekly)     Monitor and Evaluation  Food and Nutrient Intake: energy intake  Food and Nutrient Adminstration: diet order  Physical Activity and Function: nutrition-related ADLs and IADLs  Anthropometric Measurements: weight  Biochemical Data, Medical Tests and Procedures: inflammatory profile, gastrointestinal profile  Nutrition-Focused Physical Findings: overall appearance     Nutrition Follow-Up  RD Follow-up?: Yes

## 2024-10-09 NOTE — ANESTHESIA PREPROCEDURE EVALUATION
10/09/2024  Patricia Ramirez is a 69 y.o., female.  To undergo Procedure(s) (LRB):  XI ROBOTIC CHOLECYSTECTOMY (N/A)     Denies CP/SOB/GERD/MI/CVA/URI symptoms.  METS > 4  NPO > 8    Past Medical History:  Past Medical History:   Diagnosis Date    Allergy     Anxiety     Arthritis     Cataract     Depressive disorder, not elsewhere classified     Esophageal reflux     Hypertension     Impaired fasting glucose     Joint pain     Obesity, unspecified     Other and unspecified hyperlipidemia        Past Surgical History:  Past Surgical History:   Procedure Laterality Date    BLEPHAROPLASTY Bilateral 3/3/2021    Procedure: BLEPHAROPLASTY;  Surgeon: Maite Acuna MD;  Location: [facility];  Service: Ophthalmology;  Laterality: Bilateral;     SECTION  1975    COLONOSCOPY N/A 2023    Procedure: COLONOSCOPY;  Surgeon: Briana Sterling MD;  Location: [facility];  Service: Endoscopy;  Laterality: N/A;    ERCP N/A 10/7/2024    Procedure: ERCP (ENDOSCOPIC RETROGRADE CHOLANGIOPANCREATOGRAPHY);  Surgeon: Catracho Mitchell MD;  Location: [facility] (2ND FLR);  Service: Endoscopy;  Laterality: N/A;    HYSTERECTOMY      without BSO    INTRAOCULAR PROSTHESES INSERTION Left 10/27/2022    Procedure: INSERTION, IOL PROSTHESIS;  Surgeon: Palmer Berrios MD;  Location: [facility] OR;  Service: Ophthalmology;  Laterality: Left;    PHACOEMULSIFICATION OF CATARACT Left 10/27/2022    Procedure: PHACOEMULSIFICATION, CATARACT;  Surgeon: Palmer Berrios MD;  Location: [facility] OR;  Service: Ophthalmology;  Laterality: Left;  RN PHONE PREOP 10/21/2022   ARRIVAL 9:00 AM    R knee replacement      REPAIR OF RETINAL DETACHMENT WITH VITRECTOMY Left 2022    Procedure: REPAIR, RETINAL DETACHMENT, WITH VITRECTOMY;  Surgeon: MINO Martinez MD;  Location: [facility] 1ST FLR;  Service: Ophthalmology;  Laterality:  Left;  Spoke with SID Garcia. Pt was instructed nothing to eat after 8am and to arrive at 2pm for preop. 430pm case start request.    right  arm  surgery         Social History:  Social History     Socioeconomic History    Marital status:     Years of education: 12   Tobacco Use    Smoking status: Every Day     Current packs/day: 0.75     Average packs/day: 0.8 packs/day for 40.0 years (30.0 ttl pk-yrs)     Types: Cigarettes    Smokeless tobacco: Never   Substance and Sexual Activity    Alcohol use: Yes     Alcohol/week: 0.0 standard drinks of alcohol     Comment: occasionally    Drug use: Yes     Types: Marijuana    Sexual activity: Yes     Partners: Male     Birth control/protection: See Surgical Hx   Other Topics Concern    Are you pregnant or think you may be? No    Breast-feeding No     Social Drivers of Health     Financial Resource Strain: Medium Risk (10/6/2024)    Overall Financial Resource Strain (CARDIA)     Difficulty of Paying Living Expenses: Somewhat hard   Food Insecurity: No Food Insecurity (10/6/2024)    Hunger Vital Sign     Worried About Running Out of Food in the Last Year: Never true     Ran Out of Food in the Last Year: Never true   Transportation Needs: No Transportation Needs (10/6/2024)    TRANSPORTATION NEEDS     Transportation : No   Stress: Stress Concern Present (10/6/2024)    [employer] - Occupational Stress Questionnaire     Feeling of Stress : To some extent   Housing Stability: Low Risk  (10/6/2024)    Housing Stability Vital Sign     Unable to Pay for Housing in the Last Year: No     Homeless in the Last Year: No       Medications:  Current Facility-Administered Medications on File Prior to Encounter   Medication Dose Route Frequency Provider Last Rate Last Admin    cyclopentolate 1% ophthalmic solution 1 drop  1 drop Left Eye On Call Procedure Palmer Berrios MD   1 drop at 10/27/22 1003    ofloxacin 0.3 % ophthalmic solution 1 drop  1  drop Left Eye On Call Procedure Palmer Berrios MD   2 drop at 10/27/22 1238    sodium chloride 0.9% flush 10 mL  10 mL Intravenous PRN Palmer Berrios MD         Current Outpatient Medications on File Prior to Encounter   Medication Sig Dispense Refill    ALPRAZolam (XANAX) 0.5 MG tablet Take 1 tablet (0.5 mg total) by mouth 2 (two) times daily as needed for Anxiety. 60 tablet 0    aspirin (ECOTRIN) 81 MG EC tablet Take 81 mg by mouth once daily.      atorvastatin (LIPITOR) 40 MG tablet Take 1 tablet (40 mg total) by mouth once daily. 90 tablet 3    busPIRone (BUSPAR) 15 MG tablet TAKE 1 TABLET BY MOUTH THREE TIMES DAILY 90 tablet 5    citalopram (CELEXA) 40 MG tablet Take 1 tablet by mouth once daily 90 tablet 3    cyclobenzaprine (FLEXERIL) 5 MG tablet TAKE 1 TABLET BY MOUTH TWICE DAILY AS NEEDED FOR MUSCLE SPASM 90 tablet 3    ergocalciferol (ERGOCALCIFEROL) 50,000 unit Cap Take 1 capsule (50,000 Units total) by mouth every 7 days. 12 capsule 3    olmesartan (BENICAR) 20 MG tablet TAKE 1 TABLET BY MOUTH ONCE DAILY IN THE EVENING 90 tablet 3    acetaminophen (TYLENOL) 500 MG tablet Take 500 mg by mouth every 6 (six) hours as needed for Pain.      BIOTIN ORAL Take by mouth.         Allergies:  Review of patient's allergies indicates:   Allergen Reactions    Sulfa (sulfonamide antibiotics) Hives    Ace inhibitors Other (See Comments)     Cough         Active Problems:  Patient Active Problem List   Diagnosis    Essential hypertension    Hyperlipidemia    GERD (gastroesophageal reflux disease)    Depression with anxiety    Migraine with aura and without status migrainosus, not intractable    Nuclear sclerosis of both eyes    Dermatochalasis    Rosacea    Brow ptosis, bilateral    Retinal detachment of left eye with multiple breaks    Epiretinal membrane, left    Splenic infarction    Heart failure with preserved ejection fraction    Diastolic dysfunction    IFG (impaired fasting glucose)    Class 1  obesity due to excess calories without serious comorbidity with body mass index (BMI) of 31.0 to 31.9 in adult    RBBB    Left atrial enlargement    Left anterior fascicular block    Abnormal EKG    Dyspnea on exertion    Chronic anticoagulation    Multiple risk factors for coronary artery disease    Splenic infarct    History of tobacco abuse    Nuclear sclerotic cataract of left eye    Moderate major depression, single episode    Tobacco dependency    Choledocholithiasis       Diagnostic Studies:   Latest Reference Range & Units 10/09/24 04:55   WBC 3.90 - 12.70 K/uL 10.93   RBC 4.00 - 5.40 M/uL 4.78   Hemoglobin 12.0 - 16.0 g/dL 15.9   Hematocrit 37.0 - 48.5 % 47.7   MCV 82 - 98 fL 100 (H)   MCH 27.0 - 31.0 pg 33.3 (H)   MCHC 32.0 - 36.0 g/dL 33.3   RDW 11.5 - 14.5 % 12.5   Platelet Count 150 - 450 K/uL 207   MPV 9.2 - 12.9 fL 10.1   Gran % 38.0 - 73.0 % 68.2   Lymph % 18.0 - 48.0 % 19.3   Mono % 4.0 - 15.0 % 8.9   Eos % 0.0 - 8.0 % 2.7   Basophil % 0.0 - 1.9 % 0.6   Immature Granulocytes 0.0 - 0.5 % 0.3   Gran # (ANC) 1.8 - 7.7 K/uL 7.5   Lymph # 1.0 - 4.8 K/uL 2.1   Mono # 0.3 - 1.0 K/uL 1.0   Eos # 0.0 - 0.5 K/uL 0.3   Baso # 0.00 - 0.20 K/uL 0.07   Immature Grans (Abs) 0.00 - 0.04 K/uL 0.03   nRBC 0 /100 WBC 0   Differential Method  Automated      Latest Reference Range & Units 10/09/24 04:55   Sodium 136 - 145 mmol/L 140   Potassium 3.5 - 5.1 mmol/L 3.5   Chloride 95 - 110 mmol/L 100   CO2 23 - 29 mmol/L 30 (H)   Anion Gap 8 - 16 mmol/L 10   BUN 8 - 23 mg/dL 12   Creatinine 0.5 - 1.4 mg/dL 0.6   eGFR >60 mL/min/1.73 m^2 >60   Glucose 70 - 110 mg/dL 71   Calcium 8.7 - 10.5 mg/dL 9.0   Phosphorus Level 2.7 - 4.5 mg/dL 3.2   Magnesium  1.6 - 2.6 mg/dL 2.0   ALP 55 - 135 U/L 83   PROTEIN TOTAL 6.0 - 8.4 g/dL 6.3   Albumin 3.5 - 5.2 g/dL 3.5   BILIRUBIN TOTAL 0.1 - 1.0 mg/dL 0.6   AST 10 - 40 U/L 50 (H)   ALT 10 - 44 U/L 112 (H)   Lipase 4 - 60 U/L 670 (H)     EKG (10/6/24):  Sinus bradycardia   Right bundle  branch block   Left anterior fascicular block    Bifascicular block     TTE (10/7/24):    Left Ventricle: The left ventricle is normal in size. Normal wall thickness. There is normal systolic function with a visually estimated ejection fraction of 55 - 60%. Grade I diastolic dysfunction.    Right Ventricle: Normal right ventricular cavity size. Systolic function is normal.    Mitral Valve: There is severe posterior mitral annular calcification present. There is mild stenosis. The mean pressure gradient across the mitral valve is 3 mmHg at a heart rate of 58 bpm.    Pulmonary Artery: The estimated pulmonary artery systolic pressure is 35 mmHg.    24 Hour Vitals:  Temp:  [36.4 °C (97.6 °F)-37 °C (98.6 °F)] 36.9 °C (98.4 °F)  Pulse:  [54-75] 60  Resp:  [18-20] 18  SpO2:  [95 %-97 %] 95 %  BP: (111-123)/(63-68) 123/64   See Nursing Charting For Additional Vitals      Pre-op Assessment    I have reviewed the Patient Summary Reports.     I have reviewed the Nursing Notes.       Review of Systems  Anesthesia Hx:  No problems with previous Anesthesia               Denies Personal Hx of Anesthesia complications.                    Social:  Smoker, Social Alcohol Use       Cardiovascular:  Exercise tolerance: good   Hypertension    Dysrhythmias   CHF    hyperlipidemia   ECG has been reviewed.                          Pulmonary:  Pulmonary Normal                       Hepatic/GI:        Choledocholithiasis          Musculoskeletal:  Arthritis               Neurological:      Headaches                                 Endocrine:  Endocrine Normal            Psych:   anxiety depression                Physical Exam  General: Well nourished and Cooperative    Airway:  Mallampati: II   Mouth Opening: Normal  TM Distance: Normal    Dental:  Intact    Chest/Lungs:  Clear to auscultation, Normal Respiratory Rate    Heart:  Rate: Normal  Rhythm: Regular Rhythm        Anesthesia Plan  Type of Anesthesia, risks & benefits  discussed:    Anesthesia Type: Gen ETT  Intra-op Monitoring Plan: Standard ASA Monitors  Post Op Pain Control Plan: multimodal analgesia and IV/PO Opioids PRN  Induction:  IV  Airway Plan: Direct and Video, Post-Induction  Informed Consent: Informed consent signed with the Patient and all parties understand the risks and agree with anesthesia plan.  All questions answered. Patient consented to blood products? Yes  ASA Score: 3  Anesthesia Plan Notes:   GA with OETT  Standard ASA monitors  Recovery in PACU  PONV: 2    Ready For Surgery From Anesthesia Perspective.     .

## 2024-10-09 NOTE — ASSESSMENT & PLAN NOTE
Nutrition consulted. Most recent weight and BMI monitored-     Measurements:  Wt Readings from Last 1 Encounters:   10/09/24 59.9 kg (132 lb 0.9 oz)   Body mass index is 21.98 kg/m².    Patient has been screened and assessed by RD.    Malnutrition Type:  Context: chronic illness  Level: moderate    Malnutrition Characteristic Summary:  Weight Loss (Malnutrition): greater than 20% in 1 year  Energy Intake (Malnutrition): less than 75% for greater than 7 days (30% wt loss x 1 year)  Subcutaneous Fat (Malnutrition): mild depletion  Muscle Mass (Malnutrition): mild depletion    Interventions/Recommendations (treatment strategy):  1. Add Boost BID 2. Add cardiac restrictions to diet 3. Monitor weight/labs 4. RD to follow to monitor PO intake

## 2024-10-09 NOTE — ASSESSMENT & PLAN NOTE
Patient was status post lap choly on October 9  Evidence of choledocholithiasis on CT.  GI/general surgery consulted.  Initiate on IV Zosyn.    On empiric Zosyn.  Blood cultures pending.  Noted to have transaminitis  MRCP with evidence of1.8 cm choledocholith in the common bile ducts, with resultant intra and extrahepatic biliary ductal dilation .  Plan for ERCP

## 2024-10-09 NOTE — PROGRESS NOTES
[facility]  Progress Note    Patient Name: Patricia Ramirez  MRN: [id number]  Patient Class: IP- Inpatient   Admission Date: 10/6/2024  Length of Stay: 3 days  Attending Physician: Ez Reyna DO  Primary Care Provider: Fbaienne Erwin NP        Subjective:     Principal Problem:Choledocholithiasis        HPI:    Patient is a 69-year-old female with past medical history of anxiety, depression, hypertension, hyperlipidemia, GERD, CHF EF 65% with G-II DD,  tobacco use (50 pack-year smoking history) marijuana use, 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  Patient reports 10/10 intensity cramping abdominal pain for 1 day duration.  Patient reports relief with Gas-X.  Denied nausea/vomiting/fever/melena/hematochezia/diarrhea/chest pain/shortness of breath.  Patient reports not taking Eliquis for months    During evaluation in the ER, patient was found to be hypertensive (163/67, sinus Jason (58).  Labs revealed WBC 9.53, hemoglobin 15.2, platelets 231, sodium 137, potassium 4.3, CO2 21, creatinine 0.7, AST 21, ALT 24, lipase 11, total bilirubin 0.5, ALP 52.  Lactic acid 1.5.  TSH 2.52.  Urinalysis negative for UTI with rare bacteria and 16 WBC.Cholelithiasis with choledocholithiasis.  There is mild intrahepatic biliary ductal dilatation.  No inflammation about the gallbladder. Small hiatal hernia and small to moderate-sized duodenal diverticulum.  Pending ultrasound abdomen.  General surgery/GI was consulted.  Recommended MRCP.    Patient was given 1 g ceftriaxone, 4 mg IV morphine, 75 mL iohexol.  Psychiatry was consulted in ED for prolonged grief reaction with significant weight loss.  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  GI /general surgery was consulted.  On empiric Zosyn.  Blood cultures pending.  Noted to have transaminitis  MRCP with evidence of1.8 cm choledocholith in the common bile ducts, with resultant intra and extrahepatic biliary ductal dilation .  Plan for ERCP today    Interval History:  Patient just returned from the operating room.  She was continued to be under anesthesia.  She was awake.  She said that she feels the pressure and the tenderness in her abdomen.  Otherwise she requesting to start on a diet.  Sister was adamant side.  All questions were answered.  Sister was updated about the patient's condition.    Review of Systems   Reason unable to perform ROS: Unable to obtain detailed 1 due to patient coming back from the OR, patient complains of abdominal pain and distention.     Objective:     Vital Signs (Most Recent):  Temp: 98 °F (36.7 °C) (10/09/24 1716)  Pulse: 70 (10/09/24 1716)  Resp: 18 (10/09/24 1716)  BP: (!) 111/57 (10/09/24 1716)  SpO2: (!) 91 % (10/09/24 1716) Vital Signs (24h Range):  Temp:  [97.5 °F (36.4 °C)-98.4 °F (36.9 °C)] 98 °F (36.7 °C)  Pulse:  [54-81] 70  Resp:  [11-24] 18  SpO2:  [91 %-98 %] 91 %  BP: (111-164)/(57-72) 111/57     Weight: 59.9 kg (132 lb 0.9 oz)  Body mass index is 21.98 kg/m².    Intake/Output Summary (Last 24 hours) at 10/9/2024 1716  Last data filed at 10/9/2024 1446  Gross per 24 hour   Intake 1000 ml   Output 100 ml   Net 900 ml         Physical Exam  Constitutional:       General: She is not in acute distress.     Appearance: Normal appearance. She is not ill-appearing.   HENT:      Head: Normocephalic and atraumatic.      Right Ear: There is no impacted cerumen.      Nose: Nose normal. No congestion or rhinorrhea.      Mouth/Throat:      Mouth: Mucous membranes  are dry.      Pharynx: No oropharyngeal exudate or posterior oropharyngeal erythema.   Eyes:      General:         Right eye: No discharge.         Left eye: No discharge.   Cardiovascular:      Rate and Rhythm: Normal rate.   Pulmonary:      Effort: No respiratory distress.      Breath sounds: No stridor. No wheezing or rhonchi.   Abdominal:      Palpations: There is no mass.      Tenderness: There is abdominal tenderness.      Hernia: No hernia is present.   Musculoskeletal:         General: No swelling or tenderness.      Cervical back: Neck supple. No rigidity or tenderness.   Neurological:      Mental Status: She is alert.             Significant Labs: All pertinent labs within the past 24 hours have been reviewed.  BMP:   Recent Labs   Lab 10/09/24  0455   GLU 71      K 3.5      CO2 30*   BUN 12   CREATININE 0.6   CALCIUM 9.0   MG 2.0     CBC:   Recent Labs   Lab 10/08/24  0600 10/09/24  0455   WBC 9.90 10.93   HGB 14.6 15.9   HCT 44.4 47.7    207     CMP:   Recent Labs   Lab 10/08/24  0600 10/09/24  0455    140   K 3.9 3.5    100   CO2 28 30*    71   BUN 13 12   CREATININE 0.7 0.6   CALCIUM 9.1 9.0   PROT 6.2 6.3   ALBUMIN 3.5 3.5   BILITOT 0.5 0.6   ALKPHOS 102 83   AST 90* 50*   * 112*   ANIONGAP 6* 10       Significant Imaging: I have reviewed all pertinent imaging results/findings within the past 24 hours.    Assessment/Plan:   Today's assessment and plan  Choledocholithiasis, status post lap diane 10/0 9  Tobacco abuse  Depression   History of hypertension   History of hyperlipidemia  Impaired fasting glucose  GERD    10/09/2024:  Patient was seen after she got back from the OR.  Advance diet as per General surgery recommendations.  LFTs appeared to trend down.  She is status post lap choly on 10/0 9.  She appeared to tolerate the procedure well.  Pain control.  Continue to monitor CBC and BMP in the morning.  Continue to monitor vital signs.   Continue  empirically on Zosyn.    VTE Risk Mitigation (From admission, onward)           Ordered     IP VTE HIGH RISK PATIENT  Once         10/06/24 1525     Place sequential compression device  Until discontinued         10/06/24 1525                    Discharge Planning   STUART: 10/8/2024     Code Status: Full Code   Is the patient medically ready for discharge?:     Reason for patient still in hospital (select all that apply): Treatment  Discharge Plan A: Home with family   Discharge Delays: None known at this time              Ez Reyna DO  Department of Hospital Medicine   [facility]

## 2024-10-09 NOTE — OP NOTE
[facility] - [place] - Surgery  Operative Note    SUMMARY     Surgery Date: 10/9/2024     Surgeons and Role:     * Rigoberto Ponce MD - Primary     * Hai Cutler MD - Resident - Assisting     * Chiki Cummings MD - Co-Surgeon        Pre-op Diagnosis:  Cholecystitis [K81.9]  Choledocholithiasis [K80.50]    Post-op Diagnosis:  Post-Op Diagnosis Codes:     * Cholecystitis [K81.9]     * Choledocholithiasis [K80.50]    Procedure(s) (LRB):  XI ROBOTIC SUB TOTAL CHOLECYSTECTOMY; DRAIN PLACEMENT (N/A)  Modifier 22 for complexity of cholecystectomy, unable to safely peel of stomach, possible biliary enteric fistula, colon stuck to liver edge needing dissection, bleeding tissues on liver, possible cirrhotic, and unable to obtain critical view of safety, leading to need for top down dissection/subtotal cholecystectomy.    Anesthesia: General    Indication for procedure: Patricia Ramirez is 69 y.o. female with hx of choledocholithiasis recent ERCP with stent placement (difficult process and inability to remove stone). After discussion of disease process, we discussed options and have elected for operation to perform robotic vs open cholecystectomy.    Description of Procedure: After consent was obtained, patient was taken to the OR. The abdomen was prepped and draped in a standard sterile fashion after general anesthesia was started. Time out was performed. Antibiotics were started with Zosyn. We began the procedure by using a Veress needle and achieved pneumoperitoneum at dumont's point.  Patient tolerated the pneumoperitoneum well.  We placed robotic trocar at the left upper quadrant and then under direct visualization we ensured that there was no injury to structures in left upper quadrant we placed additional robotic trocars the supraumbilical midline right upper quadrant right flank.  We docked the robot after patient was placed in right side up in reverse Trendelenburg.  We noted that liver had some discoloration possibly  consistent with some liver disease or cirrhosis.  There was: Apparently stuck to the liver edge with a lot of adhesions.  We used the sharp and cautery dissection to peel the sound.  We used the firefly and unfortunately the gallbladder did not fill.  We are able to identify the gallbladder after peeling down the colon we tracked retracted the fundus cephalad.  We did dissect circumferentially to free up some surrounding adhesions and fatty tissue so we could see the edge of the gallbladder.  The infundibulum was difficult to find there was a portion of stomach which was very firmly adherent to possibly the common bile duct.  This was attempted to take down however was very stuck and we returned our attention to a top-down approach.  We did this because we could not identify the critical view of safety.  We took this down the patient did happen to have more bleeding than expected despite staying within the appropriate plane and the patient's liver eventually stopped bleeding with additional cautery and we placed a sheet of Surgicel directly onto it.  We then took down our gallbladder as far as we could at 1 point it appeared that we transition to a common bile duct however was still very dilated were unable to identify critical view of safety.  We opted to perform a subtotal cholecystectomy at this point we used the hook cautery and transected a rim of gallbladder.  We did remove all the stones and sludge found within the gallbladder including going downstream towards the cystic duct.  We irrigated with large amount of saline and ensured hemostasis.  We then used a running 2-0 Stratafix after cauterizing the mucosa of the remnant gallbladder and we closed the cuff with a running suture of note there was no bile seen coming up from the cystic duct space.  Also after opening the gallbladder identified I attempted to identify the anatomy so we can further delineate however when we saw 1 lumen going downstream this was  right where the stomach appeared very firmly adherent.  We removed the gallbladder portion as well as gallstones with the Endo-Catch bag.  We introduced a 10 flat AMBAR drain this exited from the right lateral robotic trocar and sat right under the liver edge at our area of surgery.  We then assured hemostasis used the suction to remain remove any residual blood and bile.  And then we evacuated pneumoperitoneum and removed our trocars.  We secured the drain stitch on the right side.  We then closed the skin in all sites with 4-0 Monocryl covered by Dermabond after injecting the remainder of the Exparel  All counts were correct x2. Patient was awakened from anesthesia and taken to the recovery room in a stable condition having suffered no issues at this time.    Description of the findings of the procedure:   Unable to obtain critical view of safety; stomach firmly adherent to biliary structures. Unable to visualize cystic duct, or it was massively distended.  Subtotal cholecystectomy performed, cuff closed after removal of stones. 10 flat AMBAR drain placed under liver edge.    Estimated Blood Loss: 100 mL         Specimens:   Subtotal cholecystectomy, partial gallbladder      Drains/Implants: 10 flat AMBAR drainNone

## 2024-10-09 NOTE — PLAN OF CARE
Recommendation:  1. Add Boost BID  2. Add cardiac restrictions to diet  3. Monitor weight/labs  4. RD to follow to monitor PO intake    Goals:  Pt will be started on a diet by RD follow-up

## 2024-10-09 NOTE — ANESTHESIA PROCEDURE NOTES
Intubation    Date/Time: 10/9/2024 12:20 PM    Performed by: Leonides Tipton CRNA  Authorized by: Mckinley Winn MD    Intubation:     Induction:  Intravenous    Intubated:  Postinduction    Mask Ventilation:  Easy with oral airway    Attempts:  1    Attempted By:  LEDY and student (LASHAWN Almanza)    Method of Intubation:  Video laryngoscopy    Blade:  Ramos 3    Laryngeal View Grade: Grade I - full view of cords      Difficult Airway Encountered?: No      Complications:  None    Airway Device:  Oral endotracheal tube    Airway Device Size:  7.0    Style/Cuff Inflation:  Cuffed (inflated to minimal occlusive pressure)    Tube secured:  22    Secured at:  The lips    Placement Verified By:  Capnometry    Complicating Factors:  None    Findings Post-Intubation:  BS equal bilateral and atraumatic/condition of teeth unchanged

## 2024-10-10 PROBLEM — Z79.01 CHRONIC ANTICOAGULATION: Status: RESOLVED | Noted: 2022-09-26 | Resolved: 2024-10-10

## 2024-10-10 LAB
ANION GAP SERPL CALC-SCNC: 13 MMOL/L (ref 8–16)
BACTERIA BLD CULT: NORMAL
BACTERIA BLD CULT: NORMAL
BUN SERPL-MCNC: 13 MG/DL (ref 8–23)
CALCIUM SERPL-MCNC: 8.5 MG/DL (ref 8.7–10.5)
CHLORIDE SERPL-SCNC: 101 MMOL/L (ref 95–110)
CO2 SERPL-SCNC: 25 MMOL/L (ref 23–29)
CREAT SERPL-MCNC: 0.6 MG/DL (ref 0.5–1.4)
ERYTHROCYTE [DISTWIDTH] IN BLOOD BY AUTOMATED COUNT: 12.5 % (ref 11.5–14.5)
EST. GFR  (NO RACE VARIABLE): >60 ML/MIN/1.73 M^2
GLUCOSE SERPL-MCNC: 88 MG/DL (ref 70–110)
HCT VFR BLD AUTO: 42.6 % (ref 37–48.5)
HGB BLD-MCNC: 14 G/DL (ref 12–16)
MCH RBC QN AUTO: 32.9 PG (ref 27–31)
MCHC RBC AUTO-ENTMCNC: 32.9 G/DL (ref 32–36)
MCV RBC AUTO: 100 FL (ref 82–98)
METHYLMALONATE SERPL-SCNC: 0.14 NMOL/ML
PLATELET # BLD AUTO: 169 K/UL (ref 150–450)
PMV BLD AUTO: 10.2 FL (ref 9.2–12.9)
POTASSIUM SERPL-SCNC: 3.5 MMOL/L (ref 3.5–5.1)
RBC # BLD AUTO: 4.26 M/UL (ref 4–5.4)
SODIUM SERPL-SCNC: 139 MMOL/L (ref 136–145)
WBC # BLD AUTO: 12.95 K/UL (ref 3.9–12.7)

## 2024-10-10 PROCEDURE — 63600175 PHARM REV CODE 636 W HCPCS: Performed by: STUDENT IN AN ORGANIZED HEALTH CARE EDUCATION/TRAINING PROGRAM

## 2024-10-10 PROCEDURE — 63600175 PHARM REV CODE 636 W HCPCS: Performed by: INTERNAL MEDICINE

## 2024-10-10 PROCEDURE — 36415 COLL VENOUS BLD VENIPUNCTURE: CPT | Performed by: INTERNAL MEDICINE

## 2024-10-10 PROCEDURE — 21400001 HC TELEMETRY ROOM

## 2024-10-10 PROCEDURE — 85027 COMPLETE CBC AUTOMATED: CPT | Performed by: INTERNAL MEDICINE

## 2024-10-10 PROCEDURE — 25000003 PHARM REV CODE 250: Performed by: STUDENT IN AN ORGANIZED HEALTH CARE EDUCATION/TRAINING PROGRAM

## 2024-10-10 PROCEDURE — 80048 BASIC METABOLIC PNL TOTAL CA: CPT | Performed by: INTERNAL MEDICINE

## 2024-10-10 PROCEDURE — S4991 NICOTINE PATCH NONLEGEND: HCPCS | Performed by: STUDENT IN AN ORGANIZED HEALTH CARE EDUCATION/TRAINING PROGRAM

## 2024-10-10 RX ADMIN — MORPHINE SULFATE 4 MG: 4 INJECTION, SOLUTION INTRAMUSCULAR; INTRAVENOUS at 11:10

## 2024-10-10 RX ADMIN — NICOTINE 1 PATCH: 14 PATCH TRANSDERMAL at 09:10

## 2024-10-10 RX ADMIN — PIPERACILLIN AND TAZOBACTAM 4.5 G: 4; .5 INJECTION, POWDER, FOR SOLUTION INTRAVENOUS; PARENTERAL at 01:10

## 2024-10-10 RX ADMIN — BUSPIRONE HYDROCHLORIDE 15 MG: 10 TABLET ORAL at 09:10

## 2024-10-10 RX ADMIN — PIPERACILLIN AND TAZOBACTAM 4.5 G: 4; .5 INJECTION, POWDER, FOR SOLUTION INTRAVENOUS; PARENTERAL at 05:10

## 2024-10-10 RX ADMIN — BUSPIRONE HYDROCHLORIDE 15 MG: 10 TABLET ORAL at 08:10

## 2024-10-10 RX ADMIN — BUSPIRONE HYDROCHLORIDE 15 MG: 10 TABLET ORAL at 03:10

## 2024-10-10 RX ADMIN — MORPHINE SULFATE 4 MG: 4 INJECTION, SOLUTION INTRAMUSCULAR; INTRAVENOUS at 06:10

## 2024-10-10 RX ADMIN — PIPERACILLIN AND TAZOBACTAM 4.5 G: 4; .5 INJECTION, POWDER, FOR SOLUTION INTRAVENOUS; PARENTERAL at 08:10

## 2024-10-10 RX ADMIN — MORPHINE SULFATE 4 MG: 4 INJECTION, SOLUTION INTRAMUSCULAR; INTRAVENOUS at 12:10

## 2024-10-10 RX ADMIN — PANTOPRAZOLE SODIUM 40 MG: 40 INJECTION, POWDER, LYOPHILIZED, FOR SOLUTION INTRAVENOUS at 09:10

## 2024-10-10 RX ADMIN — CITALOPRAM HYDROBROMIDE 20 MG: 20 TABLET ORAL at 09:10

## 2024-10-10 RX ADMIN — MORPHINE SULFATE 4 MG: 4 INJECTION, SOLUTION INTRAMUSCULAR; INTRAVENOUS at 01:10

## 2024-10-10 NOTE — PLAN OF CARE
"Changes in medical condition or discharge plan:    Pt is s/p emeka contreras on 10/9. Plan is to discharge home tomorrow.    9312 update: I attempted to give pt her Important Message from Medicare. Pt stated "You always come when they're giving me medicine." I explained to her that I could come back another time or tomorrow and she stated "Please do."    Does patient need new DME? No    Follow up appts needed: PCP, GI    Medically stable: not at this time    Estimated Discharge Date: 10/11/24     10/10/24 1136   Discharge Reassessment   Assessment Type Discharge Planning Reassessment   Did the patient's condition or plan change since previous assessment? Yes  (pt had a emeka contreras)   Discharge Plan discussed with: Patient   Communicated STUART with patient/caregiver Yes   Discharge Plan A Home with family   Discharge Plan B Home   DME Needed Upon Discharge  none   Transition of Care Barriers None   Why the patient remains in the hospital Requires continued medical care   Post-Acute Status   Coverage N   Hospital Resources/Appts/Education Provided Appointments scheduled and added to AVS   Discharge Delays None known at this time       "

## 2024-10-10 NOTE — ASSESSMENT & PLAN NOTE
Patient was status post lap choly on October 9, she has a drain, intact  Some abdominal discomfort. Cbc and CMP tomorrow morning  Some post operative leukocytosis this morning  Resume diet as tolerated  Pt to resume Eliquis when she is cleared by surgery.   Evidence of choledocholithiasis on CT.  GI/general surgery consulted.    On empiric Zosyn.  Blood cultures pending.  Noted to have transaminitis  MRCP with evidence of1.8 cm choledocholith in the common bile ducts, with resultant intra and extrahepatic biliary ductal dilation .

## 2024-10-10 NOTE — PROGRESS NOTES
Surgery Progress Note    Patricia Ramirez is a 69 y.o. year old female in hospital for choledocholithiasis, s/p ERCP with pancreatic and CBD stents. Reports abdominal pain improving. Tolerated CLD yesterday  No f/c/n/v/sob/cp    ROS:  Negative except above    PE:  Vitals:    10/09/24 1638 10/09/24 1716 10/09/24 1900 10/09/24 1946   BP:  (!) 111/57  115/75   BP Location:  Left arm     Patient Position:  Lying  Lying   Pulse:  70 68 69   Resp: 20 18  18   Temp:  98 °F (36.7 °C)  98.3 °F (36.8 °C)   TempSrc:  Oral  Oral   SpO2:  (!) 91%  (!) 93%   Weight:       Height:           NAD  Aox4  Normal WOB on RA  Normal HR  Abd soft, nd, tender to deep palpation of RUQ    CBC  10.93 15.9 207    47.7     Coag                 BMP  140 100 12 71   3.5 30 0.6     CaMgPhos  9.0   2.0   3.2      Last labs from current encounter as of 10/09/2024-8:06 PM    Lipase 670 from 835      A/P:  Patricia Ramirez is a 69 y.o. year old female  with choledocholithiasis s/p ERCP. Lipase down-trending. Underwent robotic-assisted laparoscopic subtotal cholecystectomy 10/9. Remains admitted postop    - CLD, advance as tolerated  - monitor drain output  - MMPC  - continue ABX  - remainder of care per primary team    Hai Cutler  General Surgery - [facility]  10/9/2024

## 2024-10-10 NOTE — NURSING
[facility]  Nurses Note -- 4 Eyes        Date: 10/10/2024        Skin assessed on: Q shift        [] No Pressure Injuries Present                 [x]Prevention Measures Documented     [] Yes LDA Previously documented      [] Yes New Pressure Injury Discovered              [] LDA Added        Attending RN: VALERIE Jackson     Second RN:  RIMA Gupta

## 2024-10-10 NOTE — PLAN OF CARE
Problem: Skin Injury Risk Increased  Goal: Skin Health and Integrity  Outcome: Progressing  Intervention: Optimize Skin Protection  Flowsheets (Taken 10/10/2024 1838)  Pressure Reduction Devices: pressure-redistributing mattress utilized  Activity Management:   Ambulated to bathroom - L4   Walk with assistive devise and /or staff member - L3  Head of Bed (HOB) Positioning: HOB elevated     Problem: Wound  Goal: Optimal Coping  Outcome: Progressing  Intervention: Support Patient and Family Response  Flowsheets (Taken 10/10/2024 1838)  Supportive Measures:   active listening utilized   positive reinforcement provided   relaxation techniques promoted   self-care encouraged

## 2024-10-10 NOTE — SUBJECTIVE & OBJECTIVE
Interval History: pain is tolerated, some abdominal discomfort and pain. She is tolerating her diet.     Review of Systems   Constitutional:  Positive for fatigue. Negative for activity change and appetite change.   HENT:  Negative for congestion and dental problem.    Eyes:  Negative for discharge and itching.   Respiratory:  Negative for apnea, cough, chest tightness and shortness of breath.    Cardiovascular:  Negative for chest pain, palpitations and leg swelling.   Gastrointestinal:  Positive for abdominal distention, abdominal pain and nausea. Negative for anal bleeding, blood in stool and constipation.   Endocrine: Negative for cold intolerance and heat intolerance.   Genitourinary:  Negative for difficulty urinating and dyspareunia.   Musculoskeletal:  Negative for arthralgias and back pain.   Skin:  Negative for color change and pallor.   Allergic/Immunologic: Negative for environmental allergies and food allergies.   Neurological:  Negative for dizziness, light-headedness, numbness and headaches.   Hematological:  Negative for adenopathy. Does not bruise/bleed easily.   Psychiatric/Behavioral:  Negative for agitation and behavioral problems.      Objective:     Vital Signs (Most Recent):  Temp: 98.4 °F (36.9 °C) (10/10/24 0741)  Pulse: 67 (10/10/24 0741)  Resp: 16 (10/10/24 0741)  BP: 111/67 (10/10/24 0741)  SpO2: (!) 94 % (10/10/24 0741) Vital Signs (24h Range):  Temp:  [97.5 °F (36.4 °C)-98.4 °F (36.9 °C)] 98.4 °F (36.9 °C)  Pulse:  [58-81] 67  Resp:  [11-24] 16  SpO2:  [91 %-98 %] 94 %  BP: (111-164)/(57-77) 111/67     Weight: 59.9 kg (132 lb 0.9 oz)  Body mass index is 21.98 kg/m².    Intake/Output Summary (Last 24 hours) at 10/10/2024 0945  Last data filed at 10/10/2024 0830  Gross per 24 hour   Intake 1000 ml   Output 300 ml   Net 700 ml         Physical Exam  Constitutional:       Appearance: Normal appearance.   HENT:      Head: Normocephalic and atraumatic.      Right Ear: There is no impacted  cerumen.      Left Ear: There is no impacted cerumen.      Nose: No congestion or rhinorrhea.      Mouth/Throat:      Pharynx: No oropharyngeal exudate or posterior oropharyngeal erythema.   Eyes:      General:         Right eye: No discharge.         Left eye: No discharge.   Cardiovascular:      Rate and Rhythm: Normal rate and regular rhythm.   Abdominal:      General: There is no distension.      Palpations: Abdomen is soft. There is no mass.      Tenderness: There is abdominal tenderness.      Hernia: No hernia is present.   Musculoskeletal:         General: No swelling or tenderness.      Cervical back: No rigidity or tenderness.   Skin:     Coloration: Skin is not jaundiced or pale.   Neurological:      Mental Status: She is alert and oriented to person, place, and time. Mental status is at baseline.      Sensory: No sensory deficit.      Motor: No weakness.             Significant Labs: All pertinent labs within the past 24 hours have been reviewed.  BMP:   Recent Labs   Lab 10/09/24  0455 10/10/24  0520   GLU 71 88    139   K 3.5 3.5    101   CO2 30* 25   BUN 12 13   CREATININE 0.6 0.6   CALCIUM 9.0 8.5*   MG 2.0  --      CBC:   Recent Labs   Lab 10/09/24  0455 10/10/24  0520   WBC 10.93 12.95*   HGB 15.9 14.0   HCT 47.7 42.6    169     CMP:   Recent Labs   Lab 10/09/24  0455 10/10/24  0520    139   K 3.5 3.5    101   CO2 30* 25   GLU 71 88   BUN 12 13   CREATININE 0.6 0.6   CALCIUM 9.0 8.5*   PROT 6.3  --    ALBUMIN 3.5  --    BILITOT 0.6  --    ALKPHOS 83  --    AST 50*  --    *  --    ANIONGAP 10 13     Imaging Results              MRI MRCP Abdomen W WO Contrast 3D WO Independent WS XPD (Final result)  Result time 10/06/24 19:31:53      Final result by Delmer Solares DO (10/06/24 19:31:53)                   Impression:      1. 1.8 cm choledocholith in the common bile ducts, with resultant intra and extrahepatic biliary ductal dilation as above.  2. Cholelithiasis  without evidence of acute cholecystitis.      Electronically signed by: Delmer Solares  Date:    10/06/2024  Time:    19:31               Narrative:    EXAMINATION:  MRI MRCP ABDOMEN W WO CONTRAST 3D WO INDEPENDENT WS XPD    CLINICAL HISTORY:  Cholelithiasis;    TECHNIQUE:  Multiplanar, multisequence magnetic resonance images of the liver and upper abdomen before and after the uncomplicated intravenous administration of 6 mL Gadavist.  Additionally 2D and 3D MRCP sequences are performed as well as MIP images.    COMPARISON:  CT abdomen and pelvis and ultrasound abdomen from earlier the same date.    FINDINGS:  Pulmonary Bases: No gross abnormality.    Liver: Normal.  No focal hepatic lesion.    Bile Ducts: There is a 1.8 cm choledocholith within the common bile duct.  There is intra and extrahepatic biliary ductal dilation.  The common bile duct measures up to approximately 1.4 cm in maximal diameter.    Gallbladder: There are multiple gallstones.  There is no gallbladder wall thickening or pericholecystic fluid.  There is a Phrygian cap.    Pancreas: Normal.  No pancreatic ductal dilatation.    Spleen: Normal.    Gastrointestinal tract: The small and large bowel loops are normal in caliber without evidence of obstruction.    Retroperitoneum and mesentery: Normal.  No lymphadenopathy.    Adrenal Glands: Normal.    Kidneys: Normal.    Aorta and Abdominal Vasculature: Normal.    Body wall: Normal.                                       US Abdomen Limited (Final result)  Result time 10/06/24 15:57:59      Final result by Zee Hines MD (10/06/24 15:57:59)                   Impression:      Cholelithiasis without evidence for cholecystitis..      Electronically signed by: Zee Hines MD  Date:    10/06/2024  Time:    15:57               Narrative:    EXAMINATION:  US ABDOMEN LIMITED    CLINICAL HISTORY:  ruq abd pain;    TECHNIQUE:  Limited ultrasound of the right upper quadrant of the abdomen (including  pancreas, liver, gallbladder, common bile duct, and right kidney) was performed.    COMPARISON:  None    FINDINGS:  Liver: Normal in size. The liver demonstrates homogenous echotexture. no focal hepatic lesions are seen.    Biliary system: Multiple gallstones are noted, the largest measuring approximately 1.5 cm.  No gallbladder wall thickening.  No sonographic Alvares sign. No pericholecystic fluid. The common duct is not dilated, measuring 0.59 cm. No intrahepatic ductal dilatation.    Pancreas: Pancreas is obscured.    Right kidney: Normal in size measuring 9.3 cmwith no hydronephrosis, mass, or calculi.    Vascular: The portions of the aorta, vena cava, and portal vein appear free of acute abnormality.                                       CT Abdomen Pelvis With IV Contrast NO Oral Contrast (Final result)  Result time 10/06/24 12:19:59      Final result by Zee Hines MD (10/06/24 12:19:59)                   Impression:      Cholelithiasis with choledocholithiasis.  There is mild intrahepatic biliary ductal dilatation.  No inflammation about the gallbladder.    Small hiatal hernia and small to moderate-sized duodenal diverticulum.      Electronically signed by: Zee Hines MD  Date:    10/06/2024  Time:    12:19               Narrative:    EXAMINATION:  CT ABDOMEN PELVIS WITH IV CONTRAST    CLINICAL HISTORY:  Epigastric pain;LLQ abdominal pain;Nausea/vomiting;    TECHNIQUE:  Low dose axial images, sagittal and coronal reformations were obtained from the lung bases to the pubic symphysis following the IV administration of 75 mL of Omnipaque 350 .  Oral contrast was not given.    COMPARISON:  07/28/2024    FINDINGS:  The lung bases are clear.  The base of the heart shows calcifications of the mitral annulus and aortic valve.  Calcified atheromatous disease affects the aorta and its branch vessels.    Cholelithiasis.  Mild prominence of the intrahepatic bile ducts, stable.  Suspected choledocholithiasis  with high density structure seen in the region of the distal CBD, measuring 1.3 and 0.6 cm respectively as seen on coronal image 84.  The spleen, pancreas, adrenal glands and kidneys are normal in size, shape and contour.  No hydronephrosis or hydroureter.  The urinary bladder is well distended and appears normal.  The uterus has been removed.  Adnexal regions are unremarkable.    Small hiatal hernia.  Small to moderate-sized duodenal diverticulum.  No free air, free fluid or obstruction.  No pathologically enlarged abdominal or pelvic lymph nodes are seen.    The bones are osteopenic and show age-appropriate degenerative change.                                     Significant Imaging: I have reviewed all pertinent imaging results/findings within the past 24 hours.

## 2024-10-10 NOTE — ANESTHESIA POSTPROCEDURE EVALUATION
Anesthesia Post Evaluation    Patient: Patricia Ramirez    Procedure(s) Performed: Procedure(s) (LRB):  XI ROBOTIC SUB TOTAL CHOLECYSTECTOMY; DRAIN PLACEMENT (N/A)    Final Anesthesia Type: general      Patient location during evaluation: PACU  Patient participation: Yes- Able to Participate  Level of consciousness: awake and alert  Post-procedure vital signs: reviewed and stable  Pain management: adequate  Airway patency: patent    PONV status at discharge: No PONV  Anesthetic complications: no      Cardiovascular status: hemodynamically stable and blood pressure returned to baseline  Respiratory status: room air and unassisted  Hydration status: euvolemic  Follow-up not needed.              Vitals Value Taken Time   /77 10/10/24 0329   Temp 36.6 °C (97.9 °F) 10/10/24 0329   Pulse 66 10/10/24 0339   Resp 17 10/10/24 0329   SpO2 95 % 10/10/24 0329         Event Time   Out of Recovery 10/09/2024 16:03:00         Pain/Ede Score: Pain Rating Prior to Med Admin: 8 (10/10/2024  1:51 AM)  Pain Rating Post Med Admin: 4 (10/9/2024  8:56 PM)  Ede Score: 10 (10/9/2024  3:20 PM)

## 2024-10-10 NOTE — PROGRESS NOTES
Surgery Progress Note    Patricia Ramirez is a 69 y.o. year old female in hospital for choledocholithiasis, s/p ERCP with pancreatic and CBD stents. Now s/p subtotal cholecystectomy on 10/9    AF  HDS  Reports abdomen is sore but improving from pre-op  Tolerating CLD, requesting regular diet  Bulb drain with 80cc serosanguinous output  No f/c/n/v/sob/cp    ROS:  Negative except above    PE:  Vitals:    10/10/24 1138 10/10/24 1216 10/10/24 1245 10/10/24 1510   BP:  131/74     BP Location:  Left arm     Patient Position:  Lying     Pulse: 69 73  65   Resp:  16 18    Temp:  98.2 °F (36.8 °C)     TempSrc:  Oral     SpO2:  96%     Weight:       Height:           NAD  Aox4  Normal WOB on RA  Normal HR  Abd soft, nd, appropriately tender  Bulb drain in place with serosanguinous output    CBC  12.95 14.0 169    42.6     Coag                 BMP  139 101 13 88   3.5 25 0.6     CaMgPhos  8.5              Last labs from current encounter as of 10/10/2024-8:06 PM    A/P:  Patricia Ramirez is a 69 y.o. year old female  with choledocholithiasis s/p ERCP. Lipase down-trending. Underwent robotic-assisted laparoscopic subtotal cholecystectomy 10/9. Remains admitted postop. No evidence of bile leak into AMBAR drain.     - Advance diet as tolerated  - monitor drain output  - MMPC  - continue ABX  - dispo pending PO tolerance and pain management  - Patient will likely keep AMBAR drain on discharge, to be removed in clinic  - remainder of care per primary team    Hai Cutler  General Surgery - [facility]  10/10/2024

## 2024-10-11 LAB
ALBUMIN SERPL BCP-MCNC: 2.8 G/DL (ref 3.5–5.2)
ALP SERPL-CCNC: 58 U/L (ref 55–135)
ALT SERPL W/O P-5'-P-CCNC: 58 U/L (ref 10–44)
ANION GAP SERPL CALC-SCNC: 9 MMOL/L (ref 8–16)
AST SERPL-CCNC: 21 U/L (ref 10–40)
BILIRUB SERPL-MCNC: 0.6 MG/DL (ref 0.1–1)
BUN SERPL-MCNC: 10 MG/DL (ref 8–23)
CALCIUM SERPL-MCNC: 9.1 MG/DL (ref 8.7–10.5)
CHLORIDE SERPL-SCNC: 102 MMOL/L (ref 95–110)
CO2 SERPL-SCNC: 31 MMOL/L (ref 23–29)
CREAT SERPL-MCNC: 0.6 MG/DL (ref 0.5–1.4)
ERYTHROCYTE [DISTWIDTH] IN BLOOD BY AUTOMATED COUNT: 12.5 % (ref 11.5–14.5)
EST. GFR  (NO RACE VARIABLE): >60 ML/MIN/1.73 M^2
GLUCOSE SERPL-MCNC: 90 MG/DL (ref 70–110)
HCT VFR BLD AUTO: 40.8 % (ref 37–48.5)
HGB BLD-MCNC: 13.6 G/DL (ref 12–16)
MCH RBC QN AUTO: 33.4 PG (ref 27–31)
MCHC RBC AUTO-ENTMCNC: 33.3 G/DL (ref 32–36)
MCV RBC AUTO: 100 FL (ref 82–98)
PLATELET # BLD AUTO: 164 K/UL (ref 150–450)
PMV BLD AUTO: 10 FL (ref 9.2–12.9)
POTASSIUM SERPL-SCNC: 3.4 MMOL/L (ref 3.5–5.1)
PROT SERPL-MCNC: 5.7 G/DL (ref 6–8.4)
RBC # BLD AUTO: 4.07 M/UL (ref 4–5.4)
SODIUM SERPL-SCNC: 142 MMOL/L (ref 136–145)
WBC # BLD AUTO: 10.88 K/UL (ref 3.9–12.7)

## 2024-10-11 PROCEDURE — 25000003 PHARM REV CODE 250: Performed by: STUDENT IN AN ORGANIZED HEALTH CARE EDUCATION/TRAINING PROGRAM

## 2024-10-11 PROCEDURE — 36415 COLL VENOUS BLD VENIPUNCTURE: CPT | Performed by: INTERNAL MEDICINE

## 2024-10-11 PROCEDURE — 80053 COMPREHEN METABOLIC PANEL: CPT | Performed by: INTERNAL MEDICINE

## 2024-10-11 PROCEDURE — 63600175 PHARM REV CODE 636 W HCPCS: Performed by: STUDENT IN AN ORGANIZED HEALTH CARE EDUCATION/TRAINING PROGRAM

## 2024-10-11 PROCEDURE — 21400001 HC TELEMETRY ROOM

## 2024-10-11 PROCEDURE — 25000003 PHARM REV CODE 250: Performed by: INTERNAL MEDICINE

## 2024-10-11 PROCEDURE — 63600175 PHARM REV CODE 636 W HCPCS: Performed by: INTERNAL MEDICINE

## 2024-10-11 PROCEDURE — 85027 COMPLETE CBC AUTOMATED: CPT | Performed by: INTERNAL MEDICINE

## 2024-10-11 PROCEDURE — S4991 NICOTINE PATCH NONLEGEND: HCPCS | Performed by: STUDENT IN AN ORGANIZED HEALTH CARE EDUCATION/TRAINING PROGRAM

## 2024-10-11 RX ORDER — POLYETHYLENE GLYCOL 3350 17 G/17G
17 POWDER, FOR SOLUTION ORAL DAILY
Status: DISCONTINUED | OUTPATIENT
Start: 2024-10-11 | End: 2024-10-12 | Stop reason: HOSPADM

## 2024-10-11 RX ORDER — POTASSIUM CHLORIDE 20 MEQ/1
40 TABLET, EXTENDED RELEASE ORAL ONCE
Status: COMPLETED | OUTPATIENT
Start: 2024-10-11 | End: 2024-10-11

## 2024-10-11 RX ORDER — HYDROCODONE BITARTRATE AND ACETAMINOPHEN 10; 325 MG/1; MG/1
1 TABLET ORAL EVERY 4 HOURS PRN
Status: DISCONTINUED | OUTPATIENT
Start: 2024-10-11 | End: 2024-10-12 | Stop reason: HOSPADM

## 2024-10-11 RX ORDER — ADHESIVE BANDAGE
30 BANDAGE TOPICAL DAILY
Status: DISCONTINUED | OUTPATIENT
Start: 2024-10-11 | End: 2024-10-12 | Stop reason: HOSPADM

## 2024-10-11 RX ORDER — HYDROCODONE BITARTRATE AND ACETAMINOPHEN 7.5; 325 MG/1; MG/1
1 TABLET ORAL EVERY 4 HOURS PRN
Status: DISCONTINUED | OUTPATIENT
Start: 2024-10-11 | End: 2024-10-11

## 2024-10-11 RX ORDER — HYDROCODONE BITARTRATE AND ACETAMINOPHEN 5; 325 MG/1; MG/1
1 TABLET ORAL EVERY 4 HOURS PRN
Status: DISCONTINUED | OUTPATIENT
Start: 2024-10-11 | End: 2024-10-11

## 2024-10-11 RX ADMIN — CITALOPRAM HYDROBROMIDE 20 MG: 20 TABLET ORAL at 09:10

## 2024-10-11 RX ADMIN — HYDROCODONE BITARTRATE AND ACETAMINOPHEN 1 TABLET: 10; 325 TABLET ORAL at 10:10

## 2024-10-11 RX ADMIN — HYDROCODONE BITARTRATE AND ACETAMINOPHEN 1 TABLET: 5; 325 TABLET ORAL at 01:10

## 2024-10-11 RX ADMIN — MORPHINE SULFATE 4 MG: 4 INJECTION, SOLUTION INTRAMUSCULAR; INTRAVENOUS at 10:10

## 2024-10-11 RX ADMIN — MORPHINE SULFATE 4 MG: 4 INJECTION, SOLUTION INTRAMUSCULAR; INTRAVENOUS at 04:10

## 2024-10-11 RX ADMIN — BUSPIRONE HYDROCHLORIDE 15 MG: 10 TABLET ORAL at 09:10

## 2024-10-11 RX ADMIN — PIPERACILLIN AND TAZOBACTAM 4.5 G: 4; .5 INJECTION, POWDER, FOR SOLUTION INTRAVENOUS; PARENTERAL at 01:10

## 2024-10-11 RX ADMIN — HYDROCODONE BITARTRATE AND ACETAMINOPHEN 1 TABLET: 5; 325 TABLET ORAL at 06:10

## 2024-10-11 RX ADMIN — BUSPIRONE HYDROCHLORIDE 15 MG: 10 TABLET ORAL at 08:10

## 2024-10-11 RX ADMIN — PIPERACILLIN AND TAZOBACTAM 4.5 G: 4; .5 INJECTION, POWDER, FOR SOLUTION INTRAVENOUS; PARENTERAL at 08:10

## 2024-10-11 RX ADMIN — PANTOPRAZOLE SODIUM 40 MG: 40 INJECTION, POWDER, LYOPHILIZED, FOR SOLUTION INTRAVENOUS at 09:10

## 2024-10-11 RX ADMIN — PIPERACILLIN AND TAZOBACTAM 4.5 G: 4; .5 INJECTION, POWDER, FOR SOLUTION INTRAVENOUS; PARENTERAL at 05:10

## 2024-10-11 RX ADMIN — POLYETHYLENE GLYCOL 3350 17 G: 17 POWDER, FOR SOLUTION ORAL at 01:10

## 2024-10-11 RX ADMIN — NICOTINE 1 PATCH: 14 PATCH TRANSDERMAL at 10:10

## 2024-10-11 RX ADMIN — POTASSIUM CHLORIDE 40 MEQ: 1500 TABLET, EXTENDED RELEASE ORAL at 01:10

## 2024-10-11 RX ADMIN — MAGNESIUM HYDROXIDE 2400 MG: 400 SUSPENSION ORAL at 01:10

## 2024-10-11 RX ADMIN — BUSPIRONE HYDROCHLORIDE 15 MG: 10 TABLET ORAL at 03:10

## 2024-10-11 NOTE — ASSESSMENT & PLAN NOTE
10/11/2024: Hypokalemia is noted, we will give the patient 1 dose of 40 mEq of potassium chloride  Postop leukocytosis resolved  Discontinue morphine, start patient on Norco q.4 hours as needed  MiraLax, milk of Mag for constipation, monitor for bowel movement  Follow-up blood work in the morning CBC and BMP  Patient was status post lap choly on October 9, she has a drain, intact  Resume diet as tolerated, tolerated well  Pt to resume Eliquis when she is cleared by surgery.   Evidence of choledocholithiasis on CT.  GI/general surgery consulted.    On empiric Zosyn.  Blood cultures no growth to date.  Noted to have transaminitis  MRCP with evidence of1.8 cm choledocholith in the common bile ducts, with resultant intra and extrahepatic biliary ductal dilation .

## 2024-10-11 NOTE — PLAN OF CARE
Patient laying quietly in bed, resp even and unlabored, alert and oriented, tolerating IV ABX therapy, ambulate to restroom with stand by assistance, bed locked and in low position, call light within reach.         Problem: Wound  Goal: Optimal Coping  Outcome: Progressing  Intervention: Support Patient and Family Response  Flowsheets (Taken 10/11/2024 1845)  Supportive Measures:   active listening utilized   decision-making supported   relaxation techniques promoted   self-care encouraged   verbalization of feelings encouraged  Family/Support System Care: self-care encouraged  Goal: Optimal Functional Ability  Outcome: Progressing  Intervention: Optimize Functional Ability  Flowsheets (Taken 10/11/2024 1845)  Activity Management: Ambulated to bathroom - L4  Activity Assistance Provided: independent  Goal: Absence of Infection Signs and Symptoms  Outcome: Progressing  Intervention: Prevent or Manage Infection  Flowsheets (Taken 10/11/2024 1845)  Fever Reduction/Comfort Measures:   lightweight bedding   lightweight clothing  Infection Management: aseptic technique maintained  Isolation Precautions: precautions maintained

## 2024-10-11 NOTE — PROGRESS NOTES
Surgery Progress Note    Patricia Ramirez is a 69 y.o. year old female in hospital for choledocholithiasis, s/p ERCP with pancreatic and CBD stents. Now s/p subtotal cholecystectomy on 10/9    AF  HDS  Reports abdomen is sore but improving from pre-op  Tolerating regular diet  Bulb drain with 120cc serosanguinous output  No f/c/n/v/sob/cp    ROS:  Negative except above    PE:  Vitals:    10/11/24 0403 10/11/24 0444 10/11/24 0735 10/11/24 1012   BP:   101/62    BP Location:   Left arm    Patient Position:   Lying    Pulse: 60  70    Resp:  16 16 17   Temp:   98.2 °F (36.8 °C)    TempSrc:   Oral    SpO2:   (!) 94%    Weight:       Height:           NAD  Aox4  Normal WOB on RA  Normal HR  Abd soft, nd, appropriately tender  Bulb drain in place with serosanguinous output    CBC  10.88 13.6 164    40.8     Coag                 BMP  142 102 10 90   3.4 31 0.6     CaMgPhos  9.1              Last labs from current encounter as of 10/11/2024-8:06 PM  Component      Latest Ref Rng 10/11/2024   Sodium      136 - 145 mmol/L 142    Potassium      3.5 - 5.1 mmol/L 3.4 (L)    Chloride      95 - 110 mmol/L 102    CO2      23 - 29 mmol/L 31 (H)    Glucose      70 - 110 mg/dL 90    BUN      8 - 23 mg/dL 10    Creatinine      0.5 - 1.4 mg/dL 0.6    Calcium      8.7 - 10.5 mg/dL 9.1    PROTEIN TOTAL      6.0 - 8.4 g/dL 5.7 (L)    Albumin      3.5 - 5.2 g/dL 2.8 (L)    BILIRUBIN TOTAL      0.1 - 1.0 mg/dL 0.6    ALP      55 - 135 U/L 58    AST      10 - 40 U/L 21    ALT      10 - 44 U/L 58 (H)    eGFR      >60 mL/min/1.73 m^2 >60    Anion Gap      8 - 16 mmol/L 9       Legend:  (L) Low  (H) High  A/P:  Patricia Ramirez is a 69 y.o. year old female  with choledocholithiasis s/p ERCP. Lipase down-trending. Underwent robotic-assisted laparoscopic subtotal cholecystectomy 10/9. Remains admitted postop. No evidence of bile leak into AMBAR drain.     - monitor drain output  - continue ABX, transition to Augmentin on DC for total of 7 days  - OK for  discharge, follow up Dr. Rigoberto Ponce in clinic  - Patient will keep AMBAR drain on discharge, to be removed in clinic  - remainder of care per primary team    Chiki Cummings  General Surgery - [facility]  10/11/2024

## 2024-10-11 NOTE — PROGRESS NOTES
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  Patient reports 10/10 intensity cramping abdominal pain for 1 day duration.  Patient reports relief with Gas-X.  Denied nausea/vomiting/fever/melena/hematochezia/diarrhea/chest pain/shortness of breath.  Patient reports not taking Eliquis for months    During evaluation in the ER, patient was found to be hypertensive (163/67, sinus Jason (58).  Labs revealed WBC 9.53, hemoglobin 15.2, platelets 231, sodium 137, potassium 4.3, CO2 21, creatinine 0.7, AST 21, ALT 24, lipase 11, total bilirubin 0.5, ALP 52.  Lactic acid 1.5.  TSH 2.52.  Urinalysis negative for UTI with rare bacteria and 16 WBC.Cholelithiasis with choledocholithiasis.  There is mild intrahepatic biliary ductal dilatation.  No inflammation about the gallbladder. Small hiatal hernia and small to moderate-sized duodenal diverticulum.  Pending ultrasound abdomen.  General surgery/GI was consulted.  Recommended MRCP.    Patient was given 1 g ceftriaxone, 4 mg IV morphine, 75 mL iohexol.  Psychiatry was consulted in ED for prolonged grief reaction with significant weight loss.  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  GI /general surgery was consulted.  On empiric Zosyn.  Blood cultures pending.  Noted to have transaminitis  MRCP with evidence of1.8 cm choledocholith in the common bile ducts, with resultant intra and extrahepatic biliary ductal dilation .  Plan for ERCP today    Interval History: pain is tolerated, some abdominal discomfort and pain. She is tolerating her diet.     Review of Systems   Constitutional:  Positive for fatigue. Negative for activity change and appetite change.   HENT:  Negative for congestion and dental problem.    Eyes:  Negative for discharge and itching.   Respiratory:  Negative for apnea, cough, chest tightness and shortness of breath.    Cardiovascular:  Negative for chest pain, palpitations and leg swelling.   Gastrointestinal:  Positive for abdominal distention, abdominal pain and nausea. Negative for anal bleeding, blood in stool and constipation.   Endocrine: Negative for cold intolerance and heat intolerance.   Genitourinary:  Negative for difficulty urinating and dyspareunia.   Musculoskeletal:  Negative for arthralgias and back pain.   Skin:  Negative for color change and pallor.   Allergic/Immunologic: Negative for environmental allergies and food allergies.   Neurological:  Negative for dizziness, light-headedness, numbness and headaches.   Hematological:  Negative for adenopathy. Does not bruise/bleed easily.   Psychiatric/Behavioral:  Negative for agitation and behavioral problems.      Objective:     Vital Signs (Most Recent):  Temp: 98.4 °F (36.9 °C) (10/10/24 0741)  Pulse: 67 (10/10/24 0741)  Resp: 16 (10/10/24 0741)  BP: 111/67 (10/10/24 0741)  SpO2: (!) 94 % (10/10/24 0741) Vital Signs (24h Range):  Temp:  [97.5 °F (36.4 °C)-98.4 °F (36.9 °C)] 98.4 °F (36.9 °C)  Pulse:  [58-81]  67  Resp:  [11-24] 16  SpO2:  [91 %-98 %] 94 %  BP: (111-164)/(57-77) 111/67     Weight: 59.9 kg (132 lb 0.9 oz)  Body mass index is 21.98 kg/m².    Intake/Output Summary (Last 24 hours) at 10/10/2024 0918  Last data filed at 10/10/2024 0830  Gross per 24 hour   Intake 1000 ml   Output 300 ml   Net 700 ml         Physical Exam  Constitutional:       Appearance: Normal appearance.   HENT:      Head: Normocephalic and atraumatic.      Right Ear: There is no impacted cerumen.      Left Ear: There is no impacted cerumen.      Nose: No congestion or rhinorrhea.      Mouth/Throat:      Pharynx: No oropharyngeal exudate or posterior oropharyngeal erythema.   Eyes:      General:         Right eye: No discharge.         Left eye: No discharge.   Cardiovascular:      Rate and Rhythm: Normal rate and regular rhythm.   Abdominal:      General: There is no distension.      Palpations: Abdomen is soft. There is no mass.      Tenderness: There is abdominal tenderness.      Hernia: No hernia is present.   Musculoskeletal:         General: No swelling or tenderness.      Cervical back: No rigidity or tenderness.   Skin:     Coloration: Skin is not jaundiced or pale.   Neurological:      Mental Status: She is alert and oriented to person, place, and time. Mental status is at baseline.      Sensory: No sensory deficit.      Motor: No weakness.             Significant Labs: All pertinent labs within the past 24 hours have been reviewed.  BMP:   Recent Labs   Lab 10/09/24  0455 10/10/24  0520   GLU 71 88    139   K 3.5 3.5    101   CO2 30* 25   BUN 12 13   CREATININE 0.6 0.6   CALCIUM 9.0 8.5*   MG 2.0  --      CBC:   Recent Labs   Lab 10/09/24  0455 10/10/24  0520   WBC 10.93 12.95*   HGB 15.9 14.0   HCT 47.7 42.6    169     CMP:   Recent Labs   Lab 10/09/24  0455 10/10/24  0520    139   K 3.5 3.5    101   CO2 30* 25   GLU 71 88   BUN 12 13   CREATININE 0.6 0.6   CALCIUM 9.0 8.5*   PROT 6.3  --    ALBUMIN  3.5  --    BILITOT 0.6  --    ALKPHOS 83  --    AST 50*  --    *  --    ANIONGAP 10 13     Imaging Results              MRI MRCP Abdomen W WO Contrast 3D WO Independent WS XPD (Final result)  Result time 10/06/24 19:31:53      Final result by Delmer Solares DO (10/06/24 19:31:53)                   Impression:      1. 1.8 cm choledocholith in the common bile ducts, with resultant intra and extrahepatic biliary ductal dilation as above.  2. Cholelithiasis without evidence of acute cholecystitis.      Electronically signed by: Delmer Solares  Date:    10/06/2024  Time:    19:31               Narrative:    EXAMINATION:  MRI MRCP ABDOMEN W WO CONTRAST 3D WO INDEPENDENT WS XPD    CLINICAL HISTORY:  Cholelithiasis;    TECHNIQUE:  Multiplanar, multisequence magnetic resonance images of the liver and upper abdomen before and after the uncomplicated intravenous administration of 6 mL Gadavist.  Additionally 2D and 3D MRCP sequences are performed as well as MIP images.    COMPARISON:  CT abdomen and pelvis and ultrasound abdomen from earlier the same date.    FINDINGS:  Pulmonary Bases: No gross abnormality.    Liver: Normal.  No focal hepatic lesion.    Bile Ducts: There is a 1.8 cm choledocholith within the common bile duct.  There is intra and extrahepatic biliary ductal dilation.  The common bile duct measures up to approximately 1.4 cm in maximal diameter.    Gallbladder: There are multiple gallstones.  There is no gallbladder wall thickening or pericholecystic fluid.  There is a Phrygian cap.    Pancreas: Normal.  No pancreatic ductal dilatation.    Spleen: Normal.    Gastrointestinal tract: The small and large bowel loops are normal in caliber without evidence of obstruction.    Retroperitoneum and mesentery: Normal.  No lymphadenopathy.    Adrenal Glands: Normal.    Kidneys: Normal.    Aorta and Abdominal Vasculature: Normal.    Body wall: Normal.                                       US Abdomen Limited  (Final result)  Result time 10/06/24 15:57:59      Final result by Zee Hines MD (10/06/24 15:57:59)                   Impression:      Cholelithiasis without evidence for cholecystitis..      Electronically signed by: Zee Hines MD  Date:    10/06/2024  Time:    15:57               Narrative:    EXAMINATION:  US ABDOMEN LIMITED    CLINICAL HISTORY:  ruq abd pain;    TECHNIQUE:  Limited ultrasound of the right upper quadrant of the abdomen (including pancreas, liver, gallbladder, common bile duct, and right kidney) was performed.    COMPARISON:  None    FINDINGS:  Liver: Normal in size. The liver demonstrates homogenous echotexture. no focal hepatic lesions are seen.    Biliary system: Multiple gallstones are noted, the largest measuring approximately 1.5 cm.  No gallbladder wall thickening.  No sonographic Alvares sign. No pericholecystic fluid. The common duct is not dilated, measuring 0.59 cm. No intrahepatic ductal dilatation.    Pancreas: Pancreas is obscured.    Right kidney: Normal in size measuring 9.3 cmwith no hydronephrosis, mass, or calculi.    Vascular: The portions of the aorta, vena cava, and portal vein appear free of acute abnormality.                                       CT Abdomen Pelvis With IV Contrast NO Oral Contrast (Final result)  Result time 10/06/24 12:19:59      Final result by Zee Hines MD (10/06/24 12:19:59)                   Impression:      Cholelithiasis with choledocholithiasis.  There is mild intrahepatic biliary ductal dilatation.  No inflammation about the gallbladder.    Small hiatal hernia and small to moderate-sized duodenal diverticulum.      Electronically signed by: Zee Hines MD  Date:    10/06/2024  Time:    12:19               Narrative:    EXAMINATION:  CT ABDOMEN PELVIS WITH IV CONTRAST    CLINICAL HISTORY:  Epigastric pain;LLQ abdominal pain;Nausea/vomiting;    TECHNIQUE:  Low dose axial images, sagittal and coronal reformations were obtained  from the lung bases to the pubic symphysis following the IV administration of 75 mL of Omnipaque 350 .  Oral contrast was not given.    COMPARISON:  07/28/2024    FINDINGS:  The lung bases are clear.  The base of the heart shows calcifications of the mitral annulus and aortic valve.  Calcified atheromatous disease affects the aorta and its branch vessels.    Cholelithiasis.  Mild prominence of the intrahepatic bile ducts, stable.  Suspected choledocholithiasis with high density structure seen in the region of the distal CBD, measuring 1.3 and 0.6 cm respectively as seen on coronal image 84.  The spleen, pancreas, adrenal glands and kidneys are normal in size, shape and contour.  No hydronephrosis or hydroureter.  The urinary bladder is well distended and appears normal.  The uterus has been removed.  Adnexal regions are unremarkable.    Small hiatal hernia.  Small to moderate-sized duodenal diverticulum.  No free air, free fluid or obstruction.  No pathologically enlarged abdominal or pelvic lymph nodes are seen.    The bones are osteopenic and show age-appropriate degenerative change.                                     Significant Imaging: I have reviewed all pertinent imaging results/findings within the past 24 hours.        Assessment/Plan:      * Choledocholithiasis  Patient was status post lap choly on October 9, she has a drain, intact  Some abdominal discomfort. Cbc and CMP tomorrow morning  Some post operative leukocytosis this morning  Resume diet as tolerated  Pt to resume Eliquis when she is cleared by surgery.   Evidence of choledocholithiasis on CT.  GI/general surgery consulted.    On empiric Zosyn.  Blood cultures pending.  Noted to have transaminitis  MRCP with evidence of1.8 cm choledocholith in the common bile ducts, with resultant intra and extrahepatic biliary ductal dilation .        Moderate protein-calorie malnutrition  Nutrition consulted. Most recent weight and BMI monitored-      Measurements:  Wt Readings from Last 1 Encounters:   10/09/24 59.9 kg (132 lb 0.9 oz)   Body mass index is 21.98 kg/m².    Patient has been screened and assessed by RD.    Malnutrition Type:  Context: chronic illness  Level: moderate    Malnutrition Characteristic Summary:  Weight Loss (Malnutrition): greater than 20% in 1 year  Energy Intake (Malnutrition): less than 75% for greater than 7 days (30% wt loss x 1 year)  Subcutaneous Fat (Malnutrition): mild depletion  Muscle Mass (Malnutrition): mild depletion    Interventions/Recommendations (treatment strategy):  1. Add Boost BID 2. Add cardiac restrictions to diet 3. Monitor weight/labs 4. RD to follow to monitor PO intake      Tobacco dependency  Dangers of cigarette smoking were reviewed with patient in detail. Patient was Counseled for 3-10 minutes. Nicotine replacement options were discussed. 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  She can not afford    IFG (impaired fasting glucose)    Obtain A1c    Heart failure with preserved ejection fraction    Obtain echo.  Patient appears euvolemic.  Start being on diuretics at home.  Appears euvolemic.    GERD (gastroesophageal reflux disease)  Initiate on Protonix      Hyperlipidemia    On statin at home, held in the setting of choledocholithiasis    Essential hypertension  Patients blood pressure range in the last 24 hours was: BP  134/60  .The patient's inpatient anti-hypertensive regimen is listed below:  Current Antihypertensives       Plan  BP is controlled.  Hold home antihypertensives for now      VTE Risk Mitigation (From admission, onward)           Ordered     IP VTE HIGH RISK PATIENT  Once         10/06/24 1525     Place sequential compression device  Until discontinued         10/06/24 1520                     Discharge Planning       Code Status: Full Code   Is the patient medically ready for discharge?:     Reason for patient still in hospital (select all that apply): Treatment  Discharge Plan A: Home with family   Discharge Delays: None known at this time              Ez Reyna DO  Department of Hospital Medicine   [facility]

## 2024-10-11 NOTE — NURSING
Received report care assumed. Patient lying in bed resting, NAD noted. Safety precautions maintained.    [facility]  Nurses Note -- 4 Eyes      10/10/2024       Skin assessed on: Q Shift      [x] No Pressure Injuries Present    []Prevention Measures Documented    [] Yes LDA  for Pressure Injury Previously documented     [] Yes New Pressure Injury Discovered   [] LDA for New Pressure Injury Added      Attending RN:  Brisa Jung LPN     Second RN:  VALERIE Jackson

## 2024-10-11 NOTE — PROGRESS NOTES
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  Patient reports 10/10 intensity cramping abdominal pain for 1 day duration.  Patient reports relief with Gas-X.  Denied nausea/vomiting/fever/melena/hematochezia/diarrhea/chest pain/shortness of breath.  Patient reports not taking Eliquis for months    During evaluation in the ER, patient was found to be hypertensive (163/67, sinus Jason (58).  Labs revealed WBC 9.53, hemoglobin 15.2, platelets 231, sodium 137, potassium 4.3, CO2 21, creatinine 0.7, AST 21, ALT 24, lipase 11, total bilirubin 0.5, ALP 52.  Lactic acid 1.5.  TSH 2.52.  Urinalysis negative for UTI with rare bacteria and 16 WBC.Cholelithiasis with choledocholithiasis.  There is mild intrahepatic biliary ductal dilatation.  No inflammation about the gallbladder. Small hiatal hernia and small to moderate-sized duodenal diverticulum.  Pending ultrasound abdomen.  General surgery/GI was consulted.  Recommended MRCP.    Patient was given 1 g ceftriaxone, 4 mg IV morphine, 75 mL iohexol.  Psychiatry was consulted in ED for prolonged grief reaction with significant weight loss.  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  GI /general surgery was consulted.  On empiric Zosyn.  Blood cultures pending.  Noted to have transaminitis  MRCP with evidence of1.8 cm choledocholith in the common bile ducts, with resultant intra and extrahepatic biliary ductal dilation .  Plan for ERCP today    Interval History:  Pain is controlled.  Continued to have discomfort of the abdomen.  She had a good night's sleep.  She was able to tolerate her meals well.    Review of Systems   Constitutional:  Negative for activity change and appetite change.   HENT:  Negative for congestion and dental problem.    Eyes:  Negative for discharge and itching.   Respiratory:  Negative for apnea and chest tightness.    Cardiovascular:  Negative for chest pain and leg swelling.   Gastrointestinal:  Positive for abdominal pain and constipation.   Endocrine: Negative for cold intolerance and heat intolerance.   Genitourinary:  Negative for difficulty urinating, dyspareunia and dysuria.   Musculoskeletal:  Negative for arthralgias and back pain.   Neurological:  Negative for dizziness and facial asymmetry.   Psychiatric/Behavioral:  Negative for agitation and behavioral problems.      Objective:     Vital Signs (Most Recent):  Temp: 98.2 °F (36.8 °C) (10/11/24 0735)  Pulse: 70 (10/11/24 0735)  Resp: 17 (10/11/24 1012)  BP: 101/62 (10/11/24 0735)  SpO2: (!) 94 % (10/11/24 0735) Vital Signs (24h Range):  Temp:  [97.8 °F (36.6 °C)-98.3 °F (36.8 °C)] 98.2 °F (36.8 °C)  Pulse:  [60-73] 70  Resp:  [16-18] 17  SpO2:  [92 %-96 %] 94 %  BP: (101-131)/(55-74) 101/62     Weight: 59.9 kg (132 lb 0.9 oz)  Body mass index is 21.98 kg/m².    Intake/Output Summary (Last 24 hours) at 10/11/2024 1030  Last data filed at 10/11/2024 0830  Gross per 24 hour   Intake 901.77 ml   Output 120 ml    Net 781.77 ml         Physical Exam  Constitutional:       Appearance: Normal appearance.   HENT:      Head: Normocephalic and atraumatic.      Right Ear: There is no impacted cerumen.      Left Ear: There is no impacted cerumen.      Nose: No congestion or rhinorrhea.      Mouth/Throat:      Pharynx: No oropharyngeal exudate or posterior oropharyngeal erythema.   Eyes:      General:         Right eye: No discharge.         Left eye: No discharge.   Cardiovascular:      Rate and Rhythm: Normal rate and regular rhythm.   Pulmonary:      Effort: Pulmonary effort is normal. No respiratory distress.      Breath sounds: Normal breath sounds. No stridor.   Abdominal:      General: Bowel sounds are normal.      Palpations: Abdomen is soft.      Tenderness: There is abdominal tenderness.      Hernia: No hernia is present.      Comments: Subcutaneous drain is intact.  Not much discharge.   Musculoskeletal:         General: No swelling or tenderness.      Cervical back: No rigidity or tenderness.   Skin:     Coloration: Skin is not jaundiced or pale.   Neurological:      Mental Status: She is alert and oriented to person, place, and time. Mental status is at baseline.      Cranial Nerves: No cranial nerve deficit.      Sensory: No sensory deficit.      Motor: No weakness.             Significant Labs: All pertinent labs within the past 24 hours have been reviewed.  BMP:   Recent Labs   Lab 10/11/24  0426   GLU 90      K 3.4*      CO2 31*   BUN 10   CREATININE 0.6   CALCIUM 9.1     CBC:   Recent Labs   Lab 10/10/24  0520 10/11/24  0426   WBC 12.95* 10.88   HGB 14.0 13.6   HCT 42.6 40.8    164     CMP:   Recent Labs   Lab 10/10/24  0520 10/11/24  0426    142   K 3.5 3.4*    102   CO2 25 31*   GLU 88 90   BUN 13 10   CREATININE 0.6 0.6   CALCIUM 8.5* 9.1   PROT  --  5.7*   ALBUMIN  --  2.8*   BILITOT  --  0.6   ALKPHOS  --  58   AST  --  21   ALT  --  58*   ANIONGAP 13 9     Imaging Results               MRI MRCP Abdomen W WO Contrast 3D WO Independent WS XPD (Final result)  Result time 10/06/24 19:31:53      Final result by Delmer Solares DO (10/06/24 19:31:53)                   Impression:      1. 1.8 cm choledocholith in the common bile ducts, with resultant intra and extrahepatic biliary ductal dilation as above.  2. Cholelithiasis without evidence of acute cholecystitis.      Electronically signed by: Delmer Solares  Date:    10/06/2024  Time:    19:31               Narrative:    EXAMINATION:  MRI MRCP ABDOMEN W WO CONTRAST 3D WO INDEPENDENT WS XPD    CLINICAL HISTORY:  Cholelithiasis;    TECHNIQUE:  Multiplanar, multisequence magnetic resonance images of the liver and upper abdomen before and after the uncomplicated intravenous administration of 6 mL Gadavist.  Additionally 2D and 3D MRCP sequences are performed as well as MIP images.    COMPARISON:  CT abdomen and pelvis and ultrasound abdomen from earlier the same date.    FINDINGS:  Pulmonary Bases: No gross abnormality.    Liver: Normal.  No focal hepatic lesion.    Bile Ducts: There is a 1.8 cm choledocholith within the common bile duct.  There is intra and extrahepatic biliary ductal dilation.  The common bile duct measures up to approximately 1.4 cm in maximal diameter.    Gallbladder: There are multiple gallstones.  There is no gallbladder wall thickening or pericholecystic fluid.  There is a Phrygian cap.    Pancreas: Normal.  No pancreatic ductal dilatation.    Spleen: Normal.    Gastrointestinal tract: The small and large bowel loops are normal in caliber without evidence of obstruction.    Retroperitoneum and mesentery: Normal.  No lymphadenopathy.    Adrenal Glands: Normal.    Kidneys: Normal.    Aorta and Abdominal Vasculature: Normal.    Body wall: Normal.                                       US Abdomen Limited (Final result)  Result time 10/06/24 15:57:59      Final result by Zee Hines MD (10/06/24 15:57:59)                    Impression:      Cholelithiasis without evidence for cholecystitis..      Electronically signed by: Zee Hines MD  Date:    10/06/2024  Time:    15:57               Narrative:    EXAMINATION:  US ABDOMEN LIMITED    CLINICAL HISTORY:  ruq abd pain;    TECHNIQUE:  Limited ultrasound of the right upper quadrant of the abdomen (including pancreas, liver, gallbladder, common bile duct, and right kidney) was performed.    COMPARISON:  None    FINDINGS:  Liver: Normal in size. The liver demonstrates homogenous echotexture. no focal hepatic lesions are seen.    Biliary system: Multiple gallstones are noted, the largest measuring approximately 1.5 cm.  No gallbladder wall thickening.  No sonographic Alvares sign. No pericholecystic fluid. The common duct is not dilated, measuring 0.59 cm. No intrahepatic ductal dilatation.    Pancreas: Pancreas is obscured.    Right kidney: Normal in size measuring 9.3 cmwith no hydronephrosis, mass, or calculi.    Vascular: The portions of the aorta, vena cava, and portal vein appear free of acute abnormality.                                       CT Abdomen Pelvis With IV Contrast NO Oral Contrast (Final result)  Result time 10/06/24 12:19:59      Final result by Zee Hines MD (10/06/24 12:19:59)                   Impression:      Cholelithiasis with choledocholithiasis.  There is mild intrahepatic biliary ductal dilatation.  No inflammation about the gallbladder.    Small hiatal hernia and small to moderate-sized duodenal diverticulum.      Electronically signed by: Zee Hines MD  Date:    10/06/2024  Time:    12:19               Narrative:    EXAMINATION:  CT ABDOMEN PELVIS WITH IV CONTRAST    CLINICAL HISTORY:  Epigastric pain;LLQ abdominal pain;Nausea/vomiting;    TECHNIQUE:  Low dose axial images, sagittal and coronal reformations were obtained from the lung bases to the pubic symphysis following the IV administration of 75 mL of Omnipaque 350 .  Oral contrast was  not given.    COMPARISON:  07/28/2024    FINDINGS:  The lung bases are clear.  The base of the heart shows calcifications of the mitral annulus and aortic valve.  Calcified atheromatous disease affects the aorta and its branch vessels.    Cholelithiasis.  Mild prominence of the intrahepatic bile ducts, stable.  Suspected choledocholithiasis with high density structure seen in the region of the distal CBD, measuring 1.3 and 0.6 cm respectively as seen on coronal image 84.  The spleen, pancreas, adrenal glands and kidneys are normal in size, shape and contour.  No hydronephrosis or hydroureter.  The urinary bladder is well distended and appears normal.  The uterus has been removed.  Adnexal regions are unremarkable.    Small hiatal hernia.  Small to moderate-sized duodenal diverticulum.  No free air, free fluid or obstruction.  No pathologically enlarged abdominal or pelvic lymph nodes are seen.    The bones are osteopenic and show age-appropriate degenerative change.                                     Significant Imaging: I have reviewed all pertinent imaging results/findings within the past 24 hours.    Assessment/Plan:      * Choledocholithiasis  10/11/2024: Hypokalemia is noted, we will give the patient 1 dose of 40 mEq of potassium chloride  Postop leukocytosis resolved  Discontinue morphine, start patient on Norco q.4 hours as needed  MiraLax, milk of Mag for constipation, monitor for bowel movement  Follow-up blood work in the morning CBC and BMP  Patient was status post lap choly on October 9, she has a drain, intact  Resume diet as tolerated, tolerated well  Pt to resume Eliquis when she is cleared by surgery.   Evidence of choledocholithiasis on CT.  GI/general surgery consulted.    On empiric Zosyn.  Blood cultures no growth to date.  Noted to have transaminitis  MRCP with evidence of1.8 cm choledocholith in the common bile ducts, with resultant intra and extrahepatic biliary ductal dilation .         Moderate protein-calorie malnutrition  Nutrition consulted. Most recent weight and BMI monitored-     Measurements:  Wt Readings from Last 1 Encounters:   10/09/24 59.9 kg (132 lb 0.9 oz)   Body mass index is 21.98 kg/m².    Patient has been screened and assessed by RD.    Malnutrition Type:  Context: chronic illness  Level: moderate    Malnutrition Characteristic Summary:  Weight Loss (Malnutrition): greater than 20% in 1 year  Energy Intake (Malnutrition): less than 75% for greater than 7 days (30% wt loss x 1 year)  Subcutaneous Fat (Malnutrition): mild depletion  Muscle Mass (Malnutrition): mild depletion    Interventions/Recommendations (treatment strategy):  1. Add Boost BID 2. Add cardiac restrictions to diet 3. Monitor weight/labs 4. RD to follow to monitor PO intake      Tobacco dependency  Dangers of cigarette smoking were reviewed with patient in detail. Patient was Counseled for 3-10 minutes. Nicotine replacement options were discussed. 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  She can not afford    IFG (impaired fasting glucose)    Obtain A1c    Heart failure with preserved ejection fraction    Obtain echo.  Patient appears euvolemic.  Start being on diuretics at home.  Appears euvolemic.    GERD (gastroesophageal reflux disease)  Initiate on Protonix      Hyperlipidemia    On statin at home, held in the setting of choledocholithiasis    Essential hypertension  Patients blood pressure range in the last 24 hours was: BP  134/60  .The patient's inpatient anti-hypertensive regimen is listed below:  Current Antihypertensives       Plan  BP is controlled.  Hold home antihypertensives for now      VTE Risk Mitigation (From admission, onward)           Ordered     IP VTE HIGH RISK PATIENT  Once         10/06/24 4907      Place sequential compression device  Until discontinued         10/06/24 1525                    Discharge Planning   STUART: 10/11/2024     Code Status: Full Code   Is the patient medically ready for discharge?:     Reason for patient still in hospital (select all that apply): Treatment  Discharge Plan A: Home with family   Discharge Delays: None known at this time              Ez Reyna DO  Department of Hospital Medicine   [facility]

## 2024-10-11 NOTE — SUBJECTIVE & OBJECTIVE
Interval History:  Pain is controlled.  Continued to have discomfort of the abdomen.  She had a good night's sleep.  She was able to tolerate her meals well.    Review of Systems   Constitutional:  Negative for activity change and appetite change.   HENT:  Negative for congestion and dental problem.    Eyes:  Negative for discharge and itching.   Respiratory:  Negative for apnea and chest tightness.    Cardiovascular:  Negative for chest pain and leg swelling.   Gastrointestinal:  Positive for abdominal pain and constipation.   Endocrine: Negative for cold intolerance and heat intolerance.   Genitourinary:  Negative for difficulty urinating, dyspareunia and dysuria.   Musculoskeletal:  Negative for arthralgias and back pain.   Neurological:  Negative for dizziness and facial asymmetry.   Psychiatric/Behavioral:  Negative for agitation and behavioral problems.      Objective:     Vital Signs (Most Recent):  Temp: 98.2 °F (36.8 °C) (10/11/24 0735)  Pulse: 70 (10/11/24 0735)  Resp: 17 (10/11/24 1012)  BP: 101/62 (10/11/24 0735)  SpO2: (!) 94 % (10/11/24 0735) Vital Signs (24h Range):  Temp:  [97.8 °F (36.6 °C)-98.3 °F (36.8 °C)] 98.2 °F (36.8 °C)  Pulse:  [60-73] 70  Resp:  [16-18] 17  SpO2:  [92 %-96 %] 94 %  BP: (101-131)/(55-74) 101/62     Weight: 59.9 kg (132 lb 0.9 oz)  Body mass index is 21.98 kg/m².    Intake/Output Summary (Last 24 hours) at 10/11/2024 1030  Last data filed at 10/11/2024 0830  Gross per 24 hour   Intake 901.77 ml   Output 120 ml   Net 781.77 ml         Physical Exam  Constitutional:       Appearance: Normal appearance.   HENT:      Head: Normocephalic and atraumatic.      Right Ear: There is no impacted cerumen.      Left Ear: There is no impacted cerumen.      Nose: No congestion or rhinorrhea.      Mouth/Throat:      Pharynx: No oropharyngeal exudate or posterior oropharyngeal erythema.   Eyes:      General:         Right eye: No discharge.         Left eye: No discharge.   Cardiovascular:       Rate and Rhythm: Normal rate and regular rhythm.   Pulmonary:      Effort: Pulmonary effort is normal. No respiratory distress.      Breath sounds: Normal breath sounds. No stridor.   Abdominal:      General: Bowel sounds are normal.      Palpations: Abdomen is soft.      Tenderness: There is abdominal tenderness.      Hernia: No hernia is present.      Comments: Subcutaneous drain is intact.  Not much discharge.   Musculoskeletal:         General: No swelling or tenderness.      Cervical back: No rigidity or tenderness.   Skin:     Coloration: Skin is not jaundiced or pale.   Neurological:      Mental Status: She is alert and oriented to person, place, and time. Mental status is at baseline.      Cranial Nerves: No cranial nerve deficit.      Sensory: No sensory deficit.      Motor: No weakness.             Significant Labs: All pertinent labs within the past 24 hours have been reviewed.  BMP:   Recent Labs   Lab 10/11/24  0426   GLU 90      K 3.4*      CO2 31*   BUN 10   CREATININE 0.6   CALCIUM 9.1     CBC:   Recent Labs   Lab 10/10/24  0520 10/11/24  0426   WBC 12.95* 10.88   HGB 14.0 13.6   HCT 42.6 40.8    164     CMP:   Recent Labs   Lab 10/10/24  0520 10/11/24  0426    142   K 3.5 3.4*    102   CO2 25 31*   GLU 88 90   BUN 13 10   CREATININE 0.6 0.6   CALCIUM 8.5* 9.1   PROT  --  5.7*   ALBUMIN  --  2.8*   BILITOT  --  0.6   ALKPHOS  --  58   AST  --  21   ALT  --  58*   ANIONGAP 13 9     Imaging Results              MRI MRCP Abdomen W WO Contrast 3D WO Independent WS XPD (Final result)  Result time 10/06/24 19:31:53      Final result by Delmer Solares DO (10/06/24 19:31:53)                   Impression:      1. 1.8 cm choledocholith in the common bile ducts, with resultant intra and extrahepatic biliary ductal dilation as above.  2. Cholelithiasis without evidence of acute cholecystitis.      Electronically signed by: Delmer Solares  Date:    10/06/2024  Time:    19:31                Narrative:    EXAMINATION:  MRI MRCP ABDOMEN W WO CONTRAST 3D WO INDEPENDENT WS XPD    CLINICAL HISTORY:  Cholelithiasis;    TECHNIQUE:  Multiplanar, multisequence magnetic resonance images of the liver and upper abdomen before and after the uncomplicated intravenous administration of 6 mL Gadavist.  Additionally 2D and 3D MRCP sequences are performed as well as MIP images.    COMPARISON:  CT abdomen and pelvis and ultrasound abdomen from earlier the same date.    FINDINGS:  Pulmonary Bases: No gross abnormality.    Liver: Normal.  No focal hepatic lesion.    Bile Ducts: There is a 1.8 cm choledocholith within the common bile duct.  There is intra and extrahepatic biliary ductal dilation.  The common bile duct measures up to approximately 1.4 cm in maximal diameter.    Gallbladder: There are multiple gallstones.  There is no gallbladder wall thickening or pericholecystic fluid.  There is a Phrygian cap.    Pancreas: Normal.  No pancreatic ductal dilatation.    Spleen: Normal.    Gastrointestinal tract: The small and large bowel loops are normal in caliber without evidence of obstruction.    Retroperitoneum and mesentery: Normal.  No lymphadenopathy.    Adrenal Glands: Normal.    Kidneys: Normal.    Aorta and Abdominal Vasculature: Normal.    Body wall: Normal.                                       US Abdomen Limited (Final result)  Result time 10/06/24 15:57:59      Final result by Zee Hines MD (10/06/24 15:57:59)                   Impression:      Cholelithiasis without evidence for cholecystitis..      Electronically signed by: Zee Hines MD  Date:    10/06/2024  Time:    15:57               Narrative:    EXAMINATION:  US ABDOMEN LIMITED    CLINICAL HISTORY:  ruq abd pain;    TECHNIQUE:  Limited ultrasound of the right upper quadrant of the abdomen (including pancreas, liver, gallbladder, common bile duct, and right kidney) was performed.    COMPARISON:  None    FINDINGS:  Liver: Normal in  size. The liver demonstrates homogenous echotexture. no focal hepatic lesions are seen.    Biliary system: Multiple gallstones are noted, the largest measuring approximately 1.5 cm.  No gallbladder wall thickening.  No sonographic Alvares sign. No pericholecystic fluid. The common duct is not dilated, measuring 0.59 cm. No intrahepatic ductal dilatation.    Pancreas: Pancreas is obscured.    Right kidney: Normal in size measuring 9.3 cmwith no hydronephrosis, mass, or calculi.    Vascular: The portions of the aorta, vena cava, and portal vein appear free of acute abnormality.                                       CT Abdomen Pelvis With IV Contrast NO Oral Contrast (Final result)  Result time 10/06/24 12:19:59      Final result by Zee Hines MD (10/06/24 12:19:59)                   Impression:      Cholelithiasis with choledocholithiasis.  There is mild intrahepatic biliary ductal dilatation.  No inflammation about the gallbladder.    Small hiatal hernia and small to moderate-sized duodenal diverticulum.      Electronically signed by: Zee Hines MD  Date:    10/06/2024  Time:    12:19               Narrative:    EXAMINATION:  CT ABDOMEN PELVIS WITH IV CONTRAST    CLINICAL HISTORY:  Epigastric pain;LLQ abdominal pain;Nausea/vomiting;    TECHNIQUE:  Low dose axial images, sagittal and coronal reformations were obtained from the lung bases to the pubic symphysis following the IV administration of 75 mL of Omnipaque 350 .  Oral contrast was not given.    COMPARISON:  07/28/2024    FINDINGS:  The lung bases are clear.  The base of the heart shows calcifications of the mitral annulus and aortic valve.  Calcified atheromatous disease affects the aorta and its branch vessels.    Cholelithiasis.  Mild prominence of the intrahepatic bile ducts, stable.  Suspected choledocholithiasis with high density structure seen in the region of the distal CBD, measuring 1.3 and 0.6 cm respectively as seen on coronal image 84.   The spleen, pancreas, adrenal glands and kidneys are normal in size, shape and contour.  No hydronephrosis or hydroureter.  The urinary bladder is well distended and appears normal.  The uterus has been removed.  Adnexal regions are unremarkable.    Small hiatal hernia.  Small to moderate-sized duodenal diverticulum.  No free air, free fluid or obstruction.  No pathologically enlarged abdominal or pelvic lymph nodes are seen.    The bones are osteopenic and show age-appropriate degenerative change.                                     Significant Imaging: I have reviewed all pertinent imaging results/findings within the past 24 hours.

## 2024-10-11 NOTE — PLAN OF CARE
10/11/24 1030   Medicare Message   Important Message from Medicare regarding Discharge Appeal Rights Given to patient/caregiver;Explained to patient/caregiver;Signed/date by patient/caregiver   Date IMM was signed 10/11/24   Time IMM was signed 1030

## 2024-10-11 NOTE — PLAN OF CARE
No acute distress noted, patient free from falls or injury this shift.  Bed in low position, wheels locked, call light in reach for assistance, plan of care continued.      Problem: Cholecystectomy  Goal: Effective Bowel Elimination  Outcome: Progressing  Goal: Anesthesia/Sedation Recovery  Outcome: Progressing  Goal: Pain Control and Function  Outcome: Progressing

## 2024-10-11 NOTE — NURSING
[facility]  Nurses Note -- 4 Eyes        Date: 10/11/2024        Skin assessed on: Q shift        [x] No Pressure Injuries Present                 []Prevention Measures Documented     [] Yes LDA Previously documented      [] Yes New Pressure Injury Discovered              [] LDA Added        Attending RN: VALERIE Jackson     Second RN:  RIMA Ledezma

## 2024-10-12 ENCOUNTER — TELEPHONE (OUTPATIENT)
Dept: ENDOSCOPY | Facility: HOSPITAL | Age: 69
End: 2024-10-12
Payer: MEDICARE

## 2024-10-12 VITALS
HEIGHT: 65 IN | TEMPERATURE: 98 F | OXYGEN SATURATION: 94 % | SYSTOLIC BLOOD PRESSURE: 112 MMHG | WEIGHT: 132.06 LBS | RESPIRATION RATE: 18 BRPM | BODY MASS INDEX: 22 KG/M2 | DIASTOLIC BLOOD PRESSURE: 69 MMHG | HEART RATE: 64 BPM

## 2024-10-12 LAB
ANION GAP SERPL CALC-SCNC: 10 MMOL/L (ref 8–16)
BUN SERPL-MCNC: 14 MG/DL (ref 8–23)
CALCIUM SERPL-MCNC: 9.3 MG/DL (ref 8.7–10.5)
CHLORIDE SERPL-SCNC: 103 MMOL/L (ref 95–110)
CO2 SERPL-SCNC: 29 MMOL/L (ref 23–29)
CREAT SERPL-MCNC: 0.6 MG/DL (ref 0.5–1.4)
ERYTHROCYTE [DISTWIDTH] IN BLOOD BY AUTOMATED COUNT: 12.6 % (ref 11.5–14.5)
EST. GFR  (NO RACE VARIABLE): >60 ML/MIN/1.73 M^2
GLUCOSE SERPL-MCNC: 100 MG/DL (ref 70–110)
HCT VFR BLD AUTO: 43.7 % (ref 37–48.5)
HGB BLD-MCNC: 14.4 G/DL (ref 12–16)
MCH RBC QN AUTO: 33.9 PG (ref 27–31)
MCHC RBC AUTO-ENTMCNC: 33 G/DL (ref 32–36)
MCV RBC AUTO: 103 FL (ref 82–98)
PLATELET # BLD AUTO: 206 K/UL (ref 150–450)
PMV BLD AUTO: 9.9 FL (ref 9.2–12.9)
POTASSIUM SERPL-SCNC: 3.7 MMOL/L (ref 3.5–5.1)
RBC # BLD AUTO: 4.25 M/UL (ref 4–5.4)
SODIUM SERPL-SCNC: 142 MMOL/L (ref 136–145)
WBC # BLD AUTO: 11.3 K/UL (ref 3.9–12.7)

## 2024-10-12 PROCEDURE — 80048 BASIC METABOLIC PNL TOTAL CA: CPT | Performed by: INTERNAL MEDICINE

## 2024-10-12 PROCEDURE — 25000003 PHARM REV CODE 250: Performed by: STUDENT IN AN ORGANIZED HEALTH CARE EDUCATION/TRAINING PROGRAM

## 2024-10-12 PROCEDURE — 85027 COMPLETE CBC AUTOMATED: CPT | Performed by: INTERNAL MEDICINE

## 2024-10-12 PROCEDURE — S4991 NICOTINE PATCH NONLEGEND: HCPCS | Performed by: STUDENT IN AN ORGANIZED HEALTH CARE EDUCATION/TRAINING PROGRAM

## 2024-10-12 PROCEDURE — 63600175 PHARM REV CODE 636 W HCPCS: Performed by: STUDENT IN AN ORGANIZED HEALTH CARE EDUCATION/TRAINING PROGRAM

## 2024-10-12 PROCEDURE — 36415 COLL VENOUS BLD VENIPUNCTURE: CPT | Performed by: INTERNAL MEDICINE

## 2024-10-12 PROCEDURE — 25000003 PHARM REV CODE 250: Performed by: INTERNAL MEDICINE

## 2024-10-12 RX ORDER — HYDROCODONE BITARTRATE AND ACETAMINOPHEN 10; 325 MG/1; MG/1
1 TABLET ORAL EVERY 4 HOURS PRN
Qty: 10 TABLET | Refills: 0 | Status: SHIPPED | OUTPATIENT
Start: 2024-10-12 | End: 2024-10-13

## 2024-10-12 RX ORDER — IBUPROFEN 200 MG
1 TABLET ORAL DAILY
Qty: 7 PATCH | Refills: 0 | Status: SHIPPED | OUTPATIENT
Start: 2024-10-12 | End: 2024-10-19

## 2024-10-12 RX ORDER — AMOXICILLIN AND CLAVULANATE POTASSIUM 875; 125 MG/1; MG/1
1 TABLET, FILM COATED ORAL 2 TIMES DAILY
Qty: 14 TABLET | Refills: 0 | Status: SHIPPED | OUTPATIENT
Start: 2024-10-12 | End: 2024-10-19

## 2024-10-12 RX ADMIN — HYDROCODONE BITARTRATE AND ACETAMINOPHEN 1 TABLET: 10; 325 TABLET ORAL at 01:10

## 2024-10-12 RX ADMIN — PIPERACILLIN AND TAZOBACTAM 4.5 G: 4; .5 INJECTION, POWDER, FOR SOLUTION INTRAVENOUS; PARENTERAL at 05:10

## 2024-10-12 RX ADMIN — CITALOPRAM HYDROBROMIDE 20 MG: 20 TABLET ORAL at 09:10

## 2024-10-12 RX ADMIN — NICOTINE 1 PATCH: 14 PATCH TRANSDERMAL at 09:10

## 2024-10-12 RX ADMIN — BUSPIRONE HYDROCHLORIDE 15 MG: 10 TABLET ORAL at 02:10

## 2024-10-12 RX ADMIN — HYDROCODONE BITARTRATE AND ACETAMINOPHEN 1 TABLET: 10; 325 TABLET ORAL at 05:10

## 2024-10-12 RX ADMIN — PANTOPRAZOLE SODIUM 40 MG: 40 INJECTION, POWDER, LYOPHILIZED, FOR SOLUTION INTRAVENOUS at 09:10

## 2024-10-12 RX ADMIN — POLYETHYLENE GLYCOL 3350 17 G: 17 POWDER, FOR SOLUTION ORAL at 09:10

## 2024-10-12 RX ADMIN — HYDROCODONE BITARTRATE AND ACETAMINOPHEN 1 TABLET: 10; 325 TABLET ORAL at 09:10

## 2024-10-12 RX ADMIN — MAGNESIUM HYDROXIDE 2400 MG: 400 SUSPENSION ORAL at 09:10

## 2024-10-12 RX ADMIN — BUSPIRONE HYDROCHLORIDE 15 MG: 10 TABLET ORAL at 09:10

## 2024-10-12 RX ADMIN — PIPERACILLIN AND TAZOBACTAM 4.5 G: 4; .5 INJECTION, POWDER, FOR SOLUTION INTRAVENOUS; PARENTERAL at 02:10

## 2024-10-12 NOTE — PLAN OF CARE
Case Management Final Discharge Note      Discharge Disposition: home    New DME ordered / company name: none    Relevant SDOH / Transition of Care Barriers:  none    Primary Caretaker and contact information: Independent but son able to help at home as needed    Scheduled followup appointment: PCP and Dr Ponce    Referrals placed: per MD    Transportation: private vehicle    Patient and family educated on discharge services and updated on DC plan. Bedside RN notified, patient clear to discharge from Case Management Perspective.    10/12/24 1355   Final Note   Assessment Type Final Discharge Note   Anticipated Discharge Disposition Home   Hospital Resources/Appts/Education Provided Appointments scheduled and added to AVS   Post-Acute Status   Post-Acute Authorization Other   Other Status No Post-Acute Service Needs   Discharge Delays None known at this time

## 2024-10-12 NOTE — DISCHARGE SUMMARY
[facility] Medicine  Discharge Summary      Patient Name: Patricia Ramirez  MRN: [id number]  [facility]: [id number]  Patient Class: IP- Inpatient  Admission Date: 10/6/2024  Hospital Length of Stay: 6 days  Discharge Date and Time:  10/12/2024 3:39 PM  Attending Physician: Ez Reyna DO   Discharging Provider: Ez Reyna DO  Primary Care Provider: Fabienne Erwin NP    Primary Care Team: Networked reference to record PCT     HPI:     Patient is a 69-year-old female with past medical history of anxiety, depression, hypertension, hyperlipidemia, GERD, CHF EF 65% with G-II DD,  tobacco use (50 pack-year smoking history) marijuana use, 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  Patient reports 10/10 intensity cramping abdominal pain for 1 day duration.  Patient reports relief with Gas-X.  Denied nausea/vomiting/fever/melena/hematochezia/diarrhea/chest pain/shortness of breath.  Patient reports not taking Eliquis for months    During evaluation in the ER, patient was found to be hypertensive (163/67, sinus Jason (58).  Labs revealed WBC 9.53, hemoglobin 15.2, platelets 231, sodium 137, potassium 4.3, CO2 21, creatinine 0.7, AST 21, ALT 24, lipase 11, total bilirubin 0.5, ALP 52.  Lactic acid 1.5.  TSH 2.52.  Urinalysis negative for UTI with rare bacteria and 16 WBC.Cholelithiasis with choledocholithiasis.  There is mild intrahepatic biliary ductal dilatation.  No inflammation about the gallbladder. Small hiatal hernia and small to moderate-sized duodenal diverticulum.  Pending ultrasound abdomen.  General surgery/GI was consulted.  Recommended MRCP.    Patient was given 1 g ceftriaxone, 4 mg IV morphine, 75 mL iohexol.  Psychiatry was consulted in ED for prolonged grief reaction with significant weight loss.  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  GI /general surgery was consulted.  On empiric Zosyn.  Blood cultures pending.  Noted to have transaminitis  MRCP with evidence of1.8 cm choledocholith in the common bile ducts, with resultant intra and extrahepatic biliary ductal dilation .  Plan for ERCP.  Patient underwent ERCP.  She continued to be on antibiotics.  She was then underwent a cholecystectomy.  The gallbladder was so adherent to the stomach due to the inflammation.  Patient came out of the OR with percutaneous drain.  We continued antibiotics Zosyn.  White blood count trended down.  Patient is hemodynamically stable.  She tolerated oral intake.  Patient was cleared to be discharged on Augmentin for 7 days.  Anticoagulation was held.  Patient will follow up with General surgery for management of percutaneous drain and for the timing of resuming her anticoagulation.  Plan of care discussed with the patient.  Patient voiced an understanding.  Patient will be discharged to home.     Goals of Care Treatment Preferences:  Code Status: Full Code      SDOH Screening:  The patient was screened for utility difficulties, food insecurity, transport difficulties, housing insecurity, and interpersonal safety and there were no concerns identified this admission.     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  Patient reports 10/10 intensity cramping abdominal pain for 1 day duration.  Patient reports relief with Gas-X.  Denied nausea/vomiting/fever/melena/hematochezia/diarrhea/chest pain/shortness of breath.  Patient reports not taking Eliquis for months     During evaluation in the ER, patient was found to be hypertensive (163/67, sinus Jason (58).  Labs revealed WBC 9.53, hemoglobin 15.2, platelets 231, sodium 137, potassium 4.3, CO2 21, creatinine 0.7, AST 21, ALT 24, lipase 11, total bilirubin 0.5, ALP 52.  Lactic acid 1.5.  TSH 2.52.  Urinalysis negative for UTI with rare bacteria and 16 WBC.Cholelithiasis with choledocholithiasis.  There is mild intrahepatic biliary ductal dilatation.  No inflammation about the gallbladder. Small hiatal hernia and small to moderate-sized duodenal diverticulum.  Pending ultrasound abdomen.  General surgery/GI was consulted.  Recommended MRCP.     Patient was given 1 g ceftriaxone, 4 mg IV morphine, 75 mL iohexol.  Psychiatry was consulted in ED for prolonged grief reaction with significant weight loss.  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  GI /general surgery was consulted.  On empiric Zosyn.  Blood cultures pending.  Noted  to have transaminitis  MRCP with evidence of1.8 cm choledocholith in the common bile ducts, with resultant intra and extrahepatic biliary ductal dilation .  Plan for ERCP today     Interval History:  Pain is controlled.  Continued to have discomfort of the abdomen.  She had a good night's sleep.  She was able to tolerate her meals well.     Review of Systems   Constitutional:  Negative for activity change and appetite change.   HENT:  Negative for congestion and dental problem.    Eyes:  Negative for discharge and itching.   Respiratory:  Negative for apnea and chest tightness.    Cardiovascular:  Negative for chest pain and leg swelling.   Gastrointestinal:  Positive for abdominal pain and constipation.   Endocrine: Negative for cold intolerance and heat intolerance.   Genitourinary:  Negative for difficulty urinating, dyspareunia and dysuria.   Musculoskeletal:  Negative for arthralgias and back pain.   Neurological:  Negative for dizziness and facial asymmetry.   Psychiatric/Behavioral:  Negative for agitation and behavioral problems.       Objective:      Vital Signs (Most Recent):  Temp: 98.2 °F (36.8 °C) (10/11/24 0735)  Pulse: 70 (10/11/24 0735)  Resp: 17 (10/11/24 1012)  BP: 101/62 (10/11/24 0735)  SpO2: (!) 94 % (10/11/24 0735) Vital Signs (24h Range):  Temp:  [97.8 °F (36.6 °C)-98.3 °F (36.8 °C)] 98.2 °F (36.8 °C)  Pulse:  [60-73] 70  Resp:  [16-18] 17  SpO2:  [92 %-96 %] 94 %  BP: (101-131)/(55-74) 101/62      Weight: 59.9 kg (132 lb 0.9 oz)  Body mass index is 21.98 kg/m².     Intake/Output Summary (Last 24 hours) at 10/11/2024 1030  Last data filed at 10/11/2024 0830      Gross per 24 hour   Intake 901.77 ml   Output 120 ml   Net 781.77 ml         Physical Exam  Constitutional:       Appearance: Normal appearance.   HENT:      Head: Normocephalic and atraumatic.      Right Ear: There is no impacted cerumen.      Left Ear: There is no impacted cerumen.      Nose: No congestion or rhinorrhea.       Mouth/Throat:      Pharynx: No oropharyngeal exudate or posterior oropharyngeal erythema.   Eyes:      General:         Right eye: No discharge.         Left eye: No discharge.   Cardiovascular:      Rate and Rhythm: Normal rate and regular rhythm.   Pulmonary:      Effort: Pulmonary effort is normal. No respiratory distress.      Breath sounds: Normal breath sounds. No stridor.   Abdominal:      General: Bowel sounds are normal.      Palpations: Abdomen is soft.      Tenderness: There is abdominal tenderness.      Hernia: No hernia is present.      Comments: Subcutaneous drain is intact.  Not much discharge.   Musculoskeletal:         General: No swelling or tenderness.      Cervical back: No rigidity or tenderness.   Skin:     Coloration: Skin is not jaundiced or pale.   Neurological:      Mental Status: She is alert and oriented to person, place, and time. Mental status is at baseline.      Cranial Nerves: No cranial nerve deficit.      Sensory: No sensory deficit.      Motor: No weakness.       onsults:   Consults (From admission, onward)          Status Ordering Provider     Inpatient consult to Registered Dietitian/Nutritionist  Once        Provider:  (Not yet assigned)    Completed IZABELLA GUERRERO     Inpatient consult to Gastroenterology  Once        Provider:  Alicia Menjivar NP    Completed IZABELLA GUERRERO     Inpatient consult to General surgery  Once        Provider:  Dutch Enriquez MD    Completed IZABELLA GUERRERO     Inpatient consult to Telemedicine - Psychiatry  Once        Provider:  Bi Carter MD    Acknowledged IZABELLA GUERRERO            No new Assessment & Plan notes have been filed under this hospital service since the last note was generated.  Service: Hospital Medicine    Final Active Diagnoses:    Diagnosis Date Noted POA    PRINCIPAL PROBLEM:  Choledocholithiasis [K80.50] 10/06/2024 Yes    Moderate protein-calorie malnutrition [E44.0] 10/09/2024 Yes      Chronic    Moderate major depression, single episode [F32.1] 10/06/2024 Yes    Tobacco dependency [F17.200] 10/06/2024 Yes    Heart failure with preserved ejection fraction [I50.30] 09/26/2022 Yes    IFG (impaired fasting glucose) [R73.01] 09/26/2022 Yes    Splenic infarct [D73.5] 09/26/2022 Yes    GERD (gastroesophageal reflux disease) [K21.9] 12/29/2014 Yes    Hyperlipidemia [E78.5] 12/29/2014 Yes    Essential hypertension [I10] 12/29/2014 Yes      Problems Resolved During this Admission:    Diagnosis Date Noted Date Resolved POA    Chronic anticoagulation [Z79.01] 09/26/2022 10/10/2024 Not Applicable       Discharged Condition: good    Disposition: Home or Self Care    Follow Up:   Follow-up Information       Rigoberto Ponce MD Follow up in 1 week(s).    Specialties: General Surgery, Surgery  Why: when to resume anticoagulation for her history of splenic infarct and resume aspirin  Contact information:  [address]  [address]  [facility] [address]  [phone]               Yaima, Fabienne, NP Follow up in 1 week(s).    Specialty: Family Medicine  Contact information:  [address]  [place] [address]  [phone]                           Patient Instructions:      Ambulatory referral/consult to Psychiatry   Referral Priority: Urgent Referral Type: Psychiatric   Referral Reason: Specialty Services Required   Requested Specialty: Psychiatry   Number of Visits Requested: 1     Ambulatory referral/consult to Smoking Cessation Program   Standing Status: Future   Referral Priority: Routine Referral Type: Consultation   Referral Reason: Specialty Services Required   Requested Specialty: CTTS   Number of Visits Requested: 1     Activity as tolerated       Significant Diagnostic Studies: Labs: BMP:   Recent Labs   Lab 10/11/24  0426 10/12/24  0435   GLU 90 100    142   K 3.4* 3.7    103   CO2 31* 29   BUN 10 14   CREATININE 0.6 0.6   CALCIUM 9.1 9.3   , CMP   Recent Labs   Lab 10/11/24  0426  10/12/24  0435    142   K 3.4* 3.7    103   CO2 31* 29   GLU 90 100   BUN 10 14   CREATININE 0.6 0.6   CALCIUM 9.1 9.3   PROT 5.7*  --    ALBUMIN 2.8*  --    BILITOT 0.6  --    ALKPHOS 58  --    AST 21  --    ALT 58*  --    ANIONGAP 9 10   , CBC   Recent Labs   Lab 10/11/24  0426 10/12/24  0435   WBC 10.88 11.30   HGB 13.6 14.4   HCT 40.8 43.7    206   , and INR   Lab Results   Component Value Date    INR 1.0 07/28/2022       Pending Diagnostic Studies:       Procedure Component Value Units Date/Time    Specimen to Pathology, Surgery General Surgery [[id number]] Collected: 10/09/24 1427    Order Status: Sent Lab Status: In process Updated: 10/10/24 0740    Specimen: Tissue            Medications:  Reconciled Home Medications:      Medication List        START taking these medications      amoxicillin-clavulanate 875-125mg 875-125 mg per tablet  Commonly known as: AUGMENTIN  Take 1 tablet by mouth 2 (two) times daily. for 7 days     HYDROcodone-acetaminophen  mg per tablet  Commonly known as: NORCO  Take 1 tablet by mouth every 4 (four) hours as needed for Pain.     mirtazapine 15 MG tablet  Commonly known as: REMERON  Take 1 tablet (15 mg total) by mouth every evening.     nicotine 14 mg/24 hr  Commonly known as: NICODERM CQ  Place 1 patch onto the skin once daily. for 7 days            CONTINUE taking these medications      acetaminophen 500 MG tablet  Commonly known as: TYLENOL  Take 500 mg by mouth every 6 (six) hours as needed for Pain.     ALPRAZolam 0.5 MG tablet  Commonly known as: XANAX  Take 1 tablet (0.5 mg total) by mouth 2 (two) times daily as needed for Anxiety.     atorvastatin 40 MG tablet  Commonly known as: LIPITOR  Take 1 tablet (40 mg total) by mouth once daily.     BIOTIN ORAL  Take by mouth.     busPIRone 15 MG tablet  Commonly known as: BUSPAR  TAKE 1 TABLET BY MOUTH THREE TIMES DAILY     citalopram 40 MG tablet  Commonly known as: CeleXA  Take 1 tablet by mouth once  daily     cyclobenzaprine 5 MG tablet  Commonly known as: FLEXERIL  TAKE 1 TABLET BY MOUTH TWICE DAILY AS NEEDED FOR MUSCLE SPASM     ergocalciferol 50,000 unit Cap  Commonly known as: ERGOCALCIFEROL  Take 1 capsule (50,000 Units total) by mouth every 7 days.     olmesartan 20 MG tablet  Commonly known as: BENICAR  TAKE 1 TABLET BY MOUTH ONCE DAILY IN THE EVENING            STOP taking these medications      aspirin 81 MG EC tablet  Commonly known as: ECOTRIN     traZODone 100 MG tablet  Commonly known as: DESYREL     traZODone 50 MG tablet  Commonly known as: DESYREL              Indwelling Lines/Drains at time of discharge:   Lines/Drains/Airways       Drain  Duration                  Closed/Suction Drain 10/09/24 1358 Tube - 1 Anterior RUQ Bulb 10 Fr. 3 days                    Time spent on the discharge of patient: 35 minutes         Ez Reyna DO  Department of Hospital Medicine  [facility] Surg

## 2024-10-12 NOTE — NURSING
Received report care assumed. Patient lying in bed resting, NAD noted. Safety precautions maintained.    [facility]  Nurses Note -- 4 Eyes      10/11/2024       Skin assessed on: Q Shift      [x] No Pressure Injuries Present    []Prevention Measures Documented    [] Yes LDA  for Pressure Injury Previously documented     [] Yes New Pressure Injury Discovered   [] LDA for New Pressure Injury Added      Attending RN:  Brisa Jung LPN     Second RN:  Renetta Stratton RN

## 2024-10-12 NOTE — PLAN OF CARE
No acute distress noted, patient free from falls or injury this shift.  Bed in low position, wheels locked, call light in reach for assistance, plan of care continued.      Problem: Cholecystectomy  Goal: Pain Control and Function  Outcome: Progressing  Goal: Effective Oxygenation and Ventilation  Outcome: Progressing

## 2024-10-12 NOTE — PROGRESS NOTES
Staff note     69 F with choledocholithiasis s/p ERCP with stents, subtotal diane 10/9    EVERARDO. Tolerating diet. Having bowel function. Minimal pain     AF VSS  Abdomen soft   Drain with scant output     Labs reviewed     A/P - doing well   - monitor drain output  - continue ABX, transition to Augmentin on DC for total of 7 days  - OK for discharge, follow up Dr. Rigoberto Ponce in clinic  - Patient will keep AMBAR drain on discharge, to be removed in clinic

## 2024-10-12 NOTE — NURSING
[facility]  Nurses Note -- 4 Eyes      10/12/2024       Skin assessed on: Q Shift      [x] No Pressure Injuries Present    [x]Prevention Measures Documented    [] Yes LDA  for Pressure Injury Previously documented     [] Yes New Pressure Injury Discovered   [] LDA for New Pressure Injury Added      Attending RN:  Ashley Horton RN     Second RN:  Brisa Jung LPN

## 2024-10-13 ENCOUNTER — TELEPHONE (OUTPATIENT)
Dept: HEPATOLOGY | Facility: HOSPITAL | Age: 69
End: 2024-10-13
Payer: MEDICARE

## 2024-10-13 RX ORDER — HYDROCODONE BITARTRATE AND ACETAMINOPHEN 10; 325 MG/1; MG/1
1 TABLET ORAL EVERY 6 HOURS PRN
Qty: 10 TABLET | Refills: 0 | Status: SHIPPED | OUTPATIENT
Start: 2024-10-13 | End: 2024-10-18 | Stop reason: SDUPTHER

## 2024-10-13 NOTE — TELEPHONE ENCOUNTER
Patient called.  Her previous [facility] prescription was canceled.  Need another p.r.n. Norco prescription after she was discharged.  I spoke with the patient's sister.  I spoke with the pharmacy.  Pharmacy canceled the prescription.  We will give the patient another Norco prescription 10/325 dispense 10 tablets q.6 hours as needed for pain from 7-10 on pain scale.   No indicators present

## 2024-10-14 LAB
FINAL PATHOLOGIC DIAGNOSIS: NORMAL
GROSS: NORMAL
Lab: NORMAL

## 2024-10-15 ENCOUNTER — TELEPHONE (OUTPATIENT)
Dept: ENDOSCOPY | Facility: HOSPITAL | Age: 69
End: 2024-10-15
Payer: MEDICARE

## 2024-10-15 ENCOUNTER — PATIENT MESSAGE (OUTPATIENT)
Dept: ENDOSCOPY | Facility: HOSPITAL | Age: 69
End: 2024-10-15
Payer: MEDICARE

## 2024-10-15 DIAGNOSIS — Z46.89 ENCOUNTER FOR REMOVAL OF BILIARY STENT: Primary | ICD-10-CM

## 2024-10-15 DIAGNOSIS — R93.2 ABNORMAL FINDINGS ON DIAGNOSTIC IMAGING OF LIVER AND BILIARY TRACT: ICD-10-CM

## 2024-10-15 NOTE — TELEPHONE ENCOUNTER
Spoke to pt to schedule procedure(s) ERCP        Physician to perform procedure(s) Dr. SID Roger  Date of Procedure (s) 11/21/24  Arrival Time 8:30 AM  Time of Procedure(s) 9:30 AM   Location of Procedure(s) [facility] [address] Floor  Type of Rx Prep sent to patient: N/A  Instructions provided to [facility] via [facility]    Patient was informed on the following information and verbalized understanding. Screening questionnaire reviewed with patient and complete. If procedure requires anesthesia, a responsible adult needs to be present to accompany the patient home, patient cannot drive after receiving anesthesia. Appointment details are tentative, especially check-in time. Patient will receive a prep-op call 7 days prior to confirm check-in time for procedure. If applicable the patient should contact their pharmacy to verify Rx for procedure prep is ready for pick-up. Patient was advised to call the scheduling department at [phone] if pharmacy states no Rx is available. Patient was advised to call the endoscopy scheduling department if any questions or concerns arise.      [facility] Scheduling Department

## 2024-10-17 ENCOUNTER — TELEPHONE (OUTPATIENT)
Dept: GASTROENTEROLOGY | Facility: CLINIC | Age: 69
End: 2024-10-17
Payer: MEDICARE

## 2024-10-17 NOTE — TELEPHONE ENCOUNTER
----- Message from Joyce sent at 10/17/2024 11:37 AM CDT -----  Regarding: Sister  Who Called: Sister    Have you contacted your pharmacy:    Refill HYDROcodone-acetaminophen (NORCO)  mg per tablet    RX Name and Strength:    Preferred Pharmacy with phone number:   [id number] - [place] - [address]  [address]  [place] [address]  Phone: [phone] Fax: [phone]      Local or Mail Order: local    Would the patient rather a call back or a response via [facility]? call    Best Call Back Number:  [phone]      Additional Information: pt is out of pain meds    Thank you.

## 2024-10-18 ENCOUNTER — TELEPHONE (OUTPATIENT)
Dept: SURGERY | Facility: CLINIC | Age: 69
End: 2024-10-18
Payer: MEDICARE

## 2024-10-18 RX ORDER — HYDROCODONE BITARTRATE AND ACETAMINOPHEN 10; 325 MG/1; MG/1
1 TABLET ORAL EVERY 6 HOURS PRN
Qty: 10 TABLET | Refills: 0 | Status: SHIPPED | OUTPATIENT
Start: 2024-10-18

## 2024-10-18 NOTE — TELEPHONE ENCOUNTER
Spoke with pt advised her that  did sent a refill to her desired pharmacy. Pt advised to take tylenol in between each medication. Pt verbalized understanding.         ----- Message from Sean sent at 10/17/2024  2:52 PM CDT -----  Regarding: sister  Type: RX Refill Request    Who Called:sister    Have you contacted your pharmacy:    Refill    RX Name and Strength:HYDROcodone-acetaminophen (NORCO)  mg per tablet    Preferred Pharmacy with phone number:  [id number]  [place] - [address] [facility]  [place] [address]  Phone: [phone] Fax: [phone]        Local or Mail Order:local    Would the patient rather a call back or a response via [facility]? olya    Best Call Back Number:[phone]    Additional Information:

## 2024-10-22 ENCOUNTER — HOSPITAL ENCOUNTER (OUTPATIENT)
Dept: RADIOLOGY | Facility: HOSPITAL | Age: 69
Discharge: HOME | End: 2024-10-22
Attending: INTERNAL MEDICINE
Payer: MEDICARE

## 2024-10-22 DIAGNOSIS — Z46.89 ENCOUNTER FOR REMOVAL OF BILIARY STENT: ICD-10-CM

## 2024-10-22 PROCEDURE — 74019 RADEX ABDOMEN 2 VIEWS: CPT | Mod: 26,,, | Performed by: STUDENT IN AN ORGANIZED HEALTH CARE EDUCATION/TRAINING PROGRAM

## 2024-10-22 PROCEDURE — 74019 RADEX ABDOMEN 2 VIEWS: CPT | Mod: TC

## 2024-10-24 ENCOUNTER — OFFICE VISIT (OUTPATIENT)
Dept: SURGERY | Facility: CLINIC | Age: 69
End: 2024-10-24
Payer: MEDICARE

## 2024-10-24 VITALS
SYSTOLIC BLOOD PRESSURE: 155 MMHG | HEART RATE: 76 BPM | HEIGHT: 65 IN | BODY MASS INDEX: 22 KG/M2 | WEIGHT: 132.06 LBS | DIASTOLIC BLOOD PRESSURE: 77 MMHG

## 2024-10-24 DIAGNOSIS — K80.50 CHOLEDOCHOLITHIASIS: Primary | ICD-10-CM

## 2024-10-24 PROCEDURE — 3288F FALL RISK ASSESSMENT DOCD: CPT | Mod: CPTII,S$GLB,, | Performed by: SURGERY

## 2024-10-24 PROCEDURE — 1101F PT FALLS ASSESS-DOCD LE1/YR: CPT | Mod: CPTII,S$GLB,, | Performed by: SURGERY

## 2024-10-24 PROCEDURE — 4010F ACE/ARB THERAPY RXD/TAKEN: CPT | Mod: CPTII,S$GLB,, | Performed by: SURGERY

## 2024-10-24 PROCEDURE — 3044F HG A1C LEVEL LT 7.0%: CPT | Mod: CPTII,S$GLB,, | Performed by: SURGERY

## 2024-10-24 PROCEDURE — 1125F AMNT PAIN NOTED PAIN PRSNT: CPT | Mod: CPTII,S$GLB,, | Performed by: SURGERY

## 2024-10-24 PROCEDURE — 1159F MED LIST DOCD IN RCRD: CPT | Mod: CPTII,S$GLB,, | Performed by: SURGERY

## 2024-10-24 PROCEDURE — 99024 POSTOP FOLLOW-UP VISIT: CPT | Mod: S$GLB,,, | Performed by: SURGERY

## 2024-10-24 PROCEDURE — 99999 PR PBB SHADOW E&M-EST. PATIENT-LVL III: CPT | Mod: PBBFAC,,, | Performed by: SURGERY

## 2024-10-24 PROCEDURE — 3077F SYST BP >= 140 MM HG: CPT | Mod: CPTII,S$GLB,, | Performed by: SURGERY

## 2024-10-24 PROCEDURE — 3078F DIAST BP <80 MM HG: CPT | Mod: CPTII,S$GLB,, | Performed by: SURGERY

## 2024-10-24 NOTE — PROGRESS NOTES
Surgery Clinic Note    Patricia Ramirez is a 69 y.o. year old female in clinic today for follow up of robotic subtotal cholecystectomy w drain placement. She is feeling OK, eating well. Recent constipation. Drain has been irritating but tolerable, she reports it was good for a week then suddenly became green. At this time the output is minimal, I see a small amount of seropurulent fluid.  No f/c/n/v/sob/cp    ROS:  Negative except above    Pathology:  Final Pathologic Diagnosis Gallbladder, cholecystectomy:  Chronic cholecystitis       PE:  Vitals:    10/24/24 1303   BP: (!) 155/77   Pulse: 76     NAD  No belabored breathing  Abd soft. ND. TTP RUQ, mild  RUQ Drain with seropurulent fluid, 3 mL total    A/P:  Patricia Ramirez is a 69 y.o. year old female s/p subtotal cholecystectomy w drain placement    -monitor drain output.  Rtc 1 wk. Keep at least 50 mL of fluid in the bulb so I can evaluate    Rigoberto Ponce  General Surgery - [facility]  10/24/2024

## 2024-11-01 ENCOUNTER — OFFICE VISIT (OUTPATIENT)
Dept: SURGERY | Facility: CLINIC | Age: 69
End: 2024-11-01
Payer: MEDICARE

## 2024-11-01 VITALS
HEART RATE: 85 BPM | BODY MASS INDEX: 22 KG/M2 | HEIGHT: 65 IN | WEIGHT: 132.06 LBS | DIASTOLIC BLOOD PRESSURE: 73 MMHG | SYSTOLIC BLOOD PRESSURE: 149 MMHG

## 2024-11-01 DIAGNOSIS — K80.50 CHOLEDOCHOLITHIASIS: Primary | ICD-10-CM

## 2024-11-01 PROCEDURE — 99999 PR PBB SHADOW E&M-EST. PATIENT-LVL III: CPT | Mod: PBBFAC,,, | Performed by: SURGERY

## 2024-11-20 ENCOUNTER — HOSPITAL ENCOUNTER (INPATIENT)
Facility: HOSPITAL | Age: 69
LOS: 2 days | Discharge: HOME | DRG: 392 | End: 2024-11-22
Attending: STUDENT IN AN ORGANIZED HEALTH CARE EDUCATION/TRAINING PROGRAM | Admitting: STUDENT IN AN ORGANIZED HEALTH CARE EDUCATION/TRAINING PROGRAM
Payer: MEDICARE

## 2024-11-20 ENCOUNTER — TELEPHONE (OUTPATIENT)
Dept: ENDOSCOPY | Facility: HOSPITAL | Age: 69
End: 2024-11-20
Payer: MEDICARE

## 2024-11-20 DIAGNOSIS — R10.9 ABDOMINAL PAIN: ICD-10-CM

## 2024-11-20 DIAGNOSIS — K31.1 GASTRIC OUTLET OBSTRUCTION: ICD-10-CM

## 2024-11-20 DIAGNOSIS — R10.13 EPIGASTRIC PAIN: Primary | ICD-10-CM

## 2024-11-20 DIAGNOSIS — R07.9 CHEST PAIN: ICD-10-CM

## 2024-11-20 PROBLEM — Z90.49 HISTORY OF LAPAROSCOPIC CHOLECYSTECTOMY: Chronic | Status: ACTIVE | Noted: 2024-11-20

## 2024-11-20 LAB
ALBUMIN SERPL BCP-MCNC: 3.4 G/DL (ref 3.5–5.2)
ALP SERPL-CCNC: 98 U/L (ref 40–150)
ALT SERPL W/O P-5'-P-CCNC: 84 U/L (ref 10–44)
ANION GAP SERPL CALC-SCNC: 9 MMOL/L (ref 8–16)
AST SERPL-CCNC: 107 U/L (ref 10–40)
BASOPHILS # BLD AUTO: 0.07 K/UL (ref 0–0.2)
BASOPHILS NFR BLD: 0.6 % (ref 0–1.9)
BILIRUB SERPL-MCNC: 0.4 MG/DL (ref 0.1–1)
BNP SERPL-MCNC: 46 PG/ML (ref 0–99)
BUN SERPL-MCNC: 10 MG/DL (ref 8–23)
CALCIUM SERPL-MCNC: 8.6 MG/DL (ref 8.7–10.5)
CHLORIDE SERPL-SCNC: 104 MMOL/L (ref 95–110)
CO2 SERPL-SCNC: 23 MMOL/L (ref 23–29)
CREAT SERPL-MCNC: 0.6 MG/DL (ref 0.5–1.4)
DIFFERENTIAL METHOD BLD: ABNORMAL
EOSINOPHIL # BLD AUTO: 0.1 K/UL (ref 0–0.5)
EOSINOPHIL NFR BLD: 0.9 % (ref 0–8)
ERYTHROCYTE [DISTWIDTH] IN BLOOD BY AUTOMATED COUNT: 13.3 % (ref 11.5–14.5)
EST. GFR  (NO RACE VARIABLE): >60 ML/MIN/1.73 M^2
GLUCOSE SERPL-MCNC: 117 MG/DL (ref 70–110)
HCT VFR BLD AUTO: 41.3 % (ref 37–48.5)
HGB BLD-MCNC: 13.8 G/DL (ref 12–16)
IMM GRANULOCYTES # BLD AUTO: 0.04 K/UL (ref 0–0.04)
IMM GRANULOCYTES NFR BLD AUTO: 0.4 % (ref 0–0.5)
LIPASE SERPL-CCNC: 32 U/L (ref 4–60)
LYMPHOCYTES # BLD AUTO: 0.7 K/UL (ref 1–4.8)
LYMPHOCYTES NFR BLD: 6.2 % (ref 18–48)
MCH RBC QN AUTO: 33.2 PG (ref 27–31)
MCHC RBC AUTO-ENTMCNC: 33.4 G/DL (ref 32–36)
MCV RBC AUTO: 99 FL (ref 82–98)
MONOCYTES # BLD AUTO: 0.8 K/UL (ref 0.3–1)
MONOCYTES NFR BLD: 7.1 % (ref 4–15)
NEUTROPHILS # BLD AUTO: 9.3 K/UL (ref 1.8–7.7)
NEUTROPHILS NFR BLD: 84.8 % (ref 38–73)
NRBC BLD-RTO: 0 /100 WBC
OHS QRS DURATION: 126 MS
OHS QTC CALCULATION: 454 MS
PLATELET # BLD AUTO: 186 K/UL (ref 150–450)
PMV BLD AUTO: 9.6 FL (ref 9.2–12.9)
POTASSIUM SERPL-SCNC: 3.9 MMOL/L (ref 3.5–5.1)
PROT SERPL-MCNC: 6.6 G/DL (ref 6–8.4)
RBC # BLD AUTO: 4.16 M/UL (ref 4–5.4)
SODIUM SERPL-SCNC: 136 MMOL/L (ref 136–145)
TROPONIN I SERPL DL<=0.01 NG/ML-MCNC: <0.006 NG/ML (ref 0–0.03)
TROPONIN I SERPL DL<=0.01 NG/ML-MCNC: <0.006 NG/ML (ref 0–0.03)
WBC # BLD AUTO: 10.92 K/UL (ref 3.9–12.7)

## 2024-11-20 PROCEDURE — 83880 ASSAY OF NATRIURETIC PEPTIDE: CPT | Performed by: PHYSICIAN ASSISTANT

## 2024-11-20 PROCEDURE — 93005 ELECTROCARDIOGRAM TRACING: CPT

## 2024-11-20 PROCEDURE — 96375 TX/PRO/DX INJ NEW DRUG ADDON: CPT

## 2024-11-20 PROCEDURE — 63600175 PHARM REV CODE 636 W HCPCS: Performed by: STUDENT IN AN ORGANIZED HEALTH CARE EDUCATION/TRAINING PROGRAM

## 2024-11-20 PROCEDURE — 63600175 PHARM REV CODE 636 W HCPCS

## 2024-11-20 PROCEDURE — 83690 ASSAY OF LIPASE: CPT | Performed by: STUDENT IN AN ORGANIZED HEALTH CARE EDUCATION/TRAINING PROGRAM

## 2024-11-20 PROCEDURE — 84484 ASSAY OF TROPONIN QUANT: CPT | Performed by: PHYSICIAN ASSISTANT

## 2024-11-20 PROCEDURE — 80053 COMPREHEN METABOLIC PANEL: CPT | Performed by: STUDENT IN AN ORGANIZED HEALTH CARE EDUCATION/TRAINING PROGRAM

## 2024-11-20 PROCEDURE — 25500020 PHARM REV CODE 255: Performed by: STUDENT IN AN ORGANIZED HEALTH CARE EDUCATION/TRAINING PROGRAM

## 2024-11-20 PROCEDURE — 85025 COMPLETE CBC W/AUTO DIFF WBC: CPT | Performed by: STUDENT IN AN ORGANIZED HEALTH CARE EDUCATION/TRAINING PROGRAM

## 2024-11-20 PROCEDURE — 25000003 PHARM REV CODE 250

## 2024-11-20 PROCEDURE — 99285 EMERGENCY DEPT VISIT HI MDM: CPT | Mod: 25

## 2024-11-20 PROCEDURE — 21400001 HC TELEMETRY ROOM

## 2024-11-20 PROCEDURE — 93010 ELECTROCARDIOGRAM REPORT: CPT | Mod: ,,, | Performed by: INTERNAL MEDICINE

## 2024-11-20 PROCEDURE — 96374 THER/PROPH/DIAG INJ IV PUSH: CPT

## 2024-11-20 RX ORDER — DEXTROMETHORPHAN POLISTIREX 30 MG/5 ML
1 SUSPENSION, EXTENDED RELEASE 12 HR ORAL DAILY PRN
Status: DISCONTINUED | OUTPATIENT
Start: 2024-11-20 | End: 2024-11-22 | Stop reason: HOSPADM

## 2024-11-20 RX ORDER — ALUMINUM HYDROXIDE, MAGNESIUM HYDROXIDE, AND SIMETHICONE 1200; 120; 1200 MG/30ML; MG/30ML; MG/30ML
30 SUSPENSION ORAL 4 TIMES DAILY PRN
Status: DISCONTINUED | OUTPATIENT
Start: 2024-11-20 | End: 2024-11-22 | Stop reason: HOSPADM

## 2024-11-20 RX ORDER — SODIUM CHLORIDE 0.9 % (FLUSH) 0.9 %
10 SYRINGE (ML) INJECTION EVERY 12 HOURS PRN
Status: DISCONTINUED | OUTPATIENT
Start: 2024-11-20 | End: 2024-11-22 | Stop reason: HOSPADM

## 2024-11-20 RX ORDER — MORPHINE SULFATE 4 MG/ML
1 INJECTION, SOLUTION INTRAMUSCULAR; INTRAVENOUS
Status: DISCONTINUED | OUTPATIENT
Start: 2024-11-20 | End: 2024-11-22 | Stop reason: HOSPADM

## 2024-11-20 RX ORDER — MIRTAZAPINE 15 MG/1
15 TABLET, FILM COATED ORAL NIGHTLY
Status: DISCONTINUED | OUTPATIENT
Start: 2024-11-20 | End: 2024-11-22 | Stop reason: HOSPADM

## 2024-11-20 RX ORDER — IBUPROFEN 200 MG
24 TABLET ORAL
Status: DISCONTINUED | OUTPATIENT
Start: 2024-11-20 | End: 2024-11-22 | Stop reason: HOSPADM

## 2024-11-20 RX ORDER — CITALOPRAM 20 MG/1
40 TABLET, FILM COATED ORAL DAILY
Status: DISCONTINUED | OUTPATIENT
Start: 2024-11-21 | End: 2024-11-22 | Stop reason: HOSPADM

## 2024-11-20 RX ORDER — MORPHINE SULFATE 4 MG/ML
4 INJECTION, SOLUTION INTRAMUSCULAR; INTRAVENOUS ONCE
Status: COMPLETED | OUTPATIENT
Start: 2024-11-20 | End: 2024-11-20

## 2024-11-20 RX ORDER — ALPRAZOLAM 0.5 MG/1
0.5 TABLET ORAL 2 TIMES DAILY PRN
Status: DISCONTINUED | OUTPATIENT
Start: 2024-11-20 | End: 2024-11-22 | Stop reason: HOSPADM

## 2024-11-20 RX ORDER — MORPHINE SULFATE 4 MG/ML
6 INJECTION, SOLUTION INTRAMUSCULAR; INTRAVENOUS
Status: COMPLETED | OUTPATIENT
Start: 2024-11-20 | End: 2024-11-20

## 2024-11-20 RX ORDER — LOSARTAN POTASSIUM 25 MG/1
50 TABLET ORAL DAILY
Status: DISCONTINUED | OUTPATIENT
Start: 2024-11-20 | End: 2024-11-22 | Stop reason: HOSPADM

## 2024-11-20 RX ORDER — ATORVASTATIN CALCIUM 10 MG/1
40 TABLET, FILM COATED ORAL DAILY
Status: DISCONTINUED | OUTPATIENT
Start: 2024-11-21 | End: 2024-11-20

## 2024-11-20 RX ORDER — IBUPROFEN 200 MG
16 TABLET ORAL
Status: DISCONTINUED | OUTPATIENT
Start: 2024-11-20 | End: 2024-11-22 | Stop reason: HOSPADM

## 2024-11-20 RX ORDER — MORPHINE SULFATE 4 MG/ML
4 INJECTION, SOLUTION INTRAMUSCULAR; INTRAVENOUS
Status: DISCONTINUED | OUTPATIENT
Start: 2024-11-20 | End: 2024-11-22 | Stop reason: HOSPADM

## 2024-11-20 RX ORDER — ONDANSETRON HYDROCHLORIDE 2 MG/ML
4 INJECTION, SOLUTION INTRAVENOUS EVERY 8 HOURS PRN
Status: DISCONTINUED | OUTPATIENT
Start: 2024-11-20 | End: 2024-11-22 | Stop reason: HOSPADM

## 2024-11-20 RX ORDER — NALOXONE HCL 0.4 MG/ML
0.02 VIAL (ML) INJECTION
Status: DISCONTINUED | OUTPATIENT
Start: 2024-11-20 | End: 2024-11-22 | Stop reason: HOSPADM

## 2024-11-20 RX ORDER — ONDANSETRON HYDROCHLORIDE 2 MG/ML
4 INJECTION, SOLUTION INTRAVENOUS
Status: COMPLETED | OUTPATIENT
Start: 2024-11-20 | End: 2024-11-20

## 2024-11-20 RX ORDER — TALC
6 POWDER (GRAM) TOPICAL NIGHTLY PRN
Status: DISCONTINUED | OUTPATIENT
Start: 2024-11-20 | End: 2024-11-22 | Stop reason: HOSPADM

## 2024-11-20 RX ORDER — POLYETHYLENE GLYCOL 3350 17 G/17G
17 POWDER, FOR SOLUTION ORAL DAILY
Status: DISCONTINUED | OUTPATIENT
Start: 2024-11-21 | End: 2024-11-22 | Stop reason: HOSPADM

## 2024-11-20 RX ORDER — PROCHLORPERAZINE EDISYLATE 5 MG/ML
5 INJECTION INTRAMUSCULAR; INTRAVENOUS EVERY 6 HOURS PRN
Status: DISCONTINUED | OUTPATIENT
Start: 2024-11-20 | End: 2024-11-22 | Stop reason: HOSPADM

## 2024-11-20 RX ORDER — ACETAMINOPHEN 325 MG/1
650 TABLET ORAL EVERY 8 HOURS PRN
Status: DISCONTINUED | OUTPATIENT
Start: 2024-11-20 | End: 2024-11-22 | Stop reason: HOSPADM

## 2024-11-20 RX ORDER — HYDROMORPHONE HYDROCHLORIDE 1 MG/ML
1 INJECTION, SOLUTION INTRAMUSCULAR; INTRAVENOUS; SUBCUTANEOUS
Status: COMPLETED | OUTPATIENT
Start: 2024-11-20 | End: 2024-11-20

## 2024-11-20 RX ORDER — GLUCAGON 1 MG
1 KIT INJECTION
Status: DISCONTINUED | OUTPATIENT
Start: 2024-11-20 | End: 2024-11-22 | Stop reason: HOSPADM

## 2024-11-20 RX ADMIN — MORPHINE SULFATE 6 MG: 4 INJECTION INTRAVENOUS at 01:11

## 2024-11-20 RX ADMIN — HYDROMORPHONE HYDROCHLORIDE 1 MG: 1 INJECTION, SOLUTION INTRAMUSCULAR; INTRAVENOUS; SUBCUTANEOUS at 04:11

## 2024-11-20 RX ADMIN — MORPHINE SULFATE 4 MG: 4 INJECTION INTRAVENOUS at 06:11

## 2024-11-20 RX ADMIN — LOSARTAN POTASSIUM 50 MG: 25 TABLET, FILM COATED ORAL at 08:11

## 2024-11-20 RX ADMIN — IOHEXOL 75 ML: 350 INJECTION, SOLUTION INTRAVENOUS at 04:11

## 2024-11-20 RX ADMIN — MORPHINE SULFATE 4 MG: 4 INJECTION INTRAVENOUS at 11:11

## 2024-11-20 RX ADMIN — MIRTAZAPINE 15 MG: 15 TABLET, FILM COATED ORAL at 09:11

## 2024-11-20 RX ADMIN — BUSPIRONE HYDROCHLORIDE 15 MG: 10 TABLET ORAL at 09:11

## 2024-11-20 RX ADMIN — ONDANSETRON 4 MG: 2 INJECTION INTRAMUSCULAR; INTRAVENOUS at 01:11

## 2024-11-20 NOTE — CONSULTS
"Consult Note    SUBJECTIVE:     History of Present Illness:  Patient is a 69 y.o. female with hx choledocholithiasis who is s/p subtotal cholecystectomy on 10/9 with Dr. Ponce. Patient reports that her recovery was uncomplicated. AMBAR drain removed on  when shown to have no bilious output. She reports that over the past week, she has had episodic severe abdominal discomfort. She describes the sensation as feeling "like a baby kicking in the womb, then it moves up to [mid-epigastrium] and hurts." Associated nausea with that sensation. She has never experienced a similar sensation before this week. Reports BM every 3-4 days, which is normal for her. Patient hypertensive, short of breath, and diaphoretic on arrival, which she attributes to a panic attack.    PMH HTN, HLD, cervical cancer s/p hysterectomy  PSH subtotal cholecystectomy, ERCP,     Chief Complaint   Patient presents with    Chest Pain     SOB and CP sudden onset x1 hour PTA while at rest. Patient reports CP has decreased from 9/10 to 5/10. Pt diaphoretic in triage.        Review of patient's allergies indicates:   Allergen Reactions    Sulfa (sulfonamide antibiotics) Hives    Ace inhibitors Other (See Comments)     Cough         No current facility-administered medications for this encounter.     Current Outpatient Medications   Medication Sig Dispense Refill    acetaminophen (TYLENOL) 500 MG tablet Take 500 mg by mouth every 6 (six) hours as needed for Pain.      ALPRAZolam (XANAX) 0.5 MG tablet Take 1 tablet (0.5 mg total) by mouth 2 (two) times daily as needed for Anxiety. 60 tablet 0    atorvastatin (LIPITOR) 40 MG tablet Take 1 tablet (40 mg total) by mouth once daily. 90 tablet 3    BIOTIN ORAL Take by mouth.      busPIRone (BUSPAR) 15 MG tablet TAKE 1 TABLET BY MOUTH THREE TIMES DAILY 90 tablet 5    citalopram (CELEXA) 40 MG tablet Take 1 tablet by mouth once daily 90 tablet 3    cyclobenzaprine (FLEXERIL) 5 MG tablet TAKE 1 TABLET BY " MOUTH TWICE DAILY AS NEEDED FOR MUSCLE SPASM 90 tablet 3    ergocalciferol (ERGOCALCIFEROL) 50,000 unit Cap Take 1 capsule (50,000 Units total) by mouth every 7 days. 12 capsule 3    mirtazapine (REMERON) 15 MG tablet Take 1 tablet (15 mg total) by mouth every evening. 30 tablet 0    olmesartan (BENICAR) 20 MG tablet TAKE 1 TABLET BY MOUTH ONCE DAILY IN THE EVENING 90 tablet 3    traZODone (DESYREL) 50 MG tablet Take 50 mg by mouth every morning.       Facility-Administered Medications Ordered in Other Encounters   Medication Dose Route Frequency Provider Last Rate Last Admin    cyclopentolate 1% ophthalmic solution 1 drop  1 drop Left Eye On Call Procedure Palmer Berrios MD   1 drop at 10/27/22 1003    ofloxacin 0.3 % ophthalmic solution 1 drop  1 drop Left Eye On Call Procedure Palmer Berrios MD   2 drop at 10/27/22 1238    sodium chloride 0.9% flush 10 mL  10 mL Intravenous PRN Palmer Berrios MD           Past Medical History:   Diagnosis Date    Allergy     Anxiety     Arthritis     Cataract     Depressive disorder, not elsewhere classified     Esophageal reflux     Hypertension     Impaired fasting glucose     Joint pain     Obesity, unspecified     Other and unspecified hyperlipidemia      Past Surgical History:   Procedure Laterality Date    BLEPHAROPLASTY Bilateral 3/3/2021    Procedure: BLEPHAROPLASTY;  Surgeon: Maite Acuna MD;  Location: [facility];  Service: Ophthalmology;  Laterality: Bilateral;     SECTION  1975    COLONOSCOPY N/A 2023    Procedure: COLONOSCOPY;  Surgeon: Briana Sterling MD;  Location: [facility];  Service: Endoscopy;  Laterality: N/A;    ERCP N/A 10/7/2024    Procedure: ERCP (ENDOSCOPIC RETROGRADE CHOLANGIOPANCREATOGRAPHY);  Surgeon: Catracho Mitchell MD;  Location: [facility] ([address] [place] [address]);  Service: Endoscopy;  Laterality: N/A;    HYSTERECTOMY      without BSO    INTRAOCULAR PROSTHESES INSERTION Left 10/27/2022    Procedure: INSERTION,  IOL PROSTHESIS;  Surgeon: Palmer Berrios MD;  Location: [facility];  Service: Ophthalmology;  Laterality: Left;    PHACOEMULSIFICATION OF CATARACT Left 10/27/2022    Procedure: PHACOEMULSIFICATION, CATARACT;  Surgeon: Palmer Berrios MD;  Location: [facility];  Service: Ophthalmology;  Laterality: Left;  RN PHONE PREOP 10/21/2022   ARRIVAL 9:00 AM    R knee replacement      REPAIR OF RETINAL DETACHMENT WITH VITRECTOMY Left 2/28/2022    Procedure: REPAIR, RETINAL DETACHMENT, WITH VITRECTOMY;  Surgeon: MINO Martinez MD;  Location: [address];  Service: Ophthalmology;  Laterality: Left;  Spoke with SID Garcia. Pt was instructed nothing to eat after 8am and to arrive at 2pm for preop. 430pm case start request.    right  arm  surgery      ROBOT-ASSISTED CHOLECYSTECTOMY USING DA GENO XI N/A 10/9/2024    Procedure: XI ROBOTIC SUB TOTAL CHOLECYSTECTOMY; DRAIN PLACEMENT;  Surgeon: Rigoberto Ponce MD;  Location: [facility];  Service: General;  Laterality: N/A;     Family History   Problem Relation Name Age of Onset    Cancer Mother          lung    Liver disease Father      Thyroid disease Sister      Cervical cancer Sister      Tremor Sister       Social History     Tobacco Use    Smoking status: Every Day     Current packs/day: 0.75     Average packs/day: 0.8 packs/day for 40.0 years (30.0 ttl pk-yrs)     Types: Cigarettes    Smokeless tobacco: Never   Substance Use Topics    Alcohol use: Yes     Alcohol/week: 0.0 standard drinks of alcohol     Comment: occasionally    Drug use: Yes     Types: Marijuana        Review of Systems:  Review of Systems   Constitutional:  Negative for activity change, chills and fever.   Respiratory:  Negative for chest tightness and shortness of breath.    Gastrointestinal:  Positive for abdominal pain (mid-epigastrium) and constipation. Negative for diarrhea.   Genitourinary:  Negative for difficulty urinating.   Psychiatric/Behavioral:          Anxiety         OBJECTIVE:  "    Vital Signs (Most Recent)  Temp: 98.1 °F (36.7 °C) (11/20/24 1119)  Pulse: 70 (11/20/24 1602)  Resp: 17 (11/20/24 1602)  BP: (!) 196/85 (11/20/24 1602)  SpO2: 99 % (11/20/24 1602)  5' 5" (1.651 m)  61.2 kg (135 lb)     Physical Exam:  Physical Exam  Constitutional:       General: She is not in acute distress.     Appearance: She is not ill-appearing.   HENT:      Head: Normocephalic and atraumatic.      Mouth/Throat:      Mouth: Mucous membranes are moist.   Eyes:      Extraocular Movements: Extraocular movements intact.   Cardiovascular:      Rate and Rhythm: Normal rate.      Pulses: Normal pulses.   Pulmonary:      Effort: Pulmonary effort is normal.   Abdominal:      General: There is distension (mild distension in LUQ/mid-epigastrium).      Palpations: Abdomen is soft.      Tenderness: There is abdominal tenderness (to moderate palpation of LUQ/midepigastrium). There is no guarding or rebound.      Hernia: No hernia is present.   Skin:     General: Skin is warm and dry.   Neurological:      General: No focal deficit present.      Mental Status: She is alert.   Psychiatric:         Mood and Affect: Mood is anxious.           Laboratory  CBC  10.92 13.8 186    41.3     Coag                 BMP  136 104 10 117   3.9 23 0.6     CaMgPhos  8.6              Last labs from current encounter as of 11/20/2024-5:50 PM    T bili 0.4    ALT 84  Lipase 32    Trop I < 0.006    Imaging:  CXR - no acute cardiopulmonary process    CT AP - severely distended stomach with enhancement of antrum, normal small bowel with areas of wall enhancement, colon dilated with air and stool present, small area of fluid in gallbladder fossa, air in biliary tree, CBD and pancreatic duct stents in place      ASSESSMENT/PLAN:     Ms. Ramirez is a 68yo female who is 6 weeks s/p robotic subtotal cholecystectomy, now with 1 week of episodic upper abdominal discomfort. Noted to have severely distended stomach on CT, concerning for gastric " outlet obstruction. Normal post-operative changes of gallbladder fossa and biliary tree. Patient has scheduled ERCP to remove CBD and pancreatic stents tomorrow, and she will require EGD to assess for gastric outlet obstruction.    Recommendations  - No acute surgical intervention  - Recommend transfer to [facility] so that patient can undergo scheduled ERCP, which could also be diagnostic for gastric outlet obstruction.  - NG tube if nauseous/vomiting  - IV antiemetics  - GI consult for gastric outlet obstruction  - Control blood pressure  - Remainder of care per primary team    Hai Cutler MD  [facility] General Surgery PGY-II  11/20/2024 5:56 PM

## 2024-11-20 NOTE — ED NOTES
Patient presents to ED with Chest pain which pt states started yesterday, states pain stopped and resumed today along with SOB and sweating, pt describes discomfort to mid sternal chest area as sharp, currently rates pain a 9 on 0-10 scale, currently denies SOB and dizziness.

## 2024-11-20 NOTE — TELEPHONE ENCOUNTER
Called pt to confirm ERCP procedure for 11/21/24. Pt currently in the ED for chest pain. Removed from schedule until work up is complete. Reminder set in system to reschedule. Called pt with no answer. Voicemail left with direct phone number for call back.

## 2024-11-20 NOTE — ED PROVIDER NOTES
Encounter Date: 11/20/2024    SCRIBE #1 NOTE: I, Oziel Botello, am scribing for, and in the presence of,  Bhavani Marquez MD.       History     Chief Complaint   Patient presents with    Chest Pain     SOB and CP sudden onset x1 hour PTA while at rest. Patient reports CP has decreased from 9/10 to 5/10. Pt diaphoretic in triage.      Patient is a 69 y.o. 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 Patient reports that the pain starts in the abdomen and radiates into her chest. Reports of associated nausea. States that she had an episode last night, which resolved and then having another episode PTA, which prompted her to come for evaluation. She states that she woke up in a normal state of health this morning. Reports symptoms being alleviated with belching. Reports of a recent cholecystectomy 3-4 weeks ago with biliary stent placed. States that she is supposed to have the stent removed tomorrow. LBM was 2 days ago and states that this is normal for her. No other exacerbating or alleviating factors. Denies vomiting, diarrhea, back pain, SOB, or other associated symptoms.       The history is provided by the patient. No  was used.     Review of patient's allergies indicates:   Allergen Reactions    Sulfa (sulfonamide antibiotics) Hives    Ace inhibitors Other (See Comments)     Cough       Past Medical History:   Diagnosis Date    Allergy     Anxiety     Arthritis     Cataract     Depressive disorder, not elsewhere classified     Esophageal reflux     Hypertension     Impaired fasting glucose     Joint pain     Obesity, unspecified     Other and unspecified hyperlipidemia      Past Surgical History:   Procedure Laterality Date    BLEPHAROPLASTY Bilateral  3/3/2021    Procedure: BLEPHAROPLASTY;  Surgeon: Maite Acuna MD;  Location: [facility] OR;  Service: Ophthalmology;  Laterality: Bilateral;     SECTION  1973    COLONOSCOPY N/A 2023    Procedure: COLONOSCOPY;  Surgeon: Briana Sterling MD;  Location: [facility] ENDO;  Service: Endoscopy;  Laterality: N/A;    ERCP N/A 10/7/2024    Procedure: ERCP (ENDOSCOPIC RETROGRADE CHOLANGIOPANCREATOGRAPHY);  Surgeon: Catracho Mitchell MD;  Location: [facility] (2ND FLR);  Service: Endoscopy;  Laterality: N/A;    HYSTERECTOMY      without BSO    INTRAOCULAR PROSTHESES INSERTION Left 10/27/2022    Procedure: INSERTION, IOL PROSTHESIS;  Surgeon: Palmer Berrios MD;  Location: [facility] OR;  Service: Ophthalmology;  Laterality: Left;    PHACOEMULSIFICATION OF CATARACT Left 10/27/2022    Procedure: PHACOEMULSIFICATION, CATARACT;  Surgeon: Palmer Berrios MD;  Location: [facility] OR;  Service: Ophthalmology;  Laterality: Left;  RN PHONE PREOP 10/21/2022   ARRIVAL 9:00 AM    R knee replacement      REPAIR OF RETINAL DETACHMENT WITH VITRECTOMY Left 2022    Procedure: REPAIR, RETINAL DETACHMENT, WITH VITRECTOMY;  Surgeon: MINO Martinez MD;  [address];  Service: Ophthalmology;  Laterality: Left;  Spoke with SID Garcia. Pt was instructed nothing to eat after 8am and to arrive at 2pm for preop. 430pm case start request.    right  arm  surgery      ROBOT-ASSISTED CHOLECYSTECTOMY USING DA GNEO XI N/A 10/9/2024    Procedure: XI ROBOTIC SUB TOTAL CHOLECYSTECTOMY; DRAIN PLACEMENT;  Surgeon: Rigoberto Ponce MD;  Location: [facility];  Service: General;  Laterality: N/A;     Family History   Problem Relation Name Age of Onset    Cancer Mother          lung    Liver disease Father      Thyroid disease Sister      Cervical cancer Sister      Tremor Sister       Social History     Tobacco Use    Smoking status: Every Day     Current packs/day: 0.75     Average packs/day: 0.8 packs/day for 40.0 years (30.0  ttl pk-yrs)     Types: Cigarettes    Smokeless tobacco: Never   Substance Use Topics    Alcohol use: Yes     Alcohol/week: 0.0 standard drinks of alcohol     Comment: occasionally    Drug use: Yes     Types: Marijuana     Review of Systems   Constitutional:  Negative for chills and fever.   HENT:  Negative for congestion and sore throat.    Eyes:  Negative for visual disturbance.   Respiratory:  Negative for cough and shortness of breath.    Cardiovascular:  Positive for chest pain.   Gastrointestinal:  Positive for abdominal pain and nausea. Negative for diarrhea and vomiting.   Genitourinary:  Negative for dysuria and vaginal discharge.   Musculoskeletal:  Negative for back pain.   Skin:  Negative for rash.   Neurological:  Negative for headaches.   Psychiatric/Behavioral:  Negative for decreased concentration.        Physical Exam     Initial Vitals [11/20/24 1119]   BP Pulse Resp Temp SpO2   (!) 140/60 79 20 98.1 °F (36.7 °C) 100 %      MAP       --         Physical Exam    Nursing note and vitals reviewed.  Constitutional: She appears well-developed and well-nourished. She is not diaphoretic. No distress.   HENT: Mouth/Throat: Oropharynx is clear and moist.   Eyes: Pupils are equal, round, and reactive to light.   Neck: Neck supple.   Cardiovascular:  Normal rate and regular rhythm.           Pulmonary/Chest: Breath sounds normal. She exhibits no tenderness.   Abdominal: Abdomen is soft. There is abdominal tenderness in the right upper quadrant and epigastric area.   Musculoskeletal:         General: No edema.      Cervical back: Neck supple.     Neurological: She is alert and oriented to person, place, and time.   Skin: Skin is warm and dry.   Psychiatric: She has a normal mood and affect.         ED Course   Procedures  Labs Reviewed   CBC W/ AUTO DIFFERENTIAL - Abnormal       Result Value    WBC 10.92      RBC 4.16      Hemoglobin 13.8      Hematocrit 41.3      MCV 99 (*)     MCH 33.2 (*)     MCHC 33.4       RDW 13.3      Platelets 186      MPV 9.6      Immature Granulocytes 0.4      Gran # (ANC) 9.3 (*)     Immature Grans (Abs) 0.04      Lymph # 0.7 (*)     Mono # 0.8      Eos # 0.1      Baso # 0.07      nRBC 0      Gran % 84.8 (*)     Lymph % 6.2 (*)     Mono % 7.1      Eosinophil % 0.9      Basophil % 0.6      Differential Method Automated     COMPREHENSIVE METABOLIC PANEL - Abnormal    Sodium 136      Potassium 3.9      Chloride 104      CO2 23      Glucose 117 (*)     BUN 10      Creatinine 0.6      Calcium 8.6 (*)     Total Protein 6.6      Albumin 3.4 (*)     Total Bilirubin 0.4      Alkaline Phosphatase 98       (*)     ALT 84 (*)     eGFR >60      Anion Gap 9     TROPONIN I    Troponin I <0.006     TROPONIN I    Troponin I <0.006     B-TYPE NATRIURETIC PEPTIDE    BNP 46     LIPASE   LIPASE    Lipase 32       EKG Readings: (Independently Interpreted)    Sinus bradycardia, rate of 53, left axis deviation noted  and right bundle branch block       Imaging Results               CT Abdomen Pelvis With IV Contrast NO Oral Contrast (Final result)  Result time 11/20/24 16:43:47      Final result by Fran Jett MD (11/20/24 16:43:47)                   Impression:      Dilatation of the distal esophagus and proximal stomach with thickening of the wall of the distal stomach.  Findings could be indicative of gastric outlet obstruction and either postoperative reactive changes of the distal stomach or gastritis.    Status post cholecystectomy with sphincterotomy and common bile duct stent placement.  Small amounts of air are seen within the bile ducts and there is a small amount of fluid within the tima hepatis without drainable fluid collection.    Periportal edema.    This report was flagged in Epic as abnormal..      Electronically signed by: Fran Jett  Date:    11/20/2024  Time:    16:43               Narrative:    EXAMINATION:  CT ABDOMEN PELVIS WITH IV CONTRAST    CLINICAL HISTORY:  Abdominal pain,  post-op;Nausea/vomiting;    TECHNIQUE:  Low dose axial images, sagittal and coronal reformations were obtained from the lung bases to the pubic symphysis following the IV administration of 75 mL of Omnipaque 350 .  Oral contrast was not administered.    COMPARISON:  10/06/2024    FINDINGS:  Abdomen:    - Lower thorax:Similar calcification of the mitral annulus and coronary arteries.    - Lung bases: No change.    - Liver: Normal size.  There is periportal edema.  No focal collection or mass.    - Gallbladder: The gallbladder has been removed.    - Bile Ducts: There is a common bile duct stent in place.  There is intrahepatic and extrahepatic biliary air.    - Spleen: Negative.    - Kidneys: No mass or hydronephrosis.    - Adrenals: Unremarkable.    - Pancreas: Small amount of air within the pancreatic duct.  The pancreas otherwise appears normal.    - Retroperitoneum:  No significant adenopathy.    - Vascular: Similar atherosclerotic disease of the abdominal aorta and iliac arteries without aneurysm.    - Abdominal wall:  There is anasarca.  No drainable fluid collection.    Pelvis:    No pelvic mass, adenopathy, or free fluid.    Bowel/Mesentery:    There is a small amount of fluid and air within the gallbladder fossa likely related to recent surgery without drainable fluid collection.    The distal esophagus is moderately dilated with an air-fluid layer.  There is a small hiatal hernia.  The stomach is moderately to severely distended proximally.  The distal stomach appears thickened which could be related to gastritis or postoperative reactive changes.  The small bowel and colon are normal in caliber.    Bones:  No change.                                       X-Ray Chest AP Portable (Final result)  Result time 11/20/24 12:16:32      Final result by Dutch Strickland MD (11/20/24 12:16:32)                   Impression:      1. Chronic appearing interstitial findings, no large focal consolidation.  Superimposed  edema is a consideration.      Electronically signed by: Dutch Strickland MD  Date:    11/20/2024  Time:    12:16               Narrative:    EXAMINATION:  XR CHEST AP PORTABLE    CLINICAL HISTORY:  Chest Pain;    TECHNIQUE:  Single frontal view of the chest was performed.    COMPARISON:  11/15/2024    FINDINGS:  The cardiomediastinal silhouette is not enlarged noting calcification of the aorta.  There is suspected hiatal hernia..  There is no pleural effusion.  The trachea is midline.  The lungs are symmetrically expanded bilaterally with mildly coarse interstitial attenuation and bilateral basilar subsegmental atelectasis..  No large focal consolidation seen.  There is no pneumothorax.  The osseous structures are remarkable for degenerative change..                                       Medications   ondansetron injection 4 mg (4 mg Intravenous Given 11/20/24 1353)   morphine injection 6 mg (6 mg Intravenous Given 11/20/24 1335)   HYDROmorphone injection 1 mg (1 mg Intravenous Given 11/20/24 1602)   iohexoL (OMNIPAQUE 350) injection 75 mL (75 mLs Intravenous Given 11/20/24 1622)   morphine injection 4 mg (4 mg Intravenous Given 11/20/24 1807)     Medical Decision Making  Patricia Ramirez is a 69 y.o. 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    Initial vitals notable for hypertension. Physical exam reveals epigastric and RUQ abdominal tenderness.     DDx of abdominal pain is broad and includes pancreatitis, GERD,  choledocholithiasis, stent malpositioning, appendicitis, pyelonephritis, kidney stone, diverticulitis, diverticulosis, constipation and UTI. Also considered SBO, abdominal aortic aneurysm, aortic dissection.     I will obtain the following to better assess:  CBC, CMP, lipase, BNP,  troponin, chest x-ray, CTA/P. Immediate interventions include  morphine, Zofran    DISCLAIMER: This note was prepared with [employer] voice recognition transcription software. Garbled syntax, mangled pronouns, and other bizarre constructions may be attributed to that software system.      Amount and/or Complexity of Data Reviewed  Labs: ordered. Decision-making details documented in ED Course.  Radiology: ordered. Decision-making details documented in ED Course.  ECG/medicine tests: ordered and independent interpretation performed. Decision-making details documented in ED Course.    Risk  Prescription drug management.  Decision regarding hospitalization.            Scribe Attestation:   Scribe #1: I performed the above scribed service and the documentation accurately describes the services I performed. I attest to the accuracy of the note.        ED Course as of 11/20/24 1919 Wed Nov 20, 2024   1625  Patient's CMP notable for new mild AST/ALT elevations [KI]   1652 CT scan notable for possible gastric outlet obstruction versus gastritis. Surgery to evaluate patient, they recommended NG tube if patient is having persistent nausea and vomiting [KI]   1730  Given significant abdominal pain requiring multiple doses of IV narcotics, findings on CT scan concerning for gastric outlet obstruction, need for likely ERCP, patient has been admitted to Hospital Medicine.  Surgery and GI has been consulted [KI]      ED Course User Index  [KI] Bhavani Marquez MD                         I, Evonne Marquez MD, personally performed the services described in this documentation. All medical record entries made by the scribe were at my direction and in my presence. I have reviewed the chart and agree that the record reflects my personal performance and is accurate and complete.      Clinical Impression:  Final diagnoses:  [R07.9] Chest pain  [R10.9] Abdominal pain          ED Disposition Condition    Admit                  Bhavani Marquez MD  11/20/24 1920

## 2024-11-21 ENCOUNTER — ANESTHESIA EVENT (OUTPATIENT)
Dept: ENDOSCOPY | Facility: HOSPITAL | Age: 69
End: 2024-11-21
Payer: MEDICARE

## 2024-11-21 ENCOUNTER — TELEPHONE (OUTPATIENT)
Dept: ENDOSCOPY | Facility: HOSPITAL | Age: 69
End: 2024-11-21
Payer: MEDICARE

## 2024-11-21 ENCOUNTER — ANESTHESIA (OUTPATIENT)
Dept: ENDOSCOPY | Facility: HOSPITAL | Age: 69
End: 2024-11-21
Payer: MEDICARE

## 2024-11-21 LAB
ALBUMIN SERPL BCP-MCNC: 3 G/DL (ref 3.5–5.2)
ALP SERPL-CCNC: 108 U/L (ref 40–150)
ALT SERPL W/O P-5'-P-CCNC: 143 U/L (ref 10–44)
ANION GAP SERPL CALC-SCNC: 9 MMOL/L (ref 8–16)
AST SERPL-CCNC: 112 U/L (ref 10–40)
BASOPHILS # BLD AUTO: 0.04 K/UL (ref 0–0.2)
BASOPHILS NFR BLD: 0.2 % (ref 0–1.9)
BILIRUB SERPL-MCNC: 0.6 MG/DL (ref 0.1–1)
BUN SERPL-MCNC: 10 MG/DL (ref 8–23)
CALCIUM SERPL-MCNC: 8.6 MG/DL (ref 8.7–10.5)
CHLORIDE SERPL-SCNC: 103 MMOL/L (ref 95–110)
CO2 SERPL-SCNC: 26 MMOL/L (ref 23–29)
CREAT SERPL-MCNC: 0.6 MG/DL (ref 0.5–1.4)
DIFFERENTIAL METHOD BLD: ABNORMAL
EOSINOPHIL # BLD AUTO: 0.1 K/UL (ref 0–0.5)
EOSINOPHIL NFR BLD: 0.6 % (ref 0–8)
ERYTHROCYTE [DISTWIDTH] IN BLOOD BY AUTOMATED COUNT: 13.2 % (ref 11.5–14.5)
EST. GFR  (NO RACE VARIABLE): >60 ML/MIN/1.73 M^2
GLUCOSE SERPL-MCNC: 142 MG/DL (ref 70–110)
HCT VFR BLD AUTO: 41.8 % (ref 37–48.5)
HGB BLD-MCNC: 13.8 G/DL (ref 12–16)
IMM GRANULOCYTES # BLD AUTO: 0.06 K/UL (ref 0–0.04)
IMM GRANULOCYTES NFR BLD AUTO: 0.3 % (ref 0–0.5)
LYMPHOCYTES # BLD AUTO: 0.5 K/UL (ref 1–4.8)
LYMPHOCYTES NFR BLD: 2.8 % (ref 18–48)
MAGNESIUM SERPL-MCNC: 1.9 MG/DL (ref 1.6–2.6)
MCH RBC QN AUTO: 32.4 PG (ref 27–31)
MCHC RBC AUTO-ENTMCNC: 33 G/DL (ref 32–36)
MCV RBC AUTO: 98 FL (ref 82–98)
MONOCYTES # BLD AUTO: 0.9 K/UL (ref 0.3–1)
MONOCYTES NFR BLD: 5.1 % (ref 4–15)
NEUTROPHILS # BLD AUTO: 16.4 K/UL (ref 1.8–7.7)
NEUTROPHILS NFR BLD: 91 % (ref 38–73)
NRBC BLD-RTO: 0 /100 WBC
PHOSPHATE SERPL-MCNC: 3.8 MG/DL (ref 2.7–4.5)
PLATELET # BLD AUTO: 215 K/UL (ref 150–450)
PMV BLD AUTO: 9.6 FL (ref 9.2–12.9)
POTASSIUM SERPL-SCNC: 3.7 MMOL/L (ref 3.5–5.1)
PROT SERPL-MCNC: 6.3 G/DL (ref 6–8.4)
RBC # BLD AUTO: 4.26 M/UL (ref 4–5.4)
SODIUM SERPL-SCNC: 138 MMOL/L (ref 136–145)
WBC # BLD AUTO: 17.98 K/UL (ref 3.9–12.7)

## 2024-11-21 PROCEDURE — 83735 ASSAY OF MAGNESIUM: CPT

## 2024-11-21 PROCEDURE — 80053 COMPREHEN METABOLIC PANEL: CPT

## 2024-11-21 PROCEDURE — 63600175 PHARM REV CODE 636 W HCPCS

## 2024-11-21 PROCEDURE — 63600175 PHARM REV CODE 636 W HCPCS: Performed by: NURSE ANESTHETIST, CERTIFIED REGISTERED

## 2024-11-21 PROCEDURE — 0DJ08ZZ INSPECTION OF UPPER INTESTINAL TRACT, VIA NATURAL OR ARTIFICIAL OPENING ENDOSCOPIC: ICD-10-PCS | Performed by: STUDENT IN AN ORGANIZED HEALTH CARE EDUCATION/TRAINING PROGRAM

## 2024-11-21 PROCEDURE — 21400001 HC TELEMETRY ROOM

## 2024-11-21 PROCEDURE — 43235 EGD DIAGNOSTIC BRUSH WASH: CPT | Performed by: STUDENT IN AN ORGANIZED HEALTH CARE EDUCATION/TRAINING PROGRAM

## 2024-11-21 PROCEDURE — 37000008 HC ANESTHESIA 1ST 15 MINUTES: Performed by: STUDENT IN AN ORGANIZED HEALTH CARE EDUCATION/TRAINING PROGRAM

## 2024-11-21 PROCEDURE — 99223 1ST HOSP IP/OBS HIGH 75: CPT | Mod: 25,,, | Performed by: NURSE PRACTITIONER

## 2024-11-21 PROCEDURE — 36415 COLL VENOUS BLD VENIPUNCTURE: CPT

## 2024-11-21 PROCEDURE — 25000003 PHARM REV CODE 250: Performed by: NURSE ANESTHETIST, CERTIFIED REGISTERED

## 2024-11-21 PROCEDURE — 85025 COMPLETE CBC W/AUTO DIFF WBC: CPT

## 2024-11-21 PROCEDURE — 43235 EGD DIAGNOSTIC BRUSH WASH: CPT | Mod: ,,, | Performed by: STUDENT IN AN ORGANIZED HEALTH CARE EDUCATION/TRAINING PROGRAM

## 2024-11-21 PROCEDURE — 63600175 PHARM REV CODE 636 W HCPCS: Performed by: STUDENT IN AN ORGANIZED HEALTH CARE EDUCATION/TRAINING PROGRAM

## 2024-11-21 PROCEDURE — 25000003 PHARM REV CODE 250

## 2024-11-21 PROCEDURE — 84100 ASSAY OF PHOSPHORUS: CPT

## 2024-11-21 PROCEDURE — 25000003 PHARM REV CODE 250: Performed by: STUDENT IN AN ORGANIZED HEALTH CARE EDUCATION/TRAINING PROGRAM

## 2024-11-21 PROCEDURE — 37000009 HC ANESTHESIA EA ADD 15 MINS: Performed by: STUDENT IN AN ORGANIZED HEALTH CARE EDUCATION/TRAINING PROGRAM

## 2024-11-21 RX ORDER — LIDOCAINE HYDROCHLORIDE 20 MG/ML
INJECTION, SOLUTION EPIDURAL; INFILTRATION; INTRACAUDAL; PERINEURAL
Status: DISPENSED
Start: 2024-11-21 | End: 2024-11-21

## 2024-11-21 RX ORDER — LORAZEPAM 2 MG/ML
0.25 INJECTION INTRAMUSCULAR ONCE AS NEEDED
OUTPATIENT
Start: 2024-11-21 | End: 2036-04-19

## 2024-11-21 RX ORDER — HYDRALAZINE HYDROCHLORIDE 20 MG/ML
10 INJECTION INTRAMUSCULAR; INTRAVENOUS EVERY 6 HOURS PRN
Status: DISCONTINUED | OUTPATIENT
Start: 2024-11-21 | End: 2024-11-22 | Stop reason: HOSPADM

## 2024-11-21 RX ORDER — DIPHENHYDRAMINE HYDROCHLORIDE 50 MG/ML
25 INJECTION INTRAMUSCULAR; INTRAVENOUS EVERY 6 HOURS PRN
OUTPATIENT
Start: 2024-11-21

## 2024-11-21 RX ORDER — ISOFLURANE 1 ML/ML
LIQUID RESPIRATORY (INHALATION)
Status: DISPENSED
Start: 2024-11-21 | End: 2024-11-21

## 2024-11-21 RX ORDER — PANTOPRAZOLE SODIUM 40 MG/1
40 TABLET, DELAYED RELEASE ORAL DAILY
Status: DISCONTINUED | OUTPATIENT
Start: 2024-11-21 | End: 2024-11-22 | Stop reason: HOSPADM

## 2024-11-21 RX ORDER — HYDROMORPHONE HYDROCHLORIDE 2 MG/ML
0.2 INJECTION, SOLUTION INTRAMUSCULAR; INTRAVENOUS; SUBCUTANEOUS EVERY 5 MIN PRN
OUTPATIENT
Start: 2024-11-21

## 2024-11-21 RX ORDER — PROPOFOL 10 MG/ML
VIAL (ML) INTRAVENOUS
Status: DISPENSED
Start: 2024-11-21 | End: 2024-11-21

## 2024-11-21 RX ORDER — SUCCINYLCHOLINE CHLORIDE 20 MG/ML INJECTION SOLUTION
SOLUTION
Status: DISPENSED
Start: 2024-11-21 | End: 2024-11-21

## 2024-11-21 RX ORDER — LIDOCAINE HYDROCHLORIDE 20 MG/ML
INJECTION INTRAVENOUS
Status: DISCONTINUED | OUTPATIENT
Start: 2024-11-21 | End: 2024-11-21

## 2024-11-21 RX ORDER — MEPERIDINE HYDROCHLORIDE 25 MG/ML
12.5 INJECTION INTRAMUSCULAR; INTRAVENOUS; SUBCUTANEOUS EVERY 10 MIN PRN
OUTPATIENT
Start: 2024-11-21 | End: 2024-11-21

## 2024-11-21 RX ORDER — SUCCINYLCHOLINE CHLORIDE 20 MG/ML
INJECTION INTRAMUSCULAR; INTRAVENOUS
Status: DISCONTINUED | OUTPATIENT
Start: 2024-11-21 | End: 2024-11-21

## 2024-11-21 RX ORDER — PROPOFOL 10 MG/ML
VIAL (ML) INTRAVENOUS
Status: DISCONTINUED | OUTPATIENT
Start: 2024-11-21 | End: 2024-11-21

## 2024-11-21 RX ADMIN — MORPHINE SULFATE 4 MG: 4 INJECTION INTRAVENOUS at 09:11

## 2024-11-21 RX ADMIN — BUSPIRONE HYDROCHLORIDE 15 MG: 10 TABLET ORAL at 08:11

## 2024-11-21 RX ADMIN — LIDOCAINE HYDROCHLORIDE 100 MG: 20 INJECTION, SOLUTION INTRAVENOUS at 10:11

## 2024-11-21 RX ADMIN — MORPHINE SULFATE 4 MG: 4 INJECTION INTRAVENOUS at 03:11

## 2024-11-21 RX ADMIN — MORPHINE SULFATE 1 MG: 4 INJECTION INTRAVENOUS at 07:11

## 2024-11-21 RX ADMIN — CITALOPRAM HYDROBROMIDE 40 MG: 20 TABLET ORAL at 09:11

## 2024-11-21 RX ADMIN — ACETAMINOPHEN 650 MG: 325 TABLET ORAL at 05:11

## 2024-11-21 RX ADMIN — PROPOFOL 130 MG: 10 INJECTION, EMULSION INTRAVENOUS at 10:11

## 2024-11-21 RX ADMIN — SODIUM CHLORIDE: 0.9 INJECTION, SOLUTION INTRAVENOUS at 10:11

## 2024-11-21 RX ADMIN — BUSPIRONE HYDROCHLORIDE 15 MG: 10 TABLET ORAL at 09:11

## 2024-11-21 RX ADMIN — PANTOPRAZOLE SODIUM 40 MG: 40 TABLET, DELAYED RELEASE ORAL at 02:11

## 2024-11-21 RX ADMIN — MORPHINE SULFATE 4 MG: 4 INJECTION INTRAVENOUS at 10:11

## 2024-11-21 RX ADMIN — BUSPIRONE HYDROCHLORIDE 15 MG: 10 TABLET ORAL at 02:11

## 2024-11-21 RX ADMIN — MIRTAZAPINE 15 MG: 15 TABLET, FILM COATED ORAL at 08:11

## 2024-11-21 RX ADMIN — PROPOFOL 40 MG: 10 INJECTION, EMULSION INTRAVENOUS at 11:11

## 2024-11-21 RX ADMIN — SUCCINYLCHOLINE CHLORIDE 120 MG: 20 INJECTION, SOLUTION INTRAMUSCULAR; INTRAVENOUS at 10:11

## 2024-11-21 RX ADMIN — LOSARTAN POTASSIUM 50 MG: 25 TABLET, FILM COATED ORAL at 09:11

## 2024-11-21 RX ADMIN — PIPERACILLIN SODIUM AND TAZOBACTAM SODIUM 4.5 G: 4; .5 INJECTION, POWDER, FOR SOLUTION INTRAVENOUS at 09:11

## 2024-11-21 RX ADMIN — PIPERACILLIN SODIUM AND TAZOBACTAM SODIUM 4.5 G: 4; .5 INJECTION, POWDER, FOR SOLUTION INTRAVENOUS at 05:11

## 2024-11-21 NOTE — NURSING
Patient transferred to room 409 via stretcher from [facility]. No acute distress. Patient oriented to board. Vitals in flowsheets. Bed low, locked and call light in reach.

## 2024-11-21 NOTE — PLAN OF CARE
Problem: Adult Inpatient Plan of Care  Goal: Optimal Comfort and Wellbeing  Outcome: Progressing  Intervention: Monitor Pain and Promote Comfort  Flowsheets (Taken 11/21/2024 0157)  Pain Management Interventions:   medication offered   quiet environment facilitated   relaxation techniques promoted  Intervention: Provide Person-Centered Care  Flowsheets (Taken 11/21/2024 0157)  Trust Relationship/Rapport:   emotional support provided   empathic listening provided   questions answered   thoughts/feelings acknowledged     Problem: Pain Acute  Goal: Optimal Pain Control and Function  Outcome: Progressing  Intervention: Develop Pain Management Plan  Flowsheets (Taken 11/21/2024 0157)  Pain Management Interventions:   medication offered   quiet environment facilitated   relaxation techniques promoted  Intervention: Prevent or Manage Pain  Flowsheets (Taken 11/21/2024 0157)  Sleep/Rest Enhancement:   regular sleep/rest pattern promoted   relaxation techniques promoted   room darkened  Sensory Stimulation Regulation:   quiet environment promoted   lighting decreased  Medication Review/Management: medications reviewed  Intervention: Optimize Psychosocial Wellbeing  Flowsheets (Taken 11/21/2024 0157)  Supportive Measures:   active listening utilized   positive reinforcement provided   relaxation techniques promoted   verbalization of feelings encouraged     Problem: Infection  Goal: Absence of Infection Signs and Symptoms  Outcome: Progressing  Intervention: Prevent or Manage Infection  Flowsheets (Taken 11/21/2024 0157)  Infection Management: (hand hygiene promoted) other (see comments)  Isolation Precautions: protective  Pt AAO x 4, free from falls/injury, and able to make needs known during shift. VSS on room air. Pt had c/o abdominal pain during shift. PRN meds given as ordered. Protective precautions maintained. Telemetry monitoring initiated on tele box#[id number]. Sinus rhythm noted. No acute distress noted. Bed locked and  in lowest position. Bedside table and call light in reach. Care ongoing.

## 2024-11-21 NOTE — ANESTHESIA POSTPROCEDURE EVALUATION
Anesthesia Post Evaluation    Patient: Patricia Ramirez    Procedure(s) Performed: Procedure(s) (LRB):  EGD (ESOPHAGOGASTRODUODENOSCOPY) (N/A)    Final Anesthesia Type: general      Patient location during evaluation: PACU  Patient participation: Yes- Able to Participate  Level of consciousness: awake and alert  Post-procedure vital signs: reviewed and stable  Pain management: adequate  Airway patency: patent    PONV status at discharge: No PONV  Anesthetic complications: no      Respiratory status: spontaneous ventilation and room air  Hydration status: euvolemic  Follow-up not needed.              Vitals Value Taken Time   /74 11/21/24 1229   Temp 37.3 °C (99.1 °F) 11/21/24 1229   Pulse 82 11/21/24 1229   Resp 17 11/21/24 1229   SpO2 95 % 11/21/24 1229         Event Time   Out of Recovery 11/21/2024 12:00:00         Pain/Ede Score: Pain Rating Prior to Med Admin: 8 (11/21/2024  9:42 AM)  Pain Rating Post Med Admin: 5 (11/21/2024  8:03 AM)  Ede Score: 10 (11/21/2024 11:49 AM)

## 2024-11-21 NOTE — TRANSFER OF CARE
"Anesthesia Transfer of Care Note    Patient: Patricia Ramirez    Procedure(s) Performed: Procedure(s) (LRB):  EGD (ESOPHAGOGASTRODUODENOSCOPY) (N/A)    Patient location: GI    Anesthesia Type: general    Transport from OR: Transported from OR on 6-10 L/min O2 by face mask with adequate spontaneous ventilation    Post pain: adequate analgesia    Post assessment: no apparent anesthetic complications and tolerated procedure well    Post vital signs: stable    Level of consciousness: sedated    Nausea/Vomiting: no nausea/vomiting    Complications: none    Transfer of care protocol was followed      Last vitals: Visit Vitals  BP (!) 148/70 (BP Location: Left arm, Patient Position: Lying)   Pulse 94   Temp 37.1 °C (98.7 °F) (Skin)   Resp 20   Ht 5' 6" (1.676 m)   Wt 72 kg (158 lb 11.7 oz)   SpO2 99%   Breastfeeding No   BMI 25.62 kg/m²     "

## 2024-11-21 NOTE — PLAN OF CARE
Problem: Adult Inpatient Plan of Care  Goal: Plan of Care Review  Outcome: Progressing  Flowsheets (Taken 11/21/2024 2218)  Plan of Care Reviewed With: patient  Goal: Patient-Specific Goal (Individualized)  Outcome: Progressing  Goal: Absence of Hospital-Acquired Illness or Injury  Outcome: Progressing  Goal: Optimal Comfort and Wellbeing  Outcome: Progressing  Goal: Readiness for Transition of Care  Outcome: Progressing     Problem: Wound  Goal: Optimal Coping  Outcome: Progressing  Goal: Optimal Functional Ability  Outcome: Progressing  Goal: Absence of Infection Signs and Symptoms  Outcome: Progressing  Goal: Improved Oral Intake  Outcome: Progressing  Goal: Optimal Pain Control and Function  Outcome: Progressing  Goal: Skin Health and Integrity  Outcome: Progressing  Goal: Optimal Wound Healing  Outcome: Progressing     Problem: Pain Acute  Goal: Optimal Pain Control and Function  Outcome: Progressing     Problem: Infection  Goal: Absence of Infection Signs and Symptoms  Outcome: Progressing

## 2024-11-21 NOTE — ASSESSMENT & PLAN NOTE
Hx lap-diane    69F who is 6 weeks s/p robotic subtotal cholecystectomy, now with 1 week of episodic upper abdominal discomfort. Noted to have severely distended stomach on CT, concerning for gastric outlet obstruction. Normal post-operative changes of gallbladder fossa and biliary tree. Patient has scheduled ERCP to remove CBD and pancreatic stents tomorrow at [facility], which [facility] believes could be diagnostic for gastric outlet obstruction.    Admit to IP  Seeking transfer to [facility] for ERCP, since this process is usually initiated by GI and then booked but her case is already on the books for tomorrow.  Following further [facility] recs, which we appreciate  NGT if nauseated  Zofran  Morphine for pain given transaminases  CLD until MN, then NPO

## 2024-11-21 NOTE — ED NOTES
Pt placed on portable telemetry monitoring box # [id number].  Ability to visualize pt's rhythm confirmed with Mary Ann of telemetry.

## 2024-11-21 NOTE — ASSESSMENT & PLAN NOTE
Hx lap-diane    69F who is 6 weeks s/p robotic subtotal cholecystectomy, now with 1 week of episodic upper abdominal discomfort. Noted to have severely distended stomach on CT, concerning for gastric outlet obstruction. Normal post-operative changes of gallbladder fossa and biliary tree. Patient has scheduled ERCP to remove CBD and pancreatic stents tomorrow at [facility].    - GI consulted, EGD today with no evidence of GOO however with hiatal hernia  - Discussed with GI at [facility], plan for outpatient stent removal. No plans for come/go ERCP during this admit  - continue with PPI daily per GI  - start on clear liquids  - covering for intra-abdominal with elevated WBC, on zosyn

## 2024-11-21 NOTE — TELEPHONE ENCOUNTER
Patient is currently admitted into hospital. Will contact to reschedule outpatient endoscopy procedure once discharged.

## 2024-11-21 NOTE — H&P
"    [facility] Emergency Dept  [facility]  History & Physical    Patient Name: Patricia Ramirez  MRN: [id number]  Patient Class: IP- Inpatient  Admission Date: 11/20/2024  Attending Physician: Moise Collins MD   Primary Care Provider: Fabienne Erwin NP         Patient information was obtained from patient, past medical records, and ER records.     Subjective:     Principal Problem:Epigastric pain    Chief Complaint:   Chief Complaint   Patient presents with    Chest Pain     SOB and CP sudden onset x1 hour PTA while at rest. Patient reports CP has decreased from 9/10 to 5/10. Pt diaphoretic in triage.         HPI: Patricia Ramirez is a 69 y.o. female with hx choledocholithiasis who is s/p subtotal cholecystectomy on 10/9 with Dr. Kris alex subsequent CBD and pancreatic stents, HFpEF, HTN, HLD, and current smoker who presented to [facility] ED on 11/20/2024 for eval and treatment of abdominal pain.  They describe their pain as "like a baby kicking", 8/10, located in the epigastric area with radiation down to her mid-abdomen.  It started over the past week and has been worsening since.  There is associated nausea and dyspnea.  They deny associated fever, chills, diarrhea.  Symptoms are alleviated by none of the OTC NSAIDs she's tried.  Symptoms are worsened by movement.  She was previously scheduled for ERCP on 11/21 at [facility] to remove her CBD and pancreatic stents.    ED course notable for the following: Very anxious on arrival, hypertensive, dyspneic, an diaphoretic.  Exam with abdominal tenderness and fullness on palpation, worst in epigastric area.  Labs Lipase and WBC WNL.  AST ALT elevated.  Imaging: CT AP severely distended stomach with enhancement of antrum, normal small bowel with areas of wall enhancement, colon dilated with air and stool present, small area of fluid in gallbladder fossa, air in biliary tree, CBD and pancreatic duct stents in place.  ED therapy/stabilization measures: morphine, " Zofran.  [facility]GY consulted, who recommend pt be transferred to [facility] for her previously-planned ERCP since that could also be diagnostic for gastric outlet obstruction as suspected by CT.  They were admitted to [facility] for further evaluation and treatment.        Past Medical History:   Diagnosis Date    Allergy     Anxiety     Arthritis     Cataract     Depressive disorder, not elsewhere classified     Esophageal reflux     Hypertension     Impaired fasting glucose     Joint pain     Obesity, unspecified     Other and unspecified hyperlipidemia        Past Surgical History:   Procedure Laterality Date    BLEPHAROPLASTY Bilateral 3/3/2021    Procedure: BLEPHAROPLASTY;  Surgeon: Maite Acuna MD;  Location: [facility] OR;  Service: Ophthalmology;  Laterality: Bilateral;     SECTION  1975    COLONOSCOPY N/A 2023    Procedure: COLONOSCOPY;  Surgeon: Briana Sterling MD;  Location: [facility];  Service: Endoscopy;  Laterality: N/A;    ERCP N/A 10/7/2024    Procedure: ERCP (ENDOSCOPIC RETROGRADE CHOLANGIOPANCREATOGRAPHY);  Surgeon: Catracho Mitchell MD;  Location: [facility] (2ND FLR);  Service: Endoscopy;  Laterality: N/A;    HYSTERECTOMY      without BSO    INTRAOCULAR PROSTHESES INSERTION Left 10/27/2022    Procedure: INSERTION, IOL PROSTHESIS;  Surgeon: Palmer Berrios MD;  Location: [facility] OR;  Service: Ophthalmology;  Laterality: Left;    PHACOEMULSIFICATION OF CATARACT Left 10/27/2022    Procedure: PHACOEMULSIFICATION, CATARACT;  Surgeon: Palmer Berrios MD;  Location: [facility] OR;  Service: Ophthalmology;  Laterality: Left;  RN PHONE PREOP 10/21/2022   ARRIVAL 9:00 AM    R knee replacement      REPAIR OF RETINAL DETACHMENT WITH VITRECTOMY Left 2022    Procedure: REPAIR, RETINAL DETACHMENT, WITH VITRECTOMY;  Surgeon: MINO Martinez MD;  Location: [facility] 1ST FLR;  Service: Ophthalmology;  Laterality: Left;  Spoke with SID Garcia. Pt was instructed nothing  to eat after 8am and to arrive at 2pm for preop. 430pm case start request.    right  arm  surgery      ROBOT-ASSISTED CHOLECYSTECTOMY USING DA GENO XI N/A 10/9/2024    Procedure: XI ROBOTIC SUB TOTAL CHOLECYSTECTOMY; DRAIN PLACEMENT;  Surgeon: Rigoberto Ponce MD;  Location: [facility];  Service: General;  Laterality: N/A;       Review of patient's allergies indicates:   Allergen Reactions    Sulfa (sulfonamide antibiotics) Hives    Ace inhibitors Other (See Comments)     Cough         Current Facility-Administered Medications on File Prior to Encounter   Medication    cyclopentolate 1% ophthalmic solution 1 drop    ofloxacin 0.3 % ophthalmic solution 1 drop    sodium chloride 0.9% flush 10 mL     Current Outpatient Medications on File Prior to Encounter   Medication Sig    acetaminophen (TYLENOL) 500 MG tablet Take 500 mg by mouth every 6 (six) hours as needed for Pain.    ALPRAZolam (XANAX) 0.5 MG tablet Take 1 tablet (0.5 mg total) by mouth 2 (two) times daily as needed for Anxiety.    atorvastatin (LIPITOR) 40 MG tablet Take 1 tablet (40 mg total) by mouth once daily.    BIOTIN ORAL Take by mouth.    busPIRone (BUSPAR) 15 MG tablet TAKE 1 TABLET BY MOUTH THREE TIMES DAILY    citalopram (CELEXA) 40 MG tablet Take 1 tablet by mouth once daily    cyclobenzaprine (FLEXERIL) 5 MG tablet TAKE 1 TABLET BY MOUTH TWICE DAILY AS NEEDED FOR MUSCLE SPASM    ergocalciferol (ERGOCALCIFEROL) 50,000 unit Cap Take 1 capsule (50,000 Units total) by mouth every 7 days.    mirtazapine (REMERON) 15 MG tablet Take 1 tablet (15 mg total) by mouth every evening.    olmesartan (BENICAR) 20 MG tablet TAKE 1 TABLET BY MOUTH ONCE DAILY IN THE EVENING    traZODone (DESYREL) 50 MG tablet Take 50 mg by mouth every morning.     Family History       Problem Relation (Age of Onset)    Cancer Mother    Cervical cancer Sister    Liver disease Father    Thyroid disease Sister    Tremor Sister          Tobacco Use    Smoking status: Every Day      Current packs/day: 0.75     Average packs/day: 0.8 packs/day for 40.0 years (30.0 ttl pk-yrs)     Types: Cigarettes    Smokeless tobacco: Never   Substance and Sexual Activity    Alcohol use: Yes     Alcohol/week: 0.0 standard drinks of alcohol     Comment: occasionally    Drug use: Yes     Types: Marijuana    Sexual activity: Yes     Partners: Male     Birth control/protection: See Surgical Hx     Review of Systems   Reason unable to perform ROS: ROS was performed and pertinent +s and -s are listed in HPI.     Objective:     Vital Signs (Most Recent):  Temp: 98.1 °F (36.7 °C) (11/20/24 1119)  Pulse: 70 (11/20/24 1602)  Resp: 17 (11/20/24 1807)  BP: (!) 196/85 (11/20/24 1602)  SpO2: 99 % (11/20/24 1602) Vital Signs (24h Range):  Temp:  [98.1 °F (36.7 °C)] 98.1 °F (36.7 °C)  Pulse:  [59-79] 70  Resp:  [16-20] 17  SpO2:  [89 %-100 %] 99 %  BP: (140-206)/(60-85) 196/85     Weight: 61.2 kg (135 lb)  Body mass index is 22.47 kg/m².     Physical Exam  Constitutional:       General: She is not in acute distress.     Appearance: Normal appearance. She is ill-appearing.   HENT:      Head: Normocephalic.   Neck:      Comments: JVP not elevated  Cardiovascular:      Rate and Rhythm: Normal rate and regular rhythm.      Pulses: Normal pulses.      Heart sounds: Normal heart sounds.   Pulmonary:      Effort: Pulmonary effort is normal.      Breath sounds: Normal breath sounds.   Abdominal:      General: Abdomen is flat. There is distension.      Tenderness: There is abdominal tenderness. There is guarding.   Musculoskeletal:      Right lower leg: No edema.      Left lower leg: No edema.   Skin:     General: Skin is warm and dry.      Capillary Refill: Capillary refill takes less than 2 seconds.      Coloration: Skin is not jaundiced.   Neurological:      General: No focal deficit present.      Mental Status: She is alert and oriented to person, place, and time.   Psychiatric:         Mood and Affect: Mood normal.          Behavior: Behavior normal.                Significant Labs: All pertinent labs within the past 24 hours have been reviewed.  CBC:   Recent Labs   Lab 11/20/24  1303   WBC 10.92   HGB 13.8   HCT 41.3        CMP:   Recent Labs   Lab 11/20/24  1525      K 3.9      CO2 23   *   BUN 10   CREATININE 0.6   CALCIUM 8.6*   PROT 6.6   ALBUMIN 3.4*   BILITOT 0.4   ALKPHOS 98   *   ALT 84*   ANIONGAP 9     Cardiac Markers:   Recent Labs   Lab 11/20/24  1229   BNP 46     Lipase:   Recent Labs   Lab 11/20/24  1525   LIPASE 32     Troponin:   Recent Labs   Lab 11/20/24  1229   TROPONINI <0.006  <0.006       Significant Imaging: I have reviewed all pertinent imaging results/findings within the past 24 hours.    Results for orders placed or performed during the hospital encounter of 11/20/24 (from the past 2160 hours)   CT Abdomen Pelvis With IV Contrast NO Oral Contrast    Narrative    EXAMINATION:  CT ABDOMEN PELVIS WITH IV CONTRAST    CLINICAL HISTORY:  Abdominal pain, post-op;Nausea/vomiting;    TECHNIQUE:  Low dose axial images, sagittal and coronal reformations were obtained from the lung bases to the pubic symphysis following the IV administration of 75 mL of Omnipaque 350 .  Oral contrast was not administered.    COMPARISON:  10/06/2024    FINDINGS:  Abdomen:    - Lower thorax:Similar calcification of the mitral annulus and coronary arteries.    - Lung bases: No change.    - Liver: Normal size.  There is periportal edema.  No focal collection or mass.    - Gallbladder: The gallbladder has been removed.    - Bile Ducts: There is a common bile duct stent in place.  There is intrahepatic and extrahepatic biliary air.    - Spleen: Negative.    - Kidneys: No mass or hydronephrosis.    - Adrenals: Unremarkable.    - Pancreas: Small amount of air within the pancreatic duct.  The pancreas otherwise appears normal.    - Retroperitoneum:  No significant adenopathy.    - Vascular: Similar  atherosclerotic disease of the abdominal aorta and iliac arteries without aneurysm.    - Abdominal wall:  There is anasarca.  No drainable fluid collection.    Pelvis:    No pelvic mass, adenopathy, or free fluid.    Bowel/Mesentery:    There is a small amount of fluid and air within the gallbladder fossa likely related to recent surgery without drainable fluid collection.    The distal esophagus is moderately dilated with an air-fluid layer.  There is a small hiatal hernia.  The stomach is moderately to severely distended proximally.  The distal stomach appears thickened which could be related to gastritis or postoperative reactive changes.  The small bowel and colon are normal in caliber.    Bones:  No change.      Impression    Dilatation of the distal esophagus and proximal stomach with thickening of the wall of the distal stomach.  Findings could be indicative of gastric outlet obstruction and either postoperative reactive changes of the distal stomach or gastritis.    Status post cholecystectomy with sphincterotomy and common bile duct stent placement.  Small amounts of air are seen within the bile ducts and there is a small amount of fluid within the tima hepatis without drainable fluid collection.    Periportal edema.    This report was flagged in Epic as abnormal..      Electronically signed by: Fran Jett  Date:    11/20/2024  Time:    16:43       Assessment/Plan:     * Epigastric pain  Hx lap-diane    69F who is 6 weeks s/p robotic subtotal cholecystectomy, now with 1 week of episodic upper abdominal discomfort. Noted to have severely distended stomach on CT, concerning for gastric outlet obstruction. Normal post-operative changes of gallbladder fossa and biliary tree. Patient has scheduled ERCP to remove CBD and pancreatic stents tomorrow at [facility], which [facility]GY believes could be diagnostic for gastric outlet obstruction.    Admit to   GI consulted   Spoke with Dr. Roger on [facility] Biliary team, who is  aware of need for ERCP and will coordinate with our GI team as per usual process of there-and-back ERCP at [facility]  Next management steps per GI/AES recs  Following further GSGY recs, which we appreciate  NGT if nauseated  Zofran  Morphine for pain given transaminases  CLD until MN, then NPO      Heart failure with preserved ejection fraction  Well compensated.  WCTM and diurese as appropriate.      Depression with anxiety  Stable.  Continue home meds.         Hyperlipidemia  Stable.  Holding home statin at least until AST ALT come back down towards normal.      Essential hypertension  Patients blood pressure range in the last 24 hours was: BP  Min: 140/60  Max: 206/81.The patient's inpatient anti-hypertensive regimen is listed below:  Current Antihypertensives  losartan tablet 50 mg, Daily, Oral    Plan  - BP is uncontrolled, will adjust as follows: adding PRN  - Giving home dose on admission  - Pain and anxiety control      VTE Risk Mitigation (From admission, onward)           Ordered     Reason for No Pharmacological VTE Prophylaxis  Once        Question:  Reasons:  Answer:  Patient is Ambulatory    11/20/24 1823     IP VTE HIGH RISK PATIENT  Once         11/20/24 1823     Place sequential compression device  Until discontinued         11/20/24 1823                                    Gatito Sosa MD  Department of Hospital Medicine  [facility] - Emergency Dept

## 2024-11-21 NOTE — ANESTHESIA PREPROCEDURE EVALUATION
11/21/2024  Patricia Ramirez is a 69 y.o., female.      Pre-op Assessment    I have reviewed the Patient Summary Reports.     I have reviewed the Nursing Notes. I have reviewed the NPO Status.   I have reviewed the Medications.     Review of Systems  Anesthesia Hx:  No problems with previous Anesthesia                Social:  Non-Smoker, No Alcohol Use       Hematology/Oncology:    Oncology Normal                                   EENT/Dental:  EENT/Dental Normal           Cardiovascular:     Hypertension           hyperlipidemia                               Renal/:  Renal/ Normal                 Hepatic/GI:     GERD   ? Gastric outlet obstruction             Endocrine:  Endocrine Normal            Psych:   anxiety                 Physical Exam  General: Well nourished, Cooperative, Alert and Oriented    Airway:  Mallampati: II / II  Mouth Opening: Normal  TM Distance: 4 - 6 cm  Tongue: Normal  Neck ROM: Normal ROM    Dental:  Dentures, Edentulous    Chest/Lungs:  Clear to auscultation, Normal Respiratory Rate    Heart:  Rate: Normal  Rhythm: Regular Rhythm  Sounds: Normal        Anesthesia Plan  Type of Anesthesia, risks & benefits discussed:    Anesthesia Type: Gen ETT  Intra-op Monitoring Plan: Standard ASA Monitors  Induction:  IV and rapid sequence  Airway Plan: Video and Direct, Post-Induction  Informed Consent: Informed consent signed with the Patient and all parties understand the risks and agree with anesthesia plan.  All questions answered.   ASA Score: 3  Day of Surgery Review of History & Physical: H&P Update referred to the surgeon/provider.    Ready For Surgery From Anesthesia Perspective.     .

## 2024-11-21 NOTE — ANESTHESIA PROCEDURE NOTES
Intubation    Date/Time: 11/21/2024 10:56 AM    Performed by: Micaela Garnett CRNA  Authorized by: Sivakumar Kennedy II, MD    Intubation:     Induction:  Rapid sequence induction    Intubated:  Postinduction    Mask Ventilation:  N/a    Attempts:  1    Attempted By:  Student (Aries Rowland)    Method of Intubation:  Video laryngoscopy    Blade:  Ramos 3    Laryngeal View Grade: Grade I - full view of cords      Difficult Airway Encountered?: No      Complications:  None    Airway Device:  Oral endotracheal tube    Airway Device Size:  7.0    Style/Cuff Inflation:  Cuffed (inflated to minimal occlusive pressure)    Inflation Amount (mL):  4    Tube secured:  21    Secured at:  The lips    Placement Verified By:  Capnometry and Revisualization with laryngoscopy    Complicating Factors:  None    Findings Post-Intubation:  BS equal bilateral and atraumatic/condition of teeth unchanged

## 2024-11-21 NOTE — PROVATION PATIENT INSTRUCTIONS
Discharge Summary/Instructions after an Endoscopic Procedure  Patient Name: Patricia Ramirez  Patient MRN: [id number]  Patient YOB: 1955 Thursday, November 21, 2024  Angie Alatorre MD  Dear patient,  As a result of recent federal legislation (The Federal Cures Act), you may   receive lab or pathology results from your procedure in your [facility]   account before your physician is able to contact you. Your physician or   their representative will relay the results to you with their   recommendations at their soonest availability.  Thank you,  RESTRICTIONS:  During your procedure today, you received medications for sedation.  These   medications may affect your judgment, balance and coordination.  Therefore,   for 24 hours, you have the following restrictions:   - DO NOT drive a car, operate machinery, make legal/financial decisions,   sign important papers or drink alcohol.    ACTIVITY:  Today: no heavy lifting, straining or running due to procedural   sedation/anesthesia.  The following day: return to full activity including work.  DIET:  Eat and drink normally unless instructed otherwise.     TREATMENT FOR COMMON SIDE EFFECTS:  - Mild abdominal pain, nausea, belching, bloating or excessive gas:  rest,   eat lightly and use a heating pad.  - Sore Throat: treat with throat lozenges and/or gargle with warm salt   water.  - Because air was used during the procedure, expelling large amounts of air   from your rectum or belching is normal.  - If a bowel prep was taken, you may not have a bowel movement for 1-3 days.    This is normal.  SYMPTOMS TO WATCH FOR AND REPORT TO YOUR PHYSICIAN:  1. Abdominal pain or bloating, other than gas cramps.  2. Chest pain.  3. Back pain.  4. Signs of infection such as: chills or fever occurring within 24 hours   after the procedure.  5. Rectal bleeding, which would show as bright red, maroon, or black stools.   (A tablespoon of blood from the rectum is not serious, especially  if   hemorrhoids are present.)  6. Vomiting.  7. Weakness or dizziness.  GO DIRECTLY TO THE NEAREST EMERGENCY ROOM IF YOU HAVE ANY OF THE FOLLOWING:      Difficulty breathing              Chills and/or fever over 101 F   Persistent vomiting and/or vomiting blood   Severe abdominal pain   Severe chest pain   Black, tarry stools   Bleeding- more than one tablespoon   Any other symptom or condition that you feel may need urgent attention  Your doctor recommends these additional instructions:  If any biopsies were taken, your doctors clinic will contact you in 1 to 2   weeks with any results.  - Return patient to hospital [facility] for ongoing care.   - Resume previous diet.   - Use a proton pump inhibitor PO daily.   - ERCP as outpatient for stent removal. Stent not seen during EGD today.  For questions, problems or results please call your physician - Angie Alatorre MD at Work:  [phone].  [facility] can be reached at [phone]     IF A COMPLICATION OR EMERGENCY SITUATION ARISES AND YOU ARE UNABLE TO REACH   YOUR PHYSICIAN - GO DIRECTLY TO THE EMERGENCY ROOM.  MD Angie Ang MD  11/21/2024 11:21:33 AM  This report has been verified and signed electronically.  Dear patient,  As a result of recent federal legislation (The Federal Cures Act), you may   receive lab or pathology results from your procedure in your [facility]   account before your physician is able to contact you. Your physician or   their representative will relay the results to you with their   recommendations at their soonest availability.  Thank you,  PROVATION

## 2024-11-21 NOTE — H&P
Endoscopy History and Physical    PCP - Fabienne Erwin NP    Procedure - EGD  ASA - per anesthesia  Mallampati - per anesthesia  Plan of anesthesia - MAC    HPI:  This is a 69 y.o. female here for evaluation of :  abnormal imaging of the stomach    ROS:  Constitutional: No fevers, chills  CV: No chest pain  Pulm: No cough  Ophtho: No vision changes  GI: see HPI  Derm: No rash    Medical History:  has a past medical history of Allergy, Anxiety, Arthritis, Cataract, Depressive disorder, not elsewhere classified, Esophageal reflux, Hypertension, Impaired fasting glucose, Joint pain, Obesity, unspecified, and Other and unspecified hyperlipidemia.    Surgical History:  has a past surgical history that includes right  arm  surgery; R knee replacement;  section (); Hysterectomy; Blepharoplasty (Bilateral, 3/3/2021); Repair of retinal detachment with vitrectomy (Left, 2022); Phacoemulsification of cataract (Left, 10/27/2022); Intraocular prosthesis insertion (Left, 10/27/2022); Colonoscopy (N/A, 2023); ERCP (N/A, 10/7/2024); and Robot-assisted cholecystectomy using da Omar Xi (N/A, 10/9/2024).    Family History: family history includes Cancer in her mother; Cervical cancer in her sister; Liver disease in her father; Thyroid disease in her sister; Tremor in her sister.. Otherwise no colon cancer, inflammatory bowel disease, or GI malignancies.    Social History:  reports that she has been smoking cigarettes. She has a 30 pack-year smoking history. She has never used smokeless tobacco. She reports current alcohol use. She reports current drug use. Drug: Marijuana.    Review of patient's allergies indicates:   Allergen Reactions    Sulfa (sulfonamide antibiotics) Hives    Ace inhibitors Other (See Comments)     Cough         Medications:   Medications Prior to Admission   Medication Sig Dispense Refill Last Dose/Taking    acetaminophen (TYLENOL) 500 MG tablet Take 500 mg by mouth every 6 (six) hours as  needed for Pain.       ALPRAZolam (XANAX) 0.5 MG tablet Take 1 tablet (0.5 mg total) by mouth 2 (two) times daily as needed for Anxiety. 60 tablet 0     atorvastatin (LIPITOR) 40 MG tablet Take 1 tablet (40 mg total) by mouth once daily. 90 tablet 3     BIOTIN ORAL Take by mouth.       busPIRone (BUSPAR) 15 MG tablet TAKE 1 TABLET BY MOUTH THREE TIMES DAILY 90 tablet 5     citalopram (CELEXA) 40 MG tablet Take 1 tablet by mouth once daily 90 tablet 3     cyclobenzaprine (FLEXERIL) 5 MG tablet TAKE 1 TABLET BY MOUTH TWICE DAILY AS NEEDED FOR MUSCLE SPASM 90 tablet 3     ergocalciferol (ERGOCALCIFEROL) 50,000 unit Cap Take 1 capsule (50,000 Units total) by mouth every 7 days. 12 capsule 3     mirtazapine (REMERON) 15 MG tablet Take 1 tablet (15 mg total) by mouth every evening. 30 tablet 0     olmesartan (BENICAR) 20 MG tablet TAKE 1 TABLET BY MOUTH ONCE DAILY IN THE EVENING 90 tablet 3     traZODone (DESYREL) 50 MG tablet Take 50 mg by mouth every morning.            Vital Signs:   Vitals:    11/21/24 0942   BP:    Pulse:    Resp: 18   Temp:        General Appearance: Well appearing in no acute distress  Eyes:    No scleral icterus  ENT: atraumatic  Abdomen: Soft, nondistended  Extremities: no tenderness  Skin: normal color    Labs:  Lab Results   Component Value Date    WBC 17.98 (H) 11/21/2024    HGB 13.8 11/21/2024    HCT 41.8 11/21/2024     11/21/2024    CHOL 120 09/17/2024    TRIG 90 09/17/2024    HDL 44 09/17/2024     (H) 11/21/2024     (H) 11/21/2024     11/21/2024    K 3.7 11/21/2024     11/21/2024    CREATININE 0.6 11/21/2024    BUN 10 11/21/2024    CO2 26 11/21/2024    TSH 1.738 11/13/2024    INR 1.0 07/28/2022    HGBA1C 5.2 10/06/2024       I have explained the risks and benefits of endoscopy procedures to the patient/their POA including but not limited to bleeding, perforation, infection, and death.  The patient/POA was asked if they understand and allowed to ask any  further questions to their satisfaction.    Angie Alatorre MD

## 2024-11-21 NOTE — SUBJECTIVE & OBJECTIVE
Past Medical History:   Diagnosis Date    Allergy     Anxiety     Arthritis     Cataract     Depressive disorder, not elsewhere classified     Esophageal reflux     Hypertension     Impaired fasting glucose     Joint pain     Obesity, unspecified     Other and unspecified hyperlipidemia        Past Surgical History:   Procedure Laterality Date    BLEPHAROPLASTY Bilateral 3/3/2021    Procedure: BLEPHAROPLASTY;  Surgeon: Maite Acuna MD;  Location: [facility];  Service: Ophthalmology;  Laterality: Bilateral;     SECTION  1973    COLONOSCOPY N/A 2023    Procedure: COLONOSCOPY;  Surgeon: Briana Sterling MD;  Location: [facility];  Service: Endoscopy;  Laterality: N/A;    ERCP N/A 10/7/2024    Procedure: ERCP (ENDOSCOPIC RETROGRADE CHOLANGIOPANCREATOGRAPHY);  Surgeon: Catracho Mitchell MD;  Location: [facility] (2ND FLR);  Service: Endoscopy;  Laterality: N/A;    HYSTERECTOMY      without BSO    INTRAOCULAR PROSTHESES INSERTION Left 10/27/2022    Procedure: INSERTION, IOL PROSTHESIS;  Surgeon: Palmer Berrios MD;  Location: [facility];  Service: Ophthalmology;  Laterality: Left;    PHACOEMULSIFICATION OF CATARACT Left 10/27/2022    Procedure: PHACOEMULSIFICATION, CATARACT;  Surgeon: Palmer Berrios MD;  Location: [facility];  Service: Ophthalmology;  Laterality: Left;  RN PHONE PREOP 10/21/2022   ARRIVAL 9:00 AM    R knee replacement      REPAIR OF RETINAL DETACHMENT WITH VITRECTOMY Left 2022    Procedure: REPAIR, RETINAL DETACHMENT, WITH VITRECTOMY;  Surgeon: MINO Martinez MD;  Location: [address];  Service: Ophthalmology;  Laterality: Left;  Spoke with SID Garcia. Pt was instructed nothing to eat after 8am and to arrive at 2pm for preop. 430pm case start request.    right  arm  surgery      ROBOT-ASSISTED CHOLECYSTECTOMY USING DA GENO XI N/A 10/9/2024    Procedure: XI ROBOTIC SUB TOTAL CHOLECYSTECTOMY; DRAIN PLACEMENT;  Surgeon: Rigoberto Ponce MD;  Location: [facility];   Service: General;  Laterality: N/A;       Review of patient's allergies indicates:   Allergen Reactions    Sulfa (sulfonamide antibiotics) Hives    Ace inhibitors Other (See Comments)     Cough         Current Facility-Administered Medications on File Prior to Encounter   Medication    cyclopentolate 1% ophthalmic solution 1 drop    ofloxacin 0.3 % ophthalmic solution 1 drop    sodium chloride 0.9% flush 10 mL     Current Outpatient Medications on File Prior to Encounter   Medication Sig    acetaminophen (TYLENOL) 500 MG tablet Take 500 mg by mouth every 6 (six) hours as needed for Pain.    ALPRAZolam (XANAX) 0.5 MG tablet Take 1 tablet (0.5 mg total) by mouth 2 (two) times daily as needed for Anxiety.    atorvastatin (LIPITOR) 40 MG tablet Take 1 tablet (40 mg total) by mouth once daily.    BIOTIN ORAL Take by mouth.    busPIRone (BUSPAR) 15 MG tablet TAKE 1 TABLET BY MOUTH THREE TIMES DAILY    citalopram (CELEXA) 40 MG tablet Take 1 tablet by mouth once daily    cyclobenzaprine (FLEXERIL) 5 MG tablet TAKE 1 TABLET BY MOUTH TWICE DAILY AS NEEDED FOR MUSCLE SPASM    ergocalciferol (ERGOCALCIFEROL) 50,000 unit Cap Take 1 capsule (50,000 Units total) by mouth every 7 days.    mirtazapine (REMERON) 15 MG tablet Take 1 tablet (15 mg total) by mouth every evening.    olmesartan (BENICAR) 20 MG tablet TAKE 1 TABLET BY MOUTH ONCE DAILY IN THE EVENING    traZODone (DESYREL) 50 MG tablet Take 50 mg by mouth every morning.     Family History       Problem Relation (Age of Onset)    Cancer Mother    Cervical cancer Sister    Liver disease Father    Thyroid disease Sister    Tremor Sister          Tobacco Use    Smoking status: Every Day     Current packs/day: 0.75     Average packs/day: 0.8 packs/day for 40.0 years (30.0 ttl pk-yrs)     Types: Cigarettes    Smokeless tobacco: Never   Substance and Sexual Activity    Alcohol use: Yes     Alcohol/week: 0.0 standard drinks of alcohol     Comment: occasionally    Drug use: Yes      Types: Marijuana    Sexual activity: Yes     Partners: Male     Birth control/protection: See Surgical Hx     Review of Systems   Reason unable to perform ROS: ROS was performed and pertinent +s and -s are listed in HPI.     Objective:     Vital Signs (Most Recent):  Temp: 98.1 °F (36.7 °C) (11/20/24 1119)  Pulse: 70 (11/20/24 1602)  Resp: 17 (11/20/24 1807)  BP: (!) 196/85 (11/20/24 1602)  SpO2: 99 % (11/20/24 1602) Vital Signs (24h Range):  Temp:  [98.1 °F (36.7 °C)] 98.1 °F (36.7 °C)  Pulse:  [59-79] 70  Resp:  [16-20] 17  SpO2:  [89 %-100 %] 99 %  BP: (140-206)/(60-85) 196/85     Weight: 61.2 kg (135 lb)  Body mass index is 22.47 kg/m².     Physical Exam  Constitutional:       General: She is not in acute distress.     Appearance: Normal appearance. She is ill-appearing.   HENT:      Head: Normocephalic.   Neck:      Comments: JVP not elevated  Cardiovascular:      Rate and Rhythm: Normal rate and regular rhythm.      Pulses: Normal pulses.      Heart sounds: Normal heart sounds.   Pulmonary:      Effort: Pulmonary effort is normal.      Breath sounds: Normal breath sounds.   Abdominal:      General: Abdomen is flat. There is distension.      Tenderness: There is abdominal tenderness. There is guarding.   Musculoskeletal:      Right lower leg: No edema.      Left lower leg: No edema.   Skin:     General: Skin is warm and dry.      Capillary Refill: Capillary refill takes less than 2 seconds.      Coloration: Skin is not jaundiced.   Neurological:      General: No focal deficit present.      Mental Status: She is alert and oriented to person, place, and time.   Psychiatric:         Mood and Affect: Mood normal.         Behavior: Behavior normal.                Significant Labs: All pertinent labs within the past 24 hours have been reviewed.  CBC:   Recent Labs   Lab 11/20/24  1303   WBC 10.92   HGB 13.8   HCT 41.3        CMP:   Recent Labs   Lab 11/20/24  1525      K 3.9      CO2 23   *    BUN 10   CREATININE 0.6   CALCIUM 8.6*   PROT 6.6   ALBUMIN 3.4*   BILITOT 0.4   ALKPHOS 98   *   ALT 84*   ANIONGAP 9     Cardiac Markers:   Recent Labs   Lab 11/20/24  1229   BNP 46     Lipase:   Recent Labs   Lab 11/20/24  1525   LIPASE 32     Troponin:   Recent Labs   Lab 11/20/24  1229   TROPONINI <0.006  <0.006       Significant Imaging: I have reviewed all pertinent imaging results/findings within the past 24 hours.    Results for orders placed or performed during the hospital encounter of 11/20/24 (from the past 2160 hours)   CT Abdomen Pelvis With IV Contrast NO Oral Contrast    Narrative    EXAMINATION:  CT ABDOMEN PELVIS WITH IV CONTRAST    CLINICAL HISTORY:  Abdominal pain, post-op;Nausea/vomiting;    TECHNIQUE:  Low dose axial images, sagittal and coronal reformations were obtained from the lung bases to the pubic symphysis following the IV administration of 75 mL of Omnipaque 350 .  Oral contrast was not administered.    COMPARISON:  10/06/2024    FINDINGS:  Abdomen:    - Lower thorax:Similar calcification of the mitral annulus and coronary arteries.    - Lung bases: No change.    - Liver: Normal size.  There is periportal edema.  No focal collection or mass.    - Gallbladder: The gallbladder has been removed.    - Bile Ducts: There is a common bile duct stent in place.  There is intrahepatic and extrahepatic biliary air.    - Spleen: Negative.    - Kidneys: No mass or hydronephrosis.    - Adrenals: Unremarkable.    - Pancreas: Small amount of air within the pancreatic duct.  The pancreas otherwise appears normal.    - Retroperitoneum:  No significant adenopathy.    - Vascular: Similar atherosclerotic disease of the abdominal aorta and iliac arteries without aneurysm.    - Abdominal wall:  There is anasarca.  No drainable fluid collection.    Pelvis:    No pelvic mass, adenopathy, or free fluid.    Bowel/Mesentery:    There is a small amount of fluid and air within the gallbladder fossa  likely related to recent surgery without drainable fluid collection.    The distal esophagus is moderately dilated with an air-fluid layer.  There is a small hiatal hernia.  The stomach is moderately to severely distended proximally.  The distal stomach appears thickened which could be related to gastritis or postoperative reactive changes.  The small bowel and colon are normal in caliber.    Bones:  No change.      Impression    Dilatation of the distal esophagus and proximal stomach with thickening of the wall of the distal stomach.  Findings could be indicative of gastric outlet obstruction and either postoperative reactive changes of the distal stomach or gastritis.    Status post cholecystectomy with sphincterotomy and common bile duct stent placement.  Small amounts of air are seen within the bile ducts and there is a small amount of fluid within the tima hepatis without drainable fluid collection.    Periportal edema.    This report was flagged in Epic as abnormal..      Electronically signed by: Fran Jett  Date:    11/20/2024  Time:    16:43

## 2024-11-21 NOTE — SUBJECTIVE & OBJECTIVE
Interval History: seen after egd, feeling okay now. Mild pain but no nausea. No obstruction seen, plans for outpatient ercp with stent removal. Updated patient. Start on clears    Review of Systems  Objective:     Vital Signs (Most Recent):  Temp: 99.1 °F (37.3 °C) (11/21/24 1229)  Pulse: 82 (11/21/24 1229)  Resp: 17 (11/21/24 1229)  BP: (!) 167/74 (11/21/24 1229)  SpO2: 95 % (11/21/24 1229) Vital Signs (24h Range):  Temp:  [98.2 °F (36.8 °C)-99.3 °F (37.4 °C)] 99.1 °F (37.3 °C)  Pulse:  [] 82  Resp:  [16-20] 17  SpO2:  [92 %-100 %] 95 %  BP: (132-196)/(63-85) 167/74     Weight: 72 kg (158 lb 11.7 oz)  Body mass index is 25.62 kg/m².    Intake/Output Summary (Last 24 hours) at 11/21/2024 1443  Last data filed at 11/21/2024 1230  Gross per 24 hour   Intake 0 ml   Output --   Net 0 ml         Physical Exam  Vitals and nursing note reviewed.   Constitutional:       General: She is not in acute distress.     Appearance: She is ill-appearing.   Cardiovascular:      Rate and Rhythm: Normal rate and regular rhythm.      Pulses: Normal pulses.   Pulmonary:      Effort: Pulmonary effort is normal. No respiratory distress.      Breath sounds: No wheezing.   Abdominal:      General: Bowel sounds are normal. There is no distension.      Tenderness: There is no abdominal tenderness.   Skin:     General: Skin is warm.   Neurological:      General: No focal deficit present.      Mental Status: She is alert and oriented to person, place, and time.   Psychiatric:         Mood and Affect: Mood normal.         Thought Content: Thought content normal.             Significant Labs: All pertinent labs within the past 24 hours have been reviewed.    Significant Imaging: I have reviewed all pertinent imaging results/findings within the past 24 hours.

## 2024-11-21 NOTE — PROGRESS NOTES
Surgery Progress Note    Patricia Ramirez is a 69 y.o. year old female who is 6 weeks s/p robotic subtotal cholecystectomy, now in hospital for episodic epigastric pain. Initial workup concerning for gastric outlet obstruction.    AF  Mild hypertension  Underwent EGD today  Abdominal discomfort has not recurred  Tolerating CLD, denies nausea/vomiting  +flatus, -BM  No f/c/sob/cp    ROS:  Negative except above    PE:  Vitals:    11/21/24 1149 11/21/24 1229 11/21/24 1544 11/21/24 1553   BP: (!) 170/73 (!) 167/74  (!) 154/83   BP Location: Left arm Left arm  Left arm   Patient Position: Lying Lying  Lying   Pulse: 90 82 78 80   Resp: 18 17  16   Temp:  99.1 °F (37.3 °C)  98.5 °F (36.9 °C)   TempSrc:  Oral  Oral   SpO2: 100% 95%  (!) 94%   Weight:       Height:           NAD  AOx4  Normal WOB on RA  Regular HR  Abd soft, nt, nd  Incisions healing well    Labs  CBC  17.98 13.8 215    41.8     Coag                 BMP  138 103 10 142   3.7 26 0.6     CaMgPhos  8.6   1.9   3.8      Last labs from current encounter as of 11/21/2024-5:49 PM    Imaging  None new    Diagnostic Studies  EGD - normal esophagus, excessive fluid in gastric fundus, diffuse gastritis throughout antrum, non-bleeding diverticulum in D2, possible hiatal hernia    A/P:  Patricia Ramirez is a 69 y.o. year old female who is 6 weeks s/p robotic subtotal cholecystectomy, now with 1 week of episodic upper abdominal discomfort. Noted to have severely distended stomach on CT, concerning for gastric outlet obstruction. Normal post-operative changes of gallbladder fossa and biliary tree. EGD showing gastritis of antrum but no GOO.     - No surgical intervention  - Ok for diet, advance as tolerated  - NG tube if nauseous/vomiting  - GI consulted, appreciate assistance  - Plan for outpatient ERCP at [facility] to remove stents  - Remainder of care per primary team    Hai Cutler MD  [facility] General Surgery PGY-II  11/21/2024 5:53 PM

## 2024-11-21 NOTE — HPI
"Patricia Ramirez is a 69 y.o. female with hx choledocholithiasis who is s/p subtotal cholecystectomy on 10/9 with Dr. Kris alex subsequent CBD and pancreatic stents, HFpEF, HTN, HLD, and current smoker who presented to [facility] ED on 11/20/2024 for eval and treatment of abdominal pain.  They describe their pain as "like a baby kicking", 8/10, located in the epigastric area with radiation down to her mid-abdomen.  It started over the past week and has been worsening since.  There is associated nausea and dyspnea.  They deny associated fever, chills, diarrhea.  Symptoms are alleviated by none of the OTC NSAIDs she's tried.  Symptoms are worsened by movement.  She was previously scheduled for ERCP on 11/21 at [facility] to remove her CBD and pancreatic stents.    ED course notable for the following: Very anxious on arrival, hypertensive, dyspneic, an diaphoretic.  Exam with abdominal tenderness and fullness on palpation, worst in epigastric area.  Labs Lipase and WBC WNL.  AST ALT elevated.  Imaging: CT AP severely distended stomach with enhancement of antrum, normal small bowel with areas of wall enhancement, colon dilated with air and stool present, small area of fluid in gallbladder fossa, air in biliary tree, CBD and pancreatic duct stents in place.  ED therapy/stabilization measures: morphine, Zofran.  GSGY consulted, who recommend pt be transferred to [facility] for her previously-planned ERCP since that could also be diagnostic for gastric outlet obstruction as suspected by CT.  They were admitted to [facility] for further evaluation and treatment.      "

## 2024-11-21 NOTE — ASSESSMENT & PLAN NOTE
Patients blood pressure range in the last 24 hours was: BP  Min: 132/63  Max: 196/85.The patient's inpatient anti-hypertensive regimen is listed below:  Current Antihypertensives  losartan tablet 50 mg, Daily, Oral  hydrALAZINE injection 10 mg, Every 6 hours PRN, Intravenous    Plan  - BP is uncontrolled, will adjust as follows: adding PRN  - Giving home dose on admission  - Pain and anxiety control

## 2024-11-21 NOTE — NURSING
[facility]  Nurses Note -- 4 Eyes      11/21/2024       Skin assessed on: Q Shift      [x] No Pressure Injuries Present    []Prevention Measures Documented    [] Yes LDA  for Pressure Injury Previously documented     [] Yes New Pressure Injury Discovered   [] LDA for New Pressure Injury Added      Attending RN:  Sujatha Marin RN     Second RN:  Karon PADILLA

## 2024-11-21 NOTE — PLAN OF CARE
Case Management Assessment     PCP: Fabienne Erwin NP   Pharmacy:   [id number]  [place] [address] [facility]  [address]  [place] [address]  Phone: [phone] Fax: [phone]       Patient Arrived From: Home   Existing Help at Home: Mynor Archer     Barriers to Discharge: None     Discharge Plan:    A. Home with family    B. Home with family       CM met with patient with bedside to discuss discharge planning. Patient is independent, reside with her son and does not use any DME.     Patient's sonMynor will provide transportation home.         11/21/24 1441   Discharge Assessment   Assessment Type Discharge Planning Assessment   Confirmed/corrected address, phone number and insurance Yes   Confirmed Demographics Correct on Facesheet   Source of Information patient   When was your last doctors appointment? 11/13/24   Communicated STUART with patient/caregiver Yes   People in Home child(kalli), adult   Facility Arrived From: Home   Do you expect to return to your current living situation? Yes   Do you have help at home or someone to help you manage your care at home? No   Prior to hospitilization cognitive status: Alert/Oriented   Current cognitive status: Alert/Oriented   Walking or Climbing Stairs Difficulty no   Dressing/Bathing Difficulty no   Equipment Currently Used at Home none   Readmission within 30 days? No   Do you currently have service(s) that help you manage your care at home? No   Do you take prescription medications? Yes   Do you have prescription coverage? Yes   Coverage PHN   Do you have any problems affording any of your prescribed medications? No   Is the patient taking medications as prescribed? yes   How do you get to doctors appointments? car, drives self   Are you on dialysis? No   Do you take coumadin? No   Discharge Plan A Home with family   Discharge Plan B Home with family   DME Needed Upon Discharge  none   Discharge Plan discussed with: Patient   Transition of  Care Barriers None   SDOH   (None)   Physical Activity   On average, how many days per week do you engage in moderate to strenuous exercise (like a brisk walk)? 0 days   On average, how many minutes do you engage in exercise at this level? 0 min   Financial Resource Strain   How hard is it for you to pay for the very basics like food, housing, medical care, and heating? Not hard   Housing Stability   In the last 12 months, was there a time when you were not able to pay the mortgage or rent on time? N   At any time in the past 12 months, were you homeless or living in a shelter (including now)? N   Transportation Needs   Has the lack of transportation kept you from medical appointments, meetings, work or from getting things needed for daily living? No   Food Insecurity   Within the past 12 months, you worried that your food would run out before you got the money to buy more. Never true   Within the past 12 months, the food you bought just didn't last and you didn't have money to get more. Never true   Stress   Do you feel stress - tense, restless, nervous, or anxious, or unable to sleep at night because your mind is troubled all the time - these days? Very much   Social Isolation   How often do you feel lonely or isolated from those around you?  Never   Alcohol Use   Q1: How often do you have a drink containing alcohol? Monthly or l   Q2: How many drinks containing alcohol do you have on a typical day when you are drinking? 1 or 2   Q3: How often do you have six or more drinks on one occasion? Never

## 2024-11-21 NOTE — CONSULTS
[facility] Consultation Note    Patient Complaint: abdominal pain    PCP:   Fabienne Erwin       LOS: 1        Initial History of Present Illness (HPI):  This is a 69 y.o. female consulted to GI service for gastric outlet obstruction, due for ERCP at [facility]. 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 Patient complaint of acute onset of severe abdominal pain with associated symptoms of nausea that began on Monday. Denies fever, chills, vomiting, hematemesis, BRBPR, melena, diarrhea or constipation. Previously seen by this GI team in oct for choledocholithiasis with stent placement with subtotal diane a few days later. She was to f/u in 6 weeks post ercp for stent eval but has become inpatient prior to appt.     Wbc 17.9, bili normal, lfts 110/196  Imaging suspicious for partial bowel obstruction      Medical History:  has a past medical history of Allergy, Anxiety, Arthritis, Cataract, Depressive disorder, not elsewhere classified, Esophageal reflux, Hypertension, Impaired fasting glucose, Joint pain, Obesity, unspecified, and Other and unspecified hyperlipidemia.    Surgical History:  has a past surgical history that includes right  arm  surgery; R knee replacement;  section (); Hysterectomy; Blepharoplasty (Bilateral, 3/3/2021); Repair of retinal detachment with vitrectomy (Left, 2022); Phacoemulsification of cataract (Left, 10/27/2022); Intraocular prosthesis insertion (Left, 10/27/2022); Colonoscopy (N/A, 2023); ERCP (N/A, 10/7/2024); and Robot-assisted cholecystectomy using da Omar Xi (N/A, 10/9/2024).      Objective Findings:    Vital Signs:  Temp:  [98.1 °F (36.7 °C)-99.3 °F (37.4 °C)]   Pulse:  []   Resp:  [16-20]   BP: (132-206)/(60-85)   SpO2:  [89  %-100 %]   Body mass index is 25.62 kg/m².      Physical Exam  Vitals and nursing note reviewed.   Constitutional:       Appearance: Normal appearance.   HENT:      Head: Normocephalic.   Pulmonary:      Effort: Pulmonary effort is normal.   Abdominal:      General: Bowel sounds are normal.      Palpations: Abdomen is soft.   Skin:     General: Skin is warm and dry.   Neurological:      Mental Status: She is alert and oriented to person, place, and time.   Psychiatric:         Mood and Affect: Mood normal.         Behavior: Behavior normal.         Thought Content: Thought content normal.         Judgment: Judgment normal.               Labs:  Lab Results   Component Value Date    WBC 17.98 (H) 2024    HGB 13.8 2024    HCT 41.8 2024     2024    CHOL 120 2024    TRIG 90 2024    HDL 44 2024     (H) 2024     (H) 2024     2024    K 3.7 2024     2024    CREATININE 0.6 2024    BUN 10 2024    CO2 26 2024    TSH 1.738 2024    INR 1.0 2022    HGBA1C 5.2 10/06/2024             Imagin24 CT abdomen pelvis- Dilatation of the distal esophagus and proximal stomach with thickening of the wall of the distal stomach.  Findings could be indicative of gastric outlet obstruction and either postoperative reactive changes of the distal stomach or gastritis.     Status post cholecystectomy with sphincterotomy and common bile duct stent placement.  Small amounts of air are seen within the bile ducts and there is a small amount of fluid within the tima hepatis without drainable fluid collection.      Endoscopy: 10/7/24 ERCP-  The major papilla was on the rim of a                          diverticulum.                          - The common bile duct was dilated.                          - Choledocholithiasis was found. Removal was not                          attempted; a stent was inserted.                           - A biliary sphincterotomy was performed.                          - Lithotripsy was unsuccessful. Unable to get a                          good angle to passed the basket after multiple                          attempts.                          - One biliary stent was placed into the common                          bile duct (10 Fr by 7 cm).                          - One pancreatic stent ( 5 Fr by 4 cm single                          pigtail) was placed into the ventral pancreatic                          duct to decreased the risk of post ERCP                          pancreatitis.                Abdominal pain. Abnormal CT abdomen. Leukocytosis.  Plan/ Recommendations:  1.  CT with suspicion for partial gastric outlet obstruction. Plan for upper endoscopy today.   2. CT comments on cbd stent in place, with air noted, likely due to previous instrumentation. Plan for come n go ERCP to eval stent on tomorrow. Elevated wbc possibly inflammation from gastric distention w/ suspected GOO, patient is afebrile. HM added zosyn for proph coverage, continue to monitor levels.      Thank you so much for allowing us to participate in the care of Patricia STEPHAN James . Please contact us if you have any additional questions.    Alicia Menjivar NP  Gastroenterology  [facility] - Med Surg

## 2024-11-21 NOTE — PROGRESS NOTES
"[facility] Medicine  Progress Note    Patient Name: Patricia Ramirez  MRN: [id number]  Patient Class: IP- Inpatient   Admission Date: 11/20/2024  Length of Stay: 1 days  Attending Physician: Moise Collins MD  Primary Care Provider: Fabienne Erwin NP        Subjective:     Principal Problem:Epigastric pain        HPI:  Patricia Ramirez is a 69 y.o. female with hx choledocholithiasis who is s/p subtotal cholecystectomy on 10/9 with Dr. Kris alex subsequent CBD and pancreatic stents, HFpEF, HTN, HLD, and current smoker who presented to [facility] ED on 11/20/2024 for eval and treatment of abdominal pain.  They describe their pain as "like a baby kicking", 8/10, located in the epigastric area with radiation down to her mid-abdomen.  It started over the past week and has been worsening since.  There is associated nausea and dyspnea.  They deny associated fever, chills, diarrhea.  Symptoms are alleviated by none of the OTC NSAIDs she's tried.  Symptoms are worsened by movement.  She was previously scheduled for ERCP on 11/21 at [facility] to remove her CBD and pancreatic stents.    ED course notable for the following: Very anxious on arrival, hypertensive, dyspneic, an diaphoretic.  Exam with abdominal tenderness and fullness on palpation, worst in epigastric area.  Labs Lipase and WBC WNL.  AST ALT elevated.  Imaging: CT AP severely distended stomach with enhancement of antrum, normal small bowel with areas of wall enhancement, colon dilated with air and stool present, small area of fluid in gallbladder fossa, air in biliary tree, CBD and pancreatic duct stents in place.  ED therapy/stabilization measures: morphine, Zofran.  GSGY consulted, who recommend pt be transferred to [facility] for her previously-planned ERCP since that could also be diagnostic for gastric outlet obstruction as suspected by CT.  They were admitted to [facility] for further evaluation and " treatment.        Overview/Hospital Course:  No notes on file    Interval History: seen after egd, feeling okay now. Mild pain but no nausea. No obstruction seen, plans for outpatient ercp with stent removal. Updated patient. Start on clears    Review of Systems  Objective:     Vital Signs (Most Recent):  Temp: 99.1 °F (37.3 °C) (11/21/24 1229)  Pulse: 82 (11/21/24 1229)  Resp: 17 (11/21/24 1229)  BP: (!) 167/74 (11/21/24 1229)  SpO2: 95 % (11/21/24 1229) Vital Signs (24h Range):  Temp:  [98.2 °F (36.8 °C)-99.3 °F (37.4 °C)] 99.1 °F (37.3 °C)  Pulse:  [] 82  Resp:  [16-20] 17  SpO2:  [92 %-100 %] 95 %  BP: (132-196)/(63-85) 167/74     Weight: 72 kg (158 lb 11.7 oz)  Body mass index is 25.62 kg/m².    Intake/Output Summary (Last 24 hours) at 11/21/2024 1443  Last data filed at 11/21/2024 1230  Gross per 24 hour   Intake 0 ml   Output --   Net 0 ml         Physical Exam  Vitals and nursing note reviewed.   Constitutional:       General: She is not in acute distress.     Appearance: She is ill-appearing.   Cardiovascular:      Rate and Rhythm: Normal rate and regular rhythm.      Pulses: Normal pulses.   Pulmonary:      Effort: Pulmonary effort is normal. No respiratory distress.      Breath sounds: No wheezing.   Abdominal:      General: Bowel sounds are normal. There is no distension.      Tenderness: There is no abdominal tenderness.   Skin:     General: Skin is warm.   Neurological:      General: No focal deficit present.      Mental Status: She is alert and oriented to person, place, and time.   Psychiatric:         Mood and Affect: Mood normal.         Thought Content: Thought content normal.             Significant Labs: All pertinent labs within the past 24 hours have been reviewed.    Significant Imaging: I have reviewed all pertinent imaging results/findings within the past 24 hours.    Assessment/Plan:      * Epigastric pain  Hx lap-diane    69F who is 6 weeks s/p robotic subtotal cholecystectomy, now  with 1 week of episodic upper abdominal discomfort. Noted to have severely distended stomach on CT, concerning for gastric outlet obstruction. Normal post-operative changes of gallbladder fossa and biliary tree. Patient has scheduled ERCP to remove CBD and pancreatic stents tomorrow at [facility] [place].    - GI consulted, EGD today with no evidence of GOO however with hiatal hernia  - Discussed with GI at [facility] [place], plan for outpatient stent removal. No plans for come/go ERCP during this admit  - continue with PPI daily per GI  - start on clear liquids  - covering for intra-abdominal with elevated WBC, on zosyn      Heart failure with preserved ejection fraction  Well compensated.  WCTM and diurese as appropriate.      Depression with anxiety  Stable.  Continue home meds.         Hyperlipidemia  Stable.  Holding home statin at least until AST ALT come back down towards normal.      Essential hypertension  Patients blood pressure range in the last 24 hours was: BP  Min: 132/63  Max: 196/85.The patient's inpatient anti-hypertensive regimen is listed below:  Current Antihypertensives  losartan tablet 50 mg, Daily, Oral  hydrALAZINE injection 10 mg, Every 6 hours PRN, Intravenous    Plan  - BP is uncontrolled, will adjust as follows: adding PRN  - Giving home dose on admission  - Pain and anxiety control      VTE Risk Mitigation (From admission, onward)           Ordered     Reason for No Pharmacological VTE Prophylaxis  Once        Question:  Reasons:  Answer:  Patient is Ambulatory    11/20/24 1823     IP VTE HIGH RISK PATIENT  Once         11/20/24 1823     Place sequential compression device  Until discontinued         11/20/24 1823                    Discharge Planning   STUART:      Code Status: Full Code   Is the patient medically ready for discharge?:     Reason for patient still in hospital (select all that apply): Treatment                     Moise Collins MD  Department of Hospital Medicine   [facility] Surg

## 2024-11-21 NOTE — NURSING
Patient arrived to floor via transporter from ED. Patient transferred to bed independently. Patient was oriented to room, information on whiteboard, and med regimen. Bed low, adequate lighting provided, side rails x 2 up, call bell within reach. Admission assessment completed. Patient denied acute distress at this time. Care ongoing.    [facility] [place]  Nurses Note -- 4 Eyes      11/20/2024       Skin assessed on: Admit      [x] No Pressure Injuries Present    [x]Prevention Measures Documented    [] Yes LDA  for Pressure Injury Previously documented     [] Yes New Pressure Injury Discovered   [] LDA for New Pressure Injury Added      Attending RN:  Stephanie Barthelemy, RN     Second RN:  Italia RN

## 2024-11-21 NOTE — ASSESSMENT & PLAN NOTE
Patients blood pressure range in the last 24 hours was: BP  Min: 140/60  Max: 206/81.The patient's inpatient anti-hypertensive regimen is listed below:  Current Antihypertensives  losartan tablet 50 mg, Daily, Oral    Plan  - BP is uncontrolled, will adjust as follows: adding PRN  - Giving home dose on admission  - Pain and anxiety control

## 2024-11-21 NOTE — OR NURSING
PROCEDURE AND RECOVERY COMPLETED. DR. LÓPEZ DISCUSSED FINDINGS WITH PATIENT AND PLAN OF CARE; REPORT CALLED TO  VALERIE CALLES. PRINTED REPORT GIVEN TO PATIENT; PATIENT READY TO TRANSPORT BACK TO ROOM.

## 2024-11-22 ENCOUNTER — TELEPHONE (OUTPATIENT)
Dept: ENDOSCOPY | Facility: HOSPITAL | Age: 69
End: 2024-11-22
Payer: MEDICARE

## 2024-11-22 VITALS
BODY MASS INDEX: 24.41 KG/M2 | OXYGEN SATURATION: 95 % | TEMPERATURE: 98 F | SYSTOLIC BLOOD PRESSURE: 157 MMHG | HEIGHT: 66 IN | RESPIRATION RATE: 16 BRPM | WEIGHT: 151.88 LBS | HEART RATE: 73 BPM | DIASTOLIC BLOOD PRESSURE: 64 MMHG

## 2024-11-22 LAB
ALBUMIN SERPL BCP-MCNC: 2.9 G/DL (ref 3.5–5.2)
ALP SERPL-CCNC: 119 U/L (ref 40–150)
ALT SERPL W/O P-5'-P-CCNC: 114 U/L (ref 10–44)
ANION GAP SERPL CALC-SCNC: 8 MMOL/L (ref 8–16)
AST SERPL-CCNC: 60 U/L (ref 10–40)
BASOPHILS # BLD AUTO: 0.05 K/UL (ref 0–0.2)
BASOPHILS NFR BLD: 0.4 % (ref 0–1.9)
BILIRUB SERPL-MCNC: 0.6 MG/DL (ref 0.1–1)
BUN SERPL-MCNC: 10 MG/DL (ref 8–23)
CALCIUM SERPL-MCNC: 8.7 MG/DL (ref 8.7–10.5)
CHLORIDE SERPL-SCNC: 104 MMOL/L (ref 95–110)
CO2 SERPL-SCNC: 28 MMOL/L (ref 23–29)
CREAT SERPL-MCNC: 0.6 MG/DL (ref 0.5–1.4)
DIFFERENTIAL METHOD BLD: ABNORMAL
EOSINOPHIL # BLD AUTO: 0.1 K/UL (ref 0–0.5)
EOSINOPHIL NFR BLD: 0.5 % (ref 0–8)
ERYTHROCYTE [DISTWIDTH] IN BLOOD BY AUTOMATED COUNT: 13.2 % (ref 11.5–14.5)
EST. GFR  (NO RACE VARIABLE): >60 ML/MIN/1.73 M^2
GLUCOSE SERPL-MCNC: 115 MG/DL (ref 70–110)
HCT VFR BLD AUTO: 43.4 % (ref 37–48.5)
HGB BLD-MCNC: 14.7 G/DL (ref 12–16)
IMM GRANULOCYTES # BLD AUTO: 0.04 K/UL (ref 0–0.04)
IMM GRANULOCYTES NFR BLD AUTO: 0.3 % (ref 0–0.5)
LYMPHOCYTES # BLD AUTO: 0.7 K/UL (ref 1–4.8)
LYMPHOCYTES NFR BLD: 4.9 % (ref 18–48)
MAGNESIUM SERPL-MCNC: 2 MG/DL (ref 1.6–2.6)
MCH RBC QN AUTO: 33.2 PG (ref 27–31)
MCHC RBC AUTO-ENTMCNC: 33.9 G/DL (ref 32–36)
MCV RBC AUTO: 98 FL (ref 82–98)
MONOCYTES # BLD AUTO: 1.1 K/UL (ref 0.3–1)
MONOCYTES NFR BLD: 8.2 % (ref 4–15)
NEUTROPHILS # BLD AUTO: 11.5 K/UL (ref 1.8–7.7)
NEUTROPHILS NFR BLD: 85.7 % (ref 38–73)
NRBC BLD-RTO: 0 /100 WBC
PHOSPHATE SERPL-MCNC: 2.7 MG/DL (ref 2.7–4.5)
PLATELET # BLD AUTO: 190 K/UL (ref 150–450)
PMV BLD AUTO: 9.8 FL (ref 9.2–12.9)
POTASSIUM SERPL-SCNC: 3.6 MMOL/L (ref 3.5–5.1)
PROT SERPL-MCNC: 6.2 G/DL (ref 6–8.4)
RBC # BLD AUTO: 4.43 M/UL (ref 4–5.4)
SODIUM SERPL-SCNC: 140 MMOL/L (ref 136–145)
WBC # BLD AUTO: 13.38 K/UL (ref 3.9–12.7)

## 2024-11-22 PROCEDURE — 25000003 PHARM REV CODE 250: Performed by: STUDENT IN AN ORGANIZED HEALTH CARE EDUCATION/TRAINING PROGRAM

## 2024-11-22 PROCEDURE — 36415 COLL VENOUS BLD VENIPUNCTURE: CPT

## 2024-11-22 PROCEDURE — 83735 ASSAY OF MAGNESIUM: CPT

## 2024-11-22 PROCEDURE — 84100 ASSAY OF PHOSPHORUS: CPT

## 2024-11-22 PROCEDURE — 63600175 PHARM REV CODE 636 W HCPCS: Performed by: STUDENT IN AN ORGANIZED HEALTH CARE EDUCATION/TRAINING PROGRAM

## 2024-11-22 PROCEDURE — 85025 COMPLETE CBC W/AUTO DIFF WBC: CPT

## 2024-11-22 PROCEDURE — 99233 SBSQ HOSP IP/OBS HIGH 50: CPT | Mod: ,,, | Performed by: NURSE PRACTITIONER

## 2024-11-22 PROCEDURE — 25000003 PHARM REV CODE 250

## 2024-11-22 PROCEDURE — 80053 COMPREHEN METABOLIC PANEL: CPT

## 2024-11-22 RX ORDER — PANTOPRAZOLE SODIUM 40 MG/1
40 TABLET, DELAYED RELEASE ORAL DAILY
Qty: 90 TABLET | Refills: 3 | Status: SHIPPED | OUTPATIENT
Start: 2024-11-23 | End: 2025-11-23

## 2024-11-22 RX ADMIN — BUSPIRONE HYDROCHLORIDE 15 MG: 10 TABLET ORAL at 02:11

## 2024-11-22 RX ADMIN — CITALOPRAM HYDROBROMIDE 40 MG: 20 TABLET ORAL at 08:11

## 2024-11-22 RX ADMIN — BUSPIRONE HYDROCHLORIDE 15 MG: 10 TABLET ORAL at 08:11

## 2024-11-22 RX ADMIN — PIPERACILLIN SODIUM AND TAZOBACTAM SODIUM 4.5 G: 4; .5 INJECTION, POWDER, FOR SOLUTION INTRAVENOUS at 09:11

## 2024-11-22 RX ADMIN — LOSARTAN POTASSIUM 50 MG: 25 TABLET, FILM COATED ORAL at 08:11

## 2024-11-22 RX ADMIN — PANTOPRAZOLE SODIUM 40 MG: 40 TABLET, DELAYED RELEASE ORAL at 08:11

## 2024-11-22 RX ADMIN — PIPERACILLIN SODIUM AND TAZOBACTAM SODIUM 4.5 G: 4; .5 INJECTION, POWDER, FOR SOLUTION INTRAVENOUS at 02:11

## 2024-11-22 RX ADMIN — POLYETHYLENE GLYCOL 3350 17 G: 17 POWDER, FOR SOLUTION ORAL at 08:11

## 2024-11-22 NOTE — PLAN OF CARE
Problem: Adult Inpatient Plan of Care  Goal: Plan of Care Review  Outcome: Adequate for Care Transition  Goal: Patient-Specific Goal (Individualized)  Outcome: Adequate for Care Transition  Goal: Absence of Hospital-Acquired Illness or Injury  Outcome: Adequate for Care Transition  Goal: Optimal Comfort and Wellbeing  Outcome: Adequate for Care Transition  Goal: Readiness for Transition of Care  Outcome: Adequate for Care Transition     Problem: Wound  Goal: Optimal Coping  Outcome: Adequate for Care Transition  Goal: Optimal Functional Ability  Outcome: Adequate for Care Transition  Goal: Absence of Infection Signs and Symptoms  Outcome: Adequate for Care Transition  Goal: Improved Oral Intake  Outcome: Adequate for Care Transition  Goal: Optimal Pain Control and Function  Outcome: Adequate for Care Transition  Goal: Skin Health and Integrity  Outcome: Adequate for Care Transition  Goal: Optimal Wound Healing  Outcome: Adequate for Care Transition     Problem: Pain Acute  Goal: Optimal Pain Control and Function  Outcome: Adequate for Care Transition     Problem: Infection  Goal: Absence of Infection Signs and Symptoms  Outcome: Adequate for Care Transition

## 2024-11-22 NOTE — PLAN OF CARE
Problem: Adult Inpatient Plan of Care  Goal: Absence of Hospital-Acquired Illness or Injury  Outcome: Progressing  Intervention: Prevent Infection  Flowsheets (Taken 11/22/2024 0224)  Infection Prevention:   hand hygiene promoted   rest/sleep promoted   single patient room provided  Goal: Optimal Comfort and Wellbeing  Outcome: Progressing  Intervention: Monitor Pain and Promote Comfort  Flowsheets (Taken 11/22/2024 0224)  Pain Management Interventions:   around-the-clock dosing utilized   medication offered  Intervention: Provide Person-Centered Care  Flowsheets (Taken 11/22/2024 0224)  Trust Relationship/Rapport:   care explained   empathic listening provided   thoughts/feelings acknowledged     Problem: Pain Acute  Goal: Optimal Pain Control and Function  Outcome: Progressing  Intervention: Develop Pain Management Plan  Flowsheets (Taken 11/22/2024 0224)  Pain Management Interventions:   around-the-clock dosing utilized   medication offered  Intervention: Prevent or Manage Pain  Flowsheets (Taken 11/22/2024 0224)  Sleep/Rest Enhancement:   awakenings minimized   regular sleep/rest pattern promoted   relaxation techniques promoted   room darkened  Medication Review/Management: medications reviewed  Intervention: Optimize Psychosocial Wellbeing  Flowsheets (Taken 11/22/2024 0224)  Supportive Measures:   active listening utilized   positive reinforcement provided   verbalization of feelings encouraged     Problem: Infection  Goal: Absence of Infection Signs and Symptoms  Outcome: Progressing  Intervention: Prevent or Manage Infection  Flowsheets (Taken 11/22/2024 0224)  Isolation Precautions: protective     Pt AAO x 4, free from falls/injury, and able to make needs known during shift. VSS on room air. IV fluids/ABX cont per dr's orders. Telemetry monitoring continued on box 8615. No acute distress noted. Bed locked and in lowest position. Bedside table and call light in reach. Care ongoing.

## 2024-11-22 NOTE — TELEPHONE ENCOUNTER
Patient is currently admitted into hospital. Will contact to schedule outpatient endoscopy procedure once discharged.

## 2024-11-22 NOTE — NURSING
Report received and care assumed. Discussed plan of care and safety with patient . Reviewed call system. No acute distress noted [facility]  Nurses Note -- 4 Eyes      11/22/2024       Skin assessed on: Q Shift      [] No Pressure Injuries Present    []Prevention Measures Documented    [] Yes LDA  for Pressure Injury Previously documented     [] Yes New Pressure Injury Discovered   [] LDA for New Pressure Injury Added      Attending RN:  Batsheva Rendon RN     Second RN:  Stephanie Barthelemy RN

## 2024-11-22 NOTE — NURSING
Tolerated meals well. No nausea of vomiting. Denies pain but of abdomen soreness. No questions about discharge instructions. Left before transport was requested

## 2024-11-22 NOTE — PROGRESS NOTES
Surgery Progress Note    Patricia Ramirez is a 69 y.o. year old female who is 6 weeks s/p robotic subtotal cholecystectomy, now in hospital for episodic epigastric pain. Initial workup concerning for gastric outlet obstruction.    AF  Mild hypertension  Underwent EGD today  Abdominal discomfort has not recurred  Tolerating full liquid diet, denies nausea/vomiting  +flatus, -BM  No f/c/sob/cp    ROS:  Negative except above    PE:  Vitals:    11/22/24 0302 11/22/24 0445 11/22/24 0721 11/22/24 0726   BP:  (!) 150/85 (!) 160/71    BP Location:   Right arm    Patient Position:  Lying Sitting    Pulse: 74 83 88 87   Resp:  18 18    Temp:  98.3 °F (36.8 °C) 98.7 °F (37.1 °C)    TempSrc:  Oral Oral    SpO2:  95% (!) 93%    Weight:       Height:           NAD  AOx4  Normal WOB on RA  Regular HR  Abd soft, nt, nd  Incisions healing well    Labs  CBC  13.38 14.7 190    43.4     Coag                 BMP  140 104 10 115   3.6 28 0.6     CaMgPhos  8.7   2.0   2.7      Last labs from current encounter as of 11/22/2024-5:49 PM    Imaging  None new    Diagnostic Studies  EGD - normal esophagus, excessive fluid in gastric fundus, diffuse gastritis throughout antrum, non-bleeding diverticulum in D2, possible hiatal hernia    A/P:  Patricia Ramirez is a 69 y.o. year old female who is 6 weeks s/p robotic subtotal cholecystectomy, now with 1 week of episodic upper abdominal discomfort. Noted to have severely distended stomach on CT, concerning for gastric outlet obstruction. Normal post-operative changes of gallbladder fossa and biliary tree. EGD showing gastritis of antrum but no GOO.     - No surgical intervention  - advance as tolerated, advanced to regular this morning  - NG tube if nauseous/vomiting  - Plan for outpatient ERCP at [facility] to remove stents  - Remainder of care per primary team    Chiki Cummings MD   General Surgery  [facility]

## 2024-11-22 NOTE — PROGRESS NOTES
[facility] Progress Note    Patient Complaint: abdominal pain    PCP:   Fabienne Erwin       LOS: 2        Initial History of Present Illness (HPI):  This is a 69 y.o. female consulted to GI service for gastric outlet obstruction, due for ERCP at [facility] [place]. 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 Patient complaint of acute onset of severe abdominal pain with associated symptoms of nausea that began on Monday. Denies fever, chills, vomiting, hematemesis, BRBPR, melena, diarrhea or constipation. Previously seen by this GI team in oct for choledocholithiasis with stent placement with subtotal diane a few days later. She was to f/u in 6 weeks post ercp for stent eval but has become inpatient prior to appt.     Wbc 17.9, bili normal, lfts 110/196  Imaging suspicious for partial bowel obstruction    Interval hx  EGD without any acute findings, patient tolerating full liquid diet, ok to adv to reg    Medical History:  has a past medical history of Allergy, Anxiety, Arthritis, Cataract, Depressive disorder, not elsewhere classified, Esophageal reflux, Hypertension, Impaired fasting glucose, Joint pain, Obesity, unspecified, and Other and unspecified hyperlipidemia.    Surgical History:  has a past surgical history that includes right  arm  surgery; R knee replacement;  section (); Hysterectomy; Blepharoplasty (Bilateral, 3/3/2021); Repair of retinal detachment with vitrectomy (Left, 2022); Phacoemulsification of cataract (Left, 10/27/2022); Intraocular prosthesis insertion (Left, 10/27/2022); Colonoscopy (N/A, 2023); ERCP (N/A, 10/7/2024); Robot-assisted cholecystectomy using da Omar Xi (N/A, 10/9/2024); and Esophagogastroduodenoscopy (N/A, 2024).      Objective  Findings:    Vital Signs:  Temp:  [98.3 °F (36.8 °C)-99.1 °F (37.3 °C)]   Pulse:  [69-94]   Resp:  [16-20]   BP: (148-170)/(67-85)   SpO2:  [93 %-100 %]   Body mass index is 24.52 kg/m².      Physical Exam  Vitals and nursing note reviewed.   Constitutional:       Appearance: Normal appearance.   HENT:      Head: Normocephalic.   Pulmonary:      Effort: Pulmonary effort is normal.   Abdominal:      General: Bowel sounds are normal.      Palpations: Abdomen is soft.   Skin:     General: Skin is warm and dry.   Neurological:      Mental Status: She is alert and oriented to person, place, and time.   Psychiatric:         Mood and Affect: Mood normal.         Behavior: Behavior normal.         Thought Content: Thought content normal.         Judgment: Judgment normal.               Labs:  Lab Results   Component Value Date    WBC 13.38 (H) 2024    HGB 14.7 2024    HCT 43.4 2024     2024    CHOL 120 2024    TRIG 90 2024    HDL 44 2024     (H) 2024    AST 60 (H) 2024     2024    K 3.6 2024     2024    CREATININE 0.6 2024    BUN 10 2024    CO2 28 2024    TSH 1.738 2024    INR 1.0 2022    HGBA1C 5.2 10/06/2024             Imagin24 CT abdomen pelvis- Dilatation of the distal esophagus and proximal stomach with thickening of the wall of the distal stomach.  Findings could be indicative of gastric outlet obstruction and either postoperative reactive changes of the distal stomach or gastritis.     Status post cholecystectomy with sphincterotomy and common bile duct stent placement.  Small amounts of air are seen within the bile ducts and there is a small amount of fluid within the tima hepatis without drainable fluid collection.      Endoscopy: 24 EGD- - Normal esophagus.                          - Excessive gastric fluid.                          - Possible large hiatal hernia, see  above.                          - Erythematous mucosa in the antrum.                          - Non-bleeding duodenal diverticulum.                          - No evidence of obstruction. Scope advanced into                          the third portion of the duodenum.                          - No specimens collected.   10/7/24 ERCP-  The major papilla was on the rim of a                          diverticulum.                          - The common bile duct was dilated.                          - Choledocholithiasis was found. Removal was not                          attempted; a stent was inserted.                          - A biliary sphincterotomy was performed.                          - Lithotripsy was unsuccessful. Unable to get a                          good angle to passed the basket after multiple                          attempts.                          - One biliary stent was placed into the common                          bile duct (10 Fr by 7 cm).                          - One pancreatic stent ( 5 Fr by 4 cm single                          pigtail) was placed into the ventral pancreatic                          duct to decreased the risk of post ERCP                          pancreatitis.                Abdominal pain. Abnormal CT abdomen. Leukocytosis.  Plan/ Recommendations:  1.  CT with suspicion for partial gastric outlet obstruction. S/p EGD without acute findings, tolerating full liquids, ok for reg diet. Monitor toleration. If tolerating reg diet, ok to d/c from GI standpoint.  2. CT comments on cbd stent in place, with air noted, likely due to previous instrumentation. Plan for ERCP outpatient, [facility] appt desk will call with appt.     Thank you so much for allowing us to participate in the care of Patricia Ramirez . Please contact us if you have any additional questions.    Alicia Menjivar NP  Gastroenterology  [facility] - Med Surg

## 2024-11-22 NOTE — NURSING
[facility] [place]  Nurses Note -- 4 Eyes      11/21/2024       Skin assessed on: Q Shift      [x] No Pressure Injuries Present    [x]Prevention Measures Documented    [] Yes LDA  for Pressure Injury Previously documented     [] Yes New Pressure Injury Discovered   [] LDA for New Pressure Injury Added      Attending RN:  Stephanie Barthelemy, RN     Second RN:  Sujatha Marin RN

## 2024-11-22 NOTE — PLAN OF CARE
Case Management Final Discharge Note    Discharge Disposition: Home     New DME ordered / company name: None     Relevant SDOH / Transition of Care Barriers:  None     Primary Caretaker and contact information: Patient is independent. [phone]    Scheduled followup appointment: PCP- 12/02/24    [facility] - Office will contact patient to schedule OP ERCP.     Transportation: Patient's family will provide transportation home.     Patient and family educated on discharge services and updated on DC plan. Bedside RN Batsheva Rendon notified, patient clear to discharge from Case Management Perspective.       11/22/24 1615   Final Note   Assessment Type Final Discharge Note   Anticipated Discharge Disposition Home   What phone number can be called within the next 1-3 days to see how you are doing after discharge? [phone]   Hospital Resources/Appts/Education Provided Appointments scheduled and added to AVS   Post-Acute Status   Coverage PHN   Discharge Delays None known at this time

## 2024-11-23 NOTE — DISCHARGE SUMMARY
"[facility] Medicine  Discharge Summary      Patient Name: Patricia Ramirez  MRN: [id number]  STEVE: [id number]  Patient Class: IP- Inpatient  Admission Date: 11/20/2024  Hospital Length of Stay: 2 days  Discharge Date and Time: 11/22/2024  5:52 PM  Attending Physician: No att. providers found   Discharging Provider: Moise Collins MD  Primary Care Provider: Fabienne Erwin NP    Primary Care Team:  HOSP MED 1    HPI:   Patricia Ramirez is a 69 y.o. female with hx choledocholithiasis who is s/p subtotal cholecystectomy on 10/9 with Dr. Kris alex subsequent CBD and pancreatic stents, HFpEF, HTN, HLD, and current smoker who presented to [facility] ED on 11/20/2024 for eval and treatment of abdominal pain.  They describe their pain as "like a baby kicking", 8/10, located in the epigastric area with radiation down to her mid-abdomen.  It started over the past week and has been worsening since.  There is associated nausea and dyspnea.  They deny associated fever, chills, diarrhea.  Symptoms are alleviated by none of the OTC NSAIDs she's tried.  Symptoms are worsened by movement.  She was previously scheduled for ERCP on 11/21 at [facility] to remove her CBD and pancreatic stents.    ED course notable for the following: Very anxious on arrival, hypertensive, dyspneic, an diaphoretic.  Exam with abdominal tenderness and fullness on palpation, worst in epigastric area.  Labs Lipase and WBC WNL.  AST ALT elevated.  Imaging: CT AP severely distended stomach with enhancement of antrum, normal small bowel with areas of wall enhancement, colon dilated with air and stool present, small area of fluid in gallbladder fossa, air in biliary tree, CBD and pancreatic duct stents in place.  ED therapy/stabilization measures: morphine, Zofran.  GSGY consulted, who recommend pt be transferred to [facility] for her previously-planned ERCP since that could also be diagnostic for gastric outlet obstruction as suspected by CT.  They " were admitted to [facility] Medicine for further evaluation and treatment.        Procedure(s) (LRB):  EGD (ESOPHAGOGASTRODUODENOSCOPY) (N/A)      Hospital Course:   Ms. Ramirez is a 69-year-old female who was admitted with abdominal pain with imaging concerning for gastric outlet obstruction.  GI and general surgery consulted.  She underwent EGD that did not show any evidence of gastric outlet obstruction but did show an hiatal hernia which could be contributing to her symptoms.  She was started on antibiotics for elevated white blood cell count however cultures remained negative.  Patient started on clear liquid diet and advanced with tolerating with no further symptoms.  GI discussed with biliary clinic, plan for outpatient ERCP and stent removal.  Patient cleared from General surgery and GI perspective.  Tolerating diet and ambulating.  Stable for discharge with outpatient follow-up.     Goals of Care Treatment Preferences:  Code Status: Full Code      SDOH Screening:  The patient was screened for utility difficulties, food insecurity, transport difficulties, housing insecurity, and interpersonal safety and there were no concerns identified this admission.     Consults:   Consults (From admission, onward)          Status Ordering Provider     Inpatient consult to Gastroenterology  Once        Provider:  Alicia Menjivar NP    Completed ALEXANDRA CALDWELL     Inpatient consult to General Surgery  Once        Provider:  Hai Cutler MD    Completed UYEN ABRAHAM            No new Assessment & Plan notes have been filed under this hospital service since the last note was generated.  Service: Hospital Medicine    Final Active Diagnoses:    Diagnosis Date Noted POA    PRINCIPAL PROBLEM:  Epigastric pain [R10.13] 11/20/2024 Yes    History of laparoscopic cholecystectomy [Z90.49] 11/20/2024 Not Applicable     Chronic    Heart failure with preserved ejection fraction [I50.30] 09/26/2022 Yes     Essential hypertension [I10] 12/29/2014 Yes    Hyperlipidemia [E78.5] 12/29/2014 Yes    Depression with anxiety [F41.8] 12/29/2014 Yes      Problems Resolved During this Admission:       Discharged Condition: stable    Disposition: Home or Self Care    Follow Up:   Follow-up Information       [facility] 4th [address] Follow up today.    Specialty: Gastroenterology  Why: Office will contact patient to scheduled OP ERCP.  Contact information:  [address], 4th Floor  [facility] [address] [phone]  Additional information:  GI Center - Main Building, 4th Floor   Please park in [facility] and use [facility] elevator                         Patient Instructions:      Diet Cardiac     Notify your health care provider if you experience any of the following:  temperature >100.4     Notify your health care provider if you experience any of the following:  persistent nausea and vomiting or diarrhea     Notify your health care provider if you experience any of the following:  severe uncontrolled pain     Notify your health care provider if you experience any of the following:  difficulty breathing or increased cough     Notify your health care provider if you experience any of the following:  severe persistent headache     Notify your health care provider if you experience any of the following:  worsening rash     Notify your health care provider if you experience any of the following:  persistent dizziness, light-headedness, or visual disturbances     Notify your health care provider if you experience any of the following:  increased confusion or weakness     Reason for not Ordering Smoking Cessation Referral     Order Specific Question Answer Comments   Reason for not ordering: Patient refused      Reason for not Prescribing Nicotine Replacement     Order Specific Question Answer Comments   Reason for not Prescribing: Patient refused      Activity as tolerated       Significant Diagnostic  Studies: Labs: All labs within the past 24 hours have been reviewed    Pending Diagnostic Studies:       None           Medications:  Reconciled Home Medications:      Medication List        START taking these medications      pantoprazole 40 MG tablet  Commonly known as: PROTONIX  Take 1 tablet (40 mg total) by mouth once daily.            CONTINUE taking these medications      acetaminophen 500 MG tablet  Commonly known as: TYLENOL  Take 500 mg by mouth every 6 (six) hours as needed for Pain.     atorvastatin 40 MG tablet  Commonly known as: LIPITOR  Take 1 tablet (40 mg total) by mouth once daily.     BIOTIN ORAL  Take by mouth.     busPIRone 15 MG tablet  Commonly known as: BUSPAR  TAKE 1 TABLET BY MOUTH THREE TIMES DAILY     citalopram 40 MG tablet  Commonly known as: CeleXA  Take 1 tablet by mouth once daily     cyclobenzaprine 5 MG tablet  Commonly known as: FLEXERIL  TAKE 1 TABLET BY MOUTH TWICE DAILY AS NEEDED FOR MUSCLE SPASM     ergocalciferol 50,000 unit Cap  Commonly known as: ERGOCALCIFEROL  Take 1 capsule (50,000 Units total) by mouth every 7 days.     mirtazapine 15 MG tablet  Commonly known as: REMERON  Take 1 tablet (15 mg total) by mouth every evening.     olmesartan 20 MG tablet  Commonly known as: BENICAR  TAKE 1 TABLET BY MOUTH ONCE DAILY IN THE EVENING     traZODone 50 MG tablet  Commonly known as: DESYREL  Take 50 mg by mouth every morning.            STOP taking these medications      ALPRAZolam 0.5 MG tablet  Commonly known as: XANAX              Indwelling Lines/Drains at time of discharge:   Lines/Drains/Airways       None                   Time spent on the discharge of patient: >35 minutes         Moise Collins MD  Department of Hospital Medicine  [facility]

## 2024-11-23 NOTE — HOSPITAL COURSE
Ms. Ramirez is a 69-year-old female who was admitted with abdominal pain with imaging concerning for gastric outlet obstruction.  GI and general surgery consulted.  She underwent EGD that did not show any evidence of gastric outlet obstruction but did show an hiatal hernia which could be contributing to her symptoms.  She was started on antibiotics for elevated white blood cell count however cultures remained negative.  Patient started on clear liquid diet and advanced with tolerating with no further symptoms.  GI discussed with biliary clinic, plan for outpatient ERCP and stent removal.  Patient cleared from General surgery and GI perspective.  Tolerating diet and ambulating.  Stable for discharge with outpatient follow-up.

## 2024-11-30 NOTE — PROGRESS NOTES
Subjective:       Patient ID: Patricia Ramirez is a 67 y.o. female.    Chief Complaint: No chief complaint on file.    HPI    DLS:10/28/2022 Latoyaette   Here for 1 week post op OS 10/27/2022  Vision has detortion OS clear/blurry.     Eye drops:Pred forte TID OS                   Ketorolac TID OS  Last edited by Christiana Joiner MA on 11/3/2022  3:22 PM.             Assessment:       1. Post-operative state    2. Retinal detachment of left eye with multiple breaks          Plan:       S/p CE OS- Doing well.     RRD OS macula involving s/p repair 2/22-Stable per Dr Martinez.        Taper gtts OS.  RTC Dr Barros in 3 wks.   calm

## 2024-12-02 ENCOUNTER — TELEPHONE (OUTPATIENT)
Dept: ENDOSCOPY | Facility: HOSPITAL | Age: 69
End: 2024-12-02
Payer: MEDICARE

## 2024-12-02 NOTE — TELEPHONE ENCOUNTER
Called pt to schedule ERCP procedure with no answer. Voicemail left with direct phone number for return call to schedule.

## 2024-12-03 ENCOUNTER — TELEPHONE (OUTPATIENT)
Dept: ENDOSCOPY | Facility: HOSPITAL | Age: 69
End: 2024-12-03
Payer: MEDICARE

## 2024-12-03 NOTE — TELEPHONE ENCOUNTER
Spoke to pt to schedule procedure(s) ERCP        Physician to perform procedure(s) Dr. SID Roger  Date of Procedure (s) 12/9/24  Arrival Time 12:15 PM  Time of Procedure(s) 1:15 PM   Location of Procedure(s) [facility] 2nd Floor  Type of Rx Prep sent to patient: N/A  Instructions provided to patient via Email    Patient was informed on the following information and verbalized understanding. Screening questionnaire reviewed with patient and complete. If procedure requires anesthesia, a responsible adult needs to be present to accompany the patient home, patient cannot drive after receiving anesthesia. Appointment details are tentative, especially check-in time. Patient will receive a prep-op call 7 days prior to confirm check-in time for procedure. If applicable the patient should contact their pharmacy to verify Rx for procedure prep is ready for pick-up. Patient was advised to call the scheduling department at [phone] if pharmacy states no Rx is available. Patient was advised to call the endoscopy scheduling department if any questions or concerns arise.       Endoscopy Scheduling Department      ERCP Prep Instructions    Date of procedure: 12/9/24 Arrive at: 12:15 PM    Location of Department:   [facility] - [address] [place] [address]   Take the Elevators to 2nd Floor Endoscopy Procedural Area    How to prep:      Day Before Procedure 12/8/24     You may have a light evening meal.   No solid food after 7:00 pm.   Continue drinking clear liquids.      Day of the Procedure 12/9/24     You may have water/clear liquids until 2 hours before your procedure or as directed by the scheduling nurse 11:15 AM.   See below for list.    What You CANNOT do:   Do not drink milk or anything colored red.  Do not drink alcohol.  No gum chewing or candy morning of procedure    Liquids That Are OK to Drink:   Water  Sports drinks (Gatorade, Power-Aid)  Coffee or tea (no cream or nondairy creamer)  Clear  juices without pulp (apple, white grape)  Gelatin desserts (no fruit or toppings)  Clear soda (sprite, coke, ginger ale)  Chicken broth (until 12 midnight the night before procedure)             IMPORTANT INFORMATION TO KNOW BEFORE YOUR PROCEDURE    [facility] 2nd Floor         If your procedure requires the administration of anesthesia, it is necessary for a responsible adult to drive you home. (Medical Transportation, Uber, Lyft, Taxi, etc. may ONLY be used if a responsible adult is present to accompany you home.  The responsible adult CAN'T be the  of the service).      person must be available to return to pick you up within 15 minutes of being notified of discharge.       Please bring a picture ID, insurance card, & copayment      Take Medications as directed below:    If you begin taking any blood thinning medications, injectable weight loss/diabetes medications (other than insulin) , or Adipex (Phentermine) please contact the endoscopy scheduling department listed below as soon as possible.    If you are diabetic see the attached instruction sheet regarding your medication.     If you take HEART, BLOOD PRESSURE, SEIZURE, PAIN, LUNG (including inhalers/nebulizers), ANTI-REJECTION (transplant patients), or PSYCHIATRIC medications, please take at your regular times with a sip of water or as directed by the scheduling nurse.     Important contact information:    Endoscopy [phone] Hours of operation Monday-Friday 8:00-4:30pm.    Questions about insurance or financial obligations call [phone] or [phone].    If you have questions regarding the prep or need to reschedule, please call [phone]. After hours questions requiring immediate assistance, contact [facility] nurse line at [phone] or [phone].   NOTE:     On occasion, unforeseen circumstances may cause a delay in your procedure start time. We respect your time and appreciate  your patience during these circumstances.      Comments: n/a

## 2024-12-06 ENCOUNTER — TELEPHONE (OUTPATIENT)
Dept: ENDOSCOPY | Facility: HOSPITAL | Age: 69
End: 2024-12-06
Payer: MEDICARE

## 2024-12-06 NOTE — TELEPHONE ENCOUNTER
Spoke with patient about arrival time @. 1100  Covid test =     Prep instructions reviewed: the day before the procedure, follow a clear liquid diet all day, then start the first 1/2 of prep at 5pm and take 2nd 1/2 of prep @. 2-3 Pt must be completely NPO when prep completed @.              Medications: Do not take Insulin or oral diabetic medications the day of the procedure.  Take as prescribed: heart, seizure and blood pressure medication in the morning with a sip of water (less than an ounce).  Take any breathing medications and bring inhalers to hospital with you Leave all valuables and jewelry at home.     Wear comfortable clothes to procedure to change into hospital gown You cannot drive for 24 hours after your procedure because you will receive sedation for your procedure to make you comfortable.  A ride must be provided at discharge.

## 2024-12-09 ENCOUNTER — ANESTHESIA (OUTPATIENT)
Dept: ENDOSCOPY | Facility: HOSPITAL | Age: 69
End: 2024-12-09
Payer: MEDICARE

## 2024-12-09 ENCOUNTER — HOSPITAL ENCOUNTER (OUTPATIENT)
Facility: HOSPITAL | Age: 69
Discharge: HOME | End: 2024-12-09
Attending: INTERNAL MEDICINE | Admitting: INTERNAL MEDICINE
Payer: MEDICARE

## 2024-12-09 ENCOUNTER — ANESTHESIA EVENT (OUTPATIENT)
Dept: ENDOSCOPY | Facility: HOSPITAL | Age: 69
End: 2024-12-09
Payer: MEDICARE

## 2024-12-09 VITALS
BODY MASS INDEX: 22.34 KG/M2 | HEART RATE: 71 BPM | HEIGHT: 66 IN | OXYGEN SATURATION: 99 % | TEMPERATURE: 98 F | SYSTOLIC BLOOD PRESSURE: 155 MMHG | DIASTOLIC BLOOD PRESSURE: 69 MMHG | WEIGHT: 139 LBS | RESPIRATION RATE: 14 BRPM

## 2024-12-09 DIAGNOSIS — Z46.89 ENCOUNTER FOR REMOVAL OF BILIARY STENT: ICD-10-CM

## 2024-12-09 PROCEDURE — 43265 ERCP LITHOTRIPSY CALCULI: CPT | Performed by: INTERNAL MEDICINE

## 2024-12-09 PROCEDURE — 27202125 HC BALLOON, EXTRACTION (ANY): Performed by: INTERNAL MEDICINE

## 2024-12-09 PROCEDURE — C1769 GUIDE WIRE: HCPCS | Performed by: INTERNAL MEDICINE

## 2024-12-09 PROCEDURE — 27201089 HC SNARE, DISP (ANY): Performed by: INTERNAL MEDICINE

## 2024-12-09 PROCEDURE — 25000003 PHARM REV CODE 250

## 2024-12-09 PROCEDURE — 37000008 HC ANESTHESIA 1ST 15 MINUTES: Performed by: INTERNAL MEDICINE

## 2024-12-09 PROCEDURE — 37000009 HC ANESTHESIA EA ADD 15 MINS: Performed by: INTERNAL MEDICINE

## 2024-12-09 PROCEDURE — 43273 ENDOSCOPIC PANCREATOSCOPY: CPT | Mod: 51,,, | Performed by: INTERNAL MEDICINE

## 2024-12-09 PROCEDURE — 27200992 HC LITHOTRIPTOR, PROBE: Performed by: INTERNAL MEDICINE

## 2024-12-09 PROCEDURE — 43273 ENDOSCOPIC PANCREATOSCOPY: CPT | Performed by: INTERNAL MEDICINE

## 2024-12-09 PROCEDURE — 25500020 PHARM REV CODE 255: Performed by: INTERNAL MEDICINE

## 2024-12-09 PROCEDURE — 43265 ERCP LITHOTRIPSY CALCULI: CPT | Mod: 51,,, | Performed by: INTERNAL MEDICINE

## 2024-12-09 PROCEDURE — 43276 ERCP STENT EXCHANGE W/DILATE: CPT | Mod: ,,, | Performed by: INTERNAL MEDICINE

## 2024-12-09 PROCEDURE — 27201702 HC BASKET, RETRIEVAL/LITHOTRIPTOR: Performed by: INTERNAL MEDICINE

## 2024-12-09 PROCEDURE — 63600175 PHARM REV CODE 636 W HCPCS

## 2024-12-09 PROCEDURE — C2617 STENT, NON-COR, TEM W/O DEL: HCPCS | Performed by: INTERNAL MEDICINE

## 2024-12-09 PROCEDURE — 74328 X-RAY BILE DUCT ENDOSCOPY: CPT | Mod: TC | Performed by: INTERNAL MEDICINE

## 2024-12-09 PROCEDURE — 27201674 HC SPHINCTERTOME: Performed by: INTERNAL MEDICINE

## 2024-12-09 PROCEDURE — 74328 X-RAY BILE DUCT ENDOSCOPY: CPT | Mod: 26,,, | Performed by: INTERNAL MEDICINE

## 2024-12-09 PROCEDURE — 43276 ERCP STENT EXCHANGE W/DILATE: CPT | Performed by: INTERNAL MEDICINE

## 2024-12-09 PROCEDURE — 27202127 HC STENT INTRODUCER: Performed by: INTERNAL MEDICINE

## 2024-12-09 DEVICE — BILIARY STENT
Type: IMPLANTABLE DEVICE | Site: BILE DUCT | Status: FUNCTIONAL
Brand: ADVANIX™ BILIARY

## 2024-12-09 RX ORDER — SODIUM CHLORIDE 9 MG/ML
INJECTION, SOLUTION INTRAVENOUS CONTINUOUS
Status: DISCONTINUED | OUTPATIENT
Start: 2024-12-09 | End: 2024-12-09 | Stop reason: HOSPADM

## 2024-12-09 RX ORDER — SUCCINYLCHOLINE CHLORIDE 20 MG/ML
INJECTION INTRAMUSCULAR; INTRAVENOUS
Status: DISCONTINUED | OUTPATIENT
Start: 2024-12-09 | End: 2024-12-09

## 2024-12-09 RX ORDER — DEXMEDETOMIDINE HYDROCHLORIDE 100 UG/ML
INJECTION, SOLUTION INTRAVENOUS
Status: DISCONTINUED | OUTPATIENT
Start: 2024-12-09 | End: 2024-12-09

## 2024-12-09 RX ORDER — ONDANSETRON HYDROCHLORIDE 2 MG/ML
INJECTION, SOLUTION INTRAVENOUS
Status: DISCONTINUED | OUTPATIENT
Start: 2024-12-09 | End: 2024-12-09

## 2024-12-09 RX ORDER — LIDOCAINE HYDROCHLORIDE 20 MG/ML
INJECTION INTRAVENOUS
Status: DISCONTINUED | OUTPATIENT
Start: 2024-12-09 | End: 2024-12-09

## 2024-12-09 RX ORDER — FENTANYL CITRATE 50 UG/ML
INJECTION, SOLUTION INTRAMUSCULAR; INTRAVENOUS
Status: DISCONTINUED | OUTPATIENT
Start: 2024-12-09 | End: 2024-12-09

## 2024-12-09 RX ORDER — PROPOFOL 10 MG/ML
VIAL (ML) INTRAVENOUS
Status: DISCONTINUED | OUTPATIENT
Start: 2024-12-09 | End: 2024-12-09

## 2024-12-09 RX ORDER — DEXAMETHASONE SODIUM PHOSPHATE 4 MG/ML
INJECTION, SOLUTION INTRA-ARTICULAR; INTRALESIONAL; INTRAMUSCULAR; INTRAVENOUS; SOFT TISSUE
Status: DISCONTINUED | OUTPATIENT
Start: 2024-12-09 | End: 2024-12-09

## 2024-12-09 RX ORDER — SODIUM CHLORIDE 0.9 % (FLUSH) 0.9 %
3 SYRINGE (ML) INJECTION
Status: DISCONTINUED | OUTPATIENT
Start: 2024-12-09 | End: 2024-12-09 | Stop reason: HOSPADM

## 2024-12-09 RX ORDER — ROCURONIUM BROMIDE 10 MG/ML
INJECTION, SOLUTION INTRAVENOUS
Status: DISCONTINUED | OUTPATIENT
Start: 2024-12-09 | End: 2024-12-09

## 2024-12-09 RX ADMIN — LIDOCAINE HYDROCHLORIDE 100 MG: 20 INJECTION, SOLUTION INTRAVENOUS at 01:12

## 2024-12-09 RX ADMIN — SODIUM CHLORIDE: 0.9 INJECTION, SOLUTION INTRAVENOUS at 01:12

## 2024-12-09 RX ADMIN — SUCCINYLCHOLINE CHLORIDE 120 MG: 20 INJECTION, SOLUTION INTRAMUSCULAR; INTRAVENOUS at 01:12

## 2024-12-09 RX ADMIN — ONDANSETRON 4 MG: 2 INJECTION, SOLUTION INTRAMUSCULAR; INTRAVENOUS at 01:12

## 2024-12-09 RX ADMIN — DEXAMETHASONE SODIUM PHOSPHATE 4 MG: 4 INJECTION, SOLUTION INTRAMUSCULAR; INTRAVENOUS at 01:12

## 2024-12-09 RX ADMIN — FENTANYL CITRATE 100 MCG: 50 INJECTION INTRAMUSCULAR; INTRAVENOUS at 01:12

## 2024-12-09 RX ADMIN — SODIUM CHLORIDE: 0.9 INJECTION, SOLUTION INTRAVENOUS at 02:12

## 2024-12-09 RX ADMIN — ROCURONIUM BROMIDE 5 MG: 10 INJECTION, SOLUTION INTRAVENOUS at 01:12

## 2024-12-09 RX ADMIN — PROPOFOL 200 MG: 10 INJECTION, EMULSION INTRAVENOUS at 01:12

## 2024-12-09 RX ADMIN — DEXMEDETOMIDINE HYDROCHLORIDE 8 MCG: 100 INJECTION, SOLUTION, CONCENTRATE INTRAVENOUS at 02:12

## 2024-12-09 NOTE — ANESTHESIA PREPROCEDURE EVALUATION
2024  Patricia Ramirez is a 69 y.o., female.    Past Medical History:   Diagnosis Date    Allergy     Anxiety     Arthritis     Cataract     Depressive disorder, not elsewhere classified     Esophageal reflux     Hypertension     Impaired fasting glucose     Joint pain     Obesity, unspecified     Other and unspecified hyperlipidemia      Past Surgical History:   Procedure Laterality Date    BLEPHAROPLASTY Bilateral 3/3/2021    Procedure: BLEPHAROPLASTY;  Surgeon: Maite Acuna MD;  Location: [facility];  Service: Ophthalmology;  Laterality: Bilateral;     SECTION  1975    COLONOSCOPY N/A 2023    Procedure: COLONOSCOPY;  Surgeon: Briana Sterling MD;  Location: [facility];  Service: Endoscopy;  Laterality: N/A;    ERCP N/A 10/7/2024    Procedure: ERCP (ENDOSCOPIC RETROGRADE CHOLANGIOPANCREATOGRAPHY);  Surgeon: Catracho Mitchell MD;  Location: [facility] (2ND FLR);  Service: Endoscopy;  Laterality: N/A;    ESOPHAGOGASTRODUODENOSCOPY N/A 2024    Procedure: EGD (ESOPHAGOGASTRODUODENOSCOPY);  Surgeon: Angie Alatorre MD;  Location: [facility];  Service: Gastroenterology;  Laterality: N/A;    HYSTERECTOMY      without BSO    INTRAOCULAR PROSTHESES INSERTION Left 10/27/2022    Procedure: INSERTION, IOL PROSTHESIS;  Surgeon: Palmer Berrios MD;  Location: [facility];  Service: Ophthalmology;  Laterality: Left;    PHACOEMULSIFICATION OF CATARACT Left 10/27/2022    Procedure: PHACOEMULSIFICATION, CATARACT;  Surgeon: Palmer Berrios MD;  Location: [facility];  Service: Ophthalmology;  Laterality: Left;  RN PHONE PREOP 10/21/2022   ARRIVAL 9:00 AM    R knee replacement      REPAIR OF RETINAL DETACHMENT WITH VITRECTOMY Left 2022    Procedure: REPAIR, RETINAL DETACHMENT, WITH VITRECTOMY;  Surgeon: MINO Martinez MD;  Location: [facility] 1ST FLR;  Service: Ophthalmology;   Laterality: Left;  Spoke with SID Garcia. Pt was instructed nothing to eat after 8am and to arrive at 2pm for preop. 430pm case start request.    right  arm  surgery      ROBOT-ASSISTED CHOLECYSTECTOMY USING DA GENO XI N/A 10/9/2024    Procedure: XI ROBOTIC SUB TOTAL CHOLECYSTECTOMY; DRAIN PLACEMENT;  Surgeon: Rigoberto Ponce MD;  Location: [facility];  Service: General;  Laterality: N/A;     Pre-op Assessment       I have reviewed the Medications.     Review of Systems  Anesthesia Hx:             Denies Family Hx of Anesthesia complications.     Social:  Smoker, Alcohol Use, Recreational Drugs     Illicit Drug Use: Types of drugs include Marijuana  Cardiovascular:     Hypertension    Dysrhythmias                                      Pulmonary:      Shortness of breath                  Hepatic/GI:     GERD                Neurological:      Headaches                                 Psych:  Psychiatric History                  Physical Exam  General: Well nourished    Airway:  Mallampati: II   TM Distance: Normal  Neck ROM: Normal ROM    Dental:  Intact    Chest/Lungs:  Clear to auscultation    Heart:  Rate: Normal  Rhythm: Regular Rhythm        Anesthesia Plan  Type of Anesthesia, risks & benefits discussed:    Anesthesia Type: Gen Natural Airway  Intra-op Monitoring Plan: Standard ASA Monitors  Post Op Pain Control Plan: multimodal analgesia  Induction:  IV  Informed Consent: Informed consent signed with the Patient and all parties understand the risks and agree with anesthesia plan.  All questions answered.   ASA Score: 3    Ready For Surgery From Anesthesia Perspective.     .

## 2024-12-09 NOTE — TRANSFER OF CARE
"Anesthesia Transfer of Care Note    Patient: Patricia Ramirez    Procedure(s) Performed: Procedure(s) (LRB):  ERCP (ENDOSCOPIC RETROGRADE CHOLANGIOPANCREATOGRAPHY) (N/A)    Patient location: PACU    Anesthesia Type: general    Transport from OR: Transported from OR on 6-10 L/min O2 by face mask with adequate spontaneous ventilation    Post pain: adequate analgesia    Post assessment: no apparent anesthetic complications and tolerated procedure well    Post vital signs: stable    Level of consciousness: awake and alert    Nausea/Vomiting: no nausea/vomiting    Complications: none    Transfer of care protocol was followedComments: Pt reported no n/v or pain, vss. Report given to pacu rn.      Last vitals: Visit Vitals  BP (!) 150/67 (BP Location: Left arm, Patient Position: Lying)   Pulse (!) 54   Temp 36.6 °C (97.9 °F) (Temporal)   Resp 18   Ht 5' 6" (1.676 m)   Wt 63 kg (139 lb)   SpO2 99%   Breastfeeding No   BMI 22.44 kg/m²     "

## 2024-12-09 NOTE — H&P
Short Stay Endoscopy History and Physical    PCP - Fabienne Erwin NP  Referring Physician - Romeo Roger MD  [address]  [address]  [place] [address]    Procedure - ercp  ASA - per anesthesia  Mallampati - per anesthesia  History of Anesthesia problems - no  Family history Anesthesia problems -  no   Plan of anesthesia - General    HPI:  This is a 69 y.o. female here for evaluation of: stone removal    Reflux - no  Dysphagia - no  Abdominal pain - no  Diarrhea - no    ROS:  Constitutional: No fevers, chills, No weight loss  CV: No chest pain  Pulm: No cough, No shortness of breath  Ophtho: No vision changes  GI: see HPI  Derm: No rash    Medical History:  has a past medical history of Allergy, Anxiety, Arthritis, Cataract, Depressive disorder, not elsewhere classified, Esophageal reflux, Hypertension, Impaired fasting glucose, Joint pain, Obesity, unspecified, and Other and unspecified hyperlipidemia.    Surgical History:  has a past surgical history that includes right  arm  surgery; R knee replacement;  section (); Hysterectomy; Blepharoplasty (Bilateral, 3/3/2021); Repair of retinal detachment with vitrectomy (Left, 2022); Phacoemulsification of cataract (Left, 10/27/2022); Intraocular prosthesis insertion (Left, 10/27/2022); Colonoscopy (N/A, 2023); ERCP (N/A, 10/7/2024); Robot-assisted cholecystectomy using da Omar Xi (N/A, 10/9/2024); and Esophagogastroduodenoscopy (N/A, 2024).    Family History: family history includes Cancer in her mother; Cervical cancer in her sister; Liver disease in her father; Thyroid disease in her sister; Tremor in her sister..    Social History:  reports that she has been smoking cigarettes. She has a 30 pack-year smoking history. She has never used smokeless tobacco. She reports current alcohol use. She reports current drug use. Frequency: 14.00 times per week. Drug: Marijuana.    Review of patient's allergies indicates:   Allergen  Reactions    Sulfa (sulfonamide antibiotics) Hives    Ace inhibitors Other (See Comments)     Cough         Medications:   Medications Prior to Admission   Medication Sig Dispense Refill Last Dose/Taking    ALPRAZolam (XANAX) 0.25 MG tablet Take 1 tablet (0.25 mg total) by mouth 3 (three) times daily. 90 tablet 0 Past Month    atorvastatin (LIPITOR) 40 MG tablet Take 1 tablet (40 mg total) by mouth once daily. 90 tablet 3 12/8/2024    busPIRone (BUSPAR) 15 MG tablet TAKE 1 TABLET BY MOUTH THREE TIMES DAILY 90 tablet 5 12/8/2024    citalopram (CELEXA) 20 MG tablet Take 3 tablets (60 mg total) by mouth once daily. Take 1 tablet by mouth once daily 90 tablet 11 12/8/2024    cyclobenzaprine (FLEXERIL) 5 MG tablet TAKE 1 TABLET BY MOUTH TWICE DAILY AS NEEDED FOR MUSCLE SPASM 90 tablet 3 12/8/2024    ergocalciferol (ERGOCALCIFEROL) 50,000 unit Cap Take 1 capsule (50,000 Units total) by mouth every 7 days. 12 capsule 3 12/8/2024    olmesartan (BENICAR) 20 MG tablet TAKE 1 TABLET BY MOUTH ONCE DAILY IN THE EVENING 90 tablet 3 12/8/2024    pantoprazole (PROTONIX) 40 MG tablet Take 1 tablet (40 mg total) by mouth once daily. 90 tablet 3 12/8/2024    traZODone (DESYREL) 50 MG tablet Take 50 mg by mouth every morning.   12/8/2024    acetaminophen (TYLENOL) 500 MG tablet Take 500 mg by mouth every 6 (six) hours as needed for Pain.   More than a month       Physical Exam:    Vital Signs:   Vitals:    12/09/24 1206   BP: (!) 150/67   Pulse: (!) 54   Resp: 18   Temp: 97.9 °F (36.6 °C)       General Appearance: Well appearing in no acute distress    Labs:  Lab Results   Component Value Date    WBC 13.38 (H) 11/22/2024    HGB 14.7 11/22/2024    HCT 43.4 11/22/2024     11/22/2024    CHOL 120 09/17/2024    TRIG 90 09/17/2024    HDL 44 09/17/2024     (H) 11/22/2024    AST 60 (H) 11/22/2024     11/22/2024    K 3.6 11/22/2024     11/22/2024    CREATININE 0.6 11/22/2024    BUN 10 11/22/2024    CO2 28 11/22/2024     TSH 1.738 11/13/2024    INR 1.0 07/28/2022    HGBA1C 5.2 10/06/2024       I have explained the risks and benefits of this endoscopic procedure to the patient including but not limited to bleeding, inflammation, infection, perforation, and death.      Romeo Roger MD

## 2024-12-09 NOTE — ANESTHESIA PROCEDURE NOTES
Intubation    Date/Time: 12/9/2024 1:26 PM    Performed by: Zahra Sanford CRNA  Authorized by: Anika Crane MD    Intubation:     Induction:  Intravenous    Intubated:  Postinduction    Mask Ventilation:  Easy mask    Attempts:  1    Attempted By:  CRNA    Method of Intubation:  Video laryngoscopy    Blade:  Ramos 3    Laryngeal View Grade: Grade I - full view of cords      Difficult Airway Encountered?: No      Complications:  None    Airway Device:  Oral endotracheal tube    Airway Device Size:  7.0    Style/Cuff Inflation:  Cuffed    Tube secured:  23    Secured at:  The lips    Placement Verified By:  Capnometry    Complicating Factors:  None    Findings Post-Intubation:  BS equal bilateral

## 2024-12-09 NOTE — PROVATION PATIENT INSTRUCTIONS
Discharge Summary/Instructions after an Endoscopic Procedure  Patient Name: Patricia Ramirez  Patient MRN: [id number]  Patient YOB: 1955 Monday, December 9, 2024  Romeo Roger MD  Dear patient,  As a result of recent federal legislation (The Federal Cures Act), you may   receive lab or pathology results from your procedure in your [facility]   account before your physician is able to contact you. Your physician or   their representative will relay the results to you with their   recommendations at their soonest availability.  Thank you,  Your health is very important to us during the Covid Crisis. Following your   procedure today, you will receive a daily text for 2 weeks asking about   signs or symptoms of Covid 19.  Please respond to this text when you   receive it so we can follow up and keep you as safe as possible.   RESTRICTIONS:  During your procedure today, you received medications for sedation.  These   medications may affect your judgment, balance and coordination.  Therefore,   for 24 hours, you have the following restrictions:   - DO NOT drive a car, operate machinery, make legal/financial decisions,   sign important papers or drink alcohol.    ACTIVITY:  Today: no heavy lifting, straining or running due to procedural   sedation/anesthesia.  The following day: return to full activity including work.  DIET:  Eat and drink normally unless instructed otherwise.     TREATMENT FOR COMMON SIDE EFFECTS:  - Mild abdominal pain, nausea, belching, bloating or excessive gas:  rest,   eat lightly and use a heating pad.  - Sore Throat: treat with throat lozenges and/or gargle with warm salt   water.  - Because air was used during the procedure, expelling large amounts of air   from your rectum or belching is normal.  - If a bowel prep was taken, you may not have a bowel movement for 1-3 days.    This is normal.  SYMPTOMS TO WATCH FOR AND REPORT TO YOUR PHYSICIAN:  1. Abdominal pain or bloating, other than gas  cramps.  2. Chest pain.  3. Back pain.  4. Signs of infection such as: chills or fever occurring within 24 hours   after the procedure.  5. Rectal bleeding, which would show as bright red, maroon, or black stools.   (A tablespoon of blood from the rectum is not serious, especially if   hemorrhoids are present.)  6. Vomiting.  7. Weakness or dizziness.  GO DIRECTLY TO THE NEAREST EMERGENCY ROOM IF YOU HAVE ANY OF THE FOLLOWING:      Difficulty breathing              Chills and/or fever over 101 F   Persistent vomiting and/or vomiting blood   Severe abdominal pain   Severe chest pain   Black, tarry stools   Bleeding- more than one tablespoon   Any other symptom or condition that you feel may need urgent attention  Your doctor recommends these additional instructions:  If any biopsies were taken, your doctors clinic will contact you in 1 to 2   weeks with any results.  - Discharge patient to home.   - Resume previous diet.   - Continue present medications.   - Repeat ERCP in 6 weeks to remove stent.  For questions, problems or results please call your physician - Romeo Roger MD.  EMERGENCY PHONE NUMBER: [phone],  LAB RESULTS: [phone]  IF A COMPLICATION OR EMERGENCY SITUATION ARISES AND YOU ARE UNABLE TO REACH   YOUR PHYSICIAN - GO DIRECTLY TO THE EMERGENCY ROOM.  Romeo Roger MD  12/9/2024 2:51:49 PM  This report has been verified and signed electronically.  Dear patient,  As a result of recent federal legislation (The Federal Cures Act), you may   receive lab or pathology results from your procedure in your [facility]   account before your physician is able to contact you. Your physician or   their representative will relay the results to you with their   recommendations at their soonest availability.  Thank you,  PROVATION

## 2024-12-10 ENCOUNTER — TELEPHONE (OUTPATIENT)
Dept: ENDOSCOPY | Facility: HOSPITAL | Age: 69
End: 2024-12-10
Payer: MEDICARE

## 2024-12-10 ENCOUNTER — OFFICE VISIT (OUTPATIENT)
Dept: SURGERY | Facility: CLINIC | Age: 69
End: 2024-12-10
Payer: MEDICARE

## 2024-12-10 VITALS
BODY MASS INDEX: 22.32 KG/M2 | WEIGHT: 138.88 LBS | DIASTOLIC BLOOD PRESSURE: 72 MMHG | HEIGHT: 66 IN | SYSTOLIC BLOOD PRESSURE: 150 MMHG | HEART RATE: 59 BPM

## 2024-12-10 DIAGNOSIS — K80.50 CHOLEDOCHOLITHIASIS: Primary | ICD-10-CM

## 2024-12-10 PROCEDURE — 1126F AMNT PAIN NOTED NONE PRSNT: CPT | Mod: CPTII,S$GLB,, | Performed by: SURGERY

## 2024-12-10 PROCEDURE — 3077F SYST BP >= 140 MM HG: CPT | Mod: CPTII,S$GLB,, | Performed by: SURGERY

## 2024-12-10 PROCEDURE — 1159F MED LIST DOCD IN RCRD: CPT | Mod: CPTII,S$GLB,, | Performed by: SURGERY

## 2024-12-10 PROCEDURE — 4010F ACE/ARB THERAPY RXD/TAKEN: CPT | Mod: CPTII,S$GLB,, | Performed by: SURGERY

## 2024-12-10 PROCEDURE — 99999 PR PBB SHADOW E&M-EST. PATIENT-LVL III: CPT | Mod: PBBFAC,,, | Performed by: SURGERY

## 2024-12-10 PROCEDURE — 1101F PT FALLS ASSESS-DOCD LE1/YR: CPT | Mod: CPTII,S$GLB,, | Performed by: SURGERY

## 2024-12-10 PROCEDURE — 3044F HG A1C LEVEL LT 7.0%: CPT | Mod: CPTII,S$GLB,, | Performed by: SURGERY

## 2024-12-10 PROCEDURE — 3078F DIAST BP <80 MM HG: CPT | Mod: CPTII,S$GLB,, | Performed by: SURGERY

## 2024-12-10 PROCEDURE — 99024 POSTOP FOLLOW-UP VISIT: CPT | Mod: S$GLB,,, | Performed by: SURGERY

## 2024-12-10 PROCEDURE — 3288F FALL RISK ASSESSMENT DOCD: CPT | Mod: CPTII,S$GLB,, | Performed by: SURGERY

## 2024-12-10 NOTE — PROGRESS NOTES
Surgery Clinic Note    Patricia Ramirez is a 69 y.o. year old female in clinic today for follow up of robo subtotal diane and ERCP. Doing well. Had another ERCP yesterday, still residual stones in CBD and stent placed. She feels ok. Eating well.  No f/c/n/v/sob/cp    ROS:  Negative except above    PE:  Vitals:    12/10/24 0812   BP: (!) 150/72   Pulse: (!) 59       NAD  No belabored breathing  Abd soft nt nd  Incisions c/d/I    A/P:  Patricia Ramirez is a 69 y.o. year old female s/p robo subtotal diane w residual choledocholithiasis    -f/u w GI for repeat ERCP in 6 wks  -no surgical interventions planned  -rtc prn    Rigoberto Ponce  General Surgery - [facility]  12/10/2024

## 2024-12-10 NOTE — ANESTHESIA POSTPROCEDURE EVALUATION
Anesthesia Post Evaluation    Patient: Patricia Ramirez    Procedure(s) Performed: Procedure(s) (LRB):  ERCP (ENDOSCOPIC RETROGRADE CHOLANGIOPANCREATOGRAPHY) (N/A)    Final Anesthesia Type: general      Patient location during evaluation: PACU  Patient participation: Yes- Able to Participate  Level of consciousness: awake and alert  Post-procedure vital signs: reviewed and stable  Pain management: adequate  Airway patency: patent    PONV status at discharge: No PONV  Anesthetic complications: no      Cardiovascular status: blood pressure returned to baseline  Respiratory status: unassisted  Hydration status: euvolemic  Follow-up not needed.          Vitals Value Taken Time   /69 12/09/24 1540   Temp 36.7 °C (98.1 °F) 12/09/24 1510   Pulse 71 12/09/24 1540   Resp 14 12/09/24 1540   SpO2 99 % 12/09/24 1540         No case tracking events are documented in the log.      Pain/Ede Score: Ede Score: 10 (12/9/2024  3:40 PM)

## 2024-12-10 NOTE — TELEPHONE ENCOUNTER
Telephone patient to schedule ERCP procedure with no answer. Direct contact left to call back to schedule procedure.

## 2025-01-06 ENCOUNTER — TELEPHONE (OUTPATIENT)
Dept: ENDOSCOPY | Facility: HOSPITAL | Age: 70
End: 2025-01-06
Payer: MEDICARE

## 2025-01-06 DIAGNOSIS — Z46.89 ENCOUNTER FOR REMOVAL OF BILIARY STENT: Primary | ICD-10-CM

## 2025-01-06 NOTE — TELEPHONE ENCOUNTER
Spoke to patient to schedule procedure(s) ERCP        Physician to perform procedure(s) Dr. SID Roger  Date of Procedure (s) 1/21/2025  Arrival Time 7:00 AM  Time of Procedure(s) 8:00 AM   Location of Procedure(s) [facility] [address]  Type of Rx Prep sent to patient: Other  Instructions provided to patient via Email    Patient was informed on the following information and verbalized understanding. Screening questionnaire reviewed with patient and complete. If procedure requires anesthesia, a responsible adult needs to be present to accompany the patient home, patient cannot drive after receiving anesthesia. Appointment details are tentative, especially check-in time. Patient will receive a prep-op call 7 days prior to confirm check-in time for procedure. If applicable the patient should contact their pharmacy to verify Rx for procedure prep is ready for pick-up. Patient was advised to call the scheduling department at [phone] if pharmacy states no Rx is available. Patient was advised to call the endoscopy scheduling department if any questions or concerns arise.       Endoscopy Scheduling Department

## 2025-01-19 ENCOUNTER — TELEPHONE (OUTPATIENT)
Dept: ENDOSCOPY | Facility: HOSPITAL | Age: 70
End: 2025-01-19
Payer: MEDICARE

## 2025-01-23 ENCOUNTER — TELEPHONE (OUTPATIENT)
Dept: ENDOSCOPY | Facility: HOSPITAL | Age: 70
End: 2025-01-23
Payer: MEDICARE

## 2025-01-23 NOTE — TELEPHONE ENCOUNTER
Spoke to patient to schedule procedure(s) ERCP        Physician to perform procedure(s) Dr. JACKIE Chaney  Date of Procedure (s) 1/29/2025  Arrival Time 8:00 AM  Time of Procedure(s) 9:00 AM   Location of Procedure(s) [place] 2[address] Floor  Type of Rx Prep sent to patient: Other  Instructions provided to patient via Email     Patient was informed on the following information and verbalized understanding. Screening questionnaire reviewed with patient and complete. If procedure requires anesthesia, a responsible adult needs to be present to accompany the patient home, patient cannot drive after receiving anesthesia. Appointment details are tentative, especially check-in time. Patient will receive a prep-op call 7 days prior to confirm check-in time for procedure. If applicable the patient should contact their pharmacy to verify Rx for procedure prep is ready for pick-up. Patient was advised to call the scheduling department at [phone] if pharmacy states no Rx is available. Patient was advised to call the endoscopy scheduling department if any questions or concerns arise.        [facility] Scheduling Department

## 2025-01-28 ENCOUNTER — TELEPHONE (OUTPATIENT)
Dept: ENDOSCOPY | Facility: HOSPITAL | Age: 70
End: 2025-01-28
Payer: MEDICARE

## 2025-01-28 NOTE — TELEPHONE ENCOUNTER
Spoke with patient about arrival time @ *. [phone]      NPO status reviewed: Patient may eat until 7:00pm.  After 7pm, pt may have CLEAR liquids ONLY until 3 hrs prior to arrival, then completely NPO..    Medications: Do not take Insulin or oral diabetic medications the day of the procedure.    Take as prescribed: heart, seizure and blood pressure medication in the morning with a sip of water (less than an ounce).  Take any breathing medications and bring inhalers to hospital with you.     Leave all valuables and jewelry at home. Wear comfortable clothes to procedure to change into hospital gown.   You cannot drive for 24 hours after your procedure because you will receive sedation for your procedure to make you comfortable.    A ride must be provided at discharge.

## 2025-01-29 ENCOUNTER — ANESTHESIA EVENT (OUTPATIENT)
Dept: ENDOSCOPY | Facility: HOSPITAL | Age: 70
End: 2025-01-29
Payer: MEDICARE

## 2025-01-29 ENCOUNTER — ANESTHESIA (OUTPATIENT)
Dept: ENDOSCOPY | Facility: HOSPITAL | Age: 70
End: 2025-01-29
Payer: MEDICARE

## 2025-01-29 ENCOUNTER — HOSPITAL ENCOUNTER (OUTPATIENT)
Facility: HOSPITAL | Age: 70
Discharge: HOME | End: 2025-01-29
Attending: INTERNAL MEDICINE | Admitting: INTERNAL MEDICINE
Payer: MEDICARE

## 2025-01-29 VITALS
SYSTOLIC BLOOD PRESSURE: 165 MMHG | TEMPERATURE: 98 F | RESPIRATION RATE: 20 BRPM | OXYGEN SATURATION: 98 % | DIASTOLIC BLOOD PRESSURE: 80 MMHG | HEART RATE: 91 BPM | WEIGHT: 129 LBS | BODY MASS INDEX: 19.55 KG/M2 | HEIGHT: 68 IN

## 2025-01-29 DIAGNOSIS — Z46.89 ENCOUNTER FOR REMOVAL OF BILIARY STENT: ICD-10-CM

## 2025-01-29 DIAGNOSIS — K80.50 CHOLEDOCHOLITHIASIS: Primary | ICD-10-CM

## 2025-01-29 PROCEDURE — 27202125 HC BALLOON, EXTRACTION (ANY): Performed by: INTERNAL MEDICINE

## 2025-01-29 PROCEDURE — 27200976 HC DILATOR, BILIARY: Performed by: INTERNAL MEDICINE

## 2025-01-29 PROCEDURE — 27201275 HC CATHETER, SPYSCOPE: Performed by: INTERNAL MEDICINE

## 2025-01-29 PROCEDURE — 25000003 PHARM REV CODE 250: Performed by: NURSE ANESTHETIST, CERTIFIED REGISTERED

## 2025-01-29 PROCEDURE — 63600175 PHARM REV CODE 636 W HCPCS: Performed by: NURSE ANESTHETIST, CERTIFIED REGISTERED

## 2025-01-29 PROCEDURE — 43273 ENDOSCOPIC PANCREATOSCOPY: CPT | Performed by: INTERNAL MEDICINE

## 2025-01-29 PROCEDURE — 43265 ERCP LITHOTRIPSY CALCULI: CPT | Mod: 51,,, | Performed by: INTERNAL MEDICINE

## 2025-01-29 PROCEDURE — 27201702 HC BASKET, RETRIEVAL/LITHOTRIPTOR: Performed by: INTERNAL MEDICINE

## 2025-01-29 PROCEDURE — 43273 ENDOSCOPIC PANCREATOSCOPY: CPT | Mod: 51,,, | Performed by: INTERNAL MEDICINE

## 2025-01-29 PROCEDURE — 74328 X-RAY BILE DUCT ENDOSCOPY: CPT | Mod: TC | Performed by: INTERNAL MEDICINE

## 2025-01-29 PROCEDURE — C1769 GUIDE WIRE: HCPCS | Performed by: INTERNAL MEDICINE

## 2025-01-29 PROCEDURE — 27201674 HC SPHINCTERTOME: Performed by: INTERNAL MEDICINE

## 2025-01-29 PROCEDURE — 37000008 HC ANESTHESIA 1ST 15 MINUTES: Performed by: INTERNAL MEDICINE

## 2025-01-29 PROCEDURE — 74328 X-RAY BILE DUCT ENDOSCOPY: CPT | Mod: 26,,, | Performed by: INTERNAL MEDICINE

## 2025-01-29 PROCEDURE — 37000009 HC ANESTHESIA EA ADD 15 MINS: Performed by: INTERNAL MEDICINE

## 2025-01-29 PROCEDURE — 43276 ERCP STENT EXCHANGE W/DILATE: CPT | Performed by: INTERNAL MEDICINE

## 2025-01-29 PROCEDURE — 27202127 HC STENT INTRODUCER: Performed by: INTERNAL MEDICINE

## 2025-01-29 PROCEDURE — C1874 STENT, COATED/COV W/DEL SYS: HCPCS | Performed by: INTERNAL MEDICINE

## 2025-01-29 PROCEDURE — 43276 ERCP STENT EXCHANGE W/DILATE: CPT | Mod: 22,,, | Performed by: INTERNAL MEDICINE

## 2025-01-29 PROCEDURE — 43265 ERCP LITHOTRIPSY CALCULI: CPT | Performed by: INTERNAL MEDICINE

## 2025-01-29 PROCEDURE — D9220A PRA ANESTHESIA: Mod: CRNA,,, | Performed by: NURSE ANESTHETIST, CERTIFIED REGISTERED

## 2025-01-29 PROCEDURE — D9220A PRA ANESTHESIA: Mod: ANES,,, | Performed by: STUDENT IN AN ORGANIZED HEALTH CARE EDUCATION/TRAINING PROGRAM

## 2025-01-29 PROCEDURE — 25500020 PHARM REV CODE 255: Performed by: INTERNAL MEDICINE

## 2025-01-29 RX ORDER — HYDROMORPHONE HYDROCHLORIDE 2 MG/ML
0.2 INJECTION, SOLUTION INTRAMUSCULAR; INTRAVENOUS; SUBCUTANEOUS EVERY 5 MIN PRN
Status: CANCELLED | OUTPATIENT
Start: 2025-01-29

## 2025-01-29 RX ORDER — ONDANSETRON HYDROCHLORIDE 2 MG/ML
INJECTION, SOLUTION INTRAVENOUS
Status: DISCONTINUED | OUTPATIENT
Start: 2025-01-29 | End: 2025-01-29

## 2025-01-29 RX ORDER — CIPROFLOXACIN 2 MG/ML
INJECTION, SOLUTION INTRAVENOUS
Status: DISCONTINUED | OUTPATIENT
Start: 2025-01-29 | End: 2025-01-29

## 2025-01-29 RX ORDER — SODIUM CHLORIDE 9 MG/ML
INJECTION, SOLUTION INTRAVENOUS CONTINUOUS
Status: DISCONTINUED | OUTPATIENT
Start: 2025-01-29 | End: 2025-01-29 | Stop reason: HOSPADM

## 2025-01-29 RX ORDER — OXYCODONE HYDROCHLORIDE 5 MG/1
5 TABLET ORAL
Status: CANCELLED | OUTPATIENT
Start: 2025-01-29

## 2025-01-29 RX ORDER — CIPROFLOXACIN 500 MG/1
500 TABLET ORAL EVERY 12 HOURS
Qty: 10 TABLET | Refills: 0 | Status: SHIPPED | OUTPATIENT
Start: 2025-01-29 | End: 2025-01-29

## 2025-01-29 RX ORDER — ROCURONIUM BROMIDE 10 MG/ML
INJECTION, SOLUTION INTRAVENOUS
Status: DISCONTINUED | OUTPATIENT
Start: 2025-01-29 | End: 2025-01-29

## 2025-01-29 RX ORDER — SODIUM CHLORIDE 0.9 % (FLUSH) 0.9 %
10 SYRINGE (ML) INJECTION
Status: CANCELLED | OUTPATIENT
Start: 2025-01-29

## 2025-01-29 RX ORDER — SUCCINYLCHOLINE CHLORIDE 20 MG/ML
INJECTION INTRAMUSCULAR; INTRAVENOUS
Status: DISCONTINUED | OUTPATIENT
Start: 2025-01-29 | End: 2025-01-29

## 2025-01-29 RX ORDER — ONDANSETRON HYDROCHLORIDE 2 MG/ML
4 INJECTION, SOLUTION INTRAVENOUS DAILY PRN
Status: CANCELLED | OUTPATIENT
Start: 2025-01-29

## 2025-01-29 RX ORDER — PROPOFOL 10 MG/ML
VIAL (ML) INTRAVENOUS
Status: DISCONTINUED | OUTPATIENT
Start: 2025-01-29 | End: 2025-01-29

## 2025-01-29 RX ORDER — FENTANYL CITRATE 50 UG/ML
INJECTION, SOLUTION INTRAMUSCULAR; INTRAVENOUS
Status: DISCONTINUED | OUTPATIENT
Start: 2025-01-29 | End: 2025-01-29

## 2025-01-29 RX ORDER — AMOXICILLIN AND CLAVULANATE POTASSIUM 875; 125 MG/1; MG/1
1 TABLET, FILM COATED ORAL 2 TIMES DAILY
Qty: 6 TABLET | Refills: 0 | Status: SHIPPED | OUTPATIENT
Start: 2025-01-29 | End: 2025-02-01

## 2025-01-29 RX ORDER — LIDOCAINE HYDROCHLORIDE 20 MG/ML
INJECTION INTRAVENOUS
Status: DISCONTINUED | OUTPATIENT
Start: 2025-01-29 | End: 2025-01-29

## 2025-01-29 RX ORDER — DEXAMETHASONE SODIUM PHOSPHATE 4 MG/ML
INJECTION, SOLUTION INTRA-ARTICULAR; INTRALESIONAL; INTRAMUSCULAR; INTRAVENOUS; SOFT TISSUE
Status: DISCONTINUED | OUTPATIENT
Start: 2025-01-29 | End: 2025-01-29

## 2025-01-29 RX ORDER — GLUCAGON 1 MG
1 KIT INJECTION
Status: CANCELLED | OUTPATIENT
Start: 2025-01-29

## 2025-01-29 RX ADMIN — PROPOFOL 30 MG: 10 INJECTION, EMULSION INTRAVENOUS at 09:01

## 2025-01-29 RX ADMIN — FENTANYL CITRATE 50 MCG: 50 INJECTION INTRAMUSCULAR; INTRAVENOUS at 09:01

## 2025-01-29 RX ADMIN — SODIUM CHLORIDE: 0.9 INJECTION, SOLUTION INTRAVENOUS at 09:01

## 2025-01-29 RX ADMIN — SUCCINYLCHOLINE CHLORIDE 120 MG: 20 INJECTION, SOLUTION INTRAMUSCULAR; INTRAVENOUS at 09:01

## 2025-01-29 RX ADMIN — ROCURONIUM BROMIDE 20 MG: 10 INJECTION, SOLUTION INTRAVENOUS at 09:01

## 2025-01-29 RX ADMIN — LIDOCAINE HYDROCHLORIDE 75 MG: 20 INJECTION, SOLUTION INTRAVENOUS at 09:01

## 2025-01-29 RX ADMIN — DEXAMETHASONE SODIUM PHOSPHATE 4 MG: 4 INJECTION, SOLUTION INTRAMUSCULAR; INTRAVENOUS at 09:01

## 2025-01-29 RX ADMIN — GLYCOPYRROLATE 0.2 MG: 0.2 INJECTION, SOLUTION INTRAMUSCULAR; INTRAVITREAL at 09:01

## 2025-01-29 RX ADMIN — ONDANSETRON 4 MG: 2 INJECTION, SOLUTION INTRAMUSCULAR; INTRAVENOUS at 10:01

## 2025-01-29 RX ADMIN — CIPROFLOXACIN 400 MG: 2 INJECTION, SOLUTION INTRAVENOUS at 09:01

## 2025-01-29 RX ADMIN — PROPOFOL 20 MG: 10 INJECTION, EMULSION INTRAVENOUS at 09:01

## 2025-01-29 RX ADMIN — SODIUM CHLORIDE: 0.9 INJECTION, SOLUTION INTRAVENOUS at 10:01

## 2025-01-29 RX ADMIN — SUGAMMADEX 200 MG: 100 INJECTION, SOLUTION INTRAVENOUS at 10:01

## 2025-01-29 NOTE — H&P
Short Stay Endoscopy History and Physical    PCP - Fabienne Erwin NP  Referring Physician - Romeo Roger MD  [address]  [address]  [place] [address]    Procedure - ERCP  ASA - per anesthesia  Mallampati - per anesthesia  History of Anesthesia problems - per anesthesia  Family history Anesthesia problems -  per anesthesia   Plan of anesthesia - per anesthesia    HPI:  This is a 69 y.o. female here for evaluation of: ERCP for further management of large CBD stones requiring prior EHL    Reflux - no  Dysphagia - no  Abdominal pain - no  Diarrhea - no    ROS:  Constitutional: No fevers, chills, No weight loss  CV: No chest pain  Pulm: No cough, No shortness of breath  Ophtho: No vision changes  GI: see HPI  Derm: No rash    Medical History:  has a past medical history of Allergy, Anxiety, Arthritis, Cataract, Depressive disorder, not elsewhere classified, Esophageal reflux, Hypertension, Impaired fasting glucose, Joint pain, Obesity, unspecified, and Other and unspecified hyperlipidemia.    Surgical History:  has a past surgical history that includes right  arm  surgery; R knee replacement;  section (); Hysterectomy; Blepharoplasty (Bilateral, 3/3/2021); Repair of retinal detachment with vitrectomy (Left, 2022); Phacoemulsification of cataract (Left, 10/27/2022); Intraocular prosthesis insertion (Left, 10/27/2022); Colonoscopy (N/A, 2023); ERCP (N/A, 10/7/2024); Robot-assisted cholecystectomy using da Omar Xi (N/A, 10/9/2024); Esophagogastroduodenoscopy (N/A, 2024); and ERCP (N/A, 2024).    Family History: family history includes Cancer in her mother; Cervical cancer in her sister; Liver disease in her father; Thyroid disease in her sister; Tremor in her sister..    Social History:  reports that she has been smoking cigarettes. She has a 30 pack-year smoking history. She has never used smokeless tobacco. She reports current alcohol use. She reports current drug use.  Frequency: 14.00 times per week. Drug: Marijuana.    Review of patient's allergies indicates:   Allergen Reactions    Sulfa (sulfonamide antibiotics) Hives    Ace inhibitors Other (See Comments)     Cough         Medications:   Medications Prior to Admission   Medication Sig Dispense Refill Last Dose/Taking    acetaminophen (TYLENOL) 500 MG tablet Take 500 mg by mouth every 6 (six) hours as needed for Pain.   Past Month    ALPRAZolam (XANAX) 0.25 MG tablet Take 1 tablet (0.25 mg total) by mouth 3 (three) times daily. 90 tablet 0 1/28/2025    atorvastatin (LIPITOR) 40 MG tablet Take 1 tablet (40 mg total) by mouth once daily. 90 tablet 3 1/28/2025    busPIRone (BUSPAR) 15 MG tablet TAKE 1 TABLET BY MOUTH THREE TIMES DAILY 90 tablet 5 1/28/2025    citalopram (CELEXA) 20 MG tablet Take 3 tablets (60 mg total) by mouth once daily. Take 1 tablet by mouth once daily (Patient taking differently: Take 60 mg by mouth 3 (three) times daily. Take 1 tablet by mouth TID daily) 90 tablet 11 1/28/2025    cyclobenzaprine (FLEXERIL) 5 MG tablet TAKE 1 TABLET BY MOUTH TWICE DAILY AS NEEDED FOR MUSCLE SPASM 90 tablet 3 1/28/2025    ergocalciferol (ERGOCALCIFEROL) 50,000 unit Cap Take 1 capsule (50,000 Units total) by mouth every 7 days. 12 capsule 3 Past Week    olmesartan (BENICAR) 20 MG tablet TAKE 1 TABLET BY MOUTH ONCE DAILY IN THE EVENING 90 tablet 3 1/28/2025    pantoprazole (PROTONIX) 40 MG tablet Take 1 tablet (40 mg total) by mouth once daily. 90 tablet 3 1/28/2025    traZODone (DESYREL) 50 MG tablet Take 50 mg by mouth every morning.   1/28/2025       Physical Exam:    Vital Signs:   Vitals:    01/29/25 0819   BP: 137/64   Pulse: 60   Resp: 17   Temp: 98.2 °F (36.8 °C)       General Appearance: Well appearing in no acute distress    Labs:  Lab Results   Component Value Date    WBC 13.38 (H) 11/22/2024    HGB 14.7 11/22/2024    HCT 43.4 11/22/2024     11/22/2024    CHOL 120 09/17/2024    TRIG 90 09/17/2024    HDL 44  09/17/2024     (H) 11/22/2024    AST 60 (H) 11/22/2024     11/22/2024    K 3.6 11/22/2024     11/22/2024    CREATININE 0.6 11/22/2024    BUN 10 11/22/2024    CO2 28 11/22/2024    TSH 1.738 11/13/2024    INR 1.0 07/28/2022    HGBA1C 5.2 10/06/2024       I have explained the risks and benefits of this endoscopic procedure to the patient including but not limited to bleeding, pancreatitis, inflammation, infection, perforation, missing a lesion and death.      Shannon Chaney MD

## 2025-01-29 NOTE — ANESTHESIA PROCEDURE NOTES
Intubation    Date/Time: 1/29/2025 9:42 AM    Performed by: Rosemary Zhao  Authorized by: Porfirio Gomez DO    Intubation:     Induction:  Intravenous    Intubated:  Postinduction    Mask Ventilation:  Not attempted    Attempts:  1    Attempted By:  Student    Blade:  Ramos 3    Laryngeal View Grade: Grade I - full view of cords      Difficult Airway Encountered?: No      Complications:  None    Airway Device:  Oral endotracheal tube    Airway Device Size:  7.0    Style/Cuff Inflation:  Cuffed    Tube secured:  21    Secured at:  The lips    Placement Verified By:  Capnometry    Complicating Factors:  None    Findings Post-Intubation:  BS equal bilateral and atraumatic/condition of teeth unchanged

## 2025-01-29 NOTE — ANESTHESIA PREPROCEDURE EVALUATION
[facility]  Anesthesia Pre-Operative Evaluation         Patient Name: Patricia Ramirez  YOB: 1955  MRN: [id number]    SUBJECTIVE:     Pre-operative evaluation for Procedure(s) (LRB):  ERCP (ENDOSCOPIC RETROGRADE CHOLANGIOPANCREATOGRAPHY) (N/A)     01/29/2025    Patricia Ramirez is a 69 y.o. female w/ a significant PMHx seen below.  Here for ERCP for large CBD stones requiring prior EHL.  Had concern for gastric outlet obstruction in the past but this was not found on prior EGD. Large hiatal hernia found.    Patient now presents for the above procedure(s).    TTE:   Echo    Result Date: 10/7/2024    Left Ventricle: The left ventricle is normal in size. Normal wall   thickness. There is normal systolic function with a visually estimated   ejection fraction of 55 - 60%. Grade I diastolic dysfunction.    Right Ventricle: Normal right ventricular cavity size. Systolic   function is normal.    Mitral Valve: There is severe posterior mitral annular calcification   present. There is mild stenosis. The mean pressure gradient across the   mitral valve is 3 mmHg at a heart rate of 58 bpm.    Pulmonary Artery: The estimated pulmonary artery systolic pressure is   35 mmHg.          Patient Active Problem List   Diagnosis    Essential hypertension    Hyperlipidemia    GERD (gastroesophageal reflux disease)    Depression with anxiety    Migraine with aura and without status migrainosus, not intractable    Nuclear sclerosis of both eyes    Dermatochalasis    Rosacea    Brow ptosis, bilateral    Retinal detachment of left eye with multiple breaks    Epiretinal membrane, left    Splenic infarction    Heart failure with preserved ejection fraction    Diastolic dysfunction    IFG (impaired fasting glucose)    Class 1 obesity due to excess calories without serious comorbidity with body mass index (BMI) of 31.0 to 31.9 in adult    RBBB    Left atrial enlargement    Left anterior fascicular block    Abnormal EKG     Dyspnea on exertion    Multiple risk factors for coronary artery disease    Splenic infarct    History of tobacco abuse    Nuclear sclerotic cataract of left eye    Moderate major depression, single episode    Tobacco dependency    Choledocholithiasis    Moderate protein-calorie malnutrition    Epigastric pain    History of laparoscopic cholecystectomy       Review of patient's allergies indicates:   Allergen Reactions    Sulfa (sulfonamide antibiotics) Hives    Ace inhibitors Other (See Comments)     Cough         Current Inpatient Medications:      Current Facility-Administered Medications on File Prior to Encounter   Medication Dose Route Frequency Provider Last Rate Last Admin    cyclopentolate 1% ophthalmic solution 1 drop  1 drop Left Eye On Call Procedure Palmer Berrios MD   1 drop at 10/27/22 1003    ofloxacin 0.3 % ophthalmic solution 1 drop  1 drop Left Eye On Call Procedure Palmer Berrios MD   2 drop at 10/27/22 1238    sodium chloride 0.9% flush 10 mL  10 mL Intravenous PRN Palmer Berrios MD         Current Outpatient Medications on File Prior to Encounter   Medication Sig Dispense Refill    acetaminophen (TYLENOL) 500 MG tablet Take 500 mg by mouth every 6 (six) hours as needed for Pain.      ALPRAZolam (XANAX) 0.25 MG tablet Take 1 tablet (0.25 mg total) by mouth 3 (three) times daily. 90 tablet 0    atorvastatin (LIPITOR) 40 MG tablet Take 1 tablet (40 mg total) by mouth once daily. 90 tablet 3    busPIRone (BUSPAR) 15 MG tablet TAKE 1 TABLET BY MOUTH THREE TIMES DAILY 90 tablet 5    citalopram (CELEXA) 20 MG tablet Take 3 tablets (60 mg total) by mouth once daily. Take 1 tablet by mouth once daily (Patient taking differently: Take 60 mg by mouth 3 (three) times daily. Take 1 tablet by mouth TID daily) 90 tablet 11    cyclobenzaprine (FLEXERIL) 5 MG tablet TAKE 1 TABLET BY MOUTH TWICE DAILY AS NEEDED FOR MUSCLE SPASM 90 tablet 3    ergocalciferol (ERGOCALCIFEROL) 50,000  unit Cap Take 1 capsule (50,000 Units total) by mouth every 7 days. 12 capsule 3    olmesartan (BENICAR) 20 MG tablet TAKE 1 TABLET BY MOUTH ONCE DAILY IN THE EVENING 90 tablet 3    pantoprazole (PROTONIX) 40 MG tablet Take 1 tablet (40 mg total) by mouth once daily. 90 tablet 3    traZODone (DESYREL) 50 MG tablet Take 50 mg by mouth every morning.         Past Surgical History:   Procedure Laterality Date    BLEPHAROPLASTY Bilateral 3/3/2021    Procedure: BLEPHAROPLASTY;  Surgeon: Maite Acuna MD;  Location: [facility];  Service: Ophthalmology;  Laterality: Bilateral;     SECTION  1973    COLONOSCOPY N/A 2023    Procedure: COLONOSCOPY;  Surgeon: Briana Sterling MD;  Location: [facility];  Service: Endoscopy;  Laterality: N/A;    ERCP N/A 10/7/2024    Procedure: ERCP (ENDOSCOPIC RETROGRADE CHOLANGIOPANCREATOGRAPHY);  Surgeon: Catracho Mitchell MD;  [facility] ([address] [place] [address]);  Service: Endoscopy;  Laterality: N/A;    ERCP N/A 2024    Procedure: ERCP (ENDOSCOPIC RETROGRADE CHOLANGIOPANCREATOGRAPHY);  Surgeon: Romeo Roger MD;  Location: [facility];  Service: Endoscopy;  Laterality: N/A;  12/3/24: instructions sent via email-GD   SWP PRECALL COMPLETED-RT    ESOPHAGOGASTRODUODENOSCOPY N/A 2024    Procedure: EGD (ESOPHAGOGASTRODUODENOSCOPY);  Surgeon: Angie Alatorre MD;  Location: [facility] ENDO;  Service: Gastroenterology;  Laterality: N/A;    HYSTERECTOMY      without BSO    INTRAOCULAR PROSTHESES INSERTION Left 10/27/2022    Procedure: INSERTION, IOL PROSTHESIS;  Surgeon: Palmer Berrios MD;  Location: [facility] OR;  Service: Ophthalmology;  Laterality: Left;    PHACOEMULSIFICATION OF CATARACT Left 10/27/2022    Procedure: PHACOEMULSIFICATION, CATARACT;  Surgeon: Palmer Berrios MD;  Location: [facility];  Service: Ophthalmology;  Laterality: Left;  RN PHONE PREOP 10/21/2022   ARRIVAL 9:00 AM    R knee replacement      REPAIR OF RETINAL DETACHMENT WITH VITRECTOMY Left  2/28/2022    Procedure: REPAIR, RETINAL DETACHMENT, WITH VITRECTOMY;  Surgeon: MINO Martinez MD;  Location: [facility] OR [address] [place] [address];  Service: Ophthalmology;  Laterality: Left;  Spoke with SID Garcia. Pt was instructed nothing to eat after 8am and to arrive at 2pm for preop. 430pm case start request.    right  arm  surgery      ROBOT-ASSISTED CHOLECYSTECTOMY USING DA GENO XI N/A 10/9/2024    Procedure: XI ROBOTIC SUB TOTAL CHOLECYSTECTOMY; DRAIN PLACEMENT;  Surgeon: Rigoberto Ponce MD;  Location: [facility] OR;  Service: General;  Laterality: N/A;       OBJECTIVE:     Vital Signs Range (Last 24H):  Temp:  [36.8 °C (98.2 °F)]   Pulse:  [60]   Resp:  [17]   BP: (137)/(64)   SpO2:  [98 %]       Significant Labs:  Lab Results   Component Value Date    WBC 13.38 (H) 11/22/2024    HGB 14.7 11/22/2024    HCT 43.4 11/22/2024     11/22/2024    CHOL 120 09/17/2024    TRIG 90 09/17/2024    HDL 44 09/17/2024     (H) 11/22/2024    AST 60 (H) 11/22/2024     11/22/2024    K 3.6 11/22/2024     11/22/2024    CREATININE 0.6 11/22/2024    BUN 10 11/22/2024    CO2 28 11/22/2024    TSH 1.738 11/13/2024    INR 1.0 07/28/2022    HGBA1C 5.2 10/06/2024       Diagnostic Studies: No relevant studies.    EKG:   Results for orders placed or performed during the hospital encounter of 11/20/24   EKG 12-lead    Collection Time: 11/20/24 11:14 AM   Result Value Ref Range    QRS Duration 126 ms    [facility] QTC Calculation 454 ms    Narrative    Test Reason : R07.9,    Vent. Rate :  53 BPM     Atrial Rate :  53 BPM     P-R Int : 200 ms          QRS Dur : 126 ms      QT Int : 484 ms       P-R-T Axes :  63 -71 -42 degrees    QTcB Int : 454 ms    Sinus bradycardia  Left axis deviation  Right bundle branch block  Abnormal ECG  No previous ECGs available  Confirmed by Caleb Wei ([id number]) on 11/20/2024 9:51:02 PM    Referred By: AAAREFERRAL SELF           Confirmed By: Caleb Wei       ASSESSMENT/PLAN:       Pre-op Assessment    I  have reviewed the Patient Summary Reports.     I have reviewed the Nursing Notes. I have reviewed the NPO Status.   I have reviewed the Medications.     Review of Systems  Anesthesia Hx:  No problems with previous Anesthesia               Denies Personal Hx of Anesthesia complications.                    Social:  Smoker       Hematology/Oncology:       -- Denies Anemia:                  Denies Current/Recent Cancer                Cardiovascular:  Exercise tolerance: poor   Hypertension   Denies MI.  Denies CAD.    Dysrhythmias    Denies CHF.    no hyperlipidemia   ECG has been reviewed.    Functional Capacity low / < 4 METS      Shortness of Breath                    Hypertension     Disorder of Cardiac Rhythm     Pulmonary:    Denies COPD.  Denies Asthma.  Shortness of breath   Denies Sleep Apnea.                Renal/:   Denies Chronic Renal Disease.                Hepatic/GI:    Hiatal Hernia, GERD   Large CBD stones.      Gerd          Musculoskeletal:  Musculoskeletal Normal                Neurological:  Denies TIA.  Denies CVA.   Headaches Denies Seizures.     Dx of Headaches                           Endocrine:  Endocrine Normal            Psych:  Psychiatric History                  Physical Exam  General: Well nourished, Cooperative, Alert and Oriented    Airway:  Mallampati: II   Mouth Opening: Normal  TM Distance: Normal  Tongue: Normal  Neck ROM: Normal ROM    Dental:  Intact        Anesthesia Plan  Type of Anesthesia, risks & benefits discussed:    Anesthesia Type: Gen ETT  Intra-op Monitoring Plan: Standard ASA Monitors  Post Op Pain Control Plan: multimodal analgesia and IV/PO Opioids PRN  Induction:  IV and rapid sequence  Airway Plan: Video, Post-Induction  Informed Consent: Informed consent signed with the Patient and all parties understand the risks and agree with anesthesia plan.  All questions answered.   ASA Score: 3  Day of Surgery Review of History & Physical: H&P Update referred to the  surgeon/provider.    Ready For Surgery From Anesthesia Perspective.     .

## 2025-01-29 NOTE — TRANSFER OF CARE
"Anesthesia Transfer of Care Note    Patient: Patricia Ramirez    Procedure(s) Performed: Procedure(s) (LRB):  ERCP (ENDOSCOPIC RETROGRADE CHOLANGIOPANCREATOGRAPHY) (N/A)    Patient location: PACU    Anesthesia Type: general    Transport from OR: Transported from OR on 6-10 L/min O2 by face mask with adequate spontaneous ventilation    Post pain: adequate analgesia    Post assessment: no apparent anesthetic complications    Post vital signs: stable    Level of consciousness: awake, alert and oriented    Nausea/Vomiting: no nausea/vomiting    Complications: none    Transfer of care protocol was followed      Last vitals: Visit Vitals  /64 (BP Location: Left arm, Patient Position: Lying)   Pulse 60   Temp 36.8 °C (98.2 °F) (Temporal)   Resp 17   Ht 5' 8" (1.727 m)   Wt 58.5 kg (129 lb)   SpO2 98%   Breastfeeding No   BMI 19.61 kg/m²     "

## 2025-01-29 NOTE — PROVATION PATIENT INSTRUCTIONS
Discharge Summary/Instructions after an Endoscopic Procedure  Patient Name: Patricia Ramirez  Patient MRN: [id number]  Patient YOB: 1955 Wednesday, January 29, 2025  Shannon Chaney MD  Dear patient,  As a result of recent federal legislation (The Federal Cures Act), you may   receive lab or pathology results from your procedure in your [facility]   account before your physician is able to contact you. Your physician or   their representative will relay the results to you with their   recommendations at their soonest availability.  Thank you,  Your health is very important to us during the Covid Crisis. Following your   procedure today, you will receive a daily text for 2 weeks asking about   signs or symptoms of Covid 19.  Please respond to this text when you   receive it so we can follow up and keep you as safe as possible.   RESTRICTIONS:  During your procedure today, you received medications for sedation.  These   medications may affect your judgment, balance and coordination.  Therefore,   for 24 hours, you have the following restrictions:   - DO NOT drive a car, operate machinery, make legal/financial decisions,   sign important papers or drink alcohol.    ACTIVITY:  Today: no heavy lifting, straining or running due to procedural   sedation/anesthesia.  The following day: return to full activity including work.  DIET:  Eat and drink normally unless instructed otherwise.     TREATMENT FOR COMMON SIDE EFFECTS:  - Mild abdominal pain, nausea, belching, bloating or excessive gas:  rest,   eat lightly and use a heating pad.  - Sore Throat: treat with throat lozenges and/or gargle with warm salt   water.  - Because air was used during the procedure, expelling large amounts of air   from your rectum or belching is normal.  - If a bowel prep was taken, you may not have a bowel movement for 1-3 days.    This is normal.  SYMPTOMS TO WATCH FOR AND REPORT TO YOUR PHYSICIAN:  1. Abdominal pain or bloating, other than  gas cramps.  2. Chest pain.  3. Back pain.  4. Signs of infection such as: chills or fever occurring within 24 hours   after the procedure.  5. Rectal bleeding, which would show as bright red, maroon, or black stools.   (A tablespoon of blood from the rectum is not serious, especially if   hemorrhoids are present.)  6. Vomiting.  7. Weakness or dizziness.  GO DIRECTLY TO THE NEAREST EMERGENCY ROOM IF YOU HAVE ANY OF THE FOLLOWING:      Difficulty breathing              Chills and/or fever over 101 F   Persistent vomiting and/or vomiting blood   Severe abdominal pain   Severe chest pain   Black, tarry stools   Bleeding- more than one tablespoon   Any other symptom or condition that you feel may need urgent attention  Your doctor recommends these additional instructions:  If any biopsies were taken, your doctors clinic will contact you in 1 to 2   weeks with any results.  - Discharge patient to home (ambulatory).   - Patient has a contact number available for emergencies.  The signs and   symptoms of potential delayed complications were discussed with the   patient.  Return to normal activities tomorrow.  Written discharge   instructions were provided to the patient.   - Resume previous diet.   - Cipro (ciprofloxacin) 500 mg PO BID for 5 days.   - Repeat ERCP in 3 months to remove covered metal biliary stent and   hopefully for final clearance of the CBD.   - Watch for pancreatitis, bleeding, perforation, and cholangitis.  For questions, problems or results please call your physician - Shannon Chaney MD.  EMERGENCY PHONE NUMBER: [phone],  LAB RESULTS: [phone]  IF A COMPLICATION OR EMERGENCY SITUATION ARISES AND YOU ARE UNABLE TO REACH   YOUR PHYSICIAN - GO DIRECTLY TO THE EMERGENCY ROOM.  Shannon Chaney MD  1/29/2025 11:16:01 AM  This report has been verified and signed electronically.  Dear patient,  As a result of recent federal legislation (The Federal Cures Act), you may   receive lab or pathology  results from your procedure in your [facility]   account before your physician is able to contact you. Your physician or   their representative will relay the results to you with their   recommendations at their soonest availability.  Thank you,  PROVATION

## 2025-02-01 ENCOUNTER — HOSPITAL ENCOUNTER (EMERGENCY)
Facility: HOSPITAL | Age: 70
Discharge: HOME | End: 2025-02-01
Attending: EMERGENCY MEDICINE
Payer: MEDICARE

## 2025-02-01 VITALS
TEMPERATURE: 98 F | RESPIRATION RATE: 16 BRPM | HEART RATE: 61 BPM | DIASTOLIC BLOOD PRESSURE: 67 MMHG | OXYGEN SATURATION: 97 % | SYSTOLIC BLOOD PRESSURE: 144 MMHG

## 2025-02-01 DIAGNOSIS — R10.13 EPIGASTRIC ABDOMINAL PAIN: ICD-10-CM

## 2025-02-01 LAB
ALBUMIN SERPL BCP-MCNC: 3.3 G/DL (ref 3.5–5.2)
ALP SERPL-CCNC: 84 U/L (ref 40–150)
ALT SERPL W/O P-5'-P-CCNC: 28 U/L (ref 10–44)
ANION GAP SERPL CALC-SCNC: 8 MMOL/L (ref 8–16)
AST SERPL-CCNC: 18 U/L (ref 10–40)
BASOPHILS # BLD AUTO: 0.11 K/UL (ref 0–0.2)
BASOPHILS NFR BLD: 1.2 % (ref 0–1.9)
BILIRUB SERPL-MCNC: 0.2 MG/DL (ref 0.1–1)
BUN SERPL-MCNC: 18 MG/DL (ref 8–23)
CALCIUM SERPL-MCNC: 8.5 MG/DL (ref 8.7–10.5)
CHLORIDE SERPL-SCNC: 107 MMOL/L (ref 95–110)
CO2 SERPL-SCNC: 24 MMOL/L (ref 23–29)
CREAT SERPL-MCNC: 0.7 MG/DL (ref 0.5–1.4)
DIFFERENTIAL METHOD BLD: ABNORMAL
EOSINOPHIL # BLD AUTO: 0.4 K/UL (ref 0–0.5)
EOSINOPHIL NFR BLD: 3.8 % (ref 0–8)
ERYTHROCYTE [DISTWIDTH] IN BLOOD BY AUTOMATED COUNT: 12.5 % (ref 11.5–14.5)
EST. GFR  (NO RACE VARIABLE): >60 ML/MIN/1.73 M^2
GLUCOSE SERPL-MCNC: 94 MG/DL (ref 70–110)
HCT VFR BLD AUTO: 43.1 % (ref 37–48.5)
HGB BLD-MCNC: 14.6 G/DL (ref 12–16)
IMM GRANULOCYTES # BLD AUTO: 0.03 K/UL (ref 0–0.04)
IMM GRANULOCYTES NFR BLD AUTO: 0.3 % (ref 0–0.5)
LIPASE SERPL-CCNC: 19 U/L (ref 4–60)
LYMPHOCYTES # BLD AUTO: 2.6 K/UL (ref 1–4.8)
LYMPHOCYTES NFR BLD: 28.5 % (ref 18–48)
MCH RBC QN AUTO: 33 PG (ref 27–31)
MCHC RBC AUTO-ENTMCNC: 33.9 G/DL (ref 32–36)
MCV RBC AUTO: 98 FL (ref 82–98)
MONOCYTES # BLD AUTO: 0.8 K/UL (ref 0.3–1)
MONOCYTES NFR BLD: 9.1 % (ref 4–15)
NEUTROPHILS # BLD AUTO: 5.2 K/UL (ref 1.8–7.7)
NEUTROPHILS NFR BLD: 57.1 % (ref 38–73)
NRBC BLD-RTO: 0 /100 WBC
PLATELET # BLD AUTO: 207 K/UL (ref 150–450)
PMV BLD AUTO: 9.4 FL (ref 9.2–12.9)
POTASSIUM SERPL-SCNC: 4.4 MMOL/L (ref 3.5–5.1)
PROT SERPL-MCNC: 6.2 G/DL (ref 6–8.4)
RBC # BLD AUTO: 4.42 M/UL (ref 4–5.4)
SODIUM SERPL-SCNC: 139 MMOL/L (ref 136–145)
TROPONIN I SERPL DL<=0.01 NG/ML-MCNC: <0.006 NG/ML (ref 0–0.03)
WBC # BLD AUTO: 9.19 K/UL (ref 3.9–12.7)

## 2025-02-01 PROCEDURE — 96375 TX/PRO/DX INJ NEW DRUG ADDON: CPT

## 2025-02-01 PROCEDURE — 80053 COMPREHEN METABOLIC PANEL: CPT | Performed by: EMERGENCY MEDICINE

## 2025-02-01 PROCEDURE — 63600175 PHARM REV CODE 636 W HCPCS: Performed by: EMERGENCY MEDICINE

## 2025-02-01 PROCEDURE — 85025 COMPLETE CBC W/AUTO DIFF WBC: CPT | Performed by: EMERGENCY MEDICINE

## 2025-02-01 PROCEDURE — 93005 ELECTROCARDIOGRAM TRACING: CPT

## 2025-02-01 PROCEDURE — 99285 EMERGENCY DEPT VISIT HI MDM: CPT | Mod: 25

## 2025-02-01 PROCEDURE — 83690 ASSAY OF LIPASE: CPT | Performed by: EMERGENCY MEDICINE

## 2025-02-01 PROCEDURE — 96374 THER/PROPH/DIAG INJ IV PUSH: CPT

## 2025-02-01 PROCEDURE — 84484 ASSAY OF TROPONIN QUANT: CPT | Performed by: EMERGENCY MEDICINE

## 2025-02-01 PROCEDURE — 93010 ELECTROCARDIOGRAM REPORT: CPT | Mod: ,,, | Performed by: INTERNAL MEDICINE

## 2025-02-01 PROCEDURE — 25000003 PHARM REV CODE 250: Performed by: EMERGENCY MEDICINE

## 2025-02-01 RX ORDER — ALUMINUM HYDROXIDE, MAGNESIUM HYDROXIDE, AND SIMETHICONE 1200; 120; 1200 MG/30ML; MG/30ML; MG/30ML
30 SUSPENSION ORAL ONCE
Status: COMPLETED | OUTPATIENT
Start: 2025-02-01 | End: 2025-02-01

## 2025-02-01 RX ORDER — FAMOTIDINE 10 MG/ML
20 INJECTION INTRAVENOUS
Status: COMPLETED | OUTPATIENT
Start: 2025-02-01 | End: 2025-02-01

## 2025-02-01 RX ORDER — LIDOCAINE HYDROCHLORIDE 20 MG/ML
15 SOLUTION OROPHARYNGEAL ONCE
Status: COMPLETED | OUTPATIENT
Start: 2025-02-01 | End: 2025-02-01

## 2025-02-01 RX ORDER — ONDANSETRON HYDROCHLORIDE 2 MG/ML
4 INJECTION, SOLUTION INTRAVENOUS
Status: COMPLETED | OUTPATIENT
Start: 2025-02-01 | End: 2025-02-01

## 2025-02-01 RX ORDER — KETOROLAC TROMETHAMINE 30 MG/ML
15 INJECTION, SOLUTION INTRAMUSCULAR; INTRAVENOUS
Status: COMPLETED | OUTPATIENT
Start: 2025-02-01 | End: 2025-02-01

## 2025-02-01 RX ORDER — MORPHINE SULFATE 4 MG/ML
4 INJECTION, SOLUTION INTRAMUSCULAR; INTRAVENOUS
Status: COMPLETED | OUTPATIENT
Start: 2025-02-01 | End: 2025-02-01

## 2025-02-01 RX ADMIN — ONDANSETRON 4 MG: 2 INJECTION INTRAMUSCULAR; INTRAVENOUS at 01:02

## 2025-02-01 RX ADMIN — ALUMINUM HYDROXIDE, MAGNESIUM HYDROXIDE, AND SIMETHICONE 30 ML: 1200; 120; 1200 SUSPENSION ORAL at 03:02

## 2025-02-01 RX ADMIN — LIDOCAINE HYDROCHLORIDE 15 ML: 20 SOLUTION ORAL at 03:02

## 2025-02-01 RX ADMIN — KETOROLAC TROMETHAMINE 15 MG: 30 INJECTION, SOLUTION INTRAMUSCULAR at 03:02

## 2025-02-01 RX ADMIN — MORPHINE SULFATE 4 MG: 4 INJECTION INTRAVENOUS at 01:02

## 2025-02-01 RX ADMIN — FAMOTIDINE 20 MG: 10 INJECTION, SOLUTION INTRAVENOUS at 03:02

## 2025-02-01 NOTE — ED PROVIDER NOTES
Encounter Date: 2025    SCRIBE #1 NOTE: I, Tatiana Campo, am scribing for, and in the presence of,  Romeo Kasper MD. I have scribed the following portions of the note - Other sections scribed: HPI, ROS, PE.       History     Chief Complaint   Patient presents with    Abdominal Pain    Chest Pain     BIB [place]  who reports the pt has abd pain radiating to her chest. Hx of cholecystitis, partial removal of gallbladder and stents in place. Denies SOB.      69-year-old female, with a PMHx of HTN, HLD, arthritis, HFpEF (estimated EF of 55-60%, Grade I diastolic dysfunction per last TTE 10/6/2024), GERD, and choledocholithiasis c/b cholecystitis s/p partial cholecystectomy(10/9/24) and biliary stent placements, presents to the ED for evaluation of intermittent upper abdominal pain radiating to generalized chest that began tonight. She states her pain feels like a tight, twisting, charley horse sensation and reports history of similar episodes. Reports being a regular smoker. Denies EtOH consumption in the last few days. Denies nausea, back pain, flank pain, lower abdominal pain, or numbness/tingling to lower extremities.      The history is provided by the patient. No  was used.     Review of patient's allergies indicates:   Allergen Reactions    Sulfa (sulfonamide antibiotics) Hives    Ace inhibitors Other (See Comments)     Cough       Past Medical History:   Diagnosis Date    Allergy     Anxiety     Arthritis     Cataract     Depressive disorder, not elsewhere classified     Esophageal reflux     Hypertension     Impaired fasting glucose     Joint pain     Obesity, unspecified     Other and unspecified hyperlipidemia      Past Surgical History:   Procedure Laterality Date    BLEPHAROPLASTY Bilateral 3/3/2021    Procedure: BLEPHAROPLASTY;  Surgeon: Maite Acuna MD;  Location: [facility];  Service: Ophthalmology;  Laterality: Bilateral;     SECTION      COLONOSCOPY N/A  9/6/2023    Procedure: COLONOSCOPY;  Surgeon: Briana Sterling MD;  Location: [facility] ENDO;  Service: Endoscopy;  Laterality: N/A;    ERCP N/A 10/7/2024    Procedure: ERCP (ENDOSCOPIC RETROGRADE CHOLANGIOPANCREATOGRAPHY);  Surgeon: Catracho Mitchell MD;  Location: [facility] ENDO (2ND FLR);  Service: Endoscopy;  Laterality: N/A;    ERCP N/A 12/9/2024    Procedure: ERCP (ENDOSCOPIC RETROGRADE CHOLANGIOPANCREATOGRAPHY);  Surgeon: Romeo Roger MD;  Location: [facility] ENDO;  Service: Endoscopy;  Laterality: N/A;  12/3/24: instructions sent via email-GD  12/5 SWP PRECALL COMPLETED-RT    ERCP N/A 1/29/2025    Procedure: ERCP (ENDOSCOPIC RETROGRADE CHOLANGIOPANCREATOGRAPHY);  Surgeon: Shannon Chaney MD;  Location: [facility];  Service: Endoscopy;  Laterality: N/A;  1/6 portal-Repeat ERCP in 6 weeks to remove stent.nicola -tt  1/14 SWP-PRECALL COMPLETE-RT  1/23 r/s updated portal instr-pt stated any MD-tt    ESOPHAGOGASTRODUODENOSCOPY N/A 11/21/2024    Procedure: EGD (ESOPHAGOGASTRODUODENOSCOPY);  Surgeon: Angie Alatorre MD;  Location: [facility];  Service: Gastroenterology;  Laterality: N/A;    HYSTERECTOMY      without BSO    INTRAOCULAR PROSTHESES INSERTION Left 10/27/2022    Procedure: INSERTION, IOL PROSTHESIS;  Surgeon: Palmer Berrios MD;  Location: [facility] OR;  Service: Ophthalmology;  Laterality: Left;    PHACOEMULSIFICATION OF CATARACT Left 10/27/2022    Procedure: PHACOEMULSIFICATION, CATARACT;  Surgeon: Palmer Berrios MD;  Location: [facility] OR;  Service: Ophthalmology;  Laterality: Left;  RN PHONE PREOP 10/21/2022   ARRIVAL 9:00 AM    R knee replacement      REPAIR OF RETINAL DETACHMENT WITH VITRECTOMY Left 2/28/2022    Procedure: REPAIR, RETINAL DETACHMENT, WITH VITRECTOMY;  Surgeon: MINO Martinez MD;  Location: [facility] OR 1ST FLR;  Service: Ophthalmology;  Laterality: Left;  Spoke with J. Radha. Pt was instructed nothing to eat after 8am and to arrive at 2pm for preop. 430pm case start request.     right  arm  surgery      ROBOT-ASSISTED CHOLECYSTECTOMY USING DA GENO XI N/A 10/9/2024    Procedure: XI ROBOTIC SUB TOTAL CHOLECYSTECTOMY; DRAIN PLACEMENT;  Surgeon: Rigoberto Ponce MD;  Location: [facility] OR;  Service: General;  Laterality: N/A;     Family History   Problem Relation Name Age of Onset    Cancer Mother          lung    Liver disease Father      Thyroid disease Sister      Cervical cancer Sister      Tremor Sister       Social History     Tobacco Use    Smoking status: Every Day     Current packs/day: 0.75     Average packs/day: 0.8 packs/day for 40.0 years (30.0 ttl pk-yrs)     Types: Cigarettes    Smokeless tobacco: Never   Substance Use Topics    Alcohol use: Yes     Comment: rarely    Drug use: Yes     Frequency: 14.0 times per week     Types: Marijuana     Review of Systems   Constitutional:  Negative for chills and fever.   Respiratory:  Negative for shortness of breath.    Cardiovascular:  Positive for chest pain.   Gastrointestinal:  Positive for abdominal pain. Negative for nausea.   Musculoskeletal:  Negative for back pain.   Skin:  Negative for rash.   Neurological:  Negative for numbness and headaches.       Physical Exam     Initial Vitals [02/01/25 0027]   BP Pulse Resp Temp SpO2   (!) 144/76 60 20 97.8 °F (36.6 °C) 99 %      MAP       --         Physical Exam    Nursing note and vitals reviewed.  Constitutional: She appears well-developed and well-nourished. She does not appear ill. No distress.   HENT:   Head: Normocephalic.   Eyes: EOM are normal.   Neck:   Normal range of motion.  Cardiovascular:  Normal rate, regular rhythm and normal heart sounds.           No murmur heard.  Pulses:       Dorsalis pedis pulses are 2+ on the right side and 2+ on the left side.   Pulmonary/Chest: Breath sounds normal. No respiratory distress. She has no wheezes.   Abdominal: Abdomen is soft. There is no abdominal tenderness. There is negative Alvares's sign.   Musculoskeletal:         General: No  edema.      Cervical back: Normal range of motion.      Right upper leg: No edema.      Left upper leg: No edema.      Right lower leg: No edema.      Left lower leg: No edema.      Right ankle: No swelling.      Left ankle: No swelling.      Right foot: No swelling.      Left foot: No swelling.     Neurological: She is alert. GCS eye subscore is 4. GCS verbal subscore is 5. GCS motor subscore is 6.   Skin: Skin is warm.         ED Course   Procedures  Labs Reviewed   CBC W/ AUTO DIFFERENTIAL - Abnormal       Result Value    WBC 9.19      RBC 4.42      Hemoglobin 14.6      Hematocrit 43.1      MCV 98      MCH 33.0 (*)     MCHC 33.9      RDW 12.5      Platelets 207      MPV 9.4      Immature Granulocytes 0.3      Gran # (ANC) 5.2      Immature Grans (Abs) 0.03      Lymph # 2.6      Mono # 0.8      Eos # 0.4      Baso # 0.11      nRBC 0      Gran % 57.1      Lymph % 28.5      Mono % 9.1      Eosinophil % 3.8      Basophil % 1.2      Differential Method Automated     COMPREHENSIVE METABOLIC PANEL - Abnormal    Sodium 139      Potassium 4.4      Chloride 107      CO2 24      Glucose 94      BUN 18      Creatinine 0.7      Calcium 8.5 (*)     Total Protein 6.2      Albumin 3.3 (*)     Total Bilirubin 0.2      Alkaline Phosphatase 84      AST 18      ALT 28      eGFR >60      Anion Gap 8     LIPASE    Lipase 19     TROPONIN I    Troponin I <0.006            Imaging Results              X-Ray Chest AP Portable (Final result)  Result time 02/01/25 04:18:56      Final result by Andrew Cruz MD (02/01/25 04:18:56)                   Impression:      Radiographic findings as above.      Electronically signed by: Andrew Cruz  Date:    02/01/2025  Time:    04:18               Narrative:    EXAMINATION:  XR CHEST AP PORTABLE    CLINICAL HISTORY:  Epigastric pain    TECHNIQUE:  Single frontal view of the chest was performed.    COMPARISON:  Chest radiograph November 20, 2024    FINDINGS:  Single portable chest radiograph  is submitted.  There is a configuration thought to represent a small hiatal hernia appearing stable.  The appearance of the cardiomediastinal silhouette is stable.  Aortic atherosclerotic change noted.    Mild atelectatic change noted.  There is no evidence for confluent infiltrate or consolidation, significant pleural effusion or pneumothorax.    The visualized osseous structures appear intact.                                       US Abdomen Limited (Final result)  Result time 02/01/25 02:41:12      Final result by Andrew Cruz MD (02/01/25 02:41:12)                   Impression:      The gallbladder is surgically absent.    There is no abnormal biliary dilatation.    The liver appears prominent measuring 17.9 cm in length.      Electronically signed by: Andrew Cruz  Date:    02/01/2025  Time:    02:41               Narrative:    EXAMINATION:  US ABDOMEN LIMITED    CLINICAL HISTORY:  epigastric abdominal pain;    TECHNIQUE:  Limited ultrasound of the right upper quadrant of the abdomen (including pancreas, liver, gallbladder, common bile duct) was performed.    COMPARISON:  Ultrasound abdomen limited October 6, 2024    FINDINGS:  The technologist indicates the examination is limited due to bowel gas.  The pancreas is not visualized on this examination obscured due to bowel gas shadowing and therefore not evaluated on this examination.  The gallbladder is surgically absent.    There is no abnormal biliary dilatation, the common duct measures 2.9 mm.  Limited imaging of the IVC appears unremarkable.    The submitted hepatic length measurement is 17.9 cm.  There is no sonographic evidence for focal hepatic mass lesion on submitted imaging.  Limited imaging of the right kidney demonstrates no evidence for hydronephrosis.  The spleen is obscured due to bowel gas shadowing.                                       Medications   ondansetron injection 4 mg (4 mg Intravenous Given 2/1/25 0101)   morphine injection 4  mg (4 mg Intravenous Given 2/1/25 0101)   ketorolac injection 15 mg (15 mg Intravenous Given 2/1/25 0316)   famotidine (PF) injection 20 mg (20 mg Intravenous Given 2/1/25 0317)   aluminum-magnesium hydroxide-simethicone 200-200-20 mg/5 mL suspension 30 mL (30 mLs Oral Given 2/1/25 0316)     And   LIDOcaine viscous HCl 2% oral solution 15 mL (15 mLs Oral Given 2/1/25 0316)     Medical Decision Making    69-year-old female presenting with epigastric abdominal pain and chest pain.  The patient reports intermittent episodes.  The patient did have symptoms while in the emergency room.  Chart review shows recent partial cholecystectomy followed by ERCP.  Patient attributed today symptoms secondary to biliary colic however patient's LFTs appeared within normal limits.  No CBD dilation.  Following analgesia patient's symptoms resolved.  CT image on 11/20/2024 showed distal esophageal dilation and proximal stomach dilation with wall thickening.  Upper endoscopy on 11/21 showed normal esophagus and large hiatal hernia.  There appeared to be erythema of the mucosa.  Possible gastritis.  The patient's symptoms all resolved at this time.  Low suspicion for ACS.  No ST changes noted on ECG.  Troponin within normal limits.  Discussed closely following up with Gastroenterology.  Discussed avoiding NSAIDs.  The patient is currently using Protonix.  Discussed using Tums or Pepto-Bismol as needed for pain control.  Patient also has cardiology follow up in the near future.  Discussed return precautions for reoccurrence of symptoms.    Medical Decision Making:     A. Problem List:  1. Epigastric abdominal pain  2. Chest pain     B. Differential diagnosis:    Gastritis, ACS, pancreatitis, biliary colic        E. Review of Previous External Medical Record:  Recent gastroenterology notes and surgical notes reviewed.        ECG:  Please check workup area for ECG interpretation.    Part of the note was done using electronic dictation  services.          Amount and/or Complexity of Data Reviewed  Labs: ordered.  Radiology: ordered.  ECG/medicine tests:  Decision-making details documented in ED Course.    Risk  OTC drugs.  Prescription drug management.            Scribe Attestation:   Scribe #1: I performed the above scribed service and the documentation accurately describes the services I performed. I attest to the accuracy of the note.        ED Course as of 02/01/25 1835   Sat Feb 01, 2025   0200 EKG 12-lead  Time 12:45 a.m.     Rate 56, sinus, regular rhythm, left axis deviation.    QTC of 453.  No ST elevation or depression.  No T-wave inversion or hyperacute T-waves.  No Q-waves present.  RSR V1, V2, V3.    Normal sinus rhythm with incomplete right bundle-branch  [JM]   0233 Per chart review history of gastritis and esophagitis. [JM]   0323 Patient reports all symptoms resolved. [JM]      ED Course User Index  [JM] Romeo Kasper MD                         I, Romeo Kasper, personally performed the services described in this documentation. All medical record entries made by the scribe were at my direction and in my presence. I have reviewed the chart and agree that the record reflects my personal performance and is accurate and complete.      DISCLAIMER: This note was prepared with [employer] voice recognition transcription software. Garbled syntax, mangled pronouns, and other bizarre constructions may be attributed to that software system.   Clinical Impression:  Final diagnoses:  [R10.13] Epigastric abdominal pain          ED Disposition Condition    Discharge Stable          ED Prescriptions    None       Follow-up Information       Follow up With Specialties Details Why Contact Info    Shannon Chaney MD Gastroenterology Schedule an appointment as soon as possible for a visit in 1 week Gastroenterology [address] [place], [place] [address] [address]  [phone]      Fabienne Erwin NP Family Medicine Schedule an appointment as soon as  possible for a visit in 1 week Primary care [address] [facility] [place] [address]  [phone]      [facility] Emergency Dept Emergency Medicine  If symptoms worsen [address] [facility] [place] [id number] [phone]             Romeo Kasper MD  02/01/25 1720

## 2025-02-01 NOTE — DISCHARGE INSTRUCTIONS
Follow up with your primary care provider for re-evaluation of symptoms within the next 1 week.    Also follow up with the your gastroenterologist in the next 1-2 weeks for re-evaluation.    Avoid any NSAIDs containing medications including ibuprofen, meloxicam, naproxen.    Seek medical care for worsening symptoms or concerns.

## 2025-02-02 LAB
OHS QRS DURATION: 118 MS
OHS QTC CALCULATION: 453 MS

## 2025-03-21 ENCOUNTER — TELEPHONE (OUTPATIENT)
Dept: ENDOSCOPY | Facility: HOSPITAL | Age: 70
End: 2025-03-21
Payer: MEDICARE

## 2025-03-21 DIAGNOSIS — K80.50 CHOLEDOCHOLITHIASIS: Primary | ICD-10-CM

## 2025-03-21 NOTE — TELEPHONE ENCOUNTER
Spoke to pt to schedule procedure(s) ERCP        Physician to perform procedure(s) Dr. JACKIE Chaney  Date of Procedure (s) 4/28/25  Arrival Time 8:00 AM  Time of Procedure(s) 9:00 AM   Location of Procedure(s) [facility] Floor  Type of Rx Prep sent to patient: N/A  Instructions provided to patient via [facility]    Patient was informed on the following information and verbalized understanding. Screening questionnaire reviewed with patient and complete. If procedure requires anesthesia, a responsible adult needs to be present to accompany the patient home, patient cannot drive after receiving anesthesia. Appointment details are tentative, especially check-in time. Patient will receive a prep-op call 7 days prior to confirm check-in time for procedure. If applicable the patient should contact their pharmacy to verify Rx for procedure prep is ready for pick-up. Patient was advised to call the scheduling department at [phone] if pharmacy states no Rx is available. Patient was advised to call the endoscopy scheduling department if any questions or concerns arise.      [facility] Scheduling Department

## 2025-03-21 NOTE — TELEPHONE ENCOUNTER
- Repeat ERCP in 3 months to remove covered metal                          biliary stent and hopefully for final clearance of                          the CBD.                          - Watch for pancreatitis, bleeding, perforation,                          and cholangitis.   Attending Participation:        I personally performed the entire procedure.   Shannon Chaney MD   1/29/2025 11:16:01 AM

## 2025-04-24 ENCOUNTER — TELEPHONE (OUTPATIENT)
Dept: ENDOSCOPY | Facility: HOSPITAL | Age: 70
End: 2025-04-24
Payer: MEDICARE

## 2025-04-24 NOTE — TELEPHONE ENCOUNTER
Called pt to confirm date and time of procedure .April 28,2025 for 0800 the patient understands instructions and is confirmed.

## 2025-04-28 ENCOUNTER — ANESTHESIA (OUTPATIENT)
Dept: ENDOSCOPY | Facility: HOSPITAL | Age: 70
End: 2025-04-28
Payer: MEDICARE

## 2025-04-28 ENCOUNTER — HOSPITAL ENCOUNTER (OUTPATIENT)
Facility: HOSPITAL | Age: 70
Discharge: HOME | End: 2025-04-28
Attending: INTERNAL MEDICINE | Admitting: INTERNAL MEDICINE
Payer: MEDICARE

## 2025-04-28 ENCOUNTER — ANESTHESIA EVENT (OUTPATIENT)
Dept: ENDOSCOPY | Facility: HOSPITAL | Age: 70
End: 2025-04-28
Payer: MEDICARE

## 2025-04-28 VITALS
BODY MASS INDEX: 19.55 KG/M2 | OXYGEN SATURATION: 98 % | HEART RATE: 67 BPM | RESPIRATION RATE: 12 BRPM | DIASTOLIC BLOOD PRESSURE: 77 MMHG | TEMPERATURE: 98 F | WEIGHT: 129 LBS | SYSTOLIC BLOOD PRESSURE: 171 MMHG | HEIGHT: 68 IN

## 2025-04-28 DIAGNOSIS — K80.50 CHOLEDOCHOLITHIASIS: ICD-10-CM

## 2025-04-28 PROCEDURE — 43275 ERCP REMOVE FORGN BODY DUCT: CPT | Mod: ,,, | Performed by: INTERNAL MEDICINE

## 2025-04-28 PROCEDURE — 27202125 HC BALLOON, EXTRACTION (ANY): Performed by: INTERNAL MEDICINE

## 2025-04-28 PROCEDURE — 25000003 PHARM REV CODE 250: Performed by: INTERNAL MEDICINE

## 2025-04-28 PROCEDURE — 63600175 PHARM REV CODE 636 W HCPCS: Performed by: STUDENT IN AN ORGANIZED HEALTH CARE EDUCATION/TRAINING PROGRAM

## 2025-04-28 PROCEDURE — 25500020 PHARM REV CODE 255: Performed by: INTERNAL MEDICINE

## 2025-04-28 PROCEDURE — C1769 GUIDE WIRE: HCPCS | Performed by: INTERNAL MEDICINE

## 2025-04-28 PROCEDURE — 74328 X-RAY BILE DUCT ENDOSCOPY: CPT | Mod: 26,,, | Performed by: INTERNAL MEDICINE

## 2025-04-28 PROCEDURE — 25000003 PHARM REV CODE 250: Performed by: STUDENT IN AN ORGANIZED HEALTH CARE EDUCATION/TRAINING PROGRAM

## 2025-04-28 PROCEDURE — 74328 X-RAY BILE DUCT ENDOSCOPY: CPT | Mod: TC | Performed by: INTERNAL MEDICINE

## 2025-04-28 PROCEDURE — 43275 ERCP REMOVE FORGN BODY DUCT: CPT | Performed by: INTERNAL MEDICINE

## 2025-04-28 PROCEDURE — 37000009 HC ANESTHESIA EA ADD 15 MINS: Performed by: INTERNAL MEDICINE

## 2025-04-28 PROCEDURE — 27202303 HC GRASPER, SPECIALTY: Performed by: INTERNAL MEDICINE

## 2025-04-28 PROCEDURE — 37000008 HC ANESTHESIA 1ST 15 MINUTES: Performed by: INTERNAL MEDICINE

## 2025-04-28 RX ORDER — PROPOFOL 10 MG/ML
VIAL (ML) INTRAVENOUS
Status: DISCONTINUED | OUTPATIENT
Start: 2025-04-28 | End: 2025-04-28

## 2025-04-28 RX ORDER — SODIUM CHLORIDE 9 MG/ML
INJECTION, SOLUTION INTRAVENOUS CONTINUOUS
Status: DISCONTINUED | OUTPATIENT
Start: 2025-04-28 | End: 2025-04-28 | Stop reason: HOSPADM

## 2025-04-28 RX ORDER — INDOMETHACIN 50 MG/1
SUPPOSITORY RECTAL
Status: DISCONTINUED | OUTPATIENT
Start: 2025-04-28 | End: 2025-04-28 | Stop reason: HOSPADM

## 2025-04-28 RX ORDER — CIPROFLOXACIN 2 MG/ML
INJECTION, SOLUTION INTRAVENOUS
Status: DISCONTINUED | OUTPATIENT
Start: 2025-04-28 | End: 2025-04-28

## 2025-04-28 RX ORDER — LIDOCAINE HYDROCHLORIDE 20 MG/ML
INJECTION INTRAVENOUS
Status: DISCONTINUED | OUTPATIENT
Start: 2025-04-28 | End: 2025-04-28

## 2025-04-28 RX ORDER — SODIUM CHLORIDE 0.9 % (FLUSH) 0.9 %
10 SYRINGE (ML) INJECTION
Status: DISCONTINUED | OUTPATIENT
Start: 2025-04-28 | End: 2025-04-28 | Stop reason: HOSPADM

## 2025-04-28 RX ADMIN — PROPOFOL 60 MG: 10 INJECTION, EMULSION INTRAVENOUS at 08:04

## 2025-04-28 RX ADMIN — CIPROFLOXACIN 400 MG: 2 INJECTION, SOLUTION INTRAVENOUS at 08:04

## 2025-04-28 RX ADMIN — SODIUM CHLORIDE: 0.9 INJECTION, SOLUTION INTRAVENOUS at 08:04

## 2025-04-28 RX ADMIN — LIDOCAINE HYDROCHLORIDE 100 MG: 20 INJECTION, SOLUTION INTRAVENOUS at 08:04

## 2025-04-28 RX ADMIN — PROPOFOL 150 MCG/KG/MIN: 10 INJECTION, EMULSION INTRAVENOUS at 08:04

## 2025-04-28 NOTE — H&P
Short Stay Endoscopy History and Physical    PCP - Fabienne Erwin NP  Referring Physician - Shannon Chaney MD  [address] [place] [address]    Procedure - ERCP  ASA - per anesthesia  Mallampati - per anesthesia  History of Anesthesia problems - per anesthesia  Family history Anesthesia problems -  per anesthesia   Plan of anesthesia - per anesthesia    HPI:  This is a 69 y.o. female here for evaluation of: ERCP for biliary stent removal and clearance of the CBD; patient has had a prior subtotal cholecystectomy; has had prior choledocholithiasis    Reflux - no  Dysphagia - no  Abdominal pain - no  Diarrhea - no    ROS:  Constitutional: No fevers, chills, No weight loss  CV: No chest pain  Pulm: No cough, No shortness of breath  Ophtho: No vision changes  GI: see HPI  Derm: No rash    Medical History:  has a past medical history of Allergy, Anxiety, Arthritis, Cataract, Depressive disorder, not elsewhere classified, Esophageal reflux, Hypertension, Impaired fasting glucose, Joint pain, Obesity, unspecified, and Other and unspecified hyperlipidemia.    Surgical History:  has a past surgical history that includes right  arm  surgery; R knee replacement;  section (); Hysterectomy; Blepharoplasty (Bilateral, 3/3/2021); Repair of retinal detachment with vitrectomy (Left, 2022); Phacoemulsification of cataract (Left, 10/27/2022); Intraocular prosthesis insertion (Left, 10/27/2022); Colonoscopy (N/A, 2023); ERCP (N/A, 10/7/2024); Robot-assisted cholecystectomy using da Omar Xi (N/A, 10/9/2024); Esophagogastroduodenoscopy (N/A, 2024); ERCP (N/A, 2024); and ERCP (N/A, 2025).    Family History: family history includes Cancer in her mother; Cervical cancer in her sister; Liver disease in her father; Thyroid disease in her sister; Tremor in her sister..    Social History:  reports that she has been smoking cigarettes. She has a 30 pack-year smoking history. She has never  used smokeless tobacco. She reports current alcohol use. She reports current drug use. Frequency: 14.00 times per week. Drug: Marijuana.    Review of patient's allergies indicates:   Allergen Reactions    Sulfa (sulfonamide antibiotics) Hives    Ace inhibitors Other (See Comments)     Cough         Medications:   Prescriptions Prior to Admission[1]    Physical Exam:    Vital Signs:   Vitals:    04/28/25 0755   BP: 138/60   Pulse: (!) 51   Resp: 18   Temp: 97.7 °F (36.5 °C)       General Appearance: Well appearing in no acute distress    Labs:  Lab Results   Component Value Date    WBC 9.19 02/01/2025    HGB 14.6 02/01/2025    HCT 43.1 02/01/2025     02/01/2025    CHOL 150 03/10/2025    TRIG 74 03/10/2025    HDL 50 03/10/2025    ALT 22 03/10/2025    AST 25 03/10/2025     03/10/2025    K 4.4 03/10/2025     03/10/2025    CREATININE 0.6 03/10/2025    BUN 11 03/10/2025    CO2 27 03/10/2025    TSH 3.330 03/10/2025    INR 1.0 07/28/2022    HGBA1C 5.3 03/10/2025       I have explained the risks and benefits of this endoscopic procedure to the patient including but not limited to bleeding, pancreatitis, inflammation, infection, perforation, missing a lesion and death.      Shannon Chaney MD         [1]   Medications Prior to Admission   Medication Sig Dispense Refill Last Dose/Taking    acetaminophen (TYLENOL) 500 MG tablet Take 500 mg by mouth every 6 (six) hours as needed for Pain.   4/27/2025    ALPRAZolam (XANAX) 0.25 MG tablet TAKE 1 TABLET BY MOUTH THREE TIMES DAILY 90 tablet 0 4/27/2025    atorvastatin (LIPITOR) 40 MG tablet Take 1 tablet by mouth once daily 90 tablet 3 4/27/2025    busPIRone (BUSPAR) 15 MG tablet TAKE 1 TABLET BY MOUTH THREE TIMES DAILY 90 tablet 5 4/27/2025    citalopram (CELEXA) 20 MG tablet Take 3 tablets (60 mg total) by mouth once daily. Take 1 tablet by mouth once daily (Patient taking differently: Take 60 mg by mouth 3 (three) times daily. Take 1 tablet by mouth TID daily)  90 tablet 11 4/27/2025    cyclobenzaprine (FLEXERIL) 5 MG tablet TAKE 1 TABLET BY MOUTH TWICE DAILY AS NEEDED FOR MUSCLE SPASM 90 tablet 3 4/27/2025    ergocalciferol (ERGOCALCIFEROL) 50,000 unit Cap Take 1 capsule (50,000 Units total) by mouth every 7 days. 12 capsule 3 Past Week    olmesartan (BENICAR) 20 MG tablet TAKE 1 TABLET BY MOUTH ONCE DAILY IN THE EVENING 90 tablet 3 4/27/2025    pantoprazole (PROTONIX) 40 MG tablet Take 1 tablet (40 mg total) by mouth once daily. 90 tablet 3 4/27/2025    traZODone (DESYREL) 50 MG tablet Take 50 mg by mouth every morning.   4/27/2025

## 2025-04-28 NOTE — ANESTHESIA POSTPROCEDURE EVALUATION
Anesthesia Post Evaluation    Patient: Patricia Ramirez    Procedure(s) Performed: Procedure(s) (LRB):  ERCP (ENDOSCOPIC RETROGRADE CHOLANGIOPANCREATOGRAPHY) (N/A)    Final Anesthesia Type: general      Patient location during evaluation: GI PACU  Patient participation: Yes- Able to Participate  Level of consciousness: awake and alert  Post-procedure vital signs: reviewed and stable  Pain management: adequate  Airway patency: patent    PONV status at discharge: No PONV  Anesthetic complications: no      Cardiovascular status: blood pressure returned to baseline and hemodynamically stable  Respiratory status: unassisted  Hydration status: euvolemic  Follow-up not needed.              Vitals Value Taken Time   /77 04/28/25 09:30   Temp  04/28/25 10:16   Pulse 67 04/28/25 09:30   Resp 12 04/28/25 09:30   SpO2 98 % 04/28/25 09:30         Event Time   Out of Recovery 09:30:00         Pain/Ede Score: Ede Score: 10 (4/28/2025  9:30 AM)

## 2025-04-28 NOTE — ANESTHESIA PREPROCEDURE EVALUATION
04/28/2025  Patricia Ramirez is a 69 y.o., female.      Pre-op Assessment     I have reviewed the Nursing Notes.    I have reviewed the Medications.     Review of Systems  Anesthesia Hx:  No problems with previous Anesthesia             Denies Family Hx of Anesthesia complications.     Social:  Non-Smoker, No Alcohol Use       Hematology/Oncology:  Hematology Normal   Oncology Normal                                   EENT/Dental:  EENT/Dental Normal           Cardiovascular:  Exercise tolerance: good   Hypertension    Dysrhythmias                   Shortness of Breath                    Hypertension     Disorder of Cardiac Rhythm     Pulmonary:      Shortness of breath                  Renal/:  Renal/ Normal                 Hepatic/GI:     GERD         Gerd          Musculoskeletal:  Musculoskeletal Normal                Neurological:      Headaches      Dx of Headaches                           Endocrine:  Endocrine Normal            Psych:  Psychiatric History                  Physical Exam  General: Well nourished    Airway:  Mallampati: II / II  Mouth Opening: Normal  TM Distance: Normal  Tongue: Normal  Neck ROM: Normal ROM    Dental:  Intact        Anesthesia Plan  Type of Anesthesia, risks & benefits discussed:    Anesthesia Type: Gen Natural Airway, Gen ETT  Intra-op Monitoring Plan: Standard ASA Monitors  Post Op Pain Control Plan: multimodal analgesia  Induction:  IV  Airway Plan: Direct, Post-Induction  Informed Consent: Informed consent signed with the Patient and all parties understand the risks and agree with anesthesia plan.  All questions answered.   ASA Score: 3    Ready For Surgery From Anesthesia Perspective.     .

## 2025-04-28 NOTE — TRANSFER OF CARE
"Anesthesia Transfer of Care Note    Patient: Patricia Ramirez    Procedure(s) Performed: Procedure(s) (LRB):  ERCP (ENDOSCOPIC RETROGRADE CHOLANGIOPANCREATOGRAPHY) (N/A)    Patient location: GI    Anesthesia Type: MAC    Transport from OR: Transported from OR on room air with adequate spontaneous ventilation    Post pain: adequate analgesia    Post assessment: no apparent anesthetic complications    Post vital signs: stable    Level of consciousness: awake and alert    Nausea/Vomiting: no nausea/vomiting    Complications: none    Transfer of care protocol was followed      Last vitals: Visit Vitals  /60 (BP Location: Left arm, Patient Position: Lying)   Pulse (!) 51   Temp 36.5 °C (97.7 °F) (Temporal)   Resp 18   Ht 5' 8" (1.727 m)   Wt 58.5 kg (129 lb)   SpO2 99%   Breastfeeding No   BMI 19.61 kg/m²     "

## 2025-04-28 NOTE — PROVATION PATIENT INSTRUCTIONS
Discharge Summary/Instructions after an Endoscopic Procedure  Patient Name: Patricia Ramirez  Patient MRN: [id number]  Patient YOB: 1955 Monday, April 28, 2025  Shannon Chaney MD  Dear patient,  As a result of recent federal legislation (The Federal Cures Act), you may   receive lab or pathology results from your procedure in your [facility]   account before your physician is able to contact you. Your physician or   their representative will relay the results to you with their   recommendations at their soonest availability.  Thank you,  Your health is very important to us during the Covid Crisis. Following your   procedure today, you will receive a daily text for 2 weeks asking about   signs or symptoms of Covid 19.  Please respond to this text when you   receive it so we can follow up and keep you as safe as possible.   RESTRICTIONS:  During your procedure today, you received medications for sedation.  These   medications may affect your judgment, balance and coordination.  Therefore,   for 24 hours, you have the following restrictions:   - DO NOT drive a car, operate machinery, make legal/financial decisions,   sign important papers or drink alcohol.    ACTIVITY:  Today: no heavy lifting, straining or running due to procedural   sedation/anesthesia.  The following day: return to full activity including work.  DIET:  Eat and drink normally unless instructed otherwise.     TREATMENT FOR COMMON SIDE EFFECTS:  - Mild abdominal pain, nausea, belching, bloating or excessive gas:  rest,   eat lightly and use a heating pad.  - Sore Throat: treat with throat lozenges and/or gargle with warm salt   water.  - Because air was used during the procedure, expelling large amounts of air   from your rectum or belching is normal.  - If a bowel prep was taken, you may not have a bowel movement for 1-3 days.    This is normal.  SYMPTOMS TO WATCH FOR AND REPORT TO YOUR PHYSICIAN:  1. Abdominal pain or bloating, other than gas  cramps.  2. Chest pain.  3. Back pain.  4. Signs of infection such as: chills or fever occurring within 24 hours   after the procedure.  5. Rectal bleeding, which would show as bright red, maroon, or black stools.   (A tablespoon of blood from the rectum is not serious, especially if   hemorrhoids are present.)  6. Vomiting.  7. Weakness or dizziness.  GO DIRECTLY TO THE NEAREST EMERGENCY ROOM IF YOU HAVE ANY OF THE FOLLOWING:      Difficulty breathing              Chills and/or fever over 101 F   Persistent vomiting and/or vomiting blood   Severe abdominal pain   Severe chest pain   Black, tarry stools   Bleeding- more than one tablespoon   Any other symptom or condition that you feel may need urgent attention  Your doctor recommends these additional instructions:  If any biopsies were taken, your doctors clinic will contact you in 1 to 2   weeks with any results.  - Discharge patient to home (ambulatory).   - Resume previous diet.   - Continue present medications.   - The patient should not require a repeat ERCP unless recurrent   choledocholithiasis or bile duct obstruction is detected/documented.   - Watch for pancreatitis, bleeding, perforation, and cholangitis.   - Return to primary care physician.  For questions, problems or results please call your physician - Shannon Chaney MD.  EMERGENCY PHONE NUMBER: [phone],  LAB RESULTS: [phone]  IF A COMPLICATION OR EMERGENCY SITUATION ARISES AND YOU ARE UNABLE TO REACH   YOUR PHYSICIAN - GO DIRECTLY TO THE EMERGENCY ROOM.  Shannon Chaney MD  4/28/2025 9:08:10 AM  This report has been verified and signed electronically.  Dear patient,  As a result of recent federal legislation (The Federal Cures Act), you may   receive lab or pathology results from your procedure in your [facility]   account before your physician is able to contact you. Your physician or   their representative will relay the results to you with their   recommendations at their soonest  availability.  Thank you,  PROVATION

## 2025-05-16 ENCOUNTER — HOSPITAL ENCOUNTER (EMERGENCY)
Facility: HOSPITAL | Age: 70
Discharge: HOME | End: 2025-05-16
Attending: EMERGENCY MEDICINE
Payer: MEDICARE

## 2025-05-16 VITALS
HEIGHT: 68 IN | TEMPERATURE: 98 F | HEART RATE: 66 BPM | RESPIRATION RATE: 17 BRPM | SYSTOLIC BLOOD PRESSURE: 175 MMHG | BODY MASS INDEX: 19.55 KG/M2 | WEIGHT: 129 LBS | OXYGEN SATURATION: 96 % | DIASTOLIC BLOOD PRESSURE: 77 MMHG

## 2025-05-16 DIAGNOSIS — R10.11 RIGHT UPPER QUADRANT ABDOMINAL PAIN: Primary | ICD-10-CM

## 2025-05-16 LAB
ABSOLUTE EOSINOPHIL (OHS): 0.2 K/UL
ABSOLUTE MONOCYTE (OHS): 0.71 K/UL (ref 0.3–1)
ABSOLUTE NEUTROPHIL COUNT (OHS): 5.98 K/UL (ref 1.8–7.7)
ALBUMIN SERPL BCP-MCNC: 4.2 G/DL (ref 3.5–5.2)
ALLENS TEST: NORMAL
ALP SERPL-CCNC: 126 UNIT/L (ref 40–150)
ALT SERPL W/O P-5'-P-CCNC: 29 UNIT/L (ref 10–44)
ANION GAP (OHS): 11 MMOL/L (ref 8–16)
ANION GAP SERPL CALC-SCNC: 13 MMOL/L (ref 8–16)
AST SERPL-CCNC: 26 UNIT/L (ref 11–45)
BASOPHILS # BLD AUTO: 0.12 K/UL
BASOPHILS NFR BLD AUTO: 1.3 %
BILIRUB SERPL-MCNC: 0.4 MG/DL (ref 0.1–1)
BILIRUB UR QL STRIP.AUTO: NEGATIVE
BUN SERPL-MCNC: 15 MG/DL (ref 8–23)
BUN SERPL-MCNC: 16 MG/DL (ref 6–30)
CALCIUM SERPL-MCNC: 9.1 MG/DL (ref 8.7–10.5)
CHLORIDE SERPL-SCNC: 101 MMOL/L (ref 95–110)
CHLORIDE SERPL-SCNC: 102 MMOL/L (ref 95–110)
CLARITY UR: CLEAR
CO2 SERPL-SCNC: 26 MMOL/L (ref 23–29)
COLOR UR AUTO: YELLOW
CREAT SERPL-MCNC: 0.7 MG/DL (ref 0.5–1.4)
CREAT SERPL-MCNC: 0.7 MG/DL (ref 0.5–1.4)
ERYTHROCYTE [DISTWIDTH] IN BLOOD BY AUTOMATED COUNT: 13.6 % (ref 11.5–14.5)
GFR SERPLBLD CREATININE-BSD FMLA CKD-EPI: >60 ML/MIN/1.73/M2
GLUCOSE SERPL-MCNC: 92 MG/DL (ref 70–110)
GLUCOSE SERPL-MCNC: 92 MG/DL (ref 70–110)
GLUCOSE UR QL STRIP: NEGATIVE
HCT VFR BLD AUTO: 46.9 % (ref 37–48.5)
HCT VFR BLD CALC: 42 %PCV (ref 36–54)
HGB BLD-MCNC: 15.5 GM/DL (ref 12–16)
HGB UR QL STRIP: NEGATIVE
HOLD SPECIMEN: NORMAL
IMM GRANULOCYTES # BLD AUTO: 0.02 K/UL (ref 0–0.04)
IMM GRANULOCYTES NFR BLD AUTO: 0.2 % (ref 0–0.5)
KETONES UR QL STRIP: NEGATIVE
LEUKOCYTE ESTERASE UR QL STRIP: NEGATIVE
LIPASE SERPL-CCNC: 23 U/L (ref 4–60)
LYMPHOCYTES # BLD AUTO: 2.24 K/UL (ref 1–4.8)
MCH RBC QN AUTO: 32.2 PG (ref 27–31)
MCHC RBC AUTO-ENTMCNC: 33 G/DL (ref 32–36)
MCV RBC AUTO: 98 FL (ref 82–98)
NITRITE UR QL STRIP: NEGATIVE
NUCLEATED RBC (/100WBC) (OHS): 0 /100 WBC
PH UR STRIP: 8 [PH]
PLATELET # BLD AUTO: 236 K/UL (ref 150–450)
PMV BLD AUTO: 9.5 FL (ref 9.2–12.9)
POC IONIZED CALCIUM: 1.16 MMOL/L (ref 1.06–1.42)
POC TCO2 (MEASURED): 28 MMOL/L (ref 23–29)
POTASSIUM BLD-SCNC: 3.6 MMOL/L (ref 3.5–5.1)
POTASSIUM SERPL-SCNC: 3.7 MMOL/L (ref 3.5–5.1)
PROT SERPL-MCNC: 8.1 GM/DL (ref 6–8.4)
PROT UR QL STRIP: NEGATIVE
RBC # BLD AUTO: 4.81 M/UL (ref 4–5.4)
RELATIVE EOSINOPHIL (OHS): 2.2 %
RELATIVE LYMPHOCYTE (OHS): 24.2 % (ref 18–48)
RELATIVE MONOCYTE (OHS): 7.7 % (ref 4–15)
RELATIVE NEUTROPHIL (OHS): 64.4 % (ref 38–73)
SAMPLE: NORMAL
SITE: NORMAL
SODIUM BLD-SCNC: 138 MMOL/L (ref 136–145)
SODIUM SERPL-SCNC: 139 MMOL/L (ref 136–145)
SP GR UR STRIP: 1.01
UROBILINOGEN UR STRIP-ACNC: NEGATIVE EU/DL
WBC # BLD AUTO: 9.27 K/UL (ref 3.9–12.7)

## 2025-05-16 PROCEDURE — 25500020 PHARM REV CODE 255: Performed by: EMERGENCY MEDICINE

## 2025-05-16 PROCEDURE — 81003 URINALYSIS AUTO W/O SCOPE: CPT | Performed by: EMERGENCY MEDICINE

## 2025-05-16 PROCEDURE — 80053 COMPREHEN METABOLIC PANEL: CPT | Performed by: EMERGENCY MEDICINE

## 2025-05-16 PROCEDURE — 99285 EMERGENCY DEPT VISIT HI MDM: CPT | Mod: 25

## 2025-05-16 PROCEDURE — 84132 ASSAY OF SERUM POTASSIUM: CPT

## 2025-05-16 PROCEDURE — 82962 GLUCOSE BLOOD TEST: CPT

## 2025-05-16 PROCEDURE — 85014 HEMATOCRIT: CPT

## 2025-05-16 PROCEDURE — 96375 TX/PRO/DX INJ NEW DRUG ADDON: CPT

## 2025-05-16 PROCEDURE — 83690 ASSAY OF LIPASE: CPT | Performed by: EMERGENCY MEDICINE

## 2025-05-16 PROCEDURE — 82565 ASSAY OF CREATININE: CPT

## 2025-05-16 PROCEDURE — 96374 THER/PROPH/DIAG INJ IV PUSH: CPT

## 2025-05-16 PROCEDURE — 99900035 HC TECH TIME PER 15 MIN (STAT)

## 2025-05-16 PROCEDURE — 63600175 PHARM REV CODE 636 W HCPCS: Mod: JZ,TB | Performed by: EMERGENCY MEDICINE

## 2025-05-16 PROCEDURE — 84295 ASSAY OF SERUM SODIUM: CPT

## 2025-05-16 PROCEDURE — 85025 COMPLETE CBC W/AUTO DIFF WBC: CPT | Performed by: EMERGENCY MEDICINE

## 2025-05-16 PROCEDURE — 82330 ASSAY OF CALCIUM: CPT

## 2025-05-16 RX ORDER — MORPHINE SULFATE 4 MG/ML
2 INJECTION, SOLUTION INTRAMUSCULAR; INTRAVENOUS
Refills: 0 | Status: COMPLETED | OUTPATIENT
Start: 2025-05-16 | End: 2025-05-16

## 2025-05-16 RX ORDER — DOXYCYCLINE 100 MG/1
100 CAPSULE ORAL 2 TIMES DAILY
Qty: 14 CAPSULE | Refills: 0 | Status: ON HOLD | OUTPATIENT
Start: 2025-05-16 | End: 2025-05-24 | Stop reason: HOSPADM

## 2025-05-16 RX ORDER — KETOROLAC TROMETHAMINE 30 MG/ML
15 INJECTION, SOLUTION INTRAMUSCULAR; INTRAVENOUS
Status: COMPLETED | OUTPATIENT
Start: 2025-05-16 | End: 2025-05-16

## 2025-05-16 RX ORDER — HYDROCODONE BITARTRATE AND ACETAMINOPHEN 7.5; 325 MG/1; MG/1
1 TABLET ORAL EVERY 6 HOURS PRN
Qty: 12 TABLET | Refills: 0 | Status: SHIPPED | OUTPATIENT
Start: 2025-05-16 | End: 2025-05-19

## 2025-05-16 RX ADMIN — IOHEXOL 75 ML: 350 INJECTION, SOLUTION INTRAVENOUS at 08:05

## 2025-05-16 RX ADMIN — KETOROLAC TROMETHAMINE 15 MG: 30 INJECTION, SOLUTION INTRAMUSCULAR; INTRAVENOUS at 08:05

## 2025-05-16 RX ADMIN — MORPHINE SULFATE 2 MG: 4 INJECTION INTRAVENOUS at 08:05

## 2025-05-16 NOTE — ED NOTES
Pt ambulatory unassisted with steady gait. Speech clear and coherent. Pt states was unable to get ride home secured and does not wish to wait.

## 2025-05-16 NOTE — ED PROVIDER NOTES
Encounter Date: 2025       History     Chief Complaint   Patient presents with    Abdominal Pain     Pt to ER with reports of right lower abd pain since yesterday.. Pt reports had gallbladder surgery few months ago and reports lump to lower abd where drain was removed. Pt rating pain 10/10     69-year-old female with a history of choledocholithiasis status post complicated postop course with multiple stents is presenting with right upper quadrant and epigastric pain.  Patient also notes redness warmth and swelling at the site of her prior stent placement.  Reports symptoms started earlier today.  Notes pain is intense, similar to prior pain associated with the choledocholithiasis.  Notes mild constipation.  Denies fevers, chills, chest pain, shortness of breath.      Review of patient's allergies indicates:   Allergen Reactions    Sulfa (sulfonamide antibiotics) Hives    Ace inhibitors Other (See Comments)     Cough       Past Medical History:   Diagnosis Date    Allergy     Anxiety     Arthritis     Cataract     Depressive disorder, not elsewhere classified     Esophageal reflux     Hypertension     Impaired fasting glucose     Joint pain     Obesity, unspecified     Other and unspecified hyperlipidemia      Past Surgical History:   Procedure Laterality Date    BLEPHAROPLASTY Bilateral 2021    Procedure: BLEPHAROPLASTY;  Surgeon: Maite Acuna MD;  Location: [facility];  Service: Ophthalmology;  Laterality: Bilateral;     SECTION  1973    CHOLECYSTECTOMY      COLONOSCOPY N/A 2023    Procedure: COLONOSCOPY;  Surgeon: Briana Sterling MD;  Location: [facility];  Service: Endoscopy;  Laterality: N/A;    ERCP N/A 10/07/2024    Procedure: ERCP (ENDOSCOPIC RETROGRADE CHOLANGIOPANCREATOGRAPHY);  Surgeon: Catracho Mitchell MD;  Location: [address]);  Service: Endoscopy;  Laterality: N/A;    ERCP N/A 2024    Procedure: ERCP (ENDOSCOPIC RETROGRADE CHOLANGIOPANCREATOGRAPHY);   Surgeon: Romeo Roger MD;  Location: [facility] END[facility];  Service: Endoscopy;  Laterality: N/A;  12/3/24: instructions sent via email-GD  12/5 SWP PRECALL COMPLETED-RT    ERCP N/A 01/29/2025    Procedure: ERCP (ENDOSCOPIC RETROGRADE CHOLANGIOPANCREATOGRAPHY);  Surgeon: Shannon Chaney MD;  Location: [facility] END[facility];  Service: Endoscopy;  Laterality: N/A;  1/6 portal-Repeat ERCP in 6 weeks to remove stent.[place]ola -tt  1/14 SWP-PRECALL COMPLETE-RT  1/23 r/s updated portal instr-pt stated any MD-tt    ERCP N/A 04/28/2025    Procedure: ERCP (ENDOSCOPIC RETROGRADE CHOLANGIOPANCREATOGRAPHY);  Surgeon: Shannon Chaney MD;  Location: [facility] END[facility];  Service: Endoscopy;  Laterality: N/A;  Per Dr. Chaney- Repeat ERCP in 3 months to remove covered metal                          biliary stent and hopefully for final clearance of                          the CBD.     3/21/25-Pt no longer taking Eliquis, instr portal-DS  4/24/25-LVM     ESOPHAGOGASTRODUODENOSCOPY N/A 11/21/2024    Procedure: EGD (ESOPHAGOGASTRODUODENOSCOPY);  Surgeon: Angie Alatorre MD;  Location: [facility];  Service: Gastroenterology;  Laterality: N/A;    HYSTERECTOMY      without BSO    INTRAOCULAR PROSTHESES INSERTION Left 10/27/2022    Procedure: INSERTION, IOL PROSTHESIS;  Surgeon: Palmer Berrios MD;  [facility];  Service: Ophthalmology;  Laterality: Left;    PHACOEMULSIFICATION OF CATARACT Left 10/27/2022    Procedure: PHACOEMULSIFICATION, CATARACT;  Surgeon: Palmer Berrios MD;  Location: [facility] OR;  Service: Ophthalmology;  Laterality: Left;  RN PHONE PREOP 10/21/2022   ARRIVAL 9:00 AM    R knee replacement      REPAIR OF RETINAL DETACHMENT WITH VITRECTOMY Left 02/28/2022    Procedure: REPAIR, RETINAL DETACHMENT, WITH VITRECTOMY;  Surgeon: MINO Martinez MD;  Location: [address];  Service: Ophthalmology;  Laterality: Left;  Spoke with SID Garcia. Pt was instructed nothing to eat after 8am and to arrive at 2pm for preop. 430pm case start  request.    right  arm  surgery      ROBOT-ASSISTED CHOLECYSTECTOMY USING DA GENO XI N/A 10/09/2024    Procedure: XI ROBOTIC SUB TOTAL CHOLECYSTECTOMY; DRAIN PLACEMENT;  Surgeon: Rigoberto Ponce MD;  Location: [facility] OR;  Service: General;  Laterality: N/A;     Family History   Problem Relation Name Age of Onset    Cancer Mother          lung    Liver disease Father      Thyroid disease Sister      Cervical cancer Sister      Tremor Sister       Social History[1]  Review of Systems   Constitutional:  Negative for fever.   Respiratory:  Negative for shortness of breath.    Cardiovascular:  Negative for chest pain.   Gastrointestinal:  Positive for abdominal pain, constipation and nausea. Negative for vomiting.   Genitourinary:  Negative for dysuria.   Musculoskeletal:  Negative for back pain.   Skin:  Negative for rash.   Neurological:  Negative for weakness.       Physical Exam     Initial Vitals [05/16/25 0722]   BP Pulse Resp Temp SpO2   (!) 170/74 80 18 98.3 °F (36.8 °C) (!) 94 %      MAP       --         Physical Exam    Nursing note and vitals reviewed.  Constitutional: She appears well-developed and well-nourished. She is not diaphoretic. No distress.   HENT:   Head: Normocephalic and atraumatic.   Nose: Nose normal.   Eyes: EOM are normal. Pupils are equal, round, and reactive to light. No scleral icterus.   Neck: Neck supple.   Normal range of motion.  Cardiovascular:  Normal rate, regular rhythm, normal heart sounds and intact distal pulses.     Exam reveals no gallop and no friction rub.       No murmur heard.  Pulmonary/Chest: Breath sounds normal. No stridor. No respiratory distress. She has no wheezes. She has no rhonchi. She has no rales.   Abdominal: Abdomen is soft. Bowel sounds are normal. She exhibits no distension. There is abdominal tenderness. There is no rebound and no guarding.   Musculoskeletal:         General: No tenderness or edema. Normal range of motion.      Cervical back: Normal range  of motion and neck supple.     Neurological: She is oriented to person, place, and time. No cranial nerve deficit.   Skin: Skin is warm and dry. No rash noted.   Psychiatric: She has a normal mood and affect. Her behavior is normal.         ED Course   Procedures  Labs Reviewed   CBC WITH DIFFERENTIAL - Abnormal       Result Value    WBC 9.27      RBC 4.81      HGB 15.5      HCT 46.9      MCV 98      MCH 32.2 (*)     MCHC 33.0      RDW 13.6      Platelet Count 236      MPV 9.5      Nucleated RBC 0      Neut % 64.4      Lymph % 24.2      Mono % 7.7      Eos % 2.2      Basophil % 1.3      Imm Grans % 0.2      Neut # 5.98      Lymph # 2.24      Mono # 0.71      Eos # 0.20      Baso # 0.12      Imm Grans # 0.02     COMPREHENSIVE METABOLIC PANEL - Normal    Sodium 139      Potassium 3.7      Chloride 102      CO2 26      Glucose 92      BUN 15      Creatinine 0.7      Calcium 9.1      Protein Total 8.1      Albumin 4.2      Bilirubin Total 0.4            AST 26      ALT 29      Anion Gap 11      eGFR >60     LIPASE - Normal    Lipase Level 23     URINALYSIS, REFLEX TO URINE CULTURE - Normal    Color, UA Yellow      Appearance, UA Clear      pH, UA 8.0      Spec Grav UA 1.010      Protein, UA Negative      Glucose, UA Negative      Ketones, UA Negative      Bilirubin, UA Negative      Blood, UA Negative      Nitrites, UA Negative      Urobilinogen, UA Negative      Leukocyte Esterase, UA Negative     CBC W/ AUTO DIFFERENTIAL    Narrative:     The following orders were created for panel order CBC W/ AUTO DIFFERENTIAL.  Procedure                               Abnormality         Status                     ---------                               -----------         ------                     CBC with Differential[[id number]]       Abnormal            Final result                 Please view results for these tests on the individual orders.   GREY TOP URINE HOLD    Extra Tube Hold for add-ons.     ISTAT PROCEDURE     POC Glucose 92      POC BUN 16      POC Creatinine 0.7      POC Sodium 138      POC Potassium 3.6      POC Chloride 101      POC TCO2 (MEASURED) 28      POC Anion Gap 13      POC Ionized Calcium 1.16      POC Hematocrit 42      Sample VENOUS      Site Other      Allens Test N/A     ISTAT CHEM8          Imaging Results              CT Abdomen Pelvis With IV Contrast NO Oral Contrast (Final result)  Result time 05/16/25 09:08:55      Final result by Daniel Kamara MD (05/16/25 09:08:55)                   Impression:      No acute intra-abdominal process.      Electronically signed by: Daniel Kamara MD  Date:    05/16/2025  Time:    09:08               Narrative:    EXAMINATION:  CT ABDOMEN PELVIS WITH IV CONTRAST    CLINICAL HISTORY:  Peritonitis or perforation suspected;Epigastric pain;Biliary pain;    TECHNIQUE:  Low dose axial images, sagittal and coronal reformations were obtained from the lung bases to the pubic symphysis following the IV administration of 75 mL of Omnipaque 350 .  Oral contrast was not given.    COMPARISON:  11/20/2024    FINDINGS:  Limited evaluation lung bases show no concerning masses or nodules.  Partial visualization of mitral calcifications.    The liver is normal in size.  No solid mass or biliary ductal dilatation.  Gallbladder is absent.    Spleen, adrenal glands, and pancreas show no focal abnormalities.    Bilateral kidneys are normal in size and position.  No hydronephrosis or nephrolithiasis.  The urinary bladder shows no focal wall thickening.  The uterus is absent.    Gastrointestinal tract shows no wall thickening or obstruction.  Small hiatal hernia.  No free fluid or free gas in the visualized abdomen.  Normal appendix is identified.  No significant lymphadenopathy.    Vascular structures show atherosclerosis without aneurysm.  No acute fracture or destructive osseous lesion.                                       Medications   ketorolac injection 15 mg (15 mg  Intravenous Given 5/16/25 0805)   morphine injection 2 mg (2 mg Intravenous Given 5/16/25 0804)   iohexoL (OMNIPAQUE 350) injection 75 mL (75 mLs Intravenous Given 5/16/25 0840)     Medical Decision Making  Amount and/or Complexity of Data Reviewed  Labs: ordered.  Radiology: ordered.    Risk  Prescription drug management.                          Medical Decision Making:   Initial Assessment:   69-year-old female presenting with right upper quadrant pain.  Recent biliary surgery with stent.  Some tenderness in a small lump over the area of prior stent.  No fluctuance.  Minimal redness.  No rebound or guarding.  Abdomen otherwise soft.  Lab workup unremarkable, no loose leukocytosis or elevated LFTs.  CT scan also reassuring.  Patient given analgesia with good improvement.  Unclear cause of symptoms, superficial infection or abscess possible overlying tract, deep tissue infection unlikely.  Patient provided pain control.  Will discharge on doxy given possible superficial skin infection, will provide analgesia.  Believe she is safe for discharge home.  Discussed return precautions.             Clinical Impression:  Final diagnoses:  [R10.11] Right upper quadrant abdominal pain (Primary)          ED Disposition Condition    Discharge Stable          ED Prescriptions       Medication Sig Dispense Start Date End Date Auth. Provider    doxycycline (VIBRAMYCIN) 100 MG Cap Take 1 capsule (100 mg total) by mouth 2 (two) times daily. for 7 days 14 capsule 5/16/2025 5/23/2025 Nasim Higginbotham MD    HYDROcodone-acetaminophen (NORCO) 7.5-325 mg per tablet Take 1 tablet by mouth every 6 (six) hours as needed. 12 tablet 5/16/2025 5/19/2025 Nasim Higginbotham MD          Follow-up Information       Follow up With Specialties Details Why Contact Info    Fabienne Erwin NP Family Medicine Schedule an appointment as soon as possible for a visit   [address] [place] [address]  [phone]      [facility] - Emergency  Dept Emergency Medicine  As needed, If symptoms worsen [address]  [facility] [place] [address] [phone]                 [1]   Social History  Tobacco Use    Smoking status: Every Day     Current packs/day: 0.75     Average packs/day: 0.8 packs/day for 40.0 years (30.0 ttl pk-yrs)     Types: Cigarettes    Smokeless tobacco: Never   Substance Use Topics    Alcohol use: Yes     Comment: rarely    Drug use: Yes     Frequency: 14.0 times per week     Types: Marijuana        Nasim Higginbotham MD  05/16/25 1500

## 2025-05-16 NOTE — ED TRIAGE NOTES
Patient presented to ED via POV with a chief complain of abdominal discomfort which Pt. described as pressure pain. Pain is rated as 10/10 that started yesterday. Pt. Reported that she had gallbladder surgery and Lump is noted where drain was removed at right lower quadrant. No SOB or chest pain noted upon assessment.

## 2025-05-21 ENCOUNTER — HOSPITAL ENCOUNTER (INPATIENT)
Facility: HOSPITAL | Age: 70
LOS: 3 days | Discharge: HOME | DRG: 381 | End: 2025-05-24
Attending: EMERGENCY MEDICINE
Payer: MEDICARE

## 2025-05-21 ENCOUNTER — PATIENT OUTREACH (OUTPATIENT)
Dept: ADMINISTRATIVE | Facility: OTHER | Age: 70
End: 2025-05-21
Payer: MEDICARE

## 2025-05-21 DIAGNOSIS — K31.1 GASTRIC OUTLET OBSTRUCTION: ICD-10-CM

## 2025-05-21 DIAGNOSIS — K56.609 SBO (SMALL BOWEL OBSTRUCTION): Primary | ICD-10-CM

## 2025-05-21 DIAGNOSIS — Z97.8 NASOGASTRIC TUBE PRESENT: ICD-10-CM

## 2025-05-21 DIAGNOSIS — R07.9 CHEST PAIN: ICD-10-CM

## 2025-05-21 DIAGNOSIS — R10.13 EPIGASTRIC ABDOMINAL PAIN: ICD-10-CM

## 2025-05-21 DIAGNOSIS — Z90.49 HISTORY OF LAPAROSCOPIC CHOLECYSTECTOMY: Chronic | ICD-10-CM

## 2025-05-21 LAB
ABSOLUTE EOSINOPHIL (OHS): 0.04 K/UL
ABSOLUTE MONOCYTE (OHS): 0.43 K/UL (ref 0.3–1)
ABSOLUTE NEUTROPHIL COUNT (OHS): 9.73 K/UL (ref 1.8–7.7)
ALBUMIN SERPL BCP-MCNC: 3.7 G/DL (ref 3.5–5.2)
ALP SERPL-CCNC: 101 UNIT/L (ref 40–150)
ALT SERPL W/O P-5'-P-CCNC: 17 UNIT/L (ref 10–44)
ANION GAP (OHS): 11 MMOL/L (ref 8–16)
AST SERPL-CCNC: 14 UNIT/L (ref 11–45)
BASOPHILS # BLD AUTO: 0.13 K/UL
BASOPHILS NFR BLD AUTO: 1.1 %
BILIRUB SERPL-MCNC: 0.4 MG/DL (ref 0.1–1)
BUN SERPL-MCNC: 14 MG/DL (ref 8–23)
CALCIUM SERPL-MCNC: 8.6 MG/DL (ref 8.7–10.5)
CHLORIDE SERPL-SCNC: 108 MMOL/L (ref 95–110)
CO2 SERPL-SCNC: 22 MMOL/L (ref 23–29)
CREAT SERPL-MCNC: 0.6 MG/DL (ref 0.5–1.4)
ERYTHROCYTE [DISTWIDTH] IN BLOOD BY AUTOMATED COUNT: 14.1 % (ref 11.5–14.5)
GFR SERPLBLD CREATININE-BSD FMLA CKD-EPI: >60 ML/MIN/1.73/M2
GLUCOSE SERPL-MCNC: 112 MG/DL (ref 70–110)
HCT VFR BLD AUTO: 51.6 % (ref 37–48.5)
HGB BLD-MCNC: 17.5 GM/DL (ref 12–16)
HOLD SPECIMEN: NORMAL
IMM GRANULOCYTES # BLD AUTO: 0.06 K/UL (ref 0–0.04)
IMM GRANULOCYTES NFR BLD AUTO: 0.5 % (ref 0–0.5)
LACTATE SERPL-SCNC: 0.8 MMOL/L (ref 0.5–2.2)
LIPASE SERPL-CCNC: 8 U/L (ref 4–60)
LYMPHOCYTES # BLD AUTO: 1.16 K/UL (ref 1–4.8)
MCH RBC QN AUTO: 32.5 PG (ref 27–31)
MCHC RBC AUTO-ENTMCNC: 33.9 G/DL (ref 32–36)
MCV RBC AUTO: 96 FL (ref 82–98)
NUCLEATED RBC (/100WBC) (OHS): 0 /100 WBC
PLATELET # BLD AUTO: 224 K/UL (ref 150–450)
PMV BLD AUTO: 10 FL (ref 9.2–12.9)
POTASSIUM SERPL-SCNC: 4.7 MMOL/L (ref 3.5–5.1)
PROT SERPL-MCNC: 7.2 GM/DL (ref 6–8.4)
RBC # BLD AUTO: 5.38 M/UL (ref 4–5.4)
RELATIVE EOSINOPHIL (OHS): 0.3 %
RELATIVE LYMPHOCYTE (OHS): 10 % (ref 18–48)
RELATIVE MONOCYTE (OHS): 3.7 % (ref 4–15)
RELATIVE NEUTROPHIL (OHS): 84.4 % (ref 38–73)
SODIUM SERPL-SCNC: 141 MMOL/L (ref 136–145)
WBC # BLD AUTO: 11.55 K/UL (ref 3.9–12.7)

## 2025-05-21 PROCEDURE — 96361 HYDRATE IV INFUSION ADD-ON: CPT

## 2025-05-21 PROCEDURE — 63600175 PHARM REV CODE 636 W HCPCS

## 2025-05-21 PROCEDURE — 99285 EMERGENCY DEPT VISIT HI MDM: CPT | Mod: 25

## 2025-05-21 PROCEDURE — 93005 ELECTROCARDIOGRAM TRACING: CPT

## 2025-05-21 PROCEDURE — 25500020 PHARM REV CODE 255: Performed by: EMERGENCY MEDICINE

## 2025-05-21 PROCEDURE — 93010 ELECTROCARDIOGRAM REPORT: CPT | Mod: ,,, | Performed by: INTERNAL MEDICINE

## 2025-05-21 PROCEDURE — 83690 ASSAY OF LIPASE: CPT | Performed by: EMERGENCY MEDICINE

## 2025-05-21 PROCEDURE — 80053 COMPREHEN METABOLIC PANEL: CPT | Performed by: EMERGENCY MEDICINE

## 2025-05-21 PROCEDURE — 25000003 PHARM REV CODE 250: Performed by: EMERGENCY MEDICINE

## 2025-05-21 PROCEDURE — 36415 COLL VENOUS BLD VENIPUNCTURE: CPT | Performed by: EMERGENCY MEDICINE

## 2025-05-21 PROCEDURE — 36415 COLL VENOUS BLD VENIPUNCTURE: CPT

## 2025-05-21 PROCEDURE — 85025 COMPLETE CBC W/AUTO DIFF WBC: CPT | Performed by: EMERGENCY MEDICINE

## 2025-05-21 PROCEDURE — 21400001 HC TELEMETRY ROOM

## 2025-05-21 PROCEDURE — 83605 ASSAY OF LACTIC ACID: CPT

## 2025-05-21 PROCEDURE — 63600175 PHARM REV CODE 636 W HCPCS: Performed by: EMERGENCY MEDICINE

## 2025-05-21 PROCEDURE — 25000003 PHARM REV CODE 250

## 2025-05-21 PROCEDURE — 96375 TX/PRO/DX INJ NEW DRUG ADDON: CPT

## 2025-05-21 PROCEDURE — 96374 THER/PROPH/DIAG INJ IV PUSH: CPT

## 2025-05-21 PROCEDURE — 96376 TX/PRO/DX INJ SAME DRUG ADON: CPT

## 2025-05-21 RX ORDER — SODIUM CHLORIDE 0.9 % (FLUSH) 0.9 %
10 SYRINGE (ML) INJECTION EVERY 12 HOURS PRN
Status: DISCONTINUED | OUTPATIENT
Start: 2025-05-21 | End: 2025-05-24 | Stop reason: HOSPADM

## 2025-05-21 RX ORDER — VALSARTAN 80 MG/1
320 TABLET ORAL DAILY
Status: DISCONTINUED | OUTPATIENT
Start: 2025-05-22 | End: 2025-05-24 | Stop reason: HOSPADM

## 2025-05-21 RX ORDER — TALC
6 POWDER (GRAM) TOPICAL NIGHTLY PRN
Status: DISCONTINUED | OUTPATIENT
Start: 2025-05-21 | End: 2025-05-24 | Stop reason: HOSPADM

## 2025-05-21 RX ORDER — ATORVASTATIN CALCIUM 40 MG/1
40 TABLET, FILM COATED ORAL DAILY
Status: DISCONTINUED | OUTPATIENT
Start: 2025-05-22 | End: 2025-05-24 | Stop reason: HOSPADM

## 2025-05-21 RX ORDER — MORPHINE SULFATE 4 MG/ML
1 INJECTION, SOLUTION INTRAMUSCULAR; INTRAVENOUS EVERY 6 HOURS PRN
Status: DISCONTINUED | OUTPATIENT
Start: 2025-05-21 | End: 2025-05-24 | Stop reason: HOSPADM

## 2025-05-21 RX ORDER — IBUPROFEN 200 MG
24 TABLET ORAL
Status: DISCONTINUED | OUTPATIENT
Start: 2025-05-21 | End: 2025-05-24 | Stop reason: HOSPADM

## 2025-05-21 RX ORDER — PANTOPRAZOLE SODIUM 40 MG/10ML
40 INJECTION, POWDER, LYOPHILIZED, FOR SOLUTION INTRAVENOUS DAILY
Status: DISCONTINUED | OUTPATIENT
Start: 2025-05-22 | End: 2025-05-23

## 2025-05-21 RX ORDER — MORPHINE SULFATE 4 MG/ML
4 INJECTION, SOLUTION INTRAMUSCULAR; INTRAVENOUS
Refills: 0 | Status: COMPLETED | OUTPATIENT
Start: 2025-05-21 | End: 2025-05-21

## 2025-05-21 RX ORDER — SODIUM CHLORIDE 9 MG/ML
INJECTION, SOLUTION INTRAVENOUS CONTINUOUS
Status: ACTIVE | OUTPATIENT
Start: 2025-05-21 | End: 2025-05-22

## 2025-05-21 RX ORDER — CITALOPRAM 20 MG/1
60 TABLET ORAL DAILY
Status: DISCONTINUED | OUTPATIENT
Start: 2025-05-22 | End: 2025-05-24 | Stop reason: HOSPADM

## 2025-05-21 RX ORDER — ONDANSETRON HYDROCHLORIDE 2 MG/ML
4 INJECTION, SOLUTION INTRAVENOUS EVERY 8 HOURS PRN
Status: DISCONTINUED | OUTPATIENT
Start: 2025-05-21 | End: 2025-05-24 | Stop reason: HOSPADM

## 2025-05-21 RX ORDER — FAMOTIDINE 10 MG/ML
40 INJECTION, SOLUTION INTRAVENOUS
Status: COMPLETED | OUTPATIENT
Start: 2025-05-21 | End: 2025-05-21

## 2025-05-21 RX ORDER — NALOXONE HCL 0.4 MG/ML
0.02 VIAL (ML) INJECTION
Status: DISCONTINUED | OUTPATIENT
Start: 2025-05-21 | End: 2025-05-24 | Stop reason: HOSPADM

## 2025-05-21 RX ORDER — ACETAMINOPHEN 325 MG/1
650 TABLET ORAL EVERY 4 HOURS PRN
Status: DISCONTINUED | OUTPATIENT
Start: 2025-05-21 | End: 2025-05-24 | Stop reason: HOSPADM

## 2025-05-21 RX ORDER — GLUCAGON 1 MG
1 KIT INJECTION
Status: DISCONTINUED | OUTPATIENT
Start: 2025-05-21 | End: 2025-05-24 | Stop reason: HOSPADM

## 2025-05-21 RX ORDER — ONDANSETRON HYDROCHLORIDE 2 MG/ML
4 INJECTION, SOLUTION INTRAVENOUS
Status: COMPLETED | OUTPATIENT
Start: 2025-05-21 | End: 2025-05-21

## 2025-05-21 RX ORDER — IBUPROFEN 200 MG
16 TABLET ORAL
Status: DISCONTINUED | OUTPATIENT
Start: 2025-05-21 | End: 2025-05-24 | Stop reason: HOSPADM

## 2025-05-21 RX ORDER — MORPHINE SULFATE 4 MG/ML
2 INJECTION, SOLUTION INTRAMUSCULAR; INTRAVENOUS EVERY 6 HOURS PRN
Status: DISCONTINUED | OUTPATIENT
Start: 2025-05-21 | End: 2025-05-24 | Stop reason: HOSPADM

## 2025-05-21 RX ORDER — TRAZODONE HYDROCHLORIDE 100 MG/1
100 TABLET ORAL NIGHTLY
COMMUNITY
Start: 2025-04-07

## 2025-05-21 RX ORDER — HYDRALAZINE HYDROCHLORIDE 20 MG/ML
10 INJECTION INTRAMUSCULAR; INTRAVENOUS
Status: COMPLETED | OUTPATIENT
Start: 2025-05-21 | End: 2025-05-21

## 2025-05-21 RX ADMIN — FAMOTIDINE 40 MG: 10 INJECTION, SOLUTION INTRAVENOUS at 10:05

## 2025-05-21 RX ADMIN — SODIUM CHLORIDE: 9 INJECTION, SOLUTION INTRAVENOUS at 10:05

## 2025-05-21 RX ADMIN — MORPHINE SULFATE 2 MG: 4 INJECTION INTRAVENOUS at 09:05

## 2025-05-21 RX ADMIN — SODIUM CHLORIDE 1000 ML: 0.9 INJECTION, SOLUTION INTRAVENOUS at 09:05

## 2025-05-21 RX ADMIN — LACTULOSE 20 G: 20 SOLUTION ORAL at 12:05

## 2025-05-21 RX ADMIN — HYDRALAZINE HYDROCHLORIDE 10 MG: 20 INJECTION INTRAMUSCULAR; INTRAVENOUS at 04:05

## 2025-05-21 RX ADMIN — MORPHINE SULFATE 4 MG: 4 INJECTION INTRAVENOUS at 10:05

## 2025-05-21 RX ADMIN — ONDANSETRON 4 MG: 2 INJECTION INTRAMUSCULAR; INTRAVENOUS at 09:05

## 2025-05-21 RX ADMIN — IOHEXOL 75 ML: 350 INJECTION, SOLUTION INTRAVENOUS at 02:05

## 2025-05-21 RX ADMIN — MORPHINE SULFATE 4 MG: 4 INJECTION INTRAVENOUS at 12:05

## 2025-05-21 NOTE — ED PROVIDER NOTES
Encounter Date: 5/21/2025    SCRIBE #1 NOTE: I, Radha Rosa, am scribing for, and in the presence of,  Sudarshan Martinez MD. I have scribed the following portions of the note - Other sections scribed: HPI, ROS.       History     Chief Complaint   Patient presents with    Abdominal Pain     Pt BIB EMS, c/o abd pain since approximately 0400 this morning. Pt reports constipation and nausea x 3 days. Pt denies any CP, SOB, vomiting or diarrhea.      This 69 y.o female with a medical history of Esophageal reflux, Hypertension, Obesity, and Other and unspecified hyperlipidemia presents to the ED via EMS transportation c/o abdominal pain that began this morning. Pt reports that she felt fine yesterday as well as this morning upon awaking, however, she states that after drinking a cup of coffee she began to experience pain to her abdomen. She reports that she has also been experiencing constipation since last week and was seen in this ED a couple of days ago and was prescribed Norco for treatment. She notes that she has not had any pain medication in the last 2 days. She has since been taking Miralax 1x a day for treatment without any improvement. She states that she also took 2x tablets of Sen[place] last night and was only able to expel a small amount of stool. She is not passing gas and has been bloated and belching frequently as well. Of note, she reports that she a small lump over her previous stent placement site to right lower abdomen and had a CT of the area preformed with no abnormalities found. She states that she was placed on antibiotics (Vibramycin) which she has been taking and has also been placing a warm compress to the area and notes that the area has since been draining. She denies shortness of breath, chest pain, nausea, or any other associated symptoms.     The history is provided by the patient.     Review of patient's allergies indicates:   Allergen Reactions    Sulfa (sulfonamide antibiotics) Hives     Ace inhibitors Other (See Comments)     Cough       Past Medical History:   Diagnosis Date    Allergy     Anxiety     Arthritis     Cataract     Depressive disorder, not elsewhere classified     Esophageal reflux     Hypertension     Impaired fasting glucose     Joint pain     Obesity, unspecified     Other and unspecified hyperlipidemia      Past Surgical History:   Procedure Laterality Date    BLEPHAROPLASTY Bilateral 2021    Procedure: BLEPHAROPLASTY;  Surgeon: Maite Acuna MD;  Location: [facility];  Service: Ophthalmology;  Laterality: Bilateral;     SECTION  1973    CHOLECYSTECTOMY      COLONOSCOPY N/A 2023    Procedure: COLONOSCOPY;  Surgeon: Briana Sterling MD;  Location: [facility];  Service: Endoscopy;  Laterality: N/A;    ERCP N/A 10/07/2024    Procedure: ERCP (ENDOSCOPIC RETROGRADE CHOLANGIOPANCREATOGRAPHY);  Surgeon: Catracho Mitchell MD;  Location: [facility] ([address] [place] [address]);  Service: Endoscopy;  Laterality: N/A;    ERCP N/A 2024    Procedure: ERCP (ENDOSCOPIC RETROGRADE CHOLANGIOPANCREATOGRAPHY);  Surgeon: Romeo Roger MD;  Location: [facility];  Service: Endoscopy;  Laterality: N/A;  12/3/24: instructions sent via email-GD   SWP PRECALL COMPLETED-RT    ERCP N/A 2025    Procedure: ERCP (ENDOSCOPIC RETROGRADE CHOLANGIOPANCREATOGRAPHY);  Surgeon: Shannon Chaney MD;  Location: [facility];  Service: Endoscopy;  Laterality: N/A;   portal-Repeat ERCP in 6 weeks to remove stent.nicola -tt   SWP-PRECALL COMPLETE-RT   r/s updated portal instr-pt stated any MD-tt    ERCP N/A 2025    Procedure: ERCP (ENDOSCOPIC RETROGRADE CHOLANGIOPANCREATOGRAPHY);  Surgeon: Shannon Chaney MD;  Location: [facility];  Service: Endoscopy;  Laterality: N/A;  Per Dr. Chaney- Repeat ERCP in 3 months to remove covered metal                          biliary stent and hopefully for final clearance of                          the CBD.     3/21/25-Pt no longer taking Eliquis,  instr portal-DS  4/24/25-LVM     ESOPHAGOGASTRODUODENOSCOPY N/A 11/21/2024    Procedure: EGD (ESOPHAGOGASTRODUODENOSCOPY);  Surgeon: Angie Alatorre MD;  Location: [facility];  Service: Gastroenterology;  Laterality: N/A;    HYSTERECTOMY      without BSO    INTRAOCULAR PROSTHESES INSERTION Left 10/27/2022    Procedure: INSERTION, IOL PROSTHESIS;  Surgeon: Palmer Berrios MD;  Location: [facility];  Service: Ophthalmology;  Laterality: Left;    PHACOEMULSIFICATION OF CATARACT Left 10/27/2022    Procedure: PHACOEMULSIFICATION, CATARACT;  Surgeon: Palmer Berrios MD;  Location: [facility];  Service: Ophthalmology;  Laterality: Left;  RN PHONE PREOP 10/21/2022   ARRIVAL 9:00 AM    R knee replacement      REPAIR OF RETINAL DETACHMENT WITH VITRECTOMY Left 02/28/2022    Procedure: REPAIR, RETINAL DETACHMENT, WITH VITRECTOMY;  Surgeon: MINO Martinez MD;  Location: [address];  Service: Ophthalmology;  Laterality: Left;  Spoke with SID Garcia. Pt was instructed nothing to eat after 8am and to arrive at 2pm for preop. 430pm case start request.    right  arm  surgery      ROBOT-ASSISTED CHOLECYSTECTOMY USING DA GENO XI N/A 10/09/2024    Procedure: XI ROBOTIC SUB TOTAL CHOLECYSTECTOMY; DRAIN PLACEMENT;  Surgeon: Rigoberto Ponce MD;  Location: [facility] OR;  Service: General;  Laterality: N/A;     Family History   Problem Relation Name Age of Onset    Cancer Mother          lung    Liver disease Father      Thyroid disease Sister      Cervical cancer Sister      Tremor Sister       Social History[1]  Review of Systems   Constitutional: Negative.    HENT: Negative.     Eyes: Negative.    Respiratory: Negative.  Negative for shortness of breath.    Cardiovascular: Negative.  Negative for chest pain.   Gastrointestinal:  Positive for abdominal pain and constipation. Negative for nausea.        (+) bloating  (+) belching   Genitourinary: Negative.    Musculoskeletal: Negative.    Skin:  Positive for wound (to right  lower abdomen; draining).   Neurological: Negative.        Physical Exam     Initial Vitals   BP Pulse Resp Temp SpO2   05/21/25 0753 05/21/25 0753 05/21/25 0755 05/21/25 0753 05/21/25 0753   (!) 172/74 60 16 98 °F (36.7 °C) 98 %      MAP       --                Physical Exam    Nursing note and vitals reviewed.  Constitutional: She appears well-developed. She is not diaphoretic. She appears distressed (moderately, belching upon initial assessment).   HENT:   Head: Normocephalic and atraumatic.   Nose: Nose normal.   Eyes: EOM are normal. Pupils are equal, round, and reactive to light.   Neck: Neck supple. No JVD present.   Normal range of motion.  Cardiovascular:  Normal rate, regular rhythm, normal heart sounds and intact distal pulses.           Pulmonary/Chest: Breath sounds normal. No stridor. No respiratory distress. She has no wheezes. She has no rhonchi. She has no rales.   Abdominal: Abdomen is soft. Bowel sounds are normal. She exhibits no distension. There is abdominal tenderness (notable epigastrically). There is no rebound and no guarding.   Musculoskeletal:         General: No tenderness or edema. Normal range of motion.      Cervical back: Normal range of motion and neck supple.     Neurological: She is alert and oriented to person, place, and time. She has normal strength.   Skin: Skin is warm and dry. Capillary refill takes less than 2 seconds. No rash noted. No erythema.         ED Course   Procedures  Labs Reviewed   COMPREHENSIVE METABOLIC PANEL - Abnormal       Result Value    Sodium 141      Potassium 4.7      Chloride 108      CO2 22 (*)     Glucose 112 (*)     BUN 14      Creatinine 0.6      Calcium 8.6 (*)     Protein Total 7.2      Albumin 3.7      Bilirubin Total 0.4            AST 14      ALT 17      Anion Gap 11      eGFR >60     CBC WITH DIFFERENTIAL - Abnormal    WBC 11.55      RBC 5.38      HGB 17.5 (*)     HCT 51.6 (*)     MCV 96      MCH 32.5 (*)     MCHC 33.9      RDW 14.1       Platelet Count 224      MPV 10.0      Nucleated RBC 0      Neut % 84.4 (*)     Lymph % 10.0 (*)     Mono % 3.7 (*)     Eos % 0.3      Basophil % 1.1      Imm Grans % 0.5      Neut # 9.73 (*)     Lymph # 1.16      Mono # 0.43      Eos # 0.04      Baso # 0.13      Imm Grans # 0.06 (*)    LIPASE - Normal    Lipase Level 8     CBC W/ AUTO DIFFERENTIAL    Narrative:     The following orders were created for panel order CBC auto differential.  Procedure                               Abnormality         Status                     ---------                               -----------         ------                     CBC with Differential[[id number]]       Abnormal            Final result                 Please view results for these tests on the individual orders.   EXTRA TUBES    Narrative:     The following orders were created for panel order EXTRA TUBES.  Procedure                               Abnormality         Status                     ---------                               -----------         ------                     Lavender Top Hold[[id number]]                               Final result               Gold Top Hold[[id number]]                                   Final result                 Please view results for these tests on the individual orders.   LAVENDER TOP HOLD    Extra Tube Hold for add-ons.     GOLD TOP HOLD    Extra Tube Hold for add-ons.            Imaging Results              X-Ray Abdomen Portable (Final result)  Result time 05/21/25 16:29:45      Final result by Patel Moore III, MD (05/21/25 16:29:45)                   Narrative:    EXAMINATION:  XR ABDOMEN PORTABLE    CLINICAL HISTORY:  Presence of other specified devices    FINDINGS:  Abdomen one view:    NG tip fundus.  Heart size is normal.  There is aortic plaque.  Lungs are clear.  No obstruction, ileus, or perforation seen.      Electronically signed by: Patel Moore MD  Date:    05/21/2025  Time:    16:29                                      CT Abdomen Pelvis With IV Contrast NO Oral Contrast (Final result)  Result time 05/21/25 14:16:35      Final result by Patel Moore III, MD (05/21/25 14:16:35)                   Impression:      There is now a small amount of ascites left upper quadrant and pelvis.  There is also now massive distension of the stomach which can be seen with gastric outlet obstruction or gastroparesis.    Severe constipation.    There is a mildly dilated small bowel loops.  Small-bowel obstruction cannot be entirely excluded.      Electronically signed by: Patel Moore MD  Date:    05/21/2025  Time:    14:16               Narrative:    EXAMINATION:  CT ABDOMEN PELVIS WITH IV CONTRAST    CLINICAL HISTORY:  Epigastric pain;    FINDINGS:  Comparison is 05/16/2025.    Patient was administered 75 cc of Omnipaque 350 intravenously.    Lung bases are clear.  There is mild periportal edema.  The liver, spleen, and pancreas demonstrate nothing unusual.  There is gas within the pancreatic duct.  The adrenal glands are not enlarged.  The kidneys demonstrate nothing unusual.  There is dilatation of the stomach which is fairly massive.  There is dilatation of colon and there is considerable constipation.  There are some dilated small bowel loops in the pelvis.  Bladder is unremarkable.  There is free fluid in the pelvis.  The bones demonstrate DJD.  The appendix is normal.                                       X-Ray Abdomen Flat And Erect (Final result)  Result time 05/21/25 09:09:39      Final result by Patel Moore III, MD (05/21/25 09:09:39)                   Narrative:    EXAMINATION:  XR ABDOMEN FLAT AND ERECT    CLINICAL HISTORY:  Epigastric pain    FINDINGS:  Abdomen flat erect:    Lung bases are clear.  No obstruction, or perforation seen.  There is constipation.  There is mild ileus.      Electronically signed by: Patel Moore MD  Date:    05/21/2025  Time:    09:09                                      Medications   sodium chloride 0.9% flush 10 mL (has no administration in time range)   naloxone 0.4 mg/mL injection 0.02 mg (has no administration in time range)   glucose chewable tablet 16 g (has no administration in time range)   glucose chewable tablet 24 g (has no administration in time range)   dextrose 50% injection 12.5 g (has no administration in time range)   dextrose 50% injection 25 g (has no administration in time range)   glucagon (human recombinant) injection 1 mg (has no administration in time range)   acetaminophen tablet 650 mg (has no administration in time range)   ondansetron injection 4 mg (has no administration in time range)   melatonin tablet 6 mg (has no administration in time range)   0.9% NaCl infusion ( Intravenous New Bag 5/21/25 2200)   pantoprazole injection 40 mg (has no administration in time range)   atorvastatin tablet 40 mg (has no administration in time range)   citalopram tablet 60 mg (has no administration in time range)   valsartan tablet 320 mg (has no administration in time range)   morphine injection 1 mg (has no administration in time range)   morphine injection 2 mg (2 mg Intravenous Given 5/21/25 2106)   sodium chloride 0.9% bolus 1,000 mL 1,000 mL (0 mLs Intravenous Stopped 5/21/25 1525)   ondansetron injection 4 mg (4 mg Intravenous Given 5/21/25 0945)   morphine injection 4 mg (4 mg Intravenous Given 5/21/25 1026)   famotidine (PF) injection 40 mg (40 mg Intravenous Given 5/21/25 1026)   lactulose 20 gram/30 mL solution Soln 20 g (20 g Oral Given 5/21/25 1230)   morphine injection 4 mg (4 mg Intravenous Given 5/21/25 1257)   iohexoL (OMNIPAQUE 350) injection 75 mL (75 mLs Intravenous Given 5/21/25 1402)   hydrALAZINE injection 10 mg (10 mg Intravenous Given 5/21/25 1646)     Medical Decision Making  Amount and/or Complexity of Data Reviewed  Labs: ordered.  Radiology: ordered.    Risk  Prescription drug management.  Decision regarding  hospitalization.      MDM:    69-year-old female with past medical history as noted above presenting with abdominal pain.  Differential Diagnosis includes:  Small-bowel obstruction, large-bowel obstruction, pancreatitis, cholecystitis, acute mi/ACS, arrhythmia.  Physical exam as noted above.  ED workup notable for CBC white blood cell count 11.55, hemoglobin 17.5, x-ray abdomen shows constipation with mild ileus, lipase 8, CMP with BUN 14 creatinine 0.6, glucose 112, lactate 0.8.  Patient presentation concerning for possible ileus, IV fluids given, lactulose given without any additional improvement in bowel function or symptoms.  Interval pain medications given with some nausea and worsening pain.  Subsequently patient with continued pain repeat CT scan was ordered and showed evidence of small amount of ascites in the left upper quadrant and pelvis and massive distention of the stomach with possible gastric outlet obstruction/gastroparesis versus small bowel obstruction in the presence of severe constipation.  Patient presentation concerning for small bowel obstruction versus gastric outlet obstruction, general surgery consulted, discussed with the patient, NG tube was placed.  IV fluids started, pain controlled and she will be admitted to Hospital Medicine team for further evaluation and management overnight.  Stable for transfer to the floor at this time.    Note was created using voice recognition software. Note may have occasional typographical or grammatical errors, garbled syntax, and other bizarre constructions that may not have been identified and edited despite good christophe initial review prior to signing.             Scribe Attestation:   Scribe #1: I performed the above scribed service and the documentation accurately describes the services I performed. I attest to the accuracy of the note.                         I, Sudarshan Martinez M.D., personally performed the services described in this  documentation. All medical record entries made by the scribe were at my direction and in my presence. I have reviewed the chart and agree that the record reflects my personal performance and is accurate and complete.      DISCLAIMER: This note was prepared with [employer] voice recognition transcription software. Garbled syntax, mangled pronouns, and other bizarre constructions may be attributed to that software system.       Clinical Impression:  Final diagnoses:  [R10.13] Epigastric abdominal pain  [K56.609] SBO (small bowel obstruction)  [Z97.8] Nasogastric tube present          ED Disposition Condition    Admit                       [1]   Social History  Tobacco Use    Smoking status: Every Day     Current packs/day: 0.75     Average packs/day: 0.8 packs/day for 40.0 years (30.0 ttl pk-yrs)     Types: Cigarettes    Smokeless tobacco: Never   Substance Use Topics    Alcohol use: Yes     Comment: rarely    Drug use: Yes     Frequency: 14.0 times per week     Types: Marijuana        Sudarshan Martinez MD  05/22/25 0702

## 2025-05-21 NOTE — NURSING
Patient arrived to floor via stretcher via transporter from ED. Patient transferred to bed via x1 person assist. AAOX4.  Patient was oriented to room, information on whiteboard, and medication regimen.  Bed low, adequate lighting provided, side rails x2 up, call bell within reach.  Admission assessment completed. Patient denied having any acute distress at this time.  None observed.  Will continue to monitor and follow treatment plan.  [facility] [place]  Nurses Note -- 4 Eyes      5/21/2025       Skin assessed on: Admit      [x] No Pressure Injuries Present    []Prevention Measures Documented    [] Yes LDA  for Pressure Injury Previously documented     [] Yes New Pressure Injury Discovered   [] LDA for New Pressure Injury Added      Attending RN:  Verna Rivero RN     Second RN:  BALA Maynard

## 2025-05-21 NOTE — HPI
"SUBJECTIVE:   Trevor Soni is a 84 year old male who presents for Preventive Visit.  Advancing his diet since his hospitalization for a partial small bowel obstruction requiring a partial colectomy.  Weight loss of 4 pounds.    Poor sleep    He has a fear of falling because of his bad knees which are stiff and chronically achy.  His gait is stiff wide-based and somewhat shuffling.    Are you in the first 12 months of your Medicare coverage?  No    Healthy Habits:     In general, how would you rate your overall health?  Very good    Frequency of exercise:  None    Duration of exercise:  Less than 15 minutes    Do you usually eat at least 4 servings of fruit and vegetables a day, include whole grains    & fiber and avoid regularly eating high fat or \"junk\" foods?  No    Taking medications regularly:  Yes    Barriers to taking medications:  None    Medication side effects:  None    Ability to successfully perform activities of daily living:  No assistance needed    Home Safety:  No safety concerns identified    Hearing Impairment:  Difficulty understanding soft or whispered speech    In the past 6 months, have you been bothered by leaking of urine?  No    In general, how would you rate your overall mental or emotional health?  Good      PHQ-2 Total Score: 0    Additional concerns today:  No    Do you feel safe in your environment? Yes    Have you ever done Advance Care Planning? (For example, a Health Directive, POLST, or a discussion with a medical provider or your loved ones about your wishes): No, advance care planning information given to patient to review.  Patient plans to discuss their wishes with loved ones or provider.        Fall risk  Fallen 2 or more times in the past year?: No  Any fall with injury in the past year?: No    Cognitive Screening   1) Repeat 3 items (Leader, Season, Table)    2) Clock draw: NORMAL  3) 3 item recall: Recalls 2 objects   Results: NORMAL clock, 1-2 items recalled: COGNITIVE " Patricia Ramirez is a 69 y.o. female with a past medical history of reflux, hypertension, hyperlipidemia, and obesity presents to the hospital with complaints of abdominal pain with the associated nausea and constipation for the past several days.    Patient states her abdominal pain worsened this morning after drinking a cup of coffee.  Patient states she has been experiencing constipation over the last week or so.  She has taken MiraLax at home with minimal relief.  Patient states she also took 2 tablets of Senokot night and was able to pass small amount of stool.  Patient is unable to pass gas and has been complaining of bloating and belching. Of note, she reports that she a small lump over her previous stent placement site to right lower abdomen and had a CT of the area preformed with no abnormalities found. She states that she was placed on antibiotics (Vibramycin) which she has been taking and has also been placing a warm compress to the area and notes that the area has since been draining. She denies shortness of breath, chest pain, nausea, or any other associated symptoms.      In the ED: Patient afebrile without leukocytosis, hypertensive on presentation 172/74, CBC and CMP unremarkable, normal lactic acid level. Abdominal x-ray shows no obstruction or perforation however constipation and mild ileus seen.. CT abdomen shows small amount of ascites in the left upper quadrant and pelvis and also a massive distention of the stomach which can be seen in gastric outlet obstruction or gastroparesis along with mildly dilated small bowel loops concerning for SBO.  Patient given IV famotidine 40 mg, IV hydralazine 10 mg, lactulose 20 g, IV morphine 4 mg x 2, IV Zofran 4 mg, and 1 L NS bolus in the ED. Case discussed with ED provider and patient admitted to hospital medicine for further medical management.   IMPAIRMENT LESS LIKELY    Mini-CogTM Copyright CARMEN Whitten. Licensed by the author for use in [facility]; reprinted with permission ([email or web address]). All rights reserved.      Do you have sleep apnea, excessive snoring or daytime drowsiness?: no    Reviewed and updated as needed this visit by clinical staff  Tobacco  Allergies  Meds         Reviewed and updated as needed this visit by Provider        Social History     Tobacco Use     Smoking status: Former Smoker     Types: Cigars     Last attempt to quit: 1980     Years since quittin.6     Smokeless tobacco: Never Used   Substance Use Topics     Alcohol use: Yes     Alcohol/week: 0.0 standard drinks     Comment: rarely         No flowsheet data found.            Current providers sharing in care for this patient include:   Patient Care Team:  Warren Lovelace MD as PCP - General (Internal Medicine)  Warren Lovelace MD as Assigned PCP    The following health maintenance items are reviewed in Epic and correct as of today:  Health Maintenance   Topic Date Due     URINE DRUG SCREEN  1935     ADVANCE CARE PLANNING  1935     ZOSTER IMMUNIZATION (1 of 2) 1983     MEDICARE ANNUAL WELLNESS VISIT  2019     INFLUENZA VACCINE (1) 2020     FALL RISK ASSESSMENT  2021     DTAP/TDAP/TD IMMUNIZATION (3 - Td) 2027     PHQ-2  Completed     PNEUMOCOCCAL IMMUNIZATION 65+ LOW/MEDIUM RISK  Completed     IPV IMMUNIZATION  Aged Out     MENINGITIS IMMUNIZATION  Aged Out     HEPATITIS B IMMUNIZATION  Aged Out     Current Outpatient Medications   Medication Sig Dispense Refill     allopurinol (ZYLOPRIM) 100 MG tablet TAKE 2 TABLETS BY MOUTH  DAILY 180 tablet 3     amLODIPine (NORVASC) 5 MG tablet TAKE 1 TABLET BY MOUTH  EVERY MORNING AND 2 TABLETS EVERY EVENING 270 tablet 3     ASPIRIN PO Take 325 mg by mouth daily.       atenolol (TENORMIN) 50 MG tablet Take 1 tablet (50 mg) by mouth daily 90 tablet 2     atorvastatin (LIPITOR)  "20 MG tablet TAKE 1 TABLET BY MOUTH  EVERY DAY 90 tablet 2     Cholecalciferol (VITAMIN D3 PO) Take 1 tablet by mouth daily       coenzyme Q-10 capsule Take 1 capsule by mouth daily       famotidine (PEPCID) 20 MG tablet Take 1 tablet (20 mg) by mouth At Bedtime 30 tablet 3     fenofibrate (TRIGLIDE/LOFIBRA) 160 MG tablet TAKE 1 TABLET(160 MG) BY MOUTH DAILY 90 tablet 3     furosemide (LASIX) 20 MG tablet Take 0.5 tablets (10 mg) by mouth daily 45 tablet 3     hydrALAZINE (APRESOLINE) 50 MG tablet TAKE 1 TABLET BY MOUTH 4  TIMES A  tablet 3     ondansetron (ZOFRAN-ODT) 4 MG ODT tab Take 1 tablet (4 mg) by mouth every 6 hours as needed for nausea or vomiting 30 tablet 0     Allergies   Allergen Reactions     Avocado      Ciprofloxacin Itching     Penicillins Itching         Review of Systems  CONSTITUTIONAL: NEGATIVE for fever, chills, change in weight  INTEGUMENTARY/SKIN: NEGATIVE for worrisome rashes, moles or lesions  EYES: NEGATIVE for vision changes or irritation  ENT/MOUTH: NEGATIVE for ear, mouth and throat problems  RESP: NEGATIVE for significant cough or SOB  BREAST: NEGATIVE for masses, tenderness or discharge  CV: NEGATIVE for chest pain, palpitations or peripheral edema  GI: NEGATIVE for nausea, abdominal pain, heartburn, or change in bowel habits  : NEGATIVE for frequency, dysuria, or hematuria  MUSCULOSKELETAL: NEGATIVE for significant arthralgias or myalgia  NEURO: NEGATIVE for weakness, dizziness or paresthesias  ENDOCRINE: NEGATIVE for temperature intolerance, skin/hair changes  HEME: NEGATIVE for bleeding problems  PSYCHIATRIC: NEGATIVE for changes in mood or affect    OBJECTIVE:   /65 (BP Location: Left arm, Patient Position: Sitting, Cuff Size: Adult Regular)   Pulse 59   Temp 97.1  F (36.2  C) (Temporal)   Ht 1.727 m (5' 8\")   Wt 67.3 kg (148 lb 4.8 oz)   SpO2 96%   BMI 22.55 kg/m   Estimated body mass index is 22.55 kg/m  as calculated from the following:    Height as of " "this encounter: 1.727 m (5' 8\").    Weight as of this encounter: 67.3 kg (148 lb 4.8 oz).  Physical Exam  GENERAL: healthy, alert and no distress  EYES: Eyes grossly normal to inspection, PERRL and conjunctivae and sclerae normal  HENT: ear canals and TM's normal, nose and mouth without ulcers or lesions  NECK: no adenopathy, no asymmetry, masses, or scars and thyroid normal to palpation  RESP: lungs clear to auscultation - no rales, rhonchi or wheezes  CV: regular rate and rhythm, normal S1 S2, no S3 or S4, no murmur, click or rub, no peripheral edema and peripheral pulses strong  ABDOMEN: soft, nontender, no hepatosplenomegaly, no masses and bowel sounds normal  MS: no gross musculoskeletal defects noted, no edema  Bilateral knees stiff with a minor flexion contracture without synovial fluid  SKIN: no suspicious lesions or rashes  NEURO: Normal strength and tone, mentation intact and speech normal  PSYCH: mentation appears normal, affect normal/bright        ASSESSMENT / PLAN:   1. Ischemic bowel 2nd to Closed Loop -- S/P Lysis of Adhes 6/21/20  Managing well with diet advancement    2. Abdominal aortic aneurysm (AAA) without rupture (H)  Follow-up echocardiogram scheduled    3. CRF (chronic renal failure), stage 4 (severe) (H)  Continue to watch closely for avoid prerenal azotemia or nephrotoxic drugs    4. Chronic diastolic CHF -- Grade 1-2 Dysfunction Echo 2016  Appears to be euvolemic    5. Coronary artery disease involving native coronary artery of native heart without angina pectoris    No current symptoms of angina  6. Chronic pain of both knees  Significant osteoarthritis in both knees recommended a walker for stability  - Walker Order for DME - ONLY FOR DME    7. Benign essential hypertension  Well-controlled with current therapy       8. Gastroesophageal reflux disease without esophagitis  Well-controlled with current therapy  9. Mild aortic stenosis -- Echo 2016  Mild to moderate    10. Stenosis of " "carotid artery, unspecified laterality        COUNSELING:      Estimated body mass index is 22.55 kg/m  as calculated from the following:    Height as of this encounter: 1.727 m (5' 8\").    Weight as of this encounter: 67.3 kg (148 lb 4.8 oz).         reports that he quit smoking about 40 years ago. His smoking use included cigars. He has never used smokeless tobacco.      Appropriate preventive services were discussed with this patient, including applicable screening as appropriate for cardiovascular disease, diabetes, osteopenia/osteoporosis, and glaucoma.  As appropriate for age/gender, discussed screening for colorectal cancer, prostate cancer, breast cancer, and cervical cancer. Checklist reviewing preventive services available has been given to the patient.    Reviewed patients plan of care and provided an AVS. The  hui Murray meets the Care Plan requirement. This Care Plan has been established and reviewed with the Patient.    Counseling Resources:  ATP IV Guidelines  Pooled Cohorts Equation Calculator  Breast Cancer Risk Calculator  FRAX Risk Assessment  ICSI Preventive Guidelines  Dietary Guidelines for Americans, 2010  USDA's MyPlate  ASA Prophylaxis  Lung CA Screening    Warren Lovelace MD  [facility]    Identified Health Risks:    "

## 2025-05-21 NOTE — PROGRESS NOTES
CHW - Initial Contact    This Community Health Worker completed the Social Determinant of Health questionnaire with patient at bedside today.    Pt identified barriers of most importance are: has no needs at this time.   Referrals to community agencies completed with patient/caregiver consent outside of [facility] include: no: none at this time.  Referrals were put through [facility] - no: none at this time.  Support and Services: has no support at this time.  Other information discussed the patient needs / wants help with: Completed SDOH with patient and spouse/caregiver at bedside today, pt has no needs at this time. Will follow up in one week to check pt's future needs.    Follow up required: yes.  Follow-up Outreach - Due: 5/27/2025

## 2025-05-22 LAB
ABSOLUTE EOSINOPHIL (OHS): 0.1 K/UL
ABSOLUTE MONOCYTE (OHS): 1.22 K/UL (ref 0.3–1)
ABSOLUTE NEUTROPHIL COUNT (OHS): 9.1 K/UL (ref 1.8–7.7)
ALBUMIN SERPL BCP-MCNC: 3.5 G/DL (ref 3.5–5.2)
ALP SERPL-CCNC: 92 UNIT/L (ref 40–150)
ALT SERPL W/O P-5'-P-CCNC: 16 UNIT/L (ref 10–44)
ANION GAP (OHS): 11 MMOL/L (ref 8–16)
AST SERPL-CCNC: 15 UNIT/L (ref 11–45)
BASOPHILS # BLD AUTO: 0.14 K/UL
BASOPHILS NFR BLD AUTO: 1.1 %
BILIRUB SERPL-MCNC: 0.5 MG/DL (ref 0.1–1)
BUN SERPL-MCNC: 11 MG/DL (ref 8–23)
CALCIUM SERPL-MCNC: 8.6 MG/DL (ref 8.7–10.5)
CHLORIDE SERPL-SCNC: 108 MMOL/L (ref 95–110)
CO2 SERPL-SCNC: 23 MMOL/L (ref 23–29)
CREAT SERPL-MCNC: 0.6 MG/DL (ref 0.5–1.4)
ERYTHROCYTE [DISTWIDTH] IN BLOOD BY AUTOMATED COUNT: 13.5 % (ref 11.5–14.5)
GFR SERPLBLD CREATININE-BSD FMLA CKD-EPI: >60 ML/MIN/1.73/M2
GLUCOSE SERPL-MCNC: 100 MG/DL (ref 70–110)
HCT VFR BLD AUTO: 47.1 % (ref 37–48.5)
HGB BLD-MCNC: 15.3 GM/DL (ref 12–16)
IMM GRANULOCYTES # BLD AUTO: 0.04 K/UL (ref 0–0.04)
IMM GRANULOCYTES NFR BLD AUTO: 0.3 % (ref 0–0.5)
LYMPHOCYTES # BLD AUTO: 2.36 K/UL (ref 1–4.8)
MAGNESIUM SERPL-MCNC: 2 MG/DL (ref 1.6–2.6)
MCH RBC QN AUTO: 31.9 PG (ref 27–31)
MCHC RBC AUTO-ENTMCNC: 32.5 G/DL (ref 32–36)
MCV RBC AUTO: 98 FL (ref 82–98)
NUCLEATED RBC (/100WBC) (OHS): 0 /100 WBC
OHS QRS DURATION: 128 MS
OHS QTC CALCULATION: 503 MS
PHOSPHATE SERPL-MCNC: 3.7 MG/DL (ref 2.7–4.5)
PLATELET # BLD AUTO: 221 K/UL (ref 150–450)
PMV BLD AUTO: 9.6 FL (ref 9.2–12.9)
POTASSIUM SERPL-SCNC: 3.6 MMOL/L (ref 3.5–5.1)
PROT SERPL-MCNC: 6.7 GM/DL (ref 6–8.4)
RBC # BLD AUTO: 4.8 M/UL (ref 4–5.4)
RELATIVE EOSINOPHIL (OHS): 0.8 %
RELATIVE LYMPHOCYTE (OHS): 18.2 % (ref 18–48)
RELATIVE MONOCYTE (OHS): 9.4 % (ref 4–15)
RELATIVE NEUTROPHIL (OHS): 70.2 % (ref 38–73)
SODIUM SERPL-SCNC: 142 MMOL/L (ref 136–145)
WBC # BLD AUTO: 12.96 K/UL (ref 3.9–12.7)

## 2025-05-22 PROCEDURE — 25000003 PHARM REV CODE 250

## 2025-05-22 PROCEDURE — 63600175 PHARM REV CODE 636 W HCPCS

## 2025-05-22 PROCEDURE — 80053 COMPREHEN METABOLIC PANEL: CPT

## 2025-05-22 PROCEDURE — 99223 1ST HOSP IP/OBS HIGH 75: CPT | Mod: ,,, | Performed by: STUDENT IN AN ORGANIZED HEALTH CARE EDUCATION/TRAINING PROGRAM

## 2025-05-22 PROCEDURE — 83735 ASSAY OF MAGNESIUM: CPT

## 2025-05-22 PROCEDURE — 85025 COMPLETE CBC W/AUTO DIFF WBC: CPT

## 2025-05-22 PROCEDURE — 36415 COLL VENOUS BLD VENIPUNCTURE: CPT

## 2025-05-22 PROCEDURE — 84100 ASSAY OF PHOSPHORUS: CPT

## 2025-05-22 PROCEDURE — 21400001 HC TELEMETRY ROOM

## 2025-05-22 PROCEDURE — 94761 N-INVAS EAR/PLS OXIMETRY MLT: CPT

## 2025-05-22 RX ORDER — HYDRALAZINE HYDROCHLORIDE 20 MG/ML
10 INJECTION INTRAMUSCULAR; INTRAVENOUS EVERY 6 HOURS PRN
Status: DISCONTINUED | OUTPATIENT
Start: 2025-05-22 | End: 2025-05-24 | Stop reason: HOSPADM

## 2025-05-22 RX ORDER — POTASSIUM CHLORIDE 7.45 MG/ML
10 INJECTION INTRAVENOUS
Status: COMPLETED | OUTPATIENT
Start: 2025-05-22 | End: 2025-05-22

## 2025-05-22 RX ADMIN — POTASSIUM CHLORIDE 10 MEQ: 7.46 INJECTION, SOLUTION INTRAVENOUS at 01:05

## 2025-05-22 RX ADMIN — PANTOPRAZOLE SODIUM 40 MG: 40 INJECTION, POWDER, FOR SOLUTION INTRAVENOUS at 08:05

## 2025-05-22 RX ADMIN — POTASSIUM CHLORIDE 10 MEQ: 7.46 INJECTION, SOLUTION INTRAVENOUS at 12:05

## 2025-05-22 RX ADMIN — SODIUM CHLORIDE: 9 INJECTION, SOLUTION INTRAVENOUS at 08:05

## 2025-05-22 RX ADMIN — POTASSIUM CHLORIDE 10 MEQ: 7.46 INJECTION, SOLUTION INTRAVENOUS at 10:05

## 2025-05-22 RX ADMIN — POTASSIUM CHLORIDE 10 MEQ: 7.46 INJECTION, SOLUTION INTRAVENOUS at 09:05

## 2025-05-22 NOTE — PROGRESS NOTES
[facility] Medicine  Progress Note    Patient Name: Patricia Ramirez  MRN: [id number]  Patient Class: IP- Inpatient   Admission Date: 5/21/2025  Length of Stay: 1 days  Attending Physician: Jenn Byers MD  Primary Care Provider: Fabienne Erwin NP        Subjective     Principal Problem:Gastric outlet obstruction        HPI:  Patricia Ramirez is a 69 y.o. female with a past medical history of reflux, hypertension, hyperlipidemia, and obesity presents to the hospital with complaints of abdominal pain with the associated nausea and constipation for the past several days.    Patient states her abdominal pain worsened this morning after drinking a cup of coffee.  Patient states she has been experiencing constipation over the last week or so.  She has taken MiraLax at home with minimal relief.  Patient states she also took 2 tablets of Senokot night and was able to pass small amount of stool.  Patient is unable to pass gas and has been complaining of bloating and belching. Of note, she reports that she a small lump over her previous stent placement site to right lower abdomen and had a CT of the area preformed with no abnormalities found. She states that she was placed on antibiotics (Vibramycin) which she has been taking and has also been placing a warm compress to the area and notes that the area has since been draining. She denies shortness of breath, chest pain, nausea, or any other associated symptoms.      In the ED: Patient afebrile without leukocytosis, hypertensive on presentation 172/74, CBC and CMP unremarkable, normal lactic acid level. Abdominal x-ray shows no obstruction or perforation however constipation and mild ileus seen.. CT abdomen shows small amount of ascites in the left upper quadrant and pelvis and also a massive distention of the stomach which can be seen in gastric outlet obstruction or gastroparesis along with mildly dilated small bowel loops concerning for SBO.  Patient  given IV famotidine 40 mg, IV hydralazine 10 mg, lactulose 20 g, IV morphine 4 mg x 2, IV Zofran 4 mg, and 1 L NS bolus in the ED. Case discussed with ED provider and patient admitted to hospital medicine for further medical management.    Overview/Hospital Course:  69 y.o. female with a past medical history of reflux, hypertension, hyperlipidemia, and obesity presents to the hospital with complaints of abdominal pain with the associated nausea and constipation for the past several days. In the ED: Patient afebrile without leukocytosis, hypertensive on presentation 172/74, CBC and CMP unremarkable, normal lactic acid level. Abdominal x-ray shows no obstruction or perforation however constipation and mild ileus seen.. CT abdomen shows small amount of ascites in the left upper quadrant and pelvis and also a massive distention of the stomach which can be seen in gastric outlet obstruction or gastroparesis along with mildly dilated small bowel loops concerning for SBO.  Patient was started on IVF, pain regimen, and NGT to LIS.  General surgery was consulted with recommendation to continue conservative management    Interval History:  No acute event overnight.  Patient stated that she has started passing flatus.  Pain appears to be well controlled.  General surgery consulted with recommendation to continue conservative management      Review of Systems  Objective:     Vital Signs (Most Recent):  Temp: 98.4 °F (36.9 °C) (05/22/25 1247)  Pulse: 82 (05/22/25 1507)  Resp: 18 (05/22/25 1247)  BP: (!) 166/79 (05/22/25 1247)  SpO2: 95 % (05/22/25 1247) Vital Signs (24h Range):  Temp:  [97.7 °F (36.5 °C)-98.6 °F (37 °C)] 98.4 °F (36.9 °C)  Pulse:  [62-90] 82  Resp:  [12-20] 18  SpO2:  [93 %-96 %] 95 %  BP: (158-190)/(76-87) 166/79     Weight: 56.6 kg (124 lb 12.5 oz)  Body mass index is 19.54 kg/m².    Intake/Output Summary (Last 24 hours) at 5/22/2025 1644  Last data filed at 5/22/2025 1607  Gross per 24 hour   Intake 2199.27  ml   Output 0 ml   Net 2199.27 ml         Physical Exam  Constitutional:       General: She is not in acute distress.     Appearance: She is not ill-appearing, toxic-appearing or diaphoretic.   Cardiovascular:      Rate and Rhythm: Normal rate and regular rhythm.      Pulses: Normal pulses.      Heart sounds: Normal heart sounds.   Pulmonary:      Effort: Pulmonary effort is normal. No respiratory distress.      Breath sounds: Normal breath sounds. No stridor.   Abdominal:      General: There is distension.      Palpations: Abdomen is soft.      Comments: NGT in place; hypoactive bowel sounds   Neurological:      General: No focal deficit present.      Mental Status: She is oriented to person, place, and time.               Significant Labs: All pertinent labs within the past 24 hours have been reviewed.    Significant Imaging: I have reviewed all pertinent imaging results/findings within the past 24 hours.      Assessment & Plan  Gastric outlet obstruction  Presents with bilateral abdominal pain with nausea, vomiting, diarrhea since last night with poor po intake.   CT abdomen pelvis shows possible gastric outlet obstruction vs SBO  NGT placed in ED and x-ray ordered to confirm placement  Likely GOO vs. SBO    Plan:  NPO for now   NGT on LIWS   cc/hr   PRN analgesics antiemetics  PPI daily   General surgery consulted with recommendation to continue conservative management and consult Gastroenterology  Essential hypertension  Patient's blood pressure range in the last 24 hours was: BP  Min: 158/76  Max: 190/87.The patient's inpatient anti-hypertensive regimen is listed below:  Current Antihypertensives  valsartan tablet 320 mg, Daily, Oral  hydrALAZINE injection 10 mg, Every 6 hours PRN, Intravenous    Plan  - BP is controlled, no changes needed to their regimen  Hyperlipidemia  Continue statin   GERD (gastroesophageal reflux disease)  Continue PPI     Epigastric pain  -Patient is s/p lap cholecystectomy    -See plan above for gastric outlet obstruction   History of laparoscopic cholecystectomy  History noted    VTE Risk Mitigation (From admission, onward)           Ordered     IP VTE HIGH RISK PATIENT  Once         05/21/25 1615     Place sequential compression device  Until discontinued         05/21/25 1615                    Discharge Planning   STUART: 5/25/2025     Code Status: Full Code   Medical Readiness for Discharge Date:   Discharge Plan A: Home with family                        Jenn Byers MD  Department of Hospital Medicine   [facility]

## 2025-05-22 NOTE — CONSULTS
History & Physical    SUBJECTIVE:     History of Present Illness:  Patient is a 69 y.o. female presents to hospital with abdominal pain and associated nausea. She describes pain as epigastric in nature. Denies any associated vomiting.Patient also reports constipation. CTAP done which demonstrated significant dilation of the stomach and significant stool burden    Chief Complaint   Patient presents with    Abdominal Pain     Pt BIB EMS, c/o abd pain since approximately 0400 this morning. Pt reports constipation and nausea x 3 days. Pt denies any CP, SOB, vomiting or diarrhea.        Review of patient's allergies indicates:   Allergen Reactions    Sulfa (sulfonamide antibiotics) Hives    Ace inhibitors Other (See Comments)     Cough         Current Medications[1]    Past Medical History:   Diagnosis Date    Allergy     Anxiety     Arthritis     Cataract     Depressive disorder, not elsewhere classified     Esophageal reflux     Hypertension     Impaired fasting glucose     Joint pain     Obesity, unspecified     Other and unspecified hyperlipidemia      Past Surgical History:   Procedure Laterality Date    BLEPHAROPLASTY Bilateral 2021    Procedure: BLEPHAROPLASTY;  Surgeon: Maite Acuna MD;  Location: [facility];  Service: Ophthalmology;  Laterality: Bilateral;     SECTION  1973    CHOLECYSTECTOMY      COLONOSCOPY N/A 2023    Procedure: COLONOSCOPY;  Surgeon: Briana Sterling MD;  Location: [facility];  Service: Endoscopy;  Laterality: N/A;    ERCP N/A 10/07/2024    Procedure: ERCP (ENDOSCOPIC RETROGRADE CHOLANGIOPANCREATOGRAPHY);  Surgeon: Catracho Mitchell MD;  Location: [address]);  Service: Endoscopy;  Laterality: N/A;    ERCP N/A 2024    Procedure: ERCP (ENDOSCOPIC RETROGRADE CHOLANGIOPANCREATOGRAPHY);  Surgeon: Romeo Roger MD;  Location: [facility];  Service: Endoscopy;  Laterality: N/A;  12/3/24: instructions sent via email-GD   SWP PRECALL COMPLETED-RT     ERCP N/A 01/29/2025    Procedure: ERCP (ENDOSCOPIC RETROGRADE CHOLANGIOPANCREATOGRAPHY);  Surgeon: Shannon Chaney MD;  Location: [facility];  Service: Endoscopy;  Laterality: N/A;  1/6 portal-Repeat ERCP in 6 weeks to remove stent.petersen -tt  1/14 SWP-PRECALL COMPLETE-RT  1/23 r/s updated portal instr-pt stated any MD-tt    ERCP N/A 04/28/2025    Procedure: ERCP (ENDOSCOPIC RETROGRADE CHOLANGIOPANCREATOGRAPHY);  Surgeon: Shannon Chaney MD;  Location: [facility] END[facility];  Service: Endoscopy;  Laterality: N/A;  Per Dr. Chaney- Repeat ERCP in 3 months to remove covered metal                          biliary stent and hopefully for final clearance of                          the CBD.     3/21/25-Pt no longer taking Eliquis, instr portal-DS  4/24/25-LVM     ESOPHAGOGASTRODUODENOSCOPY N/A 11/21/2024    Procedure: EGD (ESOPHAGOGASTRODUODENOSCOPY);  Surgeon: Angie Alatorre MD;  Location: [facility];  Service: Gastroenterology;  Laterality: N/A;    HYSTERECTOMY      without BSO    INTRAOCULAR PROSTHESES INSERTION Left 10/27/2022    Procedure: INSERTION, IOL PROSTHESIS;  Surgeon: Palmer Berrios MD;  Location: [facility];  Service: Ophthalmology;  Laterality: Left;    PHACOEMULSIFICATION OF CATARACT Left 10/27/2022    Procedure: PHACOEMULSIFICATION, CATARACT;  Surgeon: Palmer Berrios MD;  Location: [facility] OR;  Service: Ophthalmology;  Laterality: Left;  RN PHONE PREOP 10/21/2022   ARRIVAL 9:00 AM    R knee replacement      REPAIR OF RETINAL DETACHMENT WITH VITRECTOMY Left 02/28/2022    Procedure: REPAIR, RETINAL DETACHMENT, WITH VITRECTOMY;  Surgeon: MINO Martinez MD;  Location: [address];  Service: Ophthalmology;  Laterality: Left;  Spoke with SID Garcia. Pt was instructed nothing to eat after 8am and to arrive at 2pm for preop. 430pm case start request.    right  arm  surgery      ROBOT-ASSISTED CHOLECYSTECTOMY USING DA GENO XI N/A 10/09/2024    Procedure: XI ROBOTIC SUB TOTAL CHOLECYSTECTOMY; DRAIN  "PLACEMENT;  Surgeon: Rigoberto Ponce MD;  Location: [facility];  Service: General;  Laterality: N/A;     Family History   Problem Relation Name Age of Onset    Cancer Mother          lung    Liver disease Father      Thyroid disease Sister      Cervical cancer Sister      Tremor Sister       Social History[2]     Review of Systems:  Review of Systems   All other systems reviewed and are negative.        OBJECTIVE:     Vital Signs (Most Recent)  Temp: 98 °F (36.7 °C) (05/22/25 0407)  Pulse: 67 (05/22/25 0407)  Resp: 18 (05/22/25 0407)  BP: (!) 188/84 (05/22/25 0407)  SpO2: 95 % (05/22/25 0407)  5' 7" (1.702 m)  56.6 kg (124 lb 12.5 oz)     Physical Exam:  Physical Exam  Constitutional:       Appearance: Normal appearance.   HENT:      Head: Normocephalic.      Mouth/Throat:      Mouth: Mucous membranes are moist.   Cardiovascular:      Rate and Rhythm: Normal rate.   Pulmonary:      Effort: Pulmonary effort is normal. No respiratory distress.   Abdominal:      General: Abdomen is flat. There is no distension.      Palpations: Abdomen is soft.   Skin:     Capillary Refill: Capillary refill takes less than 2 seconds.   Neurological:      General: No focal deficit present.      Mental Status: She is alert and oriented to person, place, and time.           Laboratory  CBC: Reviewed  CMP: Reviewed    Diagnostic Results:  CT: Reviewed    Significant Diagnostic Studies: Labs: CMP   Recent Labs   Lab 05/21/25  1102 05/22/25  0432    142   K 4.7 3.6    108   CO2 22* 23   * 100   BUN 14 11   CREATININE 0.6 0.6   CALCIUM 8.6* 8.6*   PROT 7.2 6.7   ALBUMIN 3.7 3.5   BILITOT 0.4 0.5   ALKPHOS 101 92   AST 14 15   ALT 17 16   ANIONGAP 11 11    and CBC   Recent Labs   Lab 05/21/25  0909 05/22/25  0432   WBC 11.55 12.96*   HGB 17.5* 15.3   HCT 51.6* 47.1    221     Radiology: CT scan: CT ABDOMEN PELVIS WITH CONTRAST:   Results for orders placed or performed during the hospital encounter of 05/21/25   CT " Abdomen Pelvis With IV Contrast NO Oral Contrast    Narrative    EXAMINATION:  CT ABDOMEN PELVIS WITH IV CONTRAST    CLINICAL HISTORY:  Epigastric pain;    FINDINGS:  Comparison is 05/16/2025.    Patient was administered 75 cc of Omnipaque 350 intravenously.    Lung bases are clear.  There is mild periportal edema.  The liver, spleen, and pancreas demonstrate nothing unusual.  There is gas within the pancreatic duct.  The adrenal glands are not enlarged.  The kidneys demonstrate nothing unusual.  There is dilatation of the stomach which is fairly massive.  There is dilatation of colon and there is considerable constipation.  There are some dilated small bowel loops in the pelvis.  Bladder is unremarkable.  There is free fluid in the pelvis.  The bones demonstrate DJD.  The appendix is normal.      Impression    There is now a small amount of ascites left upper quadrant and pelvis.  There is also now massive distension of the stomach which can be seen with gastric outlet obstruction or gastroparesis.    Severe constipation.    There is a mildly dilated small bowel loops.  Small-bowel obstruction cannot be entirely excluded.      Electronically signed by: Patel Moore MD  Date:    05/21/2025  Time:    14:16       ASSESSMENT/PLAN:     Ms. Ramirez is a 69 year old female who presents to hospital with abdominal pain and nausea with CTAP demonstrating gastric distention along with heavy stool burden. Of note, patient has had previous hospitalization with suspicion for GOO. EGD done 11/21/2024 with excessive gastric fluid, possible large hiatal hernia and erythematous mucosa in gastric antrum. Notably, patient had recent CTAP on 5/16/25 with normal appearing stomach. Suspect  gastroparesis vs GOO. Patient would benefit from GI reevaluation and possible repeat EGD.    PLAN:Plan     - agree with NGT to LIWS  - consult GI for further evaluation  - aggressive bowel regimen for constipation  - no acute surgical intervention at  this time    Annmarie Sommer MD PGY-II  [facility]                [1]   Current Facility-Administered Medications   Medication Dose Route Frequency Provider Last Rate Last Admin    0.9% NaCl infusion   Intravenous Continuous Mahmud, Rashed, PA-C 100 mL/hr at 05/21/25 2200 New Bag at 05/21/25 2200    acetaminophen tablet 650 mg  650 mg Oral Q4H PRN Mahmud, Rashed, PA-C        atorvastatin tablet 40 mg  40 mg Oral Daily Mahmud, Rashed, PA-C        citalopram tablet 60 mg  60 mg Oral Daily Mahmud, Rashed, PA-C        dextrose 50% injection 12.5 g  12.5 g Intravenous PRN Mahmud, Rashed, PA-C        dextrose 50% injection 25 g  25 g Intravenous PRN Mahmud, Rashed, PA-C        glucagon (human recombinant) injection 1 mg  1 mg Intramuscular PRN Mahmud, Rashed, PA-C        glucose chewable tablet 16 g  16 g Oral PRN Mahmud, Rashed, PA-C        glucose chewable tablet 24 g  24 g Oral PRN Mahmud, Rashed, PA-C        melatonin tablet 6 mg  6 mg Oral Nightly PRN Mahmud, Rashed, PA-C        morphine injection 1 mg  1 mg Intravenous Q6H PRN Mahmud, Rashed, PA-C        morphine injection 2 mg  2 mg Intravenous Q6H PRN Mahmud, Rashed, PA-C   2 mg at 05/21/25 2106    naloxone 0.4 mg/mL injection 0.02 mg  0.02 mg Intravenous PRN Mahmud, Rashed, PA-C        ondansetron injection 4 mg  4 mg Intravenous Q8H PRN Mahmud, Rashed, PA-C        pantoprazole injection 40 mg  40 mg Intravenous Daily Mahmud, Rashed, PA-C        sodium chloride 0.9% flush 10 mL  10 mL Intravenous Q12H PRN Mahmud, Rashed, PA-C        valsartan tablet 320 mg  320 mg Oral Daily Mahmud, Rashed, PA-C         Facility-Administered Medications Ordered in Other Encounters   Medication Dose Route Frequency Provider Last Rate Last Admin    cyclopentolate 1% ophthalmic solution 1 drop  1 drop Left Eye On Call Procedure Palmer Berrios MD   1 drop at 10/27/22 1003    ofloxacin 0.3 % ophthalmic solution 1 drop  1 drop Left Eye On Call Procedure Agustina  Palmer WANG MD   2 drop at 10/27/22 1238    sodium chloride 0.9% flush 10 mL  10 mL Intravenous PRN Palmer Berrios MD       [2]   Social History  Tobacco Use    Smoking status: Every Day     Current packs/day: 0.75     Average packs/day: 0.8 packs/day for 40.0 years (30.0 ttl pk-yrs)     Types: Cigarettes    Smokeless tobacco: Never   Substance Use Topics    Alcohol use: Yes     Comment: rarely    Drug use: Yes     Frequency: 14.0 times per week     Types: Marijuana

## 2025-05-22 NOTE — ASSESSMENT & PLAN NOTE
Presents with bilateral abdominal pain with nausea, vomiting, diarrhea since last night with poor po intake.   CT abdomen pelvis shows possible gastric outlet obstruction vs SBO  NGT placed in ED and x-ray ordered to confirm placement  Likely GOO vs. SBO    Plan:  NPO for now   NGT on LIWS   cc/hr   PRN analgesics antiemetics  PPI daily   General surgery consulted with recommendation to continue conservative management and consult Gastroenterology

## 2025-05-22 NOTE — NURSING
Cont. IV fluids started on pt, IV burns when flushed, but stopped after flushed, pt gave okay to start IV fluid, and instructed to call if pain continues, pt agrees to IV change if it becomes infiltrated, no swelling, no redness, and no warmth noticed. Bed in lowest position, personal items and call light within reach, instructed to call for help if needed.

## 2025-05-22 NOTE — PLAN OF CARE
05/22/25 1209   Rounds   Attendance Provider;Nurse ;Charge nurse   Discharge Plan A Home with family   Why the patient remains in the hospital Requires continued medical care   Transition of Care Barriers None

## 2025-05-22 NOTE — HOSPITAL COURSE
69 y.o. female with a past medical history of reflux, hypertension, hyperlipidemia, and obesity presents to the hospital with complaints of abdominal pain with the associated nausea and constipation for the past several days. In the ED: Patient afebrile without leukocytosis, hypertensive on presentation 172/74, CBC and CMP unremarkable, normal lactic acid level. Abdominal x-ray shows no obstruction or perforation however constipation and mild ileus seen.. CT abdomen shows small amount of ascites in the left upper quadrant and pelvis and also a massive distention of the stomach which can be seen in gastric outlet obstruction or gastroparesis along with mildly dilated small bowel loops concerning for SBO.  Patient was started on IVF, pain regimen, and NGT to LIS.  General surgery was consulted with recommendation to continue conservative management.  Patient had multiple bowel movement and was tolerating oral diet before discharge.  Patient was asked to follow up with her primary care and to return to the hospital in the event of abdominal pain, nausea, vomiting, which generalized weakness.  All questions were answered to her satisfaction and she verbalized understanding

## 2025-05-22 NOTE — ASSESSMENT & PLAN NOTE
Presents with bilateral abdominal pain with nausea, vomiting, diarrhea since last night with poor po intake.   CT abdomen pelvis shows possible gastric outlet obstruction vs SBO  NGT placed in ED and x-ray ordered to confirm placement  Likely GOO vs. SBO    Plan:  NPO for now   NGT on LIWS   cc/hr   PRN analgesics antiemetics  PPI daily   General surgery consulted and would appreciate recommendations

## 2025-05-22 NOTE — ASSESSMENT & PLAN NOTE
Patient's blood pressure range in the last 24 hours was: BP  Min: 158/76  Max: 221/94.The patient's inpatient anti-hypertensive regimen is listed below:  Current Antihypertensives  valsartan tablet 320 mg, Daily, Oral    Plan  - BP is controlled, no changes needed to their regimen

## 2025-05-22 NOTE — SUBJECTIVE & OBJECTIVE
Interval History:  No acute event overnight.  Patient stated that she has started passing flatus.  Pain appears to be well controlled.  General surgery consulted with recommendation to continue conservative management      Review of Systems  Objective:     Vital Signs (Most Recent):  Temp: 98.4 °F (36.9 °C) (05/22/25 1247)  Pulse: 82 (05/22/25 1507)  Resp: 18 (05/22/25 1247)  BP: (!) 166/79 (05/22/25 1247)  SpO2: 95 % (05/22/25 1247) Vital Signs (24h Range):  Temp:  [97.7 °F (36.5 °C)-98.6 °F (37 °C)] 98.4 °F (36.9 °C)  Pulse:  [62-90] 82  Resp:  [12-20] 18  SpO2:  [93 %-96 %] 95 %  BP: (158-190)/(76-87) 166/79     Weight: 56.6 kg (124 lb 12.5 oz)  Body mass index is 19.54 kg/m².    Intake/Output Summary (Last 24 hours) at 5/22/2025 1644  Last data filed at 5/22/2025 1607  Gross per 24 hour   Intake 2199.27 ml   Output 0 ml   Net 2199.27 ml         Physical Exam  Constitutional:       General: She is not in acute distress.     Appearance: She is not ill-appearing, toxic-appearing or diaphoretic.   Cardiovascular:      Rate and Rhythm: Normal rate and regular rhythm.      Pulses: Normal pulses.      Heart sounds: Normal heart sounds.   Pulmonary:      Effort: Pulmonary effort is normal. No respiratory distress.      Breath sounds: Normal breath sounds. No stridor.   Abdominal:      General: There is distension.      Palpations: Abdomen is soft.      Comments: NGT in place; hypoactive bowel sounds   Neurological:      General: No focal deficit present.      Mental Status: She is oriented to person, place, and time.               Significant Labs: All pertinent labs within the past 24 hours have been reviewed.    Significant Imaging: I have reviewed all pertinent imaging results/findings within the past 24 hours.

## 2025-05-22 NOTE — PLAN OF CARE
Case Management Assessment     PCP: Fabienne Erwin NP    Pharmacy:   [id number]  [place] [address] [facility]  [address]  [place] [address]  Phone: [phone] Fax: [phone]      Patient Arrived From: Home  Existing Help at Home: Pt is independent    Barriers to Discharge: None identified    Discharge Plan:    A. Home   B. Home    CM met with pt for dc planning assessment. Pt lives with her son. She's independent with ADLs and uses no DME. Her family will provide transportation home on discharge. CM will continue to follow.     05/22/25 1020   Discharge Assessment   Assessment Type Discharge Planning Assessment   Confirmed/corrected address, phone number and insurance Yes   Confirmed Demographics Correct on Facesheet   Source of Information patient   People in Home child(kalli), adult   Name(s) of People in Home Mynor Ramirez [phone]   Do you expect to return to your current living situation? Yes   Current cognitive status: Alert/Oriented   Walking or Climbing Stairs Difficulty no   Dressing/Bathing Difficulty no   Equipment Currently Used at Home none   Readmission within 30 days? No   Patient currently being followed by outpatient case management? No   Do you currently have service(s) that help you manage your care at home? No   Do you take prescription medications? Yes   Do you have prescription coverage? Yes   Do you have any problems affording any of your prescribed medications? No   Is the patient taking medications as prescribed? yes   Who is going to help you get home at discharge? family   How do you get to doctors appointments? car, drives self;family or friend will provide   Are you on dialysis? No   Do you take coumadin? No   Discharge Plan A Home with family   Discharge Plan B Home   DME Needed Upon Discharge  none   Discharge Plan discussed with: Patient   Transition of Care Barriers None

## 2025-05-22 NOTE — PLAN OF CARE
Pt A&Ox4, free from falls/injury, and able to make needs known during shift. VSS. Pt had no c/o pain during shift. Telemetry monitoring continued on box #2051 visible and audible on monitor at nurses' station, no acute distress noted. Bed locked and in lowest position. Bedside table and call light in reach. Will cont to monitor.  Problem: Adult Inpatient Plan of Care  Goal: Plan of Care Review  Outcome: Progressing  Goal: Patient-Specific Goal (Individualized)  Outcome: Progressing  Goal: Absence of Hospital-Acquired Illness or Injury  Outcome: Progressing  Goal: Optimal Comfort and Wellbeing  Outcome: Progressing  Goal: Readiness for Transition of Care  Outcome: Progressing

## 2025-05-22 NOTE — ASSESSMENT & PLAN NOTE
Patient's blood pressure range in the last 24 hours was: BP  Min: 158/76  Max: 190/87.The patient's inpatient anti-hypertensive regimen is listed below:  Current Antihypertensives  valsartan tablet 320 mg, Daily, Oral  hydrALAZINE injection 10 mg, Every 6 hours PRN, Intravenous    Plan  - BP is controlled, no changes needed to their regimen

## 2025-05-22 NOTE — H&P
[facility]  History & Physical    Patient Name: Patricia Ramirez  MRN: [id number]  Patient Class: IP- Inpatient  Admission Date: 5/21/2025  Attending Physician: Jenn Byers MD   Primary Care Provider: Fabienne Erwin NP         Patient information was obtained from patient, past medical records, and ER records.     Subjective:     Principal Problem:Gastric outlet obstruction    Chief Complaint:   Chief Complaint   Patient presents with    Abdominal Pain     Pt BIB EMS, c/o abd pain since approximately 0400 this morning. Pt reports constipation and nausea x 3 days. Pt denies any CP, SOB, vomiting or diarrhea.         HPI: Patricia Ramirez is a 69 y.o. female with a past medical history of reflux, hypertension, hyperlipidemia, and obesity presents to the hospital with complaints of abdominal pain with the associated nausea and constipation for the past several days.    Patient states her abdominal pain worsened this morning after drinking a cup of coffee.  Patient states she has been experiencing constipation over the last week or so.  She has taken MiraLax at home with minimal relief.  Patient states she also took 2 tablets of Senokot night and was able to pass small amount of stool.  Patient is unable to pass gas and has been complaining of bloating and belching. Of note, she reports that she a small lump over her previous stent placement site to right lower abdomen and had a CT of the area preformed with no abnormalities found. She states that she was placed on antibiotics (Vibramycin) which she has been taking and has also been placing a warm compress to the area and notes that the area has since been draining. She denies shortness of breath, chest pain, nausea, or any other associated symptoms.      In the ED: Patient afebrile without leukocytosis, hypertensive on presentation 172/74, CBC and CMP unremarkable, normal lactic acid level. Abdominal x-ray shows no obstruction or  perforation however constipation and mild ileus seen.. CT abdomen shows small amount of ascites in the left upper quadrant and pelvis and also a massive distention of the stomach which can be seen in gastric outlet obstruction or gastroparesis along with mildly dilated small bowel loops concerning for SBO.  Patient given IV famotidine 40 mg, IV hydralazine 10 mg, lactulose 20 g, IV morphine 4 mg x 2, IV Zofran 4 mg, and 1 L NS bolus in the ED. Case discussed with ED provider and patient admitted to hospital medicine for further medical management.    Past Medical History:   Diagnosis Date    Allergy     Anxiety     Arthritis     Cataract     Depressive disorder, not elsewhere classified     Esophageal reflux     Hypertension     Impaired fasting glucose     Joint pain     Obesity, unspecified     Other and unspecified hyperlipidemia        Past Surgical History:   Procedure Laterality Date    BLEPHAROPLASTY Bilateral 2021    Procedure: BLEPHAROPLASTY;  Surgeon: Maite Acuna MD;  Location: [facility];  Service: Ophthalmology;  Laterality: Bilateral;     SECTION  1973    CHOLECYSTECTOMY      COLONOSCOPY N/A 2023    Procedure: COLONOSCOPY;  Surgeon: Briana Sterling MD;  Location: [facility];  Service: Endoscopy;  Laterality: N/A;    ERCP N/A 10/07/2024    Procedure: ERCP (ENDOSCOPIC RETROGRADE CHOLANGIOPANCREATOGRAPHY);  Surgeon: Catracho Mitchell MD;  Location: [address]);  Service: Endoscopy;  Laterality: N/A;    ERCP N/A 2024    Procedure: ERCP (ENDOSCOPIC RETROGRADE CHOLANGIOPANCREATOGRAPHY);  Surgeon: Romeo Roger MD;  Location: [facility];  Service: Endoscopy;  Laterality: N/A;  12/3/24: instructions sent via email-GD   SWP PRECALL COMPLETED-RT    ERCP N/A 2025    Procedure: ERCP (ENDOSCOPIC RETROGRADE CHOLANGIOPANCREATOGRAPHY);  Surgeon: Shannon Chaney MD;  Location: [facility];  Service: Endoscopy;  Laterality: N/A;   portal-Repeat ERCP in 6 weeks  to remove stent.petersen -tt  1/14 SWP-PRECALL COMPLETE-RT  1/23 r/s updated portal instr-pt stated any MD-tt    ERCP N/A 04/28/2025    Procedure: ERCP (ENDOSCOPIC RETROGRADE CHOLANGIOPANCREATOGRAPHY);  Surgeon: Shannon Chaney MD;  Location: [facility] ENDO;  Service: Endoscopy;  Laterality: N/A;  Per Dr. Chaney- Repeat ERCP in 3 months to remove covered metal                          biliary stent and hopefully for final clearance of                          the CBD.     3/21/25-Pt no longer taking Eliquis, instr portal-DS  4/24/25-LVM     ESOPHAGOGASTRODUODENOSCOPY N/A 11/21/2024    Procedure: EGD (ESOPHAGOGASTRODUODENOSCOPY);  Surgeon: Angie Alatorre MD;  Location: [facility] ENDO;  Service: Gastroenterology;  Laterality: N/A;    HYSTERECTOMY      without BSO    INTRAOCULAR PROSTHESES INSERTION Left 10/27/2022    Procedure: INSERTION, IOL PROSTHESIS;  Surgeon: Palmer Berrios MD;  Location: [facility] OR;  Service: Ophthalmology;  Laterality: Left;    PHACOEMULSIFICATION OF CATARACT Left 10/27/2022    Procedure: PHACOEMULSIFICATION, CATARACT;  Surgeon: Palmer Berrios MD;  Location: [facility] OR;  Service: Ophthalmology;  Laterality: Left;  RN PHONE PREOP 10/21/2022   ARRIVAL 9:00 AM    R knee replacement      REPAIR OF RETINAL DETACHMENT WITH VITRECTOMY Left 02/28/2022    Procedure: REPAIR, RETINAL DETACHMENT, WITH VITRECTOMY;  Surgeon: MINO Martinez MD;  Location: [address];  Service: Ophthalmology;  Laterality: Left;  Spoke with SID Garcia. Pt was instructed nothing to eat after 8am and to arrive at 2pm for preop. 430pm case start request.    right  arm  surgery      ROBOT-ASSISTED CHOLECYSTECTOMY USING DA GENO XI N/A 10/09/2024    Procedure: XI ROBOTIC SUB TOTAL CHOLECYSTECTOMY; DRAIN PLACEMENT;  Surgeon: Rigoberto Ponce MD;  Location: [facility] OR;  Service: General;  Laterality: N/A;       Review of patient's allergies indicates:   Allergen Reactions    Sulfa (sulfonamide antibiotics) Hives    Ace  inhibitors Other (See Comments)     Cough         Current Facility-Administered Medications on File Prior to Encounter   Medication    cyclopentolate 1% ophthalmic solution 1 drop    ofloxacin 0.3 % ophthalmic solution 1 drop    sodium chloride 0.9% flush 10 mL     Current Outpatient Medications on File Prior to Encounter   Medication Sig    acetaminophen (TYLENOL) 500 MG tablet Take 500 mg by mouth every 6 (six) hours as needed for Pain.    ALPRAZolam (XANAX) 0.25 MG tablet TAKE 1 TABLET BY MOUTH THREE TIMES DAILY    atorvastatin (LIPITOR) 40 MG tablet Take 1 tablet by mouth once daily    busPIRone (BUSPAR) 15 MG tablet TAKE 1 TABLET BY MOUTH THREE TIMES DAILY    citalopram (CELEXA) 20 MG tablet Take 3 tablets (60 mg total) by mouth once daily. Take 1 tablet by mouth once daily (Patient taking differently: Take 60 mg by mouth 3 (three) times daily. Take 1 tablet by mouth TID daily)    cyclobenzaprine (FLEXERIL) 5 MG tablet TAKE 1 TABLET BY MOUTH TWICE DAILY AS NEEDED FOR MUSCLE SPASM    doxycycline (VIBRAMYCIN) 100 MG Cap Take 1 capsule (100 mg total) by mouth 2 (two) times daily. for 7 days    ergocalciferol (ERGOCALCIFEROL) 50,000 unit Cap Take 1 capsule (50,000 Units total) by mouth every 7 days.    olmesartan (BENICAR) 20 MG tablet TAKE 1 TABLET BY MOUTH ONCE DAILY IN THE EVENING    pantoprazole (PROTONIX) 40 MG tablet Take 1 tablet (40 mg total) by mouth once daily.    traZODone (DESYREL) 100 MG tablet Take 100 mg by mouth every evening.    [DISCONTINUED] traZODone (DESYREL) 50 MG tablet Take 50 mg by mouth every morning.     Family History       Problem Relation (Age of Onset)    Cancer Mother    Cervical cancer Sister    Liver disease Father    Thyroid disease Sister    Tremor Sister          Tobacco Use    Smoking status: Every Day     Current packs/day: 0.75     Average packs/day: 0.8 packs/day for 40.0 years (30.0 ttl pk-yrs)     Types: Cigarettes    Smokeless tobacco: Never   Substance and Sexual  Activity    Alcohol use: Yes     Comment: rarely    Drug use: Yes     Frequency: 14.0 times per week     Types: Marijuana    Sexual activity: Yes     Partners: Male     Birth control/protection: See Surgical Hx     Review of Systems   Constitutional: Negative.    HENT: Negative.     Respiratory: Negative.     Cardiovascular: Negative.    Gastrointestinal:  Positive for abdominal pain, constipation and nausea.   Genitourinary: Negative.    Musculoskeletal: Negative.    Skin: Negative.    Neurological: Negative.    Psychiatric/Behavioral: Negative.       Objective:     Vital Signs (Most Recent):  Temp: 98.6 °F (37 °C) (05/21/25 1942)  Pulse: 77 (05/21/25 1942)  Resp: 18 (05/21/25 1942)  BP: (!) 158/76 (05/21/25 1942)  SpO2: 95 % (05/21/25 1942) Vital Signs (24h Range):  Temp:  [98 °F (36.7 °C)-98.6 °F (37 °C)] 98.6 °F (37 °C)  Pulse:  [60-77] 77  Resp:  [12-20] 18  SpO2:  [93 %-98 %] 95 %  BP: (158-221)/() 158/76     Weight: 56.6 kg (124 lb 12.5 oz)  Body mass index is 19.54 kg/m².     Physical Exam  Constitutional:       General: She is not in acute distress.     Appearance: Normal appearance. She is not ill-appearing or diaphoretic.      Comments: NGT to LIWS    HENT:      Head: Normocephalic and atraumatic.      Mouth/Throat:      Mouth: Mucous membranes are moist.   Eyes:      Extraocular Movements: Extraocular movements intact.   Cardiovascular:      Rate and Rhythm: Normal rate and regular rhythm.      Pulses: Normal pulses.   Pulmonary:      Effort: Pulmonary effort is normal. No respiratory distress.   Abdominal:      General: Abdomen is flat.      Tenderness: There is abdominal tenderness. There is no guarding or rebound.   Musculoskeletal:      Right lower leg: No edema.      Left lower leg: No edema.   Skin:     General: Skin is warm.   Neurological:      General: No focal deficit present.      Mental Status: She is alert and oriented to person, place, and time. Mental status is at baseline.    Psychiatric:         Mood and Affect: Mood normal.         Behavior: Behavior normal.         Thought Content: Thought content normal.                Significant Labs: All pertinent labs within the past 24 hours have been reviewed.    Significant Imaging: I have reviewed all pertinent imaging results/findings within the past 24 hours.  Imaging Results              X-Ray Abdomen Portable (Final result)  Result time 05/21/25 16:29:45      Final result by Patel Moore III, MD (05/21/25 16:29:45)                   Narrative:    EXAMINATION:  XR ABDOMEN PORTABLE    CLINICAL HISTORY:  Presence of other specified devices    FINDINGS:  Abdomen one view:    NG tip fundus.  Heart size is normal.  There is aortic plaque.  Lungs are clear.  No obstruction, ileus, or perforation seen.      Electronically signed by: Patel Moore MD  Date:    05/21/2025  Time:    16:29                                     CT Abdomen Pelvis With IV Contrast NO Oral Contrast (Final result)  Result time 05/21/25 14:16:35      Final result by Patel Moore III, MD (05/21/25 14:16:35)                   Impression:      There is now a small amount of ascites left upper quadrant and pelvis.  There is also now massive distension of the stomach which can be seen with gastric outlet obstruction or gastroparesis.    Severe constipation.    There is a mildly dilated small bowel loops.  Small-bowel obstruction cannot be entirely excluded.      Electronically signed by: Patel Moore MD  Date:    05/21/2025  Time:    14:16               Narrative:    EXAMINATION:  CT ABDOMEN PELVIS WITH IV CONTRAST    CLINICAL HISTORY:  Epigastric pain;    FINDINGS:  Comparison is 05/16/2025.    Patient was administered 75 cc of Omnipaque 350 intravenously.    Lung bases are clear.  There is mild periportal edema.  The liver, spleen, and pancreas demonstrate nothing unusual.  There is gas within the pancreatic duct.  The adrenal glands are not enlarged.  The  kidneys demonstrate nothing unusual.  There is dilatation of the stomach which is fairly massive.  There is dilatation of colon and there is considerable constipation.  There are some dilated small bowel loops in the pelvis.  Bladder is unremarkable.  There is free fluid in the pelvis.  The bones demonstrate DJD.  The appendix is normal.                                       X-Ray Abdomen Flat And Erect (Final result)  Result time 05/21/25 09:09:39      Final result by Patel Moore III, MD (05/21/25 09:09:39)                   Narrative:    EXAMINATION:  XR ABDOMEN FLAT AND ERECT    CLINICAL HISTORY:  Epigastric pain    FINDINGS:  Abdomen flat erect:    Lung bases are clear.  No obstruction, or perforation seen.  There is constipation.  There is mild ileus.      Electronically signed by: Patel Moore MD  Date:    05/21/2025  Time:    09:09                                    Assessment/Plan:     Assessment & Plan  Gastric outlet obstruction  Presents with bilateral abdominal pain with nausea, vomiting, diarrhea since last night with poor po intake.   CT abdomen pelvis shows possible gastric outlet obstruction vs SBO  NGT placed in ED and x-ray ordered to confirm placement  Likely GOO vs. SBO    Plan:  NPO for now   NGT on LIWS   cc/hr   PRN analgesics antiemetics  PPI daily   General surgery consulted and would appreciate recommendations  Essential hypertension  Patient's blood pressure range in the last 24 hours was: BP  Min: 158/76  Max: 221/94.The patient's inpatient anti-hypertensive regimen is listed below:  Current Antihypertensives  valsartan tablet 320 mg, Daily, Oral    Plan  - BP is controlled, no changes needed to their regimen  Hyperlipidemia  Continue statin   GERD (gastroesophageal reflux disease)  Continue PPI     Epigastric pain  -Patient is s/p lap cholecystectomy   -See plan above for gastric outlet obstruction   History of laparoscopic cholecystectomy  History noted    VTE Risk  Mitigation (From admission, onward)           Ordered     IP VTE HIGH RISK PATIENT  Once         05/21/25 1615     Place sequential compression device  Until discontinued         05/21/25 1615                       As clarification, on 5/21/2025, patient should be admitted to inpatient services under my care in collaboration with Jenn Byers MD. RUFUS Bennett PA-C  Department of Hospital Medicine  [facility]

## 2025-05-22 NOTE — SUBJECTIVE & OBJECTIVE
Past Medical History:   Diagnosis Date    Allergy     Anxiety     Arthritis     Cataract     Depressive disorder, not elsewhere classified     Esophageal reflux     Hypertension     Impaired fasting glucose     Joint pain     Obesity, unspecified     Other and unspecified hyperlipidemia        Past Surgical History:   Procedure Laterality Date    BLEPHAROPLASTY Bilateral 2021    Procedure: BLEPHAROPLASTY;  Surgeon: Maite Acuna MD;  Location: [facility];  Service: Ophthalmology;  Laterality: Bilateral;     SECTION  1973    CHOLECYSTECTOMY      COLONOSCOPY N/A 2023    Procedure: COLONOSCOPY;  Surgeon: Briana Sterling MD;  Location: [facility];  Service: Endoscopy;  Laterality: N/A;    ERCP N/A 10/07/2024    Procedure: ERCP (ENDOSCOPIC RETROGRADE CHOLANGIOPANCREATOGRAPHY);  Surgeon: Catracho Mitchell MD;  Location: [address]);  Service: Endoscopy;  Laterality: N/A;    ERCP N/A 2024    Procedure: ERCP (ENDOSCOPIC RETROGRADE CHOLANGIOPANCREATOGRAPHY);  Surgeon: Romeo Roger MD;  Location: [facility];  Service: Endoscopy;  Laterality: N/A;  12/3/24: instructions sent via email-GD   SWP PRECALL COMPLETED-RT    ERCP N/A 2025    Procedure: ERCP (ENDOSCOPIC RETROGRADE CHOLANGIOPANCREATOGRAPHY);  Surgeon: Shannon Chaney MD;  Location: [facility];  Service: Endoscopy;  Laterality: N/A;   portal-Repeat ERCP in 6 weeks to remove stent.nicola -tt   SWP-PRECALL COMPLETE-RT   r/s updated portal instr-pt stated any MD-tt    ERCP N/A 2025    Procedure: ERCP (ENDOSCOPIC RETROGRADE CHOLANGIOPANCREATOGRAPHY);  Surgeon: Shannon Chaney MD;  Location: [facility];  Service: Endoscopy;  Laterality: N/A;  Per Dr. Chaney- Repeat ERCP in 3 months to remove covered metal                          biliary stent and hopefully for final clearance of                          the CBD.     3/21/25-Pt no longer taking Eliquis, instr portal-DS  25-LVM      ESOPHAGOGASTRODUODENOSCOPY N/A 11/21/2024    Procedure: EGD (ESOPHAGOGASTRODUODENOSCOPY);  Surgeon: Angie Alatorre MD;  Location: [facility];  Service: Gastroenterology;  Laterality: N/A;    HYSTERECTOMY      without BSO    INTRAOCULAR PROSTHESES INSERTION Left 10/27/2022    Procedure: INSERTION, IOL PROSTHESIS;  Surgeon: Palmer Berrios MD;  Location: [facility] OR;  Service: Ophthalmology;  Laterality: Left;    PHACOEMULSIFICATION OF CATARACT Left 10/27/2022    Procedure: PHACOEMULSIFICATION, CATARACT;  Surgeon: Palmer Berrios MD;  Location: [facility] OR;  Service: Ophthalmology;  Laterality: Left;  RN PHONE PREOP 10/21/2022   ARRIVAL 9:00 AM    R knee replacement      REPAIR OF RETINAL DETACHMENT WITH VITRECTOMY Left 02/28/2022    Procedure: REPAIR, RETINAL DETACHMENT, WITH VITRECTOMY;  Surgeon: MINO Martinez MD;  Location: [address];  Service: Ophthalmology;  Laterality: Left;  Spoke with SID Garcia. Pt was instructed nothing to eat after 8am and to arrive at 2pm for preop. 430pm case start request.    right  arm  surgery      ROBOT-ASSISTED CHOLECYSTECTOMY USING DA GENO XI N/A 10/09/2024    Procedure: XI ROBOTIC SUB TOTAL CHOLECYSTECTOMY; DRAIN PLACEMENT;  Surgeon: Rigoberto Ponce MD;  Location: [facility] OR;  Service: General;  Laterality: N/A;       Review of patient's allergies indicates:   Allergen Reactions    Sulfa (sulfonamide antibiotics) Hives    Ace inhibitors Other (See Comments)     Cough         Current Facility-Administered Medications on File Prior to Encounter   Medication    cyclopentolate 1% ophthalmic solution 1 drop    ofloxacin 0.3 % ophthalmic solution 1 drop    sodium chloride 0.9% flush 10 mL     Current Outpatient Medications on File Prior to Encounter   Medication Sig    acetaminophen (TYLENOL) 500 MG tablet Take 500 mg by mouth every 6 (six) hours as needed for Pain.    ALPRAZolam (XANAX) 0.25 MG tablet TAKE 1 TABLET BY MOUTH THREE TIMES DAILY    atorvastatin (LIPITOR)  40 MG tablet Take 1 tablet by mouth once daily    busPIRone (BUSPAR) 15 MG tablet TAKE 1 TABLET BY MOUTH THREE TIMES DAILY    citalopram (CELEXA) 20 MG tablet Take 3 tablets (60 mg total) by mouth once daily. Take 1 tablet by mouth once daily (Patient taking differently: Take 60 mg by mouth 3 (three) times daily. Take 1 tablet by mouth TID daily)    cyclobenzaprine (FLEXERIL) 5 MG tablet TAKE 1 TABLET BY MOUTH TWICE DAILY AS NEEDED FOR MUSCLE SPASM    doxycycline (VIBRAMYCIN) 100 MG Cap Take 1 capsule (100 mg total) by mouth 2 (two) times daily. for 7 days    ergocalciferol (ERGOCALCIFEROL) 50,000 unit Cap Take 1 capsule (50,000 Units total) by mouth every 7 days.    olmesartan (BENICAR) 20 MG tablet TAKE 1 TABLET BY MOUTH ONCE DAILY IN THE EVENING    pantoprazole (PROTONIX) 40 MG tablet Take 1 tablet (40 mg total) by mouth once daily.    traZODone (DESYREL) 100 MG tablet Take 100 mg by mouth every evening.    [DISCONTINUED] traZODone (DESYREL) 50 MG tablet Take 50 mg by mouth every morning.     Family History       Problem Relation (Age of Onset)    Cancer Mother    Cervical cancer Sister    Liver disease Father    Thyroid disease Sister    Tremor Sister          Tobacco Use    Smoking status: Every Day     Current packs/day: 0.75     Average packs/day: 0.8 packs/day for 40.0 years (30.0 ttl pk-yrs)     Types: Cigarettes    Smokeless tobacco: Never   Substance and Sexual Activity    Alcohol use: Yes     Comment: rarely    Drug use: Yes     Frequency: 14.0 times per week     Types: Marijuana    Sexual activity: Yes     Partners: Male     Birth control/protection: See Surgical Hx     Review of Systems   Constitutional: Negative.    HENT: Negative.     Respiratory: Negative.     Cardiovascular: Negative.    Gastrointestinal:  Positive for abdominal pain, constipation and nausea.   Genitourinary: Negative.    Musculoskeletal: Negative.    Skin: Negative.    Neurological: Negative.    Psychiatric/Behavioral: Negative.        Objective:     Vital Signs (Most Recent):  Temp: 98.6 °F (37 °C) (05/21/25 1942)  Pulse: 77 (05/21/25 1942)  Resp: 18 (05/21/25 1942)  BP: (!) 158/76 (05/21/25 1942)  SpO2: 95 % (05/21/25 1942) Vital Signs (24h Range):  Temp:  [98 °F (36.7 °C)-98.6 °F (37 °C)] 98.6 °F (37 °C)  Pulse:  [60-77] 77  Resp:  [12-20] 18  SpO2:  [93 %-98 %] 95 %  BP: (158-221)/() 158/76     Weight: 56.6 kg (124 lb 12.5 oz)  Body mass index is 19.54 kg/m².     Physical Exam  Constitutional:       General: She is not in acute distress.     Appearance: Normal appearance. She is not ill-appearing or diaphoretic.      Comments: NGT to [facility]    HENT:      Head: Normocephalic and atraumatic.      Mouth/Throat:      Mouth: Mucous membranes are moist.   Eyes:      Extraocular Movements: Extraocular movements intact.   Cardiovascular:      Rate and Rhythm: Normal rate and regular rhythm.      Pulses: Normal pulses.   Pulmonary:      Effort: Pulmonary effort is normal. No respiratory distress.   Abdominal:      General: Abdomen is flat.      Tenderness: There is abdominal tenderness. There is no guarding or rebound.   Musculoskeletal:      Right lower leg: No edema.      Left lower leg: No edema.   Skin:     General: Skin is warm.   Neurological:      General: No focal deficit present.      Mental Status: She is alert and oriented to person, place, and time. Mental status is at baseline.   Psychiatric:         Mood and Affect: Mood normal.         Behavior: Behavior normal.         Thought Content: Thought content normal.                Significant Labs: All pertinent labs within the past 24 hours have been reviewed.    Significant Imaging: I have reviewed all pertinent imaging results/findings within the past 24 hours.  Imaging Results              X-Ray Abdomen Portable (Final result)  Result time 05/21/25 16:29:45      Final result by Patel Moore III, MD (05/21/25 16:29:45)                   Narrative:    EXAMINATION:  XR ABDOMEN  PORTABLE    CLINICAL HISTORY:  Presence of other specified devices    FINDINGS:  Abdomen one view:    NG tip fundus.  Heart size is normal.  There is aortic plaque.  Lungs are clear.  No obstruction, ileus, or perforation seen.      Electronically signed by: Patel Moore MD  Date:    05/21/2025  Time:    16:29                                     CT Abdomen Pelvis With IV Contrast NO Oral Contrast (Final result)  Result time 05/21/25 14:16:35      Final result by Ptael Moore III, MD (05/21/25 14:16:35)                   Impression:      There is now a small amount of ascites left upper quadrant and pelvis.  There is also now massive distension of the stomach which can be seen with gastric outlet obstruction or gastroparesis.    Severe constipation.    There is a mildly dilated small bowel loops.  Small-bowel obstruction cannot be entirely excluded.      Electronically signed by: Patel Moore MD  Date:    05/21/2025  Time:    14:16               Narrative:    EXAMINATION:  CT ABDOMEN PELVIS WITH IV CONTRAST    CLINICAL HISTORY:  Epigastric pain;    FINDINGS:  Comparison is 05/16/2025.    Patient was administered 75 cc of Omnipaque 350 intravenously.    Lung bases are clear.  There is mild periportal edema.  The liver, spleen, and pancreas demonstrate nothing unusual.  There is gas within the pancreatic duct.  The adrenal glands are not enlarged.  The kidneys demonstrate nothing unusual.  There is dilatation of the stomach which is fairly massive.  There is dilatation of colon and there is considerable constipation.  There are some dilated small bowel loops in the pelvis.  Bladder is unremarkable.  There is free fluid in the pelvis.  The bones demonstrate DJD.  The appendix is normal.                                       X-Ray Abdomen Flat And Erect (Final result)  Result time 05/21/25 09:09:39      Final result by Patel Moore III, MD (05/21/25 09:09:39)                   Narrative:    EXAMINATION:  XR  ABDOMEN FLAT AND ERECT    CLINICAL HISTORY:  Epigastric pain    FINDINGS:  Abdomen flat erect:    Lung bases are clear.  No obstruction, or perforation seen.  There is constipation.  There is mild ileus.      Electronically signed by: Patel Moore MD  Date:    05/21/2025  Time:    09:09

## 2025-05-23 ENCOUNTER — ANESTHESIA (OUTPATIENT)
Dept: ENDOSCOPY | Facility: HOSPITAL | Age: 70
End: 2025-05-23
Payer: MEDICARE

## 2025-05-23 ENCOUNTER — ANESTHESIA EVENT (OUTPATIENT)
Dept: ENDOSCOPY | Facility: HOSPITAL | Age: 70
End: 2025-05-23
Payer: MEDICARE

## 2025-05-23 LAB
ABSOLUTE EOSINOPHIL (OHS): 0.15 K/UL
ABSOLUTE MONOCYTE (OHS): 1.05 K/UL (ref 0.3–1)
ABSOLUTE NEUTROPHIL COUNT (OHS): 9.42 K/UL (ref 1.8–7.7)
ALBUMIN SERPL BCP-MCNC: 3.6 G/DL (ref 3.5–5.2)
ALP SERPL-CCNC: 92 UNIT/L (ref 40–150)
ALT SERPL W/O P-5'-P-CCNC: 17 UNIT/L (ref 10–44)
ANION GAP (OHS): 9 MMOL/L (ref 8–16)
AST SERPL-CCNC: 17 UNIT/L (ref 11–45)
BASOPHILS # BLD AUTO: 0.15 K/UL
BASOPHILS NFR BLD AUTO: 1.1 %
BILIRUB SERPL-MCNC: 0.7 MG/DL (ref 0.1–1)
BUN SERPL-MCNC: 8 MG/DL (ref 8–23)
CALCIUM SERPL-MCNC: 8.9 MG/DL (ref 8.7–10.5)
CHLORIDE SERPL-SCNC: 104 MMOL/L (ref 95–110)
CO2 SERPL-SCNC: 25 MMOL/L (ref 23–29)
CREAT SERPL-MCNC: 0.6 MG/DL (ref 0.5–1.4)
ERYTHROCYTE [DISTWIDTH] IN BLOOD BY AUTOMATED COUNT: 13.1 % (ref 11.5–14.5)
GFR SERPLBLD CREATININE-BSD FMLA CKD-EPI: >60 ML/MIN/1.73/M2
GLUCOSE SERPL-MCNC: 83 MG/DL (ref 70–110)
HCT VFR BLD AUTO: 44.7 % (ref 37–48.5)
HGB BLD-MCNC: 15 GM/DL (ref 12–16)
IMM GRANULOCYTES # BLD AUTO: 0.06 K/UL (ref 0–0.04)
IMM GRANULOCYTES NFR BLD AUTO: 0.5 % (ref 0–0.5)
LYMPHOCYTES # BLD AUTO: 2.36 K/UL (ref 1–4.8)
MAGNESIUM SERPL-MCNC: 1.9 MG/DL (ref 1.6–2.6)
MCH RBC QN AUTO: 32.7 PG (ref 27–31)
MCHC RBC AUTO-ENTMCNC: 33.6 G/DL (ref 32–36)
MCV RBC AUTO: 97 FL (ref 82–98)
NUCLEATED RBC (/100WBC) (OHS): 0 /100 WBC
PHOSPHATE SERPL-MCNC: 3.6 MG/DL (ref 2.7–4.5)
PLATELET # BLD AUTO: 225 K/UL (ref 150–450)
PMV BLD AUTO: 9.4 FL (ref 9.2–12.9)
POTASSIUM SERPL-SCNC: 4.3 MMOL/L (ref 3.5–5.1)
PROT SERPL-MCNC: 6.9 GM/DL (ref 6–8.4)
RBC # BLD AUTO: 4.59 M/UL (ref 4–5.4)
RELATIVE EOSINOPHIL (OHS): 1.1 %
RELATIVE LYMPHOCYTE (OHS): 17.9 % (ref 18–48)
RELATIVE MONOCYTE (OHS): 8 % (ref 4–15)
RELATIVE NEUTROPHIL (OHS): 71.4 % (ref 38–73)
SODIUM SERPL-SCNC: 138 MMOL/L (ref 136–145)
WBC # BLD AUTO: 13.19 K/UL (ref 3.9–12.7)

## 2025-05-23 PROCEDURE — 36415 COLL VENOUS BLD VENIPUNCTURE: CPT

## 2025-05-23 PROCEDURE — 63600175 PHARM REV CODE 636 W HCPCS

## 2025-05-23 PROCEDURE — 11000001 HC ACUTE MED/SURG PRIVATE ROOM

## 2025-05-23 PROCEDURE — 0DB68ZX EXCISION OF STOMACH, VIA NATURAL OR ARTIFICIAL OPENING ENDOSCOPIC, DIAGNOSTIC: ICD-10-PCS | Performed by: STUDENT IN AN ORGANIZED HEALTH CARE EDUCATION/TRAINING PROGRAM

## 2025-05-23 PROCEDURE — 25000003 PHARM REV CODE 250: Performed by: NURSE PRACTITIONER

## 2025-05-23 PROCEDURE — 27201012 HC FORCEPS, HOT/COLD, DISP: Performed by: STUDENT IN AN ORGANIZED HEALTH CARE EDUCATION/TRAINING PROGRAM

## 2025-05-23 PROCEDURE — 99223 1ST HOSP IP/OBS HIGH 75: CPT | Mod: 25,,, | Performed by: NURSE PRACTITIONER

## 2025-05-23 PROCEDURE — 37000009 HC ANESTHESIA EA ADD 15 MINS: Performed by: STUDENT IN AN ORGANIZED HEALTH CARE EDUCATION/TRAINING PROGRAM

## 2025-05-23 PROCEDURE — 43239 EGD BIOPSY SINGLE/MULTIPLE: CPT | Mod: ,,, | Performed by: STUDENT IN AN ORGANIZED HEALTH CARE EDUCATION/TRAINING PROGRAM

## 2025-05-23 PROCEDURE — 43239 EGD BIOPSY SINGLE/MULTIPLE: CPT | Performed by: STUDENT IN AN ORGANIZED HEALTH CARE EDUCATION/TRAINING PROGRAM

## 2025-05-23 PROCEDURE — 0DB58ZX EXCISION OF ESOPHAGUS, VIA NATURAL OR ARTIFICIAL OPENING ENDOSCOPIC, DIAGNOSTIC: ICD-10-PCS | Performed by: STUDENT IN AN ORGANIZED HEALTH CARE EDUCATION/TRAINING PROGRAM

## 2025-05-23 PROCEDURE — 83735 ASSAY OF MAGNESIUM: CPT

## 2025-05-23 PROCEDURE — 85025 COMPLETE CBC W/AUTO DIFF WBC: CPT

## 2025-05-23 PROCEDURE — 80053 COMPREHEN METABOLIC PANEL: CPT

## 2025-05-23 PROCEDURE — 25000003 PHARM REV CODE 250

## 2025-05-23 PROCEDURE — 84100 ASSAY OF PHOSPHORUS: CPT

## 2025-05-23 PROCEDURE — 37000008 HC ANESTHESIA 1ST 15 MINUTES: Performed by: STUDENT IN AN ORGANIZED HEALTH CARE EDUCATION/TRAINING PROGRAM

## 2025-05-23 PROCEDURE — 88305 TISSUE EXAM BY PATHOLOGIST: CPT | Mod: TC | Performed by: STUDENT IN AN ORGANIZED HEALTH CARE EDUCATION/TRAINING PROGRAM

## 2025-05-23 PROCEDURE — 94761 N-INVAS EAR/PLS OXIMETRY MLT: CPT

## 2025-05-23 RX ORDER — PROPOFOL 10 MG/ML
VIAL (ML) INTRAVENOUS
Status: DISCONTINUED | OUTPATIENT
Start: 2025-05-23 | End: 2025-05-23

## 2025-05-23 RX ORDER — PROPOFOL 10 MG/ML
VIAL (ML) INTRAVENOUS
Status: COMPLETED
Start: 2025-05-23 | End: 2025-05-23

## 2025-05-23 RX ORDER — DEXMEDETOMIDINE HYDROCHLORIDE 100 UG/ML
INJECTION, SOLUTION INTRAVENOUS
Status: DISCONTINUED | OUTPATIENT
Start: 2025-05-23 | End: 2025-05-23

## 2025-05-23 RX ORDER — POLYETHYLENE GLYCOL 3350 17 G/17G
17 POWDER, FOR SOLUTION ORAL DAILY
Status: DISCONTINUED | OUTPATIENT
Start: 2025-05-24 | End: 2025-05-24 | Stop reason: HOSPADM

## 2025-05-23 RX ORDER — DEXTROMETHORPHAN POLISTIREX 30 MG/5 ML
1 SUSPENSION, EXTENDED RELEASE 12 HR ORAL ONCE
Status: DISCONTINUED | OUTPATIENT
Start: 2025-05-23 | End: 2025-05-23

## 2025-05-23 RX ORDER — DEXTROMETHORPHAN POLISTIREX 30 MG/5 ML
1 SUSPENSION, EXTENDED RELEASE 12 HR ORAL ONCE
Status: COMPLETED | OUTPATIENT
Start: 2025-05-23 | End: 2025-05-23

## 2025-05-23 RX ORDER — LIDOCAINE HYDROCHLORIDE 20 MG/ML
INJECTION, SOLUTION EPIDURAL; INFILTRATION; INTRACAUDAL; PERINEURAL
Status: COMPLETED
Start: 2025-05-23 | End: 2025-05-23

## 2025-05-23 RX ORDER — LIDOCAINE HYDROCHLORIDE 20 MG/ML
INJECTION INTRAVENOUS
Status: DISCONTINUED | OUTPATIENT
Start: 2025-05-23 | End: 2025-05-23

## 2025-05-23 RX ORDER — PANTOPRAZOLE SODIUM 40 MG/1
40 TABLET, DELAYED RELEASE ORAL DAILY
Status: DISCONTINUED | OUTPATIENT
Start: 2025-05-24 | End: 2025-05-24 | Stop reason: HOSPADM

## 2025-05-23 RX ADMIN — LIDOCAINE HYDROCHLORIDE 100 MG: 20 INJECTION, SOLUTION INTRAVENOUS at 02:05

## 2025-05-23 RX ADMIN — PROPOFOL 20 MG: 10 INJECTION, EMULSION INTRAVENOUS at 02:05

## 2025-05-23 RX ADMIN — PROPOFOL 50 MG: 10 INJECTION, EMULSION INTRAVENOUS at 02:05

## 2025-05-23 RX ADMIN — PROPOFOL: 10 INJECTION, EMULSION INTRAVENOUS at 03:05

## 2025-05-23 RX ADMIN — SODIUM CHLORIDE: 0.9 INJECTION, SOLUTION INTRAVENOUS at 02:05

## 2025-05-23 RX ADMIN — LIDOCAINE HYDROCHLORIDE: 20 INJECTION, SOLUTION INTRAVENOUS at 03:05

## 2025-05-23 RX ADMIN — DEXMEDETOMIDINE HYDROCHLORIDE 8 MCG: 100 INJECTION, SOLUTION INTRAVENOUS at 02:05

## 2025-05-23 RX ADMIN — LACTULOSE 20 G: 20 SOLUTION ORAL at 09:05

## 2025-05-23 RX ADMIN — PANTOPRAZOLE SODIUM 40 MG: 40 INJECTION, POWDER, FOR SOLUTION INTRAVENOUS at 07:05

## 2025-05-23 RX ADMIN — MINERAL OIL 1 ENEMA: 100 ENEMA RECTAL at 04:05

## 2025-05-23 RX ADMIN — LACTULOSE 20 G: 20 SOLUTION ORAL at 04:05

## 2025-05-23 NOTE — PROVATION PATIENT INSTRUCTIONS
Discharge Summary/Instructions after an Endoscopic Procedure  Patient Name: Patricia Ramirez  Patient MRN: [id number]  Patient YOB: 1955  Friday, May 23, 2025  Mak Michael MD  Dear patient,  As a result of recent federal legislation (The Federal Cures Act), you may   receive lab or pathology results from your procedure in your [facility]   account before your physician is able to contact you. Your physician or   their representative will relay the results to you with their   recommendations at their soonest availability.  Thank you,  RESTRICTIONS:  During your procedure today, you received medications for sedation.  These   medications may affect your judgment, balance and coordination.  Therefore,   for 24 hours, you have the following restrictions:   - DO NOT drive a car, operate machinery, make legal/financial decisions,   sign important papers or drink alcohol.    ACTIVITY:  Today: no heavy lifting, straining or running due to procedural   sedation/anesthesia.  The following day: return to full activity including work.  DIET:  Eat and drink normally unless instructed otherwise.     TREATMENT FOR COMMON SIDE EFFECTS:  - Mild abdominal pain, nausea, belching, bloating or excessive gas:  rest,   eat lightly and use a heating pad.  - Sore Throat: treat with throat lozenges and/or gargle with warm salt   water.  - Because air was used during the procedure, expelling large amounts of air   from your rectum or belching is normal.  - If a bowel prep was taken, you may not have a bowel movement for 1-3 days.    This is normal.  SYMPTOMS TO WATCH FOR AND REPORT TO YOUR PHYSICIAN:  1. Abdominal pain or bloating, other than gas cramps.  2. Chest pain.  3. Back pain.  4. Signs of infection such as: chills or fever occurring within 24 hours   after the procedure.  5. Rectal bleeding, which would show as bright red, maroon, or black stools.   (A tablespoon of blood from the rectum is not serious, especially if    hemorrhoids are present.)  6. Vomiting.  7. Weakness or dizziness.  GO DIRECTLY TO THE NEAREST EMERGENCY ROOM IF YOU HAVE ANY OF THE FOLLOWING:      Difficulty breathing              Chills and/or fever over 101 F   Persistent vomiting and/or vomiting blood   Severe abdominal pain   Severe chest pain   Black, tarry stools   Bleeding- more than one tablespoon   Any other symptom or condition that you feel may need urgent attention  Your doctor recommends these additional instructions:  If any biopsies were taken, your doctors clinic will contact you in 1 to 2   weeks with any results.  - Return patient to hospital [facility] for ongoing care.   - Await pathology results.   - Daily PPI, repeat EGD in 2-3 months to ensure healing  For questions, problems or results please call your physician - Mak Michael MD at Work:  [phone].  [facility] can be reached at [phone]     IF A COMPLICATION OR EMERGENCY SITUATION ARISES AND YOU ARE UNABLE TO REACH   YOUR PHYSICIAN - GO DIRECTLY TO THE EMERGENCY ROOM.  MD Mak Mcmillan MD  5/23/2025 3:02:14 PM  This report has been verified and signed electronically.  Dear patient,  As a result of recent federal legislation (The Federal Cures Act), you may   receive lab or pathology results from your procedure in your [facility]   account before your physician is able to contact you. Your physician or   their representative will relay the results to you with their   recommendations at their soonest availability.  Thank you,  PROVATION

## 2025-05-23 NOTE — TRANSFER OF CARE
"Anesthesia Transfer of Care Note    Patient: Patricia Ramirez    Procedure(s) Performed: Procedure(s) (LRB):  EGD (ESOPHAGOGASTRODUODENOSCOPY) (N/A)    Patient location: GI    Anesthesia Type: general    Transport from OR: Transported from OR on room air with adequate spontaneous ventilation    Post pain: adequate analgesia    Post assessment: no apparent anesthetic complications and tolerated procedure well    Post vital signs: stable    Level of consciousness: responds to stimulation and lethargic    Nausea/Vomiting: no nausea/vomiting    Complications: none    Transfer of care protocol was followed      Last vitals: Visit Vitals  BP (!) 105/56   Pulse 65   Temp 36.4 °C (97.6 °F)   Resp 18   Ht 5' 7" (1.702 m)   Wt 56.6 kg (124 lb 12.5 oz)   SpO2 96%   Breastfeeding No   BMI 19.54 kg/m²     "

## 2025-05-23 NOTE — ASSESSMENT & PLAN NOTE
Presents with bilateral abdominal pain with nausea, vomiting, diarrhea since last night with poor po intake.   CT abdomen pelvis shows possible gastric outlet obstruction vs SBO  NGT placed in ED and x-ray ordered to confirm placement  Likely GOO vs. SBO    Plan:  NGT discontinued on 05/23  Start trial of clear liquid diet  PRN analgesics antiemetics  EGD today showed esophagitis; continue daily PPI

## 2025-05-23 NOTE — PROGRESS NOTES
GI PLAN OF CARE NOTE    S/P EGD-no obstruction noted. Esophagitis grade B. Rec daily ppi. Rec f/u EGD in 2-3 months, will add colonoscopy to this d/t overdue colon polyp surveillance.- will order. Rec continue stool regimen mentioned in consult note. Ok for clears adv as tolerated.    No need for GI clinic f/u appt.  Will sign off   Alicia Menjivar NP  Gastroenterology

## 2025-05-23 NOTE — NURSING
[facility] [place]  Nurses Note -- 4 Eyes      5/22/2025       Skin assessed on: Q Shift      [x] No Pressure Injuries Present    [x]Prevention Measures Documented    [] Yes LDA  for Pressure Injury Previously documented     [] Yes New Pressure Injury Discovered   [] LDA for New Pressure Injury Added      Attending RN:  Maricruz Ivy, RN     Second RN:  Verna RiveroRN

## 2025-05-23 NOTE — ANESTHESIA PREPROCEDURE EVALUATION
2025  Patricia Ramirez is a 69 y.o., female.  To undergo Procedure(s) (LRB):  EGD (ESOPHAGOGASTRODUODENOSCOPY) (N/A)     Denies CP/SOB/GERD/MI/CVA/URI symptoms.  METS > 4  NPO > 8    Past Medical History:  Past Medical History:   Diagnosis Date    Allergy     Anxiety     Arthritis     Cataract     Depressive disorder, not elsewhere classified     Esophageal reflux     Hypertension     Impaired fasting glucose     Joint pain     Obesity, unspecified     Other and unspecified hyperlipidemia        Past Surgical History:  Past Surgical History:   Procedure Laterality Date    BLEPHAROPLASTY Bilateral 2021    Procedure: BLEPHAROPLASTY;  Surgeon: Maite Acuna MD;  Location: [facility];  Service: Ophthalmology;  Laterality: Bilateral;     SECTION  1975    CHOLECYSTECTOMY      COLONOSCOPY N/A 2023    Procedure: COLONOSCOPY;  Surgeon: Briana Sterling MD;  Location: [facility];  Service: Endoscopy;  Laterality: N/A;    ERCP N/A 10/07/2024    Procedure: ERCP (ENDOSCOPIC RETROGRADE CHOLANGIOPANCREATOGRAPHY);  Surgeon: Catracho Mitchell MD;  Location: [address]);  Service: Endoscopy;  Laterality: N/A;    ERCP N/A 2024    Procedure: ERCP (ENDOSCOPIC RETROGRADE CHOLANGIOPANCREATOGRAPHY);  Surgeon: Romeo Roger MD;  Location: [facility];  Service: Endoscopy;  Laterality: N/A;  12/3/24: instructions sent via email-GD   SWP PRECALL COMPLETED-RT    ERCP N/A 2025    Procedure: ERCP (ENDOSCOPIC RETROGRADE CHOLANGIOPANCREATOGRAPHY);  Surgeon: Shannon Chaney MD;  Location: [facility];  Service: Endoscopy;  Laterality: N/A;   portal-Repeat ERCP in 6 weeks to remove stent.nicola -tt   SWP-PRECALL COMPLETE-RT   r/s updated portal instr-pt stated any MD-tt    ERCP N/A 2025    Procedure: ERCP (ENDOSCOPIC RETROGRADE CHOLANGIOPANCREATOGRAPHY);  Surgeon: Shiv  Shannon LAU MD;  Location: [facility] ENDO;  Service: Endoscopy;  Laterality: N/A;  Per Dr. Chaney- Repeat ERCP in 3 months to remove covered metal                          biliary stent and hopefully for final clearance of                          the CBD.     3/21/25-Pt no longer taking Eliquis, instr portal-DS  4/24/25-LVM     ESOPHAGOGASTRODUODENOSCOPY N/A 11/21/2024    Procedure: EGD (ESOPHAGOGASTRODUODENOSCOPY);  Surgeon: Angie Alatorre MD;  Location: [facility];  Service: Gastroenterology;  Laterality: N/A;    HYSTERECTOMY      without BSO    INTRAOCULAR PROSTHESES INSERTION Left 10/27/2022    Procedure: INSERTION, IOL PROSTHESIS;  Surgeon: Palmer Berrios MD;  Location: [facility] OR;  Service: Ophthalmology;  Laterality: Left;    PHACOEMULSIFICATION OF CATARACT Left 10/27/2022    Procedure: PHACOEMULSIFICATION, CATARACT;  Surgeon: Palmer Berrios MD;  Location: [facility] OR;  Service: Ophthalmology;  Laterality: Left;  RN PHONE PREOP 10/21/2022   ARRIVAL 9:00 AM    R knee replacement      REPAIR OF RETINAL DETACHMENT WITH VITRECTOMY Left 02/28/2022    Procedure: REPAIR, RETINAL DETACHMENT, WITH VITRECTOMY;  Surgeon: MINO Martinez MD;  Location: [address];  Service: Ophthalmology;  Laterality: Left;  Spoke with SID Garcia. Pt was instructed nothing to eat after 8am and to arrive at 2pm for preop. 430pm case start request.    right  arm  surgery      ROBOT-ASSISTED CHOLECYSTECTOMY USING DA GENO XI N/A 10/09/2024    Procedure: XI ROBOTIC SUB TOTAL CHOLECYSTECTOMY; DRAIN PLACEMENT;  Surgeon: Rigoberto Ponce MD;  Location: [facility] OR;  Service: General;  Laterality: N/A;       Social History:  Social History[1]    Medications:  Medications Ordered Prior to Encounter[2]    Allergies:  Review of patient's allergies indicates:   Allergen Reactions    Sulfa (sulfonamide antibiotics) Hives    Ace inhibitors Other (See Comments)     Cough         Active Problems:  Problem List[3]    Diagnostic Studies:   Latest  Reference Range & Units 05/23/25 05:20   WBC 3.90 - 12.70 K/uL 13.19 (H)   RBC 4.00 - 5.40 M/uL 4.59   Hemoglobin 12.0 - 16.0 gm/dL 15.0   Hematocrit 37.0 - 48.5 % 44.7   MCV 82 - 98 fL 97   MCH 27.0 - 31.0 pg 32.7 (H)   MCHC 32.0 - 36.0 g/dL 33.6   RDW 11.5 - 14.5 % 13.1   Platelet Count 150 - 450 K/uL 225      [facility] Reference Range & Units 05/23/25 05:20   Sodium 136 - 145 mmol/L 138   Potassium 3.5 - 5.1 mmol/L 4.3   Chloride 95 - 110 mmol/L 104   CO2 23 - 29 mmol/L 25   Anion Gap 8 - 16 mmol/L 9   BUN 8 - 23 mg/dL 8   Creatinine 0.5 - 1.4 mg/dL 0.6   eGFR >60 mL/min/1.73/m2 >60     EKG (5/21/25):  Normal sinus rhythm   Left axis deviation   Right bundle branch block     TTE (10/7/24):    Left Ventricle: The left ventricle is normal in size. Normal wall thickness. There is normal systolic function with a visually estimated ejection fraction of 55 - 60%. Grade I diastolic dysfunction.    Right Ventricle: Normal right ventricular cavity size. Systolic function is normal.    Mitral Valve: There is severe posterior mitral annular calcification present. There is mild stenosis. The mean pressure gradient across the mitral valve is 3 mmHg at a heart rate of 58 bpm.    Pulmonary Artery: The estimated pulmonary artery systolic pressure is 35 mmHg.    24 Hour Vitals:  Temp:  [36.8 °C (98.3 °F)-37.1 °C (98.7 °F)] 36.9 °C (98.5 °F)  Pulse:  [61-82] 64  Resp:  [16-20] 18  SpO2:  [95 %-98 %] 96 %  BP: (142-168)/(62-79) 164/62   See Nursing Charting For Additional Vitals      Pre-op Assessment    I have reviewed the Patient Summary Reports.     I have reviewed the Nursing Notes.       Review of Systems  Anesthesia Hx:  No problems with previous Anesthesia               Denies Personal Hx of Anesthesia complications.                    Social:  Smoker, Alcohol Use, Recreational Drugs MJ use      Cardiovascular:     Hypertension    Dysrhythmias   CHF    hyperlipidemia   ECG has been reviewed.                             Pulmonary:  Pulmonary Normal                       Hepatic/GI:     GERD   Gastric outlet obstruction             Musculoskeletal:  Arthritis               Neurological:      Headaches                                 Endocrine:  Endocrine Normal            Psych:   anxiety depression                Physical Exam  General: Well nourished and Cooperative    Airway:  Mallampati: II   Mouth Opening: Normal  TM Distance: Normal    Dental:  Intact    Chest/Lungs:  Clear to auscultation, Normal Respiratory Rate    Heart:  Rate: Normal  Rhythm: Regular Rhythm        Anesthesia Plan  Type of Anesthesia, risks & benefits discussed:    Anesthesia Type: Gen ETT, Gen Natural Airway, MAC  Intra-op Monitoring Plan: Standard ASA Monitors  Post Op Pain Control Plan: multimodal analgesia  Induction:  IV  Informed Consent: Informed consent signed with the Patient and all parties understand the risks and agree with anesthesia plan.  All questions answered.   ASA Score: 3    Ready For Surgery From Anesthesia Perspective.     .           [1]   Social History  Socioeconomic History    Marital status:     Years of education: 12   Tobacco Use    Smoking status: Every Day     Current packs/day: 0.75     Average packs/day: 0.8 packs/day for 40.0 years (30.0 ttl pk-yrs)     Types: Cigarettes    Smokeless tobacco: Never   Substance and Sexual Activity    Alcohol use: Yes     Comment: rarely    Drug use: Yes     Frequency: 14.0 times per week     Types: Marijuana    Sexual activity: Yes     Partners: Male     Birth control/protection: See Surgical Hx   Other Topics Concern    Are you pregnant or think you may be? No    Breast-feeding No     Social Drivers of Health     Financial Resource Strain: Patient Declined (5/21/2025)    Overall Financial Resource Strain (CARDIA)     Difficulty of Paying Living Expenses: Patient declined   Food Insecurity: Patient Declined (5/21/2025)    Hunger Vital Sign     Worried About Running Out of Food in  the Last Year: Patient declined     Ran Out of Food in the Last Year: Patient declined   Transportation Needs: Patient Declined (5/21/2025)    PRAPARE - Transportation     Lack of Transportation (Medical): Patient declined     Lack of Transportation (Non-Medical): Patient declined   Physical Activity: Inactive (5/21/2025)    Exercise Vital Sign     Days of Exercise per Week: 0 days     Minutes of Exercise per Session: 0 min   Stress: Patient Declined (5/21/2025)    [employer] - Occupational Stress Questionnaire     Feeling of Stress : Patient declined   Housing Stability: Patient Declined (5/21/2025)    Housing Stability Vital Sign     Unable to Pay for Housing in the Last Year: Patient declined     Homeless in the Last Year: Patient declined   [2]   Current Facility-Administered Medications on File Prior to Encounter   Medication Dose Route Frequency Provider Last Rate Last Admin    cyclopentolate 1% ophthalmic solution 1 drop  1 drop Left Eye On Call Procedure Palmer Berrios MD   1 drop at 10/27/22 1003    ofloxacin 0.3 % ophthalmic solution 1 drop  1 drop Left Eye On Call Procedure Palmer Berrios MD   2 drop at 10/27/22 1238    sodium chloride 0.9% flush 10 mL  10 mL Intravenous PRN Palmer Berrios MD         Current Outpatient Medications on File Prior to Encounter   Medication Sig Dispense Refill    acetaminophen (TYLENOL) 500 MG tablet Take 500 mg by mouth every 6 (six) hours as needed for Pain.      ALPRAZolam (XANAX) 0.25 MG tablet TAKE 1 TABLET BY MOUTH THREE TIMES DAILY 90 tablet 0    atorvastatin (LIPITOR) 40 MG tablet Take 1 tablet by mouth once daily 90 tablet 3    busPIRone (BUSPAR) 15 MG tablet TAKE 1 TABLET BY MOUTH THREE TIMES DAILY 90 tablet 5    citalopram (CELEXA) 20 MG tablet Take 3 tablets (60 mg total) by mouth once daily. Take 1 tablet by mouth once daily (Patient taking differently: Take 60 mg by mouth 3 (three) times daily. Take 1  tablet by mouth TID daily) 90 tablet 11    cyclobenzaprine (FLEXERIL) 5 MG tablet TAKE 1 TABLET BY MOUTH TWICE DAILY AS NEEDED FOR MUSCLE SPASM 90 tablet 3    doxycycline (VIBRAMYCIN) 100 MG Cap Take 1 capsule (100 mg total) by mouth 2 (two) times daily. for 7 days 14 capsule 0    ergocalciferol (ERGOCALCIFEROL) 50,000 unit Cap Take 1 capsule (50,000 Units total) by mouth every 7 days. 12 capsule 3    olmesartan (BENICAR) 20 MG tablet TAKE 1 TABLET BY MOUTH ONCE DAILY IN THE EVENING 90 tablet 3    pantoprazole (PROTONIX) 40 MG tablet Take 1 tablet (40 mg total) by mouth once daily. 90 tablet 3    traZODone (DESYREL) 100 MG tablet Take 100 mg by mouth every evening.     [3]   Patient Active Problem List  Diagnosis    Essential hypertension    Hyperlipidemia    GERD (gastroesophageal reflux disease)    Depression with anxiety    Migraine with aura and without status migrainosus, not intractable    Nuclear sclerosis of both eyes    Dermatochalasis    Rosacea    Brow ptosis, bilateral    Retinal detachment of left eye with multiple breaks    Epiretinal membrane, left    Splenic infarction    Heart failure with preserved ejection fraction    Diastolic dysfunction    IFG (impaired fasting glucose)    Class 1 obesity due to excess calories without serious comorbidity with body mass index (BMI) of 31.0 to 31.9 in adult    RBBB    Left atrial enlargement    Left anterior fascicular block    Abnormal EKG    Dyspnea on exertion    Multiple risk factors for coronary artery disease    Splenic infarct    History of tobacco abuse    Nuclear sclerotic cataract of left eye    Moderate major depression, single episode    Tobacco dependency    Choledocholithiasis    Moderate protein-calorie malnutrition    Epigastric pain    History of laparoscopic cholecystectomy    Gastric outlet obstruction

## 2025-05-23 NOTE — NURSING
Report received and care assumed. Discussed plan of care and safety with patient . Reviewed call system. No acute distress noted. Patient complains about Ngt discomfort to back of throat then called to room patient coughing nonstop. Gesturing that NGT  has dislodged. NGT taken out patient refusing replacement at this time. I will retry of asking her to replaced tube. Informed her tt  why tube is necessary.  [facility] -- 4 [place]      5/23/2025       Skin assessed on: Q Shift      [x] No Pressure Injuries Present    [x]Prevention Measures Documented    [] Yes LDA  for Pressure Injury Previously documented     [] Yes New Pressure Injury Discovered   [] LDA for New Pressure Injury Added      Attending RN:  Batsheva Rendon RN     Second RN:  Maricruz Ivy RN

## 2025-05-23 NOTE — PLAN OF CARE
Procedure and Recovery complete.Report given to primary nurse. Patient alert and oriented, no pain reported, and no signs or symptoms of distress noted at this moment.

## 2025-05-23 NOTE — PROGRESS NOTES
[facility] Medicine  Progress Note    Patient Name: Patricia Ramirez  MRN: [id number]  Patient Class: IP- Inpatient   Admission Date: 5/21/2025  Length of Stay: 2 days  Attending Physician: Jenn Byers MD  Primary Care Provider: Fabienne Erwin NP        Subjective     Principal Problem:Gastric outlet obstruction        HPI:  Patricia Ramirez is a 69 y.o. female with a past medical history of reflux, hypertension, hyperlipidemia, and obesity presents to the hospital with complaints of abdominal pain with the associated nausea and constipation for the past several days.    Patient states her abdominal pain worsened this morning after drinking a cup of coffee.  Patient states she has been experiencing constipation over the last week or so.  She has taken MiraLax at home with minimal relief.  Patient states she also took 2 tablets of Senokot night and was able to pass small amount of stool.  Patient is unable to pass gas and has been complaining of bloating and belching. Of note, she reports that she a small lump over her previous stent placement site to right lower abdomen and had a CT of the area preformed with no abnormalities found. She states that she was placed on antibiotics (Vibramycin) which she has been taking and has also been placing a warm compress to the area and notes that the area has since been draining. She denies shortness of breath, chest pain, nausea, or any other associated symptoms.      In the ED: Patient afebrile without leukocytosis, hypertensive on presentation 172/74, CBC and CMP unremarkable, normal lactic acid level. Abdominal x-ray shows no obstruction or perforation however constipation and mild ileus seen.. CT abdomen shows small amount of ascites in the left upper quadrant and pelvis and also a massive distention of the stomach which can be seen in gastric outlet obstruction or gastroparesis along with mildly dilated small bowel loops concerning for SBO.  Patient  given IV famotidine 40 mg, IV hydralazine 10 mg, lactulose 20 g, IV morphine 4 mg x 2, IV Zofran 4 mg, and 1 L NS bolus in the ED. Case discussed with ED provider and patient admitted to hospital medicine for further medical management.    Overview/Hospital Course:  69 y.o. female with a past medical history of reflux, hypertension, hyperlipidemia, and obesity presents to the hospital with complaints of abdominal pain with the associated nausea and constipation for the past several days. In the ED: Patient afebrile without leukocytosis, hypertensive on presentation 172/74, CBC and CMP unremarkable, normal lactic acid level. Abdominal x-ray shows no obstruction or perforation however constipation and mild ileus seen.. CT abdomen shows small amount of ascites in the left upper quadrant and pelvis and also a massive distention of the stomach which can be seen in gastric outlet obstruction or gastroparesis along with mildly dilated small bowel loops concerning for SBO.  Patient was started on IVF, pain regimen, and NGT to LIS.  General surgery was consulted with recommendation to continue conservative management.    Interval History:  No acute event overnight.  EGD showed esophagitis; NGT discontinued.  We will start trial of clear liquid diet     Review of Systems  Objective:     Vital Signs (Most Recent):  Temp: 98.2 °F (36.8 °C) (05/23/25 1610)  Pulse: 62 (05/23/25 1610)  Resp: 18 (05/23/25 1610)  BP: (!) 158/64 (05/23/25 1610)  SpO2: 95 % (05/23/25 1610) Vital Signs (24h Range):  Temp:  [97.6 °F (36.4 °C)-98.7 °F (37.1 °C)] 98.2 °F (36.8 °C)  Pulse:  [60-90] 62  Resp:  [18-20] 18  SpO2:  [94 %-98 %] 95 %  BP: (105-170)/(56-77) 158/64     Weight: 56.6 kg (124 lb 12.5 oz)  Body mass index is 19.54 kg/m².    Intake/Output Summary (Last 24 hours) at 5/23/2025 1657  Last data filed at 5/23/2025 1436  Gross per 24 hour   Intake 361.48 ml   Output 100 ml   Net 261.48 ml         Physical Exam  Cardiovascular:      Rate and  Rhythm: Normal rate and regular rhythm.      Pulses: Normal pulses.      Heart sounds: Normal heart sounds. No murmur heard.  Pulmonary:      Effort: Pulmonary effort is normal. No respiratory distress.      Breath sounds: Normal breath sounds.   Abdominal:      General: There is no distension.      Palpations: Abdomen is soft. There is no mass.      Comments: Hypoactive bowel sounds   Neurological:      General: No focal deficit present.      Mental Status: She is oriented to person, place, and time.               Significant Labs: CBC:   Recent Labs   Lab 05/22/25  0432 05/23/25  0520   WBC 12.96* 13.19*   HGB 15.3 15.0   HCT 47.1 44.7    225     CMP:   Recent Labs   Lab 05/22/25  0432 05/23/25  0520    138   K 3.6 4.3    104   CO2 23 25    83   BUN 11 8   CREATININE 0.6 0.6   CALCIUM 8.6* 8.9   PROT 6.7 6.9   ALBUMIN 3.5 3.6   BILITOT 0.5 0.7   ALKPHOS 92 92   AST 15 17   ALT 16 17   ANIONGAP 11 9       Significant Imaging:   X-Ray Abdomen Portable   Final Result      CT Abdomen Pelvis With IV Contrast NO Oral Contrast   Final Result      There is now a small amount of ascites left upper quadrant and pelvis.  There is also now massive distension of the stomach which can be seen with gastric outlet obstruction or gastroparesis.      Severe constipation.      There is a mildly dilated small bowel loops.  Small-bowel obstruction cannot be entirely excluded.         Electronically signed by: Patel Moore MD   Date:    05/21/2025   Time:    14:16      X-Ray Abdomen Flat And Erect   Final Result             Assessment & Plan  Gastric outlet obstruction  Presents with bilateral abdominal pain with nausea, vomiting, diarrhea since last night with poor po intake.   CT abdomen pelvis shows possible gastric outlet obstruction vs SBO  NGT placed in ED and x-ray ordered to confirm placement  Likely GOO vs. SBO    Plan:  NGT discontinued on 05/23  Start trial of clear liquid diet  PRN analgesics  antiemetics  EGD today showed esophagitis; continue daily PPI  Essential hypertension  Patient's blood pressure range in the last 24 hours was: BP  Min: 105/56  Max: 170/75.The patient's inpatient anti-hypertensive regimen is listed below:  Current Antihypertensives  valsartan tablet 320 mg, Daily, Oral  hydrALAZINE injection 10 mg, Every 6 hours PRN, Intravenous    Plan  - BP is controlled, no changes needed to their regimen  Hyperlipidemia  Continue statin   GERD (gastroesophageal reflux disease)  Continue PPI     Epigastric pain  -Patient is s/p lap cholecystectomy   -See plan above for gastric outlet obstruction   History of laparoscopic cholecystectomy  History noted    VTE Risk Mitigation (From admission, onward)           Ordered     IP VTE HIGH RISK PATIENT  Once         05/21/25 1615     Place sequential compression device  Until discontinued         05/21/25 1615                    Discharge Planning   STUART: 5/25/2025     Code Status: Full Code   Medical Readiness for Discharge Date:   Discharge Plan A: Home with family                        Jenn Byers MD  Department of Hospital Medicine   [facility]

## 2025-05-23 NOTE — PLAN OF CARE
Problem: Adult Inpatient Plan of Care  Goal: Plan of Care Review  Outcome: Progressing  Goal: Patient-Specific Goal (Individualized)  Outcome: Progressing  Goal: Absence of Hospital-Acquired Illness or Injury  Outcome: Progressing  Goal: Optimal Comfort and Wellbeing  Outcome: Progressing  Goal: Readiness for Transition of Care  Outcome: Progressing     Problem: Intestinal Obstruction  Goal: Optimal Bowel Function  Outcome: Progressing  Goal: Fluid and Electrolyte Balance  Outcome: Progressing  Goal: Absence of Infection Signs and Symptoms  Outcome: Progressing  Goal: Optimize Nutrition Status  Outcome: Progressing  Goal: Optimal Pain Control and Function  Outcome: Progressing

## 2025-05-23 NOTE — PLAN OF CARE
05/23/25 1600   Rounds   Attendance Provider;;Assigned nurse;Charge nurse   Discharge Plan A Home with family   Why the patient remains in the hospital Requires continued medical care   Transition of Care Barriers None

## 2025-05-23 NOTE — CONSULTS
[facility] Consultation Note    Patient Complaint: abdominal pain, constipation    PCP:   Fabienne Erwin       LOS: 2        Initial History of Present Illness (HPI):  This is a 69 y.o. female consulted to GI service for sbo. PMH reflux, hypertension, hyperlipidemia, and obesity. Patient complaint of acute onset of severe abdominal pain with associated symptoms of nausea and  constipation that began earlier this week. Denies fever, chills, sob, vomiting, diarrhea. Denies drinking or NSAID use. Denies oac.   Reports smoking 0.5- 1 pack of cigarettes a day. EGD in  notes excessive gastric fluid.         Medical History:  has a past medical history of Allergy, Anxiety, Arthritis, Cataract, Depressive disorder, not elsewhere classified, Esophageal reflux, Hypertension, Impaired fasting glucose, Joint pain, Obesity, unspecified, and Other and unspecified hyperlipidemia.    Surgical History:  has a past surgical history that includes right  arm  surgery; R knee replacement;  section (); Hysterectomy; Blepharoplasty (Bilateral, 2021); Repair of retinal detachment with vitrectomy (Left, 2022); Phacoemulsification of cataract (Left, 10/27/2022); Intraocular prosthesis insertion (Left, 10/27/2022); Colonoscopy (N/A, 2023); ERCP (N/A, 10/07/2024); Robot-assisted cholecystectomy using da Omar Xi (N/A, 10/09/2024); Esophagogastroduodenoscopy (N/A, 2024); ERCP (N/A, 2024); ERCP (N/A, 2025); ERCP (N/A, 2025); and Cholecystectomy.      Objective Findings:    Vital Signs:  Temp:  [98.3 °F (36.8 °C)-98.7 °F (37.1 °C)]   Pulse:  [61-82]   Resp:  [16-20]   BP: (142-168)/(62-79)   SpO2:  [95 %-98 %]   Body mass index is 19.54 kg/m².      Physical Exam  Vitals and nursing note reviewed.   Constitutional:       Appearance: Normal appearance.   HENT:      Head: Normocephalic.   Pulmonary:      Effort: Pulmonary effort is normal.   Abdominal:      General: Bowel  sounds are normal.      Palpations: Abdomen is soft.   Skin:     General: Skin is warm and dry.   Neurological:      Mental Status: She is alert and oriented to person, place, and time.   Psychiatric:         Mood and Affect: Mood normal.         Behavior: Behavior normal.         Thought Content: Thought content normal.         Judgment: Judgment normal.               Labs:  Lab Results   Component Value Date    WBC 13.19 (H) 2025    HGB 15.0 2025    HCT 44.7 2025     2025    CHOL 150 03/10/2025    TRIG 74 03/10/2025    HDL 50 03/10/2025    ALT 17 2025    AST 17 2025     2025    K 4.3 2025     2025    CREATININE 0.6 2025    BUN 8 2025    CO2 25 2025    TSH 3.330 03/10/2025    INR 1.0 2022    HGBA1C 5.3 03/10/2025             Imagin25 CT abdomen pelvis- There is now a small amount of ascites left upper quadrant and pelvis.  There is also now massive distension of the stomach which can be seen with gastric outlet obstruction or gastroparesis.     Severe constipation.     There is a mildly dilated small bowel loops.  Small-bowel obstruction cannot be entirely excluded.      Endoscopy:  2024 EGD-  Normal esophagus.                          - Excessive gastric fluid.                          - Possible large hiatal hernia, see above.                          - Erythematous mucosa in the antrum.                          - Non-bleeding duodenal diverticulum.                          - No evidence of obstruction. Scope advanced into                          the third portion of the duodenum.                          - No specimens collected.     2023 Colonoscopy- Many 4 to 12 mm polyps in the sigmoid colon,                          removed with a cold snare. Resected and retrieved.                          - Two 6 to 9 mm polyps at the recto-sigmoid colon,                          removed with a cold snare.  Resected and retrieved.                          - 3 8 mm polyp in the rectum, removed with a hot                          snare and cold forcep. Resected and retrieved.          Abnormal CT abdomen. SBO. Constipation.   Plan/ Recommendations:  1. Seen by general surgery for sbo and no acute surgical intervention indicated at this time per note. Patient reports abdominal pain subsided, no nausea and passing flatus. CT suspicious for GOO vs gastroparesis. Previous EGD in 2024 with similar findings. Rec iv ppi bid, Plan for upper endoscopy today. Rec continue NPO.  2. Large volume constipation noted on CT. Rec bowel regimen of mineral oil enema now. Post endoscopy rec miralax bid if able to tolerate po.   Needs outpatient repeat colonoscopy for colon polyp surveillance.       Thank you so much for allowing us to participate in the care of Patricia Ramirez . Please contact us if you have any additional questions.    Alicia Menjivar NP  Gastroenterology  [facility] [place] [facility] S[facility]

## 2025-05-23 NOTE — ASSESSMENT & PLAN NOTE
Patient's blood pressure range in the last 24 hours was: BP  Min: 105/56  Max: 170/75.The patient's inpatient anti-hypertensive regimen is listed below:  Current Antihypertensives  valsartan tablet 320 mg, Daily, Oral  hydrALAZINE injection 10 mg, Every 6 hours PRN, Intravenous    Plan  - BP is controlled, no changes needed to their regimen

## 2025-05-23 NOTE — SUBJECTIVE & OBJECTIVE
Interval History:  No acute event overnight.  EGD showed esophagitis; NGT discontinued.  We will start trial of clear liquid diet     Review of Systems  Objective:     Vital Signs (Most Recent):  Temp: 98.2 °F (36.8 °C) (05/23/25 1610)  Pulse: 62 (05/23/25 1610)  Resp: 18 (05/23/25 1610)  BP: (!) 158/64 (05/23/25 1610)  SpO2: 95 % (05/23/25 1610) Vital Signs (24h Range):  Temp:  [97.6 °F (36.4 °C)-98.7 °F (37.1 °C)] 98.2 °F (36.8 °C)  Pulse:  [60-90] 62  Resp:  [18-20] 18  SpO2:  [94 %-98 %] 95 %  BP: (105-170)/(56-77) 158/64     Weight: 56.6 kg (124 lb 12.5 oz)  Body mass index is 19.54 kg/m².    Intake/Output Summary (Last 24 hours) at 5/23/2025 1657  Last data filed at 5/23/2025 1436  Gross per 24 hour   Intake 361.48 ml   Output 100 ml   Net 261.48 ml         Physical Exam  Cardiovascular:      Rate and Rhythm: Normal rate and regular rhythm.      Pulses: Normal pulses.      Heart sounds: Normal heart sounds. No murmur heard.  Pulmonary:      Effort: Pulmonary effort is normal. No respiratory distress.      Breath sounds: Normal breath sounds.   Abdominal:      General: There is no distension.      Palpations: Abdomen is soft. There is no mass.      Comments: Hypoactive bowel sounds   Neurological:      General: No focal deficit present.      Mental Status: She is oriented to person, place, and time.               Significant Labs: CBC:   Recent Labs   Lab 05/22/25  0432 05/23/25  0520   WBC 12.96* 13.19*   HGB 15.3 15.0   HCT 47.1 44.7    225     CMP:   Recent Labs   Lab 05/22/25  0432 05/23/25  0520    138   K 3.6 4.3    104   CO2 23 25    83   BUN 11 8   CREATININE 0.6 0.6   CALCIUM 8.6* 8.9   PROT 6.7 6.9   ALBUMIN 3.5 3.6   BILITOT 0.5 0.7   ALKPHOS 92 92   AST 15 17   ALT 16 17   ANIONGAP 11 9       Significant Imaging:   X-Ray Abdomen Portable   Final Result      CT Abdomen Pelvis With IV Contrast NO Oral Contrast   Final Result      There is now a small amount of ascites left  upper quadrant and pelvis.  There is also now massive distension of the stomach which can be seen with gastric outlet obstruction or gastroparesis.      Severe constipation.      There is a mildly dilated small bowel loops.  Small-bowel obstruction cannot be entirely excluded.         Electronically signed by: Patel Moore MD   Date:    05/21/2025   Time:    14:16      X-Ray Abdomen Flat And Erect   Final Result

## 2025-05-23 NOTE — PROGRESS NOTES
Surgery Progress Note    Patricia Ramirez is a 69 y.o. year old female in hospital for nausea and vomiting thought to be due to GOO    Abdominal pain resolved. Having bowel function    ROS:  Negative except above    PE:  Vitals:    05/23/25 1456 05/23/25 1511 05/23/25 1525 05/23/25 1610   BP: (!) 105/56 (!) 161/72 (!) 162/70 (!) 158/64   BP Location:  Right arm Right arm Right arm   Patient Position:  Lying Lying Lying   Pulse: 65 64 61 62   Resp: 18 18 18 18   Temp: 97.6 °F (36.4 °C)   98.2 °F (36.8 °C)   TempSrc:    Oral   SpO2: 96% 96% (!) 94% 95%   Weight:       Height:           NAD  No belabored breathing  No skin changes  Abd soft nt nd      Significant Diagnostic Studies: Labs: CMP   Recent Labs   Lab 05/22/25  0432 05/23/25  0520    138   K 3.6 4.3    104   CO2 23 25    83   BUN 11 8   CREATININE 0.6 0.6   CALCIUM 8.6* 8.9   PROT 6.7 6.9   ALBUMIN 3.5 3.6   BILITOT 0.5 0.7   ALKPHOS 92 92   AST 15 17   ALT 16 17   ANIONGAP 11 9    and CBC   Recent Labs   Lab 05/22/25  0432 05/23/25  0520   WBC 12.96* 13.19*   HGB 15.3 15.0   HCT 47.1 44.7    225       A/P:  Patricia Ramirez is a 69 y.o. year old female  with nausea and vomiting with gastric dilation and heavy stool burden on CTAP    -EGD per GI  - continue bowel regimen  - no acute surgical intervention at this time  - General Surgery will sign off    Annmarie Sommer  General Surgery - [facility]  5/23/2025

## 2025-05-23 NOTE — PLAN OF CARE
Problem: Constipation  Goal: Effective Bowel Elimination  Outcome: Not Progressing  Intervention: Promote Effective Bowel Elimination  Flowsheets (Taken 5/23/2025 1819)  Bowel Function Promotion:   prompt response to urge promoted   toileting schedule established  Bowel Elimination Management:   enema given   relaxation techniques promoted   sitting position facilitated   toileting offered    Lactulose and mineral oil enema given

## 2025-05-24 VITALS
RESPIRATION RATE: 18 BRPM | BODY MASS INDEX: 19.58 KG/M2 | TEMPERATURE: 98 F | HEART RATE: 66 BPM | SYSTOLIC BLOOD PRESSURE: 179 MMHG | OXYGEN SATURATION: 94 % | WEIGHT: 124.75 LBS | HEIGHT: 67 IN | DIASTOLIC BLOOD PRESSURE: 81 MMHG

## 2025-05-24 LAB
ABSOLUTE EOSINOPHIL (OHS): 0.09 K/UL
ABSOLUTE MONOCYTE (OHS): 1.11 K/UL (ref 0.3–1)
ABSOLUTE NEUTROPHIL COUNT (OHS): 12.86 K/UL (ref 1.8–7.7)
ALBUMIN SERPL BCP-MCNC: 3.3 G/DL (ref 3.5–5.2)
ALP SERPL-CCNC: 86 UNIT/L (ref 40–150)
ALT SERPL W/O P-5'-P-CCNC: 15 UNIT/L (ref 10–44)
ANION GAP (OHS): 8 MMOL/L (ref 8–16)
AST SERPL-CCNC: 15 UNIT/L (ref 11–45)
BASOPHILS # BLD AUTO: 0.12 K/UL
BASOPHILS NFR BLD AUTO: 0.7 %
BILIRUB SERPL-MCNC: 0.7 MG/DL (ref 0.1–1)
BUN SERPL-MCNC: 8 MG/DL (ref 8–23)
CALCIUM SERPL-MCNC: 8.7 MG/DL (ref 8.7–10.5)
CHLORIDE SERPL-SCNC: 106 MMOL/L (ref 95–110)
CO2 SERPL-SCNC: 28 MMOL/L (ref 23–29)
CREAT SERPL-MCNC: 0.6 MG/DL (ref 0.5–1.4)
ERYTHROCYTE [DISTWIDTH] IN BLOOD BY AUTOMATED COUNT: 12.9 % (ref 11.5–14.5)
GFR SERPLBLD CREATININE-BSD FMLA CKD-EPI: >60 ML/MIN/1.73/M2
GLUCOSE SERPL-MCNC: 97 MG/DL (ref 70–110)
HCT VFR BLD AUTO: 44.4 % (ref 37–48.5)
HGB BLD-MCNC: 14.7 GM/DL (ref 12–16)
IMM GRANULOCYTES # BLD AUTO: 0.08 K/UL (ref 0–0.04)
IMM GRANULOCYTES NFR BLD AUTO: 0.5 % (ref 0–0.5)
LYMPHOCYTES # BLD AUTO: 2.33 K/UL (ref 1–4.8)
MAGNESIUM SERPL-MCNC: 2 MG/DL (ref 1.6–2.6)
MCH RBC QN AUTO: 32 PG (ref 27–31)
MCHC RBC AUTO-ENTMCNC: 33.1 G/DL (ref 32–36)
MCV RBC AUTO: 97 FL (ref 82–98)
NUCLEATED RBC (/100WBC) (OHS): 0 /100 WBC
PHOSPHATE SERPL-MCNC: 3.8 MG/DL (ref 2.7–4.5)
PLATELET # BLD AUTO: 201 K/UL (ref 150–450)
PMV BLD AUTO: 9.4 FL (ref 9.2–12.9)
POTASSIUM SERPL-SCNC: 4.1 MMOL/L (ref 3.5–5.1)
PROT SERPL-MCNC: 6.4 GM/DL (ref 6–8.4)
RBC # BLD AUTO: 4.59 M/UL (ref 4–5.4)
RELATIVE EOSINOPHIL (OHS): 0.5 %
RELATIVE LYMPHOCYTE (OHS): 14 % (ref 18–48)
RELATIVE MONOCYTE (OHS): 6.7 % (ref 4–15)
RELATIVE NEUTROPHIL (OHS): 77.6 % (ref 38–73)
SODIUM SERPL-SCNC: 142 MMOL/L (ref 136–145)
WBC # BLD AUTO: 16.59 K/UL (ref 3.9–12.7)

## 2025-05-24 PROCEDURE — 85025 COMPLETE CBC W/AUTO DIFF WBC: CPT

## 2025-05-24 PROCEDURE — 84100 ASSAY OF PHOSPHORUS: CPT

## 2025-05-24 PROCEDURE — 36415 COLL VENOUS BLD VENIPUNCTURE: CPT

## 2025-05-24 PROCEDURE — 94761 N-INVAS EAR/PLS OXIMETRY MLT: CPT

## 2025-05-24 PROCEDURE — 25000003 PHARM REV CODE 250

## 2025-05-24 PROCEDURE — 25000003 PHARM REV CODE 250: Performed by: NURSE PRACTITIONER

## 2025-05-24 PROCEDURE — 83735 ASSAY OF MAGNESIUM: CPT

## 2025-05-24 PROCEDURE — 80053 COMPREHEN METABOLIC PANEL: CPT

## 2025-05-24 RX ORDER — POLYETHYLENE GLYCOL 3350 17 G/17G
17 POWDER, FOR SOLUTION ORAL DAILY
Qty: 30 EACH | Refills: 3 | Status: SHIPPED | OUTPATIENT
Start: 2025-05-24 | End: 2025-09-21

## 2025-05-24 RX ADMIN — VALSARTAN 320 MG: 80 TABLET, FILM COATED ORAL at 08:05

## 2025-05-24 RX ADMIN — PANTOPRAZOLE SODIUM 40 MG: 40 TABLET, DELAYED RELEASE ORAL at 08:05

## 2025-05-24 RX ADMIN — ATORVASTATIN CALCIUM 40 MG: 40 TABLET, FILM COATED ORAL at 08:05

## 2025-05-24 RX ADMIN — POLYETHYLENE GLYCOL 3350 17 G: 17 POWDER, FOR SOLUTION ORAL at 08:05

## 2025-05-24 RX ADMIN — LACTULOSE 20 G: 20 SOLUTION ORAL at 08:05

## 2025-05-24 RX ADMIN — CITALOPRAM HYDROBROMIDE 60 MG: 20 TABLET ORAL at 08:05

## 2025-05-24 NOTE — NURSING
[facility], [facility]  Nurses Note -- 4 Eyes      5/23/2025       Skin assessed on: Q Shift      [x] No Pressure Injuries Present    [x]Prevention Measures Documented    [] Yes LDA  for Pressure Injury Previously documented     [] Yes New Pressure Injury Discovered   [] LDA for New Pressure Injury Added      Attending RN:  Stephanie Barthelemy, RN     Second RN:  Batsheva Rendon RN

## 2025-05-24 NOTE — PLAN OF CARE
Problem: Adult Inpatient Plan of Care  Goal: Absence of Hospital-Acquired Illness or Injury  5/24/2025 0230 by Barthelemy, Stephanie, RN  Outcome: Progressing  5/24/2025 0223 by Barthelemy, Stephanie, RN  Outcome: Progressing  Intervention: Prevent Infection  5/24/2025 0230 by Barthelemy, Stephanie, RN  Flowsheets (Taken 5/24/2025 0230)  Infection Prevention:   cohorting utilized   environmental surveillance performed   hand hygiene promoted   rest/sleep promoted   single patient room provided  5/24/2025 0223 by Barthelemy, Stephanie, RN  Flowsheets (Taken 5/24/2025 0223)  Infection Prevention:   cohorting utilized   environmental surveillance performed   hand hygiene promoted   personal protective equipment utilized   single patient room provided  Goal: Optimal Comfort and Wellbeing  5/24/2025 0230 by Barthelemy, Stephanie, RN  Outcome: Progressing  5/24/2025 0223 by Barthelemy, Stephanie, RN  Outcome: Progressing  Intervention: Provide Person-Centered Care  5/24/2025 0230 by Barthelemy, Stephanie, RN  Flowsheets (Taken 5/24/2025 0230)  Trust Relationship/Rapport:   care explained   choices provided   questions encouraged   reassurance provided   thoughts/feelings acknowledged  5/24/2025 0223 by Barthelemy, Stephanie, RN  Flowsheets (Taken 5/24/2025 0223)  Trust Relationship/Rapport:   care explained   choices provided   questions encouraged   thoughts/feelings acknowledged     Problem: Fall Injury Risk  Goal: Absence of Fall and Fall-Related Injury  Outcome: Progressing  Intervention: Promote Injury-Free Environment  Flowsheets (Taken 5/24/2025 0223)  Safety Promotion/Fall Prevention:   assistive device/personal item within reach   bed alarm set   instructed to call staff for mobility   lighting adjusted   medications reviewed   nonskid shoes/socks when out of bed   room near unit station   side rails raised x 2     Problem: Constipation  Goal: Effective Bowel Elimination  Outcome: Progressing  Intervention:  Promote Effective Bowel Elimination  Flowsheets (Taken 5/24/2025 0230)  Bowel Function Promotion: straining discouraged  Bowel Elimination Management: relaxation techniques promoted

## 2025-05-24 NOTE — NURSING
[facility], [facility]  Nurses Note -- 4 Eyes      5/24/2025       Skin assessed on: Q Shift      [x] No Pressure Injuries Present    []Prevention Measures Documented    [] Yes LDA  for Pressure Injury Previously documented     [] Yes New Pressure Injury Discovered   [] LDA for New Pressure Injury Added      Attending RN:  Michelle Lim RN     Second RN:  Karon ROTHMAN RN

## 2025-05-24 NOTE — PLAN OF CARE
Problem: Adult Inpatient Plan of Care  Goal: Absence of Hospital-Acquired Illness or Injury  Outcome: Progressing  Intervention: Prevent Infection  Flowsheets (Taken 5/24/2025 0223)  Infection Prevention:   cohorting utilized   environmental surveillance performed   hand hygiene promoted   personal protective equipment utilized   single patient room provided  Goal: Optimal Comfort and Wellbeing  Outcome: Progressing  Intervention: Provide Person-Centered Care  Flowsheets (Taken 5/24/2025 0223)  Trust Relationship/Rapport:   care explained   choices provided   questions encouraged   thoughts/feelings acknowledged     Problem: Fall Injury Risk  Goal: Absence of Fall and Fall-Related Injury  Outcome: Progressing  Intervention: Promote Injury-Free Environment  Flowsheets (Taken 5/24/2025 0223)  Safety Promotion/Fall Prevention:   assistive device/personal item within reach   bed alarm set   instructed to call staff for mobility   lighting adjusted   medications reviewed   nonskid shoes/socks when out of bed   room near unit station   side rails raised x 2

## 2025-05-24 NOTE — DISCHARGE INSTRUCTIONS
Our goal at [facility] is to always give you outstanding care and exceptional service. You may receive a survey from [employer] by mail, text or e-mail in the next 24-48 hours asking about the care you received with us. The survey should only take 5-10 minutes to complete and is very important to us.     Your feedback provides us with a way to recognize our staff who work tirelessly to provide the best care! Also, your responses help us learn how to improve when your experience was below our aspiration of excellence. We are always looking for ways to improve your stay. We WILL use your feedback to continue making improvements to help us provide the highest quality care. We keep your personal information and feedback confidential. We appreciate your time completing this survey and can't wait to hear from you!!!    We look forward to your continued care with us! Thanks so much for choosing [facility] for your healthcare needs!

## 2025-05-24 NOTE — PLAN OF CARE
Case Management Final Discharge Note    Discharge Disposition: Home/Self care    New DME ordered / company name: None    Relevant SDOH / Transition of Care Barriers:  None    Person available to provide assistance at home when needed and their contact information: Extended Emergency Contact Information  Primary Emergency Contact: Carmina Dyer   [facility]  Mobile Phone: [phone]  Relation: Sister  Secondary Emergency Contact: Mynor Ramirez   [place]  Mobile Phone: [phone]  Relation: Son      Scheduled followup appointment: Patient will call and schedule PCP follow-up appointment.    Referrals placed: None    Transportation: Patient family will provide transportation home.  Patient and family educated on discharge services and updated on DC plan. Bedside RN Michelle Krueger notified, patient clear to discharge from Case Management Perspective.      05/24/25 1151   Final Note   Assessment Type Final Discharge Note   Anticipated Discharge Disposition Home   What phone number can be called within the next 1-3 days to see how you are doing after discharge? [phone]   Hospital Resources/Appts/Education Provided Appointments scheduled and added to AVS   Post-Acute Status   Discharge Delays None known at this time

## 2025-05-24 NOTE — PLAN OF CARE
05/24/25 1217   Medicare Message   Important Message from Medicare regarding Discharge Appeal Rights Given to patient/caregiver;Explained to patient/caregiver;Signed/date by patient/caregiver   Date IMM was signed 05/24/25   Time IMM was signed 1201

## 2025-05-24 NOTE — DISCHARGE SUMMARY
[facility] Medicine  Discharge Summary      Patient Name: Patricia Ramirez  MRN: [id number]  [facility]: [id number]  Patient Class: IP- Inpatient  Admission Date: 5/21/2025  Hospital Length of Stay: 3 days  Discharge Date and Time: No discharge date for patient encounter.  Attending Physician: Jenn Byers MD   Discharging Provider: Jenn Byers MD  Primary Care Provider: Fabienne Erwin NP    Primary Care Team: Networked reference to record PCT     HPI:   Patricia Ramirez is a 69 y.o. female with a past medical history of reflux, hypertension, hyperlipidemia, and obesity presents to the hospital with complaints of abdominal pain with the associated nausea and constipation for the past several days.    Patient states her abdominal pain worsened this morning after drinking a cup of coffee.  Patient states she has been experiencing constipation over the last week or so.  She has taken MiraLax at home with minimal relief.  Patient states she also took 2 tablets of Senokot night and was able to pass small amount of stool.  Patient is unable to pass gas and has been complaining of bloating and belching. Of note, she reports that she a small lump over her previous stent placement site to right lower abdomen and had a CT of the area preformed with no abnormalities found. She states that she was placed on antibiotics (Vibramycin) which she has been taking and has also been placing a warm compress to the area and notes that the area has since been draining. She denies shortness of breath, chest pain, nausea, or any other associated symptoms.      In the ED: Patient afebrile without leukocytosis, hypertensive on presentation 172/74, CBC and CMP unremarkable, normal lactic acid level. Abdominal x-ray shows no obstruction or perforation however constipation and mild ileus seen.. CT abdomen shows small amount of ascites in the left upper quadrant and pelvis and also a massive distention of the stomach which can  be seen in gastric outlet obstruction or gastroparesis along with mildly dilated small bowel loops concerning for SBO.  Patient given IV famotidine 40 mg, IV hydralazine 10 mg, lactulose 20 g, IV morphine 4 mg x 2, IV Zofran 4 mg, and 1 L NS bolus in the ED. Case discussed with ED provider and patient admitted to hospital medicine for further medical management.    Procedure(s) (LRB):  EGD (ESOPHAGOGASTRODUODENOSCOPY) (N/A)      Hospital Course:   69 y.o. female with a past medical history of reflux, hypertension, hyperlipidemia, and obesity presents to the hospital with complaints of abdominal pain with the associated nausea and constipation for the past several days. In the ED: Patient afebrile without leukocytosis, hypertensive on presentation 172/74, CBC and CMP unremarkable, normal lactic acid level. Abdominal x-ray shows no obstruction or perforation however constipation and mild ileus seen.. CT abdomen shows small amount of ascites in the left upper quadrant and pelvis and also a massive distention of the stomach which can be seen in gastric outlet obstruction or gastroparesis along with mildly dilated small bowel loops concerning for SBO.  Patient was started on IVF, pain regimen, and NGT to LIS.  General surgery was consulted with recommendation to continue conservative management.  Patient had multiple bowel movement and was tolerating oral diet before discharge.  Patient was asked to follow up with her primary care and to return to the hospital in the event of abdominal pain, nausea, vomiting, which generalized weakness.  All questions were answered to her satisfaction and she verbalized understanding     Goals of Care Treatment Preferences:  Code Status: Full Code      SDOH Screening:  The patient declined to be screened for utility difficulties, food insecurity, transport difficulties, housing insecurity, and interpersonal safety, so no concerns could be identified this admission.     Consults:    Consults (From admission, onward)          Status Ordering Provider     Inpatient consult to Gastroenterology  Once        Provider:  Alicia Menjivar NP    Completed JAYLIN PARIKH     Inpatient consult to General surgery  Once        Provider:  Annmarie Sommer MD    Completed KEN CISNEROS            Assessment & Plan  Gastric outlet obstruction  Presents with bilateral abdominal pain with nausea, vomiting, diarrhea since last night with poor po intake.   CT abdomen pelvis shows possible gastric outlet obstruction vs SBO  NGT placed in ED and x-ray ordered to confirm placement  Likely GOO vs. SBO    Plan:  NGT discontinued on 05/23  Start trial of clear liquid diet  PRN analgesics antiemetics  EGD today showed esophagitis; continue daily PPI  Essential hypertension  Patient's blood pressure range in the last 24 hours was: BP  Min: 105/56  Max: 179/81.The patient's inpatient anti-hypertensive regimen is listed below:  Current Antihypertensives  valsartan tablet 320 mg, Daily, Oral  hydrALAZINE injection 10 mg, Every 6 hours PRN, Intravenous    Plan  - BP is controlled, no changes needed to their regimen  Hyperlipidemia  Continue statin   GERD (gastroesophageal reflux disease)  Continue PPI     Epigastric pain  -Patient is s/p lap cholecystectomy   -See plan above for gastric outlet obstruction   History of laparoscopic cholecystectomy  History noted    Final Active Diagnoses:    Diagnosis Date Noted POA    PRINCIPAL PROBLEM:  Gastric outlet obstruction [K31.1] 05/21/2025 Yes    Epigastric pain [R10.13] 11/20/2024 Yes    History of laparoscopic cholecystectomy [Z90.49] 11/20/2024 Not Applicable     Chronic    Essential hypertension [I10] 12/29/2014 Yes    Hyperlipidemia [E78.5] 12/29/2014 Yes    GERD (gastroesophageal reflux disease) [K21.9] 12/29/2014 Yes      Problems Resolved During this Admission:       Discharged Condition: stable    Disposition:     Follow Up:    Patient Instructions:      Ambulatory  referral/consult to Internal Medicine   Standing Status: Future   Referral Priority: Routine Referral Type: Consultation   Referral Reason: Specialty Services Required   Requested Specialty: Internal Medicine   Number of Visits Requested: 1     Diet Cardiac     Notify your health care provider if you experience any of the following:  increased confusion or weakness     Notify your health care provider if you experience any of the following:  persistent nausea and vomiting or diarrhea     Reason for not Ordering Smoking Cessation Referral     Order Specific Question Answer Comments   Reason for not ordering: Not medically appropriate at this time      Reason for not Prescribing Nicotine Replacement     Order Specific Question Answer Comments   Reason for not Prescribing: Not medically appropriate at this time      Activity as tolerated       Significant Diagnostic Studies: N/A    Pending Diagnostic Studies:       Procedure Component Value Units Date/Time    Specimen to Pathology Gastrointestinal tract [[id number]] Collected: 05/23/25 1442    Order Status: Sent Lab Status: No result     Specimen: Tissue from Stomach, Body; Tissue from Esophagus            Medications:  Reconciled Home Medications:      Medication List        CONTINUE taking these medications      acetaminophen 500 MG tablet  Commonly known as: TYLENOL  Take 500 mg by mouth every 6 (six) hours as needed for Pain.     ALPRAZolam 0.25 MG tablet  Commonly known as: XANAX  TAKE 1 TABLET BY MOUTH THREE TIMES DAILY     atorvastatin 40 MG tablet  Commonly known as: LIPITOR  Take 1 tablet by mouth once daily     busPIRone 15 MG tablet  Commonly known as: BUSPAR  TAKE 1 TABLET BY MOUTH THREE TIMES DAILY     citalopram 20 MG tablet  Commonly known as: CeleXA  Take 3 tablets (60 mg total) by mouth once daily. Take 1 tablet by mouth once daily     cyclobenzaprine 5 MG tablet  Commonly known as: FLEXERIL  TAKE 1 TABLET BY MOUTH TWICE DAILY AS NEEDED FOR MUSCLE  SPASM     ergocalciferol 50,000 unit Cap  Commonly known as: ERGOCALCIFEROL  Take 1 capsule (50,000 Units total) by mouth every 7 days.     olmesartan 20 MG tablet  Commonly known as: BENICAR  TAKE 1 TABLET BY MOUTH ONCE DAILY IN THE EVENING     pantoprazole 40 MG tablet  Commonly known as: PROTONIX  Take 1 tablet (40 mg total) by mouth once daily.     traZODone 100 MG tablet  Commonly known as: DESYREL  Take 100 mg by mouth every evening.            STOP taking these medications      doxycycline 100 MG Cap  Commonly known as: VIBRAMYCIN              Indwelling Lines/Drains at time of discharge:   Lines/Drains/Airways       None                   Time spent on the discharge of patient: more than 35 minutes         Jenn Byers MD  Department of Hospital Medicine  [facility]

## 2025-05-24 NOTE — PROGRESS NOTES
AVS virtually reviewed with patient in its entirety with emphasis on diet, medications, follow-up appointments and reasons to return to the ED. Patient also encouraged to utilize their patient portal. Ease and convenience of use reiterated. Education complete and patient voiced understanding. All questions answered. Discharge teaching completed.

## 2025-05-24 NOTE — PLAN OF CARE
Problem: Adult Inpatient Plan of Care  Goal: Plan of Care Review  Outcome: Met  Goal: Patient-Specific Goal (Individualized)  Outcome: Met  Goal: Absence of Hospital-Acquired Illness or Injury  Outcome: Met  Goal: Optimal Comfort and Wellbeing  Outcome: Met  Goal: Readiness for Transition of Care  Outcome: Met     Problem: Intestinal Obstruction  Goal: Optimal Bowel Function  Outcome: Met  Goal: Fluid and Electrolyte Balance  Outcome: Met  Goal: Absence of Infection Signs and Symptoms  Outcome: Met  Goal: Optimize Nutrition Status  Outcome: Met  Goal: Optimal Pain Control and Function  Outcome: Met     Problem: Fall Injury Risk  Goal: Absence of Fall and Fall-Related Injury  Outcome: Met     Problem: Constipation  Goal: Effective Bowel Elimination  Outcome: Met

## 2025-05-24 NOTE — ASSESSMENT & PLAN NOTE
Patient's blood pressure range in the last 24 hours was: BP  Min: 105/56  Max: 179/81.The patient's inpatient anti-hypertensive regimen is listed below:  Current Antihypertensives  valsartan tablet 320 mg, Daily, Oral  hydrALAZINE injection 10 mg, Every 6 hours PRN, Intravenous    Plan  - BP is controlled, no changes needed to their regimen

## 2025-05-25 NOTE — ANESTHESIA POSTPROCEDURE EVALUATION
Anesthesia Post Evaluation    Patient: Patricia Ramirez    Procedure(s) Performed: Procedure(s) (LRB):  EGD (ESOPHAGOGASTRODUODENOSCOPY) (N/A)    Final Anesthesia Type: general      Patient location during evaluation: GI PACU  Patient participation: Yes- Able to Participate  Level of consciousness: awake and alert and oriented  Post-procedure vital signs: reviewed and stable  Pain management: adequate  Airway patency: patent    PONV status at discharge: No PONV  Anesthetic complications: no      Cardiovascular status: hemodynamically stable and blood pressure returned to baseline  Respiratory status: spontaneous ventilation, room air and unassisted  Hydration status: euvolemic  Follow-up not needed.              Vitals Value Taken Time   /81 05/24/25 11:20   Temp 36.7 °C (98.1 °F) 05/24/25 11:20   Pulse 66 05/24/25 11:20   Resp 18 05/24/25 11:20   SpO2 94 % 05/24/25 11:20         Event Time   Out of Recovery 05/23/2025 15:26:57         Pain/Ede Score: Ede Score: 10 (5/23/2025  3:25 PM)

## 2025-05-26 ENCOUNTER — TELEPHONE (OUTPATIENT)
Dept: ENDOSCOPY | Facility: HOSPITAL | Age: 70
End: 2025-05-26
Payer: MEDICARE

## 2025-05-26 VITALS — BODY MASS INDEX: 18.64 KG/M2 | HEIGHT: 68 IN | WEIGHT: 123 LBS

## 2025-05-26 DIAGNOSIS — Z86.0100 HISTORY OF COLON POLYPS: ICD-10-CM

## 2025-05-26 DIAGNOSIS — K20.90 ESOPHAGITIS: Primary | ICD-10-CM

## 2025-05-26 DIAGNOSIS — Z12.11 COLON CANCER SCREENING: Primary | ICD-10-CM

## 2025-05-26 NOTE — TELEPHONE ENCOUNTER
Patient is scheduled for a Colonoscopy/EGD on 7/1/25 with Dr. GABRIELLE Michael  Referral for procedure from [facility]

## 2025-05-26 NOTE — TELEPHONE ENCOUNTER
"----- Message from Eden sent at 5/26/2025  8:16 AM CDT -----  Regarding: FW: Endo scheduling    ----- Message -----  From: Alicia Menjivar NP  Sent: 5/23/2025   3:16 PM CDT  To: [facility]  Subject: Endo scheduling                                  Procedure: EGD/ ColonoscopyDiagnosis: Esophagitis and polyp surveillance Procedure Timing: Within 8 weeks#If within 4 weeks selected, please helen as high priority##If greater than 12 weeks, please select "4-12 weeks" and delay sending until 2 months prior to requested date# Provider: Dr Michael or  Any endoscopistLocation: Hospital Based ( [facility])Additional Scheduling Information: No scheduling concernsPrep Specifications:Extended/Constipation prepHave you attached a patient to this message: Yes  "

## 2025-05-30 LAB
ESTRIOL SERPL-MCNC: NORMAL NG/ML
ESTROGEN SERPL-MCNC: NORMAL PG/ML
INSULIN SERPL-ACNC: NORMAL U[IU]/ML
LAB AP CLINICAL INFORMATION: NORMAL
LAB AP GROSS DESCRIPTION: NORMAL
LAB AP PERFORMING LOCATION(S): NORMAL
LAB AP REPORT FOOTNOTES: NORMAL

## 2025-06-02 ENCOUNTER — RESULTS FOLLOW-UP (OUTPATIENT)
Dept: GASTROENTEROLOGY | Facility: HOSPITAL | Age: 70
End: 2025-06-02

## 2025-06-08 ENCOUNTER — HOSPITAL ENCOUNTER (EMERGENCY)
Facility: HOSPITAL | Age: 70
Discharge: HOME | End: 2025-06-08
Attending: EMERGENCY MEDICINE
Payer: MEDICARE

## 2025-06-08 VITALS
SYSTOLIC BLOOD PRESSURE: 164 MMHG | WEIGHT: 125 LBS | TEMPERATURE: 98 F | HEIGHT: 68 IN | BODY MASS INDEX: 18.94 KG/M2 | DIASTOLIC BLOOD PRESSURE: 72 MMHG | OXYGEN SATURATION: 96 % | RESPIRATION RATE: 30 BRPM | HEART RATE: 71 BPM

## 2025-06-08 DIAGNOSIS — R11.2 NAUSEA AND VOMITING, UNSPECIFIED VOMITING TYPE: Primary | ICD-10-CM

## 2025-06-08 DIAGNOSIS — R10.13 EPIGASTRIC ABDOMINAL PAIN: ICD-10-CM

## 2025-06-08 LAB
ABSOLUTE EOSINOPHIL (OHS): 0.03 K/UL
ABSOLUTE MONOCYTE (OHS): 0.19 K/UL (ref 0.3–1)
ABSOLUTE NEUTROPHIL COUNT (OHS): 10.65 K/UL (ref 1.8–7.7)
ALBUMIN SERPL BCP-MCNC: 3.6 G/DL (ref 3.5–5.2)
ALP SERPL-CCNC: 69 UNIT/L (ref 40–150)
ALT SERPL W/O P-5'-P-CCNC: 14 UNIT/L (ref 10–44)
ANION GAP (OHS): 12 MMOL/L (ref 8–16)
AST SERPL-CCNC: 16 UNIT/L (ref 11–45)
BASOPHILS # BLD AUTO: 0.1 K/UL
BASOPHILS NFR BLD AUTO: 0.8 %
BILIRUB SERPL-MCNC: 0.4 MG/DL (ref 0.1–1)
BILIRUB UR QL STRIP.AUTO: NEGATIVE
BUN SERPL-MCNC: 11 MG/DL (ref 8–23)
CALCIUM SERPL-MCNC: 8.6 MG/DL (ref 8.7–10.5)
CHLORIDE SERPL-SCNC: 106 MMOL/L (ref 95–110)
CLARITY UR: CLEAR
CO2 SERPL-SCNC: 25 MMOL/L (ref 23–29)
COLOR UR AUTO: YELLOW
CREAT SERPL-MCNC: 0.6 MG/DL (ref 0.5–1.4)
ERYTHROCYTE [DISTWIDTH] IN BLOOD BY AUTOMATED COUNT: 18.6 % (ref 11.5–14.5)
GFR SERPLBLD CREATININE-BSD FMLA CKD-EPI: >60 ML/MIN/1.73/M2
GLUCOSE SERPL-MCNC: 125 MG/DL (ref 70–110)
GLUCOSE UR QL STRIP: NEGATIVE
HCT VFR BLD AUTO: 50 % (ref 37–48.5)
HGB BLD-MCNC: 16 GM/DL (ref 12–16)
HGB UR QL STRIP: NEGATIVE
HOLD SPECIMEN: NORMAL
IMM GRANULOCYTES # BLD AUTO: 0.05 K/UL (ref 0–0.04)
IMM GRANULOCYTES NFR BLD AUTO: 0.4 % (ref 0–0.5)
KETONES UR QL STRIP: NEGATIVE
LEUKOCYTE ESTERASE UR QL STRIP: NEGATIVE
LIPASE SERPL-CCNC: 4 U/L (ref 4–60)
LYMPHOCYTES # BLD AUTO: 0.83 K/UL (ref 1–4.8)
MCH RBC QN AUTO: 32.5 PG (ref 27–31)
MCHC RBC AUTO-ENTMCNC: 32 G/DL (ref 32–36)
MCV RBC AUTO: 102 FL (ref 82–98)
NITRITE UR QL STRIP: NEGATIVE
NUCLEATED RBC (/100WBC) (OHS): 0 /100 WBC
PH UR STRIP: 7 [PH]
PLATELET # BLD AUTO: ABNORMAL 10*3/UL
PMV BLD AUTO: ABNORMAL FL
POTASSIUM SERPL-SCNC: 4.3 MMOL/L (ref 3.5–5.1)
PROT SERPL-MCNC: 7 GM/DL (ref 6–8.4)
PROT UR QL STRIP: NEGATIVE
RBC # BLD AUTO: 4.92 M/UL (ref 4–5.4)
RELATIVE EOSINOPHIL (OHS): 0.3 %
RELATIVE LYMPHOCYTE (OHS): 7 % (ref 18–48)
RELATIVE MONOCYTE (OHS): 1.6 % (ref 4–15)
RELATIVE NEUTROPHIL (OHS): 89.9 % (ref 38–73)
SODIUM SERPL-SCNC: 143 MMOL/L (ref 136–145)
SP GR UR STRIP: 1.02
UROBILINOGEN UR STRIP-ACNC: ABNORMAL EU/DL
WBC # BLD AUTO: 11.85 K/UL (ref 3.9–12.7)

## 2025-06-08 PROCEDURE — 93005 ELECTROCARDIOGRAM TRACING: CPT

## 2025-06-08 PROCEDURE — 96372 THER/PROPH/DIAG INJ SC/IM: CPT | Performed by: EMERGENCY MEDICINE

## 2025-06-08 PROCEDURE — 25000003 PHARM REV CODE 250: Performed by: EMERGENCY MEDICINE

## 2025-06-08 PROCEDURE — 81003 URINALYSIS AUTO W/O SCOPE: CPT | Performed by: EMERGENCY MEDICINE

## 2025-06-08 PROCEDURE — 96361 HYDRATE IV INFUSION ADD-ON: CPT

## 2025-06-08 PROCEDURE — 80053 COMPREHEN METABOLIC PANEL: CPT | Performed by: EMERGENCY MEDICINE

## 2025-06-08 PROCEDURE — 85025 COMPLETE CBC W/AUTO DIFF WBC: CPT | Performed by: EMERGENCY MEDICINE

## 2025-06-08 PROCEDURE — 63600175 PHARM REV CODE 636 W HCPCS: Performed by: EMERGENCY MEDICINE

## 2025-06-08 PROCEDURE — 96375 TX/PRO/DX INJ NEW DRUG ADDON: CPT

## 2025-06-08 PROCEDURE — 83690 ASSAY OF LIPASE: CPT | Performed by: EMERGENCY MEDICINE

## 2025-06-08 PROCEDURE — 99284 EMERGENCY DEPT VISIT MOD MDM: CPT | Mod: 25

## 2025-06-08 PROCEDURE — 36415 COLL VENOUS BLD VENIPUNCTURE: CPT | Performed by: EMERGENCY MEDICINE

## 2025-06-08 PROCEDURE — 93010 ELECTROCARDIOGRAM REPORT: CPT | Mod: ,,, | Performed by: INTERNAL MEDICINE

## 2025-06-08 PROCEDURE — 96374 THER/PROPH/DIAG INJ IV PUSH: CPT

## 2025-06-08 RX ORDER — MORPHINE SULFATE 4 MG/ML
2 INJECTION, SOLUTION INTRAMUSCULAR; INTRAVENOUS
Refills: 0 | Status: COMPLETED | OUTPATIENT
Start: 2025-06-08 | End: 2025-06-08

## 2025-06-08 RX ORDER — PANTOPRAZOLE SODIUM 40 MG/10ML
80 INJECTION, POWDER, LYOPHILIZED, FOR SOLUTION INTRAVENOUS
Status: COMPLETED | OUTPATIENT
Start: 2025-06-08 | End: 2025-06-08

## 2025-06-08 RX ORDER — ALUMINUM HYDROXIDE, MAGNESIUM HYDROXIDE, AND SIMETHICONE 1200; 120; 1200 MG/30ML; MG/30ML; MG/30ML
60 SUSPENSION ORAL
Status: COMPLETED | OUTPATIENT
Start: 2025-06-08 | End: 2025-06-08

## 2025-06-08 RX ORDER — DICYCLOMINE HYDROCHLORIDE 10 MG/ML
20 INJECTION INTRAMUSCULAR
Status: COMPLETED | OUTPATIENT
Start: 2025-06-08 | End: 2025-06-08

## 2025-06-08 RX ORDER — ONDANSETRON 4 MG/1
4 TABLET, ORALLY DISINTEGRATING ORAL
Status: COMPLETED | OUTPATIENT
Start: 2025-06-08 | End: 2025-06-08

## 2025-06-08 RX ORDER — ONDANSETRON 4 MG/1
4 TABLET, FILM COATED ORAL EVERY 8 HOURS PRN
Qty: 12 TABLET | Refills: 0 | Status: ON HOLD | OUTPATIENT
Start: 2025-06-08

## 2025-06-08 RX ADMIN — MORPHINE SULFATE 2 MG: 4 INJECTION INTRAVENOUS at 07:06

## 2025-06-08 RX ADMIN — SODIUM CHLORIDE 1000 ML: 9 INJECTION, SOLUTION INTRAVENOUS at 07:06

## 2025-06-08 RX ADMIN — PANTOPRAZOLE SODIUM 80 MG: 40 INJECTION, POWDER, FOR SOLUTION INTRAVENOUS at 08:06

## 2025-06-08 RX ADMIN — ONDANSETRON 4 MG: 4 TABLET, ORALLY DISINTEGRATING ORAL at 08:06

## 2025-06-08 RX ADMIN — DICYCLOMINE HYDROCHLORIDE 20 MG: 10 INJECTION, SOLUTION INTRAMUSCULAR at 08:06

## 2025-06-08 RX ADMIN — ALUMINUM HYDROXIDE, MAGNESIUM HYDROXIDE, AND SIMETHICONE 60 ML: 200; 200; 20 SUSPENSION ORAL at 08:06

## 2025-06-08 NOTE — DISCHARGE INSTRUCTIONS
Please avoid caffeine carbonation alcohol cigarette citrus tomato Naprosyn aspirin ibuprofen.  Please start pantoprazole, every 12 hours, starting tomorrow morning.  Do not eat meals late in the day.  Zofran for nausea and vomiting.  Mylanta 30 mL by mouth every 4 hours as needed for epigastric abdominal discomfort.  Please follow up with the primary care doctor this week.  Return immediately if you get worse or if new problems develop.

## 2025-06-08 NOTE — ED PROVIDER NOTES
"Encounter Date: 2025    SCRIBE #1 NOTE: I, Joya Rendon, am scribing for, and in the presence of,  Romeo Acosta MD. I have scribed the following portions of the note - Other sections scribed: HPI, ROS.       History     Chief Complaint   Patient presents with    Abdominal Pain     BIB [facility] for bilat. abd pain that started at approx 3 am w/ N/V and constipation x 2 days. Pt has a PMH of constipation. No urinary issues or discharge!        This is a 69 y.o. female with a PMHx of HTN and esophageal reflux, PSHx of cholecystectomy, who presents to the ED with intermittent bilateral upper abdominal pain with associated nausea since 3 am today. Patient describes the abdominal pain as "cramping". Patient confirms 1 episode of emesis today. Patient reports having these symptoms before and was diagnosed with gastritis and constipation. No other exacerbating or alleviating factors. Patient denies cough, shortness of breath, chest pain, fever, chills, diarrhea, blood in stool, dysuria, headaches, congestion, sore throat, arm or leg trouble, eye pain, ear pain, rash, or other associated symptoms.    The history is provided by the patient. No  was used.     Review of patient's allergies indicates:   Allergen Reactions    Sulfa (sulfonamide antibiotics) Hives    Ace inhibitors Other (See Comments)     Cough       Past Medical History:   Diagnosis Date    Allergy     Anxiety     Arthritis     Cataract     Depressive disorder, not elsewhere classified     Esophageal reflux     Hypertension     Impaired fasting glucose     Joint pain     Obesity, unspecified     Other and unspecified hyperlipidemia      Past Surgical History:   Procedure Laterality Date    BLEPHAROPLASTY Bilateral 2021    Procedure: BLEPHAROPLASTY;  Surgeon: Maite Acuna MD;  Location: [facility];  Service: Ophthalmology;  Laterality: Bilateral;     SECTION      CHOLECYSTECTOMY      COLONOSCOPY N/A " 09/06/2023    Procedure: COLONOSCOPY;  Surgeon: Briana Sterling MD;  Location: [facility] ENDO;  Service: Endoscopy;  Laterality: N/A;    ERCP N/A 10/07/2024    Procedure: ERCP (ENDOSCOPIC RETROGRADE CHOLANGIOPANCREATOGRAPHY);  Surgeon: Catracho Mitchell MD;  Location: [facility] ([address] [place] [address]);  Service: Endoscopy;  Laterality: N/A;    ERCP N/A 12/09/2024    Procedure: ERCP (ENDOSCOPIC RETROGRADE CHOLANGIOPANCREATOGRAPHY);  Surgeon: Romeo Roger MD;  Location: [facility];  Service: Endoscopy;  Laterality: N/A;  12/3/24: instructions sent via email-GD  12/5 SWP PRECALL COMPLETED-RT    ERCP N/A 01/29/2025    Procedure: ERCP (ENDOSCOPIC RETROGRADE CHOLANGIOPANCREATOGRAPHY);  Surgeon: Shannon Chaney MD;  Location: [facility];  Service: Endoscopy;  Laterality: N/A;  1/6 portal-Repeat ERCP in 6 weeks to remove stent.nicola -tt  1/14 SWP-PRECALL COMPLETE-RT  1/23 r/s updated portal instr-pt stated any MD-tt    ERCP N/A 04/28/2025    Procedure: ERCP (ENDOSCOPIC RETROGRADE CHOLANGIOPANCREATOGRAPHY);  Surgeon: Shannon Chaney MD;  Location: [facility];  Service: Endoscopy;  Laterality: N/A;  Per Dr. Chaney- Repeat ERCP in 3 months to remove covered metal                          biliary stent and hopefully for final clearance of                          the CBD.     3/21/25-Pt no longer taking Eliquis, instr portal-DS  4/24/25-LVM     ESOPHAGOGASTRODUODENOSCOPY N/A 11/21/2024    Procedure: EGD (ESOPHAGOGASTRODUODENOSCOPY);  Surgeon: Angie Alatorre MD;  Location: [facility];  Service: Gastroenterology;  Laterality: N/A;    ESOPHAGOGASTRODUODENOSCOPY N/A 5/23/2025    Procedure: EGD (ESOPHAGOGASTRODUODENOSCOPY);  Surgeon: Mak Michael MD;  Location: [facility];  Service: Gastroenterology;  Laterality: N/A;    HYSTERECTOMY      without BSO    INTRAOCULAR PROSTHESES INSERTION Left 10/27/2022    Procedure: INSERTION, IOL PROSTHESIS;  Surgeon: Palmer Berrios MD;  Location: [facility];  Service: Ophthalmology;  Laterality: Left;     PHACOEMULSIFICATION OF CATARACT Left 10/27/2022    Procedure: PHACOEMULSIFICATION, CATARACT;  Surgeon: Palmer Berrios MD;  Location: [facility];  Service: Ophthalmology;  Laterality: Left;  RN PHONE PREOP 10/21/2022   ARRIVAL 9:00 AM    R knee replacement      REPAIR OF RETINAL DETACHMENT WITH VITRECTOMY Left 02/28/2022    Procedure: REPAIR, RETINAL DETACHMENT, WITH VITRECTOMY;  Surgeon: MINO Martinez MD;  Location: [address];  Service: Ophthalmology;  Laterality: Left;  Spoke with SID Garcia. Pt was instructed nothing to eat after 8am and to arrive at 2pm for preop. 430pm case start request.    right  arm  surgery      ROBOT-ASSISTED CHOLECYSTECTOMY USING DA GENO XI N/A 10/09/2024    Procedure: XI ROBOTIC SUB TOTAL CHOLECYSTECTOMY; DRAIN PLACEMENT;  Surgeon: Rigoberto Ponce MD;  Location: [facility];  Service: General;  Laterality: N/A;     Family History   Problem Relation Name Age of Onset    Cancer Mother          lung    Liver disease Father      Thyroid disease Sister      Cervical cancer Sister      Tremor Sister       Social History[1]  Review of Systems   Constitutional:  Negative for chills, diaphoresis and fever.   HENT:  Negative for congestion, ear pain and sore throat.    Eyes:  Negative for pain and visual disturbance.   Respiratory:  Negative for cough and shortness of breath.    Cardiovascular:  Negative for chest pain.   Gastrointestinal:  Positive for abdominal pain, nausea and vomiting. Negative for blood in stool and diarrhea.   Genitourinary:  Negative for dysuria.   Musculoskeletal:  Negative for myalgias.   Skin:  Negative for rash.   Neurological:  Negative for headaches.       Physical Exam     Initial Vitals [06/08/25 0631]   BP Pulse Resp Temp SpO2   (!) 160/60 72 16 97.9 °F (36.6 °C) 99 %      MAP       --         Physical Exam  The patient was examined specifically for the following:   General:No significant distress, Good color, Warm and dry. Head and neck:Scalp  atraumatic, Neck supple. Neurological:Appropriate conversation, Gross motor deficits. Eyes:Conjugate gaze, Clear corneas. ENT: No epistaxis. Cardiac: Regular rate and rhythm, Grossly normal heart tones. Pulmonary: Wheezing, Rales. Gastrointestinal: Abdominal tenderness, Abdominal distention. Musculoskeletal: Extremity deformity, Apparent pain with range of motion of the joints. Skin: Rash.   The findings on examination were normal except for the following:  The patient has moderate vague diffuse upper abdominal tenderness but no guarding rebound mass or distention.  There is mild suprapubic pain and tenderness.  The lungs are clear.  The heart tones are normal.  The abdomen is soft.   ED Course   Procedures  Labs Reviewed   COMPREHENSIVE METABOLIC PANEL - Abnormal       Result Value    Sodium 143      Potassium 4.3      Chloride 106      CO2 25      Glucose 125 (*)     BUN 11      Creatinine 0.6      Calcium 8.6 (*)     Protein Total 7.0      Albumin 3.6      Bilirubin Total 0.4      ALP 69      AST 16      ALT 14      Anion Gap 12      eGFR >60     URINALYSIS, REFLEX TO URINE CULTURE - Abnormal    Color, UA Yellow      Appearance, UA Clear      pH, UA 7.0      Spec Grav UA 1.020      Protein, UA Negative      Glucose, UA Negative      Ketones, UA Negative      Bilirubin, UA Negative      Blood, UA Negative      Nitrites, UA Negative      Urobilinogen, UA 2.0-3.0 (*)     Leukocyte Esterase, UA Negative     CBC WITH DIFFERENTIAL - Abnormal    WBC 11.85      RBC 4.92      HGB 16.0      HCT 50.0 (*)      (*)     MCH 32.5 (*)     MCHC 32.0      RDW 18.6 (*)     Platelet Count        MPV        Nucleated RBC 0      Neut % 89.9 (*)     Lymph % 7.0 (*)     Mono % 1.6 (*)     Eos % 0.3      Basophil % 0.8      Imm Grans % 0.4      Neut # 10.65 (*)     Lymph # 0.83 (*)     Mono # 0.19 (*)     Eos # 0.03      Baso # 0.10      Imm Grans # 0.05 (*)    LIPASE - Normal    Lipase Level 4     CBC W/ AUTO DIFFERENTIAL     Narrative:     The following orders were created for panel order CBC Auto Differential.  Procedure                               Abnormality         Status                     ---------                               -----------         ------                     CBC with Differential[[id number]]       Abnormal            Final result                 Please view results for these tests on the individual orders.   GREY TOP URINE HOLD    Extra Tube Hold for add-ons.       EKG Readings: (Independently Interpreted)   This patient is in a normal sinus rhythm with a heart rate of 62.  The patient has a right bundle branch block.  There is poor R-wave progression across the precordium.  There is left axis deviation.  There there are no significant ST segment changes.  There are no significant T-wave changes.  There is no definite evidence of acute myocardial infarction or malignant arrhythmia.  This is doctor Acosta dictating an I independently interpreted this EKG.       Imaging Results    None          Medications   sodium chloride 0.9% bolus 1,000 mL 1,000 mL (0 mLs Intravenous Stopped 6/8/25 1035)   pantoprazole injection 80 mg (80 mg Intravenous Given 6/8/25 0806)   dicyclomine injection 20 mg (20 mg Intramuscular Given 6/8/25 0800)   morphine injection 2 mg (2 mg Intravenous Given 6/8/25 0758)   ondansetron disintegrating tablet 4 mg (4 mg Oral Given 6/8/25 0801)   aluminum-magnesium hydroxide-simethicone 200-200-20 mg/5 mL suspension 60 mL (60 mLs Oral Given 6/8/25 0808)     Medical Decision Making  Amount and/or Complexity of Data Reviewed  Labs: ordered. Decision-making details documented in ED Course.  ECG/medicine tests: ordered and independent interpretation performed. Decision-making details documented in ED Course.    Risk  OTC drugs.  Prescription drug management.    Given the above this patient presents emergency with a history of gastritis, status post cholecystectomy.  She is here with a epigastric  abdominal pain and vomiting.  She was treated with pantoprazole dicyclomine morphine Zofran Mylanta and a L of fluid.  Her symptoms are completely resolved.  The patient believes that this is likely an exacerbation of the gastritis.  She is on pantoprazole.  I will double her dosing to 40 mg twice a day.  I will add Mylanta to her regimen.  I will treat with dicyclomine and Zofran.  I will have the patient return if she gets worse or if new problems develop.  Laboratory evaluation is remarkable for clean urine.  The patient does have a normal white blood count.  Hemoglobin and hematocrit are normal.  Chemistries failed to reveal significant abnormalities except for a borderline high glucose at 125 likely not significant.  I re-evaluated the patient at 11:14 a.m..  The abdomen is completely nontender.  I will discharge to outpatient evaluation and treatment.        Scribe Attestation:   Scribe #1: I performed the above scribed service and the documentation accurately describes the services I performed. I attest to the accuracy of the note.                           Please note that the documentation on this chart was provided by the scribe above on the date of service noted above, and that the documentation in the chart accurately reflects the work and decisions made by me alone.  Signed, Dr. Acosta    Clinical Impression:  Final diagnoses:  [R10.13] Epigastric abdominal pain  [R11.2] Nausea and vomiting, unspecified vomiting type (Primary)          ED Disposition Condition    Discharge Stable          ED Prescriptions    None       Follow-up Information       Follow up With Specialties Details Why Contact Info    Fabienne Erwin NP Family Medicine In 3 days  [address] [place] [address]  [phone]                     [1]   Social History  Tobacco Use    Smoking status: Every Day     Current packs/day: 0.75     Average packs/day: 0.8 packs/day for 40.0 years (30.0 ttl pk-yrs)     Types: Cigarettes     Smokeless tobacco: Never   Substance Use Topics    Alcohol use: Yes     Comment: rarely    Drug use: Yes     Frequency: 14.0 times per week     Types: Marijuana        Romeo Acosta MD  06/08/25 1111

## 2025-06-09 ENCOUNTER — HOSPITAL ENCOUNTER (INPATIENT)
Facility: HOSPITAL | Age: 70
LOS: 4 days | Discharge: HOME | DRG: 389 | End: 2025-06-13
Attending: STUDENT IN AN ORGANIZED HEALTH CARE EDUCATION/TRAINING PROGRAM | Admitting: STUDENT IN AN ORGANIZED HEALTH CARE EDUCATION/TRAINING PROGRAM
Payer: MEDICARE

## 2025-06-09 DIAGNOSIS — R11.2 NAUSEA AND VOMITING, UNSPECIFIED VOMITING TYPE: ICD-10-CM

## 2025-06-09 DIAGNOSIS — R10.9 ABDOMINAL PAIN: ICD-10-CM

## 2025-06-09 DIAGNOSIS — K31.1 GASTRIC OUTLET OBSTRUCTION: ICD-10-CM

## 2025-06-09 DIAGNOSIS — R10.9 ABDOMINAL PAIN, UNSPECIFIED ABDOMINAL LOCATION: Primary | ICD-10-CM

## 2025-06-09 DIAGNOSIS — K56.609 SBO (SMALL BOWEL OBSTRUCTION): ICD-10-CM

## 2025-06-09 DIAGNOSIS — E87.6 HYPOKALEMIA: ICD-10-CM

## 2025-06-09 LAB
ABSOLUTE EOSINOPHIL (OHS): 0.01 K/UL
ABSOLUTE MONOCYTE (OHS): 0.69 K/UL (ref 0.3–1)
ABSOLUTE NEUTROPHIL COUNT (OHS): 13.3 K/UL (ref 1.8–7.7)
ALBUMIN SERPL BCP-MCNC: 3.8 G/DL (ref 3.5–5.2)
ALP SERPL-CCNC: 70 UNIT/L (ref 40–150)
ALT SERPL W/O P-5'-P-CCNC: 14 UNIT/L (ref 10–44)
ANION GAP (OHS): 15 MMOL/L (ref 8–16)
AST SERPL-CCNC: 20 UNIT/L (ref 11–45)
BASOPHILS # BLD AUTO: 0.06 K/UL
BASOPHILS NFR BLD AUTO: 0.4 %
BILIRUB SERPL-MCNC: 0.5 MG/DL (ref 0.1–1)
BILIRUB UR QL STRIP.AUTO: NEGATIVE
BUN SERPL-MCNC: 14 MG/DL (ref 8–23)
CALCIUM SERPL-MCNC: 8.9 MG/DL (ref 8.7–10.5)
CHLORIDE SERPL-SCNC: 104 MMOL/L (ref 95–110)
CLARITY UR: CLEAR
CO2 SERPL-SCNC: 26 MMOL/L (ref 23–29)
COLOR UR AUTO: YELLOW
CREAT SERPL-MCNC: 0.6 MG/DL (ref 0.5–1.4)
ERYTHROCYTE [DISTWIDTH] IN BLOOD BY AUTOMATED COUNT: 13.2 % (ref 11.5–14.5)
GFR SERPLBLD CREATININE-BSD FMLA CKD-EPI: >60 ML/MIN/1.73/M2
GLUCOSE SERPL-MCNC: 141 MG/DL (ref 70–110)
GLUCOSE UR QL STRIP: NEGATIVE
HCT VFR BLD AUTO: 50.7 % (ref 37–48.5)
HGB BLD-MCNC: 16.9 GM/DL (ref 12–16)
HGB UR QL STRIP: NEGATIVE
HOLD SPECIMEN: NORMAL
IMM GRANULOCYTES # BLD AUTO: 0.06 K/UL (ref 0–0.04)
IMM GRANULOCYTES NFR BLD AUTO: 0.4 % (ref 0–0.5)
KETONES UR QL STRIP: NEGATIVE
LEUKOCYTE ESTERASE UR QL STRIP: NEGATIVE
LIPASE SERPL-CCNC: 4 U/L (ref 4–60)
LYMPHOCYTES # BLD AUTO: 1.1 K/UL (ref 1–4.8)
MCH RBC QN AUTO: 32.1 PG (ref 27–31)
MCHC RBC AUTO-ENTMCNC: 33.3 G/DL (ref 32–36)
MCV RBC AUTO: 96 FL (ref 82–98)
NITRITE UR QL STRIP: NEGATIVE
NUCLEATED RBC (/100WBC) (OHS): 0 /100 WBC
PH UR STRIP: 7 [PH]
PLATELET # BLD AUTO: 268 K/UL (ref 150–450)
PMV BLD AUTO: 9.2 FL (ref 9.2–12.9)
POTASSIUM SERPL-SCNC: 4 MMOL/L (ref 3.5–5.1)
PROT SERPL-MCNC: 7.4 GM/DL (ref 6–8.4)
PROT UR QL STRIP: ABNORMAL
RBC # BLD AUTO: 5.27 M/UL (ref 4–5.4)
RELATIVE EOSINOPHIL (OHS): 0.1 %
RELATIVE LYMPHOCYTE (OHS): 7.2 % (ref 18–48)
RELATIVE MONOCYTE (OHS): 4.5 % (ref 4–15)
RELATIVE NEUTROPHIL (OHS): 87.4 % (ref 38–73)
SODIUM SERPL-SCNC: 145 MMOL/L (ref 136–145)
SP GR UR STRIP: 1.02
UROBILINOGEN UR STRIP-ACNC: ABNORMAL EU/DL
WBC # BLD AUTO: 15.22 K/UL (ref 3.9–12.7)

## 2025-06-09 PROCEDURE — 63600175 PHARM REV CODE 636 W HCPCS: Performed by: HOSPITALIST

## 2025-06-09 PROCEDURE — 80053 COMPREHEN METABOLIC PANEL: CPT | Performed by: STUDENT IN AN ORGANIZED HEALTH CARE EDUCATION/TRAINING PROGRAM

## 2025-06-09 PROCEDURE — 81003 URINALYSIS AUTO W/O SCOPE: CPT | Performed by: STUDENT IN AN ORGANIZED HEALTH CARE EDUCATION/TRAINING PROGRAM

## 2025-06-09 PROCEDURE — 11000001 HC ACUTE MED/SURG PRIVATE ROOM

## 2025-06-09 PROCEDURE — 96375 TX/PRO/DX INJ NEW DRUG ADDON: CPT

## 2025-06-09 PROCEDURE — 96374 THER/PROPH/DIAG INJ IV PUSH: CPT

## 2025-06-09 PROCEDURE — 85025 COMPLETE CBC W/AUTO DIFF WBC: CPT | Performed by: STUDENT IN AN ORGANIZED HEALTH CARE EDUCATION/TRAINING PROGRAM

## 2025-06-09 PROCEDURE — 25000003 PHARM REV CODE 250: Performed by: HOSPITALIST

## 2025-06-09 PROCEDURE — 25000003 PHARM REV CODE 250: Performed by: STUDENT IN AN ORGANIZED HEALTH CARE EDUCATION/TRAINING PROGRAM

## 2025-06-09 PROCEDURE — 99285 EMERGENCY DEPT VISIT HI MDM: CPT | Mod: 25

## 2025-06-09 PROCEDURE — 0D9670Z DRAINAGE OF STOMACH WITH DRAINAGE DEVICE, VIA NATURAL OR ARTIFICIAL OPENING: ICD-10-PCS | Performed by: STUDENT IN AN ORGANIZED HEALTH CARE EDUCATION/TRAINING PROGRAM

## 2025-06-09 PROCEDURE — 99223 1ST HOSP IP/OBS HIGH 75: CPT | Mod: ,,, | Performed by: STUDENT IN AN ORGANIZED HEALTH CARE EDUCATION/TRAINING PROGRAM

## 2025-06-09 PROCEDURE — 83690 ASSAY OF LIPASE: CPT | Performed by: STUDENT IN AN ORGANIZED HEALTH CARE EDUCATION/TRAINING PROGRAM

## 2025-06-09 PROCEDURE — 96361 HYDRATE IV INFUSION ADD-ON: CPT

## 2025-06-09 PROCEDURE — 25500020 PHARM REV CODE 255: Performed by: STUDENT IN AN ORGANIZED HEALTH CARE EDUCATION/TRAINING PROGRAM

## 2025-06-09 PROCEDURE — 63600175 PHARM REV CODE 636 W HCPCS: Performed by: STUDENT IN AN ORGANIZED HEALTH CARE EDUCATION/TRAINING PROGRAM

## 2025-06-09 RX ORDER — MIDAZOLAM HYDROCHLORIDE 2 MG/2ML
0.5 INJECTION, SOLUTION INTRAMUSCULAR; INTRAVENOUS
Status: COMPLETED | OUTPATIENT
Start: 2025-06-09 | End: 2025-06-09

## 2025-06-09 RX ORDER — DEXTROSE MONOHYDRATE AND SODIUM CHLORIDE 5; .9 G/100ML; G/100ML
INJECTION, SOLUTION INTRAVENOUS CONTINUOUS
Status: DISCONTINUED | OUTPATIENT
Start: 2025-06-09 | End: 2025-06-13

## 2025-06-09 RX ORDER — MORPHINE SULFATE 4 MG/ML
4 INJECTION, SOLUTION INTRAMUSCULAR; INTRAVENOUS
Refills: 0 | Status: COMPLETED | OUTPATIENT
Start: 2025-06-09 | End: 2025-06-09

## 2025-06-09 RX ORDER — LIDOCAINE HYDROCHLORIDE 20 MG/ML
JELLY TOPICAL
Status: COMPLETED | OUTPATIENT
Start: 2025-06-09 | End: 2025-06-09

## 2025-06-09 RX ORDER — KETOROLAC TROMETHAMINE 30 MG/ML
15 INJECTION, SOLUTION INTRAMUSCULAR; INTRAVENOUS
Status: COMPLETED | OUTPATIENT
Start: 2025-06-09 | End: 2025-06-09

## 2025-06-09 RX ORDER — CLONIDINE 0.3 MG/24H
1 PATCH, EXTENDED RELEASE TRANSDERMAL
Status: DISCONTINUED | OUTPATIENT
Start: 2025-06-09 | End: 2025-06-13 | Stop reason: HOSPADM

## 2025-06-09 RX ORDER — ONDANSETRON HYDROCHLORIDE 2 MG/ML
4 INJECTION, SOLUTION INTRAVENOUS
Status: COMPLETED | OUTPATIENT
Start: 2025-06-09 | End: 2025-06-09

## 2025-06-09 RX ORDER — HYDRALAZINE HYDROCHLORIDE 20 MG/ML
20 INJECTION INTRAMUSCULAR; INTRAVENOUS EVERY 4 HOURS PRN
Status: DISCONTINUED | OUTPATIENT
Start: 2025-06-09 | End: 2025-06-13 | Stop reason: HOSPADM

## 2025-06-09 RX ORDER — MORPHINE SULFATE 4 MG/ML
4 INJECTION, SOLUTION INTRAMUSCULAR; INTRAVENOUS EVERY 4 HOURS PRN
Refills: 0 | Status: DISCONTINUED | OUTPATIENT
Start: 2025-06-09 | End: 2025-06-12

## 2025-06-09 RX ORDER — PANTOPRAZOLE SODIUM 40 MG/10ML
40 INJECTION, POWDER, LYOPHILIZED, FOR SOLUTION INTRAVENOUS
Status: COMPLETED | OUTPATIENT
Start: 2025-06-09 | End: 2025-06-09

## 2025-06-09 RX ADMIN — ONDANSETRON 4 MG: 2 INJECTION INTRAMUSCULAR; INTRAVENOUS at 02:06

## 2025-06-09 RX ADMIN — DEXTROSE AND SODIUM CHLORIDE: 5; 900 INJECTION, SOLUTION INTRAVENOUS at 06:06

## 2025-06-09 RX ADMIN — CLONIDINE 1 PATCH: 0.3 PATCH TRANSDERMAL at 10:06

## 2025-06-09 RX ADMIN — LIDOCAINE HYDROCHLORIDE 10 ML: 20 JELLY TOPICAL at 06:06

## 2025-06-09 RX ADMIN — MORPHINE SULFATE 4 MG: 4 INJECTION INTRAVENOUS at 07:06

## 2025-06-09 RX ADMIN — SODIUM CHLORIDE, POTASSIUM CHLORIDE, SODIUM LACTATE AND CALCIUM CHLORIDE 1000 ML: 600; 310; 30; 20 INJECTION, SOLUTION INTRAVENOUS at 02:06

## 2025-06-09 RX ADMIN — MIDAZOLAM HYDROCHLORIDE 0.5 MG: 1 INJECTION, SOLUTION INTRAMUSCULAR; INTRAVENOUS at 06:06

## 2025-06-09 RX ADMIN — MORPHINE SULFATE 4 MG: 4 INJECTION INTRAVENOUS at 12:06

## 2025-06-09 RX ADMIN — DEXTROSE AND SODIUM CHLORIDE: 5; 900 INJECTION, SOLUTION INTRAVENOUS at 04:06

## 2025-06-09 RX ADMIN — MORPHINE SULFATE 4 MG: 4 INJECTION INTRAVENOUS at 08:06

## 2025-06-09 RX ADMIN — PANTOPRAZOLE SODIUM 40 MG: 40 INJECTION, POWDER, FOR SOLUTION INTRAVENOUS at 02:06

## 2025-06-09 RX ADMIN — KETOROLAC TROMETHAMINE 15 MG: 30 INJECTION, SOLUTION INTRAMUSCULAR; INTRAVENOUS at 06:06

## 2025-06-09 RX ADMIN — IOHEXOL 75 ML: 350 INJECTION, SOLUTION INTRAVENOUS at 03:06

## 2025-06-09 RX ADMIN — MORPHINE SULFATE 4 MG: 4 INJECTION INTRAVENOUS at 04:06

## 2025-06-09 RX ADMIN — MORPHINE SULFATE 4 MG: 4 INJECTION INTRAVENOUS at 02:06

## 2025-06-09 RX ADMIN — HYDRALAZINE HYDROCHLORIDE 20 MG: 20 INJECTION INTRAMUSCULAR; INTRAVENOUS at 05:06

## 2025-06-09 NOTE — HPI
69-year-old female with a history of anxiety, depression, hypertension, management of a recent questionable small bowel obstruction that resolved with medical management presents to ED for evaluation of severe lower abdominal pain that she notes has been going on for the past several days.  She notes the pain has been progressively worsening and is currently a 10/10. Did not had BM for 4 days, She was evaluated here yesterday and felt better after some treatments and was ultimately discharged.  She was advised to come back if she felt worse and her symptoms have been worsening.  She notes she feels worse now than when she came in the 1st time.  She notes she has had numerous episodes of nausea and dry heaving.  .  She notes she typically has a regular.  Was admitted here last month following a similar presentation and was noted to have significant gastric distention concerning for gastroparesis as well as some small bowel findings concerning for possible SBO.  Admitted and managed conservatively with resolution of her symptoms.  She denies any fevers, chills, blood in her stool, chest pain, shortness of breath, vaginal bleeding, vaginal discharge.   CT abdomen,. Show,Segmentally dilated small bowel loops in the right pelvis with whirled appearance of the mesentery suspicious for internal hernia or volvulus. NG is placed and surgery is consulted.   Opt out

## 2025-06-09 NOTE — PLAN OF CARE
Problem: Adult Inpatient Plan of Care  Goal: Plan of Care Review  Outcome: Progressing  Flowsheets (Taken 6/9/2025 0930)  Plan of Care Reviewed With: patient  Goal: Patient-Specific Goal (Individualized)  Outcome: Progressing     Problem: Intestinal Obstruction  Goal: Optimal Bowel Function  Outcome: Progressing  Intervention: Promote Bowel Function  Flowsheets (Taken 6/9/2025 0930)  Body Position: supine  Head of Bed (HOB) Positioning: HOB at 30-45 degrees     Problem: Adult Inpatient Plan of Care  Goal: Plan of Care Review  Outcome: Progressing  Flowsheets (Taken 6/9/2025 0930)  Plan of Care Reviewed With: patient     Problem: Adult Inpatient Plan of Care  Goal: Plan of Care Review  Outcome: Progressing  Flowsheets (Taken 6/9/2025 0930)  Plan of Care Reviewed With: patient     Problem: Adult Inpatient Plan of Care  Goal: Patient-Specific Goal (Individualized)  Outcome: Progressing     Problem: Adult Inpatient Plan of Care  Goal: Patient-Specific Goal (Individualized)  Outcome: Progressing     Problem: Intestinal Obstruction  Goal: Optimal Bowel Function  Outcome: Progressing  Intervention: Promote Bowel Function  Flowsheets (Taken 6/9/2025 0930)  Body Position: supine  Head of Bed (HOB) Positioning: HOB at 30-45 degrees     Problem: Intestinal Obstruction  Goal: Optimal Bowel Function  Outcome: Progressing     Problem: Intestinal Obstruction  Goal: Optimal Bowel Function  Intervention: Promote Bowel Function  Flowsheets (Taken 6/9/2025 0930)  Body Position: supine  Head of Bed (HOB) Positioning: HOB at 30-45 degrees     Problem: Intestinal Obstruction  Goal: Optimal Bowel Function  Intervention: Promote Bowel Function  Flowsheets (Taken 6/9/2025 0930)  Body Position: supine  Head of Bed (HOB) Positioning: HOB at 30-45 degrees

## 2025-06-09 NOTE — CONSULTS
Consult Note  Seen and examined at 6:45 AM today.  SUBJECTIVE:     History of Present Illness:  Patient is a 69 y.o. female presents with abdominal pain, nausea, retching (no vomiting) for the last 2 days. She has not passed flatus since Friday. She has had intermittent abdominal pain since her cholecystectomy however this was more severe.  She describes it as 10/10 severity on presentation, however it is a 5/10 now. She states she does not feel ill or unwell at all.  She passed flatus this morning.   Of note during obtaining history and physical exam, patient passed flatus.    Chief Complaint   Patient presents with    Abdominal Pain     BIB [facility] [facility] for lower abd pain w/ constipation. Pt was seen here yesterday for the same c/c, was dxed and reports no relief. Has appt schedule on  for endoscopy.        Review of patient's allergies indicates:   Allergen Reactions    Sulfa (sulfonamide antibiotics) Hives    Ace inhibitors Other (See Comments)     Cough         Current Medications[1]    Past Medical History:   Diagnosis Date    Allergy     Anxiety     Arthritis     Cataract     Depressive disorder, not elsewhere classified     Esophageal reflux     Hypertension     Impaired fasting glucose     Joint pain     Obesity, unspecified     Other and unspecified hyperlipidemia      Past Surgical History:   Procedure Laterality Date    BLEPHAROPLASTY Bilateral 2021    Procedure: BLEPHAROPLASTY;  Surgeon: Maite Acuna MD;  [facility];  Service: Ophthalmology;  Laterality: Bilateral;     SECTION      CHOLECYSTECTOMY      COLONOSCOPY N/A 2023    Procedure: COLONOSCOPY;  Surgeon: Briana Sterling MD;  Location: [facility];  Service: Endoscopy;  Laterality: N/A;    ERCP N/A 10/07/2024    Procedure: ERCP (ENDOSCOPIC RETROGRADE CHOLANGIOPANCREATOGRAPHY);  Surgeon: Catracho Mitchell MD;  Location: [facility] ([address] [place] [address]);  Service: Endoscopy;  Laterality: N/A;    ERCP N/A 2024     Procedure: ERCP (ENDOSCOPIC RETROGRADE CHOLANGIOPANCREATOGRAPHY);  Surgeon: Romeo Roger MD;  Location: [facility] ENDO;  Service: Endoscopy;  Laterality: N/A;  12/3/24: instructions sent via email-GD  12/5 SWP PRECALL COMPLETED-RT    ERCP N/A 01/29/2025    Procedure: ERCP (ENDOSCOPIC RETROGRADE CHOLANGIOPANCREATOGRAPHY);  Surgeon: Sahnnon Chaney MD;  Location: [facility] ENDO;  Service: Endoscopy;  Laterality: N/A;  1/6 portal-Repeat ERCP in 6 weeks to remove stent.roger -tt  1/14 SWP-PRECALL COMPLETE-RT  1/23 r/s updated portal instr-pt stated any MD-tt    ERCP N/A 04/28/2025    Procedure: ERCP (ENDOSCOPIC RETROGRADE CHOLANGIOPANCREATOGRAPHY);  Surgeon: Shannon Chaney MD;  Location: [facility] ENDO;  Service: Endoscopy;  Laterality: N/A;  Per Dr. Chaney- Repeat ERCP in 3 months to remove covered metal                          biliary stent and hopefully for final clearance of                          the CBD.     3/21/25-Pt no longer taking Eliquis, instr portal-DS  4/24/25-LVM     ESOPHAGOGASTRODUODENOSCOPY N/A 11/21/2024    Procedure: EGD (ESOPHAGOGASTRODUODENOSCOPY);  Surgeon: Angie Alatorre MD;  Location: [facility] ENDO;  Service: Gastroenterology;  Laterality: N/A;    ESOPHAGOGASTRODUODENOSCOPY N/A 5/23/2025    Procedure: EGD (ESOPHAGOGASTRODUODENOSCOPY);  Surgeon: Mak Michael MD;  Location: [facility] ENDO;  Service: Gastroenterology;  Laterality: N/A;    HYSTERECTOMY      without BSO    INTRAOCULAR PROSTHESES INSERTION Left 10/27/2022    Procedure: INSERTION, IOL PROSTHESIS;  Surgeon: Palmer Berrios MD;  Location: [facility] OR;  Service: Ophthalmology;  Laterality: Left;    PHACOEMULSIFICATION OF CATARACT Left 10/27/2022    Procedure: PHACOEMULSIFICATION, CATARACT;  Surgeon: Palmer Berrios MD;  Location: [facility] OR;  Service: Ophthalmology;  Laterality: Left;  RN PHONE PREOP 10/21/2022   ARRIVAL 9:00 AM    R knee replacement      REPAIR OF RETINAL DETACHMENT WITH VITRECTOMY Left 02/28/2022    Procedure: REPAIR, RETINAL  "DETACHMENT, WITH VITRECTOMY;  Surgeon: MINO Martinez MD;  Location: [facility] OR [address] [place] [address];  Service: Ophthalmology;  Laterality: Left;  Spoke with SID Garcia. Pt was instructed nothing to eat after 8am and to arrive at 2pm for preop. 430pm case start request.    right  arm  surgery      ROBOT-ASSISTED CHOLECYSTECTOMY USING DA GENO XI N/A 10/09/2024    Procedure: XI ROBOTIC SUB TOTAL CHOLECYSTECTOMY; DRAIN PLACEMENT;  Surgeon: Rigoberto Ponce MD;  Location: [facility] OR;  Service: General;  Laterality: N/A;     Family History   Problem Relation Name Age of Onset    Cancer Mother          lung    Liver disease Father      Thyroid disease Sister      Cervical cancer Sister      Tremor Sister       Social History[2]     Review of Systems:  Review of Systems      OBJECTIVE:     Vital Signs (Most Recent)  Temp: 98.2 °F (36.8 °C) (06/09/25 0822)  Pulse: 65 (06/09/25 0822)  Resp: 18 (06/09/25 0822)  BP: (!) 162/71 (06/09/25 0822)  SpO2: 97 % (06/09/25 0822)  5' 8" (1.727 m)  59 kg (130 lb)     Physical Exam:  Physical Exam    Abdomen soft, very mild infraumbilical tenderness, not distended    Laboratory  Component      Latest Ref Rng 5/21/2025 5/22/2025 5/23/2025 5/24/2025 6/8/2025   WBC      3.90 - 12.70 K/uL 11.55  12.96 (H)  13.19 (H)  16.59 (H)  11.85    RBC      4.00 - 5.40 M/uL 5.38  4.80  4.59  4.59  4.92    Hemoglobin      12.0 - 16.0 gm/dL 17.5 (H)  15.3  15.0  14.7  16.0    Hematocrit      37.0 - 48.5 % 51.6 (H)  47.1  44.7  44.4  50.0 (H)    MCV      82 - 98 fL 96  98  97  97  102 (H)    MCH      27.0 - 31.0 pg 32.5 (H)  31.9 (H)  32.7 (H)  32.0 (H)  32.5 (H)    MCHC      32.0 - 36.0 g/dL 33.9  32.5  33.6  33.1  32.0    RDW      11.5 - 14.5 % 14.1  13.5  13.1  12.9  18.6 (H)    Platelet Count      150 - 450 K/uL 224  221  225  201  --    MPV      9.2 - 12.9 fL 10.0  9.6  9.4  9.4  --    Immature Granulocytes      0.0 - 0.5 % 0.5  0.3  0.5  0.5  0.4    Gran # (ANC)      1.8 - 7.7 K/uL 9.73 (H)  9.10 (H)  9.42 (H)  " 12.86 (H)  10.65 (H)    Immature Grans (Abs)      0.00 - 0.04 K/uL 0.06 (H)  0.04  0.06 (H)  0.08 (H)  0.05 (H)    Lymph #      1 - 4.8 K/uL 1.16  2.36  2.36  2.33  0.83 (L)    Mono #      0.3 - 1 K/uL 0.43  1.22 (H)  1.05 (H)  1.11 (H)  0.19 (L)    Eos #      <=0.5 K/uL 0.04  0.10  0.15  0.09  0.03    Baso #      <=0.2 K/uL 0.13  0.14  0.15  0.12  0.10    nRBC      <=0 /100 WBC 0  0  0  0  0    Lymph %      18 - 48 % 10.0 (L)  18.2  17.9 (L)  14.0 (L)  7.0 (L)    Mono %      4 - 15 % 3.7 (L)  9.4  8.0  6.7  1.6 (L)    Eos %      <=8 % 0.3  0.8  1.1  0.5  0.3    Basophil %      <=1.9 % 1.1  1.1  1.1  0.7  0.8    Neut %      38 - 73 % 84.4 (H)  70.2  71.4  77.6 (H)  89.9 (H)      Component      Latest Ref [facility] 6/9/2025   WBC      3.90 - 12.70 K/uL 15.22 (H)    RBC      4.00 - 5.40 M/uL 5.27    Hemoglobin      12.0 - 16.0 gm/dL 16.9 (H)    Hematocrit      37.0 - 48.5 % 50.7 (H)    MCV      82 - 98 fL 96    MCH      27.0 - 31.0 pg 32.1 (H)    MCHC      32.0 - 36.0 g/dL 33.3    RDW      11.5 - 14.5 % 13.2    Platelet Count      150 - 450 K/uL 268    MPV      9.2 - 12.9 fL 9.2    Immature Granulocytes      0.0 - 0.5 % 0.4    Gran # (ANC)      1.8 - 7.7 K/uL 13.30 (H)    Immature Grans (Abs)      0.00 - 0.04 K/uL 0.06 (H)    Lymph #      1 - 4.8 K/uL 1.10    Mono #      0.3 - 1 K/uL 0.69    Eos #      <=0.5 K/uL 0.01    Baso #      <=0.2 K/uL 0.06    nRBC      <=0 /100 WBC 0    Lymph %      18 - 48 % 7.2 (L)    Mono %      4 - 15 % 4.5    Eos %      <=8 % 0.1    Basophil %      <=1.9 % 0.4    Neut %      38 - 73 % 87.4 (H)       Legend:  (H) High  (L) Low    Diagnostic Results:  EXAM: CT ABDOMEN PELVIS WITH IV CONTRAST     HISTORY: 69 years -old Female with Abdominal pain, acute, nonlocalized     TECHNIQUE: Spiral CT of the abdomen and pelvis were performed with intravenous  contrast. Non-ionic intravenous contrast was utilized. All CT scans are performed using dose optimization techniques as appropriate to a performed  exam including automated exposure control and/or standardized protocols for targeted exam where dose is matched to indications/reason for exam/patient size.     COMPARISON: There are no existing relevant studies available for comparison at this time     FINDINGS:        Lung bases: Unremarkable.     Liver: Unremarkable     Biliary system: Unremarkable     Spleen: Unremarkable     Pancreas: Unremarkable     Stomach and small bowel: Segmentally dilated small bowel loops are present in the right pelvis. There is question of mild mural edema. There is a whirled appearance of the mesentery in the lower abdominal region demonstrated on images 8296 of series 601. Findings are suspicious for internal hernia or 5 view left. There is marked fluid distention of the proximal stomach. There is question possible gastric luminal narrowing in the mid body seen on image 31 of series 2.      Colon: The appendix is unremarkable. Moderate amount of fecal material is present in the right colon.     Retroperitoneum, mesentery, omentum, peritoneum: Small amount of free fluid is present in the abdomen..     Adrenal glands: Unremarkable     Kidneys: Unremarkable     Urinary bladder: Unremarkable     Reproductive organs:  There has been a hysterectomy.     Abdominal wall: Unremarkable     Musculoskeletal: Unremarkable      #CRITICAL# COMMUNICATION: I discussed suspicion for right lower quadrant internal hernia involving segment of small bowel and possible mid gastric body lesion with Dr. BEBO CASTAÑEDA at   5:15 AM  on 06/09/2025 by  telephone. The healthcare provider acknowledged receipt and understanding of the findings.     Impression:  IMPRESSION:  Segmentally dilated small bowel loops in the right pelvis with whirled appearance of the mesentery suspicious for internal hernia or volvulus.     Marked fluid distention of the proximal stomach with question of possible gastric luminal narrowing in the mid body.     Small amount of free  fluid in the abdomen.     -Electronically Signed By: Remedios Mtz MD   -Electronically Signed On:  6/9/2025 5:16 AM    I separately reviewed and interpreted the imaging. While I can see a possible transition point in the pelvis, the diffuse small bowel, gastric dilation and mild colonic dilation with stool and air, this could also represent an ileus in this clinical setting.    ASSESSMENT/PLAN:     69 year old woman presents with nausea, abdominal pain and obstipation prior to arrival.  She passed flatus (witnessed) in the ED with a benign clinical exam.     PLAN:Plan     -NGT for gastric and small bowel decompression  -NPO and IVF  -close monitoring with serial abdominal exams and further bowel function  -Gastrografin challenge after a period of decompression  -trend leukocytosis    Chiki Cummings MD   General Surgery  [facility]                   [1]   Current Facility-Administered Medications   Medication Dose Route Frequency Provider Last Rate Last Admin    cloNIDine 0.3 mg/24 hr td ptwk 1 patch  1 patch Transdermal Q7 Days Ninfa Mchugh MD        dextrose 5 % and 0.9 % NaCl infusion   Intravenous Continuous Ninfa Mchugh MD 75 mL/hr at 06/09/25 0623 New Bag at 06/09/25 0623    hydrALAZINE injection 20 mg  20 mg Intravenous Q4H PRN Ninfa Mchugh MD        morphine injection 4 mg  4 mg Intravenous Q4H PRN Ninfa Mchugh MD   4 mg at 06/09/25 0716     Facility-Administered Medications Ordered in Other Encounters   Medication Dose Route Frequency Provider Last Rate Last Admin    cyclopentolate 1% ophthalmic solution 1 drop  1 drop Left Eye On Call Procedure Palmer Berrios MD   1 drop at 10/27/22 1003    ofloxacin 0.3 % ophthalmic solution 1 drop  1 drop Left Eye On Call Procedure Palmer Berrios MD   2 drop at 10/27/22 1238    sodium chloride 0.9% flush 10 mL  10 mL Intravenous PRN aPlmer Berrios MD       [2]   Social History  Tobacco Use     Smoking status: Every Day     Current packs/day: 0.75     Average packs/day: 0.8 packs/day for 40.0 years (30.0 ttl pk-yrs)     Types: Cigarettes    Smokeless tobacco: Never   Substance Use Topics    Alcohol use: Yes     Comment: rarely    Drug use: Yes     Frequency: 14.0 times per week     Types: Marijuana

## 2025-06-09 NOTE — PLAN OF CARE
06/09/25 1659   Rounds   Attendance Provider;;Charge nurse   Discharge Plan A Home with family   Why the patient remains in the hospital Requires continued medical care   Transition of Care Barriers None

## 2025-06-09 NOTE — ED PROVIDER NOTES
Encounter Date: 6/9/2025       History     Chief Complaint   Patient presents with    Abdominal Pain     BIB [facility] for lower abd pain w/ constipation. Pt was seen here yesterday for the same c/c, was dxed and reports no relief. Has appt schedule on 7/1 for endoscopy.      HPI    69-year-old female with a history of anxiety, depression, hypertension, management of a recent questionable small bowel obstruction that resolved with medical management presents to ED for evaluation of severe lower abdominal pain that she notes has been going on for the past several days.  She notes the pain has been progressively worsening and is currently a 10/10.  She was evaluated here yesterday and felt better after some treatments and was ultimately discharged.  She was advised to come back if she felt worse and her symptoms have been worsening.  She notes she feels worse now than when she came in the 1st time.  She notes she has had numerous episodes of nausea and dry heaving.  Has not had a bowel movement in 5 days.  She notes she typically has a regular.  Was admitted here last month following a similar presentation and was noted to have significant gastric distention concerning for gastroparesis as well as some small bowel findings concerning for possible SBO.  Admitted and managed conservatively with resolution of her symptoms.  She denies any fevers, chills, blood in her stool, chest pain, shortness of breath, vaginal bleeding, vaginal discharge.    Review of patient's allergies indicates:   Allergen Reactions    Sulfa (sulfonamide antibiotics) Hives    Ace inhibitors Other (See Comments)     Cough       Past Medical History:   Diagnosis Date    Allergy     Anxiety     Arthritis     Cataract     Depressive disorder, not elsewhere classified     Esophageal reflux     Hypertension     Impaired fasting glucose     Joint pain     Obesity, unspecified     Other and unspecified hyperlipidemia      Past Surgical History:    Procedure Laterality Date    BLEPHAROPLASTY Bilateral 2021    Procedure: BLEPHAROPLASTY;  Surgeon: Maite Acuna MD;  Location: Replaced by [facility];  Service: Ophthalmology;  Laterality: Bilateral;     SECTION  1973    CHOLECYSTECTOMY      COLONOSCOPY N/A 2023    Procedure: COLONOSCOPY;  Surgeon: Briana Sterling MD;  Location: [facility];  Service: Endoscopy;  Laterality: N/A;    ERCP N/A 10/07/2024    Procedure: ERCP (ENDOSCOPIC RETROGRADE CHOLANGIOPANCREATOGRAPHY);  Surgeon: Catracho Mitchell MD;  Location: [facility] (2ND FLR);  Service: Endoscopy;  Laterality: N/A;    ERCP N/A 2024    Procedure: ERCP (ENDOSCOPIC RETROGRADE CHOLANGIOPANCREATOGRAPHY);  Surgeon: Romeo Roger MD;  Location: [facility];  Service: Endoscopy;  Laterality: N/A;  12/3/24: instructions sent via email-GD   SWP PRECALL COMPLETED-RT    ERCP N/A 2025    Procedure: ERCP (ENDOSCOPIC RETROGRADE CHOLANGIOPANCREATOGRAPHY);  Surgeon: Shannon Chaney MD;  Location: [facility];  Service: Endoscopy;  Laterality: N/A;   portal-Repeat ERCP in 6 weeks to remove stent.nicola -tt   SWP-PRECALL COMPLETE-RT   r/s updated portal instr-pt stated any MD-tt    ERCP N/A 2025    Procedure: ERCP (ENDOSCOPIC RETROGRADE CHOLANGIOPANCREATOGRAPHY);  Surgeon: Shannon Chaney MD;  Location: [facility];  Service: Endoscopy;  Laterality: N/A;  Per Dr. Chaney- Repeat ERCP in 3 months to remove covered metal                          biliary stent and hopefully for final clearance of                          the CBD.     3/21/25-Pt no longer taking Eliquis, instr portal-DS  25-LVM     ESOPHAGOGASTRODUODENOSCOPY N/A 2024    Procedure: EGD (ESOPHAGOGASTRODUODENOSCOPY);  Surgeon: Angie Alatorre MD;  Location: [facility];  Service: Gastroenterology;  Laterality: N/A;    ESOPHAGOGASTRODUODENOSCOPY N/A 2025    Procedure: EGD (ESOPHAGOGASTRODUODENOSCOPY);  Surgeon: Mak Michael MD;  Location: [facility];   Service: Gastroenterology;  Laterality: N/A;    HYSTERECTOMY      without BSO    INTRAOCULAR PROSTHESES INSERTION Left 10/27/2022    Procedure: INSERTION, IOL PROSTHESIS;  Surgeon: Palmer Berrios MD;  Location: [facility] OR;  Service: Ophthalmology;  Laterality: Left;    PHACOEMULSIFICATION OF CATARACT Left 10/27/2022    Procedure: PHACOEMULSIFICATION, CATARACT;  Surgeon: Palmer Berrios MD;  Location: [facility] OR;  Service: Ophthalmology;  Laterality: Left;  RN PHONE PREOP 10/21/2022   ARRIVAL 9:00 AM    R knee replacement      REPAIR OF RETINAL DETACHMENT WITH VITRECTOMY Left 02/28/2022    Procedure: REPAIR, RETINAL DETACHMENT, WITH VITRECTOMY;  Surgeon: MINO Martinez MD;  Location: [address];  Service: Ophthalmology;  Laterality: Left;  Spoke with SID Garcia. Pt was instructed nothing to eat after 8am and to arrive at 2pm for preop. 430pm case start request.    right  arm  surgery      ROBOT-ASSISTED CHOLECYSTECTOMY USING DA GENO XI N/A 10/09/2024    Procedure: XI ROBOTIC SUB TOTAL CHOLECYSTECTOMY; DRAIN PLACEMENT;  Surgeon: Rigoberto Ponce MD;  Location: [facility] OR;  Service: General;  Laterality: N/A;     Family History   Problem Relation Name Age of Onset    Cancer Mother          lung    Liver disease Father      Thyroid disease Sister      Cervical cancer Sister      Tremor Sister       Social History[1]  Review of Systems   Constitutional:  Negative for fever.   HENT:  Negative for sore throat.    Respiratory:  Negative for shortness of breath.    Cardiovascular:  Negative for chest pain.   Gastrointestinal:  Positive for abdominal pain, constipation, nausea and vomiting. Negative for blood in stool and diarrhea.   Genitourinary:  Negative for dysuria.   Musculoskeletal:  Negative for back pain.   Skin:  Negative for rash.   Neurological:  Negative for weakness.   Hematological:  Does not bruise/bleed easily.       Physical Exam     Initial Vitals [06/09/25 0044]   BP Pulse Resp Temp SpO2    (!) 160/80 84 18 97.7 °F (36.5 °C) 98 %      MAP       --         Physical Exam    Constitutional: Vital signs are normal. She appears well-developed and well-nourished.  Non-toxic appearance. She does not have a sickly appearance. She does not appear ill.   HENT:   Head: Normocephalic and atraumatic. Mouth/Throat: Mucous membranes are normal.   Eyes: EOM are normal.   Neck: Neck supple.   Cardiovascular:  Normal rate and regular rhythm.           Pulmonary/Chest: No respiratory distress. She has no wheezes. She has no rhonchi. She has no rales.   Abdominal: Abdomen is soft. Bowel sounds are normal. She exhibits no distension. There is abdominal tenderness (suprapubic tenderness). There is no rebound and no guarding.   Musculoskeletal:      Cervical back: Neck supple.     Neurological: She is alert.   Skin: Skin is warm and dry. No rash noted.   Psychiatric: She has a normal mood and affect.         ED Course   Procedures  Labs Reviewed   COMPREHENSIVE METABOLIC PANEL - Abnormal       Result Value    Sodium 145      Potassium 4.0      Chloride 104      CO2 26      Glucose 141 (*)     BUN 14      Creatinine 0.6      Calcium 8.9      Protein Total 7.4      Albumin 3.8      Bilirubin Total 0.5      ALP 70      AST 20      ALT 14      Anion Gap 15      eGFR >60     URINALYSIS, REFLEX TO URINE CULTURE - Abnormal    Color, UA Yellow      Appearance, UA Clear      pH, UA 7.0      Spec Grav UA 1.020      Protein, UA Trace (*)     Glucose, UA Negative      Ketones, UA Negative      Bilirubin, UA Negative      Blood, UA Negative      Nitrites, UA Negative      Urobilinogen, UA 2.0-3.0 (*)     Leukocyte Esterase, UA Negative     CBC WITH DIFFERENTIAL - Abnormal    WBC 15.22 (*)     RBC 5.27      HGB 16.9 (*)     HCT 50.7 (*)     MCV 96      MCH 32.1 (*)     MCHC 33.3      RDW 13.2      Platelet Count 268      MPV 9.2      Nucleated RBC 0      Neut % 87.4 (*)     Lymph % 7.2 (*)     Mono % 4.5      Eos % 0.1      Basophil %  0.4      Imm Grans % 0.4      Neut # 13.30 (*)     Lymph # 1.10      Mono # 0.69      Eos # 0.01      Baso # 0.06      Imm Grans # 0.06 (*)    LIPASE - Normal    Lipase Level 4     CBC W/ AUTO DIFFERENTIAL    Narrative:     The following orders were created for panel order CBC auto differential.  Procedure                               Abnormality         Status                     ---------                               -----------         ------                     CBC with Differential[[id number]]       Abnormal            Final result                 Please view results for these tests on the individual orders.   GREY TOP URINE HOLD    Extra Tube Hold for add-ons.            Imaging Results              CT Abdomen Pelvis With IV Contrast NO Oral Contrast (Final result)  Result time 06/09/25 05:16:40      Final result by Remedios Mtz MD (06/09/25 05:16:40)                   Impression:    IMPRESSION:  Segmentally dilated small bowel loops in the right pelvis with whirled appearance of the mesentery suspicious for internal hernia or volvulus.    Marked fluid distention of the proximal stomach with question of possible gastric luminal narrowing in the mid body.    Small amount of free fluid in the abdomen.    -Electronically Signed By: Remedios Mtz MD   -Electronically Signed On:  6/9/2025 5:16 AM      Report Ends               Narrative:    EXAM: CT ABDOMEN PELVIS WITH IV CONTRAST    HISTORY: 69 years -old Female with Abdominal pain, acute, nonlocalized    TECHNIQUE: Spiral CT of the abdomen and pelvis were performed with intravenous  contrast. Non-ionic intravenous contrast was utilized. All CT scans are performed using dose optimization techniques as appropriate to a performed exam including automated exposure control and/or standardized protocols for targeted exam where dose is matched to indications/reason for exam/patient size.    COMPARISON: There are no existing relevant studies available for  comparison at this time    FINDINGS:        Lung bases: Unremarkable.    Liver: Unremarkable    Biliary system: Unremarkable    Spleen: Unremarkable    Pancreas: Unremarkable    Stomach and small bowel: Segmentally dilated small bowel loops are present in the right pelvis. There is question of mild mural edema. There is a whirled appearance of the mesentery in the lower abdominal region demonstrated on images 8296 of series 601. Findings are suspicious for internal hernia or 5 view left. There is marked fluid distention of the proximal stomach. There is question possible gastric luminal narrowing in the mid body seen on image 31 of series 2.     Colon: The appendix is unremarkable. Moderate amount of fecal material is present in the right colon.    Retroperitoneum, mesentery, omentum, peritoneum: Small amount of free fluid is present in the abdomen..    Adrenal glands: Unremarkable    Kidneys: Unremarkable    Urinary bladder: Unremarkable    Reproductive organs:  There has been a hysterectomy.    Abdominal wall: Unremarkable    Musculoskeletal: Unremarkable     #CRITICAL# COMMUNICATION: I discussed suspicion for right lower quadrant internal hernia involving segment of small bowel and possible mid gastric body lesion with Dr. BEBO CASTAÑEDA at   5:15 AM  on 06/09/2025 by  telephone. The healthcare provider acknowledged receipt and understanding of the findings.                                       Medications   LIDOcaine HCl 2% urojet (has no administration in time range)   ketorolac injection 15 mg (has no administration in time range)   midazolam (PF) (VERSED) 1 mg/mL injection 0.5 mg (has no administration in time range)   ondansetron injection 4 mg (4 mg Intravenous Given 6/9/25 0225)   morphine injection 4 mg (4 mg Intravenous Given 6/9/25 0256)   pantoprazole injection 40 mg (40 mg Intravenous Given 6/9/25 0227)   lactated ringers bolus 1,000 mL (1,000 mLs Intravenous New Bag 6/9/25 0258)   iohexoL  (OMNIPAQUE 350) injection 75 mL (75 mLs Intravenous Given 6/9/25 0338)     Medical Decision Making  Amount and/or Complexity of Data Reviewed  Labs: ordered.  Radiology: ordered.    Risk  Prescription drug management.    Vitals unremarkable   Patient appears to be in some discomfort and is actively dry heaving on exam  Differential includes but not limited to bowel obstruction, gastroparesis, volvulus cholecystitis, pancreatitis, appendicitis, diverticulitis, gastritis, colitis, gastroenteritis, constipation, cystitis  Given 4 mg of IV Zofran and 4 mg of IV morphine with improvement in her pain  Given 40 mg of IV Protonix   Basic labs reviewed and remarkable for WBC of 15  Lipase 4  UA unremarkable  Shows findings concerning for a potential volvulus versus internal hernia in the right lower quadrant; surgery evaluated the imaging and feels the patient more likely has a ileus   Recommends NG tube place him in admission to hospital medicine for conservative management and repeat imaging  Patient admitted to Hospital Medicine                                  Clinical Impression:  Final diagnoses:  [R10.9] Abdominal pain, unspecified abdominal location (Primary)  [R11.2] Nausea and vomiting, unspecified vomiting type                       [1]   Social History  Tobacco Use    Smoking status: Every Day     Current packs/day: 0.75     Average packs/day: 0.8 packs/day for 40.0 years (30.0 ttl pk-yrs)     Types: Cigarettes    Smokeless tobacco: Never   Substance Use Topics    Alcohol use: Yes     Comment: rarely    Drug use: Yes     Frequency: 14.0 times per week     Types: Marijuana        Caleb Acosta MD  06/09/25 0562

## 2025-06-09 NOTE — PLAN OF CARE
[facility] [place] - Telemetry  Initial Discharge Assessment       Primary Care Provider: Fabienne Erwin NP    Admission Diagnosis: Abdominal pain [R10.9]  Abdominal pain, unspecified abdominal location [R10.9]  Nausea and vomiting, unspecified vomiting type [R11.2]    Admission Date: 6/9/2025  Expected Discharge Date:     Transition of Care Barriers: None    Payor: [employer] [facility] / Plan: [employer] CHOICES / Product Type: Medicare Advantage /     Extended Emergency Contact Information  Primary Emergency Contact: Mynor Ramirez   [place]  Mobile Phone: [phone]  Relation: Son  Secondary Emergency Contact: ManishaCarmina   [facility]  Mobile Phone: [phone]  Relation: Sister    Discharge Plan A: Home with family  Discharge Plan B: Home with family      [facility] [place] [facility] [address]  [address] [address]  Phone: [phone] Fax: [phone]      CM met with patient at bedside to discuss discharge planning. Patient is a readmit. Patient is independent, reside with her son, James and does not use any DME at home. Patient is not current with any HH/CM services.     Patient son will provide needed support and transportation home upon discharge.    CM will continue to follow patient for CM needs.     Initial Assessment (most recent)       Adult Discharge Assessment - 06/09/25 1523          Discharge Assessment    Assessment Type Discharge Planning Assessment     Confirmed/corrected address, phone number and insurance Yes     Confirmed Demographics Correct on Facesheet     Source of Information patient     Communicated STUART with patient/caregiver Yes     People in Home child(kalli), adult     Name(s) of People in Home Mynor Ramirez (Son)  [phone] (Mobile)     Facility Arrived From: Home     Do you expect to return to your current living situation? Yes     Do you have help at home or someone to help you manage your care at  home? Yes     Who are your caregiver(s) and their phone number(s)? Mynor Ramirez (Son)  [phone] (Mobile)     Prior to hospitilization cognitive status: Alert/Oriented     Current cognitive status: Alert/Oriented     Walking or Climbing Stairs Difficulty no     Dressing/Bathing Difficulty no     Equipment Currently Used at Home none     Readmission within 30 days? Yes     Patient currently being followed by outpatient case management? No     Do you currently have service(s) that help you manage your care at home? No     Do you take prescription medications? Yes     Do you have prescription coverage? Yes     Do you have any problems affording any of your prescribed medications? No     Is the patient taking medications as prescribed? yes     How do you get to doctors appointments? car, drives self     Are you on dialysis? No     Do you take coumadin? No     Discharge Plan A Home with family     Discharge Plan B Home with family     DME Needed Upon Discharge  none     Discharge Plan discussed with: Patient     Transition of Care Barriers None     SDOH --   None       Physical Activity    On average, how many days per week do you engage in moderate to strenuous exercise (like a brisk walk)? 0 days     On average, how many minutes do you engage in exercise at this level? 0 min        Financial Resource Strain    How hard is it for you to pay for the very basics like food, housing, medical care, and heating? Not hard at all        Housing Stability    In the last 12 months, was there a time when you were not able to pay the mortgage or rent on time? No     At any time in the past 12 months, were you homeless or living in a shelter (including now)? No        Transportation Needs    In the past 12 months, has lack of transportation kept you from medical appointments or from getting medications? No     In the past 12 months, has lack of transportation kept you from meetings, work, or from getting things needed for daily  living? No        Food Insecurity    Within the past 12 months, you worried that your food would run out before you got the money to buy more. Never true     Within the past 12 months, the food you bought just didn't last and you didn't have money to get more. Never true        Stress    Do you feel stress - tense, restless, nervous, or anxious, or unable to sleep at night because your mind is troubled all the time - these days? Very much        Social Isolation    How often do you feel lonely or isolated from those around you?  Never        Alcohol Use    Q1: How often do you have a drink containing alcohol? Monthly or less     Q2: How many drinks containing alcohol do you have on a typical day when you are drinking? 1 or 2     Q3: How often do you have six or more drinks on one occasion? Never                     Readmission Assessment (most recent)       Readmission Assessment - 06/09/25 1526          Readmission    Was this a planned readmission? No     Why were you hospitalized in the last 30 days? OP Procedure     Why were you readmitted? New medical problem     When you left the hospital how did you feel? Well     When you left the hospital where did you go? Assisted Living     Did patient/caregiver refused recommended DC plan? No     Did you try to manage your symptoms your self? No     Did you call anyone? Yes     Who did you call? Emergency Room     Did you try to see or did see a doctor or nurse before you came? No

## 2025-06-09 NOTE — NURSING
[facility] [place]  Nurses Note -- 4 Eyes      6/9/2025       Skin assessed on: Q Shift      [x] No Pressure Injuries Present    []Prevention Measures Documented    [] Yes LDA  for Pressure Injury Previously documented     [] Yes New Pressure Injury Discovered   [] LDA for New Pressure Injury Added      Attending RN:  Kim Trent, VALERIE     Second PCT: Sasha

## 2025-06-09 NOTE — ED TRIAGE NOTES
Pt to ED via EMS c/o abd pain w/ constipation. States that she has tried several OTC stool softeners and laxatives with minilmal relief. Tenderness noted around periumbilical region of stomach. Rates pain 10/10.

## 2025-06-09 NOTE — H&P
[facility] Medicine  History & Physical    Patient Name: Patricia Ramirez  MRN: [id number]  Patient Class: IP- Inpatient  Admission Date: 6/9/2025  Attending Physician: Ninfa Mchugh, Loyda   Primary Care Provider: Fabienne Erwin NP         Patient information was obtained from patient and ER records.     Subjective:     Principal Problem:SBO (small bowel obstruction)    Chief Complaint:   Chief Complaint   Patient presents with    Abdominal Pain     BIB [facility] for lower abd pain w/ constipation. Pt was seen here yesterday for the same c/c, was dxed and reports no relief. Has appt schedule on 7/1 for endoscopy.         HPI: 69-year-old female with a history of anxiety, depression, hypertension, management of a recent questionable small bowel obstruction that resolved with medical management presents to ED for evaluation of severe lower abdominal pain that she notes has been going on for the past several days.  She notes the pain has been progressively worsening and is currently a 10/10. Did not had BM for 4 days, She was evaluated here yesterday and felt better after some treatments and was ultimately discharged.  She was advised to come back if she felt worse and her symptoms have been worsening.  She notes she feels worse now than when she came in the 1st time.  She notes she has had numerous episodes of nausea and dry heaving.  .  She notes she typically has a regular.  Was admitted here last month following a similar presentation and was noted to have significant gastric distention concerning for gastroparesis as well as some small bowel findings concerning for possible SBO.  Admitted and managed conservatively with resolution of her symptoms.  She denies any fevers, chills, blood in her stool, chest pain, shortness of breath, vaginal bleeding, vaginal discharge.   CT abdomen,. Show,Segmentally dilated small bowel loops in the right pelvis with whirled appearance of the mesentery  suspicious for internal hernia or volvulus. NG is placed and surgery is consulted.    Past Medical History:   Diagnosis Date    Allergy     Anxiety     Arthritis     Cataract     Depressive disorder, not elsewhere classified     Esophageal reflux     Hypertension     Impaired fasting glucose     Joint pain     Obesity, unspecified     Other and unspecified hyperlipidemia        Past Surgical History:   Procedure Laterality Date    BLEPHAROPLASTY Bilateral 2021    Procedure: BLEPHAROPLASTY;  Surgeon: Maite Acuna MD;  Location: [facility];  Service: Ophthalmology;  Laterality: Bilateral;     SECTION  1973    CHOLECYSTECTOMY      COLONOSCOPY N/A 2023    Procedure: COLONOSCOPY;  Surgeon: Briana Sterling MD;  Location: [facility];  Service: Endoscopy;  Laterality: N/A;    ERCP N/A 10/07/2024    Procedure: ERCP (ENDOSCOPIC RETROGRADE CHOLANGIOPANCREATOGRAPHY);  Surgeon: Catracho Mitchell MD;  Location: [facility] ([address] [place] [address]);  Service: Endoscopy;  Laterality: N/A;    ERCP N/A 2024    Procedure: ERCP (ENDOSCOPIC RETROGRADE CHOLANGIOPANCREATOGRAPHY);  Surgeon: Romeo Roger MD;  Location: [facility];  Service: Endoscopy;  Laterality: N/A;  12/3/24: instructions sent via email-GD   SWP PRECALL COMPLETED-RT    ERCP N/A 2025    Procedure: ERCP (ENDOSCOPIC RETROGRADE CHOLANGIOPANCREATOGRAPHY);  Surgeon: Shannon Chaney MD;  Location: [facility];  Service: Endoscopy;  Laterality: N/A;   portal-Repeat ERCP in 6 weeks to remove stent.nicola -tt   SWP-PRECALL COMPLETE-RT   r/s updated portal instr-pt stated any MD-tt    ERCP N/A 2025    Procedure: ERCP (ENDOSCOPIC RETROGRADE CHOLANGIOPANCREATOGRAPHY);  Surgeon: Shannon Chaney MD;  Location: [facility];  Service: Endoscopy;  Laterality: N/A;  Per Dr. Chaney- Repeat ERCP in 3 months to remove covered metal                          biliary stent and hopefully for final clearance of                          the CBD.      3/21/25-Pt no longer taking Eliquis, instr portal-DS  4/24/25-LVM     ESOPHAGOGASTRODUODENOSCOPY N/A 11/21/2024    Procedure: EGD (ESOPHAGOGASTRODUODENOSCOPY);  Surgeon: Angie Alatorre MD;  Location: [facility] ENDO;  Service: Gastroenterology;  Laterality: N/A;    ESOPHAGOGASTRODUODENOSCOPY N/A 5/23/2025    Procedure: EGD (ESOPHAGOGASTRODUODENOSCOPY);  Surgeon: Mak Michael MD;  Location: [facility] ENDO;  Service: Gastroenterology;  Laterality: N/A;    HYSTERECTOMY      without BSO    INTRAOCULAR PROSTHESES INSERTION Left 10/27/2022    Procedure: INSERTION, IOL PROSTHESIS;  Surgeon: Palmer Berrios MD;  Location: [facility] OR;  Service: Ophthalmology;  Laterality: Left;    PHACOEMULSIFICATION OF CATARACT Left 10/27/2022    Procedure: PHACOEMULSIFICATION, CATARACT;  Surgeon: Palmer Berrios MD;  Location: [facility] OR;  Service: Ophthalmology;  Laterality: Left;  RN PHONE PREOP 10/21/2022   ARRIVAL 9:00 AM    R knee replacement      REPAIR OF RETINAL DETACHMENT WITH VITRECTOMY Left 02/28/2022    Procedure: REPAIR, RETINAL DETACHMENT, WITH VITRECTOMY;  Surgeon: MINO Martinez MD;  Location: [address];  Service: Ophthalmology;  Laterality: Left;  Spoke with SID Garcia. Pt was instructed nothing to eat after 8am and to arrive at 2pm for preop. 430pm case start request.    right  arm  surgery      ROBOT-ASSISTED CHOLECYSTECTOMY USING DA GENO XI N/A 10/09/2024    Procedure: XI ROBOTIC SUB TOTAL CHOLECYSTECTOMY; DRAIN PLACEMENT;  Surgeon: Rigoberto Ponce MD;  Location: [facility] OR;  Service: General;  Laterality: N/A;       Review of patient's allergies indicates:   Allergen Reactions    Sulfa (sulfonamide antibiotics) Hives    Ace inhibitors Other (See Comments)     Cough         Current Facility-Administered Medications on File Prior to Encounter   Medication    cyclopentolate 1% ophthalmic solution 1 drop    ofloxacin 0.3 % ophthalmic solution 1 drop    sodium chloride 0.9% flush 10 mL     Current Outpatient  Medications on File Prior to Encounter   Medication Sig    ALPRAZolam (XANAX) 0.25 MG tablet TAKE 1 TABLET BY MOUTH THREE TIMES DAILY    atorvastatin (LIPITOR) 40 MG tablet Take 1 tablet by mouth once daily    busPIRone (BUSPAR) 15 MG tablet TAKE 1 TABLET BY MOUTH THREE TIMES DAILY    citalopram (CELEXA) 20 MG tablet Take 3 tablets (60 mg total) by mouth once daily. Take 1 tablet by mouth once daily (Patient taking differently: Take 60 mg by mouth 3 (three) times daily. Take 1 tablet by mouth TID daily)    cyclobenzaprine (FLEXERIL) 5 MG tablet TAKE 1 TABLET BY MOUTH TWICE DAILY AS NEEDED FOR MUSCLE SPASM    olmesartan (BENICAR) 20 MG tablet TAKE 1 TABLET BY MOUTH ONCE DAILY IN THE EVENING    pantoprazole (PROTONIX) 40 MG tablet Take 1 tablet (40 mg total) by mouth once daily.    polyethylene glycol (GLYCOLAX) 17 gram PwPk Take 17 g by mouth once daily.    traZODone (DESYREL) 100 MG tablet Take 100 mg by mouth every evening.    acetaminophen (TYLENOL) 500 MG tablet Take 500 mg by mouth every 6 (six) hours as needed for Pain.    ergocalciferol (ERGOCALCIFEROL) 50,000 unit Cap Take 1 capsule (50,000 Units total) by mouth every 7 days.    ondansetron (ZOFRAN) 4 MG tablet Take 1 tablet (4 mg total) by mouth every 8 (eight) hours as needed.    polyethylene glycol (COLYTE) 240-22.72-6.72 -5.84 gram SolR Take as directed by provider office     Family History       Problem Relation (Age of Onset)    Cancer Mother    Cervical cancer Sister    Liver disease Father    Thyroid disease Sister    Tremor Sister          Tobacco Use    Smoking status: Every Day     Current packs/day: 0.75     Average packs/day: 0.8 packs/day for 40.0 years (30.0 ttl pk-yrs)     Types: Cigarettes    Smokeless tobacco: Never   Substance and Sexual Activity    Alcohol use: Yes     Comment: rarely    Drug use: Yes     Frequency: 14.0 times per week     Types: Marijuana    Sexual activity: Yes     Partners: Male     Birth control/protection: See  Surgical Hx     Review of Systems   Constitutional:  Positive for activity change and appetite change.   HENT:  Negative for congestion and dental problem.    Eyes:  Negative for discharge.   Respiratory:  Negative for apnea and chest tightness.    Gastrointestinal:  Positive for abdominal pain and diarrhea. Negative for vomiting.   Genitourinary:  Negative for difficulty urinating and dyspareunia.   Musculoskeletal:  Negative for arthralgias and back pain.   Allergic/Immunologic: Negative for food allergies.   Neurological:  Positive for weakness.   Hematological:  Negative for adenopathy.   Psychiatric/Behavioral:  Negative for agitation and behavioral problems.      Objective:     Vital Signs (Most Recent):  Temp: 98.2 °F (36.8 °C) (06/09/25 0822)  Pulse: 65 (06/09/25 0822)  Resp: 18 (06/09/25 0822)  BP: (!) 162/71 (06/09/25 0822)  SpO2: 97 % (06/09/25 0822) Vital Signs (24h Range):  Temp:  [97.7 °F (36.5 °C)-98.2 °F (36.8 °C)] 98.2 °F (36.8 °C)  Pulse:  [62-84] 65  Resp:  [17-20] 18  SpO2:  [95 %-99 %] 97 %  BP: (160-210)/(71-91) 162/71     Weight: 59 kg (130 lb)  Body mass index is 19.77 kg/m².     Physical Exam  HENT:      Head: Normocephalic.      Right Ear: There is no impacted cerumen.      Mouth/Throat:      Mouth: Mucous membranes are dry.   Eyes:      Extraocular Movements: Extraocular movements intact.      Pupils: Pupils are equal, round, and reactive to light.   Cardiovascular:      Rate and Rhythm: Normal rate and regular rhythm.      Heart sounds: No murmur heard.  Pulmonary:      Effort: No respiratory distress.   Abdominal:      Tenderness: There is abdominal tenderness.   Musculoskeletal:         General: No swelling or deformity.      Cervical back: Normal range of motion and neck supple.   Skin:     Coloration: Skin is not jaundiced.   Neurological:      Mental Status: She is alert and oriented to person, place, and time.   Psychiatric:         Mood and Affect: Mood normal.         Behavior:  Behavior normal.              CRANIAL NERVES     CN III, IV, VI   Pupils are equal, round, and reactive to light.       Significant Labs: All pertinent labs within the past 24 hours have been reviewed.  BMP:   Recent Labs   Lab 06/09/25  0220   *      K 4.0      CO2 26   BUN 14   CREATININE 0.6   CALCIUM 8.9     CBC:   Recent Labs   Lab 06/08/25  0755 06/09/25  0220   WBC 11.85 15.22*   HGB 16.0 16.9*   HCT 50.0* 50.7*   PLT  --  268     CMP:   Recent Labs   Lab 06/08/25  1005 06/09/25  0220    145   K 4.3 4.0    104   CO2 25 26   * 141*   BUN 11 14   CREATININE 0.6 0.6   CALCIUM 8.6* 8.9   PROT 7.0 7.4   ALBUMIN 3.6 3.8   BILITOT 0.4 0.5   ALKPHOS 69 70   AST 16 20   ALT 14 14   ANIONGAP 12 15       Significant Imaging: I have reviewed all pertinent imaging results/findings within the past 24 hours.  Assessment/Plan:     Assessment & Plan  SBO (small bowel obstruction)    CT abdomen,. Show,Segmentally dilated small bowel loops in the right pelvis with whirled appearance of the mesentery suspicious for internal hernia or volvulus. NG is placed and surgery is consulted.  Started on IVF ,remains on intermittent suction.possible gastrographin  in AM.  Essential hypertension  Patient's blood pressure range in the last 24 hours was: BP  Min: 160/80  Max: 210/89.The patient's inpatient anti-hypertensive regimen is listed below:  Current Antihypertensives  hydrALAZINE injection 20 mg, Every 4 hours PRN, Intravenous  cloNIDine 0.3 mg/24 hr td ptwk 1 patch, Every 7 days, Transdermal    P  Hyperlipidemia  Was on statin at home, NPO at this time.    Depression with anxiety  Will monitor.      Heart failure with preserved ejection fraction  Watch fro fluid overload.    Moderate protein-calorie malnutrition  Nutrition consulted. Most recent weight and BMI monitored-     Measurements:  Wt Readings from Last 1 Encounters:   06/09/25 59 kg (130 lb)   Body mass index is 19.77 kg/m².    Patient  has been screened and assessed by RD.    Malnutrition Type:  Context:    Level:      Malnutrition Characteristic Summary:       Interventions/Recommendations (treatment strategy):       VTE Risk Mitigation (From admission, onward)      None                            Ninfa Mchugh MD  Department of [facility] Medicine  [facility]

## 2025-06-09 NOTE — ASSESSMENT & PLAN NOTE
Patient's blood pressure range in the last 24 hours was: BP  Min: 160/80  Max: 210/89.The patient's inpatient anti-hypertensive regimen is listed below:  Current Antihypertensives  hydrALAZINE injection 20 mg, Every 4 hours PRN, Intravenous  cloNIDine 0.3 mg/24 hr td ptwk 1 patch, Every 7 days, Transdermal    P

## 2025-06-09 NOTE — ASSESSMENT & PLAN NOTE
CT abdomen,. Show,Segmentally dilated small bowel loops in the right pelvis with whirled appearance of the mesentery suspicious for internal hernia or volvulus. NG is placed and surgery is consulted.  Started on IVF ,remains on intermittent suction.possible gastrographin  in AM.

## 2025-06-09 NOTE — NURSING
Upon arrival to floor from ED: patient oriented to room, NGT in place, call bell in reach and bed in lowest position. Denies pain or discomfort at this time. No apparent distress noted.

## 2025-06-09 NOTE — SUBJECTIVE & OBJECTIVE
Past Medical History:   Diagnosis Date    Allergy     Anxiety     Arthritis     Cataract     Depressive disorder, not elsewhere classified     Esophageal reflux     Hypertension     Impaired fasting glucose     Joint pain     Obesity, unspecified     Other and unspecified hyperlipidemia        Past Surgical History:   Procedure Laterality Date    BLEPHAROPLASTY Bilateral 2021    Procedure: BLEPHAROPLASTY;  Surgeon: Maite Acuna MD;  Location: [facility];  Service: Ophthalmology;  Laterality: Bilateral;     SECTION  1973    CHOLECYSTECTOMY      COLONOSCOPY N/A 2023    Procedure: COLONOSCOPY;  Surgeon: Briana Sterling MD;  Location: [facility];  Service: Endoscopy;  Laterality: N/A;    ERCP N/A 10/07/2024    Procedure: ERCP (ENDOSCOPIC RETROGRADE CHOLANGIOPANCREATOGRAPHY);  Surgeon: Catracho Mitchell MD;  Location: [address]);  Service: Endoscopy;  Laterality: N/A;    ERCP N/A 2024    Procedure: ERCP (ENDOSCOPIC RETROGRADE CHOLANGIOPANCREATOGRAPHY);  Surgeon: Romeo Roger MD;  Location: [facility];  Service: Endoscopy;  Laterality: N/A;  12/3/24: instructions sent via email-GD   SWP PRECALL COMPLETED-RT    ERCP N/A 2025    Procedure: ERCP (ENDOSCOPIC RETROGRADE CHOLANGIOPANCREATOGRAPHY);  Surgeon: Shannon Chaney MD;  Location: [facility];  Service: Endoscopy;  Laterality: N/A;   portal-Repeat ERCP in 6 weeks to remove stent.nicola -tt   SWP-PRECALL COMPLETE-RT   r/s updated portal instr-pt stated any MD-tt    ERCP N/A 2025    Procedure: ERCP (ENDOSCOPIC RETROGRADE CHOLANGIOPANCREATOGRAPHY);  Surgeon: Shannon Chaney MD;  Location: [facility];  Service: Endoscopy;  Laterality: N/A;  Per Dr. Chaney- Repeat ERCP in 3 months to remove covered metal                          biliary stent and hopefully for final clearance of                          the CBD.     3/21/25-Pt no longer taking Eliquis, instr portal-DS  25-LVM      ESOPHAGOGASTRODUODENOSCOPY N/A 11/21/2024    Procedure: EGD (ESOPHAGOGASTRODUODENOSCOPY);  Surgeon: Angie Alatorre MD;  Location: [facility] ENDO;  Service: Gastroenterology;  Laterality: N/A;    ESOPHAGOGASTRODUODENOSCOPY N/A 5/23/2025    Procedure: EGD (ESOPHAGOGASTRODUODENOSCOPY);  Surgeon: Mak Michael MD;  Location: [facility] ENDO;  Service: Gastroenterology;  Laterality: N/A;    HYSTERECTOMY      without BSO    INTRAOCULAR PROSTHESES INSERTION Left 10/27/2022    Procedure: INSERTION, IOL PROSTHESIS;  Surgeon: Palmer Berrios MD;  Location: [facility] OR;  Service: Ophthalmology;  Laterality: Left;    PHACOEMULSIFICATION OF CATARACT Left 10/27/2022    Procedure: PHACOEMULSIFICATION, CATARACT;  Surgeon: Palmer Berrios MD;  Location: [facility] OR;  Service: Ophthalmology;  Laterality: Left;  RN PHONE PREOP 10/21/2022   ARRIVAL 9:00 AM    R knee replacement      REPAIR OF RETINAL DETACHMENT WITH VITRECTOMY Left 02/28/2022    Procedure: REPAIR, RETINAL DETACHMENT, WITH VITRECTOMY;  Surgeon: MINO Martinez MD;  Location: [address];  Service: Ophthalmology;  Laterality: Left;  Spoke with SID Garcia. Pt was instructed nothing to eat after 8am and to arrive at 2pm for preop. 430pm case start request.    right  arm  surgery      ROBOT-ASSISTED CHOLECYSTECTOMY USING DA GENO XI N/A 10/09/2024    Procedure: XI ROBOTIC SUB TOTAL CHOLECYSTECTOMY; DRAIN PLACEMENT;  Surgeon: Rigoberto Ponce MD;  Location: [facility] OR;  Service: General;  Laterality: N/A;       Review of patient's allergies indicates:   Allergen Reactions    Sulfa (sulfonamide antibiotics) Hives    Ace inhibitors Other (See Comments)     Cough         Current Facility-Administered Medications on File Prior to Encounter   Medication    cyclopentolate 1% ophthalmic solution 1 drop    ofloxacin 0.3 % ophthalmic solution 1 drop    sodium chloride 0.9% flush 10 mL     Current Outpatient Medications on File Prior to Encounter   Medication Sig    ALPRAZolam  (XANAX) 0.25 MG tablet TAKE 1 TABLET BY MOUTH THREE TIMES DAILY    atorvastatin (LIPITOR) 40 MG tablet Take 1 tablet by mouth once daily    busPIRone (BUSPAR) 15 MG tablet TAKE 1 TABLET BY MOUTH THREE TIMES DAILY    citalopram (CELEXA) 20 MG tablet Take 3 tablets (60 mg total) by mouth once daily. Take 1 tablet by mouth once daily (Patient taking differently: Take 60 mg by mouth 3 (three) times daily. Take 1 tablet by mouth TID daily)    cyclobenzaprine (FLEXERIL) 5 MG tablet TAKE 1 TABLET BY MOUTH TWICE DAILY AS NEEDED FOR MUSCLE SPASM    olmesartan (BENICAR) 20 MG tablet TAKE 1 TABLET BY MOUTH ONCE DAILY IN THE EVENING    pantoprazole (PROTONIX) 40 MG tablet Take 1 tablet (40 mg total) by mouth once daily.    polyethylene glycol (GLYCOLAX) 17 gram PwPk Take 17 g by mouth once daily.    traZODone (DESYREL) 100 MG tablet Take 100 mg by mouth every evening.    acetaminophen (TYLENOL) 500 MG tablet Take 500 mg by mouth every 6 (six) hours as needed for Pain.    ergocalciferol (ERGOCALCIFEROL) 50,000 unit Cap Take 1 capsule (50,000 Units total) by mouth every 7 days.    ondansetron (ZOFRAN) 4 MG tablet Take 1 tablet (4 mg total) by mouth every 8 (eight) hours as needed.    polyethylene glycol (COLYTE) 240-22.72-6.72 -5.84 gram SolR Take as directed by provider office     Family History       Problem Relation (Age of Onset)    Cancer Mother    Cervical cancer Sister    Liver disease Father    Thyroid disease Sister    Tremor Sister          Tobacco Use    Smoking status: Every Day     Current packs/day: 0.75     Average packs/day: 0.8 packs/day for 40.0 years (30.0 ttl pk-yrs)     Types: Cigarettes    Smokeless tobacco: Never   Substance and Sexual Activity    Alcohol use: Yes     Comment: rarely    Drug use: Yes     Frequency: 14.0 times per week     Types: Marijuana    Sexual activity: Yes     Partners: Male     Birth control/protection: See Surgical Hx     Review of Systems   Constitutional:  Positive for activity  change and appetite change.   HENT:  Negative for congestion and dental problem.    Eyes:  Negative for discharge.   Respiratory:  Negative for apnea and chest tightness.    Gastrointestinal:  Positive for abdominal pain and diarrhea. Negative for vomiting.   Genitourinary:  Negative for difficulty urinating and dyspareunia.   Musculoskeletal:  Negative for arthralgias and back pain.   Allergic/Immunologic: Negative for food allergies.   Neurological:  Positive for weakness.   Hematological:  Negative for adenopathy.   Psychiatric/Behavioral:  Negative for agitation and behavioral problems.      Objective:     Vital Signs (Most Recent):  Temp: 98.2 °F (36.8 °C) (06/09/25 0822)  Pulse: 65 (06/09/25 0822)  Resp: 18 (06/09/25 0822)  BP: (!) 162/71 (06/09/25 0822)  SpO2: 97 % (06/09/25 0822) Vital Signs (24h Range):  Temp:  [97.7 °F (36.5 °C)-98.2 °F (36.8 °C)] 98.2 °F (36.8 °C)  Pulse:  [62-84] 65  Resp:  [17-20] 18  SpO2:  [95 %-99 %] 97 %  BP: (160-210)/(71-91) 162/71     Weight: 59 kg (130 lb)  Body mass index is 19.77 kg/m².     Physical Exam  HENT:      Head: Normocephalic.      Right Ear: There is no impacted cerumen.      Mouth/Throat:      Mouth: Mucous membranes are dry.   Eyes:      Extraocular Movements: Extraocular movements intact.      Pupils: Pupils are equal, round, and reactive to light.   Cardiovascular:      Rate and Rhythm: Normal rate and regular rhythm.      Heart sounds: No murmur heard.  Pulmonary:      Effort: No respiratory distress.   Abdominal:      Tenderness: There is abdominal tenderness.   Musculoskeletal:         General: No swelling or deformity.      Cervical back: Normal range of motion and neck supple.   Skin:     Coloration: Skin is not jaundiced.   Neurological:      Mental Status: She is alert and oriented to person, place, and time.   Psychiatric:         Mood and Affect: Mood normal.         Behavior: Behavior normal.              CRANIAL NERVES     CN III, IV, VI   Pupils are  equal, round, and reactive to light.       Significant Labs: All pertinent labs within the past 24 hours have been reviewed.  BMP:   Recent Labs   Lab 06/09/25  0220   *      K 4.0      CO2 26   BUN 14   CREATININE 0.6   CALCIUM 8.9     CBC:   Recent Labs   Lab 06/08/25  0755 06/09/25  0220   WBC 11.85 15.22*   HGB 16.0 16.9*   HCT 50.0* 50.7*   PLT  --  268     CMP:   Recent Labs   Lab 06/08/25  1005 06/09/25  0220    145   K 4.3 4.0    104   CO2 25 26   * 141*   BUN 11 14   CREATININE 0.6 0.6   CALCIUM 8.6* 8.9   PROT 7.0 7.4   ALBUMIN 3.6 3.8   BILITOT 0.4 0.5   ALKPHOS 69 70   AST 16 20   ALT 14 14   ANIONGAP 12 15       Significant Imaging: I have reviewed all pertinent imaging results/findings within the past 24 hours.

## 2025-06-09 NOTE — ASSESSMENT & PLAN NOTE
Nutrition consulted. Most recent weight and BMI monitored-     Measurements:  Wt Readings from Last 1 Encounters:   06/09/25 59 kg (130 lb)   Body mass index is 19.77 kg/m².    Patient has been screened and assessed by RD.    Malnutrition Type:  Context:    Level:      Malnutrition Characteristic Summary:       Interventions/Recommendations (treatment strategy):

## 2025-06-10 PROBLEM — D72.829 LEUKOCYTOSIS: Status: ACTIVE | Noted: 2025-06-10

## 2025-06-10 PROBLEM — E44.0 MODERATE PROTEIN-CALORIE MALNUTRITION: Chronic | Status: RESOLVED | Noted: 2024-10-09 | Resolved: 2025-06-10

## 2025-06-10 LAB
ABSOLUTE EOSINOPHIL (OHS): 0.05 K/UL
ABSOLUTE MONOCYTE (OHS): 1.21 K/UL (ref 0.3–1)
ABSOLUTE NEUTROPHIL COUNT (OHS): 12.42 K/UL (ref 1.8–7.7)
ALBUMIN SERPL BCP-MCNC: 3.1 G/DL (ref 3.5–5.2)
ALP SERPL-CCNC: 59 UNIT/L (ref 40–150)
ALT SERPL W/O P-5'-P-CCNC: 13 UNIT/L (ref 10–44)
ANION GAP (OHS): 9 MMOL/L (ref 8–16)
AST SERPL-CCNC: 14 UNIT/L (ref 11–45)
BASOPHILS # BLD AUTO: 0.07 K/UL
BASOPHILS NFR BLD AUTO: 0.5 %
BILIRUB SERPL-MCNC: 0.5 MG/DL (ref 0.1–1)
BUN SERPL-MCNC: 12 MG/DL (ref 8–23)
CALCIUM SERPL-MCNC: 8.2 MG/DL (ref 8.7–10.5)
CHLORIDE SERPL-SCNC: 108 MMOL/L (ref 95–110)
CO2 SERPL-SCNC: 26 MMOL/L (ref 23–29)
CREAT SERPL-MCNC: 0.5 MG/DL (ref 0.5–1.4)
ERYTHROCYTE [DISTWIDTH] IN BLOOD BY AUTOMATED COUNT: 13.3 % (ref 11.5–14.5)
GFR SERPLBLD CREATININE-BSD FMLA CKD-EPI: >60 ML/MIN/1.73/M2
GLUCOSE SERPL-MCNC: 139 MG/DL (ref 70–110)
HCT VFR BLD AUTO: 46.1 % (ref 37–48.5)
HGB BLD-MCNC: 15.1 GM/DL (ref 12–16)
IMM GRANULOCYTES # BLD AUTO: 0.07 K/UL (ref 0–0.04)
IMM GRANULOCYTES NFR BLD AUTO: 0.5 % (ref 0–0.5)
LYMPHOCYTES # BLD AUTO: 1.63 K/UL (ref 1–4.8)
MCH RBC QN AUTO: 31.9 PG (ref 27–31)
MCHC RBC AUTO-ENTMCNC: 32.8 G/DL (ref 32–36)
MCV RBC AUTO: 98 FL (ref 82–98)
NUCLEATED RBC (/100WBC) (OHS): 0 /100 WBC
OHS QRS DURATION: 132 MS
OHS QTC CALCULATION: 475 MS
PLATELET # BLD AUTO: 237 K/UL (ref 150–450)
PMV BLD AUTO: 9.1 FL (ref 9.2–12.9)
POTASSIUM SERPL-SCNC: 3.7 MMOL/L (ref 3.5–5.1)
PROT SERPL-MCNC: 6.3 GM/DL (ref 6–8.4)
RBC # BLD AUTO: 4.73 M/UL (ref 4–5.4)
RELATIVE EOSINOPHIL (OHS): 0.3 %
RELATIVE LYMPHOCYTE (OHS): 10.6 % (ref 18–48)
RELATIVE MONOCYTE (OHS): 7.8 % (ref 4–15)
RELATIVE NEUTROPHIL (OHS): 80.3 % (ref 38–73)
SODIUM SERPL-SCNC: 143 MMOL/L (ref 136–145)
WBC # BLD AUTO: 15.45 K/UL (ref 3.9–12.7)

## 2025-06-10 PROCEDURE — 85025 COMPLETE CBC W/AUTO DIFF WBC: CPT | Performed by: HOSPITALIST

## 2025-06-10 PROCEDURE — 80053 COMPREHEN METABOLIC PANEL: CPT | Performed by: HOSPITALIST

## 2025-06-10 PROCEDURE — 36415 COLL VENOUS BLD VENIPUNCTURE: CPT | Performed by: HOSPITALIST

## 2025-06-10 PROCEDURE — 25500020 PHARM REV CODE 255: Performed by: STUDENT IN AN ORGANIZED HEALTH CARE EDUCATION/TRAINING PROGRAM

## 2025-06-10 PROCEDURE — 63600175 PHARM REV CODE 636 W HCPCS: Performed by: HOSPITALIST

## 2025-06-10 PROCEDURE — 11000001 HC ACUTE MED/SURG PRIVATE ROOM

## 2025-06-10 RX ORDER — DIATRIZOATE MEGLUMINE AND DIATRIZOATE SODIUM 660; 100 MG/ML; MG/ML
120 SOLUTION ORAL; RECTAL
Status: COMPLETED | OUTPATIENT
Start: 2025-06-10 | End: 2025-06-10

## 2025-06-10 RX ADMIN — DEXTROSE AND SODIUM CHLORIDE: 5; 900 INJECTION, SOLUTION INTRAVENOUS at 04:06

## 2025-06-10 RX ADMIN — MORPHINE SULFATE 4 MG: 4 INJECTION INTRAVENOUS at 06:06

## 2025-06-10 RX ADMIN — DEXTROSE AND SODIUM CHLORIDE: 5; 900 INJECTION, SOLUTION INTRAVENOUS at 06:06

## 2025-06-10 RX ADMIN — MORPHINE SULFATE 4 MG: 4 INJECTION INTRAVENOUS at 05:06

## 2025-06-10 RX ADMIN — MORPHINE SULFATE 4 MG: 4 INJECTION INTRAVENOUS at 01:06

## 2025-06-10 RX ADMIN — HYDRALAZINE HYDROCHLORIDE 20 MG: 20 INJECTION INTRAMUSCULAR; INTRAVENOUS at 05:06

## 2025-06-10 RX ADMIN — HYDRALAZINE HYDROCHLORIDE 20 MG: 20 INJECTION INTRAMUSCULAR; INTRAVENOUS at 04:06

## 2025-06-10 RX ADMIN — DIATRIZOATE MEGLUMINE AND DIATRIZOATE SODIUM 120 ML: 600; 100 SOLUTION ORAL; RECTAL at 10:06

## 2025-06-10 RX ADMIN — MORPHINE SULFATE 4 MG: 4 INJECTION INTRAVENOUS at 12:06

## 2025-06-10 NOTE — PLAN OF CARE
Problem: Adult Inpatient Plan of Care  Goal: Plan of Care Review  Outcome: Progressing  Goal: Patient-Specific Goal (Individualized)  Outcome: Progressing  Goal: Absence of Hospital-Acquired Illness or Injury  Outcome: Progressing  Goal: Optimal Comfort and Wellbeing  Outcome: Progressing  Goal: Readiness for Transition of Care  Outcome: Progressing     Problem: Intestinal Obstruction  Goal: Optimal Bowel Function  Outcome: Progressing  Goal: Fluid and Electrolyte Balance  Outcome: Progressing  Goal: Absence of Infection Signs and Symptoms  Outcome: Progressing  Goal: Optimize Nutrition Status  Outcome: Progressing  Goal: Optimal Pain Control and Function  Outcome: Progressing     Problem: Hypertension Acute  Goal: Blood Pressure Within Desired Range  Outcome: Progressing

## 2025-06-10 NOTE — PROGRESS NOTES
Surgery Progress Note    Patricia Ramirez is a 69 y.o. year old female in hospital for abd pain, possible SBO    NAEON AF VSS  No abd pain or n/v  Reports still passing flatus, also belching but no BM  NGT with dark bilious output, 320cc overnight (500cc in cannister)  No f/c/sob/cp    WBC stable at 15    ROS:  Negative except above    PE:  Vitals:    06/10/25 0543 06/10/25 0628 06/10/25 0758 06/10/25 1044   BP:  (!) 163/76 (!) 161/69 (!) 162/77   BP Location:       Patient Position:       Pulse:  76 71 77   Resp: 18  19 18   Temp:   98.1 °F (36.7 °C) 98.4 °F (36.9 °C)   TempSrc:   Oral Oral   SpO2:   95% 95%   Weight:       Height:           NAD  NGT with dark bilious output  No belabored breathing  No skin changes  Abd soft nt nd    Significant Diagnostic Studies: Labs: BMP:   Recent Labs   Lab 06/09/25  0220 06/10/25  0549   * 139*    143   K 4.0 3.7    108   CO2 26 26   BUN 14 12   CREATININE 0.6 0.5   CALCIUM 8.9 8.2*   , CBC   Recent Labs   Lab 06/09/25  0220 06/10/25  0549   WBC 15.22* 15.45*   HGB 16.9* 15.1   HCT 50.7* 46.1    237   , and All labs within the past 24 hours have been reviewed  Radiology: CT scan: CT ABDOMEN PELVIS WITH CONTRAST:   Results for orders placed or performed during the hospital encounter of 06/09/25   CT Abdomen Pelvis With IV Contrast NO Oral Contrast    Narrative    EXAM: CT ABDOMEN PELVIS WITH IV CONTRAST    HISTORY: 69 years -old Female with Abdominal pain, acute, nonlocalized    TECHNIQUE: Spiral CT of the abdomen and pelvis were performed with intravenous  contrast. Non-ionic intravenous contrast was utilized. All CT scans are performed using dose optimization techniques as appropriate to a performed exam including automated exposure control and/or standardized protocols for targeted exam where dose is matched to indications/reason for exam/patient size.    COMPARISON: There are no existing relevant studies available for comparison at this  time    FINDINGS:        Lung bases: Unremarkable.    Liver: Unremarkable    Biliary system: Unremarkable    Spleen: Unremarkable    Pancreas: Unremarkable    Stomach and small bowel: Segmentally dilated small bowel loops are present in the right pelvis. There is question of mild mural edema. There is a whirled appearance of the mesentery in the lower abdominal region demonstrated on images 8296 of series 601. Findings are suspicious for internal hernia or 5 view left. There is marked fluid distention of the proximal stomach. There is question possible gastric luminal narrowing in the mid body seen on image 31 of series 2.     Colon: The appendix is unremarkable. Moderate amount of fecal material is present in the right colon.    Retroperitoneum, mesentery, omentum, peritoneum: Small amount of free fluid is present in the abdomen..    Adrenal glands: Unremarkable    Kidneys: Unremarkable    Urinary bladder: Unremarkable    Reproductive organs:  There has been a hysterectomy.    Abdominal wall: Unremarkable    Musculoskeletal: Unremarkable     #CRITICAL# COMMUNICATION: I discussed suspicion for right lower quadrant internal hernia involving segment of small bowel and possible mid gastric body lesion with Dr. BEBO CASTAÑEDA at   5:15 AM  on 06/09/2025 by  telephone. The healthcare provider acknowledged receipt and understanding of the findings.      Impression    IMPRESSION:  Segmentally dilated small bowel loops in the right pelvis with whirled appearance of the mesentery suspicious for internal hernia or volvulus.    Marked fluid distention of the proximal stomach with question of possible gastric luminal narrowing in the mid body.    Small amount of free fluid in the abdomen.    -Electronically Signed By: Remedios Mtz MD   -Electronically Signed On:  6/9/2025 5:16 AM      Report Ends    and CT ABDOMEN PELVIS WITHOUT CONTRAST: No results found for this visit on 06/09/25.    A/P:  Patricia Ramirez is a 69 y.o. year  old female  with with nausea, abdominal pain and obstipation prior to arrival.  She passed flatus (witnessed) in the ED with a benign clinical exam. Surgery consulted for SBO    - Gastrogaffin challenge today  - Keep NPO and mIVF  - Close monitoring with serial abd exams and further bowel function  - Trend leukocytosis     Roxann Lin  General Surgery - [facility]  6/10/2025

## 2025-06-10 NOTE — HOSPITAL COURSE
69-year-old female with a history of anxiety, depression, hypertension admitted on 06/09/2025 for small-bowel obstruction.  Presented with severe abdominal pain associated with nausea and constipation, no vomiting.  Recently admitted a month ago for similar presentation.  CT abdomen and pelvis with Segmentally dilated small bowel loops in the right pelvis with whirled appearance of the mesentery suspicious for internal hernia or volvulus.  NGT place.  General surgery consulted-continue NGT for small-bowel decompression.  Await for bowel function to return.  S/p Gastrografin challenge on 06/10, which shows persistent small bowel obstruction. Patient had BM on 06/11. Surgery with clamp trial on 06/12 and NGT was removed on 06/12. Diet advanced as tolerated. Patient able to tolerate diet without any abdominal pain, nausea, vomiting.  Discussed with General surgery, okay to discharge from surgical standpoint.  Recommend GI follow up.  Patient has colonoscopy scheduled outpatient July 1st per patient.  Encouraged to keep the appointment    Patient admits feeling better overall.  Continues to pass flatus.  Wants to go home. Pt denies any fever, headaches, vision changes, chest pain, shortness of breath, palpitations, abdominal pain, nausea, vomiting, or any new weaknesses. Feels ready to go home. Patient's exam on discharge was as follow: Patient is alert and oriented, appears in no acute distress, heart with regular rate and rhythm, lungs clear to asculation with non-labored breathing, abdomen soft, and no new weaknesses or focal deficits seen. Bilateral lower extremities without any edema or calf tenderness.     Patient was counseled regarding any abnormal labs, differential diagnosis, treatment options, risk-benefit, lifestyle changes, prognosis, current condition, and medications. Patient was interactive and attentive.  Patient's questions were answered in a respectful and timely manner. Patient was instructed to  follow-up with PCP within 1 week and to continue taking medications as prescribed.  Instructed to also follow up with Gastroenterology outpatient. Also, extensively discussed the risks, benefits, and side effects of patient's medications. Discussed with patient about any medication changes. Patient verbalized understanding and agrees to treatment plan.  Patient is stable for discharge.  Patient has no other questions or concerns at this time.  ED precautions discussed with the patient.    Vital signs are stable. Ambulating without any difficulty. Tolerating p.o. intake without any nausea or vomiting. Afebrile for over 24 hours. Patient is in stable condition and has no questions or concerns. Patient will be discharge to home once transportation secured . Prescriptions sent to pharmacy.  CM/SW to assist with discharge planning.     Vitals:    06/13/25 1134 06/13/25 1205 06/13/25 1211 06/13/25 1247   BP: (!) 177/80 (!) 172/68 (!) 172/72 (!) 153/67   BP Location:  Left arm Left arm Left arm   Patient Position:   Lying Lying   Pulse: 60      Resp: 18      Temp: 98.5 °F (36.9 °C)      TempSrc: Oral      SpO2: 97%      Weight:       Height:

## 2025-06-10 NOTE — PLAN OF CARE
Problem: Adult Inpatient Plan of Care  Goal: Plan of Care Review  Outcome: Progressing  Flowsheets (Taken 6/10/2025 1300)  Plan of Care Reviewed With: patient  Goal: Patient-Specific Goal (Individualized)  Outcome: Progressing     Problem: Hypertension Acute  Goal: Blood Pressure Within Desired Range  Outcome: Progressing  Intervention: Normalize Blood Pressure  Flowsheets (Taken 6/10/2025 0815)  Sensory Stimulation Regulation:   auditory stimulation minimized   lighting decreased   quiet environment promoted  Medication Review/Management: medications reviewed     Problem: Adult Inpatient Plan of Care  Goal: Plan of Care Review  Outcome: Progressing  Flowsheets (Taken 6/10/2025 1300)  Plan of Care Reviewed With: patient     Problem: Adult Inpatient Plan of Care  Goal: Plan of Care Review  Outcome: Progressing  Flowsheets (Taken 6/10/2025 1300)  Plan of Care Reviewed With: patient     Problem: Adult Inpatient Plan of Care  Goal: Patient-Specific Goal (Individualized)  Outcome: Progressing     Problem: Adult Inpatient Plan of Care  Goal: Patient-Specific Goal (Individualized)  Outcome: Progressing     Problem: Hypertension Acute  Goal: Blood Pressure Within Desired Range  Outcome: Progressing  Intervention: Normalize Blood Pressure  Flowsheets (Taken 6/10/2025 0815)  Sensory Stimulation Regulation:   auditory stimulation minimized   lighting decreased   quiet environment promoted  Medication Review/Management: medications reviewed     Problem: Hypertension Acute  Goal: Blood Pressure Within Desired Range  Outcome: Progressing     Problem: Hypertension Acute  Goal: Blood Pressure Within Desired Range  Intervention: Normalize Blood Pressure  Flowsheets (Taken 6/10/2025 0815)  Sensory Stimulation Regulation:   auditory stimulation minimized   lighting decreased   quiet environment promoted  Medication Review/Management: medications reviewed     Problem: Hypertension Acute  Goal: Blood Pressure Within Desired  Range  Intervention: Normalize Blood Pressure  Flowsheets (Taken 6/10/2025 1615)  Sensory Stimulation Regulation:   auditory stimulation minimized   lighting decreased   quiet environment promoted  Medication Review/Management: medications reviewed

## 2025-06-10 NOTE — SUBJECTIVE & OBJECTIVE
Interval History:  No acute overnight events.  Remained afebrile.  Complains of the NG tube is uncomfortable.  No further nausea or vomiting.  Mild abdominal pain, but improving.  Passing flatus, but still no bowel movement.    Review of Systems   Constitutional:  Negative for fever.   Respiratory:  Negative for cough and shortness of breath.    Cardiovascular:  Negative for chest pain.   Gastrointestinal:  Positive for abdominal pain (improving) and constipation. Negative for nausea and vomiting.     Objective:     Vital Signs (Most Recent):  Temp: 98.4 °F (36.9 °C) (06/10/25 1044)  Pulse: 77 (06/10/25 1044)  Resp: 18 (06/10/25 1341)  BP: (!) 162/77 (06/10/25 1044)  SpO2: 95 % (06/10/25 1044) Vital Signs (24h Range):  Temp:  [97.8 °F (36.6 °C)-98.4 °F (36.9 °C)] 98.4 °F (36.9 °C)  Pulse:  [66-77] 77  Resp:  [18-19] 18  SpO2:  [93 %-95 %] 95 %  BP: (161-189)/(69-83) 162/77     Weight: 59 kg (130 lb)  Body mass index is 19.77 kg/m².    Intake/Output Summary (Last 24 hours) at 6/10/2025 1410  Last data filed at 6/10/2025 0624  Gross per 24 hour   Intake --   Output 320 ml   Net -320 ml         Physical Exam  Vitals and nursing note reviewed.   Constitutional:       General: She is not in acute distress.  HENT:      Nose:      Comments: NG tube in place with bile output     Mouth/Throat:      Mouth: Mucous membranes are dry.   Cardiovascular:      Rate and Rhythm: Normal rate and regular rhythm.   Pulmonary:      Effort: Pulmonary effort is normal. No respiratory distress.      Breath sounds: Normal breath sounds.   Abdominal:      General: There is no distension.      Palpations: Abdomen is soft.      Tenderness: There is abdominal tenderness.   Musculoskeletal:         General: No swelling or tenderness.   Skin:     General: Skin is warm and dry.   Neurological:      General: No focal deficit present.      Mental Status: She is alert and oriented to person, place, and time.               Significant Labs: All  pertinent labs within the past 24 hours have been reviewed.    Significant Imaging: I have reviewed all pertinent imaging results/findings within the past 24 hours.

## 2025-06-10 NOTE — ASSESSMENT & PLAN NOTE
-suspect reactive.  No source of infection at this time.  Patient without fevers or tachycardia.  -continue to trend

## 2025-06-10 NOTE — ASSESSMENT & PLAN NOTE
-echocardiogram from 10/07/2024 with preserved EF of 55-60%, grade 1 diastolic dysfunction.  PASP of 35 mm Hg  -appears to be at baseline, appears euvolemic   -monitor

## 2025-06-10 NOTE — ASSESSMENT & PLAN NOTE
Patient's blood pressure range in the last 24 hours was: BP  Min: 161/69  Max: 189/83.The patient's inpatient anti-hypertensive regimen is listed below:  Current Antihypertensives  hydrALAZINE injection 20 mg, Every 4 hours PRN, Intravenous  cloNIDine 0.3 mg/24 hr td ptwk 1 patch, Every 7 days, Transdermal    Plan  P.r.n. hydralazine  Remained NPO at this time

## 2025-06-10 NOTE — NURSING
[facility] [place]  Nurses Note -- 4 Eyes      6/10/2025       Skin assessed on: Q Shift      [x] No Pressure Injuries Present    []Prevention Measures Documented    [] Yes LDA  for Pressure Injury Previously documented     [] Yes New Pressure Injury Discovered   [] LDA for New Pressure Injury Added      Attending RN:  Kim Trent RN     Second RN:  Jeanne

## 2025-06-10 NOTE — PROGRESS NOTES
[facility] Medicine  Progress Note    Patient Name: Patricia Ramirez  MRN: [id number]  Patient Class: IP- Inpatient   Admission Date: 6/9/2025  Length of Stay: 1 days  Attending Physician: Margarita Morris DO  Primary Care Provider: Fabienne Erwin NP        Subjective     Principal Problem:SBO (small bowel obstruction)        HPI:  69-year-old female with a history of anxiety, depression, hypertension, management of a recent questionable small bowel obstruction that resolved with medical management presents to ED for evaluation of severe lower abdominal pain that she notes has been going on for the past several days.  She notes the pain has been progressively worsening and is currently a 10/10. Did not had BM for 4 days, She was evaluated here yesterday and felt better after some treatments and was ultimately discharged.  She was advised to come back if she felt worse and her symptoms have been worsening.  She notes she feels worse now than when she came in the 1st time.  She notes she has had numerous episodes of nausea and dry heaving.  .  She notes she typically has a regular.  Was admitted here last month following a similar presentation and was noted to have significant gastric distention concerning for gastroparesis as well as some small bowel findings concerning for possible SBO.  Admitted and managed conservatively with resolution of her symptoms.  She denies any fevers, chills, blood in her stool, chest pain, shortness of breath, vaginal bleeding, vaginal discharge.   CT abdomen,. Show,Segmentally dilated small bowel loops in the right pelvis with whirled appearance of the mesentery suspicious for internal hernia or volvulus. NG is placed and surgery is consulted.    Overview/Hospital Course:  69-year-old female with a history of anxiety, depression, hypertension admitted on 06/09/2025 for small-bowel obstruction.  Presented with severe abdominal pain associated with nausea and  constipation, no vomiting.  Recently admitted a month ago for similar presentation.  CT abdomen and pelvis with Segmentally dilated small bowel loops in the right pelvis with whirled appearance of the mesentery suspicious for internal hernia or volvulus.  NGT place.  General surgery consulted-continue NGT for small-bowel decompression.  Await for bowel function to return.  Plan for Gastrografin challenge on 06/10    Interval History:  No acute overnight events.  Remained afebrile.  Complains of the NG tube is uncomfortable.  No further nausea or vomiting.  Mild abdominal pain, but improving.  Passing flatus, but still no bowel movement.    Review of Systems   Constitutional:  Negative for fever.   Respiratory:  Negative for cough and shortness of breath.    Cardiovascular:  Negative for chest pain.   Gastrointestinal:  Positive for abdominal pain (improving) and constipation. Negative for nausea and vomiting.     Objective:     Vital Signs (Most Recent):  Temp: 98.4 °F (36.9 °C) (06/10/25 1044)  Pulse: 77 (06/10/25 1044)  Resp: 18 (06/10/25 1341)  BP: (!) 162/77 (06/10/25 1044)  SpO2: 95 % (06/10/25 1044) Vital Signs (24h Range):  Temp:  [97.8 °F (36.6 °C)-98.4 °F (36.9 °C)] 98.4 °F (36.9 °C)  Pulse:  [66-77] 77  Resp:  [18-19] 18  SpO2:  [93 %-95 %] 95 %  BP: (161-189)/(69-83) 162/77     Weight: 59 kg (130 lb)  Body mass index is 19.77 kg/m².    Intake/Output Summary (Last 24 hours) at 6/10/2025 1410  Last data filed at 6/10/2025 0624  Gross per 24 hour   Intake --   Output 320 ml   Net -320 ml         Physical Exam  Vitals and nursing note reviewed.   Constitutional:       General: She is not in acute distress.  HENT:      Nose:      Comments: NG tube in place with bile output     Mouth/Throat:      Mouth: Mucous membranes are dry.   Cardiovascular:      Rate and Rhythm: Normal rate and regular rhythm.   Pulmonary:      Effort: Pulmonary effort is normal. No respiratory distress.      Breath sounds: Normal breath  sounds.   Abdominal:      General: There is no distension.      Palpations: Abdomen is soft.      Tenderness: There is abdominal tenderness.   Musculoskeletal:         General: No swelling or tenderness.   Skin:     General: Skin is warm and dry.   Neurological:      General: No focal deficit present.      Mental Status: She is alert and oriented to person, place, and time.               Significant Labs: All pertinent labs within the past 24 hours have been reviewed.    Significant Imaging: I have reviewed all pertinent imaging results/findings within the past 24 hours.      Assessment & Plan  SBO (small bowel obstruction)  - Presented with severe abdominal pain associated with nausea and constipation, no vomiting.  Recently admitted a month ago for similar presentation.    - CT abdomen and pelvis with Segmentally dilated small bowel loops in the right pelvis with whirled appearance of the mesentery suspicious for internal hernia or volvulus.    - Continue NGT.    - General surgery consulted: continue NGT for small-bowel decompression. Plan for Gastrografin challenge on 06/10  Essential hypertension  Patient's blood pressure range in the last 24 hours was: BP  Min: 161/69  Max: 189/83.The patient's inpatient anti-hypertensive regimen is listed below:  Current Antihypertensives  hydrALAZINE injection 20 mg, Every 4 hours PRN, Intravenous  cloNIDine 0.3 mg/24 hr td ptwk 1 patch, Every 7 days, Transdermal    Plan  P.r.n. hydralazine  Remained NPO at this time  Hyperlipidemia  Was on statin at home, NPO at this time.    Depression with anxiety  Will monitor.      Heart failure with preserved ejection fraction  -echocardiogram from 10/07/2024 with preserved EF of 55-60%, grade 1 diastolic dysfunction.  PASP of 35 mm Hg  -appears to be at baseline, appears euvolemic   -monitor  Leukocytosis  -suspect reactive.  No source of infection at this time.  Patient without fevers or tachycardia.  -continue to trend    VTE Risk  Mitigation (From admission, onward)      None            Discharge Planning   STUART: 6/12/2025     Code Status: Full Code   Medical Readiness for Discharge Date:   Discharge Plan A: Home with family                        Radha-Lisset Morris DO  Department of Hospital Medicine   [facility] - [facility]

## 2025-06-10 NOTE — ASSESSMENT & PLAN NOTE
- Presented with severe abdominal pain associated with nausea and constipation, no vomiting.  Recently admitted a month ago for similar presentation.    - CT abdomen and pelvis with Segmentally dilated small bowel loops in the right pelvis with whirled appearance of the mesentery suspicious for internal hernia or volvulus.    - Continue NGT.    - General surgery consulted: continue NGT for small-bowel decompression. Plan for Gastrografin challenge on 06/10

## 2025-06-11 PROBLEM — E87.6 HYPOKALEMIA: Status: ACTIVE | Noted: 2025-06-11

## 2025-06-11 LAB
ABSOLUTE EOSINOPHIL (OHS): 0.11 K/UL
ABSOLUTE MONOCYTE (OHS): 1.17 K/UL (ref 0.3–1)
ABSOLUTE NEUTROPHIL COUNT (OHS): 7.3 K/UL (ref 1.8–7.7)
ALBUMIN SERPL BCP-MCNC: 3.1 G/DL (ref 3.5–5.2)
ALP SERPL-CCNC: 60 UNIT/L (ref 40–150)
ALT SERPL W/O P-5'-P-CCNC: 17 UNIT/L (ref 10–44)
ANION GAP (OHS): 9 MMOL/L (ref 8–16)
AST SERPL-CCNC: 17 UNIT/L (ref 11–45)
BASOPHILS # BLD AUTO: 0.06 K/UL
BASOPHILS NFR BLD AUTO: 0.6 %
BILIRUB SERPL-MCNC: 0.8 MG/DL (ref 0.1–1)
BUN SERPL-MCNC: 16 MG/DL (ref 8–23)
CALCIUM SERPL-MCNC: 8.5 MG/DL (ref 8.7–10.5)
CHLORIDE SERPL-SCNC: 109 MMOL/L (ref 95–110)
CO2 SERPL-SCNC: 29 MMOL/L (ref 23–29)
CREAT SERPL-MCNC: 0.5 MG/DL (ref 0.5–1.4)
ERYTHROCYTE [DISTWIDTH] IN BLOOD BY AUTOMATED COUNT: 13.5 % (ref 11.5–14.5)
GFR SERPLBLD CREATININE-BSD FMLA CKD-EPI: >60 ML/MIN/1.73/M2
GLUCOSE SERPL-MCNC: 125 MG/DL (ref 70–110)
HCT VFR BLD AUTO: 46.1 % (ref 37–48.5)
HGB BLD-MCNC: 15 GM/DL (ref 12–16)
IMM GRANULOCYTES # BLD AUTO: 0.02 K/UL (ref 0–0.04)
IMM GRANULOCYTES NFR BLD AUTO: 0.2 % (ref 0–0.5)
LYMPHOCYTES # BLD AUTO: 1.41 K/UL (ref 1–4.8)
MCH RBC QN AUTO: 32.1 PG (ref 27–31)
MCHC RBC AUTO-ENTMCNC: 32.5 G/DL (ref 32–36)
MCV RBC AUTO: 99 FL (ref 82–98)
NUCLEATED RBC (/100WBC) (OHS): 0 /100 WBC
PLATELET # BLD AUTO: 223 K/UL (ref 150–450)
PMV BLD AUTO: 9.6 FL (ref 9.2–12.9)
POTASSIUM SERPL-SCNC: 3.3 MMOL/L (ref 3.5–5.1)
PROT SERPL-MCNC: 6.1 GM/DL (ref 6–8.4)
RBC # BLD AUTO: 4.67 M/UL (ref 4–5.4)
RELATIVE EOSINOPHIL (OHS): 1.1 %
RELATIVE LYMPHOCYTE (OHS): 14 % (ref 18–48)
RELATIVE MONOCYTE (OHS): 11.6 % (ref 4–15)
RELATIVE NEUTROPHIL (OHS): 72.5 % (ref 38–73)
SODIUM SERPL-SCNC: 147 MMOL/L (ref 136–145)
WBC # BLD AUTO: 10.07 K/UL (ref 3.9–12.7)

## 2025-06-11 PROCEDURE — 82040 ASSAY OF SERUM ALBUMIN: CPT | Performed by: HOSPITALIST

## 2025-06-11 PROCEDURE — 11000001 HC ACUTE MED/SURG PRIVATE ROOM

## 2025-06-11 PROCEDURE — 25000003 PHARM REV CODE 250

## 2025-06-11 PROCEDURE — 85025 COMPLETE CBC W/AUTO DIFF WBC: CPT | Performed by: HOSPITALIST

## 2025-06-11 PROCEDURE — 36415 COLL VENOUS BLD VENIPUNCTURE: CPT | Performed by: HOSPITALIST

## 2025-06-11 PROCEDURE — 63600175 PHARM REV CODE 636 W HCPCS: Performed by: HOSPITALIST

## 2025-06-11 PROCEDURE — 63600175 PHARM REV CODE 636 W HCPCS: Performed by: STUDENT IN AN ORGANIZED HEALTH CARE EDUCATION/TRAINING PROGRAM

## 2025-06-11 RX ORDER — POTASSIUM CHLORIDE 7.45 MG/ML
10 INJECTION INTRAVENOUS
Status: COMPLETED | OUTPATIENT
Start: 2025-06-11 | End: 2025-06-11

## 2025-06-11 RX ORDER — ONDANSETRON HYDROCHLORIDE 2 MG/ML
4 INJECTION, SOLUTION INTRAVENOUS EVERY 4 HOURS PRN
Status: DISCONTINUED | OUTPATIENT
Start: 2025-06-11 | End: 2025-06-13 | Stop reason: HOSPADM

## 2025-06-11 RX ORDER — BISACODYL 10 MG/1
10 SUPPOSITORY RECTAL DAILY PRN
Status: DISCONTINUED | OUTPATIENT
Start: 2025-06-11 | End: 2025-06-11

## 2025-06-11 RX ORDER — BISACODYL 10 MG/1
10 SUPPOSITORY RECTAL ONCE
Status: COMPLETED | OUTPATIENT
Start: 2025-06-11 | End: 2025-06-11

## 2025-06-11 RX ADMIN — MORPHINE SULFATE 4 MG: 4 INJECTION INTRAVENOUS at 07:06

## 2025-06-11 RX ADMIN — HYDRALAZINE HYDROCHLORIDE 20 MG: 20 INJECTION INTRAMUSCULAR; INTRAVENOUS at 09:06

## 2025-06-11 RX ADMIN — BISACODYL 10 MG: 10 SUPPOSITORY RECTAL at 06:06

## 2025-06-11 RX ADMIN — MORPHINE SULFATE 4 MG: 4 INJECTION INTRAVENOUS at 11:06

## 2025-06-11 RX ADMIN — POTASSIUM CHLORIDE 10 MEQ: 7.46 INJECTION, SOLUTION INTRAVENOUS at 12:06

## 2025-06-11 RX ADMIN — ONDANSETRON 4 MG: 2 INJECTION INTRAMUSCULAR; INTRAVENOUS at 07:06

## 2025-06-11 RX ADMIN — DEXTROSE AND SODIUM CHLORIDE: 5; 900 INJECTION, SOLUTION INTRAVENOUS at 09:06

## 2025-06-11 RX ADMIN — DEXTROSE AND SODIUM CHLORIDE: 5; 900 INJECTION, SOLUTION INTRAVENOUS at 07:06

## 2025-06-11 RX ADMIN — MORPHINE SULFATE 4 MG: 4 INJECTION INTRAVENOUS at 01:06

## 2025-06-11 NOTE — PLAN OF CARE
06/11/25 1626   Rounds   Attendance Provider;;Charge nurse   Discharge Plan A Home with family   Why the patient remains in the hospital Requires continued medical care   Transition of Care Barriers None

## 2025-06-11 NOTE — PROGRESS NOTES
Surgery Progress Note    Patricia Ramirez is a 69 y.o. year old female in hospital for abd pain, possible SBO    NAEON AF VSS  SBFT yesterday without progression of contrast to colon or distal small bowel  Minimal abd pain, reports it comes and goes randomly  Still passing flatus, also belching but no BM. No n/v  NGT with brown gastric output (changed from dark bilious green), 400cc overnight, additional 300cc after flushing this morning  No f/c/sob/cp    WBC 10.07 from 15.45    ROS:  Negative except above    PE:  Vitals:    06/11/25 0450 06/11/25 0737 06/11/25 1017 06/11/25 1159   BP: (!) 185/80 (!) 186/76 (!) 162/79    BP Location: Right arm      Patient Position: Lying      Pulse: 74 68 72    Resp: 18 18 18 19   Temp: 98.2 °F (36.8 °C) 98.1 °F (36.7 °C) 98.4 °F (36.9 °C)    TempSrc: Oral Oral Oral    SpO2: 95% (!) 93% (!) 94%    Weight:       Height:           NAD  NGT with brown gastric output  No belabored breathing  No skin changes  Abd soft nt nd    Significant Diagnostic Studies: Labs: BMP:   Recent Labs   Lab 06/10/25  0549 06/11/25  0826   * 125*    147*   K 3.7 3.3*    109   CO2 26 29   BUN 12 16   CREATININE 0.5 0.5   CALCIUM 8.2* 8.5*   , CBC   Recent Labs   Lab 06/10/25  0549 06/11/25  0826   WBC 15.45* 10.07   HGB 15.1 15.0   HCT 46.1 46.1    223   , and All labs within the past 24 hours have been reviewed  Radiology: CT scan: CT ABDOMEN PELVIS WITH CONTRAST:   Results for orders placed or performed during the hospital encounter of 06/09/25   CT Abdomen Pelvis With IV Contrast NO Oral Contrast    Narrative    EXAM: CT ABDOMEN PELVIS WITH IV CONTRAST    HISTORY: 69 years -old Female with Abdominal pain, acute, nonlocalized    TECHNIQUE: Spiral CT of the abdomen and pelvis were performed with intravenous  contrast. Non-ionic intravenous contrast was utilized. All CT scans are performed using dose optimization techniques as appropriate to a performed exam including automated  exposure control and/or standardized protocols for targeted exam where dose is matched to indications/reason for exam/patient size.    COMPARISON: There are no existing relevant studies available for comparison at this time    FINDINGS:        Lung bases: Unremarkable.    Liver: Unremarkable    Biliary system: Unremarkable    Spleen: Unremarkable    Pancreas: Unremarkable    Stomach and small bowel: Segmentally dilated small bowel loops are present in the right pelvis. There is question of mild mural edema. There is a whirled appearance of the mesentery in the lower abdominal region demonstrated on images 8296 of series 601. Findings are suspicious for internal hernia or 5 view left. There is marked fluid distention of the proximal stomach. There is question possible gastric luminal narrowing in the mid body seen on image 31 of series 2.     Colon: The appendix is unremarkable. Moderate amount of fecal material is present in the right colon.    Retroperitoneum, mesentery, omentum, peritoneum: Small amount of free fluid is present in the abdomen..    Adrenal glands: Unremarkable    Kidneys: Unremarkable    Urinary bladder: Unremarkable    Reproductive organs:  There has been a hysterectomy.    Abdominal wall: Unremarkable    Musculoskeletal: Unremarkable     #CRITICAL# COMMUNICATION: I discussed suspicion for right lower quadrant internal hernia involving segment of small bowel and possible mid gastric body lesion with Dr. BEBO CASTAÑEDA at   5:15 AM  on 06/09/2025 by  telephone. The healthcare provider acknowledged receipt and understanding of the findings.      Impression    IMPRESSION:  Segmentally dilated small bowel loops in the right pelvis with whirled appearance of the mesentery suspicious for internal hernia or volvulus.    Marked fluid distention of the proximal stomach with question of possible gastric luminal narrowing in the mid body.    Small amount of free fluid in the abdomen.    -Electronically  Signed By: Remedios Mtz MD   -Electronically Signed On:  6/9/2025 5:16 AM      Report Ends    and CT ABDOMEN PELVIS WITHOUT CONTRAST: No results found for this visit on 06/09/25.    A/P:  Patricia Ramirez is a 69 y.o. year old female  with with nausea, abdominal pain and obstipation prior to arrival.  She passed flatus (witnessed) in the ED with a benign clinical exam. Surgery consulted for SBO    - SBFT without contrast in colon or distal small bowel  - Repeat KUB ordered today, residual contrast in small bowel loops with air present in distal colon/rectum but no contrast, on personal review loops of bowel do not appear significantly distended  - Discussed diagnostic laparoscopy, possible exploratory laparotomy with patient, at this time she continues to be hesitant about need for surgery and given that she is clinically stable, no emergent indication. However if patient continues to not progress, advised patient of likely need for surgery tomorrow  - Case requested for tomorrow, RBA discussed with patient, consent to be obtained  - Keep NPO and mIVF with NGT to LIWS for continued gastric decompression  - Trial of suppository  - Close monitoring with serial abd exams and further bowel function  - Leukocytosis resolved, continue to monitor for infection    Roxann Lin  General Surgery - [facility]  6/11/2025

## 2025-06-11 NOTE — PROGRESS NOTES
[facility] Medicine  Progress Note    Patient Name: Patricia Ramirez  MRN: [id number]  Patient Class: IP- Inpatient   Admission Date: 6/9/2025  Length of Stay: 2 days  Attending Physician: Margarita Morris DO  Primary Care Provider: Fabienne Erwin NP        Subjective     Principal Problem:SBO (small bowel obstruction)        HPI:  69-year-old female with a history of anxiety, depression, hypertension, management of a recent questionable small bowel obstruction that resolved with medical management presents to ED for evaluation of severe lower abdominal pain that she notes has been going on for the past several days.  She notes the pain has been progressively worsening and is currently a 10/10. Did not had BM for 4 days, She was evaluated here yesterday and felt better after some treatments and was ultimately discharged.  She was advised to come back if she felt worse and her symptoms have been worsening.  She notes she feels worse now than when she came in the 1st time.  She notes she has had numerous episodes of nausea and dry heaving.  .  She notes she typically has a regular.  Was admitted here last month following a similar presentation and was noted to have significant gastric distention concerning for gastroparesis as well as some small bowel findings concerning for possible SBO.  Admitted and managed conservatively with resolution of her symptoms.  She denies any fevers, chills, blood in her stool, chest pain, shortness of breath, vaginal bleeding, vaginal discharge.   CT abdomen,. Show,Segmentally dilated small bowel loops in the right pelvis with whirled appearance of the mesentery suspicious for internal hernia or volvulus. NG is placed and surgery is consulted.    Overview/Hospital Course:  69-year-old female with a history of anxiety, depression, hypertension admitted on 06/09/2025 for small-bowel obstruction.  Presented with severe abdominal pain associated with nausea and  constipation, no vomiting.  Recently admitted a month ago for similar presentation.  CT abdomen and pelvis with Segmentally dilated small bowel loops in the right pelvis with whirled appearance of the mesentery suspicious for internal hernia or volvulus.  NGT place.  General surgery consulted-continue NGT for small-bowel decompression.  Await for bowel function to return.  S/p Gastrografin challenge on 06/10, which shows persistent small bowel obstruction.    Interval History:  No acute overnight events.  Remained afebrile.  Continues to have lower abdominal soreness and tenderness  No further nausea or vomiting.  Fell Gastrografin challenge yesterday.  Passing flatus, but still no bowel movement.  Discussed with General surgery, plan for procedure today    Review of Systems   Constitutional:  Negative for fever.   Respiratory:  Negative for cough and shortness of breath.    Cardiovascular:  Negative for chest pain.   Gastrointestinal:  Positive for abdominal pain (improving) and constipation. Negative for nausea and vomiting.     Objective:     Vital Signs (Most Recent):  Temp: 98.4 °F (36.9 °C) (06/11/25 1017)  Pulse: 72 (06/11/25 1017)  Resp: 18 (06/11/25 1017)  BP: (!) 162/79 (06/11/25 1017)  SpO2: (!) 94 % (06/11/25 1017) Vital Signs (24h Range):  Temp:  [98.1 °F (36.7 °C)-98.4 °F (36.9 °C)] 98.4 °F (36.9 °C)  Pulse:  [68-90] 72  Resp:  [18-20] 18  SpO2:  [92 %-97 %] 94 %  BP: (158-190)/(65-80) 162/79     Weight: 59 kg (130 lb)  Body mass index is 19.77 kg/m².    Intake/Output Summary (Last 24 hours) at 6/11/2025 1154  Last data filed at 6/11/2025 0630  Gross per 24 hour   Intake --   Output 450 ml   Net -450 ml         Physical Exam  Vitals and nursing note reviewed.   Constitutional:       General: She is not in acute distress.  HENT:      Nose:      Comments: NG tube in place with bile output     Mouth/Throat:      Mouth: Mucous membranes are dry.   Cardiovascular:      Rate and Rhythm: Normal rate and  regular rhythm.   Pulmonary:      Effort: Pulmonary effort is normal. No respiratory distress.      Breath sounds: Normal breath sounds.   Abdominal:      General: There is no distension.      Palpations: Abdomen is soft.      Tenderness: There is abdominal tenderness.   Musculoskeletal:         General: No swelling or tenderness.   Skin:     General: Skin is warm and dry.   Neurological:      General: No focal deficit present.      Mental Status: She is alert and oriented to person, place, and time.               Significant Labs: All pertinent labs within the past 24 hours have been reviewed.    Significant Imaging: I have reviewed all pertinent imaging results/findings within the past 24 hours.      Assessment & Plan  SBO (small bowel obstruction)  - Presented with severe abdominal pain associated with nausea and constipation, no vomiting.  Recently admitted a month ago for similar presentation.    - CT abdomen and pelvis with Segmentally dilated small bowel loops in the right pelvis with whirled appearance of the mesentery suspicious for internal hernia or volvulus.    - Continue NGT.    - General surgery consulted: continue NGT for small-bowel decompression.   - s/p Gastrografin challenge on 06/10:  Still with persistent small-bowel obstruction  - discussed with surgery, plan for OR  Essential hypertension  Patient's blood pressure range in the last 24 hours was: BP  Min: 158/69  Max: 190/76.The patient's inpatient anti-hypertensive regimen is listed below:  Current Antihypertensives  hydrALAZINE injection 20 mg, Every 4 hours PRN, Intravenous  cloNIDine 0.3 mg/24 hr td ptwk 1 patch, Every 7 days, Transdermal    Plan  P.r.n. hydralazine  Remained NPO at this time  Hyperlipidemia  Was on statin at home, NPO at this time.    Depression with anxiety  Will monitor.      Heart failure with preserved ejection fraction  -echocardiogram from 10/07/2024 with preserved EF of 55-60%, grade 1 diastolic dysfunction.  PASP  of 35 mm Hg  -appears to be at baseline, appears euvolemic   -monitor  Leukocytosis  -suspect reactive.  No source of infection at this time.  Patient without fevers or tachycardia.  -monitor off antibiotics  -continue to trend  -leukocytosis resolved  Hypokalemia  Patient's most recent potassium results are listed below.   Recent Labs     06/09/25  0220 06/10/25  0549 06/11/25  0826   K 4.0 3.7 3.3*     Plan  - Replete potassium per protocol  - Monitor potassium Daily  - Patient's hypokalemia is worsening. Will continue current treatment  - continue to replace as needed  VTE Risk Mitigation (From admission, onward)      None            Discharge Planning   STUART: 6/12/2025     Code Status: Full Code   Medical Readiness for Discharge Date:   Discharge Plan A: Home with family                        Radha-Lisset Morris DO  Department of Hospital Medicine   [facility] - [place] - Telemetry

## 2025-06-11 NOTE — ASSESSMENT & PLAN NOTE
Patient's most recent potassium results are listed below.   Recent Labs     06/09/25  0220 06/10/25  0549 06/11/25  0826   K 4.0 3.7 3.3*     Plan  - Replete potassium per protocol  - Monitor potassium Daily  - Patient's hypokalemia is worsening. Will continue current treatment  - continue to replace as needed

## 2025-06-11 NOTE — ASSESSMENT & PLAN NOTE
Patient's blood pressure range in the last 24 hours was: BP  Min: 158/69  Max: 190/76.The patient's inpatient anti-hypertensive regimen is listed below:  Current Antihypertensives  hydrALAZINE injection 20 mg, Every 4 hours PRN, Intravenous  cloNIDine 0.3 mg/24 hr td ptwk 1 patch, Every 7 days, Transdermal    Plan  P.r.n. hydralazine  Remained NPO at this time

## 2025-06-11 NOTE — ASSESSMENT & PLAN NOTE
- Presented with severe abdominal pain associated with nausea and constipation, no vomiting.  Recently admitted a month ago for similar presentation.    - CT abdomen and pelvis with Segmentally dilated small bowel loops in the right pelvis with whirled appearance of the mesentery suspicious for internal hernia or volvulus.    - Continue NGT.    - General surgery consulted: continue NGT for small-bowel decompression.   - s/p Gastrografin challenge on 06/10:  Still with persistent small-bowel obstruction  - discussed with surgery, plan for OR

## 2025-06-11 NOTE — SUBJECTIVE & OBJECTIVE
Interval History:  No acute overnight events.  Remained afebrile.  Continues to have lower abdominal soreness and tenderness  No further nausea or vomiting.  Fell Gastrografin challenge yesterday.  Passing flatus, but still no bowel movement.  Discussed with General surgery, plan for procedure today    Review of Systems   Constitutional:  Negative for fever.   Respiratory:  Negative for cough and shortness of breath.    Cardiovascular:  Negative for chest pain.   Gastrointestinal:  Positive for abdominal pain (improving) and constipation. Negative for nausea and vomiting.     Objective:     Vital Signs (Most Recent):  Temp: 98.4 °F (36.9 °C) (06/11/25 1017)  Pulse: 72 (06/11/25 1017)  Resp: 18 (06/11/25 1017)  BP: (!) 162/79 (06/11/25 1017)  SpO2: (!) 94 % (06/11/25 1017) Vital Signs (24h Range):  Temp:  [98.1 °F (36.7 °C)-98.4 °F (36.9 °C)] 98.4 °F (36.9 °C)  Pulse:  [68-90] 72  Resp:  [18-20] 18  SpO2:  [92 %-97 %] 94 %  BP: (158-190)/(65-80) 162/79     Weight: 59 kg (130 lb)  Body mass index is 19.77 kg/m².    Intake/Output Summary (Last 24 hours) at 6/11/2025 1154  Last data filed at 6/11/2025 0630  Gross per 24 hour   Intake --   Output 450 ml   Net -450 ml         Physical Exam  Vitals and nursing note reviewed.   Constitutional:       General: She is not in acute distress.  HENT:      Nose:      Comments: NG tube in place with bile output     Mouth/Throat:      Mouth: Mucous membranes are dry.   Cardiovascular:      Rate and Rhythm: Normal rate and regular rhythm.   Pulmonary:      Effort: Pulmonary effort is normal. No respiratory distress.      Breath sounds: Normal breath sounds.   Abdominal:      General: There is no distension.      Palpations: Abdomen is soft.      Tenderness: There is abdominal tenderness.   Musculoskeletal:         General: No swelling or tenderness.   Skin:     General: Skin is warm and dry.   Neurological:      General: No focal deficit present.      Mental Status: She is alert and  oriented to person, place, and time.               Significant Labs: All pertinent labs within the past 24 hours have been reviewed.    Significant Imaging: I have reviewed all pertinent imaging results/findings within the past 24 hours.

## 2025-06-11 NOTE — ASSESSMENT & PLAN NOTE
-suspect reactive.  No source of infection at this time.  Patient without fevers or tachycardia.  -monitor off antibiotics  -continue to trend  -leukocytosis resolved

## 2025-06-12 LAB
ABSOLUTE EOSINOPHIL (OHS): 0.27 K/UL
ABSOLUTE MONOCYTE (OHS): 1.02 K/UL (ref 0.3–1)
ABSOLUTE NEUTROPHIL COUNT (OHS): 7.27 K/UL (ref 1.8–7.7)
ALBUMIN SERPL BCP-MCNC: 2.9 G/DL (ref 3.5–5.2)
ALP SERPL-CCNC: 52 UNIT/L (ref 40–150)
ALT SERPL W/O P-5'-P-CCNC: 11 UNIT/L (ref 10–44)
ANION GAP (OHS): 7 MMOL/L (ref 8–16)
AST SERPL-CCNC: 12 UNIT/L (ref 11–45)
BASOPHILS # BLD AUTO: 0.07 K/UL
BASOPHILS NFR BLD AUTO: 0.6 %
BILIRUB SERPL-MCNC: 0.6 MG/DL (ref 0.1–1)
BUN SERPL-MCNC: 16 MG/DL (ref 8–23)
CALCIUM SERPL-MCNC: 8.2 MG/DL (ref 8.7–10.5)
CHLORIDE SERPL-SCNC: 110 MMOL/L (ref 95–110)
CO2 SERPL-SCNC: 28 MMOL/L (ref 23–29)
CREAT SERPL-MCNC: 0.5 MG/DL (ref 0.5–1.4)
ERYTHROCYTE [DISTWIDTH] IN BLOOD BY AUTOMATED COUNT: 13.2 % (ref 11.5–14.5)
GFR SERPLBLD CREATININE-BSD FMLA CKD-EPI: >60 ML/MIN/1.73/M2
GLUCOSE SERPL-MCNC: 124 MG/DL (ref 70–110)
HCT VFR BLD AUTO: 43.2 % (ref 37–48.5)
HGB BLD-MCNC: 14.3 GM/DL (ref 12–16)
IMM GRANULOCYTES # BLD AUTO: 0.03 K/UL (ref 0–0.04)
IMM GRANULOCYTES NFR BLD AUTO: 0.3 % (ref 0–0.5)
LYMPHOCYTES # BLD AUTO: 2.15 K/UL (ref 1–4.8)
MCH RBC QN AUTO: 32.4 PG (ref 27–31)
MCHC RBC AUTO-ENTMCNC: 33.1 G/DL (ref 32–36)
MCV RBC AUTO: 98 FL (ref 82–98)
NUCLEATED RBC (/100WBC) (OHS): 0 /100 WBC
PLATELET # BLD AUTO: 185 K/UL (ref 150–450)
PMV BLD AUTO: 9.3 FL (ref 9.2–12.9)
POTASSIUM SERPL-SCNC: 3.2 MMOL/L (ref 3.5–5.1)
PROT SERPL-MCNC: 5.8 GM/DL (ref 6–8.4)
RBC # BLD AUTO: 4.41 M/UL (ref 4–5.4)
RELATIVE EOSINOPHIL (OHS): 2.5 %
RELATIVE LYMPHOCYTE (OHS): 19.9 % (ref 18–48)
RELATIVE MONOCYTE (OHS): 9.4 % (ref 4–15)
RELATIVE NEUTROPHIL (OHS): 67.3 % (ref 38–73)
SODIUM SERPL-SCNC: 145 MMOL/L (ref 136–145)
WBC # BLD AUTO: 10.81 K/UL (ref 3.9–12.7)

## 2025-06-12 PROCEDURE — 25000003 PHARM REV CODE 250

## 2025-06-12 PROCEDURE — 63600175 PHARM REV CODE 636 W HCPCS: Performed by: STUDENT IN AN ORGANIZED HEALTH CARE EDUCATION/TRAINING PROGRAM

## 2025-06-12 PROCEDURE — 63600175 PHARM REV CODE 636 W HCPCS: Performed by: HOSPITALIST

## 2025-06-12 PROCEDURE — 80053 COMPREHEN METABOLIC PANEL: CPT | Performed by: HOSPITALIST

## 2025-06-12 PROCEDURE — 99233 SBSQ HOSP IP/OBS HIGH 50: CPT | Mod: ,,, | Performed by: STUDENT IN AN ORGANIZED HEALTH CARE EDUCATION/TRAINING PROGRAM

## 2025-06-12 PROCEDURE — 11000001 HC ACUTE MED/SURG PRIVATE ROOM

## 2025-06-12 PROCEDURE — 85025 COMPLETE CBC W/AUTO DIFF WBC: CPT | Performed by: HOSPITALIST

## 2025-06-12 PROCEDURE — 36415 COLL VENOUS BLD VENIPUNCTURE: CPT | Performed by: HOSPITALIST

## 2025-06-12 RX ORDER — OXYCODONE HYDROCHLORIDE 5 MG/1
5 TABLET ORAL EVERY 6 HOURS PRN
Refills: 0 | Status: DISCONTINUED | OUTPATIENT
Start: 2025-06-12 | End: 2025-06-13 | Stop reason: HOSPADM

## 2025-06-12 RX ORDER — ACETAMINOPHEN 325 MG/1
650 TABLET ORAL EVERY 6 HOURS PRN
Status: DISCONTINUED | OUTPATIENT
Start: 2025-06-12 | End: 2025-06-13 | Stop reason: HOSPADM

## 2025-06-12 RX ORDER — POTASSIUM CHLORIDE 7.45 MG/ML
10 INJECTION INTRAVENOUS
Status: COMPLETED | OUTPATIENT
Start: 2025-06-12 | End: 2025-06-12

## 2025-06-12 RX ORDER — MORPHINE SULFATE 4 MG/ML
4 INJECTION, SOLUTION INTRAMUSCULAR; INTRAVENOUS EVERY 6 HOURS PRN
Status: DISCONTINUED | OUTPATIENT
Start: 2025-06-12 | End: 2025-06-13 | Stop reason: HOSPADM

## 2025-06-12 RX ORDER — BISACODYL 10 MG/1
10 SUPPOSITORY RECTAL ONCE
Status: COMPLETED | OUTPATIENT
Start: 2025-06-12 | End: 2025-06-12

## 2025-06-12 RX ADMIN — MORPHINE SULFATE 4 MG: 4 INJECTION INTRAVENOUS at 12:06

## 2025-06-12 RX ADMIN — POTASSIUM CHLORIDE 10 MEQ: 7.46 INJECTION, SOLUTION INTRAVENOUS at 07:06

## 2025-06-12 RX ADMIN — POTASSIUM CHLORIDE 10 MEQ: 7.46 INJECTION, SOLUTION INTRAVENOUS at 08:06

## 2025-06-12 RX ADMIN — DEXTROSE AND SODIUM CHLORIDE: 5; 900 INJECTION, SOLUTION INTRAVENOUS at 10:06

## 2025-06-12 RX ADMIN — BISACODYL 10 MG: 10 SUPPOSITORY RECTAL at 04:06

## 2025-06-12 NOTE — PROGRESS NOTES
[facility] Medicine  Progress Note    Patient Name: Patricia Ramirez  MRN: [id number]  Patient Class: IP- Inpatient   Admission Date: 6/9/2025  Length of Stay: 3 days  Attending Physician: Margarita Morris DO  Primary Care Provider: Fabienne Erwin NP        Subjective     Principal Problem:SBO (small bowel obstruction)        HPI:  69-year-old female with a history of anxiety, depression, hypertension, management of a recent questionable small bowel obstruction that resolved with medical management presents to ED for evaluation of severe lower abdominal pain that she notes has been going on for the past several days.  She notes the pain has been progressively worsening and is currently a 10/10. Did not had BM for 4 days, She was evaluated here yesterday and felt better after some treatments and was ultimately discharged.  She was advised to come back if she felt worse and her symptoms have been worsening.  She notes she feels worse now than when she came in the 1st time.  She notes she has had numerous episodes of nausea and dry heaving.  .  She notes she typically has a regular.  Was admitted here last month following a similar presentation and was noted to have significant gastric distention concerning for gastroparesis as well as some small bowel findings concerning for possible SBO.  Admitted and managed conservatively with resolution of her symptoms.  She denies any fevers, chills, blood in her stool, chest pain, shortness of breath, vaginal bleeding, vaginal discharge.   CT abdomen,. Show,Segmentally dilated small bowel loops in the right pelvis with whirled appearance of the mesentery suspicious for internal hernia or volvulus. NG is placed and surgery is consulted.    Overview/Hospital Course:  69-year-old female with a history of anxiety, depression, hypertension admitted on 06/09/2025 for small-bowel obstruction.  Presented with severe abdominal pain associated with nausea and  constipation, no vomiting.  Recently admitted a month ago for similar presentation.  CT abdomen and pelvis with Segmentally dilated small bowel loops in the right pelvis with whirled appearance of the mesentery suspicious for internal hernia or volvulus.  NGT place.  General surgery consulted-continue NGT for small-bowel decompression.  Await for bowel function to return.  S/p Gastrografin challenge on 06/10, which shows persistent small bowel obstruction. Patient had BM on 06/11. Surgery with clamp trial on 06/12. Continues to have bilious output. Will monitor    Interval History:  No acute overnight events.  Remained afebrile. States she had two BM last night.  No longer having any lower abdominal pain no soreness.   No further nausea or vomiting.  Continues to have bilious output.  Discussed with surgery, plan for clamp trial today    Review of Systems   Constitutional:  Negative for fever.   Respiratory:  Negative for cough and shortness of breath.    Cardiovascular:  Negative for chest pain.   Gastrointestinal:  Negative for abdominal pain (improving), constipation, nausea and vomiting.     Objective:     Vital Signs (Most Recent):  Temp: 98.1 °F (36.7 °C) (06/12/25 1159)  Pulse: 63 (06/12/25 1159)  Resp: 18 (06/12/25 1159)  BP: (!) 171/76 (06/12/25 1159)  SpO2: (!) 94 % (06/12/25 1159) Vital Signs (24h Range):  Temp:  [97.8 °F (36.6 °C)-98.4 °F (36.9 °C)] 98.1 °F (36.7 °C)  Pulse:  [63-78] 63  Resp:  [18-19] 18  SpO2:  [89 %-95 %] 94 %  BP: (157-176)/(72-80) 171/76     Weight: 59 kg (130 lb 1.1 oz)  Body mass index is 19.78 kg/m².    Intake/Output Summary (Last 24 hours) at 6/12/2025 1354  Last data filed at 6/12/2025 1046  Gross per 24 hour   Intake --   Output 2452 ml   Net -2452 ml         Physical Exam  Vitals and nursing note reviewed.   Constitutional:       General: She is not in acute distress.  HENT:      Nose:      Comments: NG tube in place with bile output     Mouth/Throat:      Mouth: Mucous  membranes are dry.   Cardiovascular:      Rate and Rhythm: Normal rate and regular rhythm.   Pulmonary:      Effort: Pulmonary effort is normal. No respiratory distress.      Breath sounds: Normal breath sounds.   Abdominal:      General: There is no distension.      Palpations: Abdomen is soft.      Tenderness: There is no abdominal tenderness (none on exam today).   Musculoskeletal:         General: No swelling or tenderness.   Skin:     General: Skin is warm and dry.   Neurological:      General: No focal deficit present.      Mental Status: She is alert and oriented to person, place, and time.   Psychiatric:         Mood and Affect: Mood normal.         Thought Content: Thought content normal.               Significant Labs: All pertinent labs within the past 24 hours have been reviewed.    Significant Imaging: I have reviewed all pertinent imaging results/findings within the past 24 hours.      Assessment & Plan  SBO (small bowel obstruction)  - Presented with severe abdominal pain associated with nausea and constipation, no vomiting.  Recently admitted a month ago for similar presentation.    - CT abdomen and pelvis with Segmentally dilated small bowel loops in the right pelvis with whirled appearance of the mesentery suspicious for internal hernia or volvulus.    - Continue NGT.    - General surgery consulted: continue NGT for small-bowel decompression.   - s/p Gastrografin challenge on 06/10:  Still with persistent small-bowel obstruction  - patient with 2 bowel movements on 06/11.  - discussed with General surgery, plan for clamp trial today on 06/12  Essential hypertension  Patient's blood pressure range in the last 24 hours was: BP  Min: 157/72  Max: 176/77.The patient's inpatient anti-hypertensive regimen is listed below:  Current Antihypertensives  hydrALAZINE injection 20 mg, Every 4 hours PRN, Intravenous  cloNIDine 0.3 mg/24 hr td ptwk 1 patch, Every 7 days, Transdermal    Plan  P.r.n.  hydralazine  Remained NPO at this time  Hyperlipidemia  Was on statin at home, NPO at this time.    Depression with anxiety  Will monitor.      Heart failure with preserved ejection fraction  -echocardiogram from 10/07/2024 with preserved EF of 55-60%, grade 1 diastolic dysfunction.  PASP of 35 mm Hg  -appears to be at baseline, appears euvolemic   -monitor  Leukocytosis  -suspect reactive.  No source of infection at this time.  Patient without fevers or tachycardia.  -monitor off antibiotics  -continue to trend  -leukocytosis resolved  Hypokalemia  Patient's most recent potassium results are listed below.   Recent Labs     06/10/25  0549 06/11/25  0826 06/12/25  0626   K 3.7 3.3* 3.2*     Plan  - Replete potassium per protocol  - Monitor potassium Daily  - Patient's hypokalemia is stable  - continue to replace as needed  VTE Risk Mitigation (From admission, onward)      None            Discharge Planning   STUART: 6/14/2025     Code Status: Full Code   Medical Readiness for Discharge Date:   Discharge Plan A: Home with family                        Radha-Lisset Morris DO  Department of Hospital Medicine   [facility] - [place] - Telemetry

## 2025-06-12 NOTE — ASSESSMENT & PLAN NOTE
Patient's blood pressure range in the last 24 hours was: BP  Min: 157/72  Max: 176/77.The patient's inpatient anti-hypertensive regimen is listed below:  Current Antihypertensives  hydrALAZINE injection 20 mg, Every 4 hours PRN, Intravenous  cloNIDine 0.3 mg/24 hr td ptwk 1 patch, Every 7 days, Transdermal    Plan  P.r.n. hydralazine  Remained NPO at this time

## 2025-06-12 NOTE — PLAN OF CARE
Changes in medical condition or discharge plan:    Patient admitted on 06/09/25 for SBO. Patient had 2 BM last night and is expected to have clamp trial today. Patient will discharge home with family.     Does patient need new DME? None    Follow up appts needed: PCP    Medically stable: Patient is not medically stable for discharge    Estimated Discharge Date:  1-2 days   06/12/25 1506   Discharge Reassessment   Assessment Type Discharge Planning Reassessment   Did the patient's condition or plan change since previous assessment? Yes   Communicated STUART with patient/caregiver Yes   Discharge Plan A Home with family   Discharge Plan B Home with family   DME Needed Upon Discharge  none   Transition of Care Barriers None   Why the patient remains in the hospital Requires continued medical care   Post-Acute Status   Discharge Delays None known at this time

## 2025-06-12 NOTE — ASSESSMENT & PLAN NOTE
Patient's most recent potassium results are listed below.   Recent Labs     06/10/25  0549 06/11/25  0826 06/12/25  0626   K 3.7 3.3* 3.2*     Plan  - Replete potassium per protocol  - Monitor potassium Daily  - Patient's hypokalemia is stable  - continue to replace as needed

## 2025-06-12 NOTE — PROGRESS NOTES
[facility] - Telemetry  Adult Nutrition  Progress Note    SUMMARY       Recommendations    1. Advance oral diet when appropriate    2. Monitor labs and weights    3. Recommend commercial beverages once NPO status advances    Goals: Patient to advance with nutrition therapies within 48 hours  Nutrition Goal Status: new  Communication of RD Recs: other (comment) (POC)    Nutrition Discharge Planning    Nutrition Discharge Planning: Too early to determine, pending clinical course    Assessment and Plan  Nutrition Problem  Altered GI Function    Related to (etiology):   SBO    Signs and Symptoms (as evidenced by):   NPO,   Nausea, vomiting, constipation and abdominal pain     Interventions (treatment strategy):  Collaboration by nutrition professional with other providers    Nutrition Diagnosis Status:   New      Malnutrition Assessment           NFPE to be performed at follow up as warranted.                             Reason for Assessment    Reason For Assessment: identified at risk by screening criteria  Diagnosis: gastrointestinal disease (SBO)  Patient Active Problem List   Diagnosis    Essential hypertension    Hyperlipidemia    GERD (gastroesophageal reflux disease)    Depression with anxiety    Migraine with aura and without status migrainosus, not intractable    Nuclear sclerosis of both eyes    Dermatochalasis    Rosacea    Brow ptosis, bilateral    Retinal detachment of left eye with multiple breaks    Epiretinal membrane, left    Splenic infarction    Heart failure with preserved ejection fraction    Diastolic dysfunction    IFG (impaired fasting glucose)    Class 1 obesity due to excess calories without serious comorbidity with body mass index (BMI) of 31.0 to 31.9 in adult    RBBB    Left atrial enlargement    Left anterior fascicular block    Abnormal EKG    Dyspnea on exertion    Multiple risk factors for coronary artery disease    Splenic infarct    History of tobacco abuse    Nuclear sclerotic  "cataract of left eye    Moderate major depression, single episode    Tobacco dependency    Choledocholithiasis    Epigastric pain    History of laparoscopic cholecystectomy    Gastric outlet obstruction    SBO (small bowel obstruction)    Leukocytosis    Hypokalemia     Past Medical History:   Diagnosis Date    Allergy     Anxiety     Arthritis     Cataract     Depressive disorder, not elsewhere classified     Esophageal reflux     Hypertension     Impaired fasting glucose     Joint pain     Obesity, unspecified     Other and unspecified hyperlipidemia        General Information Comments:   25: Patient is currently npo for procedure, admitted with SBO.  NG tube in place at this time.  On admit, patient reported n/v, constipation and abdominal pain.  Labs reviewed.  NKFA.  LBM: 25. 6% weight loss within 6 months, insignificant at this time.  RD to continue to monitor NPO status and diet advancements.    Nutrition/Diet History    Spiritual, Cultural Beliefs, Taoism Practices, Values that Affect Care: no  Food Allergies: NKFA  Factors Affecting Nutritional Intake: abdominal pain, constipation, NPO  Nutrition Related Social Determinants of Health: SDOH: Unable to assess at this time.     Anthropometrics    Height: 5' 8" (172.7 cm)  Height (inches): 68 in  Height Method: Stated  Weight: 59 kg (130 lb 1.1 oz)  Weight (lb): 130.07 lb  Weight Method: Bed Scale  Ideal Body Weight (IBW), Female: 140 lb  % Ideal Body Weight, Female (lb): 92.91 %  BMI (Calculated): 19.8  BMI Grade: 18.5-24.9 - normal  Weight Loss: unintentional  Usual Body Weight (UBW), k kg (since 2024)  % Usual Body Weight: 93.85  % Weight Change From Usual Weight: -6.35 %       Lab/Procedures/Meds    Pertinent Labs Reviewed: reviewed  BMP  Lab Results   Component Value Date     2025    K 3.2 (L) 2025     2025    CO2 28 2025    BUN 16 2025    CREATININE 0.5 2025    CALCIUM 8.2 (L) 2025 "    ANIONGAP 7 (L) 06/12/2025    EGFRNORACEVR >60 06/12/2025     Lab Results   Component Value Date    HGBA1C 5.3 03/10/2025     Lab Results   Component Value Date    CALCIUM 8.2 (L) 06/12/2025    PHOS 3.8 05/24/2025       Pertinent Medications Reviewed: reviewed  Scheduled Meds:   cloNIDine 0.3 mg/24 hr td ptwk  1 patch Transdermal Q7 Days     Continuous Infusions:   D5 and 0.9% NaCl   Intravenous Continuous 75 mL/hr at 06/12/25 1042 New Bag at 06/12/25 1042     PRN Meds:.  Current Facility-Administered Medications:     hydrALAZINE, 20 mg, Intravenous, Q4H PRN    morphine, 4 mg, Intravenous, Q4H PRN    ondansetron, 4 mg, Intravenous, Q4H PRN    Estimated/Assessed Needs    Weight Used For Calorie Calculations: 59 kg (130 lb 1.1 oz)  Energy Calorie Requirements (kcal): 25-35kcals/kg (1475-2065kcals/day)  Energy Need Method: Kcal/kg  Protein Requirements: 1.2-1.5g/kg (70-89g/day)  Weight Used For Protein Calculations: 59 kg (130 lb 1.1 oz)  Fluid Requirements (mL): 1ml/kcal  Estimated Fluid Requirement Method: RDA Method  RDA Method (mL): 25  CHO Requirement: 225-250      Nutrition Prescription Ordered    Current Diet Order: NPO    Evaluation of Received Nutrient/Fluid Intake    % Kcal Needs: NPO  % Protein Needs: NPO  I/O: 6/12/25: -3172ml since admit  Energy Calories Required: not meeting needs  Protein Required: not meeting needs  Fluid Required: not meeting needs  Comments: LBM: 6/12/25  Tolerance: other (see comments) (NPO)  % Intake of Estimated Energy Needs: 0 - 25 %  % Meal Intake: NPO    PES Statement  Inadequate energy intake related to Altered GI function as evidenced by NPO/CL status due to medical condition  Status: New    Nutrition Risk    Level of Risk/Frequency of Follow-up: moderate (Follow up: 1-2x per week)     Monitor and Evaluation    Monitor and Evaluation: Diet order, Weight, Beliefs and attitudes, Electrolyte and renal panel, Gastrointestinal profile, Glucose/endocrine profile, Inflammatory  profile, Skin, Nutrition focused physical findings, Lipid profile     Nutrition Follow-Up    RD Follow-up?: Yes  Ashly Rea, MS, RDN, LDN

## 2025-06-12 NOTE — SUBJECTIVE & OBJECTIVE
Interval History:  No acute overnight events.  Remained afebrile. States she had two BM last night.  No longer having any lower abdominal pain no soreness.   No further nausea or vomiting.  Continues to have bilious output.  Discussed with surgery, plan for clamp trial today    Review of Systems   Constitutional:  Negative for fever.   Respiratory:  Negative for cough and shortness of breath.    Cardiovascular:  Negative for chest pain.   Gastrointestinal:  Negative for abdominal pain (improving), constipation, nausea and vomiting.     Objective:     Vital Signs (Most Recent):  Temp: 98.1 °F (36.7 °C) (06/12/25 1159)  Pulse: 63 (06/12/25 1159)  Resp: 18 (06/12/25 1159)  BP: (!) 171/76 (06/12/25 1159)  SpO2: (!) 94 % (06/12/25 1159) Vital Signs (24h Range):  Temp:  [97.8 °F (36.6 °C)-98.4 °F (36.9 °C)] 98.1 °F (36.7 °C)  Pulse:  [63-78] 63  Resp:  [18-19] 18  SpO2:  [89 %-95 %] 94 %  BP: (157-176)/(72-80) 171/76     Weight: 59 kg (130 lb 1.1 oz)  Body mass index is 19.78 kg/m².    Intake/Output Summary (Last 24 hours) at 6/12/2025 1354  Last data filed at 6/12/2025 1046  Gross per 24 hour   Intake --   Output 2452 ml   Net -2452 ml         Physical Exam  Vitals and nursing note reviewed.   Constitutional:       General: She is not in acute distress.  HENT:      Nose:      Comments: NG tube in place with bile output     Mouth/Throat:      Mouth: Mucous membranes are dry.   Cardiovascular:      Rate and Rhythm: Normal rate and regular rhythm.   Pulmonary:      Effort: Pulmonary effort is normal. No respiratory distress.      Breath sounds: Normal breath sounds.   Abdominal:      General: There is no distension.      Palpations: Abdomen is soft.      Tenderness: There is no abdominal tenderness (none on exam today).   Musculoskeletal:         General: No swelling or tenderness.   Skin:     General: Skin is warm and dry.   Neurological:      General: No focal deficit present.      Mental Status: She is alert and oriented  to person, place, and time.   Psychiatric:         Mood and Affect: Mood normal.         Thought Content: Thought content normal.               Significant Labs: All pertinent labs within the past 24 hours have been reviewed.    Significant Imaging: I have reviewed all pertinent imaging results/findings within the past 24 hours.

## 2025-06-12 NOTE — ASSESSMENT & PLAN NOTE
- Presented with severe abdominal pain associated with nausea and constipation, no vomiting.  Recently admitted a month ago for similar presentation.    - CT abdomen and pelvis with Segmentally dilated small bowel loops in the right pelvis with whirled appearance of the mesentery suspicious for internal hernia or volvulus.    - Continue NGT.    - General surgery consulted: continue NGT for small-bowel decompression.   - s/p Gastrografin challenge on 06/10:  Still with persistent small-bowel obstruction  - patient with 2 bowel movements on 06/11.  - discussed with General surgery, plan for clamp trial today on 06/12

## 2025-06-12 NOTE — PROGRESS NOTES
Surgery Progress Note    Patricia Ramirez is a 69 y.o. year old female in hospital for abd pain, possible SBO    NAEON AF VSS  Repeat KUB yesterday after failed SBFT still without contrast passage to colon  Still reporting some mild abd pain, reports it comes and goes randomly, but improved   Still passing flatus, had 2 BM after suppository. No n/v  NGT with brown gastric output (changed from dark bilious green), 1600cc over last 24 hours  No f/c/sob/cp      ROS:  Negative except above    PE:  Vitals:    06/11/25 1942 06/11/25 2116 06/11/25 2117 06/11/25 2314   BP: (!) 176/77 (!) 170/76 (!) 170/76 (!) 157/72   BP Location: Right arm   Right arm   Patient Position: Lying   Lying   Pulse: 66   78   Resp: 18   18   Temp: 98.4 °F (36.9 °C)   98.2 °F (36.8 °C)   TempSrc: Oral   Oral   SpO2: (!) 89%   (!) 91%   Weight:       Height:           NAD  NGT with brown gastric output  No belabored breathing  No skin changes  Abd soft nt nd    Significant Diagnostic Studies: Labs: BMP:   Recent Labs   Lab 06/10/25  0549 06/11/25  0826   * 125*    147*   K 3.7 3.3*    109   CO2 26 29   BUN 12 16   CREATININE 0.5 0.5   CALCIUM 8.2* 8.5*   , CBC   Recent Labs   Lab 06/10/25  0549 06/11/25  0826   WBC 15.45* 10.07   HGB 15.1 15.0   HCT 46.1 46.1    223   , and All labs within the past 24 hours have been reviewed  Radiology: CT scan: CT ABDOMEN PELVIS WITH CONTRAST:   Results for orders placed or performed during the hospital encounter of 06/09/25   CT Abdomen Pelvis With IV Contrast NO Oral Contrast    Narrative    EXAM: CT ABDOMEN PELVIS WITH IV CONTRAST    HISTORY: 69 years -old Female with Abdominal pain, acute, nonlocalized    TECHNIQUE: Spiral CT of the abdomen and pelvis were performed with intravenous  contrast. Non-ionic intravenous contrast was utilized. All CT scans are performed using dose optimization techniques as appropriate to a performed exam including automated exposure control and/or  standardized protocols for targeted exam where dose is matched to indications/reason for exam/patient size.    COMPARISON: There are no existing relevant studies available for comparison at this time    FINDINGS:        Lung bases: Unremarkable.    Liver: Unremarkable    Biliary system: Unremarkable    Spleen: Unremarkable    Pancreas: Unremarkable    Stomach and small bowel: Segmentally dilated small bowel loops are present in the right pelvis. There is question of mild mural edema. There is a whirled appearance of the mesentery in the lower abdominal region demonstrated on images 8296 of series 601. Findings are suspicious for internal hernia or 5 view left. There is marked fluid distention of the proximal stomach. There is question possible gastric luminal narrowing in the mid body seen on image 31 of series 2.     Colon: The appendix is unremarkable. Moderate amount of fecal material is present in the right colon.    Retroperitoneum, mesentery, omentum, peritoneum: Small amount of free fluid is present in the abdomen..    Adrenal glands: Unremarkable    Kidneys: Unremarkable    Urinary bladder: Unremarkable    Reproductive organs:  There has been a hysterectomy.    Abdominal wall: Unremarkable    Musculoskeletal: Unremarkable     #CRITICAL# COMMUNICATION: I discussed suspicion for right lower quadrant internal hernia involving segment of small bowel and possible mid gastric body lesion with Dr. BEBO CASTAÑEDA at   5:15 AM  on 06/09/2025 by  telephone. The healthcare provider acknowledged receipt and understanding of the findings.      Impression    IMPRESSION:  Segmentally dilated small bowel loops in the right pelvis with whirled appearance of the mesentery suspicious for internal hernia or volvulus.    Marked fluid distention of the proximal stomach with question of possible gastric luminal narrowing in the mid body.    Small amount of free fluid in the abdomen.    -Electronically Signed By: Remedios Mtz  MD   -Electronically Signed On:  6/9/2025 5:16 AM      Report Ends    and CT ABDOMEN PELVIS WITHOUT CONTRAST: No results found for this visit on 06/09/25.    A/P:  Patricia Ramirez is a 69 y.o. year old female  with with nausea, abdominal pain and obstipation prior to arrival.  She passed flatus (witnessed) in the ED with a benign clinical exam. Surgery consulted for SBO    - SBFT without contrast in colon or distal small bowel  - Repeat KUB yesterday, residual contrast in small bowel loops with air present in distal colon/rectum but no contrast, on personal review loops of bowel do not appear significantly distended  - Clamp trial of NG tube today for 8 hours. Will discuss possible removal and trial of diet   - Discussed diagnostic laparoscopy, possible exploratory laparotomy with patient, at this time she continues to be hesitant about need for surgery and given that she is clinically stable, no emergent indication. However if patient continues to not progress, advised patient of likely need for surgery  - Close monitoring with serial abd exams and further bowel function  - Leukocytosis resolved, continue to monitor for infection    Roxann Lin  General Surgery - [facility]  6/12/2025

## 2025-06-12 NOTE — PLAN OF CARE
Nutrition Plan of Care:    Recommendations     1. Advance oral diet when appropriate    2. Monitor labs and weights    3. Recommend commercial beverages once NPO status advances     Goals: Patient to advance with nutrition therapies within 48 hours  Nutrition Goal Status: new  Communication of RD Recs: other (comment) (POC)     Nutrition Discharge Planning     Nutrition Discharge Planning: Too early to determine, pending clinical course     Assessment and Plan  Nutrition Problem  Altered GI Function     Related to (etiology):   SBO     Signs and Symptoms (as evidenced by):   NPO,   Nausea, vomiting, constipation and abdominal pain      Interventions (treatment strategy):  Collaboration by nutrition professional with other providers     Nutrition Diagnosis Status:   Vinh Rea MS, RDN, LDN

## 2025-06-12 NOTE — PLAN OF CARE
06/12/25 1520   Rounds   Attendance Provider;;Charge nurse;Occupational therapist   Discharge Plan A Home with family   Why the patient remains in the hospital Requires continued medical care   Transition of Care Barriers None

## 2025-06-13 ENCOUNTER — TELEPHONE (OUTPATIENT)
Dept: GASTROENTEROLOGY | Facility: CLINIC | Age: 70
End: 2025-06-13
Payer: MEDICARE

## 2025-06-13 VITALS
OXYGEN SATURATION: 97 % | TEMPERATURE: 99 F | BODY MASS INDEX: 19.71 KG/M2 | DIASTOLIC BLOOD PRESSURE: 70 MMHG | HEART RATE: 76 BPM | WEIGHT: 130.06 LBS | HEIGHT: 68 IN | SYSTOLIC BLOOD PRESSURE: 145 MMHG | RESPIRATION RATE: 18 BRPM

## 2025-06-13 LAB
ANION GAP (OHS): 11 MMOL/L (ref 8–16)
BUN SERPL-MCNC: 11 MG/DL (ref 8–23)
CALCIUM SERPL-MCNC: 8 MG/DL (ref 8.7–10.5)
CHLORIDE SERPL-SCNC: 109 MMOL/L (ref 95–110)
CO2 SERPL-SCNC: 23 MMOL/L (ref 23–29)
CREAT SERPL-MCNC: 0.5 MG/DL (ref 0.5–1.4)
GFR SERPLBLD CREATININE-BSD FMLA CKD-EPI: >60 ML/MIN/1.73/M2
GLUCOSE SERPL-MCNC: 113 MG/DL (ref 70–110)
MAGNESIUM SERPL-MCNC: 1.9 MG/DL (ref 1.6–2.6)
PHOSPHATE SERPL-MCNC: 3 MG/DL (ref 2.7–4.5)
POTASSIUM SERPL-SCNC: 3.2 MMOL/L (ref 3.5–5.1)
SODIUM SERPL-SCNC: 143 MMOL/L (ref 136–145)

## 2025-06-13 PROCEDURE — 63600175 PHARM REV CODE 636 W HCPCS: Performed by: HOSPITALIST

## 2025-06-13 PROCEDURE — 84100 ASSAY OF PHOSPHORUS: CPT | Performed by: STUDENT IN AN ORGANIZED HEALTH CARE EDUCATION/TRAINING PROGRAM

## 2025-06-13 PROCEDURE — 99233 SBSQ HOSP IP/OBS HIGH 50: CPT | Mod: ,,, | Performed by: SURGERY

## 2025-06-13 PROCEDURE — 80048 BASIC METABOLIC PNL TOTAL CA: CPT | Performed by: STUDENT IN AN ORGANIZED HEALTH CARE EDUCATION/TRAINING PROGRAM

## 2025-06-13 PROCEDURE — 25000003 PHARM REV CODE 250

## 2025-06-13 PROCEDURE — 36415 COLL VENOUS BLD VENIPUNCTURE: CPT | Performed by: STUDENT IN AN ORGANIZED HEALTH CARE EDUCATION/TRAINING PROGRAM

## 2025-06-13 PROCEDURE — 83735 ASSAY OF MAGNESIUM: CPT | Performed by: STUDENT IN AN ORGANIZED HEALTH CARE EDUCATION/TRAINING PROGRAM

## 2025-06-13 RX ORDER — HYDROCODONE BITARTRATE AND ACETAMINOPHEN 5; 325 MG/1; MG/1
1 TABLET ORAL EVERY 12 HOURS PRN
Qty: 6 TABLET | Refills: 0 | Status: SHIPPED | OUTPATIENT
Start: 2025-06-13 | End: 2025-06-13

## 2025-06-13 RX ORDER — AMOXICILLIN 250 MG
1 CAPSULE ORAL DAILY
Qty: 30 TABLET | Refills: 0 | Status: SHIPPED | OUTPATIENT
Start: 2025-06-13 | End: 2025-06-13 | Stop reason: SDUPTHER

## 2025-06-13 RX ORDER — AMOXICILLIN 250 MG
1 CAPSULE ORAL DAILY
Qty: 30 TABLET | Refills: 0 | Status: SHIPPED | OUTPATIENT
Start: 2025-06-13 | End: 2025-06-13

## 2025-06-13 RX ORDER — BISACODYL 10 MG/1
10 SUPPOSITORY RECTAL ONCE
Status: COMPLETED | OUTPATIENT
Start: 2025-06-13 | End: 2025-06-13

## 2025-06-13 RX ORDER — HYDROCODONE BITARTRATE AND ACETAMINOPHEN 5; 325 MG/1; MG/1
1 TABLET ORAL EVERY 12 HOURS PRN
Qty: 6 TABLET | Refills: 0 | Status: SHIPPED | OUTPATIENT
Start: 2025-06-13 | End: 2025-06-13 | Stop reason: SDUPTHER

## 2025-06-13 RX ADMIN — BISACODYL 10 MG: 10 SUPPOSITORY RECTAL at 09:06

## 2025-06-13 RX ADMIN — HYDRALAZINE HYDROCHLORIDE 20 MG: 20 INJECTION INTRAMUSCULAR; INTRAVENOUS at 12:06

## 2025-06-13 NOTE — TELEPHONE ENCOUNTER
Message per Dr. Michel:    Latanya Michel MD Davis, Kenyatta M., MA  She is scheduled for an EGD and colonoscopy 7/1 but no GI clinic follow up is needed.  Thanks!

## 2025-06-13 NOTE — NURSING
In preparation for discharge, d/c'd pt's saline lock, applied pressure to site, secured gauze and coban, no redness or swelling noted. Instructions given. Pt verbalized understanding to all instructions and education. Pt denies any complaints or concerns at this time. Pt was transported off the unit to car for discharge.

## 2025-06-13 NOTE — NURSING
Report received from night nurse VALERIE Fisher Visualized patient and assessed patient's overall condition and appearance. No acute distress noted. Plan of care ongoing.    [facility]  Nurses Note -- 4 Eyes      6/13/2025       Skin assessed on: Q Shift      [x] No Pressure Injuries Present    [x]Prevention Measures Documented    [] Yes LDA  for Pressure Injury Previously documented     [] Yes New Pressure Injury Discovered   [] LDA for New Pressure Injury Added      Attending RN:  Nida Rich LPN     Second RN:  Natalia CarneyRN

## 2025-06-13 NOTE — PLAN OF CARE
06/13/25 1057   Medicare Message   Important Message from Medicare regarding Discharge Appeal Rights Given to patient/caregiver;Explained to patient/caregiver;Signed/date by patient/caregiver   Date IMM was signed 06/13/25   Time IMM was signed 1012

## 2025-06-13 NOTE — DISCHARGE SUMMARY
[facility] Medicine  Discharge Summary      Patient Name: Patricia Ramirez  MRN: [id number]  STEVE: [id number]  Patient Class: IP- Inpatient  Admission Date: 6/9/2025  Hospital Length of Stay: 4 days  Discharge Date and Time: 06/13/2025 1:06 PM  Attending Physician: Margarita Morris DO   Discharging Provider: Margarita Morris DO  Primary Care Provider: Fabienne Erwin NP    Primary Care Team: Networked reference to record PCT     HPI:   69-year-old female with a history of anxiety, depression, hypertension, management of a recent questionable small bowel obstruction that resolved with medical management presents to ED for evaluation of severe lower abdominal pain that she notes has been going on for the past several days.  She notes the pain has been progressively worsening and is currently a 10/10. Did not had BM for 4 days, She was evaluated here yesterday and felt better after some treatments and was ultimately discharged.  She was advised to come back if she felt worse and her symptoms have been worsening.  She notes she feels worse now than when she came in the 1st time.  She notes she has had numerous episodes of nausea and dry heaving.  .  She notes she typically has a regular.  Was admitted here last month following a similar presentation and was noted to have significant gastric distention concerning for gastroparesis as well as some small bowel findings concerning for possible SBO.  Admitted and managed conservatively with resolution of her symptoms.  She denies any fevers, chills, blood in her stool, chest pain, shortness of breath, vaginal bleeding, vaginal discharge.   CT abdomen,. Show,Segmentally dilated small bowel loops in the right pelvis with whirled appearance of the mesentery suspicious for internal hernia or volvulus. NG is placed and surgery is consulted.    Procedure(s) (LRB):  LAPAROSCOPY, DIAGNOSTIC (N/A)  LAPAROTOMY, EXPLORATORY (N/A)      Hospital Course:   69-year-old  female with a history of anxiety, depression, hypertension admitted on 06/09/2025 for small-bowel obstruction.  Presented with severe abdominal pain associated with nausea and constipation, no vomiting.  Recently admitted a month ago for similar presentation.  CT abdomen and pelvis with Segmentally dilated small bowel loops in the right pelvis with whirled appearance of the mesentery suspicious for internal hernia or volvulus.  NGT place.  General surgery consulted-continue NGT for small-bowel decompression.  Await for bowel function to return.  S/p Gastrografin challenge on 06/10, which shows persistent small bowel obstruction. Patient had BM on 06/11. Surgery with clamp trial on 06/12 and NGT was removed on 06/12. Diet advanced as tolerated. Patient able to tolerate diet without any abdominal pain, nausea, vomiting.  Discussed with General surgery, okay to discharge from surgical standpoint.  Recommend GI follow up.  Patient has colonoscopy scheduled outpatient July 1st per patient.  Encouraged to keep the appointment    Patient admits feeling better overall.  Continues to pass flatus.  Wants to go home. Pt denies any fever, headaches, vision changes, chest pain, shortness of breath, palpitations, abdominal pain, nausea, vomiting, or any new weaknesses. Feels ready to go home. Patient's exam on discharge was as follow: Patient is alert and oriented, appears in no acute distress, heart with regular rate and rhythm, lungs clear to asculation with non-labored breathing, abdomen soft, and no new weaknesses or focal deficits seen. Bilateral lower extremities without any edema or calf tenderness.     Patient was counseled regarding any abnormal labs, differential diagnosis, treatment options, risk-benefit, lifestyle changes, prognosis, current condition, and medications. Patient was interactive and attentive.  Patient's questions were answered in a respectful and timely manner. Patient was instructed to follow-up with  PCP within 1 week and to continue taking medications as prescribed.  Instructed to also follow up with Gastroenterology outpatient. Also, extensively discussed the risks, benefits, and side effects of patient's medications. Discussed with patient about any medication changes. Patient verbalized understanding and agrees to treatment plan.  Patient is stable for discharge.  Patient has no other questions or concerns at this time.  ED precautions discussed with the patient.    Vital signs are stable. Ambulating without any difficulty. Tolerating p.o. intake without any nausea or vomiting. Afebrile for over 24 hours. Patient is in stable condition and has no questions or concerns. Patient will be discharge to home once transportation secured . Prescriptions sent to pharmacy.  CM/SW to assist with discharge planning.     Vitals:    06/13/25 1134 06/13/25 1205 06/13/25 1211 06/13/25 1247   BP: (!) 177/80 (!) 172/68 (!) 172/72 (!) 153/67   BP Location:  Left arm Left arm Left arm   Patient Position:   Lying Lying   Pulse: 60      Resp: 18      Temp: 98.5 °F (36.9 °C)      TempSrc: Oral      SpO2: 97%      Weight:       Height:                Goals of Care Treatment Preferences:  Code Status: Full Code      SDOH Screening:  The patient was screened for utility difficulties, food insecurity, transport difficulties, housing insecurity, and interpersonal safety and there were no concerns identified this admission.     Consults:   Consults (From admission, onward)          Status Ordering Provider     Inpatient consult to General surgery  Once        Provider:  Roxann Lin MD    Completed BEBO CASTAÑEDA            Assessment & Plan  SBO (small bowel obstruction)  - Presented with severe abdominal pain associated with nausea and constipation, no vomiting.  Recently admitted a month ago for similar presentation.    - CT abdomen and pelvis with Segmentally dilated small bowel loops in the right pelvis with whirled  appearance of the mesentery suspicious for internal hernia or volvulus.    - Continue NGT.    - General surgery consulted: continue NGT for small-bowel decompression.   - s/p Gastrografin challenge on 06/10:  Still with persistent small-bowel obstruction  - patient with 2 bowel movements on 06/11.  - discussed with General surgery, had lamp trial today on 06/12 and then NGT removed. Advanced diet  - patient tolerated diet without any abdominal pain, nausea, vomiting  - okay to discharge home from surgery standpoint  - needs GI follow up outpatient  - ambulatory referral GI ordered    Final Active Diagnoses:    Diagnosis Date Noted POA    PRINCIPAL PROBLEM:  SBO (small bowel obstruction) [K56.609] 06/09/2025 Yes    Leukocytosis [D72.829] 06/10/2025 Yes    Essential hypertension [I10] 12/29/2014 Yes    Hyperlipidemia [E78.5] 12/29/2014 Yes    Heart failure with preserved ejection fraction [I50.30] 09/26/2022 Yes    Depression with anxiety [F41.8] 12/29/2014 Yes    Hypokalemia [E87.6] 06/11/2025 No      Problems Resolved During this Admission:    Diagnosis Date Noted Date Resolved POA    Moderate protein-calorie malnutrition [E44.0] 10/09/2024 06/10/2025 Yes     Chronic       Discharged Condition: stable    Disposition: Home or Self Care    Follow Up:   Follow-up Information       ProviderLuly .    Specialty: Internal Medicine  Contact information:  [address]  [address]  [facility] [id number]               [facility] [place] - Gastroenterology Follow up today.    Specialty: Gastroenterology  Why: Office will contact pt. No appointments available. Darling with the call center sent a message to the office staff for a 1 week appointment  Contact information:  [address] [facility] [address]  [address]  [facility] [address] [phone]  Additional information:  Please park in garage or surface lot and use Medical Office Bldg entrance. Check in at [address]                         Patient Instructions:      Ambulatory  referral/consult to [facility] Acute Care at Home   Standing Status: Future   Referral Priority: Routine Referral Type: Consultation   Referred to Provider: MICHELE PROVIDER Requested Specialty: Internal Medicine   Number of Visits Requested: 1     Ambulatory referral/consult to Gastroenterology   Standing Status: Future   Referral Priority: Routine Referral Type: Consultation   Referral Reason: Specialty Services Required   Requested Specialty: Gastroenterology   Number of Visits Requested: 1     Diet Cardiac     Notify your health care provider if you experience any of the following:  temperature >100.4     Notify your health care provider if you experience any of the following:  persistent nausea and vomiting or diarrhea     Notify your health care provider if you experience any of the following:  severe uncontrolled pain     Notify your health care provider if you experience any of the following:  difficulty breathing or increased cough     Notify your health care provider if you experience any of the following:  increased confusion or weakness     Activity as tolerated       Significant Diagnostic Studies: Labs: All labs within the past 24 hours have been reviewed      Recent Results (from the past 100 hours)   Comprehensive metabolic panel    Collection Time: 06/10/25  5:49 AM   Result Value Ref Range    Sodium 143 136 - 145 mmol/L    Potassium 3.7 3.5 - 5.1 mmol/L    Chloride 108 95 - 110 mmol/L    CO2 26 23 - 29 mmol/L    Glucose 139 (H) 70 - 110 mg/dL    BUN 12 8 - 23 mg/dL    Creatinine 0.5 0.5 - 1.4 mg/dL    Calcium 8.2 (L) 8.7 - 10.5 mg/dL    Protein Total 6.3 6.0 - 8.4 gm/dL    Albumin 3.1 (L) 3.5 - 5.2 g/dL    Bilirubin Total 0.5 0.1 - 1.0 mg/dL    ALP 59 40 - 150 unit/L    AST 14 11 - 45 unit/L    ALT 13 10 - 44 unit/L    Anion Gap 9 8 - 16 mmol/L    eGFR >60 >60 mL/min/1.73/m2   CBC with Differential    Collection Time: 06/10/25  5:49 AM   Result Value Ref Range    WBC 15.45 (H) 3.90 - 12.70 K/uL     RBC 4.73 4.00 - 5.40 M/uL    HGB 15.1 12.0 - 16.0 gm/dL    HCT 46.1 37.0 - 48.5 %    MCV 98 82 - 98 fL    MCH 31.9 (H) 27.0 - 31.0 pg    MCHC 32.8 32.0 - 36.0 g/dL    RDW 13.3 11.5 - 14.5 %    Platelet Count 237 150 - 450 K/uL    MPV 9.1 (L) 9.2 - 12.9 fL    Nucleated RBC 0 <=0 /100 WBC    Neut % 80.3 (H) 38 - 73 %    Lymph % 10.6 (L) 18 - 48 %    Mono % 7.8 4 - 15 %    Eos % 0.3 <=8 %    Basophil % 0.5 <=1.9 %    Imm Grans % 0.5 0.0 - 0.5 %    Neut # 12.42 (H) 1.8 - 7.7 K/uL    Lymph # 1.63 1 - 4.8 K/uL    Mono # 1.21 (H) 0.3 - 1 K/uL    Eos # 0.05 <=0.5 K/uL    Baso # 0.07 <=0.2 K/uL    Imm Grans # 0.07 (H) 0.00 - 0.04 K/uL   Comprehensive metabolic panel    Collection Time: 06/11/25  8:26 AM   Result Value Ref Range    Sodium 147 (H) 136 - 145 mmol/L    Potassium 3.3 (L) 3.5 - 5.1 mmol/L    Chloride 109 95 - 110 mmol/L    CO2 29 23 - 29 mmol/L    Glucose 125 (H) 70 - 110 mg/dL    BUN 16 8 - 23 mg/dL    Creatinine 0.5 0.5 - 1.4 mg/dL    Calcium 8.5 (L) 8.7 - 10.5 mg/dL    Protein Total 6.1 6.0 - 8.4 gm/dL    Albumin 3.1 (L) 3.5 - 5.2 g/dL    Bilirubin Total 0.8 0.1 - 1.0 mg/dL    ALP 60 40 - 150 unit/L    AST 17 11 - 45 unit/L    ALT 17 10 - 44 unit/L    Anion Gap 9 8 - 16 mmol/L    eGFR >60 >60 mL/min/1.73/m2   CBC with Differential    Collection Time: 06/11/25  8:26 AM   Result Value Ref Range    WBC 10.07 3.90 - 12.70 K/uL    RBC 4.67 4.00 - 5.40 M/uL    HGB 15.0 12.0 - 16.0 gm/dL    HCT 46.1 37.0 - 48.5 %    MCV 99 (H) 82 - 98 fL    MCH 32.1 (H) 27.0 - 31.0 pg    MCHC 32.5 32.0 - 36.0 g/dL    RDW 13.5 11.5 - 14.5 %    Platelet Count 223 150 - 450 K/uL    MPV 9.6 9.2 - 12.9 fL    Nucleated RBC 0 <=0 /100 WBC    Neut % 72.5 38 - 73 %    Lymph % 14.0 (L) 18 - 48 %    Mono % 11.6 4 - 15 %    Eos % 1.1 <=8 %    Basophil % 0.6 <=1.9 %    Imm Grans % 0.2 0.0 - 0.5 %    Neut # 7.30 1.8 - 7.7 K/uL    Lymph # 1.41 1 - 4.8 K/uL    Mono # 1.17 (H) 0.3 - 1 K/uL    Eos # 0.11 <=0.5 K/uL    Baso # 0.06 <=0.2 K/uL    Imm  Grans # 0.02 0.00 - 0.04 K/uL   CBC with Differential    Collection Time: 06/12/25  6:18 AM   Result Value Ref Range    WBC 10.81 3.90 - 12.70 K/uL    RBC 4.41 4.00 - 5.40 M/uL    HGB 14.3 12.0 - 16.0 gm/dL    HCT 43.2 37.0 - 48.5 %    MCV 98 82 - 98 fL    MCH 32.4 (H) 27.0 - 31.0 pg    MCHC 33.1 32.0 - 36.0 g/dL    RDW 13.2 11.5 - 14.5 %    Platelet Count 185 150 - 450 K/uL    MPV 9.3 9.2 - 12.9 fL    Nucleated RBC 0 <=0 /100 WBC    Neut % 67.3 38 - 73 %    Lymph % 19.9 18 - 48 %    Mono % 9.4 4 - 15 %    Eos % 2.5 <=8 %    Basophil % 0.6 <=1.9 %    Imm Grans % 0.3 0.0 - 0.5 %    Neut # 7.27 1.8 - 7.7 K/uL    Lymph # 2.15 1 - 4.8 K/uL    Mono # 1.02 (H) 0.3 - 1 K/uL    Eos # 0.27 <=0.5 K/uL    Baso # 0.07 <=0.2 K/uL    Imm Grans # 0.03 0.00 - 0.04 K/uL   Comprehensive metabolic panel    Collection Time: 06/12/25  6:26 AM   Result Value Ref Range    Sodium 145 136 - 145 mmol/L    Potassium 3.2 (L) 3.5 - 5.1 mmol/L    Chloride 110 95 - 110 mmol/L    CO2 28 23 - 29 mmol/L    Glucose 124 (H) 70 - 110 mg/dL    BUN 16 8 - 23 mg/dL    Creatinine 0.5 0.5 - 1.4 mg/dL    Calcium 8.2 (L) 8.7 - 10.5 mg/dL    Protein Total 5.8 (L) 6.0 - 8.4 gm/dL    Albumin 2.9 (L) 3.5 - 5.2 g/dL    Bilirubin Total 0.6 0.1 - 1.0 mg/dL    ALP 52 40 - 150 unit/L    AST 12 11 - 45 unit/L    ALT 11 10 - 44 unit/L    Anion Gap 7 (L) 8 - 16 mmol/L    eGFR >60 >60 mL/min/1.73/m2   Basic Metabolic Panel    Collection Time: 06/13/25  8:16 AM   Result Value Ref Range    Sodium 143 136 - 145 mmol/L    Potassium 3.2 (L) 3.5 - 5.1 mmol/L    Chloride 109 95 - 110 mmol/L    CO2 23 23 - 29 mmol/L    Glucose 113 (H) 70 - 110 mg/dL    BUN 11 8 - 23 mg/dL    Creatinine 0.5 0.5 - 1.4 mg/dL    Calcium 8.0 (L) 8.7 - 10.5 mg/dL    Anion Gap 11 8 - 16 mmol/L    eGFR >60 >60 mL/min/1.73/m2   Magnesium    Collection Time: 06/13/25  8:16 AM   Result Value Ref Range    Magnesium  1.9 1.6 - 2.6 mg/dL   Phosphorus    Collection Time: 06/13/25  8:16 AM   Result Value Ref  Range    Phosphorus Level 3.0 2.7 - 4.5 mg/dL       Microbiology Results (last 7 days)       ** No results found for the last 168 hours. **            Imaging Results              XR NG/OG tube placement check, non-radiologist performed (Final result)  Result time 06/09/25 07:12:40   Procedure changed from X-Ray Chest 1 View     Final result by Mak Renteria MD (06/09/25 07:12:40)                   Impression:      NG tube within the stomach.      Electronically signed by: Mak Renteria MD  Date:    06/09/2025  Time:    07:12               Narrative:    EXAMINATION:  XR NG/OG TUBE PLACEMENT CHECK, NON-RADIOLOGIST PERFORMED    CLINICAL HISTORY:  NGT;  Unspecified abdominal pain    TECHNIQUE:  Single view of the upper abdomen.    COMPARISON:  05/21/2025    FINDINGS:  NG tube is within the stomach.  There is contrast in the collecting system from prior CT.  Lungs are clear and well expanded.                                       CT Abdomen Pelvis With IV Contrast NO Oral Contrast (Final result)  Result time 06/09/25 05:16:40      Final result by Remedios Mtz MD (06/09/25 05:16:40)                   Impression:    IMPRESSION:  Segmentally dilated small bowel loops in the right pelvis with whirled appearance of the mesentery suspicious for internal hernia or volvulus.    Marked fluid distention of the proximal stomach with question of possible gastric luminal narrowing in the mid body.    Small amount of free fluid in the abdomen.    -Electronically Signed By: Remedios Mtz MD   -Electronically Signed On:  6/9/2025 5:16 AM      Report Ends               Narrative:    EXAM: CT ABDOMEN PELVIS WITH IV CONTRAST    HISTORY: 69 years -old Female with Abdominal pain, acute, nonlocalized    TECHNIQUE: Spiral CT of the abdomen and pelvis were performed with intravenous  contrast. Non-ionic intravenous contrast was utilized. All CT scans are performed using dose optimization techniques as appropriate to a performed  exam including automated exposure control and/or standardized protocols for targeted exam where dose is matched to indications/reason for exam/patient size.    COMPARISON: There are no existing relevant studies available for comparison at this time    FINDINGS:        Lung bases: Unremarkable.    Liver: Unremarkable    Biliary system: Unremarkable    Spleen: Unremarkable    Pancreas: Unremarkable    Stomach and small bowel: Segmentally dilated small bowel loops are present in the right pelvis. There is question of mild mural edema. There is a whirled appearance of the mesentery in the lower abdominal region demonstrated on images 8296 of series 601. Findings are suspicious for internal hernia or 5 view left. There is marked fluid distention of the proximal stomach. There is question possible gastric luminal narrowing in the mid body seen on image 31 of series 2.     Colon: The appendix is unremarkable. Moderate amount of fecal material is present in the right colon.    Retroperitoneum, mesentery, omentum, peritoneum: Small amount of free fluid is present in the abdomen..    Adrenal glands: Unremarkable    Kidneys: Unremarkable    Urinary bladder: Unremarkable    Reproductive organs:  There has been a hysterectomy.    Abdominal wall: Unremarkable    Musculoskeletal: Unremarkable     #CRITICAL# COMMUNICATION: I discussed suspicion for right lower quadrant internal hernia involving segment of small bowel and possible mid gastric body lesion with Dr. BEBO CASTAÑEDA at   5:15 AM  on 06/09/2025 by  telephone. The healthcare provider acknowledged receipt and understanding of the findings.                                          Pending Diagnostic Studies:       None           Medications:  Reconciled Home Medications:      Medication List        CONTINUE taking these medications      acetaminophen 500 MG tablet  Commonly known as: TYLENOL  Take 500 mg by mouth every 6 (six) hours as needed for Pain.     ALPRAZolam 0.25  MG tablet  Commonly known as: XANAX  TAKE 1 TABLET BY MOUTH THREE TIMES DAILY     atorvastatin 40 MG tablet  Commonly known as: LIPITOR  Take 1 tablet by mouth once daily     busPIRone 15 MG tablet  Commonly known as: BUSPAR  TAKE 1 TABLET BY MOUTH THREE TIMES DAILY     citalopram 20 MG tablet  Commonly known as: CeleXA  Take 3 tablets (60 mg total) by mouth once daily. Take 1 tablet by mouth once daily     cyclobenzaprine 5 MG tablet  Commonly known as: FLEXERIL  TAKE 1 TABLET BY MOUTH TWICE DAILY AS NEEDED FOR MUSCLE SPASM     ergocalciferol 50,000 unit Cap  Commonly known as: ERGOCALCIFEROL  Take 1 capsule (50,000 Units total) by mouth every 7 days.     olmesartan 20 MG tablet  Commonly known as: BENICAR  TAKE 1 TABLET BY MOUTH ONCE DAILY IN THE EVENING     ondansetron 4 MG tablet  Commonly known as: ZOFRAN  Take 1 tablet (4 mg total) by mouth every 8 (eight) hours as needed.     pantoprazole 40 MG tablet  Commonly known as: PROTONIX  Take 1 tablet (40 mg total) by mouth once daily.     polyethylene glycol 17 gram Pwpk  Commonly known as: GLYCOLAX  Take 17 g by mouth once daily.     polyethylene glycol 240-22.72-6.72 -5.84 gram Solr  Commonly known as: COLYTE  Take as directed by provider office     traZODone 100 MG tablet  Commonly known as: DESYREL  Take 100 mg by mouth every evening.              Indwelling Lines/Drains at time of discharge:   Lines/Drains/Airways       None                   Time spent on the discharge of patient:  Greater than 35 minutes         Margarita Morris DO  Department of [facility] Medicine  [facility]

## 2025-06-13 NOTE — PLAN OF CARE
Case Management Final Discharge Note    Discharge Disposition: Home    New DME ordered / company name: None    Relevant SDOH / Transition of Care Barriers:  None    Person available to provide assistance at home when needed and their contact information: Extended Emergency Contact Information  Primary Emergency Contact: James Strauss  Work Phone: [phone]  Relation: Son  Secondary Emergency Contact: Mynor Ramirez   [place]  Mobile Phone: [phone]  Relation: Son     Scheduled followup appointment: PCP- 06/25/25 - Patient added to the waiting list for a sooner appointment.     Referrals placed: Gastroenterology - Office will contact patient to schedule. Darling with the call center sent a message to the office staff.     Transportation: Family will provide transportation home.    Patient and family educated on discharge services and updated on DC plan. Bedside LPN Nida Rich notified, patient clear to discharge from Case Management Perspective.     06/13/25 1121   Final Note   Assessment Type Final Discharge Note   Anticipated Discharge Disposition Home   What phone number can be called within the next 1-3 days to see how you are doing after discharge? [phone]   Hospital Resources/Appts/Education Provided Appointments scheduled and added to AVS   Post-Acute Status   Discharge Delays None known at this time

## 2025-06-13 NOTE — DISCHARGE INSTRUCTIONS
Our goal at [facility] is to always give you outstanding care and exceptional service. You may receive a survey from [employer] by mail, text or e-mail in the next 24-48 hours asking about the care you received with us. The survey should only take 5-10 minutes to complete and is very important to us.     Your feedback provides us with a way to recognize our staff who work tirelessly to provide the best care! Also, your responses help us learn how to improve when your experience was below our aspiration of excellence. We are always looking for ways to improve your stay. We WILL use your feedback to continue making improvements to help us provide the highest quality care. We keep your personal information and feedback confidential. We appreciate your time completing this survey and can't wait to hear from you!!!    We look forward to your continued care with us! Thanks so much for choosing [facility] for your healthcare needs!

## 2025-06-13 NOTE — ASSESSMENT & PLAN NOTE
- Presented with severe abdominal pain associated with nausea and constipation, no vomiting.  Recently admitted a month ago for similar presentation.    - CT abdomen and pelvis with Segmentally dilated small bowel loops in the right pelvis with whirled appearance of the mesentery suspicious for internal hernia or volvulus.    - Continue NGT.    - General surgery consulted: continue NGT for small-bowel decompression.   - s/p Gastrografin challenge on 06/10:  Still with persistent small-bowel obstruction  - patient with 2 bowel movements on 06/11.  - discussed with General surgery, had lamp trial today on 06/12 and then NGT removed. Advanced diet  - patient tolerated diet without any abdominal pain, nausea, vomiting  - okay to discharge home from surgery standpoint  - needs GI follow up outpatient  - ambulatory referral GI ordered

## 2025-06-13 NOTE — PROGRESS NOTES
Surgery Progress Note    Patricia Ramirez is a 69 y.o. year old female in hospital for abd pain, possible SBO    NAEON AF VSS  Removed NGT yesterday and advanced to [facility]  Reporting no abd pain or n/v, some belching   Still passing flatus, but no further BM   Feeling ready to trial regular diet and go home  No f/c/sob/cp      ROS:  Negative except above    PE:  Vitals:    06/13/25 1211 06/13/25 1247 06/13/25 1310 06/13/25 1329   BP: (!) 172/72 (!) 153/67 (!) 151/67 (!) 145/70   BP Location: Left arm Left arm  Left arm   Patient Position: Lying Lying     Pulse:    76   Resp:       Temp:       TempSrc:       SpO2:       Weight:       Height:           NAD  No belabored breathing  No skin changes  Abd soft nt nd    Significant Diagnostic Studies: Labs: BMP:   Recent Labs   Lab 06/12/25  0626 06/13/25  0816   * 113*    143   K 3.2* 3.2*    109   CO2 28 23   BUN 16 11   CREATININE 0.5 0.5   CALCIUM 8.2* 8.0*   MG  --  1.9   , CBC   Recent Labs   Lab 06/12/25  0618   WBC 10.81   HGB 14.3   HCT 43.2      , and All labs within the past 24 hours have been reviewed  Radiology: CT scan: CT ABDOMEN PELVIS WITH CONTRAST:   Results for orders placed or performed during the hospital encounter of 06/09/25   CT Abdomen Pelvis With IV Contrast NO Oral Contrast    Narrative    EXAM: CT ABDOMEN PELVIS WITH IV CONTRAST    HISTORY: 69 years -old Female with Abdominal pain, acute, nonlocalized    TECHNIQUE: Spiral CT of the abdomen and pelvis were performed with intravenous  contrast. Non-ionic intravenous contrast was utilized. All CT scans are performed using dose optimization techniques as appropriate to a performed exam including automated exposure control and/or standardized protocols for targeted exam where dose is matched to indications/reason for exam/patient size.    COMPARISON: There are no existing relevant studies available for comparison at this time    FINDINGS:        Lung bases:  Unremarkable.    Liver: Unremarkable    Biliary system: Unremarkable    Spleen: Unremarkable    Pancreas: Unremarkable    Stomach and small bowel: Segmentally dilated small bowel loops are present in the right pelvis. There is question of mild mural edema. There is a whirled appearance of the mesentery in the lower abdominal region demonstrated on images 8296 of series 601. Findings are suspicious for internal hernia or 5 view left. There is marked fluid distention of the proximal stomach. There is question possible gastric luminal narrowing in the mid body seen on image 31 of series 2.     Colon: The appendix is unremarkable. Moderate amount of fecal material is present in the right colon.    Retroperitoneum, mesentery, omentum, peritoneum: Small amount of free fluid is present in the abdomen..    Adrenal glands: Unremarkable    Kidneys: Unremarkable    Urinary bladder: Unremarkable    Reproductive organs:  There has been a hysterectomy.    Abdominal wall: Unremarkable    Musculoskeletal: Unremarkable     #CRITICAL# COMMUNICATION: I discussed suspicion for right lower quadrant internal hernia involving segment of small bowel and possible mid gastric body lesion with Dr. BEBO CASTAÑEDA at   5:15 AM  on 06/09/2025 by  telephone. The healthcare provider acknowledged receipt and understanding of the findings.      Impression    IMPRESSION:  Segmentally dilated small bowel loops in the right pelvis with whirled appearance of the mesentery suspicious for internal hernia or volvulus.    Marked fluid distention of the proximal stomach with question of possible gastric luminal narrowing in the mid body.    Small amount of free fluid in the abdomen.    -Electronically Signed By: Remedios Mtz MD   -Electronically Signed On:  6/9/2025 5:16 AM      Report Ends    and CT ABDOMEN PELVIS WITHOUT CONTRAST: No results found for this visit on 06/09/25.    A/P:  Patriciasravan Ramirez is a 69 y.o. year old female  with with nausea,  abdominal pain and obstipation prior to arrival.  She passed flatus (witnessed) in the ED with a benign clinical exam. Surgery consulted for SBO    - Ok to trial regular diet and if tolerated can discharge home with close outpatient GI follow up for likely dysmotility   - No indication for surgery at this time   - Discussed with patient, who verbalized understanding and agreement with plans    Roxann Lin  General Surgery - [facility]  6/13/2025

## 2025-06-14 ENCOUNTER — ANESTHESIA EVENT (OUTPATIENT)
Dept: SURGERY | Facility: HOSPITAL | Age: 70
End: 2025-06-14
Payer: MEDICARE

## 2025-06-14 ENCOUNTER — HOSPITAL ENCOUNTER (INPATIENT)
Facility: HOSPITAL | Age: 70
LOS: 24 days | Discharge: HOME HEALTH | DRG: 330 | End: 2025-07-08
Attending: STUDENT IN AN ORGANIZED HEALTH CARE EDUCATION/TRAINING PROGRAM | Admitting: STUDENT IN AN ORGANIZED HEALTH CARE EDUCATION/TRAINING PROGRAM
Payer: MEDICARE

## 2025-06-14 ENCOUNTER — ANESTHESIA (OUTPATIENT)
Dept: SURGERY | Facility: HOSPITAL | Age: 70
End: 2025-06-14
Payer: MEDICARE

## 2025-06-14 DIAGNOSIS — H02.839 DERMATOCHALASIS, UNSPECIFIED LATERALITY: ICD-10-CM

## 2025-06-14 DIAGNOSIS — K56.609 SMALL BOWEL OBSTRUCTION: Primary | ICD-10-CM

## 2025-06-14 DIAGNOSIS — R10.13 EPIGASTRIC PAIN: ICD-10-CM

## 2025-06-14 DIAGNOSIS — R07.9 CHEST PAIN: ICD-10-CM

## 2025-06-14 DIAGNOSIS — D73.5 SPLENIC INFARCTION: ICD-10-CM

## 2025-06-14 DIAGNOSIS — F41.8 DEPRESSION WITH ANXIETY: ICD-10-CM

## 2025-06-14 DIAGNOSIS — K56.609 SBO (SMALL BOWEL OBSTRUCTION): ICD-10-CM

## 2025-06-14 DIAGNOSIS — L89.156 PRESSURE INJURY OF DEEP TISSUE OF SACRAL REGION: ICD-10-CM

## 2025-06-14 PROBLEM — D72.829 LEUKOCYTOSIS: Status: RESOLVED | Noted: 2025-06-10 | Resolved: 2025-06-14

## 2025-06-14 LAB
ABSOLUTE EOSINOPHIL (OHS): 0.12 K/UL
ABSOLUTE MONOCYTE (OHS): 1.06 K/UL (ref 0.3–1)
ABSOLUTE NEUTROPHIL COUNT (OHS): 6.08 K/UL (ref 1.8–7.7)
ALBUMIN SERPL BCP-MCNC: 3.2 G/DL (ref 3.5–5.2)
ALP SERPL-CCNC: 58 UNIT/L (ref 40–150)
ALT SERPL W/O P-5'-P-CCNC: 14 UNIT/L (ref 10–44)
ANION GAP (OHS): 12 MMOL/L (ref 8–16)
AST SERPL-CCNC: 16 UNIT/L (ref 11–45)
BASOPHILS # BLD AUTO: 0.04 K/UL
BASOPHILS NFR BLD AUTO: 0.4 %
BILIRUB SERPL-MCNC: 0.9 MG/DL (ref 0.1–1)
BILIRUB UR QL STRIP.AUTO: NEGATIVE
BUN SERPL-MCNC: 9 MG/DL (ref 8–23)
CALCIUM SERPL-MCNC: 8.6 MG/DL (ref 8.7–10.5)
CHLORIDE SERPL-SCNC: 106 MMOL/L (ref 95–110)
CLARITY UR: CLEAR
CO2 SERPL-SCNC: 22 MMOL/L (ref 23–29)
COLOR UR AUTO: YELLOW
CREAT SERPL-MCNC: 0.5 MG/DL (ref 0.5–1.4)
ERYTHROCYTE [DISTWIDTH] IN BLOOD BY AUTOMATED COUNT: 12.7 % (ref 11.5–14.5)
GFR SERPLBLD CREATININE-BSD FMLA CKD-EPI: >60 ML/MIN/1.73/M2
GLUCOSE SERPL-MCNC: 107 MG/DL (ref 70–110)
GLUCOSE UR QL STRIP: NEGATIVE
HCT VFR BLD AUTO: 46.1 % (ref 37–48.5)
HGB BLD-MCNC: 15.5 GM/DL (ref 12–16)
HGB UR QL STRIP: NEGATIVE
HOLD SPECIMEN: NORMAL
IMM GRANULOCYTES # BLD AUTO: 0.04 K/UL (ref 0–0.04)
IMM GRANULOCYTES NFR BLD AUTO: 0.4 % (ref 0–0.5)
KETONES UR QL STRIP: ABNORMAL
LACTATE SERPL-SCNC: 0.6 MMOL/L (ref 0.5–2.2)
LEUKOCYTE ESTERASE UR QL STRIP: NEGATIVE
LIPASE SERPL-CCNC: 11 U/L (ref 4–60)
LYMPHOCYTES # BLD AUTO: 1.58 K/UL (ref 1–4.8)
MCH RBC QN AUTO: 32.2 PG (ref 27–31)
MCHC RBC AUTO-ENTMCNC: 33.6 G/DL (ref 32–36)
MCV RBC AUTO: 96 FL (ref 82–98)
NITRITE UR QL STRIP: NEGATIVE
NUCLEATED RBC (/100WBC) (OHS): 0 /100 WBC
PH UR STRIP: 7 [PH]
PLATELET # BLD AUTO: 209 K/UL (ref 150–450)
PMV BLD AUTO: 9 FL (ref 9.2–12.9)
POTASSIUM SERPL-SCNC: 3.1 MMOL/L (ref 3.5–5.1)
PROT SERPL-MCNC: 6.2 GM/DL (ref 6–8.4)
PROT UR QL STRIP: ABNORMAL
RBC # BLD AUTO: 4.82 M/UL (ref 4–5.4)
RELATIVE EOSINOPHIL (OHS): 1.3 %
RELATIVE LYMPHOCYTE (OHS): 17.7 % (ref 18–48)
RELATIVE MONOCYTE (OHS): 11.9 % (ref 4–15)
RELATIVE NEUTROPHIL (OHS): 68.3 % (ref 38–73)
SODIUM SERPL-SCNC: 140 MMOL/L (ref 136–145)
SP GR UR STRIP: >=1.03
UROBILINOGEN UR STRIP-ACNC: NEGATIVE EU/DL
WBC # BLD AUTO: 8.92 K/UL (ref 3.9–12.7)

## 2025-06-14 PROCEDURE — 88307 TISSUE EXAM BY PATHOLOGIST: CPT | Mod: 26,,, | Performed by: PATHOLOGY

## 2025-06-14 PROCEDURE — 25000003 PHARM REV CODE 250: Performed by: STUDENT IN AN ORGANIZED HEALTH CARE EDUCATION/TRAINING PROGRAM

## 2025-06-14 PROCEDURE — 63600175 PHARM REV CODE 636 W HCPCS

## 2025-06-14 PROCEDURE — 36000709 HC OR TIME LEV III EA ADD 15 MIN: Performed by: SURGERY

## 2025-06-14 PROCEDURE — 63600175 PHARM REV CODE 636 W HCPCS: Performed by: SURGERY

## 2025-06-14 PROCEDURE — 80053 COMPREHEN METABOLIC PANEL: CPT | Performed by: STUDENT IN AN ORGANIZED HEALTH CARE EDUCATION/TRAINING PROGRAM

## 2025-06-14 PROCEDURE — 63600175 PHARM REV CODE 636 W HCPCS: Performed by: STUDENT IN AN ORGANIZED HEALTH CARE EDUCATION/TRAINING PROGRAM

## 2025-06-14 PROCEDURE — 37000008 HC ANESTHESIA 1ST 15 MINUTES: Performed by: SURGERY

## 2025-06-14 PROCEDURE — 88304 TISSUE EXAM BY PATHOLOGIST: CPT | Mod: 26,,, | Performed by: PATHOLOGY

## 2025-06-14 PROCEDURE — 44955 APPENDECTOMY ADD-ON: CPT | Mod: ,,, | Performed by: SURGERY

## 2025-06-14 PROCEDURE — 27201423 OPTIME MED/SURG SUP & DEVICES STERILE SUPPLY: Performed by: SURGERY

## 2025-06-14 PROCEDURE — 0DB80ZZ EXCISION OF SMALL INTESTINE, OPEN APPROACH: ICD-10-PCS | Performed by: SURGERY

## 2025-06-14 PROCEDURE — 44120 REMOVAL OF SMALL INTESTINE: CPT | Mod: ,,, | Performed by: SURGERY

## 2025-06-14 PROCEDURE — 85025 COMPLETE CBC W/AUTO DIFF WBC: CPT | Performed by: STUDENT IN AN ORGANIZED HEALTH CARE EDUCATION/TRAINING PROGRAM

## 2025-06-14 PROCEDURE — 96375 TX/PRO/DX INJ NEW DRUG ADDON: CPT

## 2025-06-14 PROCEDURE — 11000001 HC ACUTE MED/SURG PRIVATE ROOM

## 2025-06-14 PROCEDURE — 71000039 HC RECOVERY, EACH ADD'L HOUR: Performed by: SURGERY

## 2025-06-14 PROCEDURE — 25500020 PHARM REV CODE 255: Performed by: STUDENT IN AN ORGANIZED HEALTH CARE EDUCATION/TRAINING PROGRAM

## 2025-06-14 PROCEDURE — 99285 EMERGENCY DEPT VISIT HI MDM: CPT | Mod: 25

## 2025-06-14 PROCEDURE — 0DTJ0ZZ RESECTION OF APPENDIX, OPEN APPROACH: ICD-10-PCS | Performed by: SURGERY

## 2025-06-14 PROCEDURE — 96361 HYDRATE IV INFUSION ADD-ON: CPT

## 2025-06-14 PROCEDURE — 63600175 PHARM REV CODE 636 W HCPCS: Performed by: ANESTHESIOLOGY

## 2025-06-14 PROCEDURE — 83690 ASSAY OF LIPASE: CPT | Performed by: STUDENT IN AN ORGANIZED HEALTH CARE EDUCATION/TRAINING PROGRAM

## 2025-06-14 PROCEDURE — 37000009 HC ANESTHESIA EA ADD 15 MINS: Performed by: SURGERY

## 2025-06-14 PROCEDURE — 99223 1ST HOSP IP/OBS HIGH 75: CPT | Mod: 57,,, | Performed by: SURGERY

## 2025-06-14 PROCEDURE — 81003 URINALYSIS AUTO W/O SCOPE: CPT | Performed by: STUDENT IN AN ORGANIZED HEALTH CARE EDUCATION/TRAINING PROGRAM

## 2025-06-14 PROCEDURE — 83605 ASSAY OF LACTIC ACID: CPT | Performed by: STUDENT IN AN ORGANIZED HEALTH CARE EDUCATION/TRAINING PROGRAM

## 2025-06-14 PROCEDURE — 36000708 HC OR TIME LEV III 1ST 15 MIN: Performed by: SURGERY

## 2025-06-14 PROCEDURE — 71000033 HC RECOVERY, INTIAL HOUR: Performed by: SURGERY

## 2025-06-14 PROCEDURE — 0DBB0ZZ EXCISION OF ILEUM, OPEN APPROACH: ICD-10-PCS | Performed by: SURGERY

## 2025-06-14 PROCEDURE — 25000003 PHARM REV CODE 250

## 2025-06-14 PROCEDURE — 96374 THER/PROPH/DIAG INJ IV PUSH: CPT

## 2025-06-14 PROCEDURE — 0DJD4ZZ INSPECTION OF LOWER INTESTINAL TRACT, PERCUTANEOUS ENDOSCOPIC APPROACH: ICD-10-PCS | Performed by: SURGERY

## 2025-06-14 PROCEDURE — 88304 TISSUE EXAM BY PATHOLOGIST: CPT | Mod: TC | Performed by: SURGERY

## 2025-06-14 RX ORDER — MORPHINE SULFATE 4 MG/ML
2 INJECTION, SOLUTION INTRAMUSCULAR; INTRAVENOUS EVERY 4 HOURS PRN
Status: DISCONTINUED | OUTPATIENT
Start: 2025-06-14 | End: 2025-06-14

## 2025-06-14 RX ORDER — SODIUM,POTASSIUM PHOSPHATES 280-250MG
2 POWDER IN PACKET (EA) ORAL
Status: DISCONTINUED | OUTPATIENT
Start: 2025-06-14 | End: 2025-07-08 | Stop reason: HOSPADM

## 2025-06-14 RX ORDER — MUPIROCIN 20 MG/G
OINTMENT TOPICAL 2 TIMES DAILY
Status: COMPLETED | OUTPATIENT
Start: 2025-06-14 | End: 2025-06-19

## 2025-06-14 RX ORDER — LANOLIN ALCOHOL/MO/W.PET/CERES
800 CREAM (GRAM) TOPICAL
Status: DISCONTINUED | OUTPATIENT
Start: 2025-06-14 | End: 2025-07-08 | Stop reason: HOSPADM

## 2025-06-14 RX ORDER — HYDRALAZINE HYDROCHLORIDE 20 MG/ML
5 INJECTION INTRAMUSCULAR; INTRAVENOUS EVERY 6 HOURS PRN
Status: DISCONTINUED | OUTPATIENT
Start: 2025-06-14 | End: 2025-06-14 | Stop reason: HOSPADM

## 2025-06-14 RX ORDER — ONDANSETRON HYDROCHLORIDE 2 MG/ML
4 INJECTION, SOLUTION INTRAVENOUS
Status: COMPLETED | OUTPATIENT
Start: 2025-06-14 | End: 2025-06-14

## 2025-06-14 RX ORDER — LIDOCAINE 50 MG/G
2 PATCH TOPICAL
Status: DISCONTINUED | OUTPATIENT
Start: 2025-06-14 | End: 2025-07-08 | Stop reason: HOSPADM

## 2025-06-14 RX ORDER — SODIUM CHLORIDE, SODIUM LACTATE, POTASSIUM CHLORIDE, CALCIUM CHLORIDE 600; 310; 30; 20 MG/100ML; MG/100ML; MG/100ML; MG/100ML
INJECTION, SOLUTION INTRAVENOUS CONTINUOUS
Status: DISCONTINUED | OUTPATIENT
Start: 2025-06-14 | End: 2025-06-18

## 2025-06-14 RX ORDER — MORPHINE SULFATE 4 MG/ML
4 INJECTION, SOLUTION INTRAMUSCULAR; INTRAVENOUS EVERY 4 HOURS PRN
Status: DISCONTINUED | OUTPATIENT
Start: 2025-06-14 | End: 2025-06-14

## 2025-06-14 RX ORDER — IBUPROFEN 200 MG
24 TABLET ORAL
Status: DISCONTINUED | OUTPATIENT
Start: 2025-06-14 | End: 2025-07-08 | Stop reason: HOSPADM

## 2025-06-14 RX ORDER — GLUCAGON 1 MG
1 KIT INJECTION
Status: DISCONTINUED | OUTPATIENT
Start: 2025-06-14 | End: 2025-07-08 | Stop reason: HOSPADM

## 2025-06-14 RX ORDER — SUCCINYLCHOLINE CHLORIDE 20 MG/ML
INJECTION INTRAMUSCULAR; INTRAVENOUS
Status: DISCONTINUED | OUTPATIENT
Start: 2025-06-14 | End: 2025-06-14

## 2025-06-14 RX ORDER — BUPIVACAINE HYDROCHLORIDE 2.5 MG/ML
INJECTION, SOLUTION INFILTRATION; PERINEURAL
Status: DISCONTINUED | OUTPATIENT
Start: 2025-06-14 | End: 2025-06-14 | Stop reason: HOSPADM

## 2025-06-14 RX ORDER — DEXAMETHASONE SODIUM PHOSPHATE 4 MG/ML
INJECTION, SOLUTION INTRA-ARTICULAR; INTRALESIONAL; INTRAMUSCULAR; INTRAVENOUS; SOFT TISSUE
Status: DISCONTINUED | OUTPATIENT
Start: 2025-06-14 | End: 2025-06-14

## 2025-06-14 RX ORDER — ALUMINUM HYDROXIDE, MAGNESIUM HYDROXIDE, AND SIMETHICONE 1200; 120; 1200 MG/30ML; MG/30ML; MG/30ML
30 SUSPENSION ORAL 4 TIMES DAILY PRN
Status: DISCONTINUED | OUTPATIENT
Start: 2025-06-14 | End: 2025-07-07

## 2025-06-14 RX ORDER — ACETAMINOPHEN 325 MG/1
650 TABLET ORAL EVERY 4 HOURS PRN
Status: DISCONTINUED | OUTPATIENT
Start: 2025-06-14 | End: 2025-06-19

## 2025-06-14 RX ORDER — DIPHENHYDRAMINE HYDROCHLORIDE 50 MG/ML
25 INJECTION, SOLUTION INTRAMUSCULAR; INTRAVENOUS EVERY 6 HOURS PRN
Status: DISCONTINUED | OUTPATIENT
Start: 2025-06-14 | End: 2025-06-14 | Stop reason: HOSPADM

## 2025-06-14 RX ORDER — MIDAZOLAM HYDROCHLORIDE 1 MG/ML
INJECTION INTRAMUSCULAR; INTRAVENOUS
Status: DISCONTINUED | OUTPATIENT
Start: 2025-06-14 | End: 2025-06-14

## 2025-06-14 RX ORDER — SODIUM CHLORIDE 0.9 % (FLUSH) 0.9 %
10 SYRINGE (ML) INJECTION EVERY 12 HOURS PRN
Status: DISCONTINUED | OUTPATIENT
Start: 2025-06-14 | End: 2025-07-08 | Stop reason: HOSPADM

## 2025-06-14 RX ORDER — IBUPROFEN 200 MG
16 TABLET ORAL
Status: DISCONTINUED | OUTPATIENT
Start: 2025-06-14 | End: 2025-07-08 | Stop reason: HOSPADM

## 2025-06-14 RX ORDER — ROCURONIUM BROMIDE 10 MG/ML
INJECTION, SOLUTION INTRAVENOUS
Status: DISCONTINUED | OUTPATIENT
Start: 2025-06-14 | End: 2025-06-14

## 2025-06-14 RX ORDER — MORPHINE SULFATE 4 MG/ML
4 INJECTION, SOLUTION INTRAMUSCULAR; INTRAVENOUS
Refills: 0 | Status: COMPLETED | OUTPATIENT
Start: 2025-06-14 | End: 2025-06-14

## 2025-06-14 RX ORDER — ONDANSETRON HYDROCHLORIDE 2 MG/ML
INJECTION, SOLUTION INTRAVENOUS
Status: DISCONTINUED | OUTPATIENT
Start: 2025-06-14 | End: 2025-06-14

## 2025-06-14 RX ORDER — ACETAMINOPHEN 325 MG/1
650 TABLET ORAL EVERY 8 HOURS PRN
Status: DISCONTINUED | OUTPATIENT
Start: 2025-06-14 | End: 2025-06-19

## 2025-06-14 RX ORDER — MORPHINE SULFATE 4 MG/ML
1 INJECTION, SOLUTION INTRAMUSCULAR; INTRAVENOUS EVERY 4 HOURS PRN
Status: DISCONTINUED | OUTPATIENT
Start: 2025-06-14 | End: 2025-06-14

## 2025-06-14 RX ORDER — PROPOFOL 10 MG/ML
VIAL (ML) INTRAVENOUS
Status: DISCONTINUED | OUTPATIENT
Start: 2025-06-14 | End: 2025-06-14

## 2025-06-14 RX ORDER — MEPERIDINE HYDROCHLORIDE 25 MG/ML
12.5 INJECTION INTRAMUSCULAR; INTRAVENOUS; SUBCUTANEOUS EVERY 10 MIN PRN
Status: DISCONTINUED | OUTPATIENT
Start: 2025-06-14 | End: 2025-06-14 | Stop reason: HOSPADM

## 2025-06-14 RX ORDER — HYDROMORPHONE HYDROCHLORIDE 2 MG/ML
0.2 INJECTION, SOLUTION INTRAMUSCULAR; INTRAVENOUS; SUBCUTANEOUS EVERY 5 MIN PRN
Status: DISCONTINUED | OUTPATIENT
Start: 2025-06-14 | End: 2025-06-14 | Stop reason: HOSPADM

## 2025-06-14 RX ORDER — ONDANSETRON HYDROCHLORIDE 2 MG/ML
4 INJECTION, SOLUTION INTRAVENOUS EVERY 8 HOURS PRN
Status: DISCONTINUED | OUTPATIENT
Start: 2025-06-14 | End: 2025-07-07

## 2025-06-14 RX ORDER — HYDROMORPHONE HYDROCHLORIDE 2 MG/ML
INJECTION, SOLUTION INTRAMUSCULAR; INTRAVENOUS; SUBCUTANEOUS
Status: DISCONTINUED | OUTPATIENT
Start: 2025-06-14 | End: 2025-06-14

## 2025-06-14 RX ORDER — METHOCARBAMOL 100 MG/ML
1000 INJECTION, SOLUTION INTRAMUSCULAR; INTRAVENOUS EVERY 8 HOURS
Status: COMPLETED | OUTPATIENT
Start: 2025-06-14 | End: 2025-06-15

## 2025-06-14 RX ORDER — NALOXONE HCL 0.4 MG/ML
0.02 VIAL (ML) INJECTION
Status: DISCONTINUED | OUTPATIENT
Start: 2025-06-14 | End: 2025-07-08 | Stop reason: HOSPADM

## 2025-06-14 RX ORDER — SIMETHICONE 80 MG
1 TABLET,CHEWABLE ORAL 4 TIMES DAILY PRN
Status: DISCONTINUED | OUTPATIENT
Start: 2025-06-14 | End: 2025-06-19

## 2025-06-14 RX ORDER — LIDOCAINE HYDROCHLORIDE 20 MG/ML
INJECTION INTRAVENOUS
Status: DISCONTINUED | OUTPATIENT
Start: 2025-06-14 | End: 2025-06-14

## 2025-06-14 RX ORDER — TALC
6 POWDER (GRAM) TOPICAL NIGHTLY PRN
Status: DISCONTINUED | OUTPATIENT
Start: 2025-06-14 | End: 2025-07-08 | Stop reason: HOSPADM

## 2025-06-14 RX ORDER — KETAMINE HYDROCHLORIDE 50 MG/ML
INJECTION, SOLUTION INTRAMUSCULAR; INTRAVENOUS
Status: DISCONTINUED | OUTPATIENT
Start: 2025-06-14 | End: 2025-06-14

## 2025-06-14 RX ORDER — HYDROMORPHONE HYDROCHLORIDE 1 MG/ML
0.5 INJECTION, SOLUTION INTRAMUSCULAR; INTRAVENOUS; SUBCUTANEOUS EVERY 6 HOURS PRN
Status: DISCONTINUED | OUTPATIENT
Start: 2025-06-14 | End: 2025-06-17

## 2025-06-14 RX ORDER — POTASSIUM CHLORIDE 7.45 MG/ML
10 INJECTION INTRAVENOUS
Status: DISPENSED | OUTPATIENT
Start: 2025-06-14 | End: 2025-06-14

## 2025-06-14 RX ORDER — HYDROMORPHONE HYDROCHLORIDE 1 MG/ML
1 INJECTION, SOLUTION INTRAMUSCULAR; INTRAVENOUS; SUBCUTANEOUS EVERY 6 HOURS PRN
Status: DISCONTINUED | OUTPATIENT
Start: 2025-06-14 | End: 2025-06-19

## 2025-06-14 RX ORDER — PHENYLEPHRINE HYDROCHLORIDE 10 MG/ML
INJECTION INTRAVENOUS
Status: DISCONTINUED | OUTPATIENT
Start: 2025-06-14 | End: 2025-06-14

## 2025-06-14 RX ORDER — HYDRALAZINE HYDROCHLORIDE 20 MG/ML
10 INJECTION INTRAMUSCULAR; INTRAVENOUS EVERY 8 HOURS PRN
Status: DISCONTINUED | OUTPATIENT
Start: 2025-06-14 | End: 2025-07-08 | Stop reason: HOSPADM

## 2025-06-14 RX ORDER — FENTANYL CITRATE 50 UG/ML
INJECTION, SOLUTION INTRAMUSCULAR; INTRAVENOUS
Status: DISCONTINUED | OUTPATIENT
Start: 2025-06-14 | End: 2025-06-14

## 2025-06-14 RX ADMIN — MIDAZOLAM HYDROCHLORIDE 2 MG: 1 INJECTION INTRAMUSCULAR; INTRAVENOUS at 08:06

## 2025-06-14 RX ADMIN — HYDROMORPHONE HYDROCHLORIDE 0.4 MG: 2 INJECTION, SOLUTION INTRAMUSCULAR; INTRAVENOUS; SUBCUTANEOUS at 11:06

## 2025-06-14 RX ADMIN — HYDROMORPHONE HYDROCHLORIDE 0.2 MG: 2 INJECTION, SOLUTION INTRAMUSCULAR; INTRAVENOUS; SUBCUTANEOUS at 12:06

## 2025-06-14 RX ADMIN — HYDROMORPHONE HYDROCHLORIDE 0.4 MG: 2 INJECTION, SOLUTION INTRAMUSCULAR; INTRAVENOUS; SUBCUTANEOUS at 10:06

## 2025-06-14 RX ADMIN — MORPHINE SULFATE 4 MG: 4 INJECTION INTRAVENOUS at 04:06

## 2025-06-14 RX ADMIN — KETAMINE HYDROCHLORIDE 30 MG: 50 INJECTION, SOLUTION, CONCENTRATE INTRAMUSCULAR; INTRAVENOUS at 09:06

## 2025-06-14 RX ADMIN — PHENYLEPHRINE HYDROCHLORIDE 100 MCG: 10 INJECTION INTRAVENOUS at 08:06

## 2025-06-14 RX ADMIN — SODIUM CHLORIDE, POTASSIUM CHLORIDE, SODIUM LACTATE AND CALCIUM CHLORIDE: 600; 310; 30; 20 INJECTION, SOLUTION INTRAVENOUS at 11:06

## 2025-06-14 RX ADMIN — PROPOFOL 30 MG: 10 INJECTION, EMULSION INTRAVENOUS at 09:06

## 2025-06-14 RX ADMIN — MORPHINE SULFATE 2 MG: 4 INJECTION INTRAVENOUS at 06:06

## 2025-06-14 RX ADMIN — SODIUM CHLORIDE 1000 ML: 9 INJECTION, SOLUTION INTRAVENOUS at 04:06

## 2025-06-14 RX ADMIN — SODIUM CHLORIDE, SODIUM LACTATE, POTASSIUM CHLORIDE, AND CALCIUM CHLORIDE: .6; .31; .03; .02 INJECTION, SOLUTION INTRAVENOUS at 08:06

## 2025-06-14 RX ADMIN — ROCURONIUM BROMIDE 50 MG: 10 INJECTION INTRAVENOUS at 08:06

## 2025-06-14 RX ADMIN — PROPOFOL 40 MG: 10 INJECTION, EMULSION INTRAVENOUS at 08:06

## 2025-06-14 RX ADMIN — DEXAMETHASONE SODIUM PHOSPHATE 4 MG: 4 INJECTION, SOLUTION INTRAMUSCULAR; INTRAVENOUS at 08:06

## 2025-06-14 RX ADMIN — FENTANYL CITRATE 100 MCG: 50 INJECTION, SOLUTION INTRAMUSCULAR; INTRAVENOUS at 08:06

## 2025-06-14 RX ADMIN — HYDROMORPHONE HYDROCHLORIDE 1 MG: 1 INJECTION, SOLUTION INTRAMUSCULAR; INTRAVENOUS; SUBCUTANEOUS at 08:06

## 2025-06-14 RX ADMIN — ONDANSETRON 4 MG: 2 INJECTION INTRAMUSCULAR; INTRAVENOUS at 04:06

## 2025-06-14 RX ADMIN — SUCCINYLCHOLINE CHLORIDE 100 MG: 20 INJECTION, SOLUTION INTRAMUSCULAR; INTRAVENOUS at 08:06

## 2025-06-14 RX ADMIN — KETAMINE HYDROCHLORIDE 20 MG: 50 INJECTION, SOLUTION, CONCENTRATE INTRAMUSCULAR; INTRAVENOUS at 10:06

## 2025-06-14 RX ADMIN — SUGAMMADEX 200 MG: 100 INJECTION, SOLUTION INTRAVENOUS at 10:06

## 2025-06-14 RX ADMIN — LIDOCAINE HYDROCHLORIDE 60 MG: 20 INJECTION, SOLUTION INTRAVENOUS at 08:06

## 2025-06-14 RX ADMIN — ROCURONIUM BROMIDE 10 MG: 10 INJECTION INTRAVENOUS at 10:06

## 2025-06-14 RX ADMIN — GLYCOPYRROLATE 0.1 MG: 0.2 INJECTION, SOLUTION INTRAMUSCULAR; INTRAVITREAL at 09:06

## 2025-06-14 RX ADMIN — MUPIROCIN: 20 OINTMENT TOPICAL at 08:06

## 2025-06-14 RX ADMIN — CEFAZOLIN SODIUM 2 G: 1 POWDER, FOR SOLUTION INTRAMUSCULAR; INTRAVENOUS at 08:06

## 2025-06-14 RX ADMIN — PROPOFOL 30 MG: 10 INJECTION, EMULSION INTRAVENOUS at 10:06

## 2025-06-14 RX ADMIN — ONDANSETRON 4 MG: 2 INJECTION, SOLUTION INTRAMUSCULAR; INTRAVENOUS at 10:06

## 2025-06-14 RX ADMIN — METHOCARBAMOL 1000 MG: 100 INJECTION INTRAMUSCULAR; INTRAVENOUS at 12:06

## 2025-06-14 RX ADMIN — IOHEXOL 75 ML: 350 INJECTION, SOLUTION INTRAVENOUS at 05:06

## 2025-06-14 RX ADMIN — PROPOFOL 100 MG: 10 INJECTION, EMULSION INTRAVENOUS at 08:06

## 2025-06-14 RX ADMIN — METHOCARBAMOL 1000 MG: 100 INJECTION INTRAMUSCULAR; INTRAVENOUS at 11:06

## 2025-06-14 RX ADMIN — POTASSIUM CHLORIDE 10 MEQ: 7.46 INJECTION, SOLUTION INTRAVENOUS at 07:06

## 2025-06-14 RX ADMIN — HYDRALAZINE HYDROCHLORIDE 5 MG: 20 INJECTION INTRAMUSCULAR; INTRAVENOUS at 11:06

## 2025-06-14 RX ADMIN — FENTANYL CITRATE 100 MCG: 50 INJECTION, SOLUTION INTRAMUSCULAR; INTRAVENOUS at 09:06

## 2025-06-14 RX ADMIN — HYDROMORPHONE HYDROCHLORIDE 1 MG: 1 INJECTION, SOLUTION INTRAMUSCULAR; INTRAVENOUS; SUBCUTANEOUS at 02:06

## 2025-06-14 RX ADMIN — ROCURONIUM BROMIDE 20 MG: 10 INJECTION INTRAVENOUS at 09:06

## 2025-06-14 NOTE — Clinical Note
Left: Buttocks.   Scrubbed with Chlorhexidine/Alcohol.   Painted with Chlorohexidine/Alcohol.  Hair: N/A.  Skin prep dry before draping.  Prepped by: Lan Galan MD 6/25/2025 3:17 PM.

## 2025-06-14 NOTE — ANESTHESIA POSTPROCEDURE EVALUATION
Anesthesia Post Evaluation    Patient: Patricia Ramirez    Procedure(s) Performed: Procedure(s) (LRB):  LAPAROSCOPY, DIAGNOSTIC (N/A)  LAPAROTOMY, EXPLORATORY; APPENDECTOMY; SMALL BOWEL RESECTION; DIVERTICULUM RESECTION (N/A)    Final Anesthesia Type: general      Patient location during evaluation: PACU  Patient participation: Yes- Able to Participate  Level of consciousness: awake and alert  Post-procedure vital signs: reviewed and stable  Pain management: adequate  Airway patency: patent    PONV status at discharge: No PONV  Anesthetic complications: no      Respiratory status: spontaneous ventilation and room air  Hydration status: euvolemic  Follow-up not needed.              Vitals Value Taken Time   /61 06/14/25 12:01   Temp 36.6 °C (97.9 °F) 06/14/25 11:15   Pulse 108 06/14/25 12:06   Resp 14 06/14/25 12:06   SpO2 94 % 06/14/25 12:06   Vitals shown include unfiled device data.      No case tracking events are documented in the log.      Pain/Ede Score: Pain Rating Prior to Med Admin: 6 (6/14/2025  6:54 AM)  Pain Rating Post Med Admin: 5 (6/14/2025  4:52 AM)  Ede Score: 8 (6/14/2025 12:00 PM)

## 2025-06-14 NOTE — ASSESSMENT & PLAN NOTE
Patient has persistent depression which is moderate and is currently controlled. Will hold anti-depressant medications. We will not consult psychiatry at this time. Patient does not display psychosis at this time. Continue to monitor closely and adjust plan of care as needed.      Hold home Xanax, BuSpar, and Celexa at this time given NPO status

## 2025-06-14 NOTE — CONSULTS
[facility] Medicine  Consult Note    Patient Name: Patricia Ramirez  MRN: [id number]  Admission Date: 6/14/2025  Hospital Length of Stay: 0 days  Attending Physician: Margarita Morris DO   Primary Care Provider: Fabienne Erwin NP           Patient information was obtained from patient and ER records.     Consults  Subjective:     Principal Problem: <principal problem not specified>    Chief Complaint:   Chief Complaint   Patient presents with    Abdominal Pain     Pt arrived to the ED due to continued lower abdominal pain and nausea after being recently admitted and discharged yesterday for a small bowel obstruction. No surgery performed during hospital stay        HPI: 69-year-old female with a history of anxiety, depression, hypertension presents to the ED with recurrent small bowel obstruction. Complains of diffuse abdominal pain, associated with nausea but no vomiting. Still passing flatus. Patient was recently hospitalized from 06/09/2025 to 06/13/2025 for SBO. Was seen by general surgery and was treated conservatively. Patient had return of bowel functions after multiple days of gastric decompression with NGT and suppository. Prior to discharge, patient was able to tolerate PO without any abdominal pain, nausea or vomiting. Referred to GI on discharge for possible gastric dysmotility disorder. Of note, patient was offered surgery last hospitalization, but patient declined at that time. States she is now amendable to surgery.     In the ED, patient hypertensive but afebrile. Labs without leukocytosis, but noted to have hypokalemia with K 3.1. CT abdomen/pelvis w/ worsening mechanical small bowel obstruction at the level of the distal ileum. NGT placed. Pt admitted to general surgery team. Hospital medicine team consulted.        Past Medical History:   Diagnosis Date    Allergy     Anxiety     Arthritis     Cataract     Depressive disorder, not elsewhere classified     Esophageal reflux      Hypertension     Impaired fasting glucose     Joint pain     Obesity, unspecified     Other and unspecified hyperlipidemia        Past Surgical History:   Procedure Laterality Date    BLEPHAROPLASTY Bilateral 2021    Procedure: BLEPHAROPLASTY;  Surgeon: Maite Acuna MD;  Location: [facility];  Service: Ophthalmology;  Laterality: Bilateral;     SECTION  1973    CHOLECYSTECTOMY      COLONOSCOPY N/A 2023    Procedure: COLONOSCOPY;  Surgeon: Briana Sterling MD;  Location: [facility];  Service: Endoscopy;  Laterality: N/A;    ERCP N/A 10/07/2024    Procedure: ERCP (ENDOSCOPIC RETROGRADE CHOLANGIOPANCREATOGRAPHY);  Surgeon: Catracho Mitchell MD;  [facility] ([address] [place] [address]);  Service: Endoscopy;  Laterality: N/A;    ERCP N/A 2024    Procedure: ERCP (ENDOSCOPIC RETROGRADE CHOLANGIOPANCREATOGRAPHY);  Surgeon: Romeo Roger MD;  Location: [facility];  Service: Endoscopy;  Laterality: N/A;  12/3/24: instructions sent via email-GD   SWP PRECALL COMPLETED-RT    ERCP N/A 2025    Procedure: ERCP (ENDOSCOPIC RETROGRADE CHOLANGIOPANCREATOGRAPHY);  Surgeon: Shannon Chaney MD;  Location: [facility];  Service: Endoscopy;  Laterality: N/A;   portal-Repeat ERCP in 6 weeks to remove stent.nicola -tt   SWP-PRECALL COMPLETE-RT   r/s updated portal instr-pt stated any MD-tt    ERCP N/A 2025    Procedure: ERCP (ENDOSCOPIC RETROGRADE CHOLANGIOPANCREATOGRAPHY);  Surgeon: Shannon Chaney MD;  Location: [facility];  Service: Endoscopy;  Laterality: N/A;  Per Dr. Chaney- Repeat ERCP in 3 months to remove covered metal                          biliary stent and hopefully for final clearance of                          the CBD.     3/21/25-Pt no longer taking Eliquis, instr portal-DS  25-LVM     ESOPHAGOGASTRODUODENOSCOPY N/A 2024    Procedure: EGD (ESOPHAGOGASTRODUODENOSCOPY);  Surgeon: Angie Alatorre MD;  Location: [facility];  Service: Gastroenterology;  Laterality:  N/A;    ESOPHAGOGASTRODUODENOSCOPY N/A 5/23/2025    Procedure: EGD (ESOPHAGOGASTRODUODENOSCOPY);  Surgeon: Mak Michael MD;  Location: [facility];  Service: Gastroenterology;  Laterality: N/A;    HYSTERECTOMY      without BSO    INTRAOCULAR PROSTHESES INSERTION Left 10/27/2022    Procedure: INSERTION, IOL PROSTHESIS;  Surgeon: Palmer Berrios MD;  Location: [facility] OR;  Service: Ophthalmology;  Laterality: Left;    PHACOEMULSIFICATION OF CATARACT Left 10/27/2022    Procedure: PHACOEMULSIFICATION, CATARACT;  Surgeon: Palmer Berrios MD;  Location: [facility] OR;  Service: Ophthalmology;  Laterality: Left;  RN PHONE PREOP 10/21/2022   ARRIVAL 9:00 AM    R knee replacement      REPAIR OF RETINAL DETACHMENT WITH VITRECTOMY Left 02/28/2022    Procedure: REPAIR, RETINAL DETACHMENT, WITH VITRECTOMY;  Surgeon: MINO Martinez MD;  Location: [address];  Service: Ophthalmology;  Laterality: Left;  Spoke with SID Garcia. Pt was instructed nothing to eat after 8am and to arrive at 2pm for preop. 430pm case start request.    right  arm  surgery      ROBOT-ASSISTED CHOLECYSTECTOMY USING DA GENO XI N/A 10/09/2024    Procedure: XI ROBOTIC SUB TOTAL CHOLECYSTECTOMY; DRAIN PLACEMENT;  Surgeon: Rigoberto Ponce MD;  Location: [facility] OR;  Service: General;  Laterality: N/A;       Review of patient's allergies indicates:   Allergen Reactions    Sulfa (sulfonamide antibiotics) Hives    Ace inhibitors Other (See Comments)     Cough         Current Facility-Administered Medications on File Prior to Encounter   Medication    cyclopentolate 1% ophthalmic solution 1 drop    ofloxacin 0.3 % ophthalmic solution 1 drop    sodium chloride 0.9% flush 10 mL    [DISCONTINUED] acetaminophen tablet 650 mg    [DISCONTINUED] cloNIDine 0.3 mg/24 hr td ptwk 1 patch    [DISCONTINUED] hydrALAZINE injection 20 mg    [DISCONTINUED] morphine injection 4 mg    [DISCONTINUED] ondansetron injection 4 mg    [DISCONTINUED] oxyCODONE immediate release  tablet 5 mg     Current Outpatient Medications on File Prior to Encounter   Medication Sig    acetaminophen (TYLENOL) 500 MG tablet Take 500 mg by mouth every 6 (six) hours as needed for Pain.    ALPRAZolam (XANAX) 0.25 MG tablet TAKE 1 TABLET BY MOUTH THREE TIMES DAILY    atorvastatin (LIPITOR) 40 MG tablet Take 1 tablet by mouth once daily    busPIRone (BUSPAR) 15 MG tablet TAKE 1 TABLET BY MOUTH THREE TIMES DAILY    citalopram (CELEXA) 20 MG tablet Take 3 tablets (60 mg total) by mouth once daily. Take 1 tablet by mouth once daily (Patient taking differently: Take 60 mg by mouth 3 (three) times daily. Take 1 tablet by mouth TID daily)    cyclobenzaprine (FLEXERIL) 5 MG tablet TAKE 1 TABLET BY MOUTH TWICE DAILY AS NEEDED FOR MUSCLE SPASM    ergocalciferol (ERGOCALCIFEROL) 50,000 unit Cap Take 1 capsule (50,000 Units total) by mouth every 7 days.    olmesartan (BENICAR) 20 MG tablet TAKE 1 TABLET BY MOUTH ONCE DAILY IN THE EVENING    ondansetron (ZOFRAN) 4 MG tablet Take 1 tablet (4 mg total) by mouth every 8 (eight) hours as needed.    pantoprazole (PROTONIX) 40 MG tablet Take 1 tablet (40 mg total) by mouth once daily.    polyethylene glycol (COLYTE) 240-22.72-6.72 -5.84 gram SolR Take as directed by provider office    polyethylene glycol (GLYCOLAX) 17 gram PwPk Take 17 g by mouth once daily.    traZODone (DESYREL) 100 MG tablet Take 100 mg by mouth every evening.     Family History       Problem Relation (Age of Onset)    Cancer Mother    Cervical cancer Sister    Liver disease Father    Thyroid disease Sister    Tremor Sister          Tobacco Use    Smoking status: Every Day     Current packs/day: 0.75     Average packs/day: 0.8 packs/day for 40.0 years (30.0 ttl pk-yrs)     Types: Cigarettes    Smokeless tobacco: Never   Substance and Sexual Activity    Alcohol use: Yes     Comment: rarely    Drug use: Yes     Frequency: 14.0 times per week     Types: Marijuana    Sexual activity: Yes     Partners: Male      Birth control/protection: See Surgical Hx     Review of Systems   Constitutional:  Negative for chills and fever.   Respiratory:  Negative for cough and shortness of breath.    Cardiovascular:  Negative for chest pain.   Gastrointestinal:  Positive for abdominal distention, abdominal pain and nausea. Negative for vomiting.     Objective:     Vital Signs (Most Recent):  Temp: 98.3 °F (36.8 °C) (06/14/25 1400)  Pulse: 110 (06/14/25 1400)  Resp: 20 (06/14/25 1400)  BP: 130/61 (06/14/25 1400)  SpO2: (!) 94 % (06/14/25 1400) Vital Signs (24h Range):  Temp:  [97.9 °F (36.6 °C)-98.3 °F (36.8 °C)] 98.3 °F (36.8 °C)  Pulse:  [] 110  Resp:  [11-20] 20  SpO2:  [93 %-100 %] 94 %  BP: (124-193)/(60-81) 130/61     Weight: 60.5 kg (133 lb 6.1 oz)  Body mass index is 20.28 kg/m².     Physical Exam  Vitals and nursing note reviewed.   Constitutional:       General: She is not in acute distress.     Appearance: She is not ill-appearing.      Comments: Patient is seen after exploratory laparotomy.   HENT:      Nose:      Comments: NG tube in place with bile output     Mouth/Throat:      Mouth: Mucous membranes are dry.   Cardiovascular:      Rate and Rhythm: Regular rhythm. Tachycardia present.   Pulmonary:      Effort: Pulmonary effort is normal. No respiratory distress.      Breath sounds: Normal breath sounds.   Abdominal:      General: There is no distension.      Palpations: Abdomen is soft.      Tenderness: There is abdominal tenderness.      Comments: Midline incision dressing in place, some bleeding noted.    Musculoskeletal:         General: No swelling or tenderness.   Skin:     General: Skin is warm and dry.   Neurological:      General: No focal deficit present.      Mental Status: She is alert and oriented to person, place, and time.          Significant Labs: All pertinent labs within the past 24 hours have been reviewed.  A1C:   Recent Labs   Lab 03/10/25  0730   HGBA1C 5.3     Bilirubin:   Recent Labs   Lab  06/09/25  0220 06/10/25  0549 06/11/25  0826 06/12/25  0626 06/14/25  0416   BILITOT 0.5 0.5 0.8 0.6 0.9     CBC:   Recent Labs   Lab 06/14/25  0416   WBC 8.92   HGB 15.5   HCT 46.1        CMP:   Recent Labs   Lab 06/13/25  0816 06/14/25  0416    140   K 3.2* 3.1*    106   CO2 23 22*   * 107   BUN 11 9   CREATININE 0.5 0.5   CALCIUM 8.0* 8.6*   PROT  --  6.2   ALBUMIN  --  3.2*   BILITOT  --  0.9   ALKPHOS  --  58   AST  --  16   ALT  --  14   ANIONGAP 11 12     Lipase:   Recent Labs   Lab 06/14/25  0416   LIPASE 11     Magnesium:   Recent Labs   Lab 06/13/25  0816   MG 1.9     Urine Studies:   Recent Labs   Lab 06/14/25  0648   COLORU Yellow   APPEARANCEUA Clear   PHUR 7.0   SPECGRAV >=1.030*   PROTEINUA Trace*   GLUCUA Negative   BILIRUBINUA Negative   OCCULTUA Negative   NITRITE Negative   UROBILINOGEN Negative   LEUKOCYTESUR Negative       Significant Imaging: I have reviewed all pertinent imaging results/findings within the past 24 hours.    Imaging Results              XR NG/OG tube placement check, non-radiologist performed (Final result)  Result time 06/14/25 07:13:21      Final result by Jerod Workman MD (06/14/25 07:13:21)                   Impression:      NG tube in place, as above.      Electronically signed by: Jerod Workman MD  Date:    06/14/2025  Time:    07:13               Narrative:    EXAMINATION:  XR NG/OG TUBE PLACEMENT CHECK, NON-RADIOLOGIST PERFORMED    CLINICAL HISTORY:  s/p ng tube;  Unspecified intestinal obstruction, unspecified as to partial versus complete obstruction    TECHNIQUE:  AP radiograph of the chest and upper abdomen    COMPARISON:  Abdominal radiograph 06/13/2025, CT 06/14/2025    FINDINGS:  Since recent CT there has been interval placement of a NG tube.  The tip and proximal port are subdiaphragmatic projecting over the expected location of the stomach.    Dilated loops of small bowel persist in the upper abdomen.  No obvious new pneumatosis,  portal venous gas or free air on limited supine view.    Cardiomediastinal silhouette stable in size.  No focal consolidation, pleural fluid or pneumothorax.                                        CT Abdomen Pelvis With IV Contrast NO Oral Contrast (Final result)  Result time 06/14/25 06:34:50      Final result by Anil Nelson MD (06/14/25 06:34:50)                   Impression:      Abdomen CT and Pelvis CT:    CT findings are suspicious for worsening mechanical small bowel obstruction at the level of the distal ileum, possibly on the basis of an adhesion (with other etiologies not fully excluded, as further detailed in the body of the report).  Recommendations include clinical correlation and surgical consultation.    Small quantity of intraperitoneal free fluid.    No free intraperitoneal air.    Possible esophagitis.  Underlying esophageal metaplasia or neoplasia not fully excluded.  Correlate clinically, and with follow-up outpatient EGD if clinically appropriate.    Additional observations as detailed in the body of the report.    This report was flagged in Epic as abnormal.    Anil Nelson MD, first observed the critical findings on 06/14/2025 at 06:10, and sent the results to Margarita Morris DO, via Epic Secure Chat message, on 06/14/2025 at 06:33.  Patient name and medical record number were specified/linked in the message.  The recipient immediately responded, acknowledging receipt of the information.      Electronically signed by: Anil Nelson  Date:    06/14/2025  Time:    06:34               Narrative:    EXAMINATION:  CT ABDOMEN PELVIS WITH IV CONTRAST    CLINICAL HISTORY:  Abdominal abscess/infection suspected;    TECHNIQUE:  Low dose axial images, sagittal and coronal reformations were obtained from the lung bases to the pubic symphysis following the IV administration of 75 mL of Omnipaque 350 .    COMPARISON:  Abdomen CT and pelvis CT 06/09/2025.    FINDINGS:  Abdomen CT and Pelvis  CT:    Artifacts related to beam hardening and/or motion degrade portions of the scan.    In the lower thorax, there remains a poorly defined region of weakly negative attenuation interposed between the distal esophagus and descending thoracic aorta.  This was also present on 06/09/2025 but is new since 05/16/2025.  It is favored to represent ascites within a hiatal hernia (with weakly negative attenuation values perhaps related to beam hardening artifacts arising from the nearby spine).  Other etiologies for this finding could include a distended cisterna chyli.    Circumferential wall thickening is suggested in the gas-filled but poorly distended distal esophagus.    The stomach is predominantly fluid-filled and again appears distended.  A portion of the gastric body/antrum remains relatively less distended, somewhat similar to the 06/09/2025 comparison scan.  Although a gastric consider could possibly explain this focal narrowing, more significant gastric mural pathology (such as neoplasm or scarring from peptic disease) is not excluded.    There is more extensive dilatation of the gas-filled and fluid-filled small bowel, but there remains a transition point at the level of a collapsed small bowel segment in the distal ileum.  This is possibly on the basis of an adhesion.  There is mild swelling of a portion of the mesentery in the right lower quadrant; although this appears less conspicuous than on 06/09/2025, a volvulus or internal hernia is again also possible.    The colon and appendix demonstrate no acute CT abnormalities.  There is some radiopaque intraluminal material in the appendix and portions of the colon, possibly related to residual enteric contrast from a prior study or other ingested radiopaque material.    No free intraperitoneal air.  Small quantity of abdominopelvic free intraperitoneal fluid, perhaps slightly decreased from the 06/09/2025 comparison scan.    The liver, atrophic pancreas, spleen  (pre-existing splenic deformity, possibly secondary to old infarct or old injury), adrenal glands, kidneys, atherosclerotic abdominal aorta, IVC, suboptimally distended urinary bladder, abdominopelvic lymph nodes, visualized abdominal wall, and visualized skeleton demonstrate no adverse interval CT changes from 06/09/2025.    There are scattered degenerative skeletal changes.    The uterus is absent.  The ovaries are inconspicuous or perhaps also surgically absent.    The gallbladder is inconspicuous or perhaps surgically absent.  There is mild intrahepatic biliary ductal dilatation.  Approximate 7-8 mm in diameter, the extrahepatic bile ducts are at the upper limits of normal for the patient's reported age of 69 years.    There is a moderate to large duodenal diverticulum that contains internal gas and fluid.  Some additional, smaller duodenal diverticula are also suggested.  No CT evidence of acute duodenal diverticulitis.                                      Assessment/Plan:     SBO (small bowel obstruction)  -was recently hospitalized from 06/09/2025 to 06/13/2025 for SBO. Was seen by general surgery and was treated conservatively. Patient had return of bowel functions after multiple days of gastric decompression with NGT and suppository and was discharged  -returned with recurrent abdominal pain associated with nausea.  Pt with recurrent SBO.   -CT abdomen and pelvis showed worsening mechanical small bowel obstruction at the level of the distal ileum   -pt s/p  exploratory laparotomy, appendectomy, small bowel resection and side to side anastomosis, and diverticulectomy on 06/14/25  -management per primary team, General surgery    Hypokalemia  Patient's most recent potassium results are listed below.   Recent Labs     06/12/25  0626 06/13/25  0816 06/14/25  0416   K 3.2* 3.2* 3.1*     Plan  - Replete potassium per protocol  - Monitor potassium Daily  - Patient's hypokalemia is stable  - replace as  needed    Essential hypertension  Patient's blood pressure range in the last 24 hours was: BP  Min: 128/61  Max: 193/80.The patient's inpatient anti-hypertensive regimen is listed below:  Current Antihypertensives  hydrALAZINE injection 5 mg, Every 6 hours PRN, Intravenous    Plan  - BP is controlled, no changes needed to their regimen  - hold home Benicar at this time given NPO status  - resume once able to tolerate p.o. intake and if BP not controlled    Hyperlipidemia  -hold home statin at this time given NPO status   -resume when appropriate      Heart failure with preserved ejection fraction  -echocardiogram from 10/07/2024 with preserved EF of 55-60%, grade 1 diastolic dysfunction.  PASP of 35 mm Hg  -appears to be at baseline, appears euvolemic   -monitor      Depression with anxiety  Patient has persistent depression which is moderate and is currently controlled. Will hold anti-depressant medications. We will not consult psychiatry at this time. Patient does not display psychosis at this time. Continue to monitor closely and adjust plan of care as needed.      Hold home Xanax, BuSpar, and Celexa at this time given NPO status      VTE Risk Mitigation (From admission, onward)           Ordered     IP VTE HIGH RISK PATIENT  Once         06/14/25 0559     Place sequential compression device  Until discontinued         06/14/25 0559                        Thank you for your consult. I will follow-up with patient. Please contact us if you have any additional questions.    Margarita Morris DO  Department of Hospital Medicine   [facility] - Telemetry

## 2025-06-14 NOTE — ASSESSMENT & PLAN NOTE
-hold home statin at this time given NPO status   -resume when appropriate     6 weeks status post gastric bypass.    Pt reports doing well on liquids, soft and soft meats.    No complaints of pain.   Pt reports no nausea and no vomiting  Sheis drinking approximately 40 oz of water daily  + BM  She is drinking and eating 60 grams of protein daily.     She is taking bariatric vitamins without issue.     Total weight loss since surgery 24lbs  Weight loss since last visit 3lbs  Wt 92.5 kg (204 lb)   BMI 36.14 kg/m²            Ms. Araya has a reminder for a \"due or due soon\" health maintenance. I have asked that she contact her primary care provider for follow-up on this health maintenance.      Physical Examination: General appearance - alert, well appearing, and in no distress,  Chest - no respiratory distress    Abdomen - incision healing well.   A/P    Doing well 6 weeks status post laparoscopic Gastric Bypass  Diet advanced to solid foods.   Advised patient regard to diet that is high-protein, low-fat, low-sugar, limited carbohydrates. Strive for 50-60 grams of protein daily. If having a snack, foods that are protein or fiber rich.. No eating/drinking together, chew foods well, and portion control. Measure meals. Discussed snacking behavior and to stiill pay attention to behavioral factor and habits. Drink at least 40-64 ounces of water or non-calorie/non-carbonated beverages daily. Continue vitamin regiment daily. Exercise at least 3 days a week with cardiovascular and strength training. Patient to follow up in 10 weeks. . Advised to call office if any questions/concerns.     Silvia Araya, was evaluated through a synchronous (real-time) audio-video encounter. The patient (or guardian if applicable) is aware that this is a billable service, which includes applicable co-pays. This Virtual Visit was conducted with patient's (and/or legal guardian's) consent. Patient identification was verified, and a caregiver was present when appropriate.   The patient was located at Home: [address] [place] [address]

## 2025-06-14 NOTE — OP NOTE
[facility] - Surgery  Operative Note    SUMMARY     Surgery Date: 6/14/2025     Surgeons and Role:     * Rigoberto Ponce MD - Primary     * Roxann Lin MD - Resident - Assisting    Pre-op Diagnosis:  Small bowel obstruction [K56.609]    Post-op Diagnosis:  Post-Op Diagnosis Codes:     * Small bowel obstruction [K56.609]    Procedure(s) (LRB):  LAPAROSCOPY, DIAGNOSTIC (N/A)  LAPAROTOMY, EXPLORATORY; APPENDECTOMY; SMALL BOWEL RESECTION; DIVERTICULUM RESECTION (N/A)    Anesthesia: General    Indication for procedure: Patricia Ramirez is 69 y.o. female with hx of recurrent bowel obstructions, which have generally resolved with conservative management however she has had multiple bounce-back sent a recent discharge returned to the hospital with pain and vomiting after less than 24 hours now indicated to have an operation which was previously deferred due to patient preference with plan for laparoscopy and possible laparotomy. After discussion of disease process, we discussed options and have elected for operation to perform diagnostic laparoscopy and any other indicated procedures.    Description of Procedure: After consent was obtained, patient was taken to the OR. The abdomen was prepped and draped in a standard sterile fashion after general anesthesia was started. Time out was performed. Antibiotics were started with Ancef. We began the procedure by making an incision at the supraumbilical skin cutting down through the skin towards the fascia and sharply entering the abdominal cavity.  We placed a balloon trocar and achieve pneumoperitoneum.  We placed patient in Trendelenburg and placed 5 mm trocars in the left abdomen.  We identified a few adhesions including omentum or small bowel to the abdominal wall these were taken down with cautery dissection.  Of note none of these appeared to be obstructing.  We did identify the appendix and then started at the terminal ileum and ran the bowel retrograde where there was a  collapsed terminal ileum.  We identified area of tight stricture with some inflammation in this likely need to be resected.  At this point decision was made to convert to open.  We made an open lower abdominal incision as a mini-laparotomy.  We identified a tense adhesive band directly over this strictured area that may have caused a chronic narrowing.  We lysed this adhesion and then ran the bowel there is no other evidence of obstructions.  Even though the lysis of adhesions was completed the bowel did appear to be fairly strictured and therefore decision was made to resect it.  We made a small bowel resection of approximately 8 cm segment of small bowel using JAYY blue load staplers and the LigaSure for the mesentery.  We then did a side-to-side functional end-to-end anastomosis using a JAYY blue load stapler.  During the stapling of the common channel enterotomy with a TA stapler there was noted to be a leak at the center of the staple line.  Due to the location of the anastomosis very near the ileocecal valve we did have limited residual small bowel therefore we did not revise or resect this area and do brand new anastomosis.  Rather a closed the small defect in the staple line with running 3-0 Vicryl suture and then we did some Lembert oversewn with the 3-0 silk sutures.  Then closed the mesenteric defect with a running 2-0 silk.  After revising the anastomosis and the staple line did a leak test several times and there was no evidence of any air or fluid leaking through the oversewn staple line.  We then turned our attention to the surrounding tissues.  We did perform an appendectomy to prevent any future diagnostic confusion the appendix did appear normal.  We used a 2-0 silk suture to ligate the mesoappendix to control the blood supply.  And then we transected the base of the appendix we sewed it with a 0 silk stick tie.  We then dunked and inverted the stump into the cecum with a pursestring using a 2-0  silk.  We then turned our attention to the small bowel where there was evidence of an adhesion however on further evaluation this did appear to have an extension of the lumen almost like a diverticulum and so we used a JAYY blue load stapler and did a staple transection of this and sent as a specimen as possible small-bowel diverticulum.  This was measured to be 90 cm proximal to the ileocecal valve    We then irrigated there was minimal spillage of the intestinal contents during our anastomosis again we irrigated with warm normal saline we controlled all intra-abdominal bleeding of which there was minimal.  We then closed with running 1. Looped PDS and then closed the skin with staples.  We did inject a total of 20 cc of Marcaine the subcutaneous tissues and we placed sterile dressing on top of the staple line  All counts were correct x2. Patient was awakened from anesthesia and taken to the recovery room in a stable condition having suffered no issues at this time.    Description of the findings of the procedure:  Partial obstruction noted in distal ileum likely caused by an adhesion/adherent diverticulum.  Transition point noted.  Appeared to be a chronic stricture formed by this band. small bowel resection performed an anastomosis a residual small bowel perform  Possible diverticulum versus adhesive band the stapled resection and sent as specimen.  90 cm proximal to ileocecal valve    Estimated Blood Loss: 150 mL         Specimens:   Specimen (24h ago, onward)       Start     Ordered    06/14/25 1014  Specimen to Pathology  RELEASE UPON ORDERING        References:    Click here for ordering Quick Tip   Question:  Release to patient  Answer:  Immediate    06/14/25 1014                  Drains/Implants: None

## 2025-06-14 NOTE — ANESTHESIA PREPROCEDURE EVALUATION
2025  Patricia Ramirez is a 69 y.o., female.  To undergo Procedure(s) (LRB):  EGD (ESOPHAGOGASTRODUODENOSCOPY) (N/A)     Denies CP/SOB/GERD/MI/CVA/URI symptoms.  METS > 4  NPO > 8    Past Medical History:  Past Medical History:   Diagnosis Date    Allergy     Anxiety     Arthritis     Cataract     Depressive disorder, not elsewhere classified     Esophageal reflux     Hypertension     Impaired fasting glucose     Joint pain     Obesity, unspecified     Other and unspecified hyperlipidemia        Past Surgical History:  Past Surgical History:   Procedure Laterality Date    BLEPHAROPLASTY Bilateral 2021    Procedure: BLEPHAROPLASTY;  Surgeon: Maite Acuna MD;  Location: [facility];  Service: Ophthalmology;  Laterality: Bilateral;     SECTION  1973    CHOLECYSTECTOMY      COLONOSCOPY N/A 2023    Procedure: COLONOSCOPY;  Surgeon: Briana Sterling MD;  Location: [facility];  Service: Endoscopy;  Laterality: N/A;    ERCP N/A 10/07/2024    Procedure: ERCP (ENDOSCOPIC RETROGRADE CHOLANGIOPANCREATOGRAPHY);  Surgeon: Catracho Mitchell MD;  Location: [address]);  Service: Endoscopy;  Laterality: N/A;    ERCP N/A 2024    Procedure: ERCP (ENDOSCOPIC RETROGRADE CHOLANGIOPANCREATOGRAPHY);  Surgeon: Romeo Roger MD;  Location: [facility];  Service: Endoscopy;  Laterality: N/A;  12/3/24: instructions sent via email-GD   SWP PRECALL COMPLETED-RT    ERCP N/A 2025    Procedure: ERCP (ENDOSCOPIC RETROGRADE CHOLANGIOPANCREATOGRAPHY);  Surgeon: Shannon Chaney MD;  Location: [facility];  Service: Endoscopy;  Laterality: N/A;   portal-Repeat ERCP in 6 weeks to remove stent.nicola -tt   SWP-PRECALL COMPLETE-RT   r/s updated portal instr-pt stated any MD-tt    ERCP N/A 2025    Procedure: ERCP (ENDOSCOPIC RETROGRADE CHOLANGIOPANCREATOGRAPHY);  Surgeon: Shiv  Shannon LAU MD;  Location: [facility];  Service: Endoscopy;  Laterality: N/A;  Per Dr. Chaney- Repeat ERCP in 3 months to remove covered metal                          biliary stent and hopefully for final clearance of                          the CBD.     3/21/25-Pt no longer taking Eliquis, instr portal-DS  4/24/25-LVM     ESOPHAGOGASTRODUODENOSCOPY N/A 11/21/2024    Procedure: EGD (ESOPHAGOGASTRODUODENOSCOPY);  Surgeon: Angie Alatorre MD;  Location: [facility] ENDO;  Service: Gastroenterology;  Laterality: N/A;    ESOPHAGOGASTRODUODENOSCOPY N/A 5/23/2025    Procedure: EGD (ESOPHAGOGASTRODUODENOSCOPY);  Surgeon: Mak Michael MD;  Location: [facility];  Service: Gastroenterology;  Laterality: N/A;    HYSTERECTOMY      without BSO    INTRAOCULAR PROSTHESES INSERTION Left 10/27/2022    Procedure: INSERTION, IOL PROSTHESIS;  Surgeon: Palmer Berrios MD;  Location: [facility] OR;  Service: Ophthalmology;  Laterality: Left;    PHACOEMULSIFICATION OF CATARACT Left 10/27/2022    Procedure: PHACOEMULSIFICATION, CATARACT;  Surgeon: Palmer Berrios MD;  Location: [facility] OR;  Service: Ophthalmology;  Laterality: Left;  RN PHONE PREOP 10/21/2022   ARRIVAL 9:00 AM    R knee replacement      REPAIR OF RETINAL DETACHMENT WITH VITRECTOMY Left 02/28/2022    Procedure: REPAIR, RETINAL DETACHMENT, WITH VITRECTOMY;  Surgeon: MINO Martinez MD;  Location: [address];  Service: Ophthalmology;  Laterality: Left;  Spoke with SID Garcia. Pt was instructed nothing to eat after 8am and to arrive at 2pm for preop. 430pm case start request.    right  arm  surgery      ROBOT-ASSISTED CHOLECYSTECTOMY USING DA GENO XI N/A 10/09/2024    Procedure: XI ROBOTIC SUB TOTAL CHOLECYSTECTOMY; DRAIN PLACEMENT;  Surgeon: Rigoberto Ponce MD;  Location: [facility] OR;  Service: General;  Laterality: N/A;       Social History:  Social History[1]    Medications:  Medications Ordered Prior to Encounter[2]    Allergies:  Review of patient's allergies  indicates:   Allergen Reactions    Sulfa (sulfonamide antibiotics) Hives    Ace inhibitors Other (See Comments)     Cough         Active Problems:  Problem List[3]    Diagnostic Studies:   Latest Reference Range & Units 05/23/25 05:20   WBC 3.90 - 12.70 K/uL 13.19 (H)   RBC 4.00 - 5.40 M/uL 4.59   Hemoglobin 12.0 - 16.0 gm/dL 15.0   Hematocrit 37.0 - 48.5 % 44.7   MCV 82 - 98 fL 97   MCH 27.0 - 31.0 pg 32.7 (H)   MCHC 32.0 - 36.0 g/dL 33.6   RDW 11.5 - 14.5 % 13.1   Platelet Count 150 - 450 K/uL 225      Latest Reference Range & Units 05/23/25 05:20   Sodium 136 - 145 mmol/L 138   Potassium 3.5 - 5.1 mmol/L 4.3   Chloride 95 - 110 mmol/L 104   CO2 23 - 29 mmol/L 25   Anion Gap 8 - 16 mmol/L 9   BUN 8 - 23 mg/dL 8   Creatinine 0.5 - 1.4 mg/dL 0.6   eGFR >60 mL/min/1.73/m2 >60     EKG (5/21/25):  Normal sinus rhythm   Left axis deviation   Right bundle branch block     TTE (10/7/24):    Left Ventricle: The left ventricle is normal in size. Normal wall thickness. There is normal systolic function with a visually estimated ejection fraction of 55 - 60%. Grade I diastolic dysfunction.    Right Ventricle: Normal right ventricular cavity size. Systolic function is normal.    Mitral Valve: There is severe posterior mitral annular calcification present. There is mild stenosis. The mean pressure gradient across the mitral valve is 3 mmHg at a heart rate of 58 bpm.    Pulmonary Artery: The estimated pulmonary artery systolic pressure is 35 mmHg.    24 Hour Vitals:  Temp:  [36.7 °C (98.1 °F)-37 °C (98.6 °F)] 36.7 °C (98.1 °F)  Pulse:  [60-76] 72  Resp:  [12-20] 16  SpO2:  [95 %-98 %] 95 %  BP: (143-179)/(67-81) 179/81   See Nursing Charting For Additional Vitals      Pre-op Assessment    I have reviewed the Patient Summary Reports.     I have reviewed the Nursing Notes. I have reviewed the NPO Status.   I have reviewed the Medications.     Review of Systems  Anesthesia Hx:  No problems with previous Anesthesia                Denies Personal Hx of Anesthesia complications.                    Social:  Smoker, Alcohol Use, Recreational Drugs MJ use      Cardiovascular:     Hypertension    Dysrhythmias   CHF    hyperlipidemia   ECG has been reviewed.                            Pulmonary:  Pulmonary Normal                       Hepatic/GI:     GERD   Possible SBO             Musculoskeletal:  Arthritis               Neurological:      Headaches                                 Endocrine:  Endocrine Normal            Psych:   anxiety depression                Physical Exam  General: Well nourished and Cooperative    Airway:  Mallampati: II / II  Mouth Opening: Normal  TM Distance: Normal  Tongue: Normal  Neck ROM: Normal ROM    Dental:  Dentures, Edentulous    Chest/Lungs:  Clear to auscultation, Normal Respiratory Rate    Heart:  Rate: Normal  Rhythm: Regular Rhythm        Anesthesia Plan  Type of Anesthesia, risks & benefits discussed:    Anesthesia Type: Gen ETT  Intra-op Monitoring Plan: Standard ASA Monitors  Post Op Pain Control Plan: multimodal analgesia  Induction:  IV  Airway Plan: Video and Direct, Post-Induction  Informed Consent: Informed consent signed with the Patient and all parties understand the risks and agree with anesthesia plan.  All questions answered.   ASA Score: 3  Day of Surgery Review of History & Physical: H&P Update referred to the surgeon/provider.    Ready For Surgery From Anesthesia Perspective.     .             [1]   Social History  Socioeconomic History    Marital status:     Years of education: 12   Tobacco Use    Smoking status: Every Day     Current packs/day: 0.75     Average packs/day: 0.8 packs/day for 40.0 years (30.0 ttl pk-yrs)     Types: Cigarettes    Smokeless tobacco: Never   Substance and Sexual Activity    Alcohol use: Yes     Comment: rarely    Drug use: Yes     Frequency: 14.0 times per week     Types: Marijuana    Sexual activity: Yes     Partners: Male     Birth control/protection:  See Surgical Hx   Other Topics Concern    Are you pregnant or think you may be? No    Breast-feeding No     Social Drivers of Health     Financial Resource Strain: Low Risk  (6/9/2025)    Overall Financial Resource Strain (CARDIA)     Difficulty of Paying Living Expenses: Not hard at all   Food Insecurity: No Food Insecurity (6/9/2025)    Hunger Vital Sign     Worried About Running Out of Food in the Last Year: Never true     Ran Out of Food in the Last Year: Never true   Transportation Needs: No Transportation Needs (6/9/2025)    PRAPARE - Transportation     Lack of Transportation (Medical): No     Lack of Transportation (Non-Medical): No   Physical Activity: Inactive (6/9/2025)    Exercise Vital Sign     Days of Exercise per Week: 0 days     Minutes of Exercise per Session: 0 min   Stress: Stress Concern Present (6/9/2025)    [employer] [place] [employer] - Occupational Stress Questionnaire     Feeling of Stress : Very much   Housing Stability: Low Risk  (6/9/2025)    Housing Stability Vital Sign     Unable to Pay for Housing in the Last Year: No     Homeless in the Last Year: No   [2]   Current Facility-Administered Medications on File Prior to Encounter   Medication Dose Route Frequency Provider Last Rate Last Admin    cyclopentolate 1% ophthalmic solution 1 drop  1 drop Left Eye On Call Procedure Palmer Berrios MD   1 drop at 10/27/22 1003    ofloxacin 0.3 % ophthalmic solution 1 drop  1 drop Left Eye On Call Procedure Palmer Berrios MD   2 drop at 10/27/22 1238    sodium chloride 0.9% flush 10 mL  10 mL Intravenous PRN Palmer Berrios MD         Current Outpatient Medications on File Prior to Encounter   Medication Sig Dispense Refill    acetaminophen (TYLENOL) 500 MG tablet Take 500 mg by mouth every 6 (six) hours as needed for Pain.      ALPRAZolam (XANAX) 0.25 MG tablet TAKE 1 TABLET BY MOUTH THREE TIMES DAILY 90 tablet 0    atorvastatin (LIPITOR) 40 MG tablet Take 1  tablet by mouth once daily 90 tablet 3    busPIRone (BUSPAR) 15 MG tablet TAKE 1 TABLET BY MOUTH THREE TIMES DAILY 90 tablet 5    citalopram (CELEXA) 20 MG tablet Take 3 tablets (60 mg total) by mouth once daily. Take 1 tablet by mouth once daily (Patient taking differently: Take 60 mg by mouth 3 (three) times daily. Take 1 tablet by mouth TID daily) 90 tablet 11    cyclobenzaprine (FLEXERIL) 5 MG tablet TAKE 1 TABLET BY MOUTH TWICE DAILY AS NEEDED FOR MUSCLE SPASM 90 tablet 3    ergocalciferol (ERGOCALCIFEROL) 50,000 unit Cap Take 1 capsule (50,000 Units total) by mouth every 7 days. 12 capsule 3    olmesartan (BENICAR) 20 MG tablet TAKE 1 TABLET BY MOUTH ONCE DAILY IN THE EVENING 90 tablet 3    ondansetron (ZOFRAN) 4 MG tablet Take 1 tablet (4 mg total) by mouth every 8 (eight) hours as needed. 12 tablet 0    pantoprazole (PROTONIX) 40 MG tablet Take 1 tablet (40 mg total) by mouth once daily. 90 tablet 3    polyethylene glycol (COLYTE) 240-22.72-6.72 -5.84 gram SolR Take as directed by provider office 8000 mL 0    polyethylene glycol (GLYCOLAX) 17 gram PwPk Take 17 g by mouth once daily. 30 each 3    traZODone (DESYREL) 100 MG tablet Take 100 mg by mouth every evening.     [3]   Patient Active Problem List  Diagnosis    Essential hypertension    Hyperlipidemia    GERD (gastroesophageal reflux disease)    Depression with anxiety    Migraine with aura and without status migrainosus, not intractable    Nuclear sclerosis of both eyes    Dermatochalasis    Rosacea    Brow ptosis, bilateral    Retinal detachment of left eye with multiple breaks    Epiretinal membrane, left    Splenic infarction    Heart failure with preserved ejection fraction    Diastolic dysfunction    IFG (impaired fasting glucose)    Class 1 obesity due to excess calories without serious comorbidity with body mass index (BMI) of 31.0 to 31.9 in adult    RBBB    Left atrial enlargement    Left anterior fascicular block    Abnormal EKG    Dyspnea on  exertion    Multiple risk factors for coronary artery disease    Splenic infarct    History of tobacco abuse    Nuclear sclerotic cataract of left eye    Moderate major depression, single episode    Tobacco dependency    Choledocholithiasis    Epigastric pain    History of laparoscopic cholecystectomy    Gastric outlet obstruction    SBO (small bowel obstruction)    Leukocytosis    Hypokalemia

## 2025-06-14 NOTE — HOSPITAL COURSE
69 year old female admitted to general surgery team on 06/14/2025 for recurrent small-bowel obstruction. Patient taken for expiratory laparotomy on 06/14/2025.  NGT in place. Hospital medicine consulted for medical management.

## 2025-06-14 NOTE — PROGRESS NOTES
Patient arrived to unit. VSS. NAD. Patient midline dressing with large amout of drainage, Surgeon notified. Plan of care updated with patient. Safety precautions initiated.  Will continue to monitor.

## 2025-06-14 NOTE — PT/OT/SLP PROGRESS
"Occupational Therapy      Patient Name:  Patricia Ramirez   MRN:  [id number]    Patient not seen OOB today secondary to Patient ill. Upon encounter Patient grimmacing, global bracing, both hands upon abdomen, reporting "10" pain. Sparse communication, nursing infirmed. Patient ernestly requesting revisit "tommorrow." OT in agreement and will follow up as requested. Nursing infirmed of all herein.   6/14/2025  "

## 2025-06-14 NOTE — PLAN OF CARE
Case Management Assessment     PCP: Fabienne Erwin NP; prefers AM appointments  Pharmacy:   [id number]  [place] [address]  [address]  [place] [address]  Phone: [phone] Fax: [phone]    [facility] [place] [address] [facility]  [address]   [place] [address]  Phone: [phone] Fax: [phone]     Patient Arrived From: Home with son  Existing Help at Home: Wayne  Barriers to Discharge: None    Discharge Plan:    A. Home with family; follow-ups   B. TBD    Independent at home with adult son, who assists when needed; no current medical services or equipment; no significant discharge needs anticipated.       06/14/25 1420   Discharge Assessment   Assessment Type Discharge Planning Assessment   Confirmed/corrected address, phone number and insurance Yes   Confirmed Demographics Correct on Facesheet   Source of Information patient;health record   Communicated STUART with patient/caregiver Date not available/Unable to determine   People in Home child(kalli), adult   Name(s) of People in Home Wayne   Facility Arrived From: Home   Do you expect to return to your current living situation? Yes   Do you have help at home or someone to help you manage your care at home? Yes   Who are your caregiver(s) and their phone number(s)? Wayne: [phone]   Prior to hospitilization cognitive status: Alert/Oriented   Current cognitive status: Alert/Oriented   Walking or Climbing Stairs Difficulty no   Dressing/Bathing Difficulty no   Home Accessibility wheelchair accessible   Home Layout Able to live on 1st floor   Equipment Currently Used at Home none   Readmission within 30 days? Yes  (OWB: surgery)   Patient currently being followed by outpatient case management? No   Do you currently have service(s) that help you manage your care at home? No   Do you take prescription medications? Yes   Do you have prescription coverage? Yes   Do you have any problems affording any of  your prescribed medications? No   Is the patient taking medications as prescribed? yes   Who is going to help you get home at discharge? Mynor-son   How do you get to doctors appointments? car, drives self;family or friend will provide   Are you on dialysis? No   Do you take coumadin? No   Discharge Plan A Home with family  (Follow-ups)   Discharge Plan B Home with family  (Follow-ups)   DME Needed Upon Discharge  other (see comments)  (TBD)   Discharge Plan discussed with: Patient   Transition of Care Barriers None     SW Role explained to patient; two patient identifiers recognized; SW contact information placed on Communication board. Discussed patient managing health care at home and discussed discharge plans A and B; determined who would be helping patient at home with recovery: Mynor-son will help with recovery at home.

## 2025-06-14 NOTE — H&P
"Surgery  History & Physical    SUBJECTIVE:     History of Present Illness:  Patient is a 69 y.o. female known to surgery team who re-presents with abd pain and nausea, concern for SBO. Patient was discharged earlier today after about a week in hospital for conservative management of SBO symptoms, ultimately had ROBF after multiple days of gastric decompression with NGT and suppository, was able to tolerate PO and opted for discharge with close GI outpatient follow up for potential gastric dysmotility disorder. Surgery had been extensively discussed during recent admission. This morning patient reports she was initially feeling well after discharge and was able to have BM, but pain returned later that evening. It gradually increased in intensity with associated nausea therefore prompting presentation to ED because she "could not take it any longer". Still passing flatus and no emesis. Denies fever chills SOB CP. Patient AF VSS without leukocytosis and lactate 0.6. However on exam she appears more uncomfortable than when discharged. CT suspicious for worsening mechanical SBO at the level of the distal ileum, with small amount of free fluid.     Chief Complaint   Patient presents with    Abdominal Pain     Pt arrived to the ED due to continued lower abdominal pain and nausea after being recently admitted and discharged yesterday for a small bowel obstruction. No surgery performed during hospital stay       Review of patient's allergies indicates:   Allergen Reactions    Sulfa (sulfonamide antibiotics) Hives    Ace inhibitors Other (See Comments)     Cough         Current Medications[1]    Past Medical History:   Diagnosis Date    Allergy     Anxiety     Arthritis     Cataract     Depressive disorder, not elsewhere classified     Esophageal reflux     Hypertension     Impaired fasting glucose     Joint pain     Obesity, unspecified     Other and unspecified hyperlipidemia      Past Surgical History:   Procedure " Laterality Date    BLEPHAROPLASTY Bilateral 2021    Procedure: BLEPHAROPLASTY;  Surgeon: Maite Acuna MD;  Location: [facility];  Service: Ophthalmology;  Laterality: Bilateral;     SECTION  1973    CHOLECYSTECTOMY      COLONOSCOPY N/A 2023    Procedure: COLONOSCOPY;  Surgeon: Briana Sterling MD;  Location: [facility];  Service: Endoscopy;  Laterality: N/A;    ERCP N/A 10/07/2024    Procedure: ERCP (ENDOSCOPIC RETROGRADE CHOLANGIOPANCREATOGRAPHY);  Surgeon: Catracho Mitchell MD;  Location: [facility] ([facility]);  Service: Endoscopy;  Laterality: N/A;    ERCP N/A 2024    Procedure: ERCP (ENDOSCOPIC RETROGRADE CHOLANGIOPANCREATOGRAPHY);  Surgeon: Romeo Roger MD;  Location: [facility];  Service: Endoscopy;  Laterality: N/A;  12/3/24: instructions sent via email-GD   SWP PRECALL COMPLETED-RT    ERCP N/A 2025    Procedure: ERCP (ENDOSCOPIC RETROGRADE CHOLANGIOPANCREATOGRAPHY);  Surgeon: Shannon Chaney MD;  Location: [facility];  Service: Endoscopy;  Laterality: N/A;   portal-Repeat ERCP in 6 weeks to remove stent.[place] -tt   SWP-PRECALL COMPLETE-RT   r/s updated portal instr-pt stated any MD-tt    ERCP N/A 2025    Procedure: ERCP (ENDOSCOPIC RETROGRADE CHOLANGIOPANCREATOGRAPHY);  Surgeon: Shannon Chaney MD;  Location: [facility];  Service: Endoscopy;  Laterality: N/A;  Per Dr. Chaney- Repeat ERCP in 3 months to remove covered metal                          biliary stent and hopefully for final clearance of                          the CBD.     3/21/25-Pt no longer taking Eliquis, instr portal-DS  25-LVM     ESOPHAGOGASTRODUODENOSCOPY N/A 2024    Procedure: EGD (ESOPHAGOGASTRODUODENOSCOPY);  Surgeon: Angie Alatorre MD;  Location: [facility];  Service: Gastroenterology;  Laterality: N/A;    ESOPHAGOGASTRODUODENOSCOPY N/A 2025    Procedure: EGD (ESOPHAGOGASTRODUODENOSCOPY);  Surgeon: Mak Michael MD;  Location: [facility];  Service:  "Gastroenterology;  Laterality: N/A;    HYSTERECTOMY      without BSO    INTRAOCULAR PROSTHESES INSERTION Left 10/27/2022    Procedure: INSERTION, IOL PROSTHESIS;  Surgeon: Palmer Berrios MD;  Location: [facility] OR;  Service: Ophthalmology;  Laterality: Left;    PHACOEMULSIFICATION OF CATARACT Left 10/27/2022    Procedure: PHACOEMULSIFICATION, CATARACT;  Surgeon: Palmer Berrios MD;  Location: [facility] OR;  Service: Ophthalmology;  Laterality: Left;  RN PHONE PREOP 10/21/2022   ARRIVAL 9:00 AM    R knee replacement      REPAIR OF RETINAL DETACHMENT WITH VITRECTOMY Left 02/28/2022    Procedure: REPAIR, RETINAL DETACHMENT, WITH VITRECTOMY;  Surgeon: MINO Martinez MD;  Location: [address];  Service: Ophthalmology;  Laterality: Left;  Spoke with SID Garcia. Pt was instructed nothing to eat after 8am and to arrive at 2pm for preop. 430pm case start request.    right  arm  surgery      ROBOT-ASSISTED CHOLECYSTECTOMY USING DA GENO XI N/A 10/09/2024    Procedure: XI ROBOTIC SUB TOTAL CHOLECYSTECTOMY; DRAIN PLACEMENT;  Surgeon: Rigoberto Ponce MD;  Location: [facility] OR;  Service: General;  Laterality: N/A;     Family History   Problem Relation Name Age of Onset    Cancer Mother          lung    Liver disease Father      Thyroid disease Sister      Cervical cancer Sister      Tremor Sister       Social History[2]     Review of Systems:  Negative unless otherwise listed in HPI    OBJECTIVE:     Vital Signs (Most Recent)  Temp: 98.1 °F (36.7 °C) (06/14/25 0402)  Pulse: 67 (Simultaneous filing. User may not have seen previous data.) (06/14/25 0600)  Resp: 16 (06/14/25 0654)  BP: (!) 143/67 (06/14/25 0600)  SpO2: 95 % (06/14/25 0600)  5' 8" (1.727 m)  59 kg (130 lb)     Physical Exam:  NAD but uncomfortable and teary  Non-labored breathing   Warm and well perfused  No skin changes  Abd soft mildly distended TTP throughout most in lower quadrants no rebound guarding or peritonitis     Laboratory  CBC: " Reviewed  CMP: Reviewed  Coagulation: Reviewed    Diagnostic Results:  Labs: Reviewed  CT: Reviewed    Significant Diagnostic Studies: Labs: CMP   Recent Labs   Lab 06/13/25  0816 06/14/25  0416    140   K 3.2* 3.1*    106   CO2 23 22*   * 107   BUN 11 9   CREATININE 0.5 0.5   CALCIUM 8.0* 8.6*   PROT  --  6.2   ALBUMIN  --  3.2*   BILITOT  --  0.9   ALKPHOS  --  58   AST  --  16   ALT  --  14   ANIONGAP 11 12   , CBC   Recent Labs   Lab 06/14/25  0416   WBC 8.92   HGB 15.5   HCT 46.1      , INR   Lab Results   Component Value Date    INR 1.0 07/28/2022   , A1C:   Recent Labs   Lab 03/10/25  0730   HGBA1C 5.3   , and All labs within the past 24 hours have been reviewed  Radiology: CT scan: CT ABDOMEN PELVIS WITH CONTRAST:   Results for orders placed or performed during the hospital encounter of 06/14/25   CT Abdomen Pelvis With IV Contrast NO Oral Contrast    Narrative    EXAMINATION:  CT ABDOMEN PELVIS WITH IV CONTRAST    CLINICAL HISTORY:  Abdominal abscess/infection suspected;    TECHNIQUE:  Low dose axial images, sagittal and coronal reformations were obtained from the lung bases to the pubic symphysis following the IV administration of 75 mL of Omnipaque 350 .    COMPARISON:  Abdomen CT and pelvis CT 06/09/2025.    FINDINGS:  Abdomen CT and Pelvis CT:    Artifacts related to beam hardening and/or motion degrade portions of the scan.    In the lower thorax, there remains a poorly defined region of weakly negative attenuation interposed between the distal esophagus and descending thoracic aorta.  This was also present on 06/09/2025 but is new since 05/16/2025.  It is favored to represent ascites within a hiatal hernia (with weakly negative attenuation values perhaps related to beam hardening artifacts arising from the nearby spine).  Other etiologies for this finding could include a distended cisterna chyli.    Circumferential wall thickening is suggested in the gas-filled but poorly  distended distal esophagus.    The stomach is predominantly fluid-filled and again appears distended.  A portion of the gastric body/antrum remains relatively less distended, somewhat similar to the 06/09/2025 comparison scan.  Although a gastric consider could possibly explain this focal narrowing, more significant gastric mural pathology (such as neoplasm or scarring from peptic disease) is not excluded.    There is more extensive dilatation of the gas-filled and fluid-filled small bowel, but there remains a transition point at the level of a collapsed small bowel segment in the distal ileum.  This is possibly on the basis of an adhesion.  There is mild swelling of a portion of the mesentery in the right lower quadrant; although this appears less conspicuous than on 06/09/2025, a volvulus or internal hernia is again also possible.    The colon and appendix demonstrate no acute CT abnormalities.  There is some radiopaque intraluminal material in the appendix and portions of the colon, possibly related to residual enteric contrast from a prior study or other ingested radiopaque material.    No free intraperitoneal air.  Small quantity of abdominopelvic free intraperitoneal fluid, perhaps slightly decreased from the 06/09/2025 comparison scan.    The liver, atrophic pancreas, spleen (pre-existing splenic deformity, possibly secondary to old infarct or old injury), adrenal glands, kidneys, atherosclerotic abdominal aorta, IVC, suboptimally distended urinary bladder, abdominopelvic lymph nodes, visualized abdominal wall, and visualized skeleton demonstrate no adverse interval CT changes from 06/09/2025.    There are scattered degenerative skeletal changes.    The uterus is absent.  The ovaries are inconspicuous or perhaps also surgically absent.    The gallbladder is inconspicuous or perhaps surgically absent.  There is mild intrahepatic biliary ductal dilatation.  Approximate 7-8 mm in diameter, the extrahepatic bile  ducts are at the upper limits of normal for the patient's reported age of 69 years.    There is a moderate to large duodenal diverticulum that contains internal gas and fluid.  Some additional, smaller duodenal diverticula are also suggested.  No CT evidence of acute duodenal diverticulitis.      Impression    Abdomen CT and Pelvis CT:    CT findings are suspicious for worsening mechanical small bowel obstruction at the level of the distal ileum, possibly on the basis of an adhesion (with other etiologies not fully excluded, as further detailed in the body of the report).  Recommendations include clinical correlation and surgical consultation.    Small quantity of intraperitoneal free fluid.    No free intraperitoneal air.    Possible esophagitis.  Underlying esophageal metaplasia or neoplasia not fully excluded.  Correlate clinically, and with follow-up outpatient EGD if clinically appropriate.    Additional observations as detailed in the body of the report.    This report was flagged in Epic as abnormal.    Anil Nelson MD, first observed the critical findings on 06/14/2025 at 06:10, and sent the results to Margarita Morris DO, via Epic Secure Chat message, on 06/14/2025 at 06:33.  Patient name and medical record number were specified/linked in the message.  The recipient immediately responded, acknowledging receipt of the information.      Electronically signed by: Anil Nelson  Date:    06/14/2025  Time:    06:34       ASSESSMENT/PLAN:     68 yo F presenting with persistent/recurrent abd pain and nausea, CT scan concerning for SBO     PLAN:    - NPO, mIVF  - NGT for gastric decompression  - RBA of diagnostic laparoscopy vs exploratory laparotomy, possible bowel resection, possible ostomy, possible abthera/wound vac, and all other indicated procedures discussed with patient. She is agreeable to proceed, consent obtained   - Plan for OR today     Roxann Lin MD PGY-2  General Surgery Resident  [facility]               [1]   Current Facility-Administered Medications   Medication Dose Route Frequency Provider Last Rate Last Admin    acetaminophen tablet 650 mg  650 mg Oral Q8H Yuan Hong MD        acetaminophen tablet 650 mg  650 mg Oral Q4H PRN Yuan Washington MD        aluminum-magnesium hydroxide-simethicone 200-200-20 mg/5 mL suspension 30 mL  30 mL Oral QID Yuan Hong MD        glucagon (human recombinant) injection 1 mg  1 mg Intramuscular PRN Yuan Washington MD        glucose chewable tablet 16 g  16 g Oral PRN Yuan Washington MD        glucose chewable tablet 24 g  24 g Oral PRN Yuan Washington MD        magnesium oxide tablet 800 mg  800 mg Oral PRN Yuan Washington MD        magnesium oxide tablet 800 mg  800 mg Oral PRYuan Calero MD        melatonin tablet 6 mg  6 mg Oral Nightly Yuan Hong MD        morphine injection 2 mg  2 mg Intravenous Q4H PRN Roxann Lin MD   2 mg at 06/14/25 0654    morphine injection 4 mg  4 mg Intravenous Q4H PRRoxann Barkley MD        naloxone 0.4 mg/mL injection 0.02 mg  0.02 mg Intravenous PRN Yuan Washington MD        ondansetron injection 4 mg  4 mg Intravenous Q8H PRYuan Calero MD        potassium bicarbonate disintegrating tablet 35 mEq  35 mEq Oral PRYuan Calero MD        potassium bicarbonate disintegrating tablet 50 mEq  50 mEq Oral PRYuan Calero MD        potassium bicarbonate disintegrating tablet 60 mEq  60 mEq Oral PRYuan Calero MD        potassium chloride 10 mEq in 100 mL IVPB  10 mEq Intravenous Q1H MorrisRadha-My T., DO        potassium, sodium phosphates 280-160-250 mg packet 2 packet  2 packet Oral PRN SalYuan villanueva MD        potassium, sodium phosphates 280-160-250 mg packet 2 packet  2 packet Oral PRN SalYuan villanueva MD        potassium, sodium phosphates 280-160-250 mg packet 2 packet  2 packet Oral PRN Yuan Washington MD        simethicone chewable tablet 80 mg  1 tablet Oral QID PRN Yuan Washington MD        sodium chloride  0.9% flush 10 mL  10 mL Intravenous Q12H PRN Yuan Washington MD         Current Outpatient Medications   Medication Sig Dispense Refill    acetaminophen (TYLENOL) 500 MG tablet Take 500 mg by mouth every 6 (six) hours as needed for Pain.      ALPRAZolam (XANAX) 0.25 MG tablet TAKE 1 TABLET BY MOUTH THREE TIMES DAILY 90 tablet 0    atorvastatin (LIPITOR) 40 MG tablet Take 1 tablet by mouth once daily 90 tablet 3    busPIRone (BUSPAR) 15 MG tablet TAKE 1 TABLET BY MOUTH THREE TIMES DAILY 90 tablet 5    citalopram (CELEXA) 20 MG tablet Take 3 tablets (60 mg total) by mouth once daily. Take 1 tablet by mouth once daily (Patient taking differently: Take 60 mg by mouth 3 (three) times daily. Take 1 tablet by mouth TID daily) 90 tablet 11    cyclobenzaprine (FLEXERIL) 5 MG tablet TAKE 1 TABLET BY MOUTH TWICE DAILY AS NEEDED FOR MUSCLE SPASM 90 tablet 3    ergocalciferol (ERGOCALCIFEROL) 50,000 unit Cap Take 1 capsule (50,000 Units total) by mouth every 7 days. 12 capsule 3    olmesartan (BENICAR) 20 MG tablet TAKE 1 TABLET BY MOUTH ONCE DAILY IN THE EVENING 90 tablet 3    ondansetron (ZOFRAN) 4 MG tablet Take 1 tablet (4 mg total) by mouth every 8 (eight) hours as needed. 12 tablet 0    pantoprazole (PROTONIX) 40 MG tablet Take 1 tablet (40 mg total) by mouth once daily. 90 tablet 3    polyethylene glycol (COLYTE) 240-22.72-6.72 -5.84 gram SolR Take as directed by provider office 8000 mL 0    polyethylene glycol (GLYCOLAX) 17 gram PwPk Take 17 g by mouth once daily. 30 each 3    traZODone (DESYREL) 100 MG tablet Take 100 mg by mouth every evening.       Facility-Administered Medications Ordered in Other Encounters   Medication Dose Route Frequency Provider Last Rate Last Admin    cyclopentolate 1% ophthalmic solution 1 drop  1 drop Left Eye On Call Procedure Palmer Berrios MD   1 drop at 10/27/22 1003    ofloxacin 0.3 % ophthalmic solution 1 drop  1 drop Left Eye On Call Procedure Palmer Berrios MD   2 drop  at 10/27/22 1238    sodium chloride 0.9% flush 10 mL  10 mL Intravenous PRN Palmer Berrios MD       [2]   Social History  Tobacco Use    Smoking status: Every Day     Current packs/day: 0.75     Average packs/day: 0.8 packs/day for 40.0 years (30.0 ttl pk-yrs)     Types: Cigarettes    Smokeless tobacco: Never   Substance Use Topics    Alcohol use: Yes     Comment: rarely    Drug use: Yes     Frequency: 14.0 times per week     Types: Marijuana

## 2025-06-14 NOTE — ASSESSMENT & PLAN NOTE
Patient's most recent potassium results are listed below.   Recent Labs     06/12/25  0626 06/13/25  0816 06/14/25  0416   K 3.2* 3.2* 3.1*     Plan  - Replete potassium per protocol  - Monitor potassium Daily  - Patient's hypokalemia is stable  - replace as needed

## 2025-06-14 NOTE — TRANSFER OF CARE
"Anesthesia Transfer of Care Note    Patient: Patricia Ramirez    Procedure(s) Performed: Procedure(s) (LRB):  LAPAROSCOPY, DIAGNOSTIC (N/A)  LAPAROTOMY, EXPLORATORY; APPENDECTOMY; SMALL BOWEL RESECTION; DIVERTICULUM RESECTION (N/A)    Patient location: PACU    Anesthesia Type: general    Transport from OR: Transported from OR on 6-10 L/min O2 by face mask with adequate spontaneous ventilation    Post pain: adequate analgesia    Post assessment: no apparent anesthetic complications and tolerated procedure well    Post vital signs: stable    Level of consciousness: awake and responds to stimulation    Nausea/Vomiting: no nausea/vomiting    Complications: none    Transfer of care protocol was followed      Last vitals: Visit Vitals  BP (!) 193/80 (BP Location: Left arm, Patient Position: Lying)   Pulse 88   Temp 36.6 °C (97.9 °F)   Resp 11   Ht 5' 8" (1.727 m)   Wt 59 kg (130 lb)   SpO2 100%   BMI 19.77 kg/m²     "

## 2025-06-14 NOTE — HPI
69-year-old female with a history of anxiety, depression, hypertension presents to the ED with recurrent small bowel obstruction. Complains of diffuse abdominal pain, associated with nausea but no vomiting. Still passing flatus. Patient was recently hospitalized from 06/09/2025 to 06/13/2025 for SBO. Was seen by general surgery and was treated conservatively. Patient had return of bowel functions after multiple days of gastric decompression with NGT and suppository. Prior to discharge, patient was able to tolerate PO without any abdominal pain, nausea or vomiting. Referred to GI on discharge for possible gastric dysmotility disorder. Of note, patient was offered surgery last hospitalization, but patient declined at that time. States she is now amendable to surgery.     In the ED, patient hypertensive but afebrile. Labs without leukocytosis, but noted to have hypokalemia with K 3.1. CT abdomen/pelvis w/ worsening mechanical small bowel obstruction at the level of the distal ileum. NGT placed. Pt admitted to general surgery team. Hospital medicine team consulted.

## 2025-06-14 NOTE — ANESTHESIA PROCEDURE NOTES
Intubation    Date/Time: 6/14/2025 8:39 AM    Performed by: Juan Pablo Morrissey CRNA  Authorized by: Sivakumar Kennedy II, MD    Intubation:     Induction:  Rapid sequence induction    Intubated:  Postinduction    Mask Ventilation:  Not attempted    Attempts:  1    Attempted By:  CRNA    Method of Intubation:  Video laryngoscopy    Blade:  Ramos 3    Laryngeal View Grade: Grade IIA - cords partially seen      Difficult Airway Encountered?: No      Complications:  None    Airway Device:  Oral endotracheal tube    Airway Device Size:  7.0    Style/Cuff Inflation:  Cuffed (inflated to minimal occlusive pressure)    Tube secured:  21    Secured at:  The lips    Placement Verified By:  Capnometry    Complicating Factors:  None    Findings Post-Intubation:  BS equal bilateral and atraumatic/condition of teeth unchanged

## 2025-06-14 NOTE — CONSULTS
"Surgery Consult Note    SUBJECTIVE:     History of Present Illness:  Patient is a 69 y.o. female known to surgery team who re-presents with abd pain and nausea, concern for SBO. Patient was discharged earlier today after about a week in hospital for conservative management of SBO symptoms, ultimately had ROBF after multiple days of gastric decompression with NGT and suppository, was able to tolerate PO and opted for discharge with close GI outpatient follow up for potential gastric dysmotility disorder. Surgery had been extensively discussed during recent admission. This morning patient reports she was initially feeling well after discharge and was able to have BM, but pain returned later that evening. It gradually increased in intensity with associated nausea therefore prompting presentation to ED because she "could not take it any longer". Still passing flatus and no emesis. Denies fever chills SOB CP. Patient AF VSS without leukocytosis and lactate 0.6. However on exam she appears more uncomfortable than when discharged. CT suspicious for worsening mechanical SBO at the level of the distal ileum, with small amount of free fluid.    Chief Complaint   Patient presents with    Abdominal Pain     Pt arrived to the ED due to continued lower abdominal pain and nausea after being recently admitted and discharged yesterday for a small bowel obstruction. No surgery performed during hospital stay       Review of patient's allergies indicates:   Allergen Reactions    Sulfa (sulfonamide antibiotics) Hives    Ace inhibitors Other (See Comments)     Cough         Current Medications[1]    Past Medical History:   Diagnosis Date    Allergy     Anxiety     Arthritis     Cataract     Depressive disorder, not elsewhere classified     Esophageal reflux     Hypertension     Impaired fasting glucose     Joint pain     Obesity, unspecified     Other and unspecified hyperlipidemia      Past Surgical History:   Procedure Laterality Date "    BLEPHAROPLASTY Bilateral 2021    Procedure: BLEPHAROPLASTY;  Surgeon: Maite Acuna MD;  Location: [facility];  Service: Ophthalmology;  Laterality: Bilateral;     SECTION  1973    CHOLECYSTECTOMY      COLONOSCOPY N/A 2023    Procedure: COLONOSCOPY;  Surgeon: Briana Sterling MD;  Location: [facility] ENDO;  Service: Endoscopy;  Laterality: N/A;    ERCP N/A 10/07/2024    Procedure: ERCP (ENDOSCOPIC RETROGRADE CHOLANGIOPANCREATOGRAPHY);  Surgeon: Catracho Mitchell MD;  Location: [facility] ([facility]);  Service: Endoscopy;  Laterality: N/A;    ERCP N/A 2024    Procedure: ERCP (ENDOSCOPIC RETROGRADE CHOLANGIOPANCREATOGRAPHY);  Surgeon: Romeo Roger MD;  Location: [facility];  Service: Endoscopy;  Laterality: N/A;  12/3/24: instructions sent via email-GD   SWP PRECALL COMPLETED-RT    ERCP N/A 2025    Procedure: ERCP (ENDOSCOPIC RETROGRADE CHOLANGIOPANCREATOGRAPHY);  Surgeon: Shannon Chaney MD;  Location: [facility];  Service: Endoscopy;  Laterality: N/A;   portal-Repeat ERCP in 6 weeks to remove stent.nicola -tt   SWP-PRECALL COMPLETE-RT   r/s updated portal instr-pt stated any MD-tt    ERCP N/A 2025    Procedure: ERCP (ENDOSCOPIC RETROGRADE CHOLANGIOPANCREATOGRAPHY);  Surgeon: Shannon Chaney MD;  Location: [facility];  Service: Endoscopy;  Laterality: N/A;  Per Dr. Chaney- Repeat ERCP in 3 months to remove covered metal                          biliary stent and hopefully for final clearance of                          the CBD.     3/21/25-Pt no longer taking Eliquis, instr portal-DS  25-LVM     ESOPHAGOGASTRODUODENOSCOPY N/A 2024    Procedure: EGD (ESOPHAGOGASTRODUODENOSCOPY);  Surgeon: Angie Alatorre MD;  Location: [facility];  Service: Gastroenterology;  Laterality: N/A;    ESOPHAGOGASTRODUODENOSCOPY N/A 2025    Procedure: EGD (ESOPHAGOGASTRODUODENOSCOPY);  Surgeon: Mak Michael MD;  Location: [facility];  Service: Gastroenterology;   "Laterality: N/A;    HYSTERECTOMY      without BSO    INTRAOCULAR PROSTHESES INSERTION Left 10/27/2022    Procedure: INSERTION, IOL PROSTHESIS;  Surgeon: Palmer Berrios MD;  Location: [facility] OR;  Service: Ophthalmology;  Laterality: Left;    PHACOEMULSIFICATION OF CATARACT Left 10/27/2022    Procedure: PHACOEMULSIFICATION, CATARACT;  Surgeon: Palmer Berrois MD;  Location: [facility] OR;  Service: Ophthalmology;  Laterality: Left;  RN PHONE PREOP 10/21/2022   ARRIVAL 9:00 AM    R knee replacement      REPAIR OF RETINAL DETACHMENT WITH VITRECTOMY Left 02/28/2022    Procedure: REPAIR, RETINAL DETACHMENT, WITH VITRECTOMY;  Surgeon: MINO Martinez MD;  Location: [address];  Service: Ophthalmology;  Laterality: Left;  Spoke with SID Garcia. Pt was instructed nothing to eat after 8am and to arrive at 2pm for preop. 430pm case start request.    right  arm  surgery      ROBOT-ASSISTED CHOLECYSTECTOMY USING DA GENO XI N/A 10/09/2024    Procedure: XI ROBOTIC SUB TOTAL CHOLECYSTECTOMY; DRAIN PLACEMENT;  Surgeon: Rigoberto Ponce MD;  Location: [facility] OR;  Service: General;  Laterality: N/A;     Family History   Problem Relation Name Age of Onset    Cancer Mother          lung    Liver disease Father      Thyroid disease Sister      Cervical cancer Sister      Tremor Sister       Social History[2]     Review of Systems:  Negative unless otherwise listed in HPI    OBJECTIVE:     Vital Signs (Most Recent)  Temp: 98.1 °F (36.7 °C) (06/14/25 0402)  Pulse: 67 (Simultaneous filing. User may not have seen previous data.) (06/14/25 0600)  Resp: 12 (06/14/25 0600)  BP: (!) 143/67 (06/14/25 0600)  SpO2: 95 % (06/14/25 0600)  5' 8" (1.727 m)  59 kg (130 lb)     Physical Exam:  NAD but uncomfortable and teary  Non-labored breathing   Warm and well perfused  No skin changes  Abd soft mildly distended TTP throughout most in lower quadrants no rebound guarding or peritonitis     Laboratory  CBC: Reviewed  CMP: " Reviewed  Coagulation: Reviewed    Diagnostic Results:  Labs: Reviewed  CT: Reviewed    Significant Diagnostic Studies: Labs: CMP   Recent Labs   Lab 06/13/25  0816 06/14/25  0416    140   K 3.2* 3.1*    106   CO2 23 22*   * 107   BUN 11 9   CREATININE 0.5 0.5   CALCIUM 8.0* 8.6*   PROT  --  6.2   ALBUMIN  --  3.2*   BILITOT  --  0.9   ALKPHOS  --  58   AST  --  16   ALT  --  14   ANIONGAP 11 12   , CBC   Recent Labs   Lab 06/14/25  0416   WBC 8.92   HGB 15.5   HCT 46.1      , INR   Lab Results   Component Value Date    INR 1.0 07/28/2022   , A1C:   Recent Labs   Lab 03/10/25  0730   HGBA1C 5.3   , and All labs within the past 24 hours have been reviewed  Radiology: CT scan: CT ABDOMEN PELVIS WITH CONTRAST:   Results for orders placed or performed during the hospital encounter of 06/14/25   CT Abdomen Pelvis With IV Contrast NO Oral Contrast    Narrative    EXAMINATION:  CT ABDOMEN PELVIS WITH IV CONTRAST    CLINICAL HISTORY:  Abdominal abscess/infection suspected;    TECHNIQUE:  Low dose axial images, sagittal and coronal reformations were obtained from the lung bases to the pubic symphysis following the IV administration of 75 mL of Omnipaque 350 .    COMPARISON:  Abdomen CT and pelvis CT 06/09/2025.    FINDINGS:  Abdomen CT and Pelvis CT:    Artifacts related to beam hardening and/or motion degrade portions of the scan.    In the lower thorax, there remains a poorly defined region of weakly negative attenuation interposed between the distal esophagus and descending thoracic aorta.  This was also present on 06/09/2025 but is new since 05/16/2025.  It is favored to represent ascites within a hiatal hernia (with weakly negative attenuation values perhaps related to beam hardening artifacts arising from the nearby spine).  Other etiologies for this finding could include a distended cisterna chyli.    Circumferential wall thickening is suggested in the gas-filled but poorly distended distal  esophagus.    The stomach is predominantly fluid-filled and again appears distended.  A portion of the gastric body/antrum remains relatively less distended, somewhat similar to the 06/09/2025 comparison scan.  Although a gastric consider could possibly explain this focal narrowing, more significant gastric mural pathology (such as neoplasm or scarring from peptic disease) is not excluded.    There is more extensive dilatation of the gas-filled and fluid-filled small bowel, but there remains a transition point at the level of a collapsed small bowel segment in the distal ileum.  This is possibly on the basis of an adhesion.  There is mild swelling of a portion of the mesentery in the right lower quadrant; although this appears less conspicuous than on 06/09/2025, a volvulus or internal hernia is again also possible.    The colon and appendix demonstrate no acute CT abnormalities.  There is some radiopaque intraluminal material in the appendix and portions of the colon, possibly related to residual enteric contrast from a prior study or other ingested radiopaque material.    No free intraperitoneal air.  Small quantity of abdominopelvic free intraperitoneal fluid, perhaps slightly decreased from the 06/09/2025 comparison scan.    The liver, atrophic pancreas, spleen (pre-existing splenic deformity, possibly secondary to old infarct or old injury), adrenal glands, kidneys, atherosclerotic abdominal aorta, IVC, suboptimally distended urinary bladder, abdominopelvic lymph nodes, visualized abdominal wall, and visualized skeleton demonstrate no adverse interval CT changes from 06/09/2025.    There are scattered degenerative skeletal changes.    The uterus is absent.  The ovaries are inconspicuous or perhaps also surgically absent.    The gallbladder is inconspicuous or perhaps surgically absent.  There is mild intrahepatic biliary ductal dilatation.  Approximate 7-8 mm in diameter, the extrahepatic bile ducts are at the  upper limits of normal for the patient's reported age of 69 years.    There is a moderate to large duodenal diverticulum that contains internal gas and fluid.  Some additional, smaller duodenal diverticula are also suggested.  No CT evidence of acute duodenal diverticulitis.      Impression    Abdomen CT and Pelvis CT:    CT findings are suspicious for worsening mechanical small bowel obstruction at the level of the distal ileum, possibly on the basis of an adhesion (with other etiologies not fully excluded, as further detailed in the body of the report).  Recommendations include clinical correlation and surgical consultation.    Small quantity of intraperitoneal free fluid.    No free intraperitoneal air.    Possible esophagitis.  Underlying esophageal metaplasia or neoplasia not fully excluded.  Correlate clinically, and with follow-up outpatient EGD if clinically appropriate.    Additional observations as detailed in the body of the report.    This report was flagged in Epic as abnormal.    Anil Nelson MD, first observed the critical findings on 06/14/2025 at 06:10, and sent the results to Margarita Morris DO, via Epic Secure Chat message, on 06/14/2025 at 06:33.  Patient name and medical record number were specified/linked in the message.  The recipient immediately responded, acknowledging receipt of the information.      Electronically signed by: Anil Nelson  Date:    06/14/2025  Time:    06:34    and CT ABDOMEN PELVIS WITHOUT CONTRAST: No results found for this visit on 06/14/25.    ASSESSMENT/PLAN:     70 yo F presenting with persistent/recurrent abd pain and nausea, CT scan concerning for SBO     PLAN:    - NPO, mIVF  - NGT for gastric decompression  - RBA of diagnostic laparoscopy vs exploratory laparotomy, possible bowel resection, possible ostomy, possible abthera/wound vac, and all other indicated procedures discussed with patient. She is agreeable to proceed, consent obtained   - Plan for OR today      Roxann Lin MD PGY-2  General Surgery Resident  [facility]                 [1]   Current Facility-Administered Medications   Medication Dose Route Frequency Provider Last Rate Last Admin    acetaminophen tablet 650 mg  650 mg Oral Q8H DEEDEEN Yuan Washington MD        acetaminophen tablet 650 mg  650 mg Oral Q4H PRN Yuan Washington MD        aluminum-magnesium hydroxide-simethicone 200-200-20 mg/5 mL suspension 30 mL  30 mL Oral QID PRN Yuan Washington MD        glucagon (human recombinant) injection 1 mg  1 mg Intramuscular PRN Yuan Washington MD        glucose chewable tablet 16 g  16 g Oral PRN Yuan Washington MD        glucose chewable tablet 24 g  24 g Oral PRN Yuan Washington MD        magnesium oxide tablet 800 mg  800 mg Oral PRN Yuan Washington MD        magnesium oxide tablet 800 mg  800 mg Oral PRN Yuan Washington MD        melatonin tablet 6 mg  6 mg Oral Nightly Yuan Hong MD        naloxone 0.4 mg/mL injection 0.02 mg  0.02 mg Intravenous PRN Yuan Washington MD        ondansetron injection 4 mg  4 mg Intravenous Q8H PRYuan Calero MD        potassium bicarbonate disintegrating tablet 35 mEq  35 mEq Oral PRN Yuan Washington MD        potassium bicarbonate disintegrating tablet 50 mEq  50 mEq Oral PRN Yuan Washington MD        potassium bicarbonate disintegrating tablet 60 mEq  60 mEq Oral PRN Yuan Washington MD        potassium, sodium phosphates 280-160-250 mg packet 2 packet  2 packet Oral PRN Yuan Washington MD        potassium, sodium phosphates 280-160-250 mg packet 2 packet  2 packet Oral PRN Yuan Washington MD        potassium, sodium phosphates 280-160-250 mg packet 2 packet  2 packet Oral PRN Yuan Washington MD        simethicone chewable tablet 80 mg  1 tablet Oral QID PRN Yuan Washington MD        sodium chloride 0.9% flush 10 mL  10 mL Intravenous Q12H PRYuan Calero MD         Current Outpatient Medications   Medication Sig Dispense Refill    acetaminophen (TYLENOL) 500 MG tablet Take 500 mg by mouth  every 6 (six) hours as needed for Pain.      ALPRAZolam (XANAX) 0.25 MG tablet TAKE 1 TABLET BY MOUTH THREE TIMES DAILY 90 tablet 0    atorvastatin (LIPITOR) 40 MG tablet Take 1 tablet by mouth once daily 90 tablet 3    busPIRone (BUSPAR) 15 MG tablet TAKE 1 TABLET BY MOUTH THREE TIMES DAILY 90 tablet 5    citalopram (CELEXA) 20 MG tablet Take 3 tablets (60 mg total) by mouth once daily. Take 1 tablet by mouth once daily (Patient taking differently: Take 60 mg by mouth 3 (three) times daily. Take 1 tablet by mouth TID daily) 90 tablet 11    cyclobenzaprine (FLEXERIL) 5 MG tablet TAKE 1 TABLET BY MOUTH TWICE DAILY AS NEEDED FOR MUSCLE SPASM 90 tablet 3    ergocalciferol (ERGOCALCIFEROL) 50,000 unit Cap Take 1 capsule (50,000 Units total) by mouth every 7 days. 12 capsule 3    olmesartan (BENICAR) 20 MG tablet TAKE 1 TABLET BY MOUTH ONCE DAILY IN THE EVENING 90 tablet 3    ondansetron (ZOFRAN) 4 MG tablet Take 1 tablet (4 mg total) by mouth every 8 (eight) hours as needed. 12 tablet 0    pantoprazole (PROTONIX) 40 MG tablet Take 1 tablet (40 mg total) by mouth once daily. 90 tablet 3    polyethylene glycol (COLYTE) 240-22.72-6.72 -5.84 gram SolR Take as directed by provider office 8000 mL 0    polyethylene glycol (GLYCOLAX) 17 gram PwPk Take 17 g by mouth once daily. 30 each 3    traZODone (DESYREL) 100 MG tablet Take 100 mg by mouth every evening.       Facility-Administered Medications Ordered in Other Encounters   Medication Dose Route Frequency Provider Last Rate Last Admin    cyclopentolate 1% ophthalmic solution 1 drop  1 drop Left Eye On Call Procedure Palmer Berrios MD   1 drop at 10/27/22 1003    ofloxacin 0.3 % ophthalmic solution 1 drop  1 drop Left Eye On Call Procedure Palmer Berrios MD   2 drop at 10/27/22 1238    sodium chloride 0.9% flush 10 mL  10 mL Intravenous PRN Palmer Berrios MD       [2]   Social History  Tobacco Use    Smoking status: Every Day     Current packs/day:  0.75     Average packs/day: 0.8 packs/day for 40.0 years (30.0 ttl pk-yrs)     Types: Cigarettes    Smokeless tobacco: Never   Substance Use Topics    Alcohol use: Yes     Comment: rarely    Drug use: Yes     Frequency: 14.0 times per week     Types: Marijuana

## 2025-06-14 NOTE — ASSESSMENT & PLAN NOTE
Patient's blood pressure range in the last 24 hours was: BP  Min: 128/61  Max: 193/80.The patient's inpatient anti-hypertensive regimen is listed below:  Current Antihypertensives  hydrALAZINE injection 5 mg, Every 6 hours PRN, Intravenous    Plan  - BP is controlled, no changes needed to their regimen  - hold home Benicar at this time given NPO status  - resume once able to tolerate p.o. intake and if BP not controlled

## 2025-06-14 NOTE — ED PROVIDER NOTES
Encounter Date: 2025       History     Chief Complaint   Patient presents with    Abdominal Pain     Pt arrived to the ED due to continued lower abdominal pain and nausea after being recently admitted and discharged yesterday for a small bowel obstruction. No surgery performed during hospital stay     69-year-old female with recurrent small-bowel obstruction presents for left lower quadrant abdominal pain concerning for recurrent small bowel obstruction.  She was discharge today.  The pain started this evening, it is constant nonradiating and nothing makes it better or worse, associated with nausea.  Recent hospitalization, her bowel obstruction was managed conservatively.      Review of patient's allergies indicates:   Allergen Reactions    Sulfa (sulfonamide antibiotics) Hives    Ace inhibitors Other (See Comments)     Cough       Past Medical History:   Diagnosis Date    Allergy     Anxiety     Arthritis     Cataract     Depressive disorder, not elsewhere classified     Esophageal reflux     Hypertension     Impaired fasting glucose     Joint pain     Obesity, unspecified     Other and unspecified hyperlipidemia      Past Surgical History:   Procedure Laterality Date    BLEPHAROPLASTY Bilateral 2021    Procedure: BLEPHAROPLASTY;  Surgeon: Maite Acuna MD;  Location: [facility];  Service: Ophthalmology;  Laterality: Bilateral;     SECTION  1973    CHOLECYSTECTOMY      COLONOSCOPY N/A 2023    Procedure: COLONOSCOPY;  Surgeon: Briana Sterling MD;  Location: [facility];  Service: Endoscopy;  Laterality: N/A;    ERCP N/A 10/07/2024    Procedure: ERCP (ENDOSCOPIC RETROGRADE CHOLANGIOPANCREATOGRAPHY);  Surgeon: Catracho Mitchell MD;  Location: [address]);  Service: Endoscopy;  Laterality: N/A;    ERCP N/A 2024    Procedure: ERCP (ENDOSCOPIC RETROGRADE CHOLANGIOPANCREATOGRAPHY);  Surgeon: Romeo Roger MD;  Location: [facility];  Service: Endoscopy;  Laterality: N/A;   12/3/24: instructions sent via email-GD  12/5 SWP PRECALL COMPLETED-RT    ERCP N/A 01/29/2025    Procedure: ERCP (ENDOSCOPIC RETROGRADE CHOLANGIOPANCREATOGRAPHY);  Surgeon: Shannon Chaney MD;  Location: [facility] ENDO;  Service: Endoscopy;  Laterality: N/A;  1/6 portal-Repeat ERCP in 6 weeks to remove stent.petersen -tt  1/14 SWP-PRECALL COMPLETE-RT  1/23 r/s updated portal instr-pt stated any MD-tt    ERCP N/A 04/28/2025    Procedure: ERCP (ENDOSCOPIC RETROGRADE CHOLANGIOPANCREATOGRAPHY);  Surgeon: Shannon Chaney MD;  Location: [facility] ENDO;  Service: Endoscopy;  Laterality: N/A;  Per Dr. Chaney- Repeat ERCP in 3 months to remove covered metal                          biliary stent and hopefully for final clearance of                          the CBD.     3/21/25-Pt no longer taking Eliquis, instr portal-DS  4/24/25-LVM     ESOPHAGOGASTRODUODENOSCOPY N/A 11/21/2024    Procedure: EGD (ESOPHAGOGASTRODUODENOSCOPY);  Surgeon: Angie Alatorre MD;  Location: [facility] ENDO;  Service: Gastroenterology;  Laterality: N/A;    ESOPHAGOGASTRODUODENOSCOPY N/A 5/23/2025    Procedure: EGD (ESOPHAGOGASTRODUODENOSCOPY);  Surgeon: Mak Michael MD;  Location: [facility] ENDO;  Service: Gastroenterology;  Laterality: N/A;    HYSTERECTOMY      without BSO    INTRAOCULAR PROSTHESES INSERTION Left 10/27/2022    Procedure: INSERTION, IOL PROSTHESIS;  Surgeon: Palmer Berrios MD;  Location: [facility] OR;  Service: Ophthalmology;  Laterality: Left;    PHACOEMULSIFICATION OF CATARACT Left 10/27/2022    Procedure: PHACOEMULSIFICATION, CATARACT;  Surgeon: Palmer Berrios MD;  Location: [facility] OR;  Service: Ophthalmology;  Laterality: Left;  RN PHONE PREOP 10/21/2022   ARRIVAL 9:00 AM    R knee replacement      REPAIR OF RETINAL DETACHMENT WITH VITRECTOMY Left 02/28/2022    Procedure: REPAIR, RETINAL DETACHMENT, WITH VITRECTOMY;  Surgeon: MINO Martinez MD;  Location: [facility] OR 1ST FLR;  Service: Ophthalmology;  Laterality: Left;  Spoke with SID Garcia.  Pt was instructed nothing to eat after 8am and to arrive at 2pm for preop. 430pm case start request.    right  arm  surgery      ROBOT-ASSISTED CHOLECYSTECTOMY USING DA GENO XI N/A 10/09/2024    Procedure: XI ROBOTIC SUB TOTAL CHOLECYSTECTOMY; DRAIN PLACEMENT;  Surgeon: Rigoberto Ponce MD;  Location: [facility];  Service: General;  Laterality: N/A;     Family History   Problem Relation Name Age of Onset    Cancer Mother          lung    Liver disease Father      Thyroid disease Sister      Cervical cancer Sister      Tremor Sister       Social History[1]  Review of Systems   Gastrointestinal:  Positive for abdominal pain and nausea.       Physical Exam     Initial Vitals   BP Pulse Resp Temp SpO2   06/14/25 0402 06/14/25 0400 06/14/25 0402 06/14/25 0402 06/14/25 0402   (!) 157/80 65 20 98.1 °F (36.7 °C) 98 %      MAP       --                Physical Exam    Nursing note and vitals reviewed.  Constitutional: She appears well-developed and well-nourished. She is not diaphoretic.   HENT:   Head: Normocephalic and atraumatic. Mouth/Throat: Oropharynx is clear and moist.   Eyes: EOM are normal. Pupils are equal, round, and reactive to light. Right eye exhibits no discharge. Left eye exhibits no discharge.   Neck: No tracheal deviation present.   Normal range of motion.  Cardiovascular:  Normal rate, regular rhythm and intact distal pulses.           Pulmonary/Chest: No respiratory distress. She has no wheezes. She exhibits no tenderness.   Abdominal: Abdomen is soft. She exhibits no distension. There is abdominal tenderness.   Left lower quadrant tenderness to palpation without rebound or guarding   Musculoskeletal:         General: No tenderness or edema. Normal range of motion.      Cervical back: Normal range of motion.     Neurological: She is alert and oriented to person, place, and time. She has normal strength. No cranial nerve deficit or sensory deficit. GCS eye subscore is 4. GCS verbal subscore is 5. GCS motor  subscore is 6.   Skin: Skin is warm and dry. No rash noted.   Psychiatric: She has a normal mood and affect. Her behavior is normal. Thought content normal.         ED Course   Procedures  Labs Reviewed   COMPREHENSIVE METABOLIC PANEL - Abnormal       Result Value    Sodium 140      Potassium 3.1 (*)     Chloride 106      CO2 22 (*)     Glucose 107      BUN 9      Creatinine 0.5      Calcium 8.6 (*)     Protein Total 6.2      Albumin 3.2 (*)     Bilirubin Total 0.9      ALP 58      AST 16      ALT 14      Anion Gap 12      eGFR >60     CBC WITH DIFFERENTIAL - Abnormal    WBC 8.92      RBC 4.82      HGB 15.5      HCT 46.1      MCV 96      MCH 32.2 (*)     MCHC 33.6      RDW 12.7      Platelet Count 209      MPV 9.0 (*)     Nucleated RBC 0      Neut % 68.3      Lymph % 17.7 (*)     Mono % 11.9      Eos % 1.3      Basophil % 0.4      Imm Grans % 0.4      Neut # 6.08      Lymph # 1.58      Mono # 1.06 (*)     Eos # 0.12      Baso # 0.04      Imm Grans # 0.04     LIPASE - Normal    Lipase Level 11     LACTIC ACID, PLASMA - Normal    Lactic Acid Level 0.6      Narrative:     Falsely low lactic acid results can be found in samples containing >=13.0 mg/dL total bilirubin and/or >=3.5 mg/dL direct bilirubin.   Specimen slightly hemolyzed.   CBC W/ AUTO DIFFERENTIAL    Narrative:     The following orders were created for panel order CBC W/ AUTO DIFFERENTIAL.  Procedure                               Abnormality         Status                     ---------                               -----------         ------                     CBC with Differential[[id number]]       Abnormal            Final result                 Please view results for these tests on the individual orders.   URINALYSIS, REFLEX TO URINE CULTURE          Imaging Results              CT Abdomen Pelvis With IV Contrast NO Oral Contrast (In process)                      Medications   sodium chloride 0.9% bolus 1,000 mL 1,000 mL (0 mLs Intravenous Stopped  6/14/25 0539)   ondansetron injection 4 mg (4 mg Intravenous Given 6/14/25 0421)   morphine injection 4 mg (4 mg Intravenous Given 6/14/25 0422)   iohexoL (OMNIPAQUE 350) injection 75 mL (75 mLs Intravenous Given 6/14/25 0525)     Medical Decision Making  69-year-old female with history of recurrent small-bowel obstruction presents for left lower quadrant abdominal pain concerning for recurrent small bowel obstruction.  Vitals within normal limits.  She has tenderness to palpation left lower quadrant without rebound or guarding.  Differential diagnosis includes recurrent obstruction, gastroparesis, constipation, aortic disease.  Will give morphine and Zofran for symptoms along with IV fluids.  Disposition pending abdominal pain workup and symptom control.    Amount and/or Complexity of Data Reviewed  Labs: ordered.  Radiology: ordered.    Risk  Prescription drug management.  Decision regarding hospitalization.               ED Course as of 06/14/25 0544   Sat Jun 14, 2025   0534 CT abdomen pelvis with air-fluid levels concerning for small bowel obstruction.  Labs normal.  Will discuss with General surgery.  Will admit to hospital medicine. [BS]   0540 Case discussed with general surgery who will, evaluated the patient and requested admission to hospital medicine. [BS]      ED Course User Index  [BS] Jun Dior MD                           Clinical Impression:  Final diagnoses:  [K56.609] Small bowel obstruction (Primary)          ED Disposition Condition    Admit                       [1]   Social History  Tobacco Use    Smoking status: Every Day     Current packs/day: 0.75     Average packs/day: 0.8 packs/day for 40.0 years (30.0 ttl pk-yrs)     Types: Cigarettes    Smokeless tobacco: Never   Substance Use Topics    Alcohol use: Yes     Comment: rarely    Drug use: Yes     Frequency: 14.0 times per week     Types: Marijuana        Jun Dior MD  06/14/25 0562

## 2025-06-14 NOTE — ASSESSMENT & PLAN NOTE
-was recently hospitalized from 06/09/2025 to 06/13/2025 for SBO. Was seen by general surgery and was treated conservatively. Patient had return of bowel functions after multiple days of gastric decompression with NGT and suppository and was discharged  -returned with recurrent abdominal pain associated with nausea.  Pt with recurrent SBO.   -CT abdomen and pelvis showed worsening mechanical small bowel obstruction at the level of the distal ileum   -pt s/p  exploratory laparotomy, appendectomy, small bowel resection and side to side anastomosis, and diverticulectomy on 06/14/25  -management per primary team, General surgery

## 2025-06-14 NOTE — SUBJECTIVE & OBJECTIVE
Past Medical History:   Diagnosis Date    Allergy     Anxiety     Arthritis     Cataract     Depressive disorder, not elsewhere classified     Esophageal reflux     Hypertension     Impaired fasting glucose     Joint pain     Obesity, unspecified     Other and unspecified hyperlipidemia        Past Surgical History:   Procedure Laterality Date    BLEPHAROPLASTY Bilateral 2021    Procedure: BLEPHAROPLASTY;  Surgeon: Maite Acuna MD;  Location: [facility];  Service: Ophthalmology;  Laterality: Bilateral;     SECTION  1973    CHOLECYSTECTOMY      COLONOSCOPY N/A 2023    Procedure: COLONOSCOPY;  Surgeon: Briana Sterling MD;  Location: [facility];  Service: Endoscopy;  Laterality: N/A;    ERCP N/A 10/07/2024    Procedure: ERCP (ENDOSCOPIC RETROGRADE CHOLANGIOPANCREATOGRAPHY);  Surgeon: Catracho Mitchell MD;  Location: [address]);  Service: Endoscopy;  Laterality: N/A;    ERCP N/A 2024    Procedure: ERCP (ENDOSCOPIC RETROGRADE CHOLANGIOPANCREATOGRAPHY);  Surgeon: Romeo Roger MD;  Location: [facility];  Service: Endoscopy;  Laterality: N/A;  12/3/24: instructions sent via email-GD   SWP PRECALL COMPLETED-RT    ERCP N/A 2025    Procedure: ERCP (ENDOSCOPIC RETROGRADE CHOLANGIOPANCREATOGRAPHY);  Surgeon: Shannon Chaney MD;  Location: [facility];  Service: Endoscopy;  Laterality: N/A;   portal-Repeat ERCP in 6 weeks to remove stent.nicola -tt   SWP-PRECALL COMPLETE-RT   r/s updated portal instr-pt stated any MD-tt    ERCP N/A 2025    Procedure: ERCP (ENDOSCOPIC RETROGRADE CHOLANGIOPANCREATOGRAPHY);  Surgeon: Shannon Chaney MD;  Location: [facility];  Service: Endoscopy;  Laterality: N/A;  Per Dr. Chaney- Repeat ERCP in 3 months to remove covered metal                          biliary stent and hopefully for final clearance of                          the CBD.     3/21/25-Pt no longer taking Eliquis, instr portal-DS  25-LVM      ESOPHAGOGASTRODUODENOSCOPY N/A 11/21/2024    Procedure: EGD (ESOPHAGOGASTRODUODENOSCOPY);  Surgeon: Angie Alatorre MD;  Location: [facility] ENDO;  Service: Gastroenterology;  Laterality: N/A;    ESOPHAGOGASTRODUODENOSCOPY N/A 5/23/2025    Procedure: EGD (ESOPHAGOGASTRODUODENOSCOPY);  Surgeon: Mak Michael MD;  Location: [facility] ENDO;  Service: Gastroenterology;  Laterality: N/A;    HYSTERECTOMY      without BSO    INTRAOCULAR PROSTHESES INSERTION Left 10/27/2022    Procedure: INSERTION, IOL PROSTHESIS;  Surgeon: Palmer Berrios MD;  Location: [facility] OR;  Service: Ophthalmology;  Laterality: Left;    PHACOEMULSIFICATION OF CATARACT Left 10/27/2022    Procedure: PHACOEMULSIFICATION, CATARACT;  Surgeon: Palmer Berrios MD;  Location: [facility] OR;  Service: Ophthalmology;  Laterality: Left;  RN PHONE PREOP 10/21/2022   ARRIVAL 9:00 AM    R knee replacement      REPAIR OF RETINAL DETACHMENT WITH VITRECTOMY Left 02/28/2022    Procedure: REPAIR, RETINAL DETACHMENT, WITH VITRECTOMY;  Surgeon: MINO Martinez MD;  Location: [address];  Service: Ophthalmology;  Laterality: Left;  Spoke with SID Garcia. Pt was instructed nothing to eat after 8am and to arrive at 2pm for preop. 430pm case start request.    right  arm  surgery      ROBOT-ASSISTED CHOLECYSTECTOMY USING DA GENO XI N/A 10/09/2024    Procedure: XI ROBOTIC SUB TOTAL CHOLECYSTECTOMY; DRAIN PLACEMENT;  Surgeon: Rigoberto Ponce MD;  Location: [facility] OR;  Service: General;  Laterality: N/A;       Review of patient's allergies indicates:   Allergen Reactions    Sulfa (sulfonamide antibiotics) Hives    Ace inhibitors Other (See Comments)     Cough         Current Facility-Administered Medications on File Prior to Encounter   Medication    cyclopentolate 1% ophthalmic solution 1 drop    ofloxacin 0.3 % ophthalmic solution 1 drop    sodium chloride 0.9% flush 10 mL    [DISCONTINUED] acetaminophen tablet 650 mg    [DISCONTINUED] cloNIDine 0.3 mg/24 hr td ptwk 1  patch    [DISCONTINUED] hydrALAZINE injection 20 mg    [DISCONTINUED] morphine injection 4 mg    [DISCONTINUED] ondansetron injection 4 mg    [DISCONTINUED] oxyCODONE immediate release tablet 5 mg     Current Outpatient Medications on File Prior to Encounter   Medication Sig    acetaminophen (TYLENOL) 500 MG tablet Take 500 mg by mouth every 6 (six) hours as needed for Pain.    ALPRAZolam (XANAX) 0.25 MG tablet TAKE 1 TABLET BY MOUTH THREE TIMES DAILY    atorvastatin (LIPITOR) 40 MG tablet Take 1 tablet by mouth once daily    busPIRone (BUSPAR) 15 MG tablet TAKE 1 TABLET BY MOUTH THREE TIMES DAILY    citalopram (CELEXA) 20 MG tablet Take 3 tablets (60 mg total) by mouth once daily. Take 1 tablet by mouth once daily (Patient taking differently: Take 60 mg by mouth 3 (three) times daily. Take 1 tablet by mouth TID daily)    cyclobenzaprine (FLEXERIL) 5 MG tablet TAKE 1 TABLET BY MOUTH TWICE DAILY AS NEEDED FOR MUSCLE SPASM    ergocalciferol (ERGOCALCIFEROL) 50,000 unit Cap Take 1 capsule (50,000 Units total) by mouth every 7 days.    olmesartan (BENICAR) 20 MG tablet TAKE 1 TABLET BY MOUTH ONCE DAILY IN THE EVENING    ondansetron (ZOFRAN) 4 MG tablet Take 1 tablet (4 mg total) by mouth every 8 (eight) hours as needed.    pantoprazole (PROTONIX) 40 MG tablet Take 1 tablet (40 mg total) by mouth once daily.    polyethylene glycol (COLYTE) 240-22.72-6.72 -5.84 gram SolR Take as directed by provider office    polyethylene glycol (GLYCOLAX) 17 gram PwPk Take 17 g by mouth once daily.    traZODone (DESYREL) 100 MG tablet Take 100 mg by mouth every evening.     Family History       Problem Relation (Age of Onset)    Cancer Mother    Cervical cancer Sister    Liver disease Father    Thyroid disease Sister    Tremor Sister          Tobacco Use    Smoking status: Every Day     Current packs/day: 0.75     Average packs/day: 0.8 packs/day for 40.0 years (30.0 ttl pk-yrs)     Types: Cigarettes    Smokeless tobacco: Never    Substance and Sexual Activity    Alcohol use: Yes     Comment: rarely    Drug use: Yes     Frequency: 14.0 times per week     Types: Marijuana    Sexual activity: Yes     Partners: Male     Birth control/protection: See Surgical Hx     Review of Systems   Constitutional:  Negative for chills and fever.   Respiratory:  Negative for cough and shortness of breath.    Cardiovascular:  Negative for chest pain.   Gastrointestinal:  Positive for abdominal distention, abdominal pain and nausea. Negative for vomiting.     Objective:     Vital Signs (Most Recent):  Temp: 98.3 °F (36.8 °C) (06/14/25 1400)  Pulse: 110 (06/14/25 1400)  Resp: 20 (06/14/25 1400)  BP: 130/61 (06/14/25 1400)  SpO2: (!) 94 % (06/14/25 1400) Vital Signs (24h Range):  Temp:  [97.9 °F (36.6 °C)-98.3 °F (36.8 °C)] 98.3 °F (36.8 °C)  Pulse:  [] 110  Resp:  [11-20] 20  SpO2:  [93 %-100 %] 94 %  BP: (124-193)/(60-81) 130/61     Weight: 60.5 kg (133 lb 6.1 oz)  Body mass index is 20.28 kg/m².     Physical Exam  Vitals and nursing note reviewed.   Constitutional:       General: She is not in acute distress.     Appearance: She is not ill-appearing.      Comments: Patient is seen after exploratory laparotomy.   HENT:      Nose:      Comments: NG tube in place with bile output     Mouth/Throat:      Mouth: Mucous membranes are dry.   Cardiovascular:      Rate and Rhythm: Regular rhythm. Tachycardia present.   Pulmonary:      Effort: Pulmonary effort is normal. No respiratory distress.      Breath sounds: Normal breath sounds.   Abdominal:      General: There is no distension.      Palpations: Abdomen is soft.      Tenderness: There is abdominal tenderness.      Comments: Midline incision dressing in place, some bleeding noted.    Musculoskeletal:         General: No swelling or tenderness.   Skin:     General: Skin is warm and dry.   Neurological:      General: No focal deficit present.      Mental Status: She is alert and oriented to person, place, and  time.          Significant Labs: All pertinent labs within the past 24 hours have been reviewed.  A1C:   Recent Labs   Lab 03/10/25  0730   HGBA1C 5.3     Bilirubin:   Recent Labs   Lab 06/09/25  0220 06/10/25  0549 06/11/25  0826 06/12/25  0626 06/14/25  0416   BILITOT 0.5 0.5 0.8 0.6 0.9     CBC:   Recent Labs   Lab 06/14/25  0416   WBC 8.92   HGB 15.5   HCT 46.1        CMP:   Recent Labs   Lab 06/13/25  0816 06/14/25  0416    140   K 3.2* 3.1*    106   CO2 23 22*   * 107   BUN 11 9   CREATININE 0.5 0.5   CALCIUM 8.0* 8.6*   PROT  --  6.2   ALBUMIN  --  3.2*   BILITOT  --  0.9   ALKPHOS  --  58   AST  --  16   ALT  --  14   ANIONGAP 11 12     Lipase:   Recent Labs   Lab 06/14/25  0416   LIPASE 11     Magnesium:   Recent Labs   Lab 06/13/25  0816   MG 1.9     Urine Studies:   Recent Labs   Lab 06/14/25  0648   COLORU Yellow   APPEARANCEUA Clear   PHUR 7.0   SPECGRAV >=1.030*   PROTEINUA Trace*   GLUCUA Negative   BILIRUBINUA Negative   OCCULTUA Negative   NITRITE Negative   UROBILINOGEN Negative   LEUKOCYTESUR Negative       Significant Imaging: I have reviewed all pertinent imaging results/findings within the past 24 hours.    Imaging Results              XR NG/OG tube placement check, non-radiologist performed (Final result)  Result time 06/14/25 07:13:21      Final result by Jerod Workman MD (06/14/25 07:13:21)                   Impression:      NG tube in place, as above.      Electronically signed by: Jerod Workman MD  Date:    06/14/2025  Time:    07:13               Narrative:    EXAMINATION:  XR NG/OG TUBE PLACEMENT CHECK, NON-RADIOLOGIST PERFORMED    CLINICAL HISTORY:  s/p ng tube;  Unspecified intestinal obstruction, unspecified as to partial versus complete obstruction    TECHNIQUE:  AP radiograph of the chest and upper abdomen    COMPARISON:  Abdominal radiograph 06/13/2025, CT 06/14/2025    FINDINGS:  Since recent CT there has been interval placement of a NG tube.  The  tip and proximal port are subdiaphragmatic projecting over the expected location of the stomach.    Dilated loops of small bowel persist in the upper abdomen.  No obvious new pneumatosis, portal venous gas or free air on limited supine view.    Cardiomediastinal silhouette stable in size.  No focal consolidation, pleural fluid or pneumothorax.                                        CT Abdomen Pelvis With IV Contrast NO Oral Contrast (Final result)  Result time 06/14/25 06:34:50      Final result by Anil Nelson MD (06/14/25 06:34:50)                   Impression:      Abdomen CT and Pelvis CT:    CT findings are suspicious for worsening mechanical small bowel obstruction at the level of the distal ileum, possibly on the basis of an adhesion (with other etiologies not fully excluded, as further detailed in the body of the report).  Recommendations include clinical correlation and surgical consultation.    Small quantity of intraperitoneal free fluid.    No free intraperitoneal air.    Possible esophagitis.  Underlying esophageal metaplasia or neoplasia not fully excluded.  Correlate clinically, and with follow-up outpatient EGD if clinically appropriate.    Additional observations as detailed in the body of the report.    This report was flagged in Epic as abnormal.    Anil Nelson MD, first observed the critical findings on 06/14/2025 at 06:10, and sent the results to Margarita Morris DO, via Epic Secure Chat message, on 06/14/2025 at 06:33.  Patient name and medical record number were specified/linked in the message.  The recipient immediately responded, acknowledging receipt of the information.      Electronically signed by: Anil Nelson  Date:    06/14/2025  Time:    06:34               Narrative:    EXAMINATION:  CT ABDOMEN PELVIS WITH IV CONTRAST    CLINICAL HISTORY:  Abdominal abscess/infection suspected;    TECHNIQUE:  Low dose axial images, sagittal and coronal reformations were obtained from the  lung bases to the pubic symphysis following the IV administration of 75 mL of Omnipaque 350 .    COMPARISON:  Abdomen CT and pelvis CT 06/09/2025.    FINDINGS:  Abdomen CT and Pelvis CT:    Artifacts related to beam hardening and/or motion degrade portions of the scan.    In the lower thorax, there remains a poorly defined region of weakly negative attenuation interposed between the distal esophagus and descending thoracic aorta.  This was also present on 06/09/2025 but is new since 05/16/2025.  It is favored to represent ascites within a hiatal hernia (with weakly negative attenuation values perhaps related to beam hardening artifacts arising from the nearby spine).  Other etiologies for this finding could include a distended cisterna chyli.    Circumferential wall thickening is suggested in the gas-filled but poorly distended distal esophagus.    The stomach is predominantly fluid-filled and again appears distended.  A portion of the gastric body/antrum remains relatively less distended, somewhat similar to the 06/09/2025 comparison scan.  Although a gastric consider could possibly explain this focal narrowing, more significant gastric mural pathology (such as neoplasm or scarring from peptic disease) is not excluded.    There is more extensive dilatation of the gas-filled and fluid-filled small bowel, but there remains a transition point at the level of a collapsed small bowel segment in the distal ileum.  This is possibly on the basis of an adhesion.  There is mild swelling of a portion of the mesentery in the right lower quadrant; although this appears less conspicuous than on 06/09/2025, a volvulus or internal hernia is again also possible.    The colon and appendix demonstrate no acute CT abnormalities.  There is some radiopaque intraluminal material in the appendix and portions of the colon, possibly related to residual enteric contrast from a prior study or other ingested radiopaque material.    No free  intraperitoneal air.  Small quantity of abdominopelvic free intraperitoneal fluid, perhaps slightly decreased from the 06/09/2025 comparison scan.    The liver, atrophic pancreas, spleen (pre-existing splenic deformity, possibly secondary to old infarct or old injury), adrenal glands, kidneys, atherosclerotic abdominal aorta, IVC, suboptimally distended urinary bladder, abdominopelvic lymph nodes, visualized abdominal wall, and visualized skeleton demonstrate no adverse interval CT changes from 06/09/2025.    There are scattered degenerative skeletal changes.    The uterus is absent.  The ovaries are inconspicuous or perhaps also surgically absent.    The gallbladder is inconspicuous or perhaps surgically absent.  There is mild intrahepatic biliary ductal dilatation.  Approximate 7-8 mm in diameter, the extrahepatic bile ducts are at the upper limits of normal for the patient's reported age of 69 years.    There is a moderate to large duodenal diverticulum that contains internal gas and fluid.  Some additional, smaller duodenal diverticula are also suggested.  No CT evidence of acute duodenal diverticulitis.

## 2025-06-14 NOTE — BRIEF OP NOTE
[facility] [place]ative [place]    SUMMARY     Surgery Date: 6/14/2025     Surgeons and Role:     * Rigoberto Ponce MD - Primary     * Roxann Lin MD - Resident - Assisting        Pre-op Diagnosis:  Small bowel obstruction [K56.609]    Post-op Diagnosis:  Post-Op Diagnosis Codes:     * Small bowel obstruction [K56.609]    Procedure(s) (LRB):  LAPAROSCOPY, DIAGNOSTIC (N/A)  LAPAROTOMY, EXPLORATORY; APPENDECTOMY; SMALL BOWEL RESECTION; DIVERTICULUM RESECTION (N/A)    Anesthesia: General    Implants:  * No implants in log *    Operative Findings: diagnostic laparoscopy converted to exploratory laparotomy through inferior midline incision, appendectomy, small bowel resection and side to side anastomosis, and diverticulectomy; see full operative report    Estimated Blood Loss: 150 mL    Estimated Blood Loss has been documented.         Specimens:   Specimen (24h ago, onward)       Start     Ordered    06/14/25 1014  Specimen to Pathology  RELEASE UPON ORDERING        References:    Click here for ordering Quick Tip   Question:  Release to patient  Answer:  Immediate    06/14/25 1014                  ID Type Source Tests Collected by Time Destination   1 : APPENDIX Tissue Appendix SPECIMEN TO PATHOLOGY Madhuri Summers RN 6/14/2025 1000    2 : STRICTURE SMALL BOWEL SEGMENT Tissue Abdomen SPECIMEN TO PATHOLOGY Madhuri Summers RN 6/14/2025 1001    3 : POSSIBLE SMALL BOWEL DIVERTICULUM Tissue Abdomen SPECIMEN TO PATHOLOGY Madhuri Summers RN 6/14/2025 1031        [id number]

## 2025-06-15 LAB
ABSOLUTE NEUTROPHIL MANUAL (OHS): 7.7 K/UL
ANION GAP (OHS): 11 MMOL/L (ref 8–16)
ANISOCYTOSIS BLD QL SMEAR: SLIGHT
BUN SERPL-MCNC: 11 MG/DL (ref 8–23)
CALCIUM SERPL-MCNC: 8.1 MG/DL (ref 8.7–10.5)
CHLORIDE SERPL-SCNC: 107 MMOL/L (ref 95–110)
CO2 SERPL-SCNC: 24 MMOL/L (ref 23–29)
CREAT SERPL-MCNC: 0.5 MG/DL (ref 0.5–1.4)
ERYTHROCYTE [DISTWIDTH] IN BLOOD BY AUTOMATED COUNT: 12.9 % (ref 11.5–14.5)
GFR SERPLBLD CREATININE-BSD FMLA CKD-EPI: >60 ML/MIN/1.73/M2
GLUCOSE SERPL-MCNC: 90 MG/DL (ref 70–110)
HCT VFR BLD AUTO: 42.1 % (ref 37–48.5)
HGB BLD-MCNC: 13.9 GM/DL (ref 12–16)
LYMPHOCYTES NFR BLD MANUAL: 12 % (ref 18–48)
MAGNESIUM SERPL-MCNC: 1.7 MG/DL (ref 1.6–2.6)
MCH RBC QN AUTO: 32.2 PG (ref 27–31)
MCHC RBC AUTO-ENTMCNC: 33 G/DL (ref 32–36)
MCV RBC AUTO: 98 FL (ref 82–98)
MONOCYTES NFR BLD MANUAL: 6 % (ref 4–15)
NEUTROPHILS NFR BLD MANUAL: 58 % (ref 38–73)
NEUTS BAND NFR BLD MANUAL: 24 %
NUCLEATED RBC (/100WBC) (OHS): 0 /100 WBC
PHOSPHATE SERPL-MCNC: 3.7 MG/DL (ref 2.7–4.5)
PLATELET # BLD AUTO: 181 K/UL (ref 150–450)
PLATELET BLD QL SMEAR: ABNORMAL
PMV BLD AUTO: 9.3 FL (ref 9.2–12.9)
POTASSIUM SERPL-SCNC: 2.7 MMOL/L (ref 3.5–5.1)
RBC # BLD AUTO: 4.32 M/UL (ref 4–5.4)
SODIUM SERPL-SCNC: 142 MMOL/L (ref 136–145)
WBC # BLD AUTO: 9.41 K/UL (ref 3.9–12.7)
WBC TOXIC VACUOLES BLD QL SMEAR: PRESENT

## 2025-06-15 PROCEDURE — 97161 PT EVAL LOW COMPLEX 20 MIN: CPT

## 2025-06-15 PROCEDURE — 94761 N-INVAS EAR/PLS OXIMETRY MLT: CPT

## 2025-06-15 PROCEDURE — 36415 COLL VENOUS BLD VENIPUNCTURE: CPT | Performed by: STUDENT IN AN ORGANIZED HEALTH CARE EDUCATION/TRAINING PROGRAM

## 2025-06-15 PROCEDURE — 97166 OT EVAL MOD COMPLEX 45 MIN: CPT

## 2025-06-15 PROCEDURE — 99900035 HC TECH TIME PER 15 MIN (STAT)

## 2025-06-15 PROCEDURE — 83735 ASSAY OF MAGNESIUM: CPT | Performed by: STUDENT IN AN ORGANIZED HEALTH CARE EDUCATION/TRAINING PROGRAM

## 2025-06-15 PROCEDURE — 84100 ASSAY OF PHOSPHORUS: CPT

## 2025-06-15 PROCEDURE — 63600175 PHARM REV CODE 636 W HCPCS: Performed by: STUDENT IN AN ORGANIZED HEALTH CARE EDUCATION/TRAINING PROGRAM

## 2025-06-15 PROCEDURE — 63600175 PHARM REV CODE 636 W HCPCS

## 2025-06-15 PROCEDURE — 82310 ASSAY OF CALCIUM: CPT | Performed by: STUDENT IN AN ORGANIZED HEALTH CARE EDUCATION/TRAINING PROGRAM

## 2025-06-15 PROCEDURE — 11000001 HC ACUTE MED/SURG PRIVATE ROOM

## 2025-06-15 PROCEDURE — 25000003 PHARM REV CODE 250: Performed by: STUDENT IN AN ORGANIZED HEALTH CARE EDUCATION/TRAINING PROGRAM

## 2025-06-15 PROCEDURE — 97530 THERAPEUTIC ACTIVITIES: CPT

## 2025-06-15 PROCEDURE — 25000003 PHARM REV CODE 250

## 2025-06-15 PROCEDURE — 85025 COMPLETE CBC W/AUTO DIFF WBC: CPT | Performed by: STUDENT IN AN ORGANIZED HEALTH CARE EDUCATION/TRAINING PROGRAM

## 2025-06-15 RX ORDER — POTASSIUM CHLORIDE 7.45 MG/ML
10 INJECTION INTRAVENOUS
Status: COMPLETED | OUTPATIENT
Start: 2025-06-15 | End: 2025-06-15

## 2025-06-15 RX ORDER — ENOXAPARIN SODIUM 100 MG/ML
40 INJECTION SUBCUTANEOUS EVERY 24 HOURS
Status: DISCONTINUED | OUTPATIENT
Start: 2025-06-15 | End: 2025-06-24

## 2025-06-15 RX ADMIN — LIDOCAINE 2 PATCH: 50 PATCH TOPICAL at 12:06

## 2025-06-15 RX ADMIN — HYDROMORPHONE HYDROCHLORIDE 1 MG: 1 INJECTION, SOLUTION INTRAMUSCULAR; INTRAVENOUS; SUBCUTANEOUS at 05:06

## 2025-06-15 RX ADMIN — POTASSIUM CHLORIDE 10 MEQ: 7.46 INJECTION, SOLUTION INTRAVENOUS at 08:06

## 2025-06-15 RX ADMIN — METHOCARBAMOL 1000 MG: 100 INJECTION INTRAMUSCULAR; INTRAVENOUS at 03:06

## 2025-06-15 RX ADMIN — POTASSIUM CHLORIDE 10 MEQ: 7.46 INJECTION, SOLUTION INTRAVENOUS at 06:06

## 2025-06-15 RX ADMIN — POTASSIUM CHLORIDE 10 MEQ: 7.46 INJECTION, SOLUTION INTRAVENOUS at 07:06

## 2025-06-15 RX ADMIN — SODIUM CHLORIDE, POTASSIUM CHLORIDE, SODIUM LACTATE AND CALCIUM CHLORIDE: 600; 310; 30; 20 INJECTION, SOLUTION INTRAVENOUS at 12:06

## 2025-06-15 RX ADMIN — SODIUM CHLORIDE, POTASSIUM CHLORIDE, SODIUM LACTATE AND CALCIUM CHLORIDE: 600; 310; 30; 20 INJECTION, SOLUTION INTRAVENOUS at 10:06

## 2025-06-15 RX ADMIN — HYDROMORPHONE HYDROCHLORIDE 1 MG: 1 INJECTION, SOLUTION INTRAMUSCULAR; INTRAVENOUS; SUBCUTANEOUS at 03:06

## 2025-06-15 RX ADMIN — METHOCARBAMOL 1000 MG: 100 INJECTION INTRAMUSCULAR; INTRAVENOUS at 09:06

## 2025-06-15 RX ADMIN — POTASSIUM CHLORIDE 10 MEQ: 7.46 INJECTION, SOLUTION INTRAVENOUS at 09:06

## 2025-06-15 RX ADMIN — MUPIROCIN: 20 OINTMENT TOPICAL at 08:06

## 2025-06-15 RX ADMIN — MUPIROCIN: 20 OINTMENT TOPICAL at 09:06

## 2025-06-15 RX ADMIN — METHOCARBAMOL 1000 MG: 100 INJECTION INTRAMUSCULAR; INTRAVENOUS at 06:06

## 2025-06-15 RX ADMIN — ENOXAPARIN SODIUM 40 MG: 40 INJECTION SUBCUTANEOUS at 04:06

## 2025-06-15 RX ADMIN — HYDROMORPHONE HYDROCHLORIDE 1 MG: 1 INJECTION, SOLUTION INTRAMUSCULAR; INTRAVENOUS; SUBCUTANEOUS at 10:06

## 2025-06-15 NOTE — SUBJECTIVE & OBJECTIVE
Interval History:  No acute overnight events.  Patient remained afebrile.  Complains of abdominal pain, rates it a 6/10 at this time.  No nausea or vomiting.  Have not had a bowel movement. Not passing flatus     Review of Systems   Constitutional:  Positive for fatigue. Negative for fever.   Respiratory:  Negative for cough and shortness of breath.    Cardiovascular:  Negative for chest pain.   Gastrointestinal:  Positive for abdominal pain and constipation. Negative for diarrhea, nausea and vomiting.   Genitourinary:  Negative for difficulty urinating.   Neurological:  Positive for weakness.     Objective:     Vital Signs (Most Recent):  Temp: 99 °F (37.2 °C) (06/15/25 0722)  Pulse: 88 (06/15/25 0722)  Resp: 18 (06/15/25 0722)  BP: 122/85 (06/15/25 0722)  SpO2: (!) 91 % (06/15/25 0722) Vital Signs (24h Range):  Temp:  [97.9 °F (36.6 °C)-99 °F (37.2 °C)] 99 °F (37.2 °C)  Pulse:  [] 88  Resp:  [11-20] 18  SpO2:  [91 %-100 %] 91 %  BP: (122-193)/(60-85) 122/85     Weight: 60.5 kg (133 lb 6.1 oz)  Body mass index is 20.28 kg/m².    Intake/Output Summary (Last 24 hours) at 6/15/2025 0934  Last data filed at 6/15/2025 0721  Gross per 24 hour   Intake 3030 ml   Output 1630 ml   Net 1400 ml         Physical Exam  Vitals and nursing note reviewed.   Constitutional:       General: She is not in acute distress.     Appearance: She is ill-appearing.   HENT:      Nose:      Comments: NG tube in place with bile output     Mouth/Throat:      Mouth: Mucous membranes are dry.   Cardiovascular:      Rate and Rhythm: Normal rate and regular rhythm.   Pulmonary:      Effort: Pulmonary effort is normal. No respiratory distress.      Breath sounds: Normal breath sounds.   Abdominal:      General: There is no distension.      Palpations: Abdomen is soft.      Tenderness: There is abdominal tenderness.      Comments: Midline incision dressing in place, some blood noted on dressing.   Musculoskeletal:         General: No swelling or  tenderness.   Skin:     General: Skin is warm and dry.   Neurological:      General: No focal deficit present.      Mental Status: She is alert and oriented to person, place, and time.   Psychiatric:         Mood and Affect: Mood normal.         Thought Content: Thought content normal.               Significant Labs: All pertinent labs within the past 24 hours have been reviewed.    Significant Imaging: I have reviewed all pertinent imaging results/findings within the past 24 hours.

## 2025-06-15 NOTE — PROGRESS NOTES
[facility] Medicine  Progress Note    Patient Name: Patricia Ramirez  MRN: [id number]  Patient Class: IP- Inpatient   Admission Date: 6/14/2025  Length of Stay: 1 days  Attending Physician: Margarita Morris DO  Primary Care Provider: Fabienne Erwin NP        Subjective     Principal Problem:<principal problem not specified>        HPI:  69-year-old female with a history of anxiety, depression, hypertension presents to the ED with recurrent small bowel obstruction. Complains of diffuse abdominal pain, associated with nausea but no vomiting. Still passing flatus. Patient was recently hospitalized from 06/09/2025 to 06/13/2025 for SBO. Was seen by general surgery and was treated conservatively. Patient had return of bowel functions after multiple days of gastric decompression with NGT and suppository. Prior to discharge, patient was able to tolerate PO without any abdominal pain, nausea or vomiting. Referred to GI on discharge for possible gastric dysmotility disorder. Of note, patient was offered surgery last hospitalization, but patient declined at that time. States she is now amendable to surgery.     In the ED, patient hypertensive but afebrile. Labs without leukocytosis, but noted to have hypokalemia with K 3.1. CT abdomen/pelvis w/ worsening mechanical small bowel obstruction at the level of the distal ileum. NGT placed. Pt admitted to general surgery team. Hospital medicine team consulted.        Overview/Hospital Course:  69 year old female admitted to general surgery team on 06/14/2025 for recurrent small-bowel obstruction. Patient taken for expiratory laparotomy on 06/14/2025.  NGT in place. Hospital medicine consulted for medical management.    Interval History:  No acute overnight events.  Patient remained afebrile.  Complains of abdominal pain, rates it a 6/10 at this time.  No nausea or vomiting.  Have not had a bowel movement. Not passing flatus     Review of Systems   Constitutional:   Positive for fatigue. Negative for fever.   Respiratory:  Negative for cough and shortness of breath.    Cardiovascular:  Negative for chest pain.   Gastrointestinal:  Positive for abdominal pain and constipation. Negative for diarrhea, nausea and vomiting.   Genitourinary:  Negative for difficulty urinating.   Neurological:  Positive for weakness.     Objective:     Vital Signs (Most Recent):  Temp: 99 °F (37.2 °C) (06/15/25 0722)  Pulse: 88 (06/15/25 0722)  Resp: 18 (06/15/25 0722)  BP: 122/85 (06/15/25 0722)  SpO2: (!) 91 % (06/15/25 0722) Vital Signs (24h Range):  Temp:  [97.9 °F (36.6 °C)-99 °F (37.2 °C)] 99 °F (37.2 °C)  Pulse:  [] 88  Resp:  [11-20] 18  SpO2:  [91 %-100 %] 91 %  BP: (122-193)/(60-85) 122/85     Weight: 60.5 kg (133 lb 6.1 oz)  Body mass index is 20.28 kg/m².    Intake/Output Summary (Last 24 hours) at 6/15/2025 0934  Last data filed at 6/15/2025 0721  Gross per 24 hour   Intake 3030 ml   Output 1630 ml   Net 1400 ml         Physical Exam  Vitals and nursing note reviewed.   Constitutional:       General: She is not in acute distress.     Appearance: She is ill-appearing.   HENT:      Nose:      Comments: NG tube in place with bile output     Mouth/Throat:      Mouth: Mucous membranes are dry.   Cardiovascular:      Rate and Rhythm: Normal rate and regular rhythm.   Pulmonary:      Effort: Pulmonary effort is normal. No respiratory distress.      Breath sounds: Normal breath sounds.   Abdominal:      General: There is no distension.      Palpations: Abdomen is soft.      Tenderness: There is abdominal tenderness.      Comments: Midline incision dressing in place, some blood noted on dressing.   Musculoskeletal:         General: No swelling or tenderness.   Skin:     General: Skin is warm and dry.   Neurological:      General: No focal deficit present.      Mental Status: She is alert and oriented to person, place, and time.   Psychiatric:         Mood and Affect: Mood normal.          Thought Content: Thought content normal.               Significant Labs: All pertinent labs within the past 24 hours have been reviewed.    Significant Imaging: I have reviewed all pertinent imaging results/findings within the past 24 hours.      Assessment & Plan  SBO (small bowel obstruction)  -was recently hospitalized from 06/09/2025 to 06/13/2025 for SBO. Was seen by general surgery and was treated conservatively. Patient had return of bowel functions after multiple days of gastric decompression with NGT and suppository and was discharged  -returned with recurrent abdominal pain associated with nausea.  Pt with recurrent SBO.   -CT abdomen and pelvis showed worsening mechanical small bowel obstruction at the level of the distal ileum   -pt s/p  exploratory laparotomy, appendectomy, small bowel resection and side to side anastomosis, and diverticulectomy on 06/14/25  -management per primary team, General surgery  Hypokalemia  Patient's most recent potassium results are listed below.   Recent Labs     06/13/25  0816 06/14/25  0416 06/15/25  0446   K 3.2* 3.1* 2.7*     Plan  - Replete potassium per protocol  - Monitor potassium Daily  - Patient's hypokalemia is worsening. Will continue to replace it as needed  - replace as needed  Essential hypertension  Patient's blood pressure range in the last 24 hours was: BP  Min: 122/85  Max: 193/80.The patient's inpatient anti-hypertensive regimen is listed below:  Current Antihypertensives  hydrALAZINE injection 10 mg, Every 8 hours PRN, Intravenous    Plan  - BP is controlled, no changes needed to their regimen  - hold home Benicar at this time given NPO status  - resume once able to tolerate p.o. intake and if BP not controlled  Hyperlipidemia  -hold home statin at this time given NPO status   -resume when appropriate    Heart failure with preserved ejection fraction  -echocardiogram from 10/07/2024 with preserved EF of 55-60%, grade 1 diastolic dysfunction.  PASP of 35  mm Hg  -appears to be at baseline, appears euvolemic   -monitor    Depression with anxiety  Patient has persistent depression which is moderate and is currently controlled. Will hold anti-depressant medications. We will not consult psychiatry at this time. Patient does not display psychosis at this time. Continue to monitor closely and adjust plan of care as needed.      Hold home Xanax, BuSpar, and Celexa at this time given NPO status  VTE Risk Mitigation (From admission, onward)           Ordered     enoxaparin injection 40 mg  Every 24 hours         06/15/25 0733     IP VTE HIGH RISK PATIENT  Once         06/14/25 0559     Place sequential compression device  Until discontinued         06/14/25 0559                    Discharge Planning   STUART:      Code Status: Full Code   Medical Readiness for Discharge Date:   Discharge Plan A: Home with family (Follow-ups)          Thank you for your consult and allowing me to participate in this patient's care. I will follow-up with patient. Please contact us if you have any additional questions.        Please place Justification for MANAV Garcia-Lisset Morris DO  Department of Hospital Medicine   [facility] - Telemetry

## 2025-06-15 NOTE — NURSING
[facility] [place]  Nurses Note -- 4 Eyes      6/15/2025       Skin assessed on: Q Shift      [x] No Pressure Injuries Present    []Prevention Measures Documented    [] Yes LDA  for Pressure Injury Previously documented     [] Yes New Pressure Injury Discovered   [] LDA for New Pressure Injury Added      Attending RN:  Christiana Ackerman RN     Second RN:  VALERIE Mcgee

## 2025-06-15 NOTE — PT/OT/SLP EVAL
Physical Therapy Evaluation    Patient Name:  Patricia Ramirez   MRN:  [id number]    Recommendations:     Discharge Recommendations: Low Intensity Therapy   Discharge Equipment Recommendations: bedside commode, walker, rolling, shower chair, to be determined by next level of care   Barriers to discharge: Pain    Assessment:     Patricia Ramirez is a 69 y.o. female admitted with a medical diagnosis of <principal problem not specified>.  She presents with the following impairments/functional limitations: weakness, impaired endurance, impaired self care skills, impaired functional mobility, gait instability, impaired balance, pain, impaired cardiopulmonary response to activity. Pt was not in too much pain today. Pt took pain meds prior therapy. Pt was able to perform sup<>sit with min Ax1. Pt was able to scoot on EOB with supervision. Pain did not increase during initial eval. Gait training no perform today to avoid increase surgical pain. Pt is fall risk    Rehab Prognosis: Good; patient would benefit from acute skilled PT services to address these deficits and reach maximum level of function.    Recent Surgery: Procedure(s) (LRB):  LAPAROSCOPY, DIAGNOSTIC (N/A)  LAPAROTOMY, EXPLORATORY; APPENDECTOMY; SMALL BOWEL RESECTION; DIVERTICULUM RESECTION (N/A) 1 Day Post-Op    Plan:     During this hospitalization, patient to be seen 5 x/week (5-4x per week) to address the identified rehab impairments via gait training, therapeutic activities, therapeutic exercises, neuromuscular re-education and progress toward the following goals:    Plan of Care Expires:  07/06/25    Subjective     Chief Complaint: Min pain at surgical site  Patient/Family Comments/goals: agreed to participate in therapy   Pain/Comfort:  Pain Rating 1: 2/10  Location - Side 1: Bilateral  Location 1:  (surgical site)  Pain Addressed 1: Nurse notified    Patients cultural, spiritual, Buddhism conflicts given the current situation: no    Living Environment:  Lives  with son in house. 2 step toward porch then 2 step to entry house no hand rail. Back door has 4 step with no hand rail. T/s combo. Standard toilet.   Prior to admission, patients level of function was independent with ADL's, IADL's, and ambulation.  Equipment used at home: none.  DME owned (not currently used): none.  Upon discharge, patient will have assistance from son.    Objective:     Communicated with nurse prior to session.  Patient found supine with bed alarm, NG tube, oxygen, PureWick, peripheral IV, SCD, telemetry  upon PT entry to room.    General Precautions: Standard, fall ([place])  Orthopedic Precautions:N/A   Braces: N/A  Respiratory Status: Nasal cannula, flow 2 L/min    Exams:  Cognitive Exam:  Patient is oriented to Person, Place, and Situation  Postural Exam:  Patient presented with the following abnormalities:    -       No postural abnormalities identified  Sensation:    -       Intact  Skin Integrity/Edema:      -       Skin integrity: Visible skin intact  RLE ROM: WNL  RLE Strength: WNL  LLE ROM: WNL  LLE Strength: WNL    Functional Mobility:  Bed Mobility:     Scooting: stand by assistance  Supine to Sit: minimum assistance and of 1 persons  Sit to Supine: minimum assistance and of 1 persons  Balance: seated balance: Good      AM-PAC 6 CLICK MOBILITY  Total Score:13       Treatment & Education:  There act: sup<>sit MinAx1. Mod v/c's for proper hand placement bed hand rails and proper B LE movement during transfers.     Patient left supine with all lines intact, call button in reach, bed alarm on, and nurse notified.    GOALS:   Multidisciplinary Problems       Physical Therapy Goals          Problem: Physical Therapy    Goal Priority Disciplines Outcome Interventions   Physical Therapy Goal     PT, PT/OT Progressing    Description: Goals to be met by: 25     Patient will increase functional independence with mobility by performin. Supine to sit with Modified [place]  2. Sit to  supine with Modified [place]  3. Sit to stand transfer with Modified [place]  4. Gait  x 50 feet with Modified [place] using Rolling Walker.   5. Stand for 5 minutes with Modified [place] using Rolling Walker  6. Lower extremity exercise program x30 reps per handout, with independence                         DME Justifications:   Patricia requires a commode for home use because she is confined to one level of the home environment and there is no toilet on that level.   Patricia's mobility limitation cannot be sufficiently resolved by the use of a cane. Her functional mobility deficit can be sufficiently resolved with the use of a Rolling Walker. Patient's mobility limitation significantly impairs their ability to participate in one of more activities of daily living.  The use of a RW will significantly improve the patient's ability to participate in MRADLS and the patient will use it on regular basis in the home.    History:     Past Medical History:   Diagnosis Date    Allergy     Anxiety     Arthritis     Cataract     Depressive disorder, not elsewhere classified     Esophageal reflux     Hypertension     Impaired fasting glucose     Joint pain     Obesity, unspecified     Other and unspecified hyperlipidemia        Past Surgical History:   Procedure Laterality Date    BLEPHAROPLASTY Bilateral 2021    Procedure: BLEPHAROPLASTY;  Surgeon: Maite Acuna MD;  Location: [facility];  Service: Ophthalmology;  Laterality: Bilateral;     SECTION  1973    CHOLECYSTECTOMY      COLONOSCOPY N/A 2023    Procedure: COLONOSCOPY;  Surgeon: Briana Sterling MD;  Location: [facility];  Service: Endoscopy;  Laterality: N/A;    ERCP N/A 10/07/2024    Procedure: ERCP (ENDOSCOPIC RETROGRADE CHOLANGIOPANCREATOGRAPHY);  Surgeon: Catracho Mitchell MD;  Location: [facility] ([address] [place] [address]);  Service: Endoscopy;  Laterality: N/A;    ERCP N/A 2024    Procedure: ERCP (ENDOSCOPIC RETROGRADE  CHOLANGIOPANCREATOGRAPHY);  Surgeon: Romeo Roger MD;  Location: [facility] ENDO;  Service: Endoscopy;  Laterality: N/A;  12/3/24: instructions sent via email-GD  12/5 SWP PRECALL COMPLETED-RT    ERCP N/A 01/29/2025    Procedure: ERCP (ENDOSCOPIC RETROGRADE CHOLANGIOPANCREATOGRAPHY);  Surgeon: Shannon Chaney MD;  Location: [facility] ENDO;  Service: Endoscopy;  Laterality: N/A;  1/6 portal-Repeat ERCP in 6 weeks to remove stent.nicola -tt  1/14 SWP-PRECALL COMPLETE-RT  1/23 r/s updated portal instr-pt stated any MD-tt    ERCP N/A 04/28/2025    Procedure: ERCP (ENDOSCOPIC RETROGRADE CHOLANGIOPANCREATOGRAPHY);  Surgeon: Shannon Chaney MD;  Location: [facility] ENDO;  Service: Endoscopy;  Laterality: N/A;  Per Dr. Chaney- Repeat ERCP in 3 months to remove covered metal                          biliary stent and hopefully for final clearance of                          the CBD.     3/21/25-Pt no longer taking Eliquis, instr portal-DS  4/24/25-LVM     ESOPHAGOGASTRODUODENOSCOPY N/A 11/21/2024    Procedure: EGD (ESOPHAGOGASTRODUODENOSCOPY);  Surgeon: Angie Alatorre MD;  Location: [facility] ENDO;  Service: Gastroenterology;  Laterality: N/A;    ESOPHAGOGASTRODUODENOSCOPY N/A 5/23/2025    Procedure: EGD (ESOPHAGOGASTRODUODENOSCOPY);  Surgeon: Mak Michael MD;  Location: [facility] ENDO;  Service: Gastroenterology;  Laterality: N/A;    HYSTERECTOMY      without BSO    INTRAOCULAR PROSTHESES INSERTION Left 10/27/2022    Procedure: INSERTION, IOL PROSTHESIS;  Surgeon: Palmer Berrios MD;  Location: [facility] OR;  Service: Ophthalmology;  Laterality: Left;    PHACOEMULSIFICATION OF CATARACT Left 10/27/2022    Procedure: PHACOEMULSIFICATION, CATARACT;  Surgeon: Palmer Berrios MD;  Location: [facility] OR;  Service: Ophthalmology;  Laterality: Left;  RN PHONE PREOP 10/21/2022   ARRIVAL 9:00 AM    R knee replacement      REPAIR OF RETINAL DETACHMENT WITH VITRECTOMY Left 02/28/2022    Procedure: REPAIR, RETINAL DETACHMENT, WITH VITRECTOMY;  Surgeon:  MINO Martinez MD;  Location: [facility] OR [address] [place] [address];  Service: Ophthalmology;  Laterality: Left;  Spoke with SID Garcia. Pt was instructed nothing to eat after 8am and to arrive at 2pm for preop. 430pm case start request.    right  arm  surgery      ROBOT-ASSISTED CHOLECYSTECTOMY USING DA GENO XI N/A 10/09/2024    Procedure: XI ROBOTIC SUB TOTAL CHOLECYSTECTOMY; DRAIN PLACEMENT;  Surgeon: Rigoberto Ponce MD;  Location: [facility];  Service: General;  Laterality: N/A;       Time Tracking:     PT Received On: 06/15/25  PT Start Time: 1130     PT Stop Time: 1148  PT Total Time (min): 18 min     Billable Minutes: Evaluation 10 min and Therapeutic Activity 8 mi       06/15/2025

## 2025-06-15 NOTE — NURSING
[facility] [place]  Nurses Note -- 4 Eyes      6/15/2025          Pt resting in bed quietly. Patient had surgery dressing clean and intact. With ngt on low intermittent suction. Patient has bruises on left and right arm, as per patient bruises is present before IV insertion.     Fall and safety precautions maintained. Bed alarm activated and audible.. Bed locked in lowest position, with side rails up x2. Call bell and personal items within reach    Skin assessed on: Q Shift      [x] No Pressure Injuries Present    [x]Prevention Measures Documented    [] Yes LDA  for Pressure Injury Previously documented     [] Yes New Pressure Injury Discovered   [] LDA for New Pressure Injury Added      Attending RN:  Everardo Delgado RN     Second RN:  Lucia PADILLA

## 2025-06-15 NOTE — PLAN OF CARE
Problem: Occupational Therapy  Goal: Occupational Therapy Goal  Description: Goals to be met by: 7/5/2025     Patient will increase functional independence with ADLs by performing:    UE Dressing with Set-up Assistance.  LE Dressing with Stand-by Assistance.  Grooming while seated with Modified [place].  Toileting from bedside commode with Supervision for hygiene and clothing management.   Bathing seated UB and andreia care sponge  with Stand-by Assistance.  Toilet transfer to bedside commode with Contact Guard Assistance.  Upper extremity exercise program x7-10 reps no resistance, and per handout, with assistance as needed.    Outcome: Progressing

## 2025-06-15 NOTE — PLAN OF CARE
Problem: Pain Acute  Goal: Optimal Pain Control and Function  Outcome: Progressing  Intervention: Develop Pain Management Plan  Flowsheets (Taken 6/15/2025 1719)  Pain Management Interventions: medication offered

## 2025-06-15 NOTE — PROGRESS NOTES
Surgery Progress Note    Patricia Ramirez is a 69 y.o. year old female in hospital for recurrent SBO (failed conservative management), POD#1 from diagnostic laparoscopy converted exploratory laparotomy, SBR and anastomosis, appendectomy, diverticulectomy 6/14    NAEON AF VSS  Pain relatively controlled, still very uncomfortable but improves with medications  Denies n/v, but not yet passing flatus, no BM  NGT with 240 dark gastric output  No f/c/n/v/sob/cp    ROS:  Negative except above    PE:  Vitals:    06/15/25 0337 06/15/25 0340 06/15/25 0722 06/15/25 1014   BP: (!) 151/85  122/85    BP Location: Left arm  Right arm    Patient Position: Lying  Lying    Pulse: 89  88    Resp: 18 20 18 18   Temp: 98.4 °F (36.9 °C)  99 °F (37.2 °C)    TempSrc: Oral  Oral    SpO2: 95%  (!) 91%    Weight:       Height:           NAD, uncomfortable   No belabored breathing  No skin changes  Abd soft mildy distended appropriately TTP around midline incision, port site incisions c/d/I   Dressings c/d/I    Significant Diagnostic Studies: Labs: BMP:   Recent Labs   Lab 06/14/25  0416 06/15/25  0446    90    142   K 3.1* 2.7*    107   CO2 22* 24   BUN 9 11   CREATININE 0.5 0.5   CALCIUM 8.6* 8.1*   MG  --  1.7   , CBC   Recent Labs   Lab 06/14/25  0416 06/15/25  0446   WBC 8.92 9.41   HGB 15.5 13.9   HCT 46.1 42.1    181   , INR   Lab Results   Component Value Date    INR 1.0 07/28/2022   , and All labs within the past 24 hours have been reviewed    A/P:  Patricia Ramirez is a 69 y.o. year old female  in hospital for recurrent SBO (failed conservative management), POD#1 from diagnostic laparoscopy converted exploratory laparotomy, SBR and anastomosis, appendectomy, diverticulectomy 6/14    - NPO, mIVF - ok for ice chips sparingly  - Maintain NGT to LIWS  - Will assess for clamp trial vs removal when ROBF   - Discontinue madden today, follow up void  - MMPC   - Pulm toilet, encourage IS 10x/hour  - PT/OT, encourage up to  chair and ambulation   - Abd binder for comfort, ok to take off or have breaks if needed  - Replete electrolytes to maintain K > 4, Phos > 3, Mg > 2  - DVT ppx   - Appreciate medicine team care     Roxann Lin  General Surgery - [facility]  6/15/2025

## 2025-06-15 NOTE — PT/OT/SLP EVAL
Occupational Therapy   Evaluation    Name: Patricia Ramirez  MRN: [id number]  Admitting Diagnosis: <principal problem not specified>  Recent Surgery: Procedure(s) (LRB):  LAPAROSCOPY, DIAGNOSTIC (N/A)  LAPAROTOMY, EXPLORATORY; APPENDECTOMY; SMALL BOWEL RESECTION; DIVERTICULUM RESECTION (N/A) 1 Day Post-Op    Recommendations:     Discharge Recommendations: Low Intensity Therapy  Discharge Equipment Recommendations:  bedside commode, bath bench  Barriers to discharge:   (acuity)    Assessment:     Patricia Ramirez is a 69 y.o. female with a medical diagnosis of LAPAROTOMY, EXPLORATORY; APPENDECTOMY; SMALL BOWEL RESECTION; DIVERTICULUM RESECTION, and presents with postural instability, post Sx pain management and soft tissue healing needs. Performance deficits affecting function: weakness, impaired balance, impaired endurance, impaired self care skills, impaired functional mobility, gait instability, decreased upper extremity function, pain, impaired skin.      Rehab Prognosis: Good; patient would benefit from acute skilled OT services to address these deficits and reach maximum level of function.       Plan:     Patient to be seen  (3<5/week) to address the above listed problems via self-care/home management, therapeutic activities, therapeutic exercises  Plan of Care Expires: 07/05/25  Plan of Care Reviewed with: patient    Subjective     Chief Complaint: Post Sx abdominal pain, weakness, compromised mobility and routine self care.   Patient/Family Comments/goals: Return to own home, resume normalized functional levels, accepting family support, initiate HH follow up.     Occupational Profile:  Living Environment: Single story family home w/ Son both well preceding this event. 4 steps to enter, 2 steps in to porch, 2 more steps to enter home, broken hand rail. Back entrance w/ 3-4 steps at back with 3-4 steps at back entrance (                                             W/in, tub shower combo, no seat,    Previous level of  function: (I) , no DME, (+) driving  Equipment Used at Home: none  Assistance upon Discharge: Self, adult Son    Pain/Comfort:  Location 1: abdomen  Pain Addressed 1: Pre-medicate for activity (Pain managed well per Patient.)    Patients cultural, spiritual, Nondenominational conflicts given the current situation: no    Objective:     Communicated with: RN prior to session.  Patient found supine with peripheral IV, NG tube, oxygen upon OT entry to room.    General Precautions: Standard, aspiration, NPO  Orthopedic Precautions: N/A  Braces: N/A  Respiratory Status: Nasal cannula, flow 2 L/min    Occupational Performance:    Bed Mobility:    Patient completed Rolling/Turning to Left with  stand by assistance  Patient completed Scooting with stand by assistance  Patient completed Supine to Sit with minimum assistance  Patient completed Sit to Supine with minimum assistance    Functional Mobility/Transfers:  Patient completed Sit <> Stand Transfer with minimum assistance  with  no assistive device   Functional Mobility: limited by post Sx abdominal pain (premedicated).     Activities of Daily Living:  Grooming: stand by assistance seated EOB  Upper Body Dressing: minimum assistance gown,back gown  Lower Body Dressing: moderate assistance non skid socks seated EOB, good (-) dyn sitting.   Toileting: NT, needs untimely, madden cath removed just prior to session. BSC installed L bedside wall to stabilize. Patient politely declined use.     Patient presents with reduced mobility, UB strength, and stamina deficits, which impact the patient's ability to complete functional tasks & activities safely and in a timely manner.   Pt could benefit from a collaborative therapeutic program to improve quality of life and sustain focus on impairment resoltion. Patient outlook for progression towards goals (+) at this time.       Cognitive/Visual Perceptual:Intact, A+OX4, able to follow complex direction, able to make complex needs known,  congruent information integration at time of eval. Patient cooperative / active in own POC.    Upper Body Physical Exam:  RESPIRATORY: non-labored / normal effort / rate. (-) accessory MM engagement.    UB SKIN: Observable UB skin warm and dry.   Sensation: Norm in UB extremities light touch/localization.  Posture: Norm <>  Rounded shoulders.   BUE AROM WNL  BUE MMT WFL, effective use of UB as a s stabilizer in EOB sitting and hand rail use.   UB GM/FM coordination WNL  UT dynamic standing bedside  (-) UB edema      Treatment & Education:  Discussed role of OT,eval and collaborative POC dev completed. Patient  states understanding and agreement with all herein.   Interventions:   UE ROM/MMT assessment  Bed mobility training / assessment  Functional mobility assessment  Sit/standing balance assessment  Educated on importance of sitting OOB in bedside chair to promote increased strength, endurance & proper breathing.  Intro UB seated  AROM constructs BUE all joints, all planes seated (AAROM B shoulders >/= 90 degrees, no c/o pain) 1x10, x3 daily recommended for (I) ex as suggested to counter (-) effects of limited mobility inherent in acute setting. AROM/no resistance all planes in sit introduced.   Fair (+)  return demo and verbal recitation.        AMPAC 6 Click ADL:  AMPAC Total Score: 13    Patient left HOB elevated with all lines intact, call button in reach, bed alarm on, and RN notified.    GOALS:   Multidisciplinary Problems       Occupational Therapy Goals          Problem: Occupational Therapy    Goal Priority Disciplines Outcome Interventions   Occupational Therapy Goal     OT, PT/OT Progressing    Description: Goals to be met by: 7/5/2025     Patient will increase functional independence with ADLs by performing:    UE Dressing with Set-up Assistance.  LE Dressing with Stand-by Assistance.  Grooming while seated with Modified [place].  Toileting from bedside commode with Supervision for hygiene and  clothing management.   Bathing seated UB and andreia care sponge  with Stand-by Assistance.  Toilet transfer to bedside commode with Contact Guard Assistance.  Upper extremity exercise program x7-10 reps no resistance, and per handout, with assistance as needed.                     BSC for nocturnal safety, TTB 2* core stability and LB compromised 2* abdominal Sx.       History:     Past Medical History:   Diagnosis Date    Allergy     Anxiety     Arthritis     Cataract     Depressive disorder, not elsewhere classified     Esophageal reflux     Hypertension     Impaired fasting glucose     Joint pain     Obesity, unspecified     Other and unspecified hyperlipidemia          Past Surgical History:   Procedure Laterality Date    BLEPHAROPLASTY Bilateral 2021    Procedure: BLEPHAROPLASTY;  Surgeon: Maite Acuna MD;  Location: [facility];  Service: Ophthalmology;  Laterality: Bilateral;     SECTION  1973    CHOLECYSTECTOMY      COLONOSCOPY N/A 2023    Procedure: COLONOSCOPY;  Surgeon: Briana Sterling MD;  Location: [facility] ENDO;  Service: Endoscopy;  Laterality: N/A;    ERCP N/A 10/07/2024    Procedure: ERCP (ENDOSCOPIC RETROGRADE CHOLANGIOPANCREATOGRAPHY);  Surgeon: Catracho Mitchell MD;  [facility]  Service: Endoscopy;  Laterality: N/A;    ERCP N/A 2024    Procedure: ERCP (ENDOSCOPIC RETROGRADE CHOLANGIOPANCREATOGRAPHY);  Surgeon: Romeo Roger MD;  Location: [facility];  Service: Endoscopy;  Laterality: N/A;  12/3/24: instructions sent via email-GD   SWP PRECALL COMPLETED-RT    ERCP N/A 2025    Procedure: ERCP (ENDOSCOPIC RETROGRADE CHOLANGIOPANCREATOGRAPHY);  Surgeon: Shannon Chaney MD;  Location: [facility];  Service: Endoscopy;  Laterality: N/A;   portal-Repeat ERCP in 6 weeks to remove stent.[place] -tt   SWP-PRECALL COMPLETE-RT   r/s updated portal instr-pt stated any MD-tt    ERCP N/A 2025    Procedure: ERCP (ENDOSCOPIC RETROGRADE  CHOLANGIOPANCREATOGRAPHY);  Surgeon: Shannon Chaney MD;  Location: [facility] ENDO;  Service: Endoscopy;  Laterality: N/A;  Per Dr. Chaney- Repeat ERCP in 3 months to remove covered metal                          biliary stent and hopefully for final clearance of                          the CBD.     3/21/25-Pt no longer taking Eliquis, instr portal-DS  4/24/25-LVM     ESOPHAGOGASTRODUODENOSCOPY N/A 11/21/2024    Procedure: EGD (ESOPHAGOGASTRODUODENOSCOPY);  Surgeon: Angie Alatorre MD;  Location: [facility] ENDO;  Service: Gastroenterology;  Laterality: N/A;    ESOPHAGOGASTRODUODENOSCOPY N/A 5/23/2025    Procedure: EGD (ESOPHAGOGASTRODUODENOSCOPY);  Surgeon: Mak Michael MD;  Location: [facility] ENDO;  Service: Gastroenterology;  Laterality: N/A;    HYSTERECTOMY      without BSO    INTRAOCULAR PROSTHESES INSERTION Left 10/27/2022    Procedure: INSERTION, IOL PROSTHESIS;  Surgeon: Palmer Berrios MD;  Location: [facility] OR;  Service: Ophthalmology;  Laterality: Left;    PHACOEMULSIFICATION OF CATARACT Left 10/27/2022    Procedure: PHACOEMULSIFICATION, CATARACT;  Surgeon: Palmer Berrios MD;  Location: [facility] OR;  Service: Ophthalmology;  Laterality: Left;  RN PHONE PREOP 10/21/2022   ARRIVAL 9:00 AM    R knee replacement      REPAIR OF RETINAL DETACHMENT WITH VITRECTOMY Left 02/28/2022    Procedure: REPAIR, RETINAL DETACHMENT, WITH VITRECTOMY;  Surgeon: MINO Martinez MD;  Location: [address];  Service: Ophthalmology;  Laterality: Left;  Spoke with SID Garcia. Pt was instructed nothing to eat after 8am and to arrive at 2pm for preop. 430pm case start request.    right  arm  surgery      ROBOT-ASSISTED CHOLECYSTECTOMY USING DA GENO XI N/A 10/09/2024    Procedure: XI ROBOTIC SUB TOTAL CHOLECYSTECTOMY; DRAIN PLACEMENT;  Surgeon: Rigoberto Ponce MD;  Location: [facility] OR;  Service: General;  Laterality: N/A;       Time Tracking:     OT Date of Treatment: 06/15/25  OT Start Time: 1124  OT Stop Time: 1150  OT Total  Time (min): 26 min    Billable Minutes:Evaluation 15  Therapeutic Activity 11    6/15/2025

## 2025-06-15 NOTE — PLAN OF CARE
Problem: Wound  Goal: Optimal Wound Healing  Outcome: Progressing  Intervention: Promote Wound Healing  Flowsheets (Taken 6/15/2025 0141)  Sleep/Rest Enhancement:   awakenings minimized   regular sleep/rest pattern promoted     Problem: Pain Acute  Goal: Optimal Pain Control and Function  Outcome: Progressing  Intervention: Develop Pain Management Plan  Flowsheets (Taken 6/15/2025 0141)  Pain Management Interventions: quiet environment facilitated  Intervention: Prevent or Manage Pain  Flowsheets (Taken 6/15/2025 0141)  Sleep/Rest Enhancement:   awakenings minimized   regular sleep/rest pattern promoted  Medication Review/Management: medications reviewed  Intervention: Optimize Psychosocial Wellbeing  Flowsheets (Taken 6/15/2025 0141)  Supportive Measures: active listening utilized

## 2025-06-15 NOTE — NURSING
Patient complained of pain at her IV site   Informed patient that we need to insert another IV,  .as per patient she want to sleep first. Patient refused despite health teaching

## 2025-06-15 NOTE — PLAN OF CARE
Problem: Physical Therapy  Goal: Physical Therapy Goal  Description: Goals to be met by: 25     Patient will increase functional independence with mobility by performin. Supine to sit with Modified [place]  2. Sit to supine with Modified [place]  3. Sit to stand transfer with Modified [place]  4. Gait  x 50 feet with Modified [place] using Rolling Walker.   5. Stand for 5 minutes with Modified [place] using Rolling Walker  6. Lower extremity exercise program x30 reps per handout, with independence    Outcome: Progressing

## 2025-06-15 NOTE — ASSESSMENT & PLAN NOTE
Patient's blood pressure range in the last 24 hours was: BP  Min: 122/85  Max: 193/80.The patient's inpatient anti-hypertensive regimen is listed below:  Current Antihypertensives  hydrALAZINE injection 10 mg, Every 8 hours PRN, Intravenous    Plan  - BP is controlled, no changes needed to their regimen  - hold home Benicar at this time given NPO status  - resume once able to tolerate p.o. intake and if BP not controlled

## 2025-06-15 NOTE — ASSESSMENT & PLAN NOTE
Patient's most recent potassium results are listed below.   Recent Labs     06/13/25  0816 06/14/25  0416 06/15/25  0446   K 3.2* 3.1* 2.7*     Plan  - Replete potassium per protocol  - Monitor potassium Daily  - Patient's hypokalemia is worsening. Will continue to replace it as needed  - replace as needed

## 2025-06-16 LAB
ABSOLUTE EOSINOPHIL (OHS): 0.02 K/UL
ABSOLUTE MONOCYTE (OHS): 0.94 K/UL (ref 0.3–1)
ABSOLUTE NEUTROPHIL COUNT (OHS): 10.41 K/UL (ref 1.8–7.7)
ANION GAP (OHS): 9 MMOL/L (ref 8–16)
BASOPHILS # BLD AUTO: 0.05 K/UL
BASOPHILS NFR BLD AUTO: 0.4 %
BUN SERPL-MCNC: 14 MG/DL (ref 8–23)
CALCIUM SERPL-MCNC: 8.2 MG/DL (ref 8.7–10.5)
CHLORIDE SERPL-SCNC: 109 MMOL/L (ref 95–110)
CO2 SERPL-SCNC: 22 MMOL/L (ref 23–29)
CREAT SERPL-MCNC: 0.4 MG/DL (ref 0.5–1.4)
ERYTHROCYTE [DISTWIDTH] IN BLOOD BY AUTOMATED COUNT: 13 % (ref 11.5–14.5)
GFR SERPLBLD CREATININE-BSD FMLA CKD-EPI: >60 ML/MIN/1.73/M2
GLUCOSE SERPL-MCNC: 77 MG/DL (ref 70–110)
HCT VFR BLD AUTO: 43.9 % (ref 37–48.5)
HGB BLD-MCNC: 13.7 GM/DL (ref 12–16)
IMM GRANULOCYTES # BLD AUTO: 0.06 K/UL (ref 0–0.04)
IMM GRANULOCYTES NFR BLD AUTO: 0.5 % (ref 0–0.5)
LYMPHOCYTES # BLD AUTO: 0.88 K/UL (ref 1–4.8)
MAGNESIUM SERPL-MCNC: 1.9 MG/DL (ref 1.6–2.6)
MCH RBC QN AUTO: 32.6 PG (ref 27–31)
MCHC RBC AUTO-ENTMCNC: 31.2 G/DL (ref 32–36)
MCV RBC AUTO: 105 FL (ref 82–98)
NUCLEATED RBC (/100WBC) (OHS): 0 /100 WBC
PHOSPHATE SERPL-MCNC: 2.1 MG/DL (ref 2.7–4.5)
PLATELET # BLD AUTO: 136 K/UL (ref 150–450)
PMV BLD AUTO: 9.7 FL (ref 9.2–12.9)
POTASSIUM SERPL-SCNC: 3.2 MMOL/L (ref 3.5–5.1)
RBC # BLD AUTO: 4.2 M/UL (ref 4–5.4)
RELATIVE EOSINOPHIL (OHS): 0.2 %
RELATIVE LYMPHOCYTE (OHS): 7.1 % (ref 18–48)
RELATIVE MONOCYTE (OHS): 7.6 % (ref 4–15)
RELATIVE NEUTROPHIL (OHS): 84.2 % (ref 38–73)
SODIUM SERPL-SCNC: 140 MMOL/L (ref 136–145)
WBC # BLD AUTO: 12.36 K/UL (ref 3.9–12.7)

## 2025-06-16 PROCEDURE — 97110 THERAPEUTIC EXERCISES: CPT | Mod: CQ

## 2025-06-16 PROCEDURE — 63600175 PHARM REV CODE 636 W HCPCS

## 2025-06-16 PROCEDURE — 99900035 HC TECH TIME PER 15 MIN (STAT)

## 2025-06-16 PROCEDURE — 36410 VNPNXR 3YR/> PHY/QHP DX/THER: CPT

## 2025-06-16 PROCEDURE — 27000221 HC OXYGEN, UP TO 24 HOURS

## 2025-06-16 PROCEDURE — 94761 N-INVAS EAR/PLS OXIMETRY MLT: CPT

## 2025-06-16 PROCEDURE — 25000003 PHARM REV CODE 250

## 2025-06-16 PROCEDURE — 83735 ASSAY OF MAGNESIUM: CPT | Performed by: STUDENT IN AN ORGANIZED HEALTH CARE EDUCATION/TRAINING PROGRAM

## 2025-06-16 PROCEDURE — 84100 ASSAY OF PHOSPHORUS: CPT

## 2025-06-16 PROCEDURE — 25000003 PHARM REV CODE 250: Performed by: STUDENT IN AN ORGANIZED HEALTH CARE EDUCATION/TRAINING PROGRAM

## 2025-06-16 PROCEDURE — 97116 GAIT TRAINING THERAPY: CPT | Mod: CQ

## 2025-06-16 PROCEDURE — 11000001 HC ACUTE MED/SURG PRIVATE ROOM

## 2025-06-16 PROCEDURE — 85025 COMPLETE CBC W/AUTO DIFF WBC: CPT | Performed by: STUDENT IN AN ORGANIZED HEALTH CARE EDUCATION/TRAINING PROGRAM

## 2025-06-16 PROCEDURE — 36415 COLL VENOUS BLD VENIPUNCTURE: CPT | Performed by: STUDENT IN AN ORGANIZED HEALTH CARE EDUCATION/TRAINING PROGRAM

## 2025-06-16 PROCEDURE — 94760 N-INVAS EAR/PLS OXIMETRY 1: CPT

## 2025-06-16 PROCEDURE — 63600175 PHARM REV CODE 636 W HCPCS: Performed by: STUDENT IN AN ORGANIZED HEALTH CARE EDUCATION/TRAINING PROGRAM

## 2025-06-16 PROCEDURE — 80048 BASIC METABOLIC PNL TOTAL CA: CPT | Performed by: STUDENT IN AN ORGANIZED HEALTH CARE EDUCATION/TRAINING PROGRAM

## 2025-06-16 PROCEDURE — C1751 CATH, INF, PER/CENT/MIDLINE: HCPCS

## 2025-06-16 PROCEDURE — 94799 UNLISTED PULMONARY SVC/PX: CPT

## 2025-06-16 PROCEDURE — 97530 THERAPEUTIC ACTIVITIES: CPT

## 2025-06-16 RX ORDER — SODIUM CHLORIDE 0.9 % (FLUSH) 0.9 %
10 SYRINGE (ML) INJECTION EVERY 12 HOURS PRN
Status: DISCONTINUED | OUTPATIENT
Start: 2025-06-16 | End: 2025-07-08 | Stop reason: HOSPADM

## 2025-06-16 RX ORDER — POTASSIUM CHLORIDE 7.45 MG/ML
10 INJECTION INTRAVENOUS
Status: COMPLETED | OUTPATIENT
Start: 2025-06-16 | End: 2025-06-16

## 2025-06-16 RX ADMIN — HYDROMORPHONE HYDROCHLORIDE 1 MG: 1 INJECTION, SOLUTION INTRAMUSCULAR; INTRAVENOUS; SUBCUTANEOUS at 04:06

## 2025-06-16 RX ADMIN — POTASSIUM PHOSPHATE, MONOBASIC AND POTASSIUM PHOSPHATE, DIBASIC 30 MMOL: 224; 236 INJECTION, SOLUTION, CONCENTRATE INTRAVENOUS at 10:06

## 2025-06-16 RX ADMIN — LIDOCAINE 2 PATCH: 50 PATCH TOPICAL at 12:06

## 2025-06-16 RX ADMIN — POTASSIUM CHLORIDE 10 MEQ: 7.46 INJECTION, SOLUTION INTRAVENOUS at 08:06

## 2025-06-16 RX ADMIN — MUPIROCIN: 20 OINTMENT TOPICAL at 09:06

## 2025-06-16 RX ADMIN — HYDROMORPHONE HYDROCHLORIDE 1 MG: 1 INJECTION, SOLUTION INTRAMUSCULAR; INTRAVENOUS; SUBCUTANEOUS at 03:06

## 2025-06-16 RX ADMIN — POTASSIUM CHLORIDE 10 MEQ: 7.46 INJECTION, SOLUTION INTRAVENOUS at 11:06

## 2025-06-16 RX ADMIN — ENOXAPARIN SODIUM 40 MG: 40 INJECTION SUBCUTANEOUS at 04:06

## 2025-06-16 RX ADMIN — HYDROMORPHONE HYDROCHLORIDE 1 MG: 1 INJECTION, SOLUTION INTRAMUSCULAR; INTRAVENOUS; SUBCUTANEOUS at 10:06

## 2025-06-16 RX ADMIN — POTASSIUM CHLORIDE 10 MEQ: 7.46 INJECTION, SOLUTION INTRAVENOUS at 07:06

## 2025-06-16 RX ADMIN — POTASSIUM CHLORIDE 10 MEQ: 7.46 INJECTION, SOLUTION INTRAVENOUS at 12:06

## 2025-06-16 RX ADMIN — SODIUM CHLORIDE, POTASSIUM CHLORIDE, SODIUM LACTATE AND CALCIUM CHLORIDE: 600; 310; 30; 20 INJECTION, SOLUTION INTRAVENOUS at 08:06

## 2025-06-16 NOTE — PLAN OF CARE
Problem: Adult Inpatient Plan of Care  Goal: Absence of Hospital-Acquired Illness or Injury  Outcome: Progressing  Goal: Optimal Comfort and Wellbeing  Outcome: Progressing  Goal: Readiness for Transition of Care  Outcome: Progressing     Problem: Infection  Goal: Absence of Infection Signs and Symptoms  Outcome: Progressing     Problem: Wound  Goal: Optimal Coping  Outcome: Progressing     Problem: Fall Injury Risk  Goal: Absence of Fall and Fall-Related Injury  Outcome: Progressing     Problem: Pain Acute  Goal: Optimal Pain Control and Function  Outcome: Progressing

## 2025-06-16 NOTE — PLAN OF CARE
Nutrition Plan of Care:    Recommendations     1. Advance oral diet as tolerated    2. Monitor labs and weights    3. Collaboration by nutrition professional with other providers     Goals: Patient to advance with nutrition therapies within 48 hours  Nutrition Goal Status: new  Communication of RD Recs: other (comment) (POC)     Nutrition Discharge Planning     Nutrition Discharge Planning: Too early to determine, pending clinical course    PES Statement  Inadequate oral intake related to Altered GI function as evidenced by NPO/CL status due to medical condition  Status: New      Ashly Rea, MS, RDN, LDN

## 2025-06-16 NOTE — PT/OT/SLP PROGRESS
Physical Therapy Treatment    Patient Name:  Patricia Ramirez   MRN:  [id number]    Recommendations:     Discharge Recommendations: Low Intensity Therapy  Discharge Equipment Recommendations: bedside commode, walker, rolling, shower chair, to be determined by next level of care  Barriers to discharge: None    Assessment:     Patricia Ramirez is a 69 y.o. female admitted with a medical diagnosis of <principal problem not specified>.  She presents with the following impairments/functional limitations: weakness, impaired endurance, impaired self care skills, impaired functional mobility, gait instability, impaired balance, pain, impaired cardiopulmonary response to activity.    Pt ambulated ~10ft using RW and CGA/SBA    Rehab Prognosis: Good; patient would benefit from acute skilled PT services to address these deficits and reach maximum level of function.    Recent Surgery: Procedure(s) (LRB):  LAPAROSCOPY, DIAGNOSTIC (N/A)  LAPAROTOMY, EXPLORATORY; APPENDECTOMY; SMALL BOWEL RESECTION; DIVERTICULUM RESECTION (N/A) 2 Days Post-Op    Plan:     During this hospitalization, patient to be seen 5 x/week (5-4x per week) to address the identified rehab impairments via gait training, therapeutic activities, therapeutic exercises, neuromuscular re-education and progress toward the following goals:    Plan of Care Expires:  07/06/25    Subjective     Chief Complaint: surgical pain  Patient/Family Comments/goals: Pt agreed to therapy  Pain/Comfort:  Pain Rating 1: 10/10  Location 1: abdomen  Pain Addressed 1: Reposition, Distraction, Nurse notified      Objective:     Communicated with nursing prior to session.  Patient found HOB elevated with bed alarm, NG tube, oxygen, peripheral IV, telemetry, SCD upon PT entry to room.     General Precautions: Standard, fall ([place])  Orthopedic Precautions: N/A  Braces: N/A  Respiratory Status: Nasal cannula, flow 2 L/min     Functional Mobility:  Bed Mobility:     Scooting: contact guard  assistance  Supine to Sit: contact guard assistance  Transfers: Gait belt donned   Sit to Stand:  contact guard assistance with rolling walker, pt with increased pressure around abdomen when standing  Bed to Chair: stand by assistance and contact guard assistance with  rolling walker  using  Step Transfer  Gait: Pt ambulated ~10ft using RW and SBA/CGA, pt with decreased leonides and step length, cues for proper sequencing and upright posture  Balance: Pt with Fair- standing balance      AM-PAC 6 CLICK MOBILITY  Turning over in bed (including adjusting bedclothes, sheets and blankets)?: 3  Sitting down on and standing up from a chair with arms (e.g., wheelchair, bedside commode, etc.): 3  Moving from lying on back to sitting on the side of the bed?: 3  Moving to and from a bed to a chair (including a wheelchair)?: 3  Need to walk in hospital room?: 3  Climbing 3-5 steps with a railing?: 2  Basic Mobility Total Score: 17       Treatment & Education:  Pt instructed to perform seated LAQ, Hip flexion, HR, hip abd/add    Patient left up in chair with all lines intact, call button in reach, and nursing notified..    GOALS:   Multidisciplinary Problems       Physical Therapy Goals          Problem: Physical Therapy    Goal Priority Disciplines Outcome Interventions   Physical Therapy Goal     PT, PT/OT Progressing    Description: Goals to be met by: 25     Patient will increase functional independence with mobility by performin. Supine to sit with Modified [place]  2. Sit to supine with Modified [place]  3. Sit to stand transfer with Modified [place]  4. Gait  x 50 feet with Modified [place] using Rolling Walker.   5. Stand for 5 minutes with Modified [place] using Rolling Walker  6. Lower extremity exercise program x30 reps per handout, with independence                         DME Justifications:      Time Tracking:     PT Received On: 25  PT Start Time: 905     PT Stop Time:  0928  PT Total Time (min): 23 min     Billable Minutes: Gait Training 10 and Therapeutic Exercise 13    Treatment Type: Treatment  PT/PTA: PTA     Number of PTA visits since last PT visit: 1 06/16/2025

## 2025-06-16 NOTE — PROGRESS NOTES
[facility] - Telemetry  Adult Nutrition  Progress Note    SUMMARY       Recommendations    1. Advance oral diet as tolerated    2. Monitor labs and weights    3. Collaboration by nutrition professional with other providers    Goals: Patient to advance with nutrition therapies within 48 hours  Nutrition Goal Status: new  Communication of RD Recs: other (comment) (POC)    Nutrition Discharge Planning    Nutrition Discharge Planning: Too early to determine, pending clinical course         Malnutrition Assessment       NPFE to be performed as warranted at follow up visits.                                 Reason for Assessment    Reason For Assessment: identified at risk by screening criteria    Diagnosis:  (SBO)  Patient Active Problem List   Diagnosis    Essential hypertension    Hyperlipidemia    GERD (gastroesophageal reflux disease)    Depression with anxiety    Migraine with aura and without status migrainosus, not intractable    Nuclear sclerosis of both eyes    Dermatochalasis    Rosacea    Brow ptosis, bilateral    Retinal detachment of left eye with multiple breaks    Epiretinal membrane, left    Splenic infarction    Heart failure with preserved ejection fraction    Diastolic dysfunction    IFG (impaired fasting glucose)    Class 1 obesity due to excess calories without serious comorbidity with body mass index (BMI) of 31.0 to 31.9 in adult    RBBB    Left atrial enlargement    Left anterior fascicular block    Abnormal EKG    Dyspnea on exertion    Multiple risk factors for coronary artery disease    Splenic infarct    History of tobacco abuse    Nuclear sclerotic cataract of left eye    Moderate major depression, single episode    Tobacco dependency    Choledocholithiasis    Epigastric pain    History of laparoscopic cholecystectomy    Gastric outlet obstruction    SBO (small bowel obstruction)    Hypokalemia     Past Medical History:   Diagnosis Date    Allergy     Anxiety     Arthritis     Cataract      "Depressive disorder, not elsewhere classified     Esophageal reflux     Hypertension     Impaired fasting glucose     Joint pain     Obesity, unspecified     Other and unspecified hyperlipidemia        General Information Comments:   6/16/25: Patient is currently NPO for SBO.  Labs reviewed.  NKFA.  LBM: 6/13/25.  Post op on 6/14/25.  No significant weight loss found in medical chart weight history within the last 1 year.  UBW: 130lbs.  RD to continue to monitor npo status and nutrition therapies.    Nutrition/Diet History    Spiritual, Cultural Beliefs, Christianity Practices, Values that Affect Care: no  Food Allergies: NKFA  Factors Affecting Nutritional Intake: NPO  Nutrition Related Social Determinants of Health: SDOH: Adequate food in home environment and None Identified    Anthropometrics    Height: 5' 8" (172.7 cm)  Height (inches): 68 in  Height Method: Stated  Weight: 60.5 kg (133 lb 6.1 oz)  Weight (lb): 133.38 lb  Weight Method: Bed Scale  Ideal Body Weight (IBW), Female: 140 lb  % Ideal Body Weight, Female (lb): 95.27 %  BMI (Calculated): 20.3  BMI Grade: 18.5-24.9 - normal       Lab/Procedures/Meds    Pertinent Labs Reviewed: reviewed  BMP  Lab Results   Component Value Date     06/16/2025    K 3.2 (L) 06/16/2025     06/16/2025    CO2 22 (L) 06/16/2025    BUN 14 06/16/2025    CREATININE 0.4 (L) 06/16/2025    CALCIUM 8.2 (L) 06/16/2025    ANIONGAP 9 06/16/2025    EGFRNORACEVR >60 06/16/2025     Lab Results   Component Value Date    HGBA1C 5.3 03/10/2025     Lab Results   Component Value Date    CALCIUM 8.2 (L) 06/16/2025    PHOS 2.1 (L) 06/16/2025       Pertinent Medications Reviewed: reviewed  Scheduled Meds:   enoxparin  40 mg Subcutaneous Q24H (prophylaxis, 1700)    LIDOcaine  2 patch Transdermal Q24H    mupirocin   Nasal BID     Continuous Infusions:   lactated ringers   Intravenous Continuous 100 mL/hr at 06/16/25 0858 New Bag at 06/16/25 0858     PRN Meds:.  Current Facility-Administered " Medications:     acetaminophen, 650 mg, Oral, Q8H PRN    acetaminophen, 650 mg, Oral, Q4H PRN    aluminum-magnesium hydroxide-simethicone, 30 mL, Oral, QID PRN    glucagon (human recombinant), 1 mg, Intramuscular, PRN    glucose, 16 g, Oral, PRN    glucose, 24 g, Oral, PRN    hydrALAZINE, 10 mg, Intravenous, Q8H PRN    HYDROmorphone, 0.5 mg, Intravenous, Q6H PRN    HYDROmorphone, 1 mg, Intravenous, Q6H PRN    magnesium oxide, 800 mg, Oral, PRN    magnesium oxide, 800 mg, Oral, PRN    melatonin, 6 mg, Oral, Nightly PRN    naloxone, 0.02 mg, Intravenous, PRN    ondansetron, 4 mg, Intravenous, Q8H PRN    potassium bicarbonate, 35 mEq, Oral, PRN    potassium bicarbonate, 50 mEq, Oral, PRN    potassium bicarbonate, 60 mEq, Oral, PRN    potassium, sodium phosphates, 2 packet, Oral, PRN    potassium, sodium phosphates, 2 packet, Oral, PRN    potassium, sodium phosphates, 2 packet, Oral, PRN    simethicone, 1 tablet, Oral, QID PRN    sodium chloride 0.9%, 10 mL, Intravenous, Q12H PRN    Flushing PICC/Midline Protocol, , , Until Discontinued **AND** sodium chloride 0.9%, 10 mL, Intravenous, Q12H PRN    Estimated/Assessed Needs    Weight Used For Calorie Calculations: 60.5 kg (133 lb 6.1 oz)  Energy Calorie Requirements (kcal): 25-35kcals/kg (1512-2117kcals/day)  Energy Need Method: Kcal/kg  Protein Requirements: 1.2-1.5g/kg (70-89g/day)  Weight Used For Protein Calculations: 59 kg (130 lb 1.1 oz)  Fluid Requirements (mL): 1ml/kcal  Estimated Fluid Requirement Method: RDA Method  RDA Method (mL): 25  CHO Requirement: 225-250      Nutrition Prescription Ordered    Current Diet Order: NPO    Evaluation of Received Nutrient/Fluid Intake    % Kcal Needs: NPO  % Protein Needs: NPO  I/O: 6/16/25: +770ml since admit  Energy Calories Required: not meeting needs  Protein Required: not meeting needs  Fluid Required: not meeting needs  Comments: LBM: 6/13/25  Tolerance: other (see comments) (NPO)  % Intake of Estimated Energy Needs: 0 -  25 %  % Meal Intake: NPO    PES Statement  Inadequate oral intake related to Altered GI function as evidenced by NPO/CL status due to medical condition  Status: New    Nutrition Risk    Level of Risk/Frequency of Follow-up: moderate (Follow up: 2x per week)     Monitor and Evaluation    Monitor and Evaluation: Diet order, Energy intake, Protein intake, Weight, Beliefs and attitudes, Electrolyte and renal panel, Gastrointestinal profile, Glucose/endocrine profile, Inflammatory profile, Lipid profile, Nutrition focused physical findings     Nutrition Follow-Up    RD Follow-up?: Yes  Ashly Rea, MS, RDN, LDN

## 2025-06-16 NOTE — ASSESSMENT & PLAN NOTE
Patient's blood pressure range in the last 24 hours was: BP  Min: 133/73  Max: 167/83.The patient's inpatient anti-hypertensive regimen is listed below:  Current Antihypertensives  hydrALAZINE injection 10 mg, Every 8 hours PRN, Intravenous    Plan  - BP is controlled, no changes needed to their regimen  - hold home Benicar at this time given NPO status  - resume once able to tolerate p.o. intake and if BP not controlled

## 2025-06-16 NOTE — SUBJECTIVE & OBJECTIVE
Interval History:  No acute overnight events.  Patient remained afebrile.  Complains of abdominal pain, but it is better controlled at this time.  No nausea or vomiting.  Have not had a bowel movement. Not passing flatus     Review of Systems   Constitutional:  Positive for fatigue. Negative for fever.   Respiratory:  Negative for cough and shortness of breath.    Cardiovascular:  Negative for chest pain.   Gastrointestinal:  Positive for abdominal pain and constipation. Negative for diarrhea, nausea and vomiting.   Genitourinary:  Negative for difficulty urinating.   Neurological:  Positive for weakness.     Objective:     Vital Signs (Most Recent):  Temp: 98 °F (36.7 °C) (06/16/25 1118)  Pulse: 80 (06/16/25 1118)  Resp: 18 (06/16/25 1118)  BP: (!) 167/83 (06/16/25 1118)  SpO2: 96 % (06/16/25 1118) Vital Signs (24h Range):  Temp:  [97.6 °F (36.4 °C)-98.8 °F (37.1 °C)] 98 °F (36.7 °C)  Pulse:  [72-91] 80  Resp:  [16-20] 18  SpO2:  [95 %-96 %] 96 %  BP: (133-167)/(66-83) 167/83     Weight: 60.5 kg (133 lb 6.1 oz)  Body mass index is 20.28 kg/m².    Intake/Output Summary (Last 24 hours) at 6/16/2025 1259  Last data filed at 6/16/2025 1121  Gross per 24 hour   Intake 0 ml   Output 730 ml   Net -730 ml         Physical Exam  Vitals and nursing note reviewed.   Constitutional:       General: She is not in acute distress.     Appearance: She is ill-appearing.   HENT:      Nose:      Comments: NG tube in place with bile output     Mouth/Throat:      Mouth: Mucous membranes are dry.   Cardiovascular:      Rate and Rhythm: Normal rate and regular rhythm.   Pulmonary:      Effort: Pulmonary effort is normal. No respiratory distress.      Breath sounds: Normal breath sounds.   Abdominal:      General: There is no distension.      Palpations: Abdomen is soft.      Tenderness: There is abdominal tenderness.      Comments: Midline incision dressing in place, some blood noted on dressing.   Musculoskeletal:         General: No  swelling or tenderness.   Skin:     General: Skin is warm and dry.   Neurological:      General: No focal deficit present.      Mental Status: She is alert and oriented to person, place, and time.   Psychiatric:         Mood and Affect: Mood normal.         Thought Content: Thought content normal.               Significant Labs: All pertinent labs within the past 24 hours have been reviewed.    Significant Imaging: I have reviewed all pertinent imaging results/findings within the past 24 hours.

## 2025-06-16 NOTE — PROGRESS NOTES
[facility] Medicine  Progress Note    Patient Name: Patricia Ramirez  MRN: [id number]  Patient Class: IP- Inpatient   Admission Date: 6/14/2025  Length of Stay: 2 days  Attending Physician: Margarita Morris DO  Primary Care Provider: Fabienne Erwin NP        Subjective     Principal Problem:<principal problem not specified>        HPI:  69-year-old female with a history of anxiety, depression, hypertension presents to the ED with recurrent small bowel obstruction. Complains of diffuse abdominal pain, associated with nausea but no vomiting. Still passing flatus. Patient was recently hospitalized from 06/09/2025 to 06/13/2025 for SBO. Was seen by general surgery and was treated conservatively. Patient had return of bowel functions after multiple days of gastric decompression with NGT and suppository. Prior to discharge, patient was able to tolerate PO without any abdominal pain, nausea or vomiting. Referred to GI on discharge for possible gastric dysmotility disorder. Of note, patient was offered surgery last hospitalization, but patient declined at that time. States she is now amendable to surgery.     In the ED, patient hypertensive but afebrile. Labs without leukocytosis, but noted to have hypokalemia with K 3.1. CT abdomen/pelvis w/ worsening mechanical small bowel obstruction at the level of the distal ileum. NGT placed. Pt admitted to general surgery team. Hospital medicine team consulted.        Overview/Hospital Course:  69 year old female admitted to general surgery team on 06/14/2025 for recurrent small-bowel obstruction. Patient taken for expiratory laparotomy on 06/14/2025.  NGT in place. Hospital medicine consulted for medical management.    Interval History:  No acute overnight events.  Patient remained afebrile.  Complains of abdominal pain, but it is better controlled at this time.  No nausea or vomiting.  Have not had a bowel movement. Not passing flatus     Review of Systems    Constitutional:  Positive for fatigue. Negative for fever.   Respiratory:  Negative for cough and shortness of breath.    Cardiovascular:  Negative for chest pain.   Gastrointestinal:  Positive for abdominal pain and constipation. Negative for diarrhea, nausea and vomiting.   Genitourinary:  Negative for difficulty urinating.   Neurological:  Positive for weakness.     Objective:     Vital Signs (Most Recent):  Temp: 98 °F (36.7 °C) (06/16/25 1118)  Pulse: 80 (06/16/25 1118)  Resp: 18 (06/16/25 1118)  BP: (!) 167/83 (06/16/25 1118)  SpO2: 96 % (06/16/25 1118) Vital Signs (24h Range):  Temp:  [97.6 °F (36.4 °C)-98.8 °F (37.1 °C)] 98 °F (36.7 °C)  Pulse:  [72-91] 80  Resp:  [16-20] 18  SpO2:  [95 %-96 %] 96 %  BP: (133-167)/(66-83) 167/83     Weight: 60.5 kg (133 lb 6.1 oz)  Body mass index is 20.28 kg/m².    Intake/Output Summary (Last 24 hours) at 6/16/2025 1259  Last data filed at 6/16/2025 1121  Gross per 24 hour   Intake 0 ml   Output 730 ml   Net -730 ml         Physical Exam  Vitals and nursing note reviewed.   Constitutional:       General: She is not in acute distress.     Appearance: She is ill-appearing.   HENT:      Nose:      Comments: NG tube in place with bile output     Mouth/Throat:      Mouth: Mucous membranes are dry.   Cardiovascular:      Rate and Rhythm: Normal rate and regular rhythm.   Pulmonary:      Effort: Pulmonary effort is normal. No respiratory distress.      Breath sounds: Normal breath sounds.   Abdominal:      General: There is no distension.      Palpations: Abdomen is soft.      Tenderness: There is abdominal tenderness.      Comments: Midline incision dressing in place, some blood noted on dressing.   Musculoskeletal:         General: No swelling or tenderness.   Skin:     General: Skin is warm and dry.   Neurological:      General: No focal deficit present.      Mental Status: She is alert and oriented to person, place, and time.   Psychiatric:         Mood and Affect: Mood  normal.         Thought Content: Thought content normal.               Significant Labs: All pertinent labs within the past 24 hours have been reviewed.    Significant Imaging: I have reviewed all pertinent imaging results/findings within the past 24 hours.      Assessment & Plan  SBO (small bowel obstruction)  -was recently hospitalized from 06/09/2025 to 06/13/2025 for SBO. Was seen by general surgery and was treated conservatively. Patient had return of bowel functions after multiple days of gastric decompression with NGT and suppository and was discharged  -returned with recurrent abdominal pain associated with nausea.  Pt with recurrent SBO.   -CT abdomen and pelvis showed worsening mechanical small bowel obstruction at the level of the distal ileum   -pt s/p  exploratory laparotomy, appendectomy, small bowel resection and side to side anastomosis, and diverticulectomy on 06/14/25  -management per primary team, General surgery  Hypokalemia  Patient's most recent potassium results are listed below.   Recent Labs     06/14/25  0416 06/15/25  0446 06/16/25  0519   K 3.1* 2.7* 3.2*     Plan  - Replete potassium per protocol  - Monitor potassium Daily  - Patient's hypokalemia is improving  - replace as needed  Essential hypertension  Patient's blood pressure range in the last 24 hours was: BP  Min: 133/73  Max: 167/83.The patient's inpatient anti-hypertensive regimen is listed below:  Current Antihypertensives  hydrALAZINE injection 10 mg, Every 8 hours PRN, Intravenous    Plan  - BP is controlled, no changes needed to their regimen  - hold home Benicar at this time given NPO status  - resume once able to tolerate p.o. intake and if BP not controlled  Hyperlipidemia  -hold home statin at this time given NPO status   -resume when appropriate    Heart failure with preserved ejection fraction  -echocardiogram from 10/07/2024 with preserved EF of 55-60%, grade 1 diastolic dysfunction.  PASP of 35 mm Hg  -appears to be  at baseline, appears euvolemic   -monitor    Depression with anxiety  Patient has persistent depression which is moderate and is currently controlled. Will hold anti-depressant medications. We will not consult psychiatry at this time. Patient does not display psychosis at this time. Continue to monitor closely and adjust plan of care as needed.      Hold home Xanax, BuSpar, and Celexa at this time given NPO status  VTE Risk Mitigation (From admission, onward)           Ordered     enoxaparin injection 40 mg  Every 24 hours         06/15/25 0733     IP VTE HIGH RISK PATIENT  Once         06/14/25 0559     Place sequential compression device  Until discontinued         06/14/25 0559                    Discharge Planning   STUART:      Code Status: Full Code   Medical Readiness for Discharge Date:   Discharge Plan A: Home with family (Follow-ups)            Thank you for your consult and allowing me to participate in this patient's care. I will sign off at this time.  Please contact us if you have any additional questions.      Please place Justification for MANAV Garcia-Lisset Morris DO  Department of Hospital Medicine   [facility] - Telemetry

## 2025-06-16 NOTE — PLAN OF CARE
Problem: Adult Inpatient Plan of Care  Goal: Plan of Care Review  Outcome: Progressing  Flowsheets (Taken 6/16/2025 1727)  Plan of Care Reviewed With: patient     Problem: Fall Injury Risk  Goal: Absence of Fall and Fall-Related Injury  Outcome: Progressing  Intervention: Identify and Manage Contributors  Flowsheets (Taken 6/16/2025 1727)  Self-Care Promotion:   independence encouraged   BADL personal objects within reach   BADL personal routines maintained  Medication Review/Management: medications reviewed  Intervention: Promote Injury-Free Environment  Flowsheets (Taken 6/16/2025 1727)  Safety Promotion/Fall Prevention:   assistive device/personal item within reach   Fall Risk reviewed with patient/family   Fall Risk signage in place   nonskid shoes/socks when out of bed   medications reviewed   room near unit station   side rails raised x 2     Problem: Pain Acute  Goal: Optimal Pain Control and Function  Outcome: Progressing  Intervention: Develop Pain Management Plan  Flowsheets (Taken 6/16/2025 1727)  Pain Management Interventions: pain management plan reviewed with patient/caregiver

## 2025-06-16 NOTE — PT/OT/SLP PROGRESS
Occupational Therapy   Treatment    Name: Patricia Ramirez  MRN: [id number]  Admitting Diagnosis:  <principal problem not specified>  2 Days Post-Op    Recommendations:     Discharge Recommendations: Low Intensity Therapy  Discharge Equipment Recommendations:  bath bench, bedside commode, hospital bed  Barriers to discharge:  None    Assessment:     Patricia Ramirez is a 69 y.o. female with a medical diagnosis of <principal problem not specified>.  She presents with HOB elevated and awaiting a new IV per her report because IV was burning. Performance deficits affecting function are impaired endurance, weakness, impaired sensation, impaired self care skills, impaired functional mobility, gait instability, impaired balance, decreased safety awareness.  Patient stayed in bed because she had just been sitting in chair during morning and was having abdominal pain.     Rehab Prognosis:  Good; patient would benefit from acute skilled OT services to address these deficits and reach maximum level of function.       Plan:     Patient to be seen 3 x/week to address the above listed problems via self-care/home management, therapeutic activities, therapeutic exercises  Plan of Care Expires: 07/05/25  Plan of Care Reviewed with: patient    Subjective     Chief Complaint: she said that the Potassium was burning earlier in her IV, so she may need a new IV line   Patient/Family Comments/goals: patient said she had just returned to bed because she was having pain   Pain/Comfort:  Pain Rating 1: other (see comments)    Objective:     Communicated with: RN,  prior to session.  Patient found HOB elevated with bed alarm, NG tube, oxygen, peripheral IV, telemetry, SCD, abdominal binder, upon OT entry to room.    General Precautions: Standard, aspiration, NPO    Orthopedic Precautions:N/A  Braces: N/A  Respiratory Status: Nasal cannula, flow 2  L/min     Occupational Performance:     Bed Mobility:    Patient completed Scooting/Bridging with  modified independence to HOB from mid bed     Functional Mobility/Transfers:  NT because she had just returned to bed and declined t/f to bathroom     Activities of Daily Living:  Feeding:  NPO/NG tube         AMPAC 6 Click ADL: 11    Treatment & Education:  Patient said she needs hospital bed at home due to new abdominal incision and because she does not have a regular bed at home, also.   Occupational therapy educated her re: precautions such as no lifting >5# and no excessive bending and log rolling in/out bed.     Patient left HOB elevated with all lines intact, call button in reach, and RN  notified    GOALS:   Multidisciplinary Problems       Occupational Therapy Goals          Problem: Occupational Therapy    Goal Priority Disciplines Outcome Interventions   Occupational Therapy Goal     OT, PT/OT Progressing    Description: Goals to be met by: 7/5/2025     Patient will increase functional independence with ADLs by performing:    UE Dressing with Set-up Assistance.  LE Dressing with Stand-by Assistance.  Grooming while seated with Modified [place].  Toileting from bedside commode with Supervision for hygiene and clothing management.   Bathing seated UB and andreia care sponge  with Stand-by Assistance.  Toilet transfer to bedside commode with Contact Guard Assistance.  Upper extremity exercise program x7-10 reps no resistance, and per handout, with assistance as needed.                         DME Justifications:   Patricia requires a commode for home use because she is confined to a single room.  Patricia requires a hospital bed due to her requiring positioning of the body in ways not feasible with an ordinary bed due to limited ability and cannot independently make changes in body position without the use of the bed.The positioning of the body cannot be sufficiently resolved by the use of pillows and wedges.    Time Tracking:     OT Date of Treatment: 06/16/25  OT Start Time: 1149  OT Stop Time: 1200  OT Total  Time (min): 11 min    Billable Minutes:Therapeutic Activity 11     OT/CHANELL: OT          6/16/2025

## 2025-06-16 NOTE — ASSESSMENT & PLAN NOTE
Patient's most recent potassium results are listed below.   Recent Labs     06/14/25  0416 06/15/25  0446 06/16/25  0519   K 3.1* 2.7* 3.2*     Plan  - Replete potassium per protocol  - Monitor potassium Daily  - Patient's hypokalemia is improving  - replace as needed

## 2025-06-16 NOTE — PROGRESS NOTES
Surgery Progress Note    Patricia Ramirez is a 69 y.o. year old female in hospital for recurrent SBO (failed conservative management), POD#1 from diagnostic laparoscopy converted exploratory laparotomy, SBR and anastomosis, appendectomy, diverticulectomy 6/14    NAEON AF VSS  Pain better controlled  Was able to work with PT/OT yesterday  Denies n/v, but not yet passing flatus, no BM  NGT with 310 dark gastric output over last 24 hrs  Able to get up to 1000 on IS   No f/c/n/v/sob/cp    ROS:  Negative except above    PE:  Vitals:    06/16/25 0317 06/16/25 0353 06/16/25 0748 06/16/25 0805   BP:  (!) 147/78 (!) 143/68    BP Location:  Left arm     Patient Position:  Lying     Pulse:  80 76 72   Resp: 19 18 18 18   Temp:  98.2 °F (36.8 °C) 97.7 °F (36.5 °C)    TempSrc:  Oral Oral    SpO2:  95% 96% 96%   Weight:       Height:           NAD, resting  No belabored breathing  No skin changes  Abd soft mildy distended appropriately TTP around midline incision, port site incisions c/d/I   Dressings c/d/I    Significant Diagnostic Studies: Labs: BMP:   Recent Labs   Lab 06/15/25  0446 06/16/25  0519   GLU 90 77    140   K 2.7* 3.2*    109   CO2 24 22*   BUN 11 14   CREATININE 0.5 0.4*   CALCIUM 8.1* 8.2*   MG 1.7 1.9   , CBC   Recent Labs   Lab 06/15/25  0446 06/16/25  0554   WBC 9.41 12.36   HGB 13.9 13.7   HCT 42.1 43.9    136*   , INR   Lab Results   Component Value Date    INR 1.0 07/28/2022   , and All labs within the past 24 hours have been reviewed    A/P:  Patricia Ramirez is a 69 y.o. year old female  in hospital for recurrent SBO (failed conservative management), POD#1 from diagnostic laparoscopy converted exploratory laparotomy, SBR and anastomosis, appendectomy, diverticulectomy 6/14    - NPO, mIVF - ok for ice chips sparingly  - Maintain NGT to LIWS  - Will assess for clamp trial vs removal when ROBF   - Smalls removed, voiding  - MMPC   - Pulm toilet, encourage IS 10x/hour  - PT/OT, encourage up to  chair and ambulation   - Abd binder for comfort, ok to take off or have breaks if needed  - Replete electrolytes to maintain K > 4, Phos > 3, Mg > 2  - DVT ppx   - Awaiting ONEL Lin  General Surgery - [facility]  6/16/2025

## 2025-06-16 NOTE — NURSING
[facility] [place]  Nurses Note -- 4 Eyes      6/15/2025       Skin assessed on: Q Shift      [x] No Pressure Injuries Present    []Prevention Measures Documented    [] Yes LDA  for Pressure Injury Previously documented     [] Yes New Pressure Injury Discovered   [] LDA for New Pressure Injury Added      Attending RN:  Everardo Delgado RN     Second RN:  Christiana PADILLA

## 2025-06-17 LAB
ABSOLUTE EOSINOPHIL (OHS): 0.25 K/UL
ABSOLUTE MONOCYTE (OHS): 0.86 K/UL (ref 0.3–1)
ABSOLUTE NEUTROPHIL COUNT (OHS): 12.11 K/UL (ref 1.8–7.7)
ANION GAP (OHS): 9 MMOL/L (ref 8–16)
BASOPHILS # BLD AUTO: 0.03 K/UL
BASOPHILS NFR BLD AUTO: 0.2 %
BUN SERPL-MCNC: 13 MG/DL (ref 8–23)
CALCIUM SERPL-MCNC: 8.2 MG/DL (ref 8.7–10.5)
CHLORIDE SERPL-SCNC: 104 MMOL/L (ref 95–110)
CO2 SERPL-SCNC: 27 MMOL/L (ref 23–29)
CREAT SERPL-MCNC: 0.5 MG/DL (ref 0.5–1.4)
ERYTHROCYTE [DISTWIDTH] IN BLOOD BY AUTOMATED COUNT: 12.8 % (ref 11.5–14.5)
GFR SERPLBLD CREATININE-BSD FMLA CKD-EPI: >60 ML/MIN/1.73/M2
GLUCOSE SERPL-MCNC: 81 MG/DL (ref 70–110)
HCT VFR BLD AUTO: 41.4 % (ref 37–48.5)
HGB BLD-MCNC: 13.5 GM/DL (ref 12–16)
IMM GRANULOCYTES # BLD AUTO: 0.11 K/UL (ref 0–0.04)
IMM GRANULOCYTES NFR BLD AUTO: 0.7 % (ref 0–0.5)
LYMPHOCYTES # BLD AUTO: 1.34 K/UL (ref 1–4.8)
MAGNESIUM SERPL-MCNC: 1.9 MG/DL (ref 1.6–2.6)
MCH RBC QN AUTO: 32.2 PG (ref 27–31)
MCHC RBC AUTO-ENTMCNC: 32.6 G/DL (ref 32–36)
MCV RBC AUTO: 99 FL (ref 82–98)
NUCLEATED RBC (/100WBC) (OHS): 0 /100 WBC
PHOSPHATE SERPL-MCNC: 3.1 MG/DL (ref 2.7–4.5)
PLATELET # BLD AUTO: 195 K/UL (ref 150–450)
PLATELET BLD QL SMEAR: NORMAL
PMV BLD AUTO: 9.3 FL (ref 9.2–12.9)
POTASSIUM SERPL-SCNC: 3.8 MMOL/L (ref 3.5–5.1)
RBC # BLD AUTO: 4.19 M/UL (ref 4–5.4)
RELATIVE EOSINOPHIL (OHS): 1.7 %
RELATIVE LYMPHOCYTE (OHS): 9.1 % (ref 18–48)
RELATIVE MONOCYTE (OHS): 5.9 % (ref 4–15)
RELATIVE NEUTROPHIL (OHS): 82.4 % (ref 38–73)
SODIUM SERPL-SCNC: 140 MMOL/L (ref 136–145)
WBC # BLD AUTO: 14.7 K/UL (ref 3.9–12.7)

## 2025-06-17 PROCEDURE — 97110 THERAPEUTIC EXERCISES: CPT | Mod: CQ

## 2025-06-17 PROCEDURE — 80048 BASIC METABOLIC PNL TOTAL CA: CPT | Performed by: STUDENT IN AN ORGANIZED HEALTH CARE EDUCATION/TRAINING PROGRAM

## 2025-06-17 PROCEDURE — 84100 ASSAY OF PHOSPHORUS: CPT

## 2025-06-17 PROCEDURE — 83735 ASSAY OF MAGNESIUM: CPT | Performed by: STUDENT IN AN ORGANIZED HEALTH CARE EDUCATION/TRAINING PROGRAM

## 2025-06-17 PROCEDURE — 85025 COMPLETE CBC W/AUTO DIFF WBC: CPT | Performed by: STUDENT IN AN ORGANIZED HEALTH CARE EDUCATION/TRAINING PROGRAM

## 2025-06-17 PROCEDURE — 99900035 HC TECH TIME PER 15 MIN (STAT)

## 2025-06-17 PROCEDURE — 63600175 PHARM REV CODE 636 W HCPCS: Performed by: INTERNAL MEDICINE

## 2025-06-17 PROCEDURE — 97530 THERAPEUTIC ACTIVITIES: CPT | Mod: CQ

## 2025-06-17 PROCEDURE — 97116 GAIT TRAINING THERAPY: CPT | Mod: CQ

## 2025-06-17 PROCEDURE — 25000003 PHARM REV CODE 250: Performed by: STUDENT IN AN ORGANIZED HEALTH CARE EDUCATION/TRAINING PROGRAM

## 2025-06-17 PROCEDURE — 97110 THERAPEUTIC EXERCISES: CPT

## 2025-06-17 PROCEDURE — 63600175 PHARM REV CODE 636 W HCPCS

## 2025-06-17 PROCEDURE — 11000001 HC ACUTE MED/SURG PRIVATE ROOM

## 2025-06-17 PROCEDURE — 94761 N-INVAS EAR/PLS OXIMETRY MLT: CPT

## 2025-06-17 PROCEDURE — 36415 COLL VENOUS BLD VENIPUNCTURE: CPT | Performed by: STUDENT IN AN ORGANIZED HEALTH CARE EDUCATION/TRAINING PROGRAM

## 2025-06-17 PROCEDURE — 27000221 HC OXYGEN, UP TO 24 HOURS

## 2025-06-17 PROCEDURE — 25000003 PHARM REV CODE 250

## 2025-06-17 PROCEDURE — 63600175 PHARM REV CODE 636 W HCPCS: Performed by: STUDENT IN AN ORGANIZED HEALTH CARE EDUCATION/TRAINING PROGRAM

## 2025-06-17 RX ORDER — HYDROMORPHONE HYDROCHLORIDE 1 MG/ML
0.5 INJECTION, SOLUTION INTRAMUSCULAR; INTRAVENOUS; SUBCUTANEOUS
Status: DISCONTINUED | OUTPATIENT
Start: 2025-06-17 | End: 2025-06-19

## 2025-06-17 RX ADMIN — HYDROMORPHONE HYDROCHLORIDE 1 MG: 1 INJECTION, SOLUTION INTRAMUSCULAR; INTRAVENOUS; SUBCUTANEOUS at 11:06

## 2025-06-17 RX ADMIN — HYDROMORPHONE HYDROCHLORIDE 0.5 MG: 1 INJECTION, SOLUTION INTRAMUSCULAR; INTRAVENOUS; SUBCUTANEOUS at 02:06

## 2025-06-17 RX ADMIN — HYDROMORPHONE HYDROCHLORIDE 0.5 MG: 1 INJECTION, SOLUTION INTRAMUSCULAR; INTRAVENOUS; SUBCUTANEOUS at 06:06

## 2025-06-17 RX ADMIN — LIDOCAINE 2 PATCH: 50 PATCH TOPICAL at 11:06

## 2025-06-17 RX ADMIN — ENOXAPARIN SODIUM 40 MG: 40 INJECTION SUBCUTANEOUS at 04:06

## 2025-06-17 RX ADMIN — HYDROMORPHONE HYDROCHLORIDE 1 MG: 1 INJECTION, SOLUTION INTRAMUSCULAR; INTRAVENOUS; SUBCUTANEOUS at 04:06

## 2025-06-17 RX ADMIN — SODIUM CHLORIDE, POTASSIUM CHLORIDE, SODIUM LACTATE AND CALCIUM CHLORIDE: 600; 310; 30; 20 INJECTION, SOLUTION INTRAVENOUS at 12:06

## 2025-06-17 RX ADMIN — MUPIROCIN: 20 OINTMENT TOPICAL at 08:06

## 2025-06-17 RX ADMIN — HYDROMORPHONE HYDROCHLORIDE 1 MG: 1 INJECTION, SOLUTION INTRAMUSCULAR; INTRAVENOUS; SUBCUTANEOUS at 10:06

## 2025-06-17 NOTE — ASSESSMENT & PLAN NOTE
Patient's most recent potassium results are listed below.   Recent Labs     06/15/25  0446 06/16/25  0519 06/17/25  0516   K 2.7* 3.2* 3.8     Plan  - Replete potassium per protocol  - Monitor potassium Daily  - Patient's hypokalemia is improving  - replace as needed

## 2025-06-17 NOTE — SUBJECTIVE & OBJECTIVE
Interval History:  No new issues, still with NG tube and output.  Reports no gas or stool but feels like she may need to.    Review of Systems   Constitutional:  Positive for fatigue. Negative for fever.   Respiratory:  Negative for cough and shortness of breath.    Cardiovascular:  Negative for chest pain.   Gastrointestinal:  Positive for abdominal pain and constipation. Negative for diarrhea, nausea and vomiting.   Genitourinary:  Negative for difficulty urinating.   Neurological:  Positive for weakness.     Objective:     Vital Signs (Most Recent):  Temp: 98.5 °F (36.9 °C) (06/17/25 1141)  Pulse: 75 (06/17/25 1321)  Resp: 20 (06/17/25 1321)  BP: (!) 160/79 (06/17/25 1141)  SpO2: 98 % (06/17/25 1321) Vital Signs (24h Range):  Temp:  [97.5 °F (36.4 °C)-98.7 °F (37.1 °C)] 98.5 °F (36.9 °C)  Pulse:  [71-75] 75  Resp:  [16-20] 20  SpO2:  [94 %-98 %] 98 %  BP: (108-160)/(70-79) 160/79     Weight: 60.3 kg (132 lb 15 oz)  Body mass index is 20.21 kg/m².    Intake/Output Summary (Last 24 hours) at 6/17/2025 1406  Last data filed at 6/17/2025 1321  Gross per 24 hour   Intake 30 ml   Output 250 ml   Net -220 ml         Physical Exam  Vitals and nursing note reviewed.   Constitutional:       General: She is not in acute distress.     Appearance: She is ill-appearing.   HENT:      Nose:      Comments: NG tube in place with bile output     Mouth/Throat:      Mouth: Mucous membranes are dry.   Cardiovascular:      Rate and Rhythm: Normal rate and regular rhythm.   Pulmonary:      Effort: Pulmonary effort is normal. No respiratory distress.      Breath sounds: Normal breath sounds.   Abdominal:      General: There is no distension.      Palpations: Abdomen is soft.      Tenderness: There is abdominal tenderness.      Comments: Midline incision dressing in place, some blood noted on dressing.   Musculoskeletal:         General: No swelling or tenderness.   Skin:     General: Skin is warm and dry.   Neurological:      General: No  focal deficit present.      Mental Status: She is alert and oriented to person, place, and time.   Psychiatric:         Mood and Affect: Mood normal.         Thought Content: Thought content normal.               Significant Labs: All pertinent labs within the past 24 hours have been reviewed.    Significant Imaging: I have reviewed all pertinent imaging results/findings within the past 24 hours.

## 2025-06-17 NOTE — PROGRESS NOTES
Surgery Progress Note    Patricia Ramirez is a 69 y.o. year old female in hospital for recurrent SBO (failed conservative management), POD#1 from diagnostic laparoscopy converted exploratory laparotomy, SBR and anastomosis, appendectomy, diverticulectomy 6/14    NAEON AF VSS  Pain better controlled  Was able to work with PT/OT yesterday  Denies n/v, but not yet passing flatus, no BM  NGT output incompletely charted over last 24 hrs  Able to get up to 1000 on IS   No f/c/n/v/sob/cp    ROS:  Negative except above    PE:  Vitals:    06/17/25 0251 06/17/25 0400 06/17/25 0600 06/17/25 0608   BP:  108/73     BP Location:  Right arm     Patient Position:       Pulse:  71     Resp: 16 18  16   Temp:  97.5 °F (36.4 °C)     TempSrc:  Oral     SpO2:  (!) 94%     Weight:   60.3 kg (132 lb 15 oz)    Height:           NAD, resting  No belabored breathing  No skin changes  Abd soft mildy distended appropriately TTP around midline incision, port site incisions c/d/I  Dressings c/d/I       Significant Diagnostic Studies: Labs: BMP:   Recent Labs   Lab 06/16/25  0519 06/17/25  0516   GLU 77 81    140   K 3.2* 3.8    104   CO2 22* 27   BUN 14 13   CREATININE 0.4* 0.5   CALCIUM 8.2* 8.2*   MG 1.9 1.9   , CBC   Recent Labs   Lab 06/16/25  0554 06/17/25  0516   WBC 12.36 14.70*   HGB 13.7 13.5   HCT 43.9 41.4   * 195   , INR   Lab Results   Component Value Date    INR 1.0 07/28/2022   , and All labs within the past 24 hours have been reviewed    A/P:  Patricia Ramirez is a 69 y.o. year old female  in hospital for recurrent SBO (failed conservative management), POD#1 from diagnostic laparoscopy converted exploratory laparotomy, SBR and anastomosis, appendectomy, diverticulectomy 6/14    - NPO, mIVF - ok for ice chips sparingly  - Maintain NGT to LIWS. Accurate charting of NGT output  - Will assess for clamp trial vs removal when ROBF   - MMPC   - Pulm toilet, encourage IS 10x/hour  - PT/OT, encourage up to chair and  ambulation   - Abd binder for comfort, ok to take off or have breaks if needed  - Replete electrolytes to maintain K > 4, Phos > 3, Mg > 2  - DVT ppx     Annmarie Sommer  General Surgery - [facility]  6/17/2025

## 2025-06-17 NOTE — ASSESSMENT & PLAN NOTE
Patient's blood pressure range in the last 24 hours was: BP  Min: 108/73  Max: 160/79.The patient's inpatient anti-hypertensive regimen is listed below:  Current Antihypertensives  hydrALAZINE injection 10 mg, Every 8 hours PRN, Intravenous    Plan  - BP is controlled, no changes needed to their regimen  - hold home Benicar at this time given NPO status  - resume once able to tolerate p.o. intake and if BP not controlled

## 2025-06-17 NOTE — PLAN OF CARE
Problem: Adult Inpatient Plan of Care  Goal: Plan of Care Review  Outcome: Progressing  Flowsheets (Taken 6/17/2025 1717)  Plan of Care Reviewed With: patient     Problem: Infection  Goal: Absence of Infection Signs and Symptoms  Outcome: Progressing  Intervention: Prevent or Manage Infection  Flowsheets (Taken 6/17/2025 1717)  Infection Management: aseptic technique maintained     Problem: Wound  Goal: Optimal Coping  Outcome: Progressing     Problem: Fall Injury Risk  Goal: Absence of Fall and Fall-Related Injury  Outcome: Progressing     Problem: Pain Acute  Goal: Optimal Pain Control and Function  Outcome: Progressing

## 2025-06-17 NOTE — PT/OT/SLP PROGRESS
Occupational Therapy   Treatment    Name: Patricia Ramirez  MRN: [id number]  Admitting Diagnosis:  <principal problem not specified>  3 Days Post-Op    Recommendations:     Discharge Recommendations: Low Intensity Therapy  Discharge Equipment Recommendations:  bedside commode, hospital bed  Barriers to discharge:  None    Assessment:     Patricia Ramirez is a 69 y.o. female with a medical diagnosis of <principal problem not specified>.  She presents with HOB elevated in bed. Performance deficits affecting function are weakness, impaired endurance, impaired self care skills, impaired functional mobility, gait instability, impaired balance, decreased safety awareness, decreased lower extremity function, decreased ROM, impaired skin, edema, impaired cardiopulmonary response to activity, other (comment) (abdominal precautions and binder). She had been sitting in chair earlier during morning, but began having pain, so returned to bed with nursing.     Rehab Prognosis:  Good; patient would benefit from acute skilled OT services to address these deficits and reach maximum level of function.       Plan:     Patient to be seen 3 x/week to address the above listed problems via self-care/home management, therapeutic activities, therapeutic exercises  Plan of Care Expires: 07/05/25  Plan of Care Reviewed with: patient    Subjective     Chief Complaint: abdominal pain   Patient/Family Comments/goals: she said her son would help her at home; she does not have a regular bed at home   Pain/Comfort:  Pain Rating 1: other (see comments) (she did not rate, but she had just had morphine for pain)  Location - Side 1: Bilateral  Location 1: abdomen  Pain Addressed 1: Reposition, Distraction, Nurse notified    Objective:     Communicated with: RNLeti, prior to session.  Patient found HOB elevated with bed alarm, NG tube, oxygen, peripheral IV, telemetry upon OT entry to room.    General Precautions: Standard, aspiration, NPO, other (see  comments) (ice chips only)    Orthopedic Precautions:N/A  Braces:  (abdominal binder during last 2 days)  Respiratory Status: Nasal cannula, flow 2 L/min, but at 94-96% on RA with UB therex, so reduced to 1L NC after notifying nurse      Occupational Performance:     Bed Mobility:    Patient completed Scooting/Bridging with modified independence while in bed     Functional Mobility/Transfers:  NT     Activities of Daily Living:  NT       [facility] 6 Click ADL: 11    Treatment & Education:  Patient completed 3 sets of tricep exercises x 5 reps with each arm separately with yellow theraband (light resistance) while in bed with no desaturation noted  Occupational therapy to test LB dressing and toileting at next session     Patient left HOB elevated with all lines intact, call button in reach, and RN  notified    GOALS:   Multidisciplinary Problems       Occupational Therapy Goals          Problem: Occupational Therapy    Goal Priority Disciplines Outcome Interventions   Occupational Therapy Goal     OT, PT/OT Progressing    Description: Goals to be met by: 7/5/2025     Patient will increase functional independence with ADLs by performing:    UE Dressing with Set-up Assistance.  LE Dressing with Stand-by Assistance.  Grooming while seated with Modified [place].  Toileting from bedside commode with Supervision for hygiene and clothing management.   Bathing seated UB and andreia care sponge  with Stand-by Assistance.  Toilet transfer to bedside commode with Contact Guard Assistance.  Upper extremity exercise program x7-10 reps no resistance, and per handout, with assistance as needed.                         DME Justifications:   Patricia requires a commode for home use because she is confined to a single room.   Patricia requires a hospital bed due to her requiring positioning of the body in ways not feasible with an ordinary bed to alleviate pain and due to limited ability and cannot independently make changes in body position  without the use of the bed.The positioning of the body cannot be sufficiently resolved by the use of pillows and wedges.    Time Tracking:     OT Date of Treatment: 06/17/25  OT Start Time: 1110  OT Stop Time: 1125  OT Total Time (min): 15 min    Billable Minutes:Therapeutic Exercise 15     OT/CHANELL: OT          6/17/2025

## 2025-06-17 NOTE — PROGRESS NOTES
[facility] Medicine  Progress Note    Patient Name: Patricia Ramirez  MRN: [id number]  Patient Class: IP- Inpatient   Admission Date: 6/14/2025  Length of Stay: 3 days  Attending Physician: Moise Collins MD  Primary Care Provider: Fabienne Erwin NP        Subjective     Principal Problem:<principal problem not specified>        HPI:  69-year-old female with a history of anxiety, depression, hypertension presents to the ED with recurrent small bowel obstruction. Complains of diffuse abdominal pain, associated with nausea but no vomiting. Still passing flatus. Patient was recently hospitalized from 06/09/2025 to 06/13/2025 for SBO. Was seen by general surgery and was treated conservatively. Patient had return of bowel functions after multiple days of gastric decompression with NGT and suppository. Prior to discharge, patient was able to tolerate PO without any abdominal pain, nausea or vomiting. Referred to GI on discharge for possible gastric dysmotility disorder. Of note, patient was offered surgery last hospitalization, but patient declined at that time. States she is now amendable to surgery.     In the ED, patient hypertensive but afebrile. Labs without leukocytosis, but noted to have hypokalemia with K 3.1. CT abdomen/pelvis w/ worsening mechanical small bowel obstruction at the level of the distal ileum. NGT placed. Pt admitted to general surgery team. Hospital medicine team consulted.        Overview/Hospital Course:  69 year old female admitted to general surgery team on 06/14/2025 for recurrent small-bowel obstruction. Patient taken for expiratory laparotomy on 06/14/2025.  NGT in place. Hospital medicine consulted for medical management.    Interval History:  No new issues, still with NG tube and output.  Reports no gas or stool but feels like she may need to.    Review of Systems   Constitutional:  Positive for fatigue. Negative for fever.   Respiratory:  Negative for cough and  shortness of breath.    Cardiovascular:  Negative for chest pain.   Gastrointestinal:  Positive for abdominal pain and constipation. Negative for diarrhea, nausea and vomiting.   Genitourinary:  Negative for difficulty urinating.   Neurological:  Positive for weakness.     Objective:     Vital Signs (Most Recent):  Temp: 98.5 °F (36.9 °C) (06/17/25 1141)  Pulse: 75 (06/17/25 1321)  Resp: 20 (06/17/25 1321)  BP: (!) 160/79 (06/17/25 1141)  SpO2: 98 % (06/17/25 1321) Vital Signs (24h Range):  Temp:  [97.5 °F (36.4 °C)-98.7 °F (37.1 °C)] 98.5 °F (36.9 °C)  Pulse:  [71-75] 75  Resp:  [16-20] 20  SpO2:  [94 %-98 %] 98 %  BP: (108-160)/(70-79) 160/79     Weight: 60.3 kg (132 lb 15 oz)  Body mass index is 20.21 kg/m².    Intake/Output Summary (Last 24 hours) at 6/17/2025 1406  Last data filed at 6/17/2025 1321  Gross per 24 hour   Intake 30 ml   Output 250 ml   Net -220 ml         Physical Exam  Vitals and nursing note reviewed.   Constitutional:       General: She is not in acute distress.     Appearance: She is ill-appearing.   HENT:      Nose:      Comments: NG tube in place with bile output     Mouth/Throat:      Mouth: Mucous membranes are dry.   Cardiovascular:      Rate and Rhythm: Normal rate and regular rhythm.   Pulmonary:      Effort: Pulmonary effort is normal. No respiratory distress.      Breath sounds: Normal breath sounds.   Abdominal:      General: There is no distension.      Palpations: Abdomen is soft.      Tenderness: There is abdominal tenderness.      Comments: Midline incision dressing in place, some blood noted on dressing.   Musculoskeletal:         General: No swelling or tenderness.   Skin:     General: Skin is warm and dry.   Neurological:      General: No focal deficit present.      Mental Status: She is alert and oriented to person, place, and time.   Psychiatric:         Mood and Affect: Mood normal.         Thought Content: Thought content normal.               Significant Labs: All  pertinent labs within the past 24 hours have been reviewed.    Significant Imaging: I have reviewed all pertinent imaging results/findings within the past 24 hours.      Assessment & Plan  SBO (small bowel obstruction)  -was recently hospitalized from 06/09/2025 to 06/13/2025 for SBO. Was seen by general surgery and was treated conservatively. Patient had return of bowel functions after multiple days of gastric decompression with NGT and suppository and was discharged  -returned with recurrent abdominal pain associated with nausea.  Pt with recurrent SBO.   -CT abdomen and pelvis showed worsening mechanical small bowel obstruction at the level of the distal ileum   -pt s/p  exploratory laparotomy, appendectomy, small bowel resection and side to side anastomosis, and diverticulectomy on 06/14/25  -management per primary team, General surgery  Hypokalemia  Patient's most recent potassium results are listed below.   Recent Labs     06/15/25  0446 06/16/25  0519 06/17/25  0516   K 2.7* 3.2* 3.8     Plan  - Replete potassium per protocol  - Monitor potassium Daily  - Patient's hypokalemia is improving  - replace as needed  Essential hypertension  Patient's blood pressure range in the last 24 hours was: BP  Min: 108/73  Max: 160/79.The patient's inpatient anti-hypertensive regimen is listed below:  Current Antihypertensives  hydrALAZINE injection 10 mg, Every 8 hours PRN, Intravenous    Plan  - BP is controlled, no changes needed to their regimen  - hold home Benicar at this time given NPO status  - resume once able to tolerate p.o. intake and if BP not controlled  Hyperlipidemia  -hold home statin at this time given NPO status   -resume when appropriate    Heart failure with preserved ejection fraction  -echocardiogram from 10/07/2024 with preserved EF of 55-60%, grade 1 diastolic dysfunction.  PASP of 35 mm Hg  -appears to be at baseline, appears euvolemic   -monitor    Depression with anxiety  Patient has persistent  depression which is moderate and is currently controlled. Will hold anti-depressant medications. We will not consult psychiatry at this time. Patient does not display psychosis at this time. Continue to monitor closely and adjust plan of care as needed.      Hold home Xanax, BuSpar, and Celexa at this time given NPO status  VTE Risk Mitigation (From admission, onward)           Ordered     enoxaparin injection 40 mg  Every 24 hours         06/15/25 0733     IP VTE HIGH RISK PATIENT  Once         06/14/25 0559     Place sequential compression device  Until discontinued         06/14/25 0559                    Discharge Planning   STUART:      Code Status: Full Code   Medical Readiness for Discharge Date:   Discharge Plan A: Home with family (Follow-ups)                Please place Justification for DME        Moise Collins MD  Department of Hospital Medicine   [facility] [place] [facility]

## 2025-06-18 LAB
ABSOLUTE EOSINOPHIL (OHS): 0.07 K/UL
ABSOLUTE MONOCYTE (OHS): 0.19 K/UL (ref 0.3–1)
ABSOLUTE NEUTROPHIL COUNT (OHS): 4.45 K/UL (ref 1.8–7.7)
ALBUMIN SERPL BCP-MCNC: 2.4 G/DL (ref 3.5–5.2)
ALP SERPL-CCNC: 63 UNIT/L (ref 40–150)
ALT SERPL W/O P-5'-P-CCNC: 14 UNIT/L (ref 10–44)
ANION GAP (OHS): 16 MMOL/L (ref 8–16)
AST SERPL-CCNC: 14 UNIT/L (ref 11–45)
BASOPHILS # BLD AUTO: 0.06 K/UL
BASOPHILS NFR BLD AUTO: 1 %
BILIRUB SERPL-MCNC: 0.8 MG/DL (ref 0.1–1)
BUN SERPL-MCNC: 11 MG/DL (ref 8–23)
CALCIUM SERPL-MCNC: 8.5 MG/DL (ref 8.7–10.5)
CHLORIDE SERPL-SCNC: 103 MMOL/L (ref 95–110)
CO2 SERPL-SCNC: 25 MMOL/L (ref 23–29)
CREAT SERPL-MCNC: 0.5 MG/DL (ref 0.5–1.4)
ERYTHROCYTE [DISTWIDTH] IN BLOOD BY AUTOMATED COUNT: 12.5 % (ref 11.5–14.5)
GFR SERPLBLD CREATININE-BSD FMLA CKD-EPI: >60 ML/MIN/1.73/M2
GLUCOSE SERPL-MCNC: 74 MG/DL (ref 70–110)
HCT VFR BLD AUTO: 50.9 % (ref 37–48.5)
HGB BLD-MCNC: 16.2 GM/DL (ref 12–16)
IMM GRANULOCYTES # BLD AUTO: 0.04 K/UL (ref 0–0.04)
IMM GRANULOCYTES NFR BLD AUTO: 0.7 % (ref 0–0.5)
LACTATE SERPL-SCNC: 2 MMOL/L (ref 0.5–2.2)
LYMPHOCYTES # BLD AUTO: 1.01 K/UL (ref 1–4.8)
MAGNESIUM SERPL-MCNC: 1.8 MG/DL (ref 1.6–2.6)
MCH RBC QN AUTO: 31.8 PG (ref 27–31)
MCHC RBC AUTO-ENTMCNC: 31.8 G/DL (ref 32–36)
MCV RBC AUTO: 100 FL (ref 82–98)
NUCLEATED RBC (/100WBC) (OHS): 0 /100 WBC
PHOSPHATE SERPL-MCNC: 3.5 MG/DL (ref 2.7–4.5)
PLATELET # BLD AUTO: 281 K/UL (ref 150–450)
PLATELET BLD QL SMEAR: NORMAL
PMV BLD AUTO: 9 FL (ref 9.2–12.9)
POCT GLUCOSE: 123 MG/DL (ref 70–110)
POCT GLUCOSE: 67 MG/DL (ref 70–110)
POTASSIUM SERPL-SCNC: 3.7 MMOL/L (ref 3.5–5.1)
PROCALCITONIN SERPL-MCNC: 1.28 NG/ML
PROT SERPL-MCNC: 6.1 GM/DL (ref 6–8.4)
RBC # BLD AUTO: 5.1 M/UL (ref 4–5.4)
RELATIVE EOSINOPHIL (OHS): 1.2 %
RELATIVE LYMPHOCYTE (OHS): 17.4 % (ref 18–48)
RELATIVE MONOCYTE (OHS): 3.3 % (ref 4–15)
RELATIVE NEUTROPHIL (OHS): 76.4 % (ref 38–73)
SODIUM SERPL-SCNC: 144 MMOL/L (ref 136–145)
WBC # BLD AUTO: 5.82 K/UL (ref 3.9–12.7)

## 2025-06-18 PROCEDURE — 80053 COMPREHEN METABOLIC PANEL: CPT | Performed by: INTERNAL MEDICINE

## 2025-06-18 PROCEDURE — 25000003 PHARM REV CODE 250: Performed by: STUDENT IN AN ORGANIZED HEALTH CARE EDUCATION/TRAINING PROGRAM

## 2025-06-18 PROCEDURE — 83735 ASSAY OF MAGNESIUM: CPT | Performed by: INTERNAL MEDICINE

## 2025-06-18 PROCEDURE — 84145 PROCALCITONIN (PCT): CPT | Performed by: INTERNAL MEDICINE

## 2025-06-18 PROCEDURE — 63600175 PHARM REV CODE 636 W HCPCS: Performed by: STUDENT IN AN ORGANIZED HEALTH CARE EDUCATION/TRAINING PROGRAM

## 2025-06-18 PROCEDURE — 63600175 PHARM REV CODE 636 W HCPCS: Performed by: INTERNAL MEDICINE

## 2025-06-18 PROCEDURE — 99900035 HC TECH TIME PER 15 MIN (STAT)

## 2025-06-18 PROCEDURE — 36415 COLL VENOUS BLD VENIPUNCTURE: CPT | Performed by: INTERNAL MEDICINE

## 2025-06-18 PROCEDURE — 97116 GAIT TRAINING THERAPY: CPT | Mod: CQ

## 2025-06-18 PROCEDURE — 97530 THERAPEUTIC ACTIVITIES: CPT | Mod: CQ

## 2025-06-18 PROCEDURE — 63600175 PHARM REV CODE 636 W HCPCS

## 2025-06-18 PROCEDURE — 27000221 HC OXYGEN, UP TO 24 HOURS

## 2025-06-18 PROCEDURE — 84100 ASSAY OF PHOSPHORUS: CPT | Performed by: INTERNAL MEDICINE

## 2025-06-18 PROCEDURE — 83605 ASSAY OF LACTIC ACID: CPT | Performed by: INTERNAL MEDICINE

## 2025-06-18 PROCEDURE — 11000001 HC ACUTE MED/SURG PRIVATE ROOM

## 2025-06-18 PROCEDURE — 25500020 PHARM REV CODE 255: Performed by: STUDENT IN AN ORGANIZED HEALTH CARE EDUCATION/TRAINING PROGRAM

## 2025-06-18 PROCEDURE — 85025 COMPLETE CBC W/AUTO DIFF WBC: CPT | Performed by: INTERNAL MEDICINE

## 2025-06-18 PROCEDURE — 94761 N-INVAS EAR/PLS OXIMETRY MLT: CPT

## 2025-06-18 PROCEDURE — 25000003 PHARM REV CODE 250: Performed by: INTERNAL MEDICINE

## 2025-06-18 PROCEDURE — 25000003 PHARM REV CODE 250

## 2025-06-18 RX ORDER — GLYCERIN 1 G/1
1 SUPPOSITORY RECTAL ONCE
Status: COMPLETED | OUTPATIENT
Start: 2025-06-18 | End: 2025-06-18

## 2025-06-18 RX ORDER — SODIUM CHLORIDE, SODIUM LACTATE, POTASSIUM CHLORIDE, CALCIUM CHLORIDE 600; 310; 30; 20 MG/100ML; MG/100ML; MG/100ML; MG/100ML
INJECTION, SOLUTION INTRAVENOUS CONTINUOUS
Status: ACTIVE | OUTPATIENT
Start: 2025-06-18 | End: 2025-06-18

## 2025-06-18 RX ORDER — SODIUM CHLORIDE, SODIUM LACTATE, POTASSIUM CHLORIDE, CALCIUM CHLORIDE 600; 310; 30; 20 MG/100ML; MG/100ML; MG/100ML; MG/100ML
INJECTION, SOLUTION INTRAVENOUS CONTINUOUS
Status: DISCONTINUED | OUTPATIENT
Start: 2025-06-18 | End: 2025-06-19

## 2025-06-18 RX ORDER — HYDROMORPHONE HYDROCHLORIDE 1 MG/ML
1 INJECTION, SOLUTION INTRAMUSCULAR; INTRAVENOUS; SUBCUTANEOUS ONCE
Status: COMPLETED | OUTPATIENT
Start: 2025-06-18 | End: 2025-06-18

## 2025-06-18 RX ORDER — ACETAMINOPHEN 10 MG/ML
1000 INJECTION, SOLUTION INTRAVENOUS ONCE
Status: COMPLETED | OUTPATIENT
Start: 2025-06-18 | End: 2025-06-18

## 2025-06-18 RX ORDER — ACETAMINOPHEN 10 MG/ML
1000 INJECTION, SOLUTION INTRAVENOUS ONCE
Status: DISCONTINUED | OUTPATIENT
Start: 2025-06-18 | End: 2025-06-18

## 2025-06-18 RX ADMIN — HYDROMORPHONE HYDROCHLORIDE 0.5 MG: 1 INJECTION, SOLUTION INTRAMUSCULAR; INTRAVENOUS; SUBCUTANEOUS at 02:06

## 2025-06-18 RX ADMIN — HYDROMORPHONE HYDROCHLORIDE 1 MG: 1 INJECTION, SOLUTION INTRAMUSCULAR; INTRAVENOUS; SUBCUTANEOUS at 03:06

## 2025-06-18 RX ADMIN — LIDOCAINE 2 PATCH: 50 PATCH TOPICAL at 11:06

## 2025-06-18 RX ADMIN — SODIUM CHLORIDE 500 ML: 9 INJECTION, SOLUTION INTRAVENOUS at 05:06

## 2025-06-18 RX ADMIN — GLYCERIN 1 SUPPOSITORY: 2 SUPPOSITORY RECTAL at 01:06

## 2025-06-18 RX ADMIN — ENOXAPARIN SODIUM 40 MG: 40 INJECTION SUBCUTANEOUS at 04:06

## 2025-06-18 RX ADMIN — MUPIROCIN: 20 OINTMENT TOPICAL at 08:06

## 2025-06-18 RX ADMIN — HYDROMORPHONE HYDROCHLORIDE 1 MG: 1 INJECTION, SOLUTION INTRAMUSCULAR; INTRAVENOUS; SUBCUTANEOUS at 06:06

## 2025-06-18 RX ADMIN — SODIUM CHLORIDE, POTASSIUM CHLORIDE, SODIUM LACTATE AND CALCIUM CHLORIDE: 600; 310; 30; 20 INJECTION, SOLUTION INTRAVENOUS at 01:06

## 2025-06-18 RX ADMIN — HYDROMORPHONE HYDROCHLORIDE 0.5 MG: 1 INJECTION, SOLUTION INTRAMUSCULAR; INTRAVENOUS; SUBCUTANEOUS at 11:06

## 2025-06-18 RX ADMIN — HYDROMORPHONE HYDROCHLORIDE 1 MG: 1 INJECTION, SOLUTION INTRAMUSCULAR; INTRAVENOUS; SUBCUTANEOUS at 11:06

## 2025-06-18 RX ADMIN — IOHEXOL 75 ML: 350 INJECTION, SOLUTION INTRAVENOUS at 03:06

## 2025-06-18 RX ADMIN — SIMETHICONE 80 MG: 80 TABLET, CHEWABLE ORAL at 05:06

## 2025-06-18 RX ADMIN — SODIUM CHLORIDE, POTASSIUM CHLORIDE, SODIUM LACTATE AND CALCIUM CHLORIDE: 600; 310; 30; 20 INJECTION, SOLUTION INTRAVENOUS at 05:06

## 2025-06-18 RX ADMIN — DEXTROSE MONOHYDRATE 12.5 G: 25 INJECTION, SOLUTION INTRAVENOUS at 03:06

## 2025-06-18 RX ADMIN — ACETAMINOPHEN 1000 MG: 10 INJECTION, SOLUTION INTRAVENOUS at 05:06

## 2025-06-18 RX ADMIN — MUPIROCIN: 20 OINTMENT TOPICAL at 09:06

## 2025-06-18 RX ADMIN — SODIUM CHLORIDE, POTASSIUM CHLORIDE, SODIUM LACTATE AND CALCIUM CHLORIDE: 600; 310; 30; 20 INJECTION, SOLUTION INTRAVENOUS at 11:06

## 2025-06-18 NOTE — ASSESSMENT & PLAN NOTE
Patient's most recent potassium results are listed below.   Recent Labs     06/16/25  0519 06/17/25  0516 06/18/25  0328   K 3.2* 3.8 3.7     Plan  - Replete potassium per protocol  - Monitor potassium Daily  - Patient's hypokalemia is improving  - replace as needed

## 2025-06-18 NOTE — PT/OT/SLP PROGRESS
Physical Therapy Treatment    Patient Name:  Patricia Ramirez   MRN:  [id number]    Recommendations:     Discharge Recommendations: Low Intensity Therapy  Discharge Equipment Recommendations: walker, rolling,  pt requested for hospital bed ( pt reported that she has no bed at home and has been sleeping on her low sofa  .)   Barriers to discharge: None    Assessment:     Patricia Ramirez is a 69 y.o. female admitted with a medical diagnosis of SBO (small bowel obstruction).  She presents with the following impairments/functional limitations: weakness, impaired endurance, impaired self care skills, impaired functional mobility, gait instability, decreased lower extremity function, impaired balance, decreased coordination, decreased upper extremity function, pain, decreased safety awareness, decreased ROM, impaired cardiopulmonary response to activity .    Rehab Prognosis: Good; patient would benefit from acute skilled PT services to address these deficits and reach maximum level of function.    Recent Surgery: Procedure(s) (LRB):  LAPAROSCOPY, DIAGNOSTIC (N/A)  LAPAROTOMY, EXPLORATORY; APPENDECTOMY; SMALL BOWEL RESECTION; DIVERTICULUM RESECTION (N/A) 4 Days Post-Op    Plan:     During this hospitalization, patient to be seen 5 x/week (5-4x per week) to address the identified rehab impairments via gait training, therapeutic activities, therapeutic exercises, neuromuscular re-education and progress toward the following goals:    Plan of Care Expires:  07/06/25    Subjective     Chief Complaint: pain and had a rough night .  Patient/Family Comments/goals: pt is pleasant and agreeable to therapy   Pain/Comfort:  Pain Rating 1: 10/10  Location 1: abdomen  Pain Addressed 1: Nurse notified, Pre-medicate for activity, Reposition  Pain Rating Post-Intervention 1: 10/10      Objective:     Communicated with nurse  prior to session.  Patient found HOB elevated with telemetry, peripheral IV, oxygen, bed alarm, PICC line upon PT entry to  room.     General Precautions: Standard, fall, respiratory, NPO (abdominal precautions)   Orthopedic Precautions: N/A  Braces: N/A (abdominal binder)   Respiratory Status: Nasal cannula, flow 2 L/min   SpO2 : 98% on exertions on 2 L,  max .   Functional Mobility:  Bed Mobility: performed log roll technique for bed mobility    Rolling Left:  stand by assistance  Scooting: stand by assistance and contact guard assistance  Supine to Sit: minimum assistance, HOB elevated, bedside rail   Transfers: VC's for safety technique and walker management .     Sit to Stand: x 2 trials from bed stand by assistance with rolling walker  Bed to chair : stand by assistance/  CGA  with  rolling walker  using  Step Transfer  Gait:  pt ambulated ~ 10 ft in room and 250 ft  with RW, SBA/CGA(2L O2NC) . Pt required several standing rest breaks during gait training 2* to pain and fatigue.  Noted with decreased leonides,decreased step length, no LOB. VC's for safety technique and walker management.     Balance: good in sitting, fair+ in standing with RW.       AM-PAC 6 CLICK MOBILITY  Turning over in bed (including adjusting bedclothes, sheets and blankets)?: 3  Sitting down on and standing up from a chair with arms (e.g., wheelchair, bedside commode, etc.): 3  Moving from lying on back to sitting on the side of the bed?: 3  Moving to and from a bed to a chair (including a wheelchair)?: 3  Need to walk in hospital room?: 3  Climbing 3-5 steps with a railing?: 3  Basic Mobility Total Score: 18       Treatment & Education:  Pt tolerated static standing with RW, SBA for balance while getting diaper placement.   Instructed pt to perform BLE x 15 reps : AP, GS while sitting up in chair. Encouraged pt to perform BLE AP throughout the day. Pt verbalized understanding .     Patient left up in chair with all lines intact, call button in reach, and nurse notified..    GOALS:   Multidisciplinary Problems       Physical Therapy Goals           Problem: Physical Therapy    Goal Priority Disciplines Outcome Interventions   Physical Therapy Goal     PT, PT/OT Progressing    Description: Goals to be met by: 25     Patient will increase functional independence with mobility by performin. Supine to sit with Modified [place]  2. Sit to supine with Modified [place]  3. Sit to stand transfer with Modified [place]  4. Gait  x 50 feet with Modified [place] using Rolling Walker.   5. Stand for 5 minutes with Modified [place] using Rolling Walker  6. Lower extremity exercise program x30 reps per handout, with independence                         DME Justifications:  Patrciia requires a hospital bed due to her requiring positioning of the body in ways not feasible with an ordinary bed to alleviate pain and cannot independently make changes in body position without the use of the bed.The positioning of the body cannot be sufficiently resolved by the use of pillows and wedges.   Patricia's mobility limitation cannot be sufficiently resolved by the use of a cane. Her functional mobility deficit can be sufficiently resolved with the use of a Rolling Walker. Patient's mobility limitation significantly impairs their ability to participate in one of more activities of daily living.  The use of a RW will significantly improve the patient's ability to participate in MRADLS and the patient will use it on regular basis in the home.    Time Tracking:     PT Received On: 25  PT Start Time: 1500     PT Stop Time: 1539  PT Total Time (min): 39 min     Billable Minutes: Gait Training 23 and Therapeutic Activity 16    Treatment Type: Treatment  PT/PTA: PTA     Number of PTA visits since last PT visit: 3     2025

## 2025-06-18 NOTE — NURSING
Assisted patient to ambulate in hallway with walker; patient walked about 30 feet then stated she was dizzy; assisted to sit in chair; BP = 94/62; 500ml NS bolus given; states pain is about the same but is now belching while sitting up in chair

## 2025-06-18 NOTE — PLAN OF CARE
Problem: Adult Inpatient Plan of Care  Goal: Plan of Care Review  Outcome: Progressing  Flowsheets (Taken 6/18/2025 1744)  Plan of Care Reviewed With: patient     Problem: Infection  Goal: Absence of Infection Signs and Symptoms  Outcome: Progressing     Problem: Fall Injury Risk  Goal: Absence of Fall and Fall-Related Injury  Outcome: Progressing  Intervention: Identify and Manage Contributors  Flowsheets (Taken 6/18/2025 1744)  Self-Care Promotion:   independence encouraged   BADL personal objects within reach   BADL personal routines maintained  Medication Review/Management: medications reviewed     Problem: Skin Injury Risk Increased  Goal: Skin Health and Integrity  Outcome: Progressing     Problem: Pain Acute  Goal: Optimal Pain Control and Function  Outcome: Progressing  Intervention: Develop Pain Management Plan  Flowsheets (Taken 6/18/2025 1744)  Pain Management Interventions:   pain management plan reviewed with patient/caregiver   care clustered

## 2025-06-18 NOTE — NURSING
"RAPID RESPONSE NURSE PROACTIVE ROUNDING NOTE       Time of Visit: 0300    Admit Date: 2025  LOS: 4  Code Status: Full Code   Date of Visit: 2025  : 1955  Age: 69 y.o.  Sex: female  Race: White  Bed: W301/W301 A:   MRN: [id number]  Was the patient discharged from an ICU this admission? No   Was the patient discharged from a PACU within last 24 hours? No   Did the patient receive conscious sedation/general anesthesia in last 24 hours? No   Was the patient in the ED within the past 24 hours? No   Was the patient on NIPPV within the past 24 hours? No   Attending Physician: Moise Collins MD  Primary Service: Hospitalist   Time spent at the bedside: > 60 min    SITUATION    Notified by Dr. Renteria via secure chat  Reason for alert: Increased pain/ diaphoresis     Diagnosis: <principal problem not specified>   has a past medical history of Allergy, Anxiety, Arthritis, Cataract, Depressive disorder, not elsewhere classified, Esophageal reflux, Hypertension, Impaired fasting glucose, Joint pain, Obesity, unspecified, and Other and unspecified hyperlipidemia.    Last Vitals:  Temp: 97.7 °F (36.5 °C) (315)  Pulse: 103 (315)  Resp: 27 ( 0358)  BP: 132/80 (315)  SpO2: 91 % (315)    24 Hour Vitals Range:  Temp:  [97.5 °F (36.4 °C)-98.5 °F (36.9 °C)]   Pulse:  []   Resp:  [16-28]   BP: (132-171)/(65-80)   SpO2:  [91 %-99 %]     Clinical Issues: new ornset of 10/10 pain to abdomen with diaphoresis    ASSESSMENT/INTERVENTIONS    Added to secure chat by Dr. Renteria to assess patient for new acute 10/10 pain to abdomen; patient lying in bed diaphoretic and states her pain is "20/10;" VSS taken; NG to LIWS flushed and hooked to continuous suction; 250ml of green bile noted in canister; Abdomen soft but extremely tender to touch in bilateral upper quadrants; Dilaudid 1mg IVP given by bedside RN; labs drawn and KUB completed; D50 1/2 amp given for CBG=67; repeat was 123    "     RECOMMENDATIONS  CT of abdomen and pelvis with contrast    Discussed plan of care with bedside RNKaren & charge RN Priyank    PROVIDER ESCALATION    Physician escalation: Yes    Orders received and case discussed with Dr. Renteria.    Disposition:Remain in room 301    FOLLOW UP    Call back the Rapid Response NurseMoise at [phone] for additional questions or concerns.

## 2025-06-18 NOTE — MEDICAL/APP STUDENT
"[facility]  General Surgery  Progress Note    Subjective:   HPI:   Patricia Ramirez is a 69 y.o. year old female in hospital for recurrent SBO (failed conservative management), POD#4 from diagnostic laparoscopy converted exploratory laparotomy, SBR and anastomosis, appendectomy, diverticulectomy 6/14     Interval History:   Patient seen and examined today, lying in bed. Rapid response activated around 3 am for increased pain/diaphoresis, STAT labs and CT abd/pelvis with IV contrast ordered. Patient reports she "is feeling better today." States her pain "comes and goes." No BM, but able to pass flatus. Denies N/V, chest pain, SOB. Drain output of 475 cc yesterday. Able to work with PT/OT yesterday.    Review of Systems   Constitutional:  Positive for malaise/fatigue.   Gastrointestinal:  Positive for abdominal pain and constipation. Negative for diarrhea, nausea and vomiting.        Post-Op Info:  Procedure(s) (LRB):  LAPAROSCOPY, DIAGNOSTIC (N/A)  LAPAROTOMY, EXPLORATORY; APPENDECTOMY; SMALL BOWEL RESECTION; DIVERTICULUM RESECTION (N/A)   4 Days Post-Op      Medications:  Continuous Infusions:   lactated ringers   Intravenous Continuous 125 mL/hr at 06/18/25 0558 New Bag at 06/18/25 0558    lactated ringers   Intravenous Continuous         Scheduled Meds:   enoxparin  40 mg Subcutaneous Q24H (prophylaxis, 1700)    LIDOcaine  2 patch Transdermal Q24H    mupirocin   Nasal BID     PRN Meds:  Current Facility-Administered Medications:     acetaminophen, 650 mg, Oral, Q8H PRN    acetaminophen, 650 mg, Oral, Q4H PRN    aluminum-magnesium hydroxide-simethicone, 30 mL, Oral, QID PRN    glucagon (human recombinant), 1 mg, Intramuscular, PRN    glucose, 16 g, Oral, PRN    glucose, 24 g, Oral, PRN    hydrALAZINE, 10 mg, Intravenous, Q8H PRN    HYDROmorphone, 0.5 mg, Intravenous, Q3H PRN    HYDROmorphone, 1 mg, Intravenous, Q6H PRN    magnesium oxide, 800 mg, Oral, PRN    magnesium oxide, 800 mg, Oral, PRN    " melatonin, 6 mg, Oral, Nightly PRN    naloxone, 0.02 mg, Intravenous, PRN    ondansetron, 4 mg, Intravenous, Q8H PRN    potassium bicarbonate, 35 mEq, Oral, PRN    potassium bicarbonate, 50 mEq, Oral, PRN    potassium bicarbonate, 60 mEq, Oral, PRN    potassium, sodium phosphates, 2 packet, Oral, PRN    potassium, sodium phosphates, 2 packet, Oral, PRN    potassium, sodium phosphates, 2 packet, Oral, PRN    simethicone, 1 tablet, Oral, QID PRN    sodium chloride 0.9%, 10 mL, Intravenous, Q12H PRN    Flushing PICC/Midline Protocol, , , Until Discontinued **AND** sodium chloride 0.9%, 10 mL, Intravenous, Q12H PRN     Objective:     Vital Signs (Most Recent):  Temp: 97.6 °F (36.4 °C) (06/18/25 0738)  Pulse: 100 (06/18/25 0740)  Resp: 18 (06/18/25 0738)  BP: 114/76 (06/18/25 0738)  SpO2: 95 % (06/18/25 0739) Vital Signs (24h Range):  Temp:  [97.4 °F (36.3 °C)-98.5 °F (36.9 °C)] 97.6 °F (36.4 °C)  Pulse:  [] 100  Resp:  [16-28] 18  SpO2:  [91 %-99 %] 95 %  BP: ()/(62-80) 114/76       Intake/Output Summary (Last 24 hours) at 6/18/2025 0957  Last data filed at 6/18/2025 0815  Gross per 24 hour   Intake 60 ml   Output 825 ml   Net -765 ml       Physical Exam  Vitals and nursing note reviewed.   Constitutional:       General: She is not in acute distress.  Abdominal:      General: There is no distension.      Palpations: Abdomen is soft.      Tenderness: There is abdominal tenderness.      Comments: Midline incision dressing in place, dressings c/d/I    Neurological:      Mental Status: She is alert.         Significant Labs:  CBC:   Recent Labs   Lab 06/18/25  0328   WBC 5.82   RBC 5.10   HGB 16.2*   HCT 50.9*      *   MCH 31.8*   MCHC 31.8*     CMP:   Recent Labs   Lab 06/18/25 0328   GLU 74   CALCIUM 8.5*   ALBUMIN 2.4*   PROT 6.1      K 3.7   CO2 25      BUN 11   CREATININE 0.5   ALKPHOS 63   ALT 14   AST 14   BILITOT 0.8     Lactic Acid:   Recent Labs   Lab 06/18/25 0328   LACTATE  2.0       Significant Diagnostics:  I have reviewed all pertinent imaging results/findings within the past 24 hours.  Imaging Results              XR NG/OG tube placement check, non-radiologist performed (Final result)  Result time 06/14/25 07:13:21      Final result by Jerod Workman MD (06/14/25 07:13:21)                   Impression:      NG tube in place, as above.      Electronically signed by: Jerod Workman MD  Date:    06/14/2025  Time:    07:13               Narrative:    EXAMINATION:  XR NG/OG TUBE PLACEMENT CHECK, NON-RADIOLOGIST PERFORMED    CLINICAL HISTORY:  s/p ng tube;  Unspecified intestinal obstruction, unspecified as to partial versus complete obstruction    TECHNIQUE:  AP radiograph of the chest and upper abdomen    COMPARISON:  Abdominal radiograph 06/13/2025, CT 06/14/2025    FINDINGS:  Since recent CT there has been interval placement of a NG tube.  The tip and proximal port are subdiaphragmatic projecting over the expected location of the stomach.    Dilated loops of small bowel persist in the upper abdomen.  No obvious new pneumatosis, portal venous gas or free air on limited supine view.    Cardiomediastinal silhouette stable in size.  No focal consolidation, pleural fluid or pneumothorax.                                        CT Abdomen Pelvis With IV Contrast NO Oral Contrast (Final result)  Result time 06/14/25 06:34:50      Final result by Anil Nelson MD (06/14/25 06:34:50)                   Impression:      Abdomen CT and Pelvis CT:    CT findings are suspicious for worsening mechanical small bowel obstruction at the level of the distal ileum, possibly on the basis of an adhesion (with other etiologies not fully excluded, as further detailed in the body of the report).  Recommendations include clinical correlation and surgical consultation.    Small quantity of intraperitoneal free fluid.    No free intraperitoneal air.    Possible esophagitis.  Underlying esophageal  metaplasia or neoplasia not fully excluded.  Correlate clinically, and with follow-up outpatient EGD if clinically appropriate.    Additional observations as detailed in the body of the report.    This report was flagged in Epic as abnormal.    Anil Nelson MD, first observed the critical findings on 06/14/2025 at 06:10, and sent the results to Margarita Morris DO, via Epic Secure Chat message, on 06/14/2025 at 06:33.  Patient name and medical record number were specified/linked in the message.  The recipient immediately responded, acknowledging receipt of the information.      Electronically signed by: Anil Nelson  Date:    06/14/2025  Time:    06:34               Narrative:    EXAMINATION:  CT ABDOMEN PELVIS WITH IV CONTRAST    CLINICAL HISTORY:  Abdominal abscess/infection suspected;    TECHNIQUE:  Low dose axial images, sagittal and coronal reformations were obtained from the lung bases to the pubic symphysis following the IV administration of 75 mL of Omnipaque 350 .    COMPARISON:  Abdomen CT and pelvis CT 06/09/2025.    FINDINGS:  Abdomen CT and Pelvis CT:    Artifacts related to beam hardening and/or motion degrade portions of the scan.    In the lower thorax, there remains a poorly defined region of weakly negative attenuation interposed between the distal esophagus and descending thoracic aorta.  This was also present on 06/09/2025 but is new since 05/16/2025.  It is favored to represent ascites within a hiatal hernia (with weakly negative attenuation values perhaps related to beam hardening artifacts arising from the nearby spine).  Other etiologies for this finding could include a distended cisterna chyli.    Circumferential wall thickening is suggested in the gas-filled but poorly distended distal esophagus.    The stomach is predominantly fluid-filled and again appears distended.  A portion of the gastric body/antrum remains relatively less distended, somewhat similar to the 06/09/2025 comparison  scan.  Although a gastric consider could possibly explain this focal narrowing, more significant gastric mural pathology (such as neoplasm or scarring from peptic disease) is not excluded.    There is more extensive dilatation of the gas-filled and fluid-filled small bowel, but there remains a transition point at the level of a collapsed small bowel segment in the distal ileum.  This is possibly on the basis of an adhesion.  There is mild swelling of a portion of the mesentery in the right lower quadrant; although this appears less conspicuous than on 06/09/2025, a volvulus or internal hernia is again also possible.    The colon and appendix demonstrate no acute CT abnormalities.  There is some radiopaque intraluminal material in the appendix and portions of the colon, possibly related to residual enteric contrast from a prior study or other ingested radiopaque material.    No free intraperitoneal air.  Small quantity of abdominopelvic free intraperitoneal fluid, perhaps slightly decreased from the 06/09/2025 comparison scan.    The liver, atrophic pancreas, spleen (pre-existing splenic deformity, possibly secondary to old infarct or old injury), adrenal glands, kidneys, atherosclerotic abdominal aorta, IVC, suboptimally distended urinary bladder, abdominopelvic lymph nodes, visualized abdominal wall, and visualized skeleton demonstrate no adverse interval CT changes from 06/09/2025.    There are scattered degenerative skeletal changes.    The uterus is absent.  The ovaries are inconspicuous or perhaps also surgically absent.    The gallbladder is inconspicuous or perhaps surgically absent.  There is mild intrahepatic biliary ductal dilatation.  Approximate 7-8 mm in diameter, the extrahepatic bile ducts are at the upper limits of normal for the patient's reported age of 69 years.    There is a moderate to large duodenal diverticulum that contains internal gas and fluid.  Some additional, smaller duodenal  diverticula are also suggested.  No CT evidence of acute duodenal diverticulitis.                                      Assessment/Plan:   Patricia Ramirez is a 69 y.o. year old female  in hospital for recurrent SBO (failed conservative management), POD#4 from diagnostic laparoscopy converted exploratory laparotomy, SBR and anastomosis, appendectomy, diverticulectomy 6/14.    - Plan to do clamp trial  - Recommend starting on a suppository  - NPO, mIVF - ok for ice chips sparingly  - Maintain NGT to LIWS. Accurate charting of NGT output  - MMPC   - Pulm toilet, encourage IS 10x/hour  - PT/OT, encourage up to chair and ambulation   - Abd binder for comfort, ok to take off or have breaks if needed  - Replete electrolytes to maintain K > 4, Phos > 3, Mg > 2  - DVT ppx     Active Diagnoses:    Diagnosis Date Noted POA    Hypokalemia [E87.6] 06/11/2025 Yes    SBO (small bowel obstruction) [K56.609] 06/09/2025 Yes    Heart failure with preserved ejection fraction [I50.30] 09/26/2022 Yes    Essential hypertension [I10] 12/29/2014 Yes    Hyperlipidemia [E78.5] 12/29/2014 Yes    Depression with anxiety [F41.8] 12/29/2014 Yes      Problems Resolved During this Admission:       NICKO HickeyS  General Surgery  [facility] [place] - Telemetry

## 2025-06-18 NOTE — PROGRESS NOTES
[facility] Medicine  Progress Note    Patient Name: Patricia Ramirez  MRN: [id number]  Patient Class: IP- Inpatient   Admission Date: 6/14/2025  Length of Stay: 4 days  Attending Physician: Chiki Cummings MD  Primary Care Provider: Fabienne Erwin NP        Subjective     Principal Problem:SBO (small bowel obstruction)        HPI:  69-year-old female with a history of anxiety, depression, hypertension presents to the ED with recurrent small bowel obstruction. Complains of diffuse abdominal pain, associated with nausea but no vomiting. Still passing flatus. Patient was recently hospitalized from 06/09/2025 to 06/13/2025 for SBO. Was seen by general surgery and was treated conservatively. Patient had return of bowel functions after multiple days of gastric decompression with NGT and suppository. Prior to discharge, patient was able to tolerate PO without any abdominal pain, nausea or vomiting. Referred to GI on discharge for possible gastric dysmotility disorder. Of note, patient was offered surgery last hospitalization, but patient declined at that time. States she is now amendable to surgery.     In the ED, patient hypertensive but afebrile. Labs without leukocytosis, but noted to have hypokalemia with K 3.1. CT abdomen/pelvis w/ worsening mechanical small bowel obstruction at the level of the distal ileum. NGT placed. Pt admitted to general surgery team. Hospital medicine team consulted.        Overview/Hospital Course:  69 year old female admitted to general surgery team on 06/14/2025 for recurrent small-bowel obstruction. Patient taken for expiratory laparotomy on 06/14/2025.  NGT in place. Hospital medicine consulted for medical management.    Interval History:  Patient with a lot of abdominal pain and hypotension issues last night.  CT scan with no acute findings and patient were passing flatus overnight.  Surgery evaluating currently.  Continue to suction.    Review of Systems  Objective:      Vital Signs (Most Recent):  Temp: 97.8 °F (36.6 °C) (06/18/25 1130)  Pulse: 87 (06/18/25 1130)  Resp: 18 (06/18/25 1139)  BP: 110/73 (06/18/25 1130)  SpO2: 95 % (06/18/25 1130) Vital Signs (24h Range):  Temp:  [97.4 °F (36.3 °C)-98.3 °F (36.8 °C)] 97.8 °F (36.6 °C)  Pulse:  [] 87  Resp:  [16-28] 18  SpO2:  [91 %-99 %] 95 %  BP: ()/(62-80) 110/73     Weight: 60.3 kg (132 lb 15 oz)  Body mass index is 20.21 kg/m².    Intake/Output Summary (Last 24 hours) at 6/18/2025 1239  Last data filed at 6/18/2025 0815  Gross per 24 hour   Intake 60 ml   Output 675 ml   Net -615 ml         Physical Exam  Vitals and nursing note reviewed.   Constitutional:       General: She is not in acute distress.     Appearance: Normal appearance. She is ill-appearing.   HENT:      Head: Normocephalic and atraumatic.      Nose:      Comments: NG tube in place with bile output     Mouth/Throat:      Mouth: Mucous membranes are dry.   Cardiovascular:      Rate and Rhythm: Normal rate and regular rhythm.      Pulses: Normal pulses.      Heart sounds: Normal heart sounds. No murmur heard.  Pulmonary:      Effort: Pulmonary effort is normal. No respiratory distress.      Breath sounds: Normal breath sounds. No wheezing or rhonchi.   Abdominal:      General: Bowel sounds are normal. There is no distension.      Palpations: Abdomen is soft.      Tenderness: There is no abdominal tenderness.      Comments: Dressing in place and abdominal binder on   Musculoskeletal:         General: No swelling or tenderness.   Skin:     General: Skin is warm and dry.      Coloration: Skin is not jaundiced or pale.   Neurological:      General: No focal deficit present.      Mental Status: She is alert and oriented to person, place, and time. Mental status is at baseline.      Sensory: No sensory deficit.      Motor: No weakness.   Psychiatric:         Mood and Affect: Mood normal.         Behavior: Behavior normal.         Thought Content: Thought  content normal.         Judgment: Judgment normal.               Significant Labs: All pertinent labs within the past 24 hours have been reviewed.    Significant Imaging: I have reviewed all pertinent imaging results/findings within the past 24 hours.      Assessment & Plan  SBO (small bowel obstruction)  -was recently hospitalized from 06/09/2025 to 06/13/2025 for SBO. Was seen by general surgery and was treated conservatively. Patient had return of bowel functions after multiple days of gastric decompression with NGT and suppository and was discharged  -returned with recurrent abdominal pain associated with nausea.  Pt with recurrent SBO.   -CT abdomen and pelvis showed worsening mechanical small bowel obstruction at the level of the distal ileum   -pt s/p  exploratory laparotomy, appendectomy, small bowel resection and side to side anastomosis, and diverticulectomy on 06/14/25  -management per primary team, General surgery    Hypokalemia  Patient's most recent potassium results are listed below.   Recent Labs     06/16/25  0519 06/17/25  0516 06/18/25  0328   K 3.2* 3.8 3.7     Plan  - Replete potassium per protocol  - Monitor potassium Daily  - Patient's hypokalemia is improving  - replace as needed  Essential hypertension  Patient's blood pressure range in the last 24 hours was: BP  Min: 94/62  Max: 171/79.The patient's inpatient anti-hypertensive regimen is listed below:  Current Antihypertensives  hydrALAZINE injection 10 mg, Every 8 hours PRN, Intravenous    Plan  - BP is controlled, no changes needed to their regimen  - hold home Benicar at this time given NPO status  - resume once able to tolerate p.o. intake and if BP not controlled  Hyperlipidemia  -hold home statin at this time given NPO status   -resume when appropriate    Heart failure with preserved ejection fraction  -echocardiogram from 10/07/2024 with preserved EF of 55-60%, grade 1 diastolic dysfunction.  PASP of 35 mm Hg  -appears to be at  baseline, appears euvolemic   -monitor    Depression with anxiety  Patient has persistent depression which is moderate and is currently controlled. Will hold anti-depressant medications. We will not consult psychiatry at this time. Patient does not display psychosis at this time. Continue to monitor closely and adjust plan of care as needed.      Hold home Xanax, BuSpar, and Celexa at this time given NPO status  VTE Risk Mitigation (From admission, onward)           Ordered     enoxaparin injection 40 mg  Every 24 hours         06/15/25 0733     IP VTE HIGH RISK PATIENT  Once         06/14/25 0559     Place sequential compression device  Until discontinued         06/14/25 0559                    Discharge Planning   STUART: 6/20/2025     Code Status: Full Code   Medical Readiness for Discharge Date:   Discharge Plan A: Home with family (Follow-ups)                Please place Justification for DME        Moise Collins MD  Department of Hospital Medicine   [facility] [place] - Telemetry

## 2025-06-18 NOTE — PLAN OF CARE
06/18/25 1517   Rounds   Attendance Provider;;Charge nurse   Discharge Plan A Home Health   Why the patient remains in the hospital Requires continued medical care   Transition of Care Barriers None

## 2025-06-18 NOTE — NURSING
"[facility] Rapid Response Follow-up Note     Followed up with patient for proactive rounding.     Reviewed labs and imaging with Dr. Renteria; 2nd NS bolus given and LR increased to 125ml/hr x 8hrs; pt is now back in bed and states pain is unchanged but overall is "feeling a little better;"  IV tylenol x1 dose ordered by Dr. Renteria  Reviewed plan of care with primary nurse, Karen.     "

## 2025-06-18 NOTE — PLAN OF CARE
Changes in medical condition or discharge plan:    Patient admitted on 06/14/25 for SBO. 06/14/25 patient had a LAPAROSCOPY, DIAGNOSTIC (N/A)  LAPAROTOMY, EXPLORATORY; APPENDECTOMY; SMALL BOWEL RESECTION; DIVERTICULUM RESECTION.     PT/OT recommend patient discharge with Home Health, a hospital bed and bedside commode.     CM discussed recommendations for Home health with patient. Patient agreed.     Met with patient to review discharge recommendation of Home Health and is agreeable to plan    Referral sent via EPIC    Patient has declined to select a preferred provider and elects placement with the first accepting in network provider that is available to provide services as ordered by the physician     If an additional preferred facility not listed above is identified, additional referral to be sent. If above facilities unable to accept, will send additional referrals to in-network providers.      4:02 pm [facility] has accepted patient.     Does patient need new DME? Hospital Bed and Commode    Follow up appts needed: PCP    Medically stable: Patient is not medically stable for discharge     Estimated Discharge Date:  1-2 days   06/18/25 3764   Discharge Reassessment   Assessment Type Discharge Planning Reassessment   Did the patient's condition or plan change since previous assessment? Yes   Discharge Plan discussed with: Patient   Communicated STUART with patient/caregiver Yes   Discharge Plan A Home Health   Discharge Plan B Home with family   DME Needed Upon Discharge  none   Transition of Care Barriers None   Why the patient remains in the hospital Requires continued medical care   Post-Acute Status   Discharge Delays None known at this time

## 2025-06-18 NOTE — PROGRESS NOTES
HOSPITALIST ON CALL NOTE:    Contacted by the RN regarding increased abdominal pain and diaphoresis. Pt is POD 2 from exlap due to SBO with SBR and anastomosis, appendectomy, diverticulectomy.  Glucose low at 67 and replaced.   Stat labs ordered and overall improved from previous labs. STAT CT abd/pelvis with IV contrast due to degree of pain and diaphoresis noted:   Small bibasilar pleural effusions with mild associated compressive atelectatic change.   Postop changes consistent with recent abdominal surgery including midline skin staples. Small volume pneumoperitoneum presumably related to recent exploratory laparotomy.  Moderate volume free fluid in the abdomen and pelvis.   Enterocutaneous tract seen in the midline, similar compared to prior, extending to the superior aspect of the incision site.   Moderate hiatal hernia. Enteric tube tip in the stomach.   Interval resolution of small-bowel obstruction. Several loops of distal small bowel in the left lower quadrant of the abdomen and the pelvis demonstrate diffuse wall thickening suggesting enteritis. Postsurgical changes in the distal small bowel.     Noted that she became hypotensive when she stool up to walk concerning for positive orthostatics. IVF bolus started. Cont. To monitor and f/u with general surgery. Pain control is an issues and will try IV tylenol.     Went to assess patient at bedside:    Vitals:    06/18/25 0225 06/18/25 0231 06/18/25 0305 06/18/25 0315   BP:  (!) 169/74  132/80   BP Location:  Left arm  Left arm   Patient Position:  Lying  Lying   Pulse:  91  103   Resp: 16 16 20 (!) 28   Temp:  98.3 °F (36.8 °C)  97.7 °F (36.5 °C)   TempSrc:  Oral  Oral   SpO2:  (!) 91%  (!) 91%   Weight:       Height:              Physical Exam  Vitals and nursing note reviewed.   Constitutional:       General: She is not in acute distress.     Appearance: She is ill-appearing, toxic-appearing and diaphoretic.   HENT:      Head: Normocephalic and atraumatic.       Nose: Nose normal.      Comments: NGT in place with green/yellow gastric secretions.      Mouth/Throat:      Mouth: Mucous membranes are dry.      Pharynx: Oropharynx is clear. No oropharyngeal exudate or posterior oropharyngeal erythema.   Eyes:      General: No scleral icterus.     Extraocular Movements: Extraocular movements intact.      Conjunctiva/sclera: Conjunctivae normal.      Pupils: Pupils are equal, round, and reactive to light.   Neck:      Vascular: No carotid bruit.   Cardiovascular:      Rate and Rhythm: Normal rate and regular rhythm.      Pulses: Normal pulses.      Heart sounds: No murmur heard.     No friction rub. No gallop.   Pulmonary:      Effort: Pulmonary effort is normal. No respiratory distress.      Breath sounds: Rhonchi present. No wheezing or rales.   Abdominal:      General: There is no distension.      Palpations: Abdomen is soft.      Tenderness: There is abdominal tenderness. There is guarding. There is no rebound.      Comments: Absent bowl sounds    Musculoskeletal:         General: No swelling. Normal range of motion.      Cervical back: Normal range of motion and neck supple.      Right lower leg: No edema.      Left lower leg: No edema.   Skin:     General: Skin is warm.      Capillary Refill: Capillary refill takes less than 2 seconds.      Coloration: Skin is pale.   Neurological:      Mental Status: She is alert and oriented to person, place, and time.      Cranial Nerves: No cranial nerve deficit.      Sensory: No sensory deficit.   Psychiatric:         Mood and Affect: Mood normal.         Behavior: Behavior normal.         Recent Results (from the past 24 hours)   POCT glucose    Collection Time: 06/18/25  3:27 AM   Result Value Ref Range    POCT Glucose 67 (L) 70 - 110 mg/dL   Comprehensive metabolic panel    Collection Time: 06/18/25  3:28 AM   Result Value Ref Range    Sodium 144 136 - 145 mmol/L    Potassium 3.7 3.5 - 5.1 mmol/L    Chloride 103 95 - 110 mmol/L     CO2 25 23 - 29 mmol/L    Glucose 74 70 - 110 mg/dL    BUN 11 8 - 23 mg/dL    Creatinine 0.5 0.5 - 1.4 mg/dL    Calcium 8.5 (L) 8.7 - 10.5 mg/dL    Protein Total 6.1 6.0 - 8.4 gm/dL    Albumin 2.4 (L) 3.5 - 5.2 g/dL    Bilirubin Total 0.8 0.1 - 1.0 mg/dL    ALP 63 40 - 150 unit/L    AST 14 11 - 45 unit/L    ALT 14 10 - 44 unit/L    Anion Gap 16 8 - 16 mmol/L    eGFR >60 >60 mL/min/1.73/m2   Magnesium    Collection Time: 06/18/25  3:28 AM   Result Value Ref Range    Magnesium  1.8 1.6 - 2.6 mg/dL   Phosphorus    Collection Time: 06/18/25  3:28 AM   Result Value Ref Range    Phosphorus Level 3.5 2.7 - 4.5 mg/dL   Lactic acid, plasma    Collection Time: 06/18/25  3:28 AM   Result Value Ref Range    Lactic Acid Level 2.0 0.5 - 2.2 mmol/L   Procalcitonin    Collection Time: 06/18/25  3:28 AM   Result Value Ref Range    Procalcitonin 1.28 (H) <0.25 ng/mL   CBC with Differential    Collection Time: 06/18/25  3:28 AM   Result Value Ref Range    WBC 5.82 3.90 - 12.70 K/uL    RBC 5.10 4.00 - 5.40 M/uL    HGB 16.2 (H) 12.0 - 16.0 gm/dL    HCT 50.9 (H) 37.0 - 48.5 %     (H) 82 - 98 fL    MCH 31.8 (H) 27.0 - 31.0 pg    MCHC 31.8 (L) 32.0 - 36.0 g/dL    RDW 12.5 11.5 - 14.5 %    Platelet Count 281 150 - 450 K/uL    MPV 9.0 (L) 9.2 - 12.9 fL    Nucleated RBC 0 <=0 /100 WBC    Neut % 76.4 (H) 38 - 73 %    Lymph % 17.4 (L) 18 - 48 %    Mono % 3.3 (L) 4 - 15 %    Eos % 1.2 <=8 %    Basophil % 1.0 <=1.9 %    Imm Grans % 0.7 (H) 0.0 - 0.5 %    Neut # 4.45 1.8 - 7.7 K/uL    Lymph # 1.01 1 - 4.8 K/uL    Mono # 0.19 (L) 0.3 - 1 K/uL    Eos # 0.07 <=0.5 K/uL    Baso # 0.06 <=0.2 K/uL    Imm Grans # 0.04 0.00 - 0.04 K/uL   Platelet Review    Collection Time: 06/18/25  3:28 AM   Result Value Ref Range    Platelet Estimate Appears Normal     POCT glucose    Collection Time: 06/18/25  3:56 AM   Result Value Ref Range    POCT Glucose 123 (H) 70 - 110 mg/dL       Microbiology Results (last 7 days)       ** No results found for the last  168 hours. **            Imaging Results              XR NG/OG tube placement check, non-radiologist performed (Final result)  Result time 06/14/25 07:13:21      Final result by Jerod Workman MD (06/14/25 07:13:21)                   Impression:      NG tube in place, as above.      Electronically signed by: Jerod Workman MD  Date:    06/14/2025  Time:    07:13               Narrative:    EXAMINATION:  XR NG/OG TUBE PLACEMENT CHECK, NON-RADIOLOGIST PERFORMED    CLINICAL HISTORY:  s/p ng tube;  Unspecified intestinal obstruction, unspecified as to partial versus complete obstruction    TECHNIQUE:  AP radiograph of the chest and upper abdomen    COMPARISON:  Abdominal radiograph 06/13/2025, CT 06/14/2025    FINDINGS:  Since recent CT there has been interval placement of a NG tube.  The tip and proximal port are subdiaphragmatic projecting over the expected location of the stomach.    Dilated loops of small bowel persist in the upper abdomen.  No obvious new pneumatosis, portal venous gas or free air on limited supine view.    Cardiomediastinal silhouette stable in size.  No focal consolidation, pleural fluid or pneumothorax.                                        CT Abdomen Pelvis With IV Contrast NO Oral Contrast (Final result)  Result time 06/14/25 06:34:50      Final result by Anil Nelson MD (06/14/25 06:34:50)                   Impression:      Abdomen CT and Pelvis CT:    CT findings are suspicious for worsening mechanical small bowel obstruction at the level of the distal ileum, possibly on the basis of an adhesion (with other etiologies not fully excluded, as further detailed in the body of the report).  Recommendations include clinical correlation and surgical consultation.    Small quantity of intraperitoneal free fluid.    No free intraperitoneal air.    Possible esophagitis.  Underlying esophageal metaplasia or neoplasia not fully excluded.  Correlate clinically, and with follow-up outpatient EGD  if clinically appropriate.    Additional observations as detailed in the body of the report.    This report was flagged in Epic as abnormal.    Anil Nelson MD, first observed the critical findings on 06/14/2025 at 06:10, and sent the results to Margarita Morris DO, via Epic Secure Chat message, on 06/14/2025 at 06:33.  Patient name and medical record number were specified/linked in the message.  The recipient immediately responded, acknowledging receipt of the information.      Electronically signed by: Anil Nelson  Date:    06/14/2025  Time:    06:34               Narrative:    EXAMINATION:  CT ABDOMEN PELVIS WITH IV CONTRAST    CLINICAL HISTORY:  Abdominal abscess/infection suspected;    TECHNIQUE:  Low dose axial images, sagittal and coronal reformations were obtained from the lung bases to the pubic symphysis following the IV administration of 75 mL of Omnipaque 350 .    COMPARISON:  Abdomen CT and pelvis CT 06/09/2025.    FINDINGS:  Abdomen CT and Pelvis CT:    Artifacts related to beam hardening and/or motion degrade portions of the scan.    In the lower thorax, there remains a poorly defined region of weakly negative attenuation interposed between the distal esophagus and descending thoracic aorta.  This was also present on 06/09/2025 but is new since 05/16/2025.  It is favored to represent ascites within a hiatal hernia (with weakly negative attenuation values perhaps related to beam hardening artifacts arising from the nearby spine).  Other etiologies for this finding could include a distended cisterna chyli.    Circumferential wall thickening is suggested in the gas-filled but poorly distended distal esophagus.    The stomach is predominantly fluid-filled and again appears distended.  A portion of the gastric body/antrum remains relatively less distended, somewhat similar to the 06/09/2025 comparison scan.  Although a gastric consider could possibly explain this focal narrowing, more significant  gastric mural pathology (such as neoplasm or scarring from peptic disease) is not excluded.    There is more extensive dilatation of the gas-filled and fluid-filled small bowel, but there remains a transition point at the level of a collapsed small bowel segment in the distal ileum.  This is possibly on the basis of an adhesion.  There is mild swelling of a portion of the mesentery in the right lower quadrant; although this appears less conspicuous than on 06/09/2025, a volvulus or internal hernia is again also possible.    The colon and appendix demonstrate no acute CT abnormalities.  There is some radiopaque intraluminal material in the appendix and portions of the colon, possibly related to residual enteric contrast from a prior study or other ingested radiopaque material.    No free intraperitoneal air.  Small quantity of abdominopelvic free intraperitoneal fluid, perhaps slightly decreased from the 06/09/2025 comparison scan.    The liver, atrophic pancreas, spleen (pre-existing splenic deformity, possibly secondary to old infarct or old injury), adrenal glands, kidneys, atherosclerotic abdominal aorta, IVC, suboptimally distended urinary bladder, abdominopelvic lymph nodes, visualized abdominal wall, and visualized skeleton demonstrate no adverse interval CT changes from 06/09/2025.    There are scattered degenerative skeletal changes.    The uterus is absent.  The ovaries are inconspicuous or perhaps also surgically absent.    The gallbladder is inconspicuous or perhaps surgically absent.  There is mild intrahepatic biliary ductal dilatation.  Approximate 7-8 mm in diameter, the extrahepatic bile ducts are at the upper limits of normal for the patient's reported age of 69 years.    There is a moderate to large duodenal diverticulum that contains internal gas and fluid.  Some additional, smaller duodenal diverticula are also suggested.  No CT evidence of acute duodenal diverticulitis.

## 2025-06-18 NOTE — SUBJECTIVE & OBJECTIVE
Interval History:  Patient with a lot of abdominal pain and hypotension issues last night.  CT scan with no acute findings and patient were passing flatus overnight.  Surgery evaluating currently.  Continue to suction.    Review of Systems  Objective:     Vital Signs (Most Recent):  Temp: 97.8 °F (36.6 °C) (06/18/25 1130)  Pulse: 87 (06/18/25 1130)  Resp: 18 (06/18/25 1139)  BP: 110/73 (06/18/25 1130)  SpO2: 95 % (06/18/25 1130) Vital Signs (24h Range):  Temp:  [97.4 °F (36.3 °C)-98.3 °F (36.8 °C)] 97.8 °F (36.6 °C)  Pulse:  [] 87  Resp:  [16-28] 18  SpO2:  [91 %-99 %] 95 %  BP: ()/(62-80) 110/73     Weight: 60.3 kg (132 lb 15 oz)  Body mass index is 20.21 kg/m².    Intake/Output Summary (Last 24 hours) at 6/18/2025 1239  Last data filed at 6/18/2025 0815  Gross per 24 hour   Intake 60 ml   Output 675 ml   Net -615 ml         Physical Exam  Vitals and nursing note reviewed.   Constitutional:       General: She is not in acute distress.     Appearance: Normal appearance. She is ill-appearing.   HENT:      Head: Normocephalic and atraumatic.      Nose:      Comments: NG tube in place with bile output     Mouth/Throat:      Mouth: Mucous membranes are dry.   Cardiovascular:      Rate and Rhythm: Normal rate and regular rhythm.      Pulses: Normal pulses.      Heart sounds: Normal heart sounds. No murmur heard.  Pulmonary:      Effort: Pulmonary effort is normal. No respiratory distress.      Breath sounds: Normal breath sounds. No wheezing or rhonchi.   Abdominal:      General: Bowel sounds are normal. There is no distension.      Palpations: Abdomen is soft.      Tenderness: There is no abdominal tenderness.      Comments: Dressing in place and abdominal binder on   Musculoskeletal:         General: No swelling or tenderness.   Skin:     General: Skin is warm and dry.      Coloration: Skin is not jaundiced or pale.   Neurological:      General: No focal deficit present.      Mental Status: She is alert and  oriented to person, place, and time. Mental status is at baseline.      Sensory: No sensory deficit.      Motor: No weakness.   Psychiatric:         Mood and Affect: Mood normal.         Behavior: Behavior normal.         Thought Content: Thought content normal.         Judgment: Judgment normal.               Significant Labs: All pertinent labs within the past 24 hours have been reviewed.    Significant Imaging: I have reviewed all pertinent imaging results/findings within the past 24 hours.

## 2025-06-18 NOTE — ASSESSMENT & PLAN NOTE
Patient's blood pressure range in the last 24 hours was: BP  Min: 94/62  Max: 171/79.The patient's inpatient anti-hypertensive regimen is listed below:  Current Antihypertensives  hydrALAZINE injection 10 mg, Every 8 hours PRN, Intravenous    Plan  - BP is controlled, no changes needed to their regimen  - hold home Benicar at this time given NPO status  - resume once able to tolerate p.o. intake and if BP not controlled

## 2025-06-19 LAB
ESTROGEN SERPL-MCNC: NORMAL PG/ML
INSULIN SERPL-ACNC: NORMAL U[IU]/ML
LAB AP CLINICAL INFORMATION: NORMAL
LAB AP GROSS DESCRIPTION: NORMAL
LAB AP PERFORMING LOCATION(S): NORMAL
LAB AP REPORT FOOTNOTES: NORMAL

## 2025-06-19 PROCEDURE — 99900035 HC TECH TIME PER 15 MIN (STAT)

## 2025-06-19 PROCEDURE — 36410 VNPNXR 3YR/> PHY/QHP DX/THER: CPT

## 2025-06-19 PROCEDURE — 63600175 PHARM REV CODE 636 W HCPCS: Performed by: INTERNAL MEDICINE

## 2025-06-19 PROCEDURE — 94761 N-INVAS EAR/PLS OXIMETRY MLT: CPT

## 2025-06-19 PROCEDURE — 25000003 PHARM REV CODE 250

## 2025-06-19 PROCEDURE — 97530 THERAPEUTIC ACTIVITIES: CPT | Mod: CQ

## 2025-06-19 PROCEDURE — 25000003 PHARM REV CODE 250: Performed by: STUDENT IN AN ORGANIZED HEALTH CARE EDUCATION/TRAINING PROGRAM

## 2025-06-19 PROCEDURE — 11000001 HC ACUTE MED/SURG PRIVATE ROOM

## 2025-06-19 PROCEDURE — 63600175 PHARM REV CODE 636 W HCPCS: Performed by: STUDENT IN AN ORGANIZED HEALTH CARE EDUCATION/TRAINING PROGRAM

## 2025-06-19 PROCEDURE — 97116 GAIT TRAINING THERAPY: CPT | Mod: CQ

## 2025-06-19 PROCEDURE — 27000221 HC OXYGEN, UP TO 24 HOURS

## 2025-06-19 PROCEDURE — 63600175 PHARM REV CODE 636 W HCPCS

## 2025-06-19 PROCEDURE — 97535 SELF CARE MNGMENT TRAINING: CPT

## 2025-06-19 RX ORDER — OXYCODONE HYDROCHLORIDE 5 MG/1
5 TABLET ORAL EVERY 6 HOURS PRN
Refills: 0 | Status: DISCONTINUED | OUTPATIENT
Start: 2025-06-19 | End: 2025-07-08 | Stop reason: HOSPADM

## 2025-06-19 RX ORDER — ACETAMINOPHEN 500 MG
1000 TABLET ORAL 3 TIMES DAILY
Status: DISCONTINUED | OUTPATIENT
Start: 2025-06-19 | End: 2025-07-08 | Stop reason: HOSPADM

## 2025-06-19 RX ORDER — OXYCODONE HYDROCHLORIDE 5 MG/1
10 TABLET ORAL EVERY 6 HOURS PRN
Refills: 0 | Status: DISCONTINUED | OUTPATIENT
Start: 2025-06-19 | End: 2025-07-08 | Stop reason: HOSPADM

## 2025-06-19 RX ORDER — HYDROMORPHONE HYDROCHLORIDE 1 MG/ML
0.5 INJECTION, SOLUTION INTRAMUSCULAR; INTRAVENOUS; SUBCUTANEOUS EVERY 4 HOURS PRN
Status: DISCONTINUED | OUTPATIENT
Start: 2025-06-19 | End: 2025-07-07

## 2025-06-19 RX ORDER — SIMETHICONE 80 MG
1 TABLET,CHEWABLE ORAL
Status: DISCONTINUED | OUTPATIENT
Start: 2025-06-19 | End: 2025-07-08 | Stop reason: HOSPADM

## 2025-06-19 RX ADMIN — HYDROMORPHONE HYDROCHLORIDE 1 MG: 1 INJECTION, SOLUTION INTRAMUSCULAR; INTRAVENOUS; SUBCUTANEOUS at 03:06

## 2025-06-19 RX ADMIN — ACETAMINOPHEN 1000 MG: 500 TABLET ORAL at 08:06

## 2025-06-19 RX ADMIN — ENOXAPARIN SODIUM 40 MG: 40 INJECTION SUBCUTANEOUS at 05:06

## 2025-06-19 RX ADMIN — HYDROMORPHONE HYDROCHLORIDE 1 MG: 1 INJECTION, SOLUTION INTRAMUSCULAR; INTRAVENOUS; SUBCUTANEOUS at 05:06

## 2025-06-19 RX ADMIN — HYDROMORPHONE HYDROCHLORIDE 0.5 MG: 1 INJECTION, SOLUTION INTRAMUSCULAR; INTRAVENOUS; SUBCUTANEOUS at 08:06

## 2025-06-19 RX ADMIN — SIMETHICONE 80 MG: 80 TABLET, CHEWABLE ORAL at 08:06

## 2025-06-19 RX ADMIN — Medication 6 MG: at 11:06

## 2025-06-19 RX ADMIN — SODIUM CHLORIDE, POTASSIUM CHLORIDE, SODIUM LACTATE AND CALCIUM CHLORIDE: 600; 310; 30; 20 INJECTION, SOLUTION INTRAVENOUS at 09:06

## 2025-06-19 RX ADMIN — LIDOCAINE 2 PATCH: 50 PATCH TOPICAL at 12:06

## 2025-06-19 RX ADMIN — ACETAMINOPHEN 1000 MG: 500 TABLET ORAL at 04:06

## 2025-06-19 RX ADMIN — OXYCODONE HYDROCHLORIDE 10 MG: 5 TABLET ORAL at 11:06

## 2025-06-19 RX ADMIN — MUPIROCIN: 20 OINTMENT TOPICAL at 08:06

## 2025-06-19 RX ADMIN — SIMETHICONE 80 MG: 80 TABLET, CHEWABLE ORAL at 04:06

## 2025-06-19 NOTE — MEDICAL/APP STUDENT
[facility]  General Surgery  Progress Note    Subjective:   HPI:  Patricia Ramirez is a 69 y.o. year old female in hospital for recurrent SBO (failed conservative management), POD#5 from diagnostic laparoscopy converted exploratory laparotomy, SBR and anastomosis, appendectomy, diverticulectomy 6/14     Interval History: Patient seen and examined today, lying in bed. Patient reports her pain has improved after receiving hydromorphone in the AM. Describes her pain as 4/10 and intermittent. Able to pass flatus and have a BM last night, describes stool as loose. Denies N/V, chest pain, SOB. Drain output of 650 cc yesterday. Able to work with PT/OT yesterday. Able to get up to 1000 on IS.    Review of Systems   Constitutional:  Positive for malaise/fatigue.   Respiratory:  Negative for shortness of breath.    Cardiovascular:  Negative for chest pain.   Gastrointestinal:  Positive for abdominal pain. Negative for blood in stool, nausea and vomiting.   Neurological:  Negative for headaches.         Post-Op Info:  Procedure(s) (LRB):  LAPAROSCOPY, DIAGNOSTIC (N/A)  LAPAROTOMY, EXPLORATORY; APPENDECTOMY; SMALL BOWEL RESECTION; DIVERTICULUM RESECTION (N/A)   5 Days Post-Op      Medications:  Continuous Infusions:   lactated ringers   Intravenous Continuous 100 mL/hr at 06/19/25 0918 New Bag at 06/19/25 0918     Scheduled Meds:   enoxparin  40 mg Subcutaneous Q24H (prophylaxis, 1700)    LIDOcaine  2 patch Transdermal Q24H     PRN Meds:  Current Facility-Administered Medications:     acetaminophen, 650 mg, Oral, Q8H PRN    acetaminophen, 650 mg, Oral, Q4H PRN    aluminum-magnesium hydroxide-simethicone, 30 mL, Oral, QID PRN    glucagon (human recombinant), 1 mg, Intramuscular, PRN    glucose, 16 g, Oral, PRN    glucose, 24 g, Oral, PRN    hydrALAZINE, 10 mg, Intravenous, Q8H PRN    HYDROmorphone, 0.5 mg, Intravenous, Q3H PRN    HYDROmorphone, 1 mg, Intravenous, Q6H PRN    magnesium oxide, 800 mg, Oral, PRN     magnesium oxide, 800 mg, Oral, PRN    melatonin, 6 mg, Oral, Nightly PRN    naloxone, 0.02 mg, Intravenous, PRN    ondansetron, 4 mg, Intravenous, Q8H PRN    potassium bicarbonate, 35 mEq, Oral, PRN    potassium bicarbonate, 50 mEq, Oral, PRN    potassium bicarbonate, 60 mEq, Oral, PRN    potassium, sodium phosphates, 2 packet, Oral, PRN    potassium, sodium phosphates, 2 packet, Oral, PRN    potassium, sodium phosphates, 2 packet, Oral, PRN    simethicone, 1 tablet, Oral, QID PRN    sodium chloride 0.9%, 10 mL, Intravenous, Q12H PRN    Flushing PICC/Midline Protocol, , , Until Discontinued **AND** sodium chloride 0.9%, 10 mL, Intravenous, Q12H PRN     Objective:     Vital Signs (Most Recent):  Temp: 97.8 °F (36.6 °C) (06/19/25 1108)  Pulse: 79 (06/19/25 1108)  Resp: 18 (06/19/25 1108)  BP: 120/64 (06/19/25 1108)  SpO2: 95 % (06/19/25 1108) Vital Signs (24h Range):  Temp:  [97.5 °F (36.4 °C)-98.4 °F (36.9 °C)] 97.8 °F (36.6 °C)  Pulse:  [79-91] 79  Resp:  [16-18] 18  SpO2:  [93 %-100 %] 95 %  BP: (107-152)/(64-78) 120/64       Intake/Output Summary (Last 24 hours) at 6/19/2025 1154  Last data filed at 6/19/2025 0631  Gross per 24 hour   Intake 90 ml   Output 750 ml   Net -660 ml       Physical Exam  Vitals and nursing note reviewed.   Constitutional:       General: She is not in acute distress.  HENT:      Nose:      Comments: NG tube in place with bile output.  Abdominal:      General: There is no distension.      Palpations: Abdomen is soft.      Tenderness: There is abdominal tenderness.   Neurological:      Mental Status: She is alert.         Significant Labs:  CBC:   Recent Labs   Lab 06/18/25  0328   WBC 5.82   RBC 5.10   HGB 16.2*   HCT 50.9*      *   MCH 31.8*   MCHC 31.8*     CMP:   Recent Labs   Lab 06/18/25  0328   GLU 74   CALCIUM 8.5*   ALBUMIN 2.4*   PROT 6.1      K 3.7   CO2 25      BUN 11   CREATININE 0.5   ALKPHOS 63   ALT 14   AST 14   BILITOT 0.8     Lactic Acid:    Recent Labs   Lab 06/18/25  0328   LACTATE 2.0       Significant Diagnostics:  I have reviewed all pertinent imaging results/findings within the past 24 hours.  Imaging Results              XR NG/OG tube placement check, non-radiologist performed (Final result)  Result time 06/14/25 07:13:21      Final result by Jerod Workman MD (06/14/25 07:13:21)                   Impression:      NG tube in place, as above.      Electronically signed by: Jerod Workman MD  Date:    06/14/2025  Time:    07:13               Narrative:    EXAMINATION:  XR NG/OG TUBE PLACEMENT CHECK, NON-RADIOLOGIST PERFORMED    CLINICAL HISTORY:  s/p ng tube;  Unspecified intestinal obstruction, unspecified as to partial versus complete obstruction    TECHNIQUE:  AP radiograph of the chest and upper abdomen    COMPARISON:  Abdominal radiograph 06/13/2025, CT 06/14/2025    FINDINGS:  Since recent CT there has been interval placement of a NG tube.  The tip and proximal port are subdiaphragmatic projecting over the expected location of the stomach.    Dilated loops of small bowel persist in the upper abdomen.  No obvious new pneumatosis, portal venous gas or free air on limited supine view.    Cardiomediastinal silhouette stable in size.  No focal consolidation, pleural fluid or pneumothorax.                                        CT Abdomen Pelvis With IV Contrast NO Oral Contrast (Final result)  Result time 06/14/25 06:34:50      Final result by Anil Nelson MD (06/14/25 06:34:50)                   Impression:      Abdomen CT and Pelvis CT:    CT findings are suspicious for worsening mechanical small bowel obstruction at the level of the distal ileum, possibly on the basis of an adhesion (with other etiologies not fully excluded, as further detailed in the body of the report).  Recommendations include clinical correlation and surgical consultation.    Small quantity of intraperitoneal free fluid.    No free intraperitoneal air.    Possible  esophagitis.  Underlying esophageal metaplasia or neoplasia not fully excluded.  Correlate clinically, and with follow-up outpatient EGD if clinically appropriate.    Additional observations as detailed in the body of the report.    This report was flagged in Epic as abnormal.    Anil Nelson MD, first observed the critical findings on 06/14/2025 at 06:10, and sent the results to Margarita Morris DO, via Epic Secure Chat message, on 06/14/2025 at 06:33.  Patient name and medical record number were specified/linked in the message.  The recipient immediately responded, acknowledging receipt of the information.      Electronically signed by: Anil Nelson  Date:    06/14/2025  Time:    06:34               Narrative:    EXAMINATION:  CT ABDOMEN PELVIS WITH IV CONTRAST    CLINICAL HISTORY:  Abdominal abscess/infection suspected;    TECHNIQUE:  Low dose axial images, sagittal and coronal reformations were obtained from the lung bases to the pubic symphysis following the IV administration of 75 mL of Omnipaque 350 .    COMPARISON:  Abdomen CT and pelvis CT 06/09/2025.    FINDINGS:  Abdomen CT and Pelvis CT:    Artifacts related to beam hardening and/or motion degrade portions of the scan.    In the lower thorax, there remains a poorly defined region of weakly negative attenuation interposed between the distal esophagus and descending thoracic aorta.  This was also present on 06/09/2025 but is new since 05/16/2025.  It is favored to represent ascites within a hiatal hernia (with weakly negative attenuation values perhaps related to beam hardening artifacts arising from the nearby spine).  Other etiologies for this finding could include a distended cisterna chyli.    Circumferential wall thickening is suggested in the gas-filled but poorly distended distal esophagus.    The stomach is predominantly fluid-filled and again appears distended.  A portion of the gastric body/antrum remains relatively less distended, somewhat  similar to the 06/09/2025 comparison scan.  Although a gastric consider could possibly explain this focal narrowing, more significant gastric mural pathology (such as neoplasm or scarring from peptic disease) is not excluded.    There is more extensive dilatation of the gas-filled and fluid-filled small bowel, but there remains a transition point at the level of a collapsed small bowel segment in the distal ileum.  This is possibly on the basis of an adhesion.  There is mild swelling of a portion of the mesentery in the right lower quadrant; although this appears less conspicuous than on 06/09/2025, a volvulus or internal hernia is again also possible.    The colon and appendix demonstrate no acute CT abnormalities.  There is some radiopaque intraluminal material in the appendix and portions of the colon, possibly related to residual enteric contrast from a prior study or other ingested radiopaque material.    No free intraperitoneal air.  Small quantity of abdominopelvic free intraperitoneal fluid, perhaps slightly decreased from the 06/09/2025 comparison scan.    The liver, atrophic pancreas, spleen (pre-existing splenic deformity, possibly secondary to old infarct or old injury), adrenal glands, kidneys, atherosclerotic abdominal aorta, IVC, suboptimally distended urinary bladder, abdominopelvic lymph nodes, visualized abdominal wall, and visualized skeleton demonstrate no adverse interval CT changes from 06/09/2025.    There are scattered degenerative skeletal changes.    The uterus is absent.  The ovaries are inconspicuous or perhaps also surgically absent.    The gallbladder is inconspicuous or perhaps surgically absent.  There is mild intrahepatic biliary ductal dilatation.  Approximate 7-8 mm in diameter, the extrahepatic bile ducts are at the upper limits of normal for the patient's reported age of 69 years.    There is a moderate to large duodenal diverticulum that contains internal gas and fluid.  Some  additional, smaller duodenal diverticula are also suggested.  No CT evidence of acute duodenal diverticulitis.                                       Assessment/Plan:   Patricia Ramirez is a 69 y.o. year old female in hospital for recurrent SBO (failed conservative management), POD#5 from diagnostic laparoscopy converted exploratory laparotomy, SBR and anastomosis, appendectomy, diverticulectomy 6/14.     - Plan to remove NG tube and advance diet to clear liquid  - MMPC   - Pulm toilet, encourage IS 10x/hour  - PT/OT, encourage up to chair and ambulation   - Abd binder for comfort, ok to take off or have breaks if needed  - Replete electrolytes to maintain K > 4, Phos > 3, Mg > 2  - DVT ppx     Active Diagnoses:    Diagnosis Date Noted POA    PRINCIPAL PROBLEM:  SBO (small bowel obstruction) [K56.609] 06/09/2025 Yes    Hypokalemia [E87.6] 06/11/2025 Yes    Heart failure with preserved ejection fraction [I50.30] 09/26/2022 Yes    Essential hypertension [I10] 12/29/2014 Yes    Hyperlipidemia [E78.5] 12/29/2014 Yes    Depression with anxiety [F41.8] 12/29/2014 Yes      Problems Resolved During this Admission:         [facility]  General Surgery  [facility] [place] - Telemetry

## 2025-06-19 NOTE — ASSESSMENT & PLAN NOTE
-was recently hospitalized from 06/09/2025 to 06/13/2025 for SBO. Was seen by general surgery and was treated conservatively. Patient had return of bowel functions after multiple days of gastric decompression with NGT and suppository and was discharged  -returned with recurrent abdominal pain associated with nausea.  Pt with recurrent SBO.   -CT abdomen and pelvis showed worsening mechanical small bowel obstruction at the level of the distal ileum   -pt s/p  exploratory laparotomy, appendectomy, small bowel resection and side to side anastomosis, and diverticulectomy on 06/14/25  -management per primary team, General surgery  - NGT out, discharge per surgery once pain controlled and tolerating diet

## 2025-06-19 NOTE — SUBJECTIVE & OBJECTIVE
Interval History:  NG tube out and patient had bowel movement overnight.  Tolerating clear liquids thus far.  Still having significant amount of pain.  Discussed trialing of p.o. pain medications with IV as backup.  Also adding simethicone to see if this helps.    Review of Systems  Objective:     Vital Signs (Most Recent):  Temp: 97.8 °F (36.6 °C) (06/19/25 1108)  Pulse: 93 (06/19/25 1440)  Resp: 16 (06/19/25 1511)  BP: 120/64 (06/19/25 1108)  SpO2: 95 % (06/19/25 1300) Vital Signs (24h Range):  Temp:  [97.5 °F (36.4 °C)-98.4 °F (36.9 °C)] 97.8 °F (36.6 °C)  Pulse:  [79-93] 93  Resp:  [16-18] 16  SpO2:  [93 %-100 %] 95 %  BP: (107-152)/(64-78) 120/64     Weight: 60.3 kg (132 lb 15 oz)  Body mass index is 20.21 kg/m².    Intake/Output Summary (Last 24 hours) at 6/19/2025 1528  Last data filed at 6/19/2025 1401  Gross per 24 hour   Intake 180 ml   Output 750 ml   Net -570 ml         Physical Exam  Vitals and nursing note reviewed.   Constitutional:       General: She is not in acute distress.     Appearance: Normal appearance. She is ill-appearing.   HENT:      Head: Normocephalic and atraumatic.      Mouth/Throat:      Mouth: Mucous membranes are dry.   Cardiovascular:      Rate and Rhythm: Normal rate and regular rhythm.      Pulses: Normal pulses.      Heart sounds: Normal heart sounds. No murmur heard.  Pulmonary:      Effort: Pulmonary effort is normal. No respiratory distress.      Breath sounds: Normal breath sounds. No wheezing or rhonchi.   Abdominal:      General: Bowel sounds are normal. There is no distension.      Palpations: Abdomen is soft.      Tenderness: There is no abdominal tenderness.      Comments: Dressing in place and abdominal binder on   Musculoskeletal:         General: No swelling or tenderness.   Skin:     General: Skin is warm and dry.      Coloration: Skin is not jaundiced or pale.   Neurological:      General: No focal deficit present.      Mental Status: She is alert and oriented to  person, place, and time. Mental status is at baseline.      Sensory: No sensory deficit.      Motor: No weakness.   Psychiatric:         Mood and Affect: Mood normal.         Behavior: Behavior normal.         Thought Content: Thought content normal.         Judgment: Judgment normal.               Significant Labs: All pertinent labs within the past 24 hours have been reviewed.    Significant Imaging: I have reviewed all pertinent imaging results/findings within the past 24 hours.

## 2025-06-19 NOTE — PT/OT/SLP PROGRESS
Occupational Therapy   Treatment    Name: Patricia Ramirez  MRN: [id number]  Admitting Diagnosis:  SBO (small bowel obstruction)  5 Days Post-Op    Recommendations:     Discharge Recommendations: Low Intensity Therapy  Discharge Equipment Recommendations:  hospital bed  Barriers to discharge:  None    Assessment:     Patricia Ramirez is a 69 y.o. female with a medical diagnosis of SBO (small bowel obstruction).  She presents with HOB elevated after nursing assisted her to bed just prior to session. Performance deficits affecting function are weakness, impaired endurance, impaired sensation, impaired self care skills, impaired functional mobility, gait instability, impaired balance, decreased safety awareness, impaired skin, edema. Patient did not want to get OOB again, but agreed to use LB dressing equipment while in bed.     Rehab Prognosis:  Fair+; patient would benefit from acute skilled OT services to address these deficits and reach maximum level of function.       Plan:     Patient to be seen 3 x/week to address the above listed problems via self-care/home management, therapeutic activities, therapeutic exercises  Plan of Care Expires: 07/05/25  Plan of Care Reviewed with: patient    Subjective     Chief Complaint: abdominal pain   Patient/Family Comments/goals: pt. Said she can have clear liquids now   Pain/Comfort:  Pain Rating 1: other (see comments) (not rated, but had morphine earlier)    Objective:     Communicated with: RN  prior to session.  Patient found HOB elevated with telemetry, peripheral IV, bed alarm, PICC line upon OT entry to room.    General Precautions: Standard, fall    Orthopedic Precautions:N/A  Braces: N/A  Respiratory Status: Room air     Occupational Performance:     Bed Mobility:    Patient completed Scooting/Bridging with modified independence     Functional Mobility/Transfers:  NT     Activities of Daily Living:  Lower Body Dressing: minimum assistance to use reacher to doff sock and to  practice with donning pants in bed       [facility] 6 Click ADL: 18    Treatment & Education:  Occupational therapy recommended reacher for home use to prevent excessive bending for picking up items  She demonstrated use of reacher with LB dressing, but said she thinks she may not need reacher at home for that     Patient left HOB elevated with all lines intact, call button in reach, and RN  notified    GOALS:   Multidisciplinary Problems       Occupational Therapy Goals          Problem: Occupational Therapy    Goal Priority Disciplines Outcome Interventions   Occupational Therapy Goal     OT, PT/OT Progressing    Description: Goals to be met by: 7/5/2025     Patient will increase functional independence with ADLs by performing:    UE Dressing with Set-up Assistance.  LE Dressing with Stand-by Assistance.  Grooming while seated with Modified [place].  Toileting from bedside commode with Supervision for hygiene and clothing management.   Bathing seated UB and andreia care sponge  with Stand-by Assistance.  Toilet transfer to bedside commode with Contact Guard Assistance.  Upper extremity exercise program x7-10 reps no resistance, and per handout, with assistance as needed.                         DME Justifications:  Patricia requires a hospital bed due to her requiring positioning of the body in ways not feasible with an ordinary bed to alleviate pain and due to limited ability and cannot independently make changes in body position without the use of the bed due to recent abdominal surgery.The positioning of the body cannot be sufficiently resolved by the use of pillows and wedges.    Time Tracking:     OT Date of Treatment: 06/19/25  OT Start Time: 1025  OT Stop Time: 1035  OT Total Time (min): 10 min    Billable Minutes:Self Care/Home Management 10     OT/CHANELL: OT          6/19/2025   18

## 2025-06-19 NOTE — PLAN OF CARE
Problem: Adult Inpatient Plan of Care  Goal: Plan of Care Review  Outcome: Progressing  Goal: Patient-Specific Goal (Individualized)  Outcome: Progressing  Goal: Absence of Hospital-Acquired Illness or Injury  Outcome: Progressing  Goal: Optimal Comfort and Wellbeing  Outcome: Progressing  Goal: Readiness for Transition of Care  Outcome: Progressing     Problem: Infection  Goal: Absence of Infection Signs and Symptoms  Outcome: Progressing     Problem: Wound  Goal: Optimal Coping  Outcome: Progressing  Goal: Optimal Functional Ability  Outcome: Progressing  Goal: Absence of Infection Signs and Symptoms  Outcome: Progressing  Goal: Improved Oral Intake  Outcome: Progressing  Goal: Optimal Pain Control and Function  Outcome: Progressing  Goal: Skin Health and Integrity  Outcome: Progressing     Problem: Skin Injury Risk Increased  Goal: Skin Health and Integrity  Outcome: Progressing     Problem: Pain Acute  Goal: Optimal Pain Control and Function  Outcome: Progressing  Intervention: Prevent or Manage Pain  Flowsheets (Taken 6/19/2025 1821)  Sleep/Rest Enhancement:   noise level reduced   regular sleep/rest pattern promoted  Sensory Stimulation Regulation:   auditory stimulation minimized   lighting decreased   quiet environment promoted  Bowel Elimination Promotion:   ambulation promoted   commode/bedpan at bedside  Medication Review/Management: medications reviewed

## 2025-06-19 NOTE — PROGRESS NOTES
[facility] Medicine  Progress Note    Patient Name: Patricia Ramirez  MRN: [id number]  Patient Class: IP- Inpatient   Admission Date: 6/14/2025  Length of Stay: 5 days  Attending Physician: Chiki Cummings MD  Primary Care Provider: Fabienne Erwin NP        Subjective     Principal Problem:SBO (small bowel obstruction)        HPI:  69-year-old female with a history of anxiety, depression, hypertension presents to the ED with recurrent small bowel obstruction. Complains of diffuse abdominal pain, associated with nausea but no vomiting. Still passing flatus. Patient was recently hospitalized from 06/09/2025 to 06/13/2025 for SBO. Was seen by general surgery and was treated conservatively. Patient had return of bowel functions after multiple days of gastric decompression with NGT and suppository. Prior to discharge, patient was able to tolerate PO without any abdominal pain, nausea or vomiting. Referred to GI on discharge for possible gastric dysmotility disorder. Of note, patient was offered surgery last hospitalization, but patient declined at that time. States she is now amendable to surgery.     In the ED, patient hypertensive but afebrile. Labs without leukocytosis, but noted to have hypokalemia with K 3.1. CT abdomen/pelvis w/ worsening mechanical small bowel obstruction at the level of the distal ileum. NGT placed. Pt admitted to general surgery team. Hospital medicine team consulted.        Overview/Hospital Course:  69 year old female admitted to general surgery team on 06/14/2025 for recurrent small-bowel obstruction. Patient taken for expiratory laparotomy on 06/14/2025.  NGT in place. Hospital medicine consulted for medical management.    Interval History:  NG tube out and patient had bowel movement overnight.  Tolerating clear liquids thus far.  Still having significant amount of pain.  Discussed trialing of p.o. pain medications with IV as backup.  Also adding simethicone to see if this  helps.    Review of Systems  Objective:     Vital Signs (Most Recent):  Temp: 97.8 °F (36.6 °C) (06/19/25 1108)  Pulse: 93 (06/19/25 1440)  Resp: 16 (06/19/25 1511)  BP: 120/64 (06/19/25 1108)  SpO2: 95 % (06/19/25 1300) Vital Signs (24h Range):  Temp:  [97.5 °F (36.4 °C)-98.4 °F (36.9 °C)] 97.8 °F (36.6 °C)  Pulse:  [79-93] 93  Resp:  [16-18] 16  SpO2:  [93 %-100 %] 95 %  BP: (107-152)/(64-78) 120/64     Weight: 60.3 kg (132 lb 15 oz)  Body mass index is 20.21 kg/m².    Intake/Output Summary (Last 24 hours) at 6/19/2025 1528  Last data filed at 6/19/2025 1401  Gross per 24 hour   Intake 180 ml   Output 750 ml   Net -570 ml         Physical Exam  Vitals and nursing note reviewed.   Constitutional:       General: She is not in acute distress.     Appearance: Normal appearance. She is ill-appearing.   HENT:      Head: Normocephalic and atraumatic.      Mouth/Throat:      Mouth: Mucous membranes are dry.   Cardiovascular:      Rate and Rhythm: Normal rate and regular rhythm.      Pulses: Normal pulses.      Heart sounds: Normal heart sounds. No murmur heard.  Pulmonary:      Effort: Pulmonary effort is normal. No respiratory distress.      Breath sounds: Normal breath sounds. No wheezing or rhonchi.   Abdominal:      General: Bowel sounds are normal. There is no distension.      Palpations: Abdomen is soft.      Tenderness: There is no abdominal tenderness.      Comments: Dressing in place and abdominal binder on   Musculoskeletal:         General: No swelling or tenderness.   Skin:     General: Skin is warm and dry.      Coloration: Skin is not jaundiced or pale.   Neurological:      General: No focal deficit present.      Mental Status: She is alert and oriented to person, place, and time. Mental status is at baseline.      Sensory: No sensory deficit.      Motor: No weakness.   Psychiatric:         Mood and Affect: Mood normal.         Behavior: Behavior normal.         Thought Content: Thought content normal.          Judgment: Judgment normal.               Significant Labs: All pertinent labs within the past 24 hours have been reviewed.    Significant Imaging: I have reviewed all pertinent imaging results/findings within the past 24 hours.      Assessment & Plan  SBO (small bowel obstruction)  -was recently hospitalized from 06/09/2025 to 06/13/2025 for SBO. Was seen by general surgery and was treated conservatively. Patient had return of bowel functions after multiple days of gastric decompression with NGT and suppository and was discharged  -returned with recurrent abdominal pain associated with nausea.  Pt with recurrent SBO.   -CT abdomen and pelvis showed worsening mechanical small bowel obstruction at the level of the distal ileum   -pt s/p  exploratory laparotomy, appendectomy, small bowel resection and side to side anastomosis, and diverticulectomy on 06/14/25  -management per primary team, General surgery  - NGT out, discharge per surgery once pain controlled and tolerating diet    Hypokalemia  Patient's most recent potassium results are listed below.   Recent Labs     06/17/25  0516 06/18/25  0328   K 3.8 3.7     Plan  - Replete potassium per protocol  - Monitor potassium Daily  - Patient's hypokalemia is improving  - replace as needed  Essential hypertension  Patient's blood pressure range in the last 24 hours was: BP  Min: 107/64  Max: 152/65.The patient's inpatient anti-hypertensive regimen is listed below:  Current Antihypertensives  hydrALAZINE injection 10 mg, Every 8 hours PRN, Intravenous    Plan  - BP is controlled, no changes needed to their regimen  - hold home Benicar at this time given NPO status  - resume once able to tolerate p.o. intake and if BP not controlled  Hyperlipidemia  -hold home statin at this time given NPO status   -resume when appropriate    Heart failure with preserved ejection fraction  -echocardiogram from 10/07/2024 with preserved EF of 55-60%, grade 1 diastolic dysfunction.  PASP of  35 mm Hg  -appears to be at baseline, appears euvolemic   -monitor    Depression with anxiety  Patient has persistent depression which is moderate and is currently controlled. Will hold anti-depressant medications. We will not consult psychiatry at this time. Patient does not display psychosis at this time. Continue to monitor closely and adjust plan of care as needed.      Hold home Xanax, BuSpar, and Celexa at this time given NPO status  VTE Risk Mitigation (From admission, onward)           Ordered     enoxaparin injection 40 mg  Every 24 hours         06/15/25 0733     IP VTE HIGH RISK PATIENT  Once         06/14/25 0559     Place sequential compression device  Until discontinued         06/14/25 0559                    Discharge Planning   STUART: 6/20/2025     Code Status: Full Code   Patient is Medically Not Yet Ready for discharge.  Medical Readiness for Discharge Date:   Discharge Plan A: Home Health   Discharge Delays: None known at this time      SDOH Screening:  The patient was screened for utility difficulties, food insecurity, transport difficulties, housing insecurity, and interpersonal safety and there were no concerns identified this admission.                Please place Justification for DME      Moise Collins MD  Department of Hospital Medicine   [facility] [place] - Telemetry

## 2025-06-19 NOTE — ASSESSMENT & PLAN NOTE
Patient's most recent potassium results are listed below.   Recent Labs     06/17/25  0516 06/18/25  0328   K 3.8 3.7     Plan  - Replete potassium per protocol  - Monitor potassium Daily  - Patient's hypokalemia is improving  - replace as needed

## 2025-06-19 NOTE — NURSING
[facility] [place]  Nurses Note -- 4 Eyes      6/19/2025       Skin assessed on: Q Shift      [x] No Pressure Injuries Present    [x]Prevention Measures Documented    [] Yes LDA  for Pressure Injury Previously documented     [] Yes New Pressure Injury Discovered   [] LDA for New Pressure Injury Added      Attending RN:  Mak Ross RN     Second RN:  Hortencia KHAN

## 2025-06-19 NOTE — ASSESSMENT & PLAN NOTE
Patient's blood pressure range in the last 24 hours was: BP  Min: 107/64  Max: 152/65.The patient's inpatient anti-hypertensive regimen is listed below:  Current Antihypertensives  hydrALAZINE injection 10 mg, Every 8 hours PRN, Intravenous    Plan  - BP is controlled, no changes needed to their regimen  - hold home Benicar at this time given NPO status  - resume once able to tolerate p.o. intake and if BP not controlled

## 2025-06-19 NOTE — PT/OT/SLP PROGRESS
Physical Therapy Treatment    Patient Name:  Patricia Ramirez   MRN:  [id number]    Recommendations:     Discharge Recommendations: Low Intensity Therapy  Discharge Equipment Recommendations: walker, rolling , pt requested for hospital bed ( pt reported that she has no bed at home and has been sleeping on her low sofa .)   Barriers to discharge: None    Assessment:     Patricia Ramirez is a 69 y.o. female admitted with a medical diagnosis of SBO (small bowel obstruction).  She presents with the following impairments/functional limitations: weakness, impaired endurance, gait instability, decreased lower extremity function, pain, impaired balance, decreased safety awareness, decreased ROM, impaired functional mobility .    Rehab Prognosis: Good; patient would benefit from acute skilled PT services to address these deficits and reach maximum level of function.    Recent Surgery: Procedure(s) (LRB):  LAPAROSCOPY, DIAGNOSTIC (N/A)  LAPAROTOMY, EXPLORATORY; APPENDECTOMY; SMALL BOWEL RESECTION; DIVERTICULUM RESECTION (N/A) 5 Days Post-Op    Plan:     During this hospitalization, patient to be seen 5 x/week (5-4x per week) to address the identified rehab impairments via gait training, therapeutic activities, therapeutic exercises, neuromuscular re-education and progress toward the following goals:    Plan of Care Expires:  07/06/25    Subjective     Chief Complaint: pain   Patient/Family Comments/goals: pt is pleasant and agreeable to therapy   Pain/Comfort:  Pain Rating 1: 10/10  Location 1: abdomen  Pain Rating Post-Intervention 1: 10/10      Objective:     Communicated with nurse prior to session.  Patient found HOB elevated with telemetry, peripheral IV, bed alarm, PICC line upon PT entry to room.     General Precautions: Standard, fall  Orthopedic Precautions: N/A  Braces: N/A  Respiratory Status: Room air   SpO2 : 89%-94% on RA with exertions , HR stable   Functional Mobility:  Bed Mobility: performed log roll technique for  bed mobility    Rolling Left:  stand by assistance  Scooting: stand by assistance and contact guard assistance  Supine to Sit: minimum assistance, HOB elevated, bedside rail   Transfers: VC's for safety technique and walker management .     Sit to Stand:  from bed stand by assistance with rolling walker  Gait:  pt ambulated  250 ft  with RW, SBA . Pt required several standing rest breaks during gait training 2* to pain and fatigue.  Noted with decreased leonides,decreased step length, no LOB. VC's for safety technique and walker management.     Balance: good in sitting, fair+ in standing with RW.        AM-PAC 6 CLICK MOBILITY  Turning over in bed (including adjusting bedclothes, sheets and blankets)?: 4  Sitting down on and standing up from a chair with arms (e.g., wheelchair, bedside commode, etc.): 4  Moving from lying on back to sitting on the side of the bed?: 4  Moving to and from a bed to a chair (including a wheelchair)?: 3  Need to walk in hospital room?: 3  Climbing 3-5 steps with a railing?: 3  Basic Mobility Total Score: 21       Treatment & Education:  Pt performed bed mobility, transfer and gait training as above.    Encouraged pt to perform BLE AP throughout the day. Pt verbalized understanding .   Patient left up in chair with all lines intact, call button in reach, and nurse notified..    GOALS:   Multidisciplinary Problems       Physical Therapy Goals          Problem: Physical Therapy    Goal Priority Disciplines Outcome Interventions   Physical Therapy Goal     PT, PT/OT Progressing    Description: Goals to be met by: 25     Patient will increase functional independence with mobility by performin. Supine to sit with Modified [place]  2. Sit to supine with Modified [place]  3. Sit to stand transfer with Modified [place]  4. Gait  x 50 feet with Modified [place] using Rolling Walker.   5. Stand for 5 minutes with Modified [place] using Rolling Walker  6. Lower  extremity exercise program x30 reps per handout, with independence                         DME Justifications:   Patricia's mobility limitation cannot be sufficiently resolved by the use of a cane. Her functional mobility deficit can be sufficiently resolved with the use of a Rolling Walker. Patient's mobility limitation significantly impairs their ability to participate in one of more activities of daily living.  The use of a RW will significantly improve the patient's ability to participate in MRADLS and the patient will use it on regular basis in the home.    Time Tracking:     PT Received On: 06/19/25  PT Start Time: 1358     PT Stop Time: 1425  PT Total Time (min): 27 min     Billable Minutes: Gait Training 17 and Therapeutic Activity 10    Treatment Type: Treatment  PT/PTA: PTA     Number of PTA visits since last PT visit: 4 06/19/2025

## 2025-06-20 LAB
ABSOLUTE NEUTROPHIL MANUAL (OHS): 16.9 K/UL
ANION GAP (OHS): 14 MMOL/L (ref 8–16)
BUN SERPL-MCNC: 14 MG/DL (ref 8–23)
CALCIUM SERPL-MCNC: 8.1 MG/DL (ref 8.7–10.5)
CHLORIDE SERPL-SCNC: 102 MMOL/L (ref 95–110)
CO2 SERPL-SCNC: 24 MMOL/L (ref 23–29)
CREAT SERPL-MCNC: 0.4 MG/DL (ref 0.5–1.4)
EOSINOPHIL NFR BLD MANUAL: 1 % (ref 0–8)
ERYTHROCYTE [DISTWIDTH] IN BLOOD BY AUTOMATED COUNT: 12.9 % (ref 11.5–14.5)
GFR SERPLBLD CREATININE-BSD FMLA CKD-EPI: >60 ML/MIN/1.73/M2
GLUCOSE SERPL-MCNC: 109 MG/DL (ref 70–110)
HCT VFR BLD AUTO: 38.2 % (ref 37–48.5)
HGB BLD-MCNC: 12.7 GM/DL (ref 12–16)
LYMPHOCYTES NFR BLD MANUAL: 4 % (ref 18–48)
MAGNESIUM SERPL-MCNC: 1.9 MG/DL (ref 1.6–2.6)
MCH RBC QN AUTO: 32.2 PG (ref 27–31)
MCHC RBC AUTO-ENTMCNC: 33.2 G/DL (ref 32–36)
MCV RBC AUTO: 97 FL (ref 82–98)
MONOCYTES NFR BLD MANUAL: 3 % (ref 4–15)
NEUTROPHILS NFR BLD MANUAL: 81 % (ref 38–73)
NEUTS BAND NFR BLD MANUAL: 11 %
NUCLEATED RBC (/100WBC) (OHS): 0 /100 WBC
PHOSPHATE SERPL-MCNC: 3.1 MG/DL (ref 2.7–4.5)
PLATELET # BLD AUTO: 246 K/UL (ref 150–450)
PLATELET BLD QL SMEAR: ABNORMAL
PMV BLD AUTO: 9.2 FL (ref 9.2–12.9)
POTASSIUM SERPL-SCNC: 4.2 MMOL/L (ref 3.5–5.1)
RBC # BLD AUTO: 3.95 M/UL (ref 4–5.4)
SODIUM SERPL-SCNC: 140 MMOL/L (ref 136–145)
TOXIC GRANULES BLD QL SMEAR: PRESENT
WBC # BLD AUTO: 18.38 K/UL (ref 3.9–12.7)
WBC TOXIC VACUOLES BLD QL SMEAR: PRESENT

## 2025-06-20 PROCEDURE — 25000003 PHARM REV CODE 250: Performed by: STUDENT IN AN ORGANIZED HEALTH CARE EDUCATION/TRAINING PROGRAM

## 2025-06-20 PROCEDURE — 25000003 PHARM REV CODE 250

## 2025-06-20 PROCEDURE — 85007 BL SMEAR W/DIFF WBC COUNT: CPT

## 2025-06-20 PROCEDURE — 99900035 HC TECH TIME PER 15 MIN (STAT)

## 2025-06-20 PROCEDURE — 83735 ASSAY OF MAGNESIUM: CPT

## 2025-06-20 PROCEDURE — 82310 ASSAY OF CALCIUM: CPT

## 2025-06-20 PROCEDURE — 97530 THERAPEUTIC ACTIVITIES: CPT | Mod: CQ

## 2025-06-20 PROCEDURE — 84100 ASSAY OF PHOSPHORUS: CPT

## 2025-06-20 PROCEDURE — 11000001 HC ACUTE MED/SURG PRIVATE ROOM

## 2025-06-20 PROCEDURE — 36415 COLL VENOUS BLD VENIPUNCTURE: CPT

## 2025-06-20 PROCEDURE — 63600175 PHARM REV CODE 636 W HCPCS

## 2025-06-20 PROCEDURE — 94761 N-INVAS EAR/PLS OXIMETRY MLT: CPT

## 2025-06-20 PROCEDURE — 63600175 PHARM REV CODE 636 W HCPCS: Performed by: STUDENT IN AN ORGANIZED HEALTH CARE EDUCATION/TRAINING PROGRAM

## 2025-06-20 RX ORDER — CITALOPRAM 20 MG/1
20 TABLET ORAL DAILY
Status: DISCONTINUED | OUTPATIENT
Start: 2025-06-20 | End: 2025-07-08 | Stop reason: HOSPADM

## 2025-06-20 RX ORDER — PANTOPRAZOLE SODIUM 40 MG/1
40 TABLET, DELAYED RELEASE ORAL DAILY
Status: DISCONTINUED | OUTPATIENT
Start: 2025-06-20 | End: 2025-07-08 | Stop reason: HOSPADM

## 2025-06-20 RX ORDER — ATORVASTATIN CALCIUM 40 MG/1
40 TABLET, FILM COATED ORAL NIGHTLY
Status: DISCONTINUED | OUTPATIENT
Start: 2025-06-20 | End: 2025-07-08 | Stop reason: HOSPADM

## 2025-06-20 RX ADMIN — ACETAMINOPHEN 1000 MG: 500 TABLET ORAL at 08:06

## 2025-06-20 RX ADMIN — SIMETHICONE 80 MG: 80 TABLET, CHEWABLE ORAL at 10:06

## 2025-06-20 RX ADMIN — PANTOPRAZOLE SODIUM 40 MG: 40 TABLET, DELAYED RELEASE ORAL at 02:06

## 2025-06-20 RX ADMIN — OXYCODONE HYDROCHLORIDE 10 MG: 5 TABLET ORAL at 08:06

## 2025-06-20 RX ADMIN — CITALOPRAM HYDROBROMIDE 20 MG: 20 TABLET ORAL at 02:06

## 2025-06-20 RX ADMIN — OXYCODONE HYDROCHLORIDE 10 MG: 5 TABLET ORAL at 02:06

## 2025-06-20 RX ADMIN — BUSPIRONE HYDROCHLORIDE 15 MG: 5 TABLET ORAL at 08:06

## 2025-06-20 RX ADMIN — Medication 6 MG: at 08:06

## 2025-06-20 RX ADMIN — SIMETHICONE 80 MG: 80 TABLET, CHEWABLE ORAL at 08:06

## 2025-06-20 RX ADMIN — ATORVASTATIN CALCIUM 40 MG: 40 TABLET, FILM COATED ORAL at 08:06

## 2025-06-20 RX ADMIN — SIMETHICONE 80 MG: 80 TABLET, CHEWABLE ORAL at 02:06

## 2025-06-20 RX ADMIN — ACETAMINOPHEN 1000 MG: 500 TABLET ORAL at 10:06

## 2025-06-20 RX ADMIN — LIDOCAINE 2 PATCH: 50 PATCH TOPICAL at 01:06

## 2025-06-20 RX ADMIN — OXYCODONE HYDROCHLORIDE 10 MG: 5 TABLET ORAL at 06:06

## 2025-06-20 RX ADMIN — HYDROMORPHONE HYDROCHLORIDE 0.5 MG: 1 INJECTION, SOLUTION INTRAMUSCULAR; INTRAVENOUS; SUBCUTANEOUS at 09:06

## 2025-06-20 RX ADMIN — BUSPIRONE HYDROCHLORIDE 15 MG: 5 TABLET ORAL at 02:06

## 2025-06-20 RX ADMIN — ENOXAPARIN SODIUM 40 MG: 40 INJECTION SUBCUTANEOUS at 05:06

## 2025-06-20 RX ADMIN — ACETAMINOPHEN 1000 MG: 500 TABLET ORAL at 02:06

## 2025-06-20 NOTE — NURSING
PER handoff received from VALERIE Nieto. Pt calmly awake in bed. NAD noted. Recently received PRN pain medication but still reports 6/10 pain. Position adjusted. Fall and safety precautions maintained. Bed alarm activated and audible. Bed locked in lowest position, with side rails up x2. Call bell and personal items within reach.    [facility]  Nurses Note -- 4 Eyes      6/20/2025       Skin assessed on: Q Shift      [x] No Pressure Injuries Present    [x]Prevention Measures Documented    [] Yes LDA  for Pressure Injury Previously documented     [] Yes New Pressure Injury Discovered   [] LDA for New Pressure Injury Added      Attending RN:  Nicole Durán RN     Second RN:  VALERIE Nieto

## 2025-06-20 NOTE — SUBJECTIVE & OBJECTIVE
Interval History:  No acute issues, reports her abdominal pain is getting better.  Passing gas but still no further bowel movement.  Tolerating diet.  No nausea and vomiting.    Review of Systems  Objective:     Vital Signs (Most Recent):  Temp: 98.2 °F (36.8 °C) (06/20/25 1101)  Pulse: 73 (06/20/25 1126)  Resp: 18 (06/20/25 1101)  BP: (!) 140/68 (06/20/25 1101)  SpO2: (!) 94 % (06/20/25 1101) Vital Signs (24h Range):  Temp:  [97.5 °F (36.4 °C)-98.2 °F (36.8 °C)] 98.2 °F (36.8 °C)  Pulse:  [70-93] 73  Resp:  [16-18] 18  SpO2:  [93 %-94 %] 94 %  BP: (130-152)/(63-79) 140/68     Weight: 60.3 kg (132 lb 15 oz)  Body mass index is 20.21 kg/m².    Intake/Output Summary (Last 24 hours) at 6/20/2025 1344  Last data filed at 6/20/2025 0959  Gross per 24 hour   Intake 1210 ml   Output 300 ml   Net 910 ml         Physical Exam  Vitals and nursing note reviewed.   Constitutional:       General: She is not in acute distress.     Appearance: Normal appearance. She is ill-appearing.   HENT:      Head: Normocephalic and atraumatic.      Mouth/Throat:      Mouth: Mucous membranes are dry.   Cardiovascular:      Rate and Rhythm: Normal rate and regular rhythm.      Pulses: Normal pulses.      Heart sounds: Normal heart sounds. No murmur heard.  Pulmonary:      Effort: Pulmonary effort is normal. No respiratory distress.      Breath sounds: Normal breath sounds. No wheezing or rhonchi.   Abdominal:      General: Bowel sounds are normal. There is no distension.      Palpations: Abdomen is soft.      Tenderness: There is no abdominal tenderness.      Comments: Dressing in place and abdominal binder on   Musculoskeletal:         General: No swelling or tenderness.   Skin:     General: Skin is warm and dry.      Coloration: Skin is not jaundiced or pale.   Neurological:      General: No focal deficit present.      Mental Status: She is alert and oriented to person, place, and time. Mental status is at baseline.      Sensory: No sensory  deficit.      Motor: No weakness.   Psychiatric:         Mood and Affect: Mood normal.         Behavior: Behavior normal.         Thought Content: Thought content normal.         Judgment: Judgment normal.               Significant Labs: All pertinent labs within the past 24 hours have been reviewed.    Significant Imaging: I have reviewed all pertinent imaging results/findings within the past 24 hours.

## 2025-06-20 NOTE — PT/OT/SLP PROGRESS
Physical Therapy Treatment    Patient Name:  Patricia Ramirez   MRN:  [id number]    Recommendations:     Discharge Recommendations: Low Intensity Therapy  Discharge Equipment Recommendations: walker, rolling  Barriers to discharge: None    Assessment:     Patricia Ramirez is a 69 y.o. female admitted with a medical diagnosis of SBO (small bowel obstruction).  She presents with the following impairments/functional limitations: impaired self care skills, weakness, impaired endurance, impaired functional mobility, gait instability, decreased lower extremity function, decreased ROM, pain, decreased safety awareness, impaired balance .    Rehab Prognosis: Good; patient would benefit from acute skilled PT services to address these deficits and reach maximum level of function.    Recent Surgery: Procedure(s) (LRB):  LAPAROSCOPY, DIAGNOSTIC (N/A)  LAPAROTOMY, EXPLORATORY; APPENDECTOMY; SMALL BOWEL RESECTION; DIVERTICULUM RESECTION (N/A) 6 Days Post-Op    Plan:     During this hospitalization, patient to be seen 5 x/week (5-4x per week) to address the identified rehab impairments via gait training, therapeutic activities, therapeutic exercises, neuromuscular re-education and progress toward the following goals:    Plan of Care Expires:  07/06/25    Subjective     Chief Complaint: pain   Patient/Family Comments/goals: pt declined OOB mobility 2* to pain, pt reported that she has been OOB  at least 3 times today.   Pain/Comfort:  Pain Rating 1: 10/10  Location 1: abdomen  Pain Addressed 1: Pre-medicate for activity, Nurse notified  Pain Rating Post-Intervention 1: 10/10      Objective:     Communicated with nurse prior to session.  Patient found with bed in chair position with telemetry, peripheral IV upon PT entry to room.     General Precautions: Standard, fall, abdominal precautions .   Orthopedic Precautions: N/A  Braces: ( abdominal binder)  Respiratory Status: Room air        AM-PAC 6 CLICK MOBILITY  Turning over in bed  (including adjusting bedclothes, sheets and blankets)?: 4  Sitting down on and standing up from a chair with arms (e.g., wheelchair, bedside commode, etc.): 3  Moving from lying on back to sitting on the side of the bed?: 3  Moving to and from a bed to a chair (including a wheelchair)?: 3  Need to walk in hospital room?: 3  Climbing 3-5 steps with a railing?: 3  Basic Mobility Total Score: 19       Treatment & Education:  BLE HEP within abdominal precautions given with instructions and demonstrations (pt verbalized understanding). Encouraged pt to perform BLE ex's per handout throughout the day.   Educated pt on pursed lip breathing technique and pursed lip breathing techniques , handouts provided.   Encouraged pt to ambulate as tolerated with RW and nursing staff later today .   Patient left with bed in chair position with all lines intact, call button in reach, and nurse notified..    GOALS:   Multidisciplinary Problems       Physical Therapy Goals          Problem: Physical Therapy    Goal Priority Disciplines Outcome Interventions   Physical Therapy Goal     PT, PT/OT Progressing    Description: Goals to be met by: 25     Patient will increase functional independence with mobility by performin. Supine to sit with Modified [place]  2. Sit to supine with Modified [place]  3. Sit to stand transfer with Modified [place]  4. Gait  x 50 feet with Modified [place] using Rolling Walker.   5. Stand for 5 minutes with Modified [place] using Rolling Walker  6. Lower extremity exercise program x30 reps per handout, with independence                         DME Justifications:   Patricia's mobility limitation cannot be sufficiently resolved by the use of a cane. Her functional mobility deficit can be sufficiently resolved with the use of a Rolling Walker. Patient's mobility limitation significantly impairs their ability to participate in one of more activities of daily living.  The use of a  RW will significantly improve the patient's ability to participate in MRADLS and the patient will use it on regular basis in the home.    Time Tracking:     PT Received On: 06/20/25  PT Start Time: 1430     PT Stop Time: 1438  PT Total Time (min): 8 min     Billable Minutes: Therapeutic Activity 8    Treatment Type: Treatment  PT/PTA: PTA     Number of PTA visits since last PT visit: 4     06/20/2025

## 2025-06-20 NOTE — ASSESSMENT & PLAN NOTE
Patient's blood pressure range in the last 24 hours was: BP  Min: 130/79  Max: 152/71.The patient's inpatient anti-hypertensive regimen is listed below:  Current Antihypertensives  hydrALAZINE injection 10 mg, Every 8 hours PRN, Intravenous    Plan  - BP is controlled, no changes needed to their regimen  - hold home Benicar at this time given NPO status  - resume once able to tolerate p.o. intake and if BP not controlled

## 2025-06-20 NOTE — ASSESSMENT & PLAN NOTE
-was recently hospitalized from 06/09/2025 to 06/13/2025 for SBO. Was seen by general surgery and was treated conservatively. Patient had return of bowel functions after multiple days of gastric decompression with NGT and suppository and was discharged  -returned with recurrent abdominal pain associated with nausea.  Pt with recurrent SBO.   -CT abdomen and pelvis showed worsening mechanical small bowel obstruction at the level of the distal ileum   -pt s/p  exploratory laparotomy, appendectomy, small bowel resection and side to side anastomosis, and diverticulectomy on 06/14/25  -management per primary team, General surgery  - NGT out, discharge per surgery once pain controlled and tolerating diet  - we will sign off.  Please call with any questions or concerns.

## 2025-06-20 NOTE — PLAN OF CARE
Problem: Adult Inpatient Plan of Care  Goal: Plan of Care Review  Outcome: Progressing  Flowsheets (Taken 6/19/2025 2239)  Plan of Care Reviewed With: patient     Problem: Infection  Goal: Absence of Infection Signs and Symptoms  Outcome: Progressing  Intervention: Prevent or Manage Infection  Flowsheets (Taken 6/19/2025 2239)  Fever Reduction/Comfort Measures:   lightweight bedding   lightweight clothing     Problem: Fall Injury Risk  Goal: Absence of Fall and Fall-Related Injury  Outcome: Progressing  Intervention: Identify and Manage Contributors  Flowsheets (Taken 6/19/2025 2239)  Self-Care Promotion: independence encouraged  Medication Review/Management: medications reviewed

## 2025-06-20 NOTE — PROGRESS NOTES
Surgery Progress Note    Patricia Ramirez is a 69 y.o. year old female in hospital for recurrent SBO (failed conservative management) s/p diagnostic laparoscopy converted exploratory laparotomy, SBR and anastomosis, appendectomy, diverticulectomy 6/14    NAEON NATALIA VSS  Reports some cramping pain but overall improved  Tolerating clear liquid diet, no n/v  Passing flatus, last BM two nights ago  No f/c/n/v/sob/cp    ROS:  Negative except above    PE:  Vitals:    06/20/25 0753 06/20/25 0910 06/20/25 1101 06/20/25 1126   BP:   (!) 140/68    BP Location:   Left arm    Patient Position:   Lying    Pulse: 85  85 73   Resp:  18 18    Temp:   98.2 °F (36.8 °C)    TempSrc:   Oral    SpO2:   (!) 94%    Weight:       Height:           NAD, resting  No belabored breathing  No skin changes  Abd soft mildy distended appropriately TTP around midline incision, port site incisions c/d/I  Dressings c/d/I       Significant Diagnostic Studies: Labs: BMP:   Recent Labs   Lab 06/20/25  0831         K 4.2      CO2 24   BUN 14   CREATININE 0.4*   CALCIUM 8.1*   MG 1.9   , CBC   Recent Labs   Lab 06/20/25  0831   WBC 18.38*   HGB 12.7   HCT 38.2      , INR   Lab Results   Component Value Date    INR 1.0 07/28/2022   , and All labs within the past 24 hours have been reviewed    A/P:  Patricia Ramirez is a 69 y.o. year old female  in hospital for recurrent SBO (failed conservative management), POD#1 from diagnostic laparoscopy converted exploratory laparotomy, SBR and anastomosis, appendectomy, diverticulectomy 6/14    - Advance diet to regular  - Monitor for n/v and bowel function  - MMPC  - Pulm toilet, encourage IS 10x/hour  - PT/OT, encourage up to chair and ambulation   - Abd binder for comfort, ok to take off or have breaks if needed  - Replete electrolytes to maintain K > 4, Phos > 3, Mg > 2  - DVT ppx     Phokoffi Lin  General Surgery - [facility]  6/20/2025

## 2025-06-20 NOTE — PLAN OF CARE
[facility] Telemetry      HOME HEALTH ORDERS  FACE TO FACE ENCOUNTER    Patient Name: Patricia Ramirez  YOB: 1955    PCP: Fabienne Erwin NP   PCP Address: [address] / [place] [address]  PCP Phone Number: [phone]  PCP Fax: [phone]    Encounter Date: 6/14/25    Admit to Home Health    Diagnoses:  Active Hospital Problems    Diagnosis  POA    *SBO (small bowel obstruction) [K56.609]  Yes    Hypokalemia [E87.6]  Yes    Heart failure with preserved ejection fraction [I50.30]  Yes     ECHO (7/29/22) EF 65% LV grade 2 diastolic dysfunction.      Essential hypertension [I10]  Yes    Hyperlipidemia [E78.5]  Yes    Depression with anxiety [F41.8]  Yes      Resolved Hospital Problems   No resolved problems to display.       Follow Up Appointments:  Future Appointments   Date Time Provider Department Center   6/25/2025  1:00 PM Fabienne Erwin NP [facility]   7/17/2025 10:40 AM Lisa Ramos NP [facility] GASTRO [facility]   9/15/2025  7:55 AM [facility] LAB [facility] LAB [facility]   9/15/2025 10:00 AM Stevie Dewey MD [facility] DERM [facility]   9/22/2025  8:45 AM Fabienne Erwin NP [facility]       Allergies:  Review of patient's allergies indicates:   Allergen Reactions    Sulfa (sulfonamide antibiotics) Hives    Ace inhibitors Other (See Comments)     Cough         Medications: Review discharge medications with patient and family and provide education.      I have seen and examined this patient within the last 30 days. My clinical findings that support the need for the home health skilled services and home bound status are the following:no   Weakness/numbness causing balance and gait disturbance due to Surgery making it taxing to leave home.     Diet:   cardiac diet    Labs:  N/a    Referrals/ Consults  Physical Therapy to evaluate and treat. Evaluate for home safety and equipment needs; Establish/upgrade home exercise program. Perform / instruct on therapeutic  exercises, gait training, transfer training, and Range of Motion.  Occupational Therapy to evaluate and treat. Evaluate home environment for safety and equipment needs. Perform/Instruct on transfers, ADL training, ROM, and therapeutic exercises.   to evaluate for community resources/long-range planning.  Aide to provide assistance with personal care, ADLs, and vital signs.    Activities:   activity as tolerated    Nursing:   Agency to admit patient within 24 hours of hospital discharge unless specified on physician order or at patient request    SN to complete comprehensive assessment including routine vital signs. Instruct on disease process and s/s of complications to report to MD. Review/verify medication list sent home with the patient at time of discharge  and instruct patient/caregiver as needed. Frequency may be adjusted depending on start of care date.     Skilled nurse to perform up to 3 visits PRN for symptoms related to diagnosis    Notify MD if SBP > 160 or < 90; DBP > 90 or < 50; HR > 120 or < 50; Temp > 101; O2 < 88%; Other:       Ok to schedule additional visits based on staff availability and patient request on consecutive days within the home health episode.    Miscellaneous       Home Health Aide:  Nursing , Physical Therapy , Occupational Therapy , and Home Health Aide     Wound Care Orders  no    I certify that this patient is confined to her home and needs intermittent skilled nursing care, physical therapy, and occupational therapy.

## 2025-06-20 NOTE — PLAN OF CARE
06/20/25 1203   Medicare Message   Important Message from Medicare regarding Discharge Appeal Rights Given to patient/caregiver;Explained to patient/caregiver;Signed/date by patient/caregiver   Date IMM was signed 06/20/25   Time IMM was signed 1115

## 2025-06-20 NOTE — PROGRESS NOTES
[facility] Medicine  Progress Note    Patient Name: Patricia Ramirez  MRN: [id number]  Patient Class: IP- Inpatient   Admission Date: 6/14/2025  Length of Stay: 6 days  Attending Physician: Chiki Cummings MD  Primary Care Provider: Fabienne Erwin NP        Subjective     Principal Problem:SBO (small bowel obstruction)        HPI:  69-year-old female with a history of anxiety, depression, hypertension presents to the ED with recurrent small bowel obstruction. Complains of diffuse abdominal pain, associated with nausea but no vomiting. Still passing flatus. Patient was recently hospitalized from 06/09/2025 to 06/13/2025 for SBO. Was seen by general surgery and was treated conservatively. Patient had return of bowel functions after multiple days of gastric decompression with NGT and suppository. Prior to discharge, patient was able to tolerate PO without any abdominal pain, nausea or vomiting. Referred to GI on discharge for possible gastric dysmotility disorder. Of note, patient was offered surgery last hospitalization, but patient declined at that time. States she is now amendable to surgery.     In the ED, patient hypertensive but afebrile. Labs without leukocytosis, but noted to have hypokalemia with K 3.1. CT abdomen/pelvis w/ worsening mechanical small bowel obstruction at the level of the distal ileum. NGT placed. Pt admitted to general surgery team. Hospital medicine team consulted.        Overview/Hospital Course:  69 year old female admitted to general surgery team on 06/14/2025 for recurrent small-bowel obstruction. Patient taken for expiratory laparotomy on 06/14/2025.  NGT in place. Hospital medicine consulted for medical management.    Interval History:  No acute issues, reports her abdominal pain is getting better.  Passing gas but still no further bowel movement.  Tolerating diet.  No nausea and vomiting.    Review of Systems  Objective:     Vital Signs (Most Recent):  Temp: 98.2 °F  (36.8 °C) (06/20/25 1101)  Pulse: 73 (06/20/25 1126)  Resp: 18 (06/20/25 1101)  BP: (!) 140/68 (06/20/25 1101)  SpO2: (!) 94 % (06/20/25 1101) Vital Signs (24h Range):  Temp:  [97.5 °F (36.4 °C)-98.2 °F (36.8 °C)] 98.2 °F (36.8 °C)  Pulse:  [70-93] 73  Resp:  [16-18] 18  SpO2:  [93 %-94 %] 94 %  BP: (130-152)/(63-79) 140/68     Weight: 60.3 kg (132 lb 15 oz)  Body mass index is 20.21 kg/m².    Intake/Output Summary (Last 24 hours) at 6/20/2025 1344  Last data filed at 6/20/2025 0959  Gross per 24 hour   Intake 1210 ml   Output 300 ml   Net 910 ml         Physical Exam  Vitals and nursing note reviewed.   Constitutional:       General: She is not in acute distress.     Appearance: Normal appearance. She is ill-appearing.   HENT:      Head: Normocephalic and atraumatic.      Mouth/Throat:      Mouth: Mucous membranes are dry.   Cardiovascular:      Rate and Rhythm: Normal rate and regular rhythm.      Pulses: Normal pulses.      Heart sounds: Normal heart sounds. No murmur heard.  Pulmonary:      Effort: Pulmonary effort is normal. No respiratory distress.      Breath sounds: Normal breath sounds. No wheezing or rhonchi.   Abdominal:      General: Bowel sounds are normal. There is no distension.      Palpations: Abdomen is soft.      Tenderness: There is no abdominal tenderness.      Comments: Dressing in place and abdominal binder on   Musculoskeletal:         General: No swelling or tenderness.   Skin:     General: Skin is warm and dry.      Coloration: Skin is not jaundiced or pale.   Neurological:      General: No focal deficit present.      Mental Status: She is alert and oriented to person, place, and time. Mental status is at baseline.      Sensory: No sensory deficit.      Motor: No weakness.   Psychiatric:         Mood and Affect: Mood normal.         Behavior: Behavior normal.         Thought Content: Thought content normal.         Judgment: Judgment normal.               Significant Labs: All pertinent  labs within the past 24 hours have been reviewed.    Significant Imaging: I have reviewed all pertinent imaging results/findings within the past 24 hours.      Assessment & Plan  SBO (small bowel obstruction)  -was recently hospitalized from 06/09/2025 to 06/13/2025 for SBO. Was seen by general surgery and was treated conservatively. Patient had return of bowel functions after multiple days of gastric decompression with NGT and suppository and was discharged  -returned with recurrent abdominal pain associated with nausea.  Pt with recurrent SBO.   -CT abdomen and pelvis showed worsening mechanical small bowel obstruction at the level of the distal ileum   -pt s/p  exploratory laparotomy, appendectomy, small bowel resection and side to side anastomosis, and diverticulectomy on 06/14/25  -management per primary team, General surgery  - NGT out, discharge per surgery once pain controlled and tolerating diet  - we will sign off.  Please call with any questions or concerns.    Hypokalemia  Patient's most recent potassium results are listed below.   Recent Labs     06/18/25  0328 06/20/25  0831   K 3.7 4.2     Plan  - Replete potassium per protocol  - Monitor potassium Daily  - Patient's hypokalemia is improving  - replace as needed  Essential hypertension  Patient's blood pressure range in the last 24 hours was: BP  Min: 130/79  Max: 152/71.The patient's inpatient anti-hypertensive regimen is listed below:  Current Antihypertensives  hydrALAZINE injection 10 mg, Every 8 hours PRN, Intravenous    Plan  - BP is controlled, no changes needed to their regimen  - hold home Benicar at this time given NPO status  - resume once able to tolerate p.o. intake and if BP not controlled  Hyperlipidemia  -hold home statin at this time given NPO status   -resume when appropriate    Heart failure with preserved ejection fraction  -echocardiogram from 10/07/2024 with preserved EF of 55-60%, grade 1 diastolic dysfunction.  PASP of 35 mm  Hg  -appears to be at baseline, appears euvolemic   -monitor    Depression with anxiety  Patient has persistent depression which is moderate and is currently controlled. Will hold anti-depressant medications. We will not consult psychiatry at this time. Patient does not display psychosis at this time. Continue to monitor closely and adjust plan of care as needed.      Hold home Xanax, BuSpar, and Celexa at this time given NPO status  VTE Risk Mitigation (From admission, onward)           Ordered     enoxaparin injection 40 mg  Every 24 hours         06/15/25 0733     IP VTE HIGH RISK PATIENT  Once         06/14/25 0559     Place sequential compression device  Until discontinued         06/14/25 0559                    Discharge Planning   STUART: 6/20/2025     Code Status: Full Code   Medical Readiness for Discharge Date: 6/19/2025  Discharge Plan A: Home Health   Discharge Delays: None known at this time              Please place Justification for DME      Moise Collins MD  Department of Hospital Medicine   [facility] - Telemetry

## 2025-06-20 NOTE — ASSESSMENT & PLAN NOTE
Patient's most recent potassium results are listed below.   Recent Labs     06/18/25  0328 06/20/25  0831   K 3.7 4.2     Plan  - Replete potassium per protocol  - Monitor potassium Daily  - Patient's hypokalemia is improving  - replace as needed

## 2025-06-21 LAB
ABSOLUTE EOSINOPHIL (OHS): 0.31 K/UL
ABSOLUTE MONOCYTE (OHS): 0.74 K/UL (ref 0.3–1)
ABSOLUTE NEUTROPHIL COUNT (OHS): 13.29 K/UL (ref 1.8–7.7)
ANION GAP (OHS): 10 MMOL/L (ref 8–16)
BASOPHILS # BLD AUTO: 0.01 K/UL
BASOPHILS NFR BLD AUTO: 0.1 %
BUN SERPL-MCNC: 14 MG/DL (ref 8–23)
CALCIUM SERPL-MCNC: 7.8 MG/DL (ref 8.7–10.5)
CHLORIDE SERPL-SCNC: 101 MMOL/L (ref 95–110)
CO2 SERPL-SCNC: 30 MMOL/L (ref 23–29)
CREAT SERPL-MCNC: 0.4 MG/DL (ref 0.5–1.4)
ERYTHROCYTE [DISTWIDTH] IN BLOOD BY AUTOMATED COUNT: 13.2 % (ref 11.5–14.5)
GFR SERPLBLD CREATININE-BSD FMLA CKD-EPI: >60 ML/MIN/1.73/M2
GLUCOSE SERPL-MCNC: 107 MG/DL (ref 70–110)
HCT VFR BLD AUTO: 36.2 % (ref 37–48.5)
HGB BLD-MCNC: 12 GM/DL (ref 12–16)
IMM GRANULOCYTES # BLD AUTO: 0.11 K/UL (ref 0–0.04)
IMM GRANULOCYTES NFR BLD AUTO: 0.7 % (ref 0–0.5)
LYMPHOCYTES # BLD AUTO: 0.95 K/UL (ref 1–4.8)
MAGNESIUM SERPL-MCNC: 1.8 MG/DL (ref 1.6–2.6)
MCH RBC QN AUTO: 32.4 PG (ref 27–31)
MCHC RBC AUTO-ENTMCNC: 33.1 G/DL (ref 32–36)
MCV RBC AUTO: 98 FL (ref 82–98)
NUCLEATED RBC (/100WBC) (OHS): 0 /100 WBC
PHOSPHATE SERPL-MCNC: 3.2 MG/DL (ref 2.7–4.5)
PLATELET # BLD AUTO: 246 K/UL (ref 150–450)
PMV BLD AUTO: 9 FL (ref 9.2–12.9)
POTASSIUM SERPL-SCNC: 3.2 MMOL/L (ref 3.5–5.1)
RBC # BLD AUTO: 3.7 M/UL (ref 4–5.4)
RELATIVE EOSINOPHIL (OHS): 2 %
RELATIVE LYMPHOCYTE (OHS): 6.2 % (ref 18–48)
RELATIVE MONOCYTE (OHS): 4.8 % (ref 4–15)
RELATIVE NEUTROPHIL (OHS): 86.2 % (ref 38–73)
SODIUM SERPL-SCNC: 141 MMOL/L (ref 136–145)
WBC # BLD AUTO: 15.41 K/UL (ref 3.9–12.7)

## 2025-06-21 PROCEDURE — 63600175 PHARM REV CODE 636 W HCPCS

## 2025-06-21 PROCEDURE — 94761 N-INVAS EAR/PLS OXIMETRY MLT: CPT

## 2025-06-21 PROCEDURE — 36415 COLL VENOUS BLD VENIPUNCTURE: CPT

## 2025-06-21 PROCEDURE — 99900035 HC TECH TIME PER 15 MIN (STAT)

## 2025-06-21 PROCEDURE — 85025 COMPLETE CBC W/AUTO DIFF WBC: CPT

## 2025-06-21 PROCEDURE — 11000001 HC ACUTE MED/SURG PRIVATE ROOM

## 2025-06-21 PROCEDURE — 80048 BASIC METABOLIC PNL TOTAL CA: CPT

## 2025-06-21 PROCEDURE — 84100 ASSAY OF PHOSPHORUS: CPT

## 2025-06-21 PROCEDURE — 99900031 HC PATIENT EDUCATION (STAT)

## 2025-06-21 PROCEDURE — 25000003 PHARM REV CODE 250

## 2025-06-21 PROCEDURE — 25000003 PHARM REV CODE 250: Performed by: STUDENT IN AN ORGANIZED HEALTH CARE EDUCATION/TRAINING PROGRAM

## 2025-06-21 PROCEDURE — 83735 ASSAY OF MAGNESIUM: CPT

## 2025-06-21 RX ADMIN — SIMETHICONE 80 MG: 80 TABLET, CHEWABLE ORAL at 02:06

## 2025-06-21 RX ADMIN — ACETAMINOPHEN 1000 MG: 500 TABLET ORAL at 02:06

## 2025-06-21 RX ADMIN — LIDOCAINE 2 PATCH: 50 PATCH TOPICAL at 02:06

## 2025-06-21 RX ADMIN — BUSPIRONE HYDROCHLORIDE 15 MG: 5 TABLET ORAL at 08:06

## 2025-06-21 RX ADMIN — BUSPIRONE HYDROCHLORIDE 15 MG: 5 TABLET ORAL at 09:06

## 2025-06-21 RX ADMIN — OXYCODONE HYDROCHLORIDE 10 MG: 5 TABLET ORAL at 05:06

## 2025-06-21 RX ADMIN — BUSPIRONE HYDROCHLORIDE 15 MG: 5 TABLET ORAL at 02:06

## 2025-06-21 RX ADMIN — SIMETHICONE 80 MG: 80 TABLET, CHEWABLE ORAL at 08:06

## 2025-06-21 RX ADMIN — OXYCODONE HYDROCHLORIDE 10 MG: 5 TABLET ORAL at 07:06

## 2025-06-21 RX ADMIN — ENOXAPARIN SODIUM 40 MG: 40 INJECTION SUBCUTANEOUS at 04:06

## 2025-06-21 RX ADMIN — PANTOPRAZOLE SODIUM 40 MG: 40 TABLET, DELAYED RELEASE ORAL at 08:06

## 2025-06-21 RX ADMIN — SIMETHICONE 80 MG: 80 TABLET, CHEWABLE ORAL at 06:06

## 2025-06-21 RX ADMIN — CITALOPRAM HYDROBROMIDE 20 MG: 20 TABLET ORAL at 08:06

## 2025-06-21 RX ADMIN — ACETAMINOPHEN 1000 MG: 500 TABLET ORAL at 08:06

## 2025-06-21 RX ADMIN — ACETAMINOPHEN 1000 MG: 500 TABLET ORAL at 09:06

## 2025-06-21 RX ADMIN — SIMETHICONE 80 MG: 80 TABLET, CHEWABLE ORAL at 09:06

## 2025-06-21 RX ADMIN — ATORVASTATIN CALCIUM 40 MG: 40 TABLET, FILM COATED ORAL at 09:06

## 2025-06-21 NOTE — PROGRESS NOTES
Surgery Progress Note    Patricia Ramirez is a 69 y.o. year old female in hospital for recurrent SBO (failed conservative management) s/p diagnostic laparoscopy converted exploratory laparotomy, SBR and anastomosis, appendectomy, diverticulectomy 6/14    NAEON AF VSS  Reports pain continues to improve daily  Tolerating regular diet  Passing flatus, no BM but no abd distension  No f/c/n/v/sob/cp    ROS:  Negative except above    PE:  Vitals:    06/21/25 0544 06/21/25 0722 06/21/25 0733 06/21/25 0738   BP: 125/72 125/75     BP Location:       Patient Position:       Pulse: 81 75  77   Resp:  18 18    Temp: 98.6 °F (37 °C) 98.1 °F (36.7 °C)     TempSrc:  Oral     SpO2: (!) 92% (!) 92%     Weight:       Height:           NAD, resting  No belabored breathing  No skin changes  Abd soft mildy distended appropriately TTP around midline incision, port site incisions c/d/I      Significant Diagnostic Studies: Labs: BMP:   Recent Labs   Lab 06/20/25  0831         K 4.2      CO2 24   BUN 14   CREATININE 0.4*   CALCIUM 8.1*   MG 1.9   , CBC   Recent Labs   Lab 06/20/25  0831 06/21/25  0416   WBC 18.38* 15.41*   HGB 12.7 12.0   HCT 38.2 36.2*    246   , INR   Lab Results   Component Value Date    INR 1.0 07/28/2022   , and All labs within the past 24 hours have been reviewed    A/P:  Patricia Ramirez is a 69 y.o. year old female  in hospital for recurrent SBO (failed conservative management), POD#1 from diagnostic laparoscopy converted exploratory laparotomy, SBR and anastomosis, appendectomy, diverticulectomy 6/14    - Regular diet  - Monitor for n/v and bowel function  - MMPC  - Pulm toilet, encourage IS 10x/hour  - PT/OT, encourage up to chair and ambulation   - Abd binder for comfort, ok to take off or have breaks if needed  - Replete electrolytes to maintain K > 4, Phos > 3, Mg > 2  - DVT ppx   - Possible discharge soon, will obtain PT/OT recs for discharge planning    Roxann Lin  General Surgery -  [facility]  6/21/2025

## 2025-06-21 NOTE — NURSING
[facility]  Nurses Note -- 4 Eyes      6/21/2025       Skin assessed on: Q Shift      [x] No Pressure Injuries Present    []Prevention Measures Documented    [] Yes LDA  for Pressure Injury Previously documented     [] Yes New Pressure Injury Discovered   [] LDA for New Pressure Injury Added      Attending RN:  Kim Trent RN     Second RN:  Emilia

## 2025-06-21 NOTE — PLAN OF CARE
Problem: Adult Inpatient Plan of Care  Goal: Plan of Care Review  Outcome: Progressing  Flowsheets (Taken 6/20/2025 2002)  Plan of Care Reviewed With: patient     Problem: Wound  Goal: Absence of Infection Signs and Symptoms  Outcome: Progressing  Intervention: Prevent or Manage Infection  Flowsheets (Taken 6/20/2025 2002)  Fever Reduction/Comfort Measures:   lightweight bedding   lightweight clothing     Problem: Fall Injury Risk  Goal: Absence of Fall and Fall-Related Injury  Outcome: Progressing  Intervention: Identify and Manage Contributors  Flowsheets (Taken 6/20/2025 2002)  Self-Care Promotion:   independence encouraged   adaptive equipment use encouraged  Medication Review/Management: medications reviewed

## 2025-06-21 NOTE — PLAN OF CARE
Problem: Adult Inpatient Plan of Care  Goal: Plan of Care Review  Outcome: Progressing  Flowsheets (Taken 6/21/2025 0815)  Plan of Care Reviewed With: patient  Goal: Patient-Specific Goal (Individualized)  Outcome: Progressing     Problem: Pain Acute  Goal: Optimal Pain Control and Function  Outcome: Progressing  Intervention: Develop Pain Management Plan  Flowsheets (Taken 6/21/2025 0815)  Pain Management Interventions:   around-the-clock dosing utilized   awakened for pain meds per patient request   care clustered   pillow support provided     Problem: Adult Inpatient Plan of Care  Goal: Plan of Care Review  Outcome: Progressing  Flowsheets (Taken 6/21/2025 0815)  Plan of Care Reviewed With: patient     Problem: Adult Inpatient Plan of Care  Goal: Plan of Care Review  Outcome: Progressing  Flowsheets (Taken 6/21/2025 0815)  Plan of Care Reviewed With: patient     Problem: Adult Inpatient Plan of Care  Goal: Patient-Specific Goal (Individualized)  Outcome: Progressing     Problem: Adult Inpatient Plan of Care  Goal: Patient-Specific Goal (Individualized)  Outcome: Progressing     Problem: Pain Acute  Goal: Optimal Pain Control and Function  Outcome: Progressing  Intervention: Develop Pain Management Plan  Flowsheets (Taken 6/21/2025 0815)  Pain Management Interventions:   around-the-clock dosing utilized   awakened for pain meds per patient request   care clustered   pillow support provided     Problem: Pain Acute  Goal: Optimal Pain Control and Function  Outcome: Progressing     Problem: Pain Acute  Goal: Optimal Pain Control and Function  Intervention: Develop Pain Management Plan  Flowsheets (Taken 6/21/2025 0815)  Pain Management Interventions:   around-the-clock dosing utilized   awakened for pain meds per patient request   care clustered   pillow support provided     Problem: Pain Acute  Goal: Optimal Pain Control and Function  Intervention: Develop Pain Management Plan  Flowsheets (Taken 6/21/2025  0815)  Pain Management Interventions:   around-the-clock dosing utilized   awakened for pain meds per patient request   care clustered   pillow support provided

## 2025-06-22 LAB
ABSOLUTE EOSINOPHIL (OHS): 0.11 K/UL
ABSOLUTE MONOCYTE (OHS): 0.73 K/UL (ref 0.3–1)
ABSOLUTE NEUTROPHIL COUNT (OHS): 17.61 K/UL (ref 1.8–7.7)
ANION GAP (OHS): 9 MMOL/L (ref 8–16)
BASOPHILS # BLD AUTO: 0.03 K/UL
BASOPHILS NFR BLD AUTO: 0.2 %
BILIRUB UR QL STRIP.AUTO: NEGATIVE
BUN SERPL-MCNC: 11 MG/DL (ref 8–23)
CALCIUM SERPL-MCNC: 7.6 MG/DL (ref 8.7–10.5)
CHLORIDE SERPL-SCNC: 102 MMOL/L (ref 95–110)
CLARITY UR: ABNORMAL
CO2 SERPL-SCNC: 30 MMOL/L (ref 23–29)
COLOR UR AUTO: YELLOW
CREAT SERPL-MCNC: 0.4 MG/DL (ref 0.5–1.4)
ERYTHROCYTE [DISTWIDTH] IN BLOOD BY AUTOMATED COUNT: 13.2 % (ref 11.5–14.5)
GFR SERPLBLD CREATININE-BSD FMLA CKD-EPI: >60 ML/MIN/1.73/M2
GLUCOSE SERPL-MCNC: 108 MG/DL (ref 70–110)
GLUCOSE UR QL STRIP: NEGATIVE
HCT VFR BLD AUTO: 38.1 % (ref 37–48.5)
HGB BLD-MCNC: 12.6 GM/DL (ref 12–16)
HGB UR QL STRIP: NEGATIVE
HOLD SPECIMEN: NORMAL
IMM GRANULOCYTES # BLD AUTO: 0.17 K/UL (ref 0–0.04)
IMM GRANULOCYTES NFR BLD AUTO: 0.9 % (ref 0–0.5)
KETONES UR QL STRIP: NEGATIVE
LEUKOCYTE ESTERASE UR QL STRIP: NEGATIVE
LYMPHOCYTES # BLD AUTO: 1.01 K/UL (ref 1–4.8)
MAGNESIUM SERPL-MCNC: 1.8 MG/DL (ref 1.6–2.6)
MCH RBC QN AUTO: 32.2 PG (ref 27–31)
MCHC RBC AUTO-ENTMCNC: 33.1 G/DL (ref 32–36)
MCV RBC AUTO: 97 FL (ref 82–98)
NITRITE UR QL STRIP: NEGATIVE
NUCLEATED RBC (/100WBC) (OHS): 0 /100 WBC
PH UR STRIP: 7 [PH]
PLATELET # BLD AUTO: 264 K/UL (ref 150–450)
PMV BLD AUTO: 8.7 FL (ref 9.2–12.9)
POTASSIUM SERPL-SCNC: 3.1 MMOL/L (ref 3.5–5.1)
PROT UR QL STRIP: ABNORMAL
RBC # BLD AUTO: 3.91 M/UL (ref 4–5.4)
RELATIVE EOSINOPHIL (OHS): 0.6 %
RELATIVE LYMPHOCYTE (OHS): 5.1 % (ref 18–48)
RELATIVE MONOCYTE (OHS): 3.7 % (ref 4–15)
RELATIVE NEUTROPHIL (OHS): 89.5 % (ref 38–73)
SODIUM SERPL-SCNC: 141 MMOL/L (ref 136–145)
SP GR UR STRIP: >=1.03
UROBILINOGEN UR STRIP-ACNC: >=8 EU/DL
WBC # BLD AUTO: 19.66 K/UL (ref 3.9–12.7)

## 2025-06-22 PROCEDURE — 36415 COLL VENOUS BLD VENIPUNCTURE: CPT

## 2025-06-22 PROCEDURE — 63600175 PHARM REV CODE 636 W HCPCS: Performed by: STUDENT IN AN ORGANIZED HEALTH CARE EDUCATION/TRAINING PROGRAM

## 2025-06-22 PROCEDURE — 85025 COMPLETE CBC W/AUTO DIFF WBC: CPT

## 2025-06-22 PROCEDURE — 83735 ASSAY OF MAGNESIUM: CPT

## 2025-06-22 PROCEDURE — 63600175 PHARM REV CODE 636 W HCPCS

## 2025-06-22 PROCEDURE — 25000003 PHARM REV CODE 250

## 2025-06-22 PROCEDURE — 25000003 PHARM REV CODE 250: Performed by: STUDENT IN AN ORGANIZED HEALTH CARE EDUCATION/TRAINING PROGRAM

## 2025-06-22 PROCEDURE — 80048 BASIC METABOLIC PNL TOTAL CA: CPT

## 2025-06-22 PROCEDURE — 11000001 HC ACUTE MED/SURG PRIVATE ROOM

## 2025-06-22 PROCEDURE — 81003 URINALYSIS AUTO W/O SCOPE: CPT | Performed by: SURGERY

## 2025-06-22 PROCEDURE — 99900035 HC TECH TIME PER 15 MIN (STAT)

## 2025-06-22 RX ADMIN — SIMETHICONE 80 MG: 80 TABLET, CHEWABLE ORAL at 06:06

## 2025-06-22 RX ADMIN — BUSPIRONE HYDROCHLORIDE 15 MG: 5 TABLET ORAL at 08:06

## 2025-06-22 RX ADMIN — POTASSIUM BICARBONATE 35 MEQ: 391 TABLET, EFFERVESCENT ORAL at 02:06

## 2025-06-22 RX ADMIN — BUSPIRONE HYDROCHLORIDE 15 MG: 5 TABLET ORAL at 02:06

## 2025-06-22 RX ADMIN — OXYCODONE HYDROCHLORIDE 10 MG: 5 TABLET ORAL at 01:06

## 2025-06-22 RX ADMIN — CITALOPRAM HYDROBROMIDE 20 MG: 20 TABLET ORAL at 08:06

## 2025-06-22 RX ADMIN — ACETAMINOPHEN 1000 MG: 500 TABLET ORAL at 08:06

## 2025-06-22 RX ADMIN — SIMETHICONE 80 MG: 80 TABLET, CHEWABLE ORAL at 08:06

## 2025-06-22 RX ADMIN — POTASSIUM BICARBONATE 35 MEQ: 391 TABLET, EFFERVESCENT ORAL at 06:06

## 2025-06-22 RX ADMIN — OXYCODONE HYDROCHLORIDE 10 MG: 5 TABLET ORAL at 02:06

## 2025-06-22 RX ADMIN — SIMETHICONE 80 MG: 80 TABLET, CHEWABLE ORAL at 01:06

## 2025-06-22 RX ADMIN — ATORVASTATIN CALCIUM 40 MG: 40 TABLET, FILM COATED ORAL at 08:06

## 2025-06-22 RX ADMIN — PANTOPRAZOLE SODIUM 40 MG: 40 TABLET, DELAYED RELEASE ORAL at 08:06

## 2025-06-22 RX ADMIN — LIDOCAINE 2 PATCH: 50 PATCH TOPICAL at 01:06

## 2025-06-22 RX ADMIN — ACETAMINOPHEN 1000 MG: 500 TABLET ORAL at 02:06

## 2025-06-22 RX ADMIN — ENOXAPARIN SODIUM 40 MG: 40 INJECTION SUBCUTANEOUS at 04:06

## 2025-06-22 RX ADMIN — HYDROMORPHONE HYDROCHLORIDE 0.5 MG: 1 INJECTION, SOLUTION INTRAMUSCULAR; INTRAVENOUS; SUBCUTANEOUS at 06:06

## 2025-06-22 NOTE — PLAN OF CARE
Problem: Adult Inpatient Plan of Care  Goal: Plan of Care Review  Outcome: Progressing  Goal: Patient-Specific Goal (Individualized)  Outcome: Progressing  Goal: Absence of Hospital-Acquired Illness or Injury  Outcome: Progressing  Goal: Optimal Comfort and Wellbeing  Outcome: Progressing  Goal: Readiness for Transition of Care  Outcome: Progressing     Problem: Infection  Goal: Absence of Infection Signs and Symptoms  Outcome: Progressing     Problem: Wound  Goal: Optimal Coping  Outcome: Progressing  Goal: Optimal Functional Ability  Outcome: Progressing  Goal: Absence of Infection Signs and Symptoms  Outcome: Progressing  Goal: Improved Oral Intake  Outcome: Progressing  Goal: Optimal Pain Control and Function  Outcome: Progressing  Goal: Skin Health and Integrity  Outcome: Progressing  Goal: Optimal Wound Healing  Outcome: Progressing     Problem: Skin Injury Risk Increased  Goal: Skin Health and Integrity  Outcome: Progressing     Problem: Intestinal Obstruction  Goal: Optimal Bowel Function  Outcome: Progressing  Goal: Fluid and Electrolyte Balance  Outcome: Progressing  Goal: Absence of Infection Signs and Symptoms  Outcome: Progressing  Goal: Optimize Nutrition Status  Outcome: Progressing  Goal: Optimal Pain Control and Function  Outcome: Progressing

## 2025-06-22 NOTE — PLAN OF CARE
Problem: Adult Inpatient Plan of Care  Goal: Plan of Care Review  Outcome: Progressing  Flowsheets (Taken 6/22/2025 0815)  Plan of Care Reviewed With: patient  Goal: Patient-Specific Goal (Individualized)  Outcome: Progressing     Problem: Pain Acute  Goal: Optimal Pain Control and Function  Outcome: Progressing  Intervention: Develop Pain Management Plan  Flowsheets (Taken 6/22/2025 0815)  Pain Management Interventions:   around-the-clock dosing utilized   awakened for pain meds per patient request   care clustered   quiet environment facilitated     Problem: Adult Inpatient Plan of Care  Goal: Plan of Care Review  Outcome: Progressing  Flowsheets (Taken 6/22/2025 0815)  Plan of Care Reviewed With: patient     Problem: Adult Inpatient Plan of Care  Goal: Plan of Care Review  Outcome: Progressing  Flowsheets (Taken 6/22/2025 0815)  Plan of Care Reviewed With: patient     Problem: Adult Inpatient Plan of Care  Goal: Patient-Specific Goal (Individualized)  Outcome: Progressing     Problem: Adult Inpatient Plan of Care  Goal: Patient-Specific Goal (Individualized)  Outcome: Progressing     Problem: Pain Acute  Goal: Optimal Pain Control and Function  Outcome: Progressing  Intervention: Develop Pain Management Plan  Flowsheets (Taken 6/22/2025 0815)  Pain Management Interventions:   around-the-clock dosing utilized   awakened for pain meds per patient request   care clustered   quiet environment facilitated     Problem: Pain Acute  Goal: Optimal Pain Control and Function  Outcome: Progressing     Problem: Pain Acute  Goal: Optimal Pain Control and Function  Intervention: Develop Pain Management Plan  Flowsheets (Taken 6/22/2025 0815)  Pain Management Interventions:   around-the-clock dosing utilized   awakened for pain meds per patient request   care clustered   quiet environment facilitated     Problem: Pain Acute  Goal: Optimal Pain Control and Function  Intervention: Develop Pain Management Plan  Flowsheets (Taken  6/22/2025 2607)  Pain Management Interventions:   around-the-clock dosing utilized   awakened for pain meds per patient request   care clustered   quiet environment facilitated

## 2025-06-22 NOTE — PROGRESS NOTES
Progress Note    Admit Date: 6/14/2025   LOS: 8 days     SUBJECTIVE:     Follow-up For:  Diagnostic laparoscopy converted to exploratory laparotomy small-bowel resection and anastomosis without any complaints feeling well    Scheduled Meds:   acetaminophen  1,000 mg Oral TID    atorvastatin  40 mg Oral QHS    busPIRone  15 mg Oral TID    citalopram  20 mg Oral Daily    enoxparin  40 mg Subcutaneous Q24H (prophylaxis, 1700)    LIDOcaine  2 patch Transdermal Q24H    pantoprazole  40 mg Oral Daily    simethicone  1 tablet Oral QID (PC + HS)     Continuous Infusions:  PRN Meds:  Current Facility-Administered Medications:     aluminum-magnesium hydroxide-simethicone, 30 mL, Oral, QID PRN    glucagon (human recombinant), 1 mg, Intramuscular, PRN    glucose, 16 g, Oral, PRN    glucose, 24 g, Oral, PRN    hydrALAZINE, 10 mg, Intravenous, Q8H PRN    HYDROmorphone, 0.5 mg, Intravenous, Q4H PRN    magnesium oxide, 800 mg, Oral, PRN    magnesium oxide, 800 mg, Oral, PRN    melatonin, 6 mg, Oral, Nightly PRN    naloxone, 0.02 mg, Intravenous, PRN    ondansetron, 4 mg, Intravenous, Q8H PRN    oxyCODONE, 10 mg, Oral, Q6H PRN    oxyCODONE, 5 mg, Oral, Q6H PRN    potassium bicarbonate, 35 mEq, Oral, PRN    potassium bicarbonate, 50 mEq, Oral, PRN    potassium bicarbonate, 60 mEq, Oral, PRN    potassium, sodium phosphates, 2 packet, Oral, PRN    potassium, sodium phosphates, 2 packet, Oral, PRN    potassium, sodium phosphates, 2 packet, Oral, PRN    sodium chloride 0.9%, 10 mL, Intravenous, Q12H PRN    Flushing PICC/Midline Protocol, , , Until Discontinued **AND** sodium chloride 0.9%, 10 mL, Intravenous, Q12H PRN    Review of patient's allergies indicates:   Allergen Reactions    Sulfa (sulfonamide antibiotics) Hives    Ace inhibitors Other (See Comments)     Cough         Review of Systems  Constitutional: negative  Respiratory: negative  Cardiovascular: negative  Gastrointestinal: negative    OBJECTIVE:     Vital Signs (Most  Recent)  Temp: 97.7 °F (36.5 °C) (06/22/25 0725)  Pulse: 75 (06/22/25 0725)  Resp: 18 (06/22/25 0725)  BP: 128/72 (06/22/25 0725)  SpO2: 96 % (06/22/25 0725)    Vital Signs Range (Last 24H):  Temp:  [97.6 °F (36.4 °C)-98 °F (36.7 °C)]   Pulse:  [66-75]   Resp:  [16-20]   BP: (124-148)/(68-79)   SpO2:  [93 %-96 %]     I & O (Last 24H):  Intake/Output Summary (Last 24 hours) at 6/22/2025 1038  Last data filed at 6/22/2025 0800  Gross per 24 hour   Intake 600 ml   Output --   Net 600 ml     Physical Exam:  General: well developed, well nourished  Lungs:  clear to auscultation bilaterally and normal respiratory effort  Cardiovascular: Heart: regular rate and rhythm, S1, S2 normal, no murmur, click, rub or gallop. Chest Wall: no tenderness. Extremities: no cyanosis or edema, or clubbing. Pulses: 2+ and symmetric.  Abdomen/Rectal: Abdomen: soft, non-tender non-distented; bowel sounds normal; no masses,  no organomegaly. Rectal: normal tone, no masses or tenderness    Laboratory:  I have reviewed all pertinent lab results within the past 24 hours.  CBC:   Recent Labs   Lab 06/22/25  0533   WBC 19.66*   RBC 3.91*   HGB 12.6   HCT 38.1      MCV 97   MCH 32.2*   MCHC 33.1     BMP:   Recent Labs   Lab 06/22/25  0533         K 3.1*      CO2 30*   BUN 11   CREATININE 0.4*   CALCIUM 7.6*   MG 1.8     CMP:   Recent Labs   Lab 06/18/25  0328 06/20/25  0831 06/22/25  0533   GLU 74   < > 108   CALCIUM 8.5*   < > 7.6*   ALBUMIN 2.4*  --   --    PROT 6.1  --   --       < > 141   K 3.7   < > 3.1*   CO2 25   < > 30*      < > 102   BUN 11   < > 11   CREATININE 0.5   < > 0.4*   ALKPHOS 63  --   --    ALT 14  --   --    AST 14  --   --    BILITOT 0.8  --   --     < > = values in this interval not displayed.       Diagnostic Results:       ASSESSMENT/PLAN:     Stable for now and actually improving only concern is that her white blood cell count continues to increase.    Plan:  My plan is to order a CT  scan of her abdomen and pelvis in the morning and hopefully this will give us a better idea of why her white blood cell count continues to increase..

## 2025-06-22 NOTE — NURSING
[facility] [place]  Nurses Note -- 4 Eyes      6/22/2025       Skin assessed on: Q Shift      [x] No Pressure Injuries Present    []Prevention Measures Documented    [] Yes LDA  for Pressure Injury Previously documented     [] Yes New Pressure Injury Discovered   [] LDA for New Pressure Injury Added      Attending RN:  Kim Trent, RN     Second : BALA Malcolm

## 2025-06-23 LAB
ABSOLUTE EOSINOPHIL (OHS): 0.1 K/UL
ABSOLUTE MONOCYTE (OHS): 0.97 K/UL (ref 0.3–1)
ABSOLUTE NEUTROPHIL COUNT (OHS): 17.32 K/UL (ref 1.8–7.7)
ANION GAP (OHS): 11 MMOL/L (ref 8–16)
BASOPHILS # BLD AUTO: 0.05 K/UL
BASOPHILS NFR BLD AUTO: 0.3 %
BUN SERPL-MCNC: 10 MG/DL (ref 8–23)
CALCIUM SERPL-MCNC: 7.6 MG/DL (ref 8.7–10.5)
CHLORIDE SERPL-SCNC: 100 MMOL/L (ref 95–110)
CO2 SERPL-SCNC: 29 MMOL/L (ref 23–29)
CREAT SERPL-MCNC: 0.4 MG/DL (ref 0.5–1.4)
ERYTHROCYTE [DISTWIDTH] IN BLOOD BY AUTOMATED COUNT: 13.2 % (ref 11.5–14.5)
GFR SERPLBLD CREATININE-BSD FMLA CKD-EPI: >60 ML/MIN/1.73/M2
GLUCOSE SERPL-MCNC: 104 MG/DL (ref 70–110)
HCT VFR BLD AUTO: 39.1 % (ref 37–48.5)
HGB BLD-MCNC: 12.5 GM/DL (ref 12–16)
IMM GRANULOCYTES # BLD AUTO: 0.18 K/UL (ref 0–0.04)
IMM GRANULOCYTES NFR BLD AUTO: 0.9 % (ref 0–0.5)
LYMPHOCYTES # BLD AUTO: 1.05 K/UL (ref 1–4.8)
MAGNESIUM SERPL-MCNC: 1.8 MG/DL (ref 1.6–2.6)
MCH RBC QN AUTO: 31.7 PG (ref 27–31)
MCHC RBC AUTO-ENTMCNC: 32 G/DL (ref 32–36)
MCV RBC AUTO: 99 FL (ref 82–98)
NUCLEATED RBC (/100WBC) (OHS): 0 /100 WBC
PLATELET # BLD AUTO: 265 K/UL (ref 150–450)
PMV BLD AUTO: 9.4 FL (ref 9.2–12.9)
POTASSIUM SERPL-SCNC: 3.8 MMOL/L (ref 3.5–5.1)
RBC # BLD AUTO: 3.94 M/UL (ref 4–5.4)
RELATIVE EOSINOPHIL (OHS): 0.5 %
RELATIVE LYMPHOCYTE (OHS): 5.3 % (ref 18–48)
RELATIVE MONOCYTE (OHS): 4.9 % (ref 4–15)
RELATIVE NEUTROPHIL (OHS): 88.1 % (ref 38–73)
SODIUM SERPL-SCNC: 140 MMOL/L (ref 136–145)
WBC # BLD AUTO: 19.67 K/UL (ref 3.9–12.7)

## 2025-06-23 PROCEDURE — 36415 COLL VENOUS BLD VENIPUNCTURE: CPT

## 2025-06-23 PROCEDURE — 25000003 PHARM REV CODE 250: Performed by: STUDENT IN AN ORGANIZED HEALTH CARE EDUCATION/TRAINING PROGRAM

## 2025-06-23 PROCEDURE — 51798 US URINE CAPACITY MEASURE: CPT

## 2025-06-23 PROCEDURE — 94761 N-INVAS EAR/PLS OXIMETRY MLT: CPT

## 2025-06-23 PROCEDURE — 97110 THERAPEUTIC EXERCISES: CPT

## 2025-06-23 PROCEDURE — 83735 ASSAY OF MAGNESIUM: CPT

## 2025-06-23 PROCEDURE — 25500020 PHARM REV CODE 255: Performed by: STUDENT IN AN ORGANIZED HEALTH CARE EDUCATION/TRAINING PROGRAM

## 2025-06-23 PROCEDURE — 25000003 PHARM REV CODE 250

## 2025-06-23 PROCEDURE — 97530 THERAPEUTIC ACTIVITIES: CPT

## 2025-06-23 PROCEDURE — 99900035 HC TECH TIME PER 15 MIN (STAT)

## 2025-06-23 PROCEDURE — 85025 COMPLETE CBC W/AUTO DIFF WBC: CPT

## 2025-06-23 PROCEDURE — 80048 BASIC METABOLIC PNL TOTAL CA: CPT

## 2025-06-23 PROCEDURE — 63600175 PHARM REV CODE 636 W HCPCS: Performed by: STUDENT IN AN ORGANIZED HEALTH CARE EDUCATION/TRAINING PROGRAM

## 2025-06-23 PROCEDURE — 63600175 PHARM REV CODE 636 W HCPCS

## 2025-06-23 PROCEDURE — 11000001 HC ACUTE MED/SURG PRIVATE ROOM

## 2025-06-23 RX ORDER — BISACODYL 10 MG/1
10 SUPPOSITORY RECTAL ONCE
Status: COMPLETED | OUTPATIENT
Start: 2025-06-23 | End: 2025-06-23

## 2025-06-23 RX ORDER — MAGNESIUM SULFATE 1 G/100ML
1 INJECTION INTRAVENOUS ONCE
Status: COMPLETED | OUTPATIENT
Start: 2025-06-23 | End: 2025-06-23

## 2025-06-23 RX ORDER — CALCIUM GLUCONATE 20 MG/ML
2 INJECTION, SOLUTION INTRAVENOUS ONCE
Status: DISCONTINUED | OUTPATIENT
Start: 2025-06-23 | End: 2025-06-23

## 2025-06-23 RX ORDER — CALCIUM GLUCONATE 20 MG/ML
1 INJECTION, SOLUTION INTRAVENOUS
Status: COMPLETED | OUTPATIENT
Start: 2025-06-23 | End: 2025-06-23

## 2025-06-23 RX ORDER — TAMSULOSIN HYDROCHLORIDE 0.4 MG/1
0.4 CAPSULE ORAL DAILY
Status: DISCONTINUED | OUTPATIENT
Start: 2025-06-23 | End: 2025-07-08 | Stop reason: HOSPADM

## 2025-06-23 RX ORDER — POTASSIUM CHLORIDE 20 MEQ/1
20 TABLET, EXTENDED RELEASE ORAL ONCE
Status: COMPLETED | OUTPATIENT
Start: 2025-06-23 | End: 2025-06-23

## 2025-06-23 RX ADMIN — BISACODYL 10 MG: 10 SUPPOSITORY RECTAL at 08:06

## 2025-06-23 RX ADMIN — BUSPIRONE HYDROCHLORIDE 15 MG: 5 TABLET ORAL at 02:06

## 2025-06-23 RX ADMIN — SIMETHICONE 80 MG: 80 TABLET, CHEWABLE ORAL at 02:06

## 2025-06-23 RX ADMIN — IOHEXOL 75 ML: 350 INJECTION, SOLUTION INTRAVENOUS at 12:06

## 2025-06-23 RX ADMIN — POTASSIUM CHLORIDE 20 MEQ: 1500 TABLET, EXTENDED RELEASE ORAL at 06:06

## 2025-06-23 RX ADMIN — OXYCODONE HYDROCHLORIDE 10 MG: 5 TABLET ORAL at 08:06

## 2025-06-23 RX ADMIN — BUSPIRONE HYDROCHLORIDE 15 MG: 5 TABLET ORAL at 08:06

## 2025-06-23 RX ADMIN — ACETAMINOPHEN 1000 MG: 500 TABLET ORAL at 08:06

## 2025-06-23 RX ADMIN — TAMSULOSIN HYDROCHLORIDE 0.4 MG: 0.4 CAPSULE ORAL at 02:06

## 2025-06-23 RX ADMIN — HYDROMORPHONE HYDROCHLORIDE 0.5 MG: 1 INJECTION, SOLUTION INTRAMUSCULAR; INTRAVENOUS; SUBCUTANEOUS at 12:06

## 2025-06-23 RX ADMIN — PANTOPRAZOLE SODIUM 40 MG: 40 TABLET, DELAYED RELEASE ORAL at 08:06

## 2025-06-23 RX ADMIN — ACETAMINOPHEN 1000 MG: 500 TABLET ORAL at 02:06

## 2025-06-23 RX ADMIN — ENOXAPARIN SODIUM 40 MG: 40 INJECTION SUBCUTANEOUS at 04:06

## 2025-06-23 RX ADMIN — SIMETHICONE 80 MG: 80 TABLET, CHEWABLE ORAL at 08:06

## 2025-06-23 RX ADMIN — OXYCODONE HYDROCHLORIDE 10 MG: 5 TABLET ORAL at 02:06

## 2025-06-23 RX ADMIN — OXYCODONE HYDROCHLORIDE 10 MG: 5 TABLET ORAL at 11:06

## 2025-06-23 RX ADMIN — CITALOPRAM HYDROBROMIDE 20 MG: 20 TABLET ORAL at 08:06

## 2025-06-23 RX ADMIN — SIMETHICONE 80 MG: 80 TABLET, CHEWABLE ORAL at 06:06

## 2025-06-23 RX ADMIN — OXYCODONE HYDROCHLORIDE 10 MG: 5 TABLET ORAL at 03:06

## 2025-06-23 RX ADMIN — IOHEXOL 15 ML: 300 INJECTION, SOLUTION INTRAVENOUS at 12:06

## 2025-06-23 RX ADMIN — MAGNESIUM SULFATE IN DEXTROSE 1 G: 10 INJECTION, SOLUTION INTRAVENOUS at 06:06

## 2025-06-23 RX ADMIN — CALCIUM GLUCONATE 1 G: 20 INJECTION, SOLUTION INTRAVENOUS at 07:06

## 2025-06-23 RX ADMIN — LIDOCAINE 2 PATCH: 50 PATCH TOPICAL at 02:06

## 2025-06-23 RX ADMIN — HYDROMORPHONE HYDROCHLORIDE 0.5 MG: 1 INJECTION, SOLUTION INTRAMUSCULAR; INTRAVENOUS; SUBCUTANEOUS at 07:06

## 2025-06-23 RX ADMIN — ATORVASTATIN CALCIUM 40 MG: 40 TABLET, FILM COATED ORAL at 08:06

## 2025-06-23 NOTE — PT/OT/SLP PROGRESS
Occupational Therapy   Treatment    Name: Patricia Ramirez  MRN: [id number]  Admitting Diagnosis:  SBO (small bowel obstruction)  9 Days Post-Op    Recommendations:     Discharge Recommendations: Low Intensity Therapy  Discharge Equipment Recommendations:  walker, rolling, hospital bed  Barriers to discharge:  None    Assessment:     Patricia Ramirez is a 69 y.o. female with a medical diagnosis of SBO (small bowel obstruction).  She presents with HOB elevated and agreeable to treatment, but not needing to use bathroom and declined washing hair and grooming. Performance deficits affecting function are weakness, impaired endurance, impaired self care skills, impaired functional mobility, pain. She walked in room to sink and to bathroom with RW with no issues and then sat EOB to perform UB therex.     Rehab Prognosis:  Good; patient would benefit from acute skilled OT services to address these deficits and reach maximum level of function.       Plan:     Patient to be seen 3 x/week to address the above listed problems via self-care/home management, therapeutic activities, therapeutic exercises  Plan of Care Expires: 07/05/25  Plan of Care Reviewed with: patient    Subjective     Chief Complaint: she said she is unsure of when she is leaving   Patient/Family Comments/goals: she said hospital bed was approved   Pain/Comfort:  Pain Rating 1: 0/10    Objective:     Communicated with: RN  prior to session.  Patient found HOB elevated with peripheral IV, telemetry (abdominal binder) upon OT entry to room.    General Precautions: Standard, fall    Orthopedic Precautions:N/A  Braces: N/A  Respiratory Status: Room air     Occupational Performance:     Bed Mobility:    Patient completed Rolling/Turning to Left with  modified independence  Patient completed Scooting/Bridging with modified independence  Patient completed Supine to Sit with modified independence  Patient completed Sit to Supine with modified independence     Functional  Mobility/Transfers:  Patient completed Sit <> Stand Transfer with modified independence  with  rolling walker   Functional Mobility: She walked in room twice with RW, but did not want to sit in chair saying she had sat in the chair earlier today.     Activities of Daily Living:  NT       [facility] 6 Click ADL: 18    Treatment & Education:  She demonstrated UB therex for the following with no resistance and with no pain with B upper extremity seated EOB x 2 sets each x 10 reps:  1) Shoulder raises  2) chest presses  3) triceps/biceps     Patient left HOB elevated with all lines intact, call button in reach, and RN  notified    GOALS:   Multidisciplinary Problems       Occupational Therapy Goals          Problem: Occupational Therapy    Goal Priority Disciplines Outcome Interventions   Occupational Therapy Goal     OT, PT/OT Progressing    Description: Goals to be met by: 7/5/2025     Patient will increase functional independence with ADLs by performing:    UE Dressing with Set-up Assistance.  LE Dressing with Stand-by Assistance.  Grooming while seated with Modified [place].  Toileting from bedside commode with Supervision for hygiene and clothing management.   Bathing seated UB and andreia care sponge  with Stand-by Assistance.  Toilet transfer to bedside commode with Contact Guard Assistance.  Upper extremity exercise program x7-10 reps no resistance, and per handout, with assistance as needed.                         DME Justifications:  Patricia requires a hospital bed due to her requiring positioning of the body in ways not feasible with an ordinary bed due to limited ability and cannot independently make changes in body position without the use of the bed.The positioning of the body cannot be sufficiently resolved by the use of pillows and wedges.    Time Tracking:     OT Date of Treatment: 06/23/25  OT Start Time: 1443  OT Stop Time: 1500  OT Total Time (min): 17 min    Billable Minutes:Therapeutic Activity 17      OT/CHANELL: OT          6/23/2025

## 2025-06-23 NOTE — PT/OT/SLP PROGRESS
Physical Therapy Treatment    Patient Name:  Patricia Ramirez   MRN:  [id number]    Recommendations:     Discharge Recommendations: Low Intensity Therapy  Discharge Equipment Recommendations: walker, rolling  Barriers to discharge: None    Assessment:     Patricia Ramirez is a 69 y.o. female admitted with a medical diagnosis of SBO (small bowel obstruction).  She presents with the following impairments/functional limitations: weakness, impaired endurance, impaired self care skills, impaired functional mobility, pain.    Pt supine>sit Jon, with pain reported upon movement initiation.   Pt ambulated 16ft to bathroom SBA and RW. Pt able to perform hygiene following voiding set-upA. Bladder scanned to determine urinary retention, pt found to be retaining excess urine after voiding per nursing.   Pt ambulated 188ft CGA and RW. Standing RB in hallway. Pt ambulated 60ft back to bed CGA and RW.       Rehab Prognosis: Good; patient would benefit from acute skilled PT services to address these deficits and reach maximum level of function.    Recent Surgery: Procedure(s) (LRB):  LAPAROSCOPY, DIAGNOSTIC (N/A)  LAPAROTOMY, EXPLORATORY; APPENDECTOMY; SMALL BOWEL RESECTION; DIVERTICULUM RESECTION (N/A) 9 Days Post-Op    Plan:     During this hospitalization, patient to be seen 5 x/week to address the identified rehab impairments via gait training, therapeutic activities, therapeutic exercises, neuromuscular re-education and progress toward the following goals:    Plan of Care Expires:  07/06/25    Subjective     Chief Complaint: Pt drinking her contrast solution and reports pain in abdomen  Patient/Family Comments/goals: Home  Pain/Comfort:  Pain Rating 1: 8/10  Location 1: abdomen      Objective:     Communicated with nursing prior to session.  Patient found HOB elevated with peripheral IV, telemetry upon PT entry to room.     General Precautions: Standard, fall  Orthopedic Precautions: N/A  Braces: N/A  Respiratory Status: Room air      Functional Mobility:  Bed Mobility:     Rolling Right: modified independence  Supine to Sit: modified independence  Sit to Supine: modified independence  Transfers:     Sit to Stand:  stand by assistance with rolling walker  Gait: Ambulated 250ft total CGA and RW, with standing RB at 188ft.   Balance: Static sit: Good. Static stand: Good       AM-PAC 6 CLICK MOBILITY  Turning over in bed (including adjusting bedclothes, sheets and blankets)?: 4  Sitting down on and standing up from a chair with arms (e.g., wheelchair, bedside commode, etc.): 4  Moving from lying on back to sitting on the side of the bed?: 4  Moving to and from a bed to a chair (including a wheelchair)?: 4  Need to walk in hospital room?: 4  Climbing 3-5 steps with a railing?: 3  Basic Mobility Total Score: 23       Treatment & Education:  TA 1:1 x27 min >> bed mobility. Walking to bathroom. Hygiene. Ambulation in hallway. Rest breaks per pt tolerance.     Patient left HOB elevated with all lines intact, call button in reach, and nursing notified..    GOALS:   Multidisciplinary Problems       Physical Therapy Goals          Problem: Physical Therapy    Goal Priority Disciplines Outcome Interventions   Physical Therapy Goal     PT, PT/OT Progressing    Description: Goals to be met by: 25     Patient will increase functional independence with mobility by performin. Supine to sit with Modified [place]  2. Sit to supine with Modified [place]  3. Sit to stand transfer with Modified [place]  4. Gait  x 50 feet with Modified [place] using Rolling Walker.   5. Stand for 5 minutes with Modified [place] using Rolling Walker  6. Lower extremity exercise program x30 reps per handout, with independence                         DME Justifications:  No DME recommended requiring DME justifications    Time Tracking:     PT Received On: 25  PT Start Time: 1126     PT Stop Time: 1153  PT Total Time (min): 27 min     Billable  Minutes: Therapeutic Activity 2    Treatment Type: Treatment  PT/PTA: PT     Number of PTA visits since last PT visit: 0     06/23/2025

## 2025-06-23 NOTE — PROGRESS NOTES
Surgery Progress Note    Patricia Ramirez is a 69 y.o. year old female in hospital for recurrent SBO (failed conservative management) s/p diagnostic laparoscopy converted exploratory laparotomy, SBR and anastomosis, appendectomy, diverticulectomy 6/14    JAREDEON NATALIA VSS  Reports pain generally improving, however new complaint of suprapubic/rectal pressure, feels like she is not completely emptying bladder when voiding  Tolerating regular diet without n/v  Passing flatus, no BM but no abd distension  No f/c/n/v/sob/cp    Persistent leukocytosis today 19.67 from 19.66    ROS:  Negative except above    PE:  Vitals:    06/23/25 0720 06/23/25 0726 06/23/25 0759 06/23/25 0848   BP: (!) 149/78      BP Location: Left arm      Patient Position: Sitting      Pulse: 70 72     Resp: 18  18 18   Temp: 98.3 °F (36.8 °C)      TempSrc: Oral      SpO2: (!) 94%  (!) 94%    Weight:       Height:           NAD, resting  No belabored breathing  No skin changes  Abd soft mildy distended appropriately TTP around midline incision, port site incisions c/d/I      Significant Diagnostic Studies: Labs: BMP:   Recent Labs   Lab 06/22/25  0533 06/23/25  0410    104    140   K 3.1* 3.8    100   CO2 30* 29   BUN 11 10   CREATININE 0.4* 0.4*   CALCIUM 7.6* 7.6*   MG 1.8 1.8   , CBC   Recent Labs   Lab 06/22/25  0533 06/23/25  0410   WBC 19.66* 19.67*   HGB 12.6 12.5   HCT 38.1 39.1    265   , INR   Lab Results   Component Value Date    INR 1.0 07/28/2022   , and All labs within the past 24 hours have been reviewed    A/P:  Patricia Ramirez is a 69 y.o. year old female  in hospital for recurrent SBO (failed conservative management), POD#1 from diagnostic laparoscopy converted exploratory laparotomy, SBR and anastomosis, appendectomy, diverticulectomy 6/14. Persistent leukocytosis today 19.67 from 19.66    - Given persistent leukocytosis will obtain CT A/P today  - Please bladder scan to obtain post void residual  - Regular diet  -  Monitor for n/v and bowel function  - [facility]  - Pulm toilet, encourage IS 10x/hour  - PT/OT, encourage up to chair and ambulation   - Abd binder for comfort, ok to take off or have breaks if needed  - Replete electrolytes to maintain K > 4, Phos > 3, Mg > 2  - DVT ppx     Roxann Lin  General Surgery - [facility]  6/23/2025

## 2025-06-23 NOTE — PLAN OF CARE
Changes in medical condition or discharge plan: No    Does patient need new DME? TBD    Follow up appts needed: General Surgery and PCP    Medically stable: No    Estimated Discharge Date: TBD    Per attending, patient not medically stable for discharge.  Patient labs still being monitored, patient WBC still increasing.  When medically stable, patient to discharge with home health from [facility], [phone].   to continue to assess for and until discharge.    06/23/25 1505   Discharge Reassessment   Assessment Type Discharge Planning Reassessment   Did the patient's condition or plan change since previous assessment? No   Communicated STUART with patient/caregiver Date not available/Unable to determine   Discharge Plan A Home Health   Discharge Plan B Home with family   DME Needed Upon Discharge  other (see comments)  (TBD)   Transition of Care Barriers None   Why the patient remains in the hospital Requires continued medical care   Post-Acute Status   Post-Acute Authorization Home Health   Home Health Status Set-up Complete/Auth obtained   Hospital Resources/Appts/Education Provided Provided patient/caregiver with written discharge plan information   Discharge Delays None known at this time

## 2025-06-23 NOTE — PHYSICIAN QUERY
Due to the conflicting clinical picture, please clinically validate the moderate malnutrition. If validated, please provide additional clinical support for the moderate malnutrition.   The condition is not confirmed and/or it has been ruled out

## 2025-06-24 LAB
ABSOLUTE EOSINOPHIL (OHS): 0.05 K/UL
ABSOLUTE MONOCYTE (OHS): 1.04 K/UL (ref 0.3–1)
ABSOLUTE NEUTROPHIL COUNT (OHS): 18.49 K/UL (ref 1.8–7.7)
ALBUMIN SERPL BCP-MCNC: 1.6 G/DL (ref 3.5–5.2)
ALP SERPL-CCNC: 81 UNIT/L (ref 40–150)
ALT SERPL W/O P-5'-P-CCNC: 11 UNIT/L (ref 10–44)
ANION GAP (OHS): 10 MMOL/L (ref 8–16)
AST SERPL-CCNC: 16 UNIT/L (ref 11–45)
BASOPHILS # BLD AUTO: 0.05 K/UL
BASOPHILS NFR BLD AUTO: 0.2 %
BILIRUB SERPL-MCNC: 0.4 MG/DL (ref 0.1–1)
BUN SERPL-MCNC: 10 MG/DL (ref 8–23)
CALCIUM SERPL-MCNC: 7.5 MG/DL (ref 8.7–10.5)
CHLORIDE SERPL-SCNC: 101 MMOL/L (ref 95–110)
CO2 SERPL-SCNC: 28 MMOL/L (ref 23–29)
CREAT SERPL-MCNC: 0.4 MG/DL (ref 0.5–1.4)
ERYTHROCYTE [DISTWIDTH] IN BLOOD BY AUTOMATED COUNT: 13.4 % (ref 11.5–14.5)
GFR SERPLBLD CREATININE-BSD FMLA CKD-EPI: >60 ML/MIN/1.73/M2
GLUCOSE SERPL-MCNC: 112 MG/DL (ref 70–110)
HCT VFR BLD AUTO: 37.5 % (ref 37–48.5)
HGB BLD-MCNC: 12.2 GM/DL (ref 12–16)
IMM GRANULOCYTES # BLD AUTO: 0.2 K/UL (ref 0–0.04)
IMM GRANULOCYTES NFR BLD AUTO: 1 % (ref 0–0.5)
INR PPP: 0.9 (ref 0.8–1.2)
LYMPHOCYTES # BLD AUTO: 1.01 K/UL (ref 1–4.8)
MAGNESIUM SERPL-MCNC: 1.9 MG/DL (ref 1.6–2.6)
MCH RBC QN AUTO: 32.3 PG (ref 27–31)
MCHC RBC AUTO-ENTMCNC: 32.5 G/DL (ref 32–36)
MCV RBC AUTO: 99 FL (ref 82–98)
NUCLEATED RBC (/100WBC) (OHS): 0 /100 WBC
PHOSPHATE SERPL-MCNC: 3.2 MG/DL (ref 2.7–4.5)
PLATELET # BLD AUTO: 277 K/UL (ref 150–450)
PMV BLD AUTO: 8.8 FL (ref 9.2–12.9)
POTASSIUM SERPL-SCNC: 4 MMOL/L (ref 3.5–5.1)
PREALB SERPL-MCNC: 7 MG/DL (ref 20–43)
PROT SERPL-MCNC: 4.9 GM/DL (ref 6–8.4)
PROTHROMBIN TIME: 10.4 SECONDS (ref 9–12.5)
RBC # BLD AUTO: 3.78 M/UL (ref 4–5.4)
RELATIVE EOSINOPHIL (OHS): 0.2 %
RELATIVE LYMPHOCYTE (OHS): 4.8 % (ref 18–48)
RELATIVE MONOCYTE (OHS): 5 % (ref 4–15)
RELATIVE NEUTROPHIL (OHS): 88.8 % (ref 38–73)
SODIUM SERPL-SCNC: 139 MMOL/L (ref 136–145)
WBC # BLD AUTO: 20.84 K/UL (ref 3.9–12.7)

## 2025-06-24 PROCEDURE — 83735 ASSAY OF MAGNESIUM: CPT

## 2025-06-24 PROCEDURE — 85610 PROTHROMBIN TIME: CPT | Performed by: STUDENT IN AN ORGANIZED HEALTH CARE EDUCATION/TRAINING PROGRAM

## 2025-06-24 PROCEDURE — 94799 UNLISTED PULMONARY SVC/PX: CPT

## 2025-06-24 PROCEDURE — 11000001 HC ACUTE MED/SURG PRIVATE ROOM

## 2025-06-24 PROCEDURE — 25000003 PHARM REV CODE 250

## 2025-06-24 PROCEDURE — 80053 COMPREHEN METABOLIC PANEL: CPT

## 2025-06-24 PROCEDURE — 94760 N-INVAS EAR/PLS OXIMETRY 1: CPT

## 2025-06-24 PROCEDURE — 25000003 PHARM REV CODE 250: Performed by: STUDENT IN AN ORGANIZED HEALTH CARE EDUCATION/TRAINING PROGRAM

## 2025-06-24 PROCEDURE — 99900035 HC TECH TIME PER 15 MIN (STAT)

## 2025-06-24 PROCEDURE — 85025 COMPLETE CBC W/AUTO DIFF WBC: CPT

## 2025-06-24 PROCEDURE — 63600175 PHARM REV CODE 636 W HCPCS

## 2025-06-24 PROCEDURE — 99223 1ST HOSP IP/OBS HIGH 75: CPT | Mod: ,,, | Performed by: NURSE PRACTITIONER

## 2025-06-24 PROCEDURE — 84134 ASSAY OF PREALBUMIN: CPT

## 2025-06-24 PROCEDURE — 97530 THERAPEUTIC ACTIVITIES: CPT

## 2025-06-24 PROCEDURE — 36415 COLL VENOUS BLD VENIPUNCTURE: CPT

## 2025-06-24 PROCEDURE — 63600175 PHARM REV CODE 636 W HCPCS: Performed by: STUDENT IN AN ORGANIZED HEALTH CARE EDUCATION/TRAINING PROGRAM

## 2025-06-24 PROCEDURE — 51798 US URINE CAPACITY MEASURE: CPT

## 2025-06-24 PROCEDURE — 36415 COLL VENOUS BLD VENIPUNCTURE: CPT | Performed by: STUDENT IN AN ORGANIZED HEALTH CARE EDUCATION/TRAINING PROGRAM

## 2025-06-24 PROCEDURE — 84100 ASSAY OF PHOSPHORUS: CPT

## 2025-06-24 RX ORDER — ENOXAPARIN SODIUM 100 MG/ML
40 INJECTION SUBCUTANEOUS EVERY 24 HOURS
Status: DISCONTINUED | OUTPATIENT
Start: 2025-06-25 | End: 2025-07-08 | Stop reason: HOSPADM

## 2025-06-24 RX ADMIN — ACETAMINOPHEN 1000 MG: 500 TABLET ORAL at 08:06

## 2025-06-24 RX ADMIN — BUSPIRONE HYDROCHLORIDE 15 MG: 5 TABLET ORAL at 02:06

## 2025-06-24 RX ADMIN — SIMETHICONE 80 MG: 80 TABLET, CHEWABLE ORAL at 08:06

## 2025-06-24 RX ADMIN — TAMSULOSIN HYDROCHLORIDE 0.4 MG: 0.4 CAPSULE ORAL at 08:06

## 2025-06-24 RX ADMIN — CITALOPRAM HYDROBROMIDE 20 MG: 20 TABLET ORAL at 08:06

## 2025-06-24 RX ADMIN — OXYCODONE HYDROCHLORIDE 10 MG: 5 TABLET ORAL at 01:06

## 2025-06-24 RX ADMIN — HYDROMORPHONE HYDROCHLORIDE 0.5 MG: 1 INJECTION, SOLUTION INTRAMUSCULAR; INTRAVENOUS; SUBCUTANEOUS at 11:06

## 2025-06-24 RX ADMIN — SIMETHICONE 80 MG: 80 TABLET, CHEWABLE ORAL at 02:06

## 2025-06-24 RX ADMIN — SIMETHICONE 80 MG: 80 TABLET, CHEWABLE ORAL at 06:06

## 2025-06-24 RX ADMIN — ATORVASTATIN CALCIUM 40 MG: 40 TABLET, FILM COATED ORAL at 08:06

## 2025-06-24 RX ADMIN — BUSPIRONE HYDROCHLORIDE 15 MG: 5 TABLET ORAL at 08:06

## 2025-06-24 RX ADMIN — PANTOPRAZOLE SODIUM 40 MG: 40 TABLET, DELAYED RELEASE ORAL at 08:06

## 2025-06-24 RX ADMIN — PIPERACILLIN SODIUM AND TAZOBACTAM SODIUM 4.5 G: 4; .5 INJECTION, POWDER, LYOPHILIZED, FOR SOLUTION INTRAVENOUS at 08:06

## 2025-06-24 RX ADMIN — ACETAMINOPHEN 1000 MG: 500 TABLET ORAL at 02:06

## 2025-06-24 RX ADMIN — LIDOCAINE 2 PATCH: 50 PATCH TOPICAL at 02:06

## 2025-06-24 RX ADMIN — PIPERACILLIN SODIUM AND TAZOBACTAM SODIUM 4.5 G: 4; .5 INJECTION, POWDER, LYOPHILIZED, FOR SOLUTION INTRAVENOUS at 11:06

## 2025-06-24 RX ADMIN — OXYCODONE HYDROCHLORIDE 10 MG: 5 TABLET ORAL at 06:06

## 2025-06-24 NOTE — NURSING
[facility] [place]  Nurses Note -- 4 Eyes      6/24/2025       Skin assessed on: Q Shift      [] No Pressure Injuries Present    []Prevention Measures Documented    [] Yes LDA  for Pressure Injury Previously documented     [x] Yes New Pressure Injury Discovered   [] LDA for New Pressure Injury Added      Attending RN:  Kim Trent RN     Second: BALA Vargas

## 2025-06-24 NOTE — PROGRESS NOTES
"Surgery Progress Note    Patricia Ramirez is a 69 y.o. year old female in hospital for recurrent SBO (failed conservative management) s/p diagnostic laparoscopy converted exploratory laparotomy, SBR and anastomosis, appendectomy, diverticulectomy 6/14    NAEON AF VSS  Reports minimal abdominal pain however still with vaginal/rectal pressure and feeling of incomplete voiding. Started flomax yesterday  CT A/P obtained with pelvic fluid collections  Tolerating regular diet without n/v  Passing flatus and having BM  No f/c/n/v/sob/cp    Persistent leukocytosis today 20.9 from 19.7    ROS:  Negative except above    PE:  Vitals:    06/24/25 1117 06/24/25 1325 06/24/25 1328 06/24/25 1333   BP: (!) 109/50  116/69    BP Location: Left arm      Patient Position: Sitting      Pulse: 78  78    Resp: 16   18   Temp: 98.5 °F (36.9 °C)      TempSrc: Oral      SpO2: (!) 94%      Weight:  64.4 kg (141 lb 15.6 oz)     Height:  5' 8" (1.727 m)         NAD, resting  No belabored breathing  No skin changes  Abd soft mildy distended appropriately TTP around midline incision, port site incisions c/d/I      Significant Diagnostic Studies: Labs: BMP:   Recent Labs   Lab 06/23/25  0410 06/24/25  0530    112*    139   K 3.8 4.0    101   CO2 29 28   BUN 10 10   CREATININE 0.4* 0.4*   CALCIUM 7.6* 7.5*   MG 1.8 1.9   , CBC   Recent Labs   Lab 06/23/25  0410 06/24/25  0530   WBC 19.67* 20.84*   HGB 12.5 12.2   HCT 39.1 37.5    277   , INR   Lab Results   Component Value Date    INR 1.0 07/28/2022   , and All labs within the past 24 hours have been reviewed    A/P:  Patricia Ramirez is a 69 y.o. year old female  in hospital for recurrent SBO (failed conservative management), POD#1 from diagnostic laparoscopy converted exploratory laparotomy, SBR and anastomosis, appendectomy, diverticulectomy 6/14. Persistent leukocytosis today 19.67 from 19.66    - Consulted IR for pelvic fluid drainage vs aspiration and culture if " possible, appreciate assitance  - Start empiric IV zosyn  - Regular diet, NPO at midnight  - Hold DVT ppx at midnight for IR procedure  - Monitor for n/v and bowel function  - [facility]  - Pulm toilet, encourage IS 10x/hour  - PT/OT, encourage up to chair and ambulation   - Abd binder for comfort, ok to take off or have breaks if needed  - Replete electrolytes to maintain K > 4, Phos > 3, Mg > 2    Roxann Lin  General Surgery - [facility]  6/24/2025

## 2025-06-24 NOTE — PT/OT/SLP PROGRESS
Physical Therapy Treatment    Patient Name:  Patricia Ramirez   MRN:  [id number]    Recommendations:     Discharge Recommendations: Low Intensity Therapy  Discharge Equipment Recommendations: walker, rolling  Barriers to discharge: None    Assessment:     Patricia Ramirez is a 69 y.o. female admitted with a medical diagnosis of SBO (small bowel obstruction).  She presents with the following impairments/functional limitations: weakness, impaired endurance, impaired functional mobility, pain.    Pt reports being in 10/10 pain in abdomen but does not want to take medication prior to therapy session because the medication makes pt feel dizzy.   Pt ambulated 156ft in hallway [facility] and RW, requested to return to bed because of pain level.       Rehab Prognosis: Good; patient would benefit from acute skilled PT services to address these deficits and reach maximum level of function.    Recent Surgery: Procedure(s) (LRB):  LAPAROSCOPY, DIAGNOSTIC (N/A)  LAPAROTOMY, EXPLORATORY; APPENDECTOMY; SMALL BOWEL RESECTION; DIVERTICULUM RESECTION (N/A) 10 Days Post-Op    Plan:     During this hospitalization, patient to be seen 5 x/week to address the identified rehab impairments via gait training, therapeutic activities, therapeutic exercises, neuromuscular re-education and progress toward the following goals:    Plan of Care Expires:  07/06/25    Subjective     Chief Complaint: Pt states she is hurting today, expecting a procedure tomorrow for drainage.   Patient/Family Comments/goals: Return to PLOF  Pain/Comfort:  Pain Rating 1: 10/10  Location 1: abdomen  Pain Addressed 1: Nurse notified      Objective:     Communicated with nursing prior to session.  Patient found HOB elevated with peripheral IV, telemetry upon PT entry to room.     General Precautions: Standard, fall  Orthopedic Precautions: N/A  Braces: N/A  Respiratory Status: Room air     Functional Mobility:  Bed Mobility:     Rolling Right: modified independence  Supine to Sit:  modified independence and increased pain during sitting EOB  Sit to Supine: modified independence  Transfers:     Sit to Stand:  contact guard assistance with rolling walker  Gait: Ambulated 156ft in hallway CGA and RW  Balance: Static sit: Good. Stand: Good.       AM-PAC 6 CLICK MOBILITY  Turning over in bed (including adjusting bedclothes, sheets and blankets)?: 4  Sitting down on and standing up from a chair with arms (e.g., wheelchair, bedside commode, etc.): 4  Moving from lying on back to sitting on the side of the bed?: 4  Moving to and from a bed to a chair (including a wheelchair)?: 4  Need to walk in hospital room?: 4  Climbing 3-5 steps with a railing?: 3  Basic Mobility Total Score: 23       Treatment & Education:  TA 1:1 x18 minutes >> bed mobility. Breathing for pain management. Ambulation.       Patient left HOB elevated with all lines intact, call button in reach, nursing notified concerning pt pain level.    GOALS:   Multidisciplinary Problems       Physical Therapy Goals          Problem: Physical Therapy    Goal Priority Disciplines Outcome Interventions   Physical Therapy Goal     PT, PT/OT Progressing    Description: Goals to be met by: 25     Patient will increase functional independence with mobility by performin. Supine to sit with Modified [place]  2. Sit to supine with Modified [place]  3. Sit to stand transfer with Modified [place]  4. Gait  x 50 feet with Modified [place] using Rolling Walker.   5. Stand for 5 minutes with Modified [place] using Rolling Walker  6. Lower extremity exercise program x30 reps per handout, with independence                         DME Justifications:      Time Tracking:     PT Received On: 25  PT Start Time: 1219     PT Stop Time: 1237  PT Total Time (min): 18 min     Billable Minutes: Therapeutic Activity 1    Treatment Type: Treatment  PT/PTA: PT     Number of PTA visits since last PT visit: 0     2025

## 2025-06-24 NOTE — CONSULTS
Interventional Radiology  Consult/History & Physical Note    Consult Requested By: Roxann Lin MD  Reason for Consult: pelvic free fluid, request for drain placement    SUBJECTIVE:     Chief Complaint:  pelvic free fluid, request for drain placement    History of Present Illness:  Patricia Ramirez is a 69 y.o. female with anxiety, depression and HTN who was admitted on 6/14 for recurrent small bowel obstruction. Pt underwent  diagnostic laparoscopy converted exploratory laparotomy, SBR and anastomosis, appendectomy and diverticulectomy on 6/14 with General Surgery. Pt initially doing well post op, however over the last 5 days has had a rising leukocytosis (WBC 20.8 today). CT a/p on 6/23 showed perihepatic and pelvic free fluid.     IR has been consulted by General Surgery for percutaneous drain placement for management of pelvic free fluid, as well as diagnostic labs due to rising WBC. Pt is afebrile and hemodynamically stable. Last dose lovenox 6/23 1630.     Review of Systems   Constitutional:  Negative for chills and fever.   Respiratory:  Negative for shortness of breath.    Cardiovascular:  Negative for chest pain.   Gastrointestinal:  Positive for abdominal pain.   Neurological:  Negative for headaches.       Scheduled Meds:   acetaminophen  1,000 mg Oral TID    atorvastatin  40 mg Oral QHS    busPIRone  15 mg Oral TID    citalopram  20 mg Oral Daily    [START ON 6/25/2025] enoxparin  40 mg Subcutaneous Q24H (prophylaxis, 1700)    LIDOcaine  2 patch Transdermal Q24H    pantoprazole  40 mg Oral Daily    piperacillin-tazobactam (Zosyn) IV (PEDS and ADULTS) (extended infusion is not appropriate)  4.5 g Intravenous Q8H    simethicone  1 tablet Oral QID (PC + HS)    tamsulosin  0.4 mg Oral Daily     Continuous Infusions:  PRN Meds:  Current Facility-Administered Medications:     aluminum-magnesium hydroxide-simethicone, 30 mL, Oral, QID PRN    glucagon (human recombinant), 1 mg, Intramuscular, PRN    glucose, 16  g, Oral, PRN    glucose, 24 g, Oral, PRN    hydrALAZINE, 10 mg, Intravenous, Q8H PRN    HYDROmorphone, 0.5 mg, Intravenous, Q4H PRN    magnesium oxide, 800 mg, Oral, PRN    magnesium oxide, 800 mg, Oral, PRN    melatonin, 6 mg, Oral, Nightly PRN    naloxone, 0.02 mg, Intravenous, PRN    ondansetron, 4 mg, Intravenous, Q8H PRN    oxyCODONE, 10 mg, Oral, Q6H PRN    oxyCODONE, 5 mg, Oral, Q6H PRN    potassium bicarbonate, 35 mEq, Oral, PRN    potassium bicarbonate, 50 mEq, Oral, PRN    potassium bicarbonate, 60 mEq, Oral, PRN    potassium, sodium phosphates, 2 packet, Oral, PRN    potassium, sodium phosphates, 2 packet, Oral, PRN    potassium, sodium phosphates, 2 packet, Oral, PRN    sodium chloride 0.9%, 10 mL, Intravenous, Q12H PRN    Flushing PICC/Midline Protocol, , , Until Discontinued **AND** sodium chloride 0.9%, 10 mL, Intravenous, Q12H PRN    Review of patient's allergies indicates:   Allergen Reactions    Sulfa (sulfonamide antibiotics) Hives    Ace inhibitors Other (See Comments)     Cough         Past Medical History:   Diagnosis Date    Allergy     Anxiety     Arthritis     Cataract     Depressive disorder, not elsewhere classified     Esophageal reflux     Hypertension     Impaired fasting glucose     Joint pain     Obesity, unspecified     Other and unspecified hyperlipidemia      Past Surgical History:   Procedure Laterality Date    BLEPHAROPLASTY Bilateral 2021    Procedure: BLEPHAROPLASTY;  Surgeon: Maite Acuna MD;  Location: [facility] OR;  Service: Ophthalmology;  Laterality: Bilateral;     SECTION  1973    CHOLECYSTECTOMY      COLONOSCOPY N/A 2023    Procedure: COLONOSCOPY;  Surgeon: Briana Sterling MD;  Location: [facility] ENDO;  Service: Endoscopy;  Laterality: N/A;    DIAGNOSTIC LAPAROSCOPY N/A 2025    Procedure: LAPAROSCOPY, DIAGNOSTIC;  Surgeon: Rigoberto Ponce MD;  Location: [facility] OR;  Service: General;  Laterality: N/A;    ERCP N/A 10/07/2024    Procedure: ERCP  (ENDOSCOPIC RETROGRADE CHOLANGIOPANCREATOGRAPHY);  Surgeon: Catracho Mitchell MD;  [facility]  Service: Endoscopy;  Laterality: N/A;    ERCP N/A 12/09/2024    Procedure: ERCP (ENDOSCOPIC RETROGRADE CHOLANGIOPANCREATOGRAPHY);  Surgeon: Romeo Roger MD;  Location: [facility];  Service: Endoscopy;  Laterality: N/A;  12/3/24: instructions sent via email-GD  12/5 SWP PRECALL COMPLETED-RT    ERCP N/A 01/29/2025    Procedure: ERCP (ENDOSCOPIC RETROGRADE CHOLANGIOPANCREATOGRAPHY);  Surgeon: Shannon Chaney MD;  Location: [facility] ENDO;  Service: Endoscopy;  Laterality: N/A;  1/6 portal-Repeat ERCP in 6 weeks to remove stent.nicola -tt  1/14 SWP-PRECALL COMPLETE-RT  1/23 r/s updated portal instr-pt stated any MD-tt    ERCP N/A 04/28/2025    Procedure: ERCP (ENDOSCOPIC RETROGRADE CHOLANGIOPANCREATOGRAPHY);  Surgeon: Shannon Chaney MD;  Location: [facility];  Service: Endoscopy;  Laterality: N/A;  Per Dr. Chaney- Repeat ERCP in 3 months to remove covered metal                          biliary stent and hopefully for final clearance of                          the CBD.     3/21/25-Pt no longer taking Eliquis, instr portal-DS  4/24/25-LVM     ESOPHAGOGASTRODUODENOSCOPY N/A 11/21/2024    Procedure: EGD (ESOPHAGOGASTRODUODENOSCOPY);  Surgeon: Angie Alatorre MD;  Location: [facility] ENDO;  Service: Gastroenterology;  Laterality: N/A;    ESOPHAGOGASTRODUODENOSCOPY N/A 5/23/2025    Procedure: EGD (ESOPHAGOGASTRODUODENOSCOPY);  Surgeon: Mak Michael MD;  Location: [facility] ENDO;  Service: Gastroenterology;  Laterality: N/A;    HYSTERECTOMY      without BSO    INTRAOCULAR PROSTHESES INSERTION Left 10/27/2022    Procedure: INSERTION, IOL PROSTHESIS;  Surgeon: Palmer Berrios MD;  Location: [facility] OR;  Service: Ophthalmology;  Laterality: Left;    LAPAROTOMY, EXPLORATORY N/A 6/14/2025    Procedure: LAPAROTOMY, EXPLORATORY; APPENDECTOMY; SMALL BOWEL RESECTION; DIVERTICULUM RESECTION;  Surgeon: Rigoberto Ponce MD;   Location: [facility];  Service: General;  Laterality: N/A;    PHACOEMULSIFICATION OF CATARACT Left 10/27/2022    Procedure: PHACOEMULSIFICATION, CATARACT;  Surgeon: Palmer eBrrios MD;  Location: [facility];  Service: Ophthalmology;  Laterality: Left;  RN PHONE PREOP 10/21/2022   ARRIVAL 9:00 AM    R knee replacement      REPAIR OF RETINAL DETACHMENT WITH VITRECTOMY Left 02/28/2022    Procedure: REPAIR, RETINAL DETACHMENT, WITH VITRECTOMY;  Surgeon: MINO Martinez MD;  Location: [address];  Service: Ophthalmology;  Laterality: Left;  Spoke with SID Garcia. Pt was instructed nothing to eat after 8am and to arrive at 2pm for preop. 430pm case start request.    right  arm  surgery      ROBOT-ASSISTED CHOLECYSTECTOMY USING DA GENO XI N/A 10/09/2024    Procedure: XI ROBOTIC SUB TOTAL CHOLECYSTECTOMY; DRAIN PLACEMENT;  Surgeon: Rigoberto Ponce MD;  Location: [facility];  Service: General;  Laterality: N/A;     Family History   Problem Relation Name Age of Onset    Cancer Mother          lung    Liver disease Father      Thyroid disease Sister      Cervical cancer Sister      Tremor Sister       Social History[1]    OBJECTIVE:     Vital Signs (Most Recent)  Temp: 98.5 °F (36.9 °C) (06/24/25 1117)  Pulse: 80 (06/24/25 1435)  Resp: 18 (06/24/25 1333)  BP: 116/69 (06/24/25 1328)  SpO2: 95 % (06/24/25 1416)    Physical Exam:  Physical Exam  Vitals and nursing note reviewed.   HENT:      Head: Normocephalic and atraumatic.      Mouth/Throat:      Pharynx: Oropharynx is clear.   Cardiovascular:      Rate and Rhythm: Normal rate.   Pulmonary:      Effort: Pulmonary effort is normal. No respiratory distress.   Abdominal:      Palpations: Abdomen is soft.      Comments: Mild generalized tenderness. Midline vertical incision with staples intact. Edges well approximated.    Musculoskeletal:         General: Normal range of motion.   Skin:     General: Skin is warm and dry.   Neurological:      General: No focal deficit  present.      Mental Status: She is alert and oriented to person, place, and time.   Psychiatric:         Mood and Affect: Mood normal.         Behavior: Behavior normal.         Thought Content: Thought content normal.         Judgment: Judgment normal.         Laboratory  I have reviewed all pertinent lab results within the past 24 hours.    ASA/Mallampati  ASA: III  Mallampati: II    Imaging:  Recent imaging studies including CT a/p on 6/23/25 which was independently reviewed by Dr. Lan Galan.     ASSESSMENT/PLAN:     Assessment:  69 y.o. female with anxiety, depression and HTN admitted with SBO, now s/p xploratory laparotomy, SBR and anastomosis, appendectomy and diverticulectomy on 6/14 with General Surgery who has been referred to IR for image guided percutaneous drain placement for management of pelvic free fluid. The procedure was discussed in great detail with the patient including thorough explanations of the potential risks and benefits of percutaneous drain placement/aspiration. Risks include but are not limited to sepsis, severe infection, hemorrhage, damage to surrounding structures, catheter malfunction, inability to remove catheter, catheter dislodgment and need for additional procedures. The patient is a candidate for CT guided percutaneous drain placement under moderate sedation. Plan discussed with ordering physician and pt who verbalized understanding of the plan and would like to proceed.    Plan:  Will proceed with CT guided percutaneous drain placement under moderate sedation on Wednesday 6/25.   Please keep pt NPO starting at midnight on 6/25.   Primary team is responsible for ordering any labs/cultures to be drawn on fluid sample collected during the procedure.  Anticoagulation history reviewed. Please hold lovenox starting this evening (order placed).   If starting prophylactic AC following procedure, ok to start lovenox 12 hours following procedure and ok to start SQ heparin 6-8 hours  following procedure per SIR guidelines  Coagulation labs reviewed. plt count 277 on 6/24; INR pending.    Thank you for this consult. Please contact via Epic secure chat with questions.     Daija Alarcon NP  Interventional Radiology            [1]   Social History  Tobacco Use    Smoking status: Every Day     Current packs/day: 0.75     Average packs/day: 0.8 packs/day for 40.0 years (30.0 ttl pk-yrs)     Types: Cigarettes    Smokeless tobacco: Never   Substance Use Topics    Alcohol use: Yes     Comment: rarely    Drug use: Yes     Frequency: 14.0 times per week     Types: Marijuana

## 2025-06-24 NOTE — PLAN OF CARE
Nutrition Plan of Care:    Recommendations     1. Continue on a general healthful diet as tolerated  2. Recommend to continue with commercial beverages, Boost   3. Monitor labs and weights    4. Collaboration by nutrition professional with other providers     Goals: Patient to advance with nutrition therapies within 48 hours  Nutrition Goal Status: new  Communication of RD Recs: other (comment) (POC)     Nutrition Discharge Planning     Nutrition Discharge Planning: General healthy diet, Oral supplement regimen (comments)  Oral supplement regimen (comments): Boost      PES Statement  Inadequate oral intake related to Altered GI function as evidenced by Intake <75% estimated needs  Status: Improving     Interventions:  General Healthful Diet   Commercial beverage medical food supplement therapy      Ashly Rea MS, RDN, LDN

## 2025-06-24 NOTE — CONSULTS
[facility] [place] - Telemetry  Adult Nutrition  Progress Note    SUMMARY       Recommendations    1. Continue on a general healthful diet as tolerated  2. Recommend to continue with commercial beverages, Boost   3. Monitor labs and weights    4. Collaboration by nutrition professional with other providers    Goals: Patient to advance with nutrition therapies within 48 hours  Nutrition Goal Status: new  Communication of RACHEL Recs: other (comment) (POC)    Nutrition Discharge Planning    Nutrition Discharge Planning: General healthy diet, Oral supplement regimen (comments)  Oral supplement regimen (comments): Boost     PES Statement  Inadequate oral intake related to Altered GI function as evidenced by Intake <75% estimated needs  Status: Improving    Interventions:  General Healthful Diet   Commercial beverage medical food supplement therapy      Malnutrition Assessment      No significant weight loss found at this time. Previous weight 1 year ago shown below.   09/17/24 63 kg (139 lb)   Patient is positive for decreased po intake and varying po intake.  Meal consumption ranges from %.                                   Reason for Assessment    Reason For Assessment: identified at risk by screening criteria    Diagnosis:  (SBO)  Patient Active Problem List   Diagnosis    Essential hypertension    Hyperlipidemia    GERD (gastroesophageal reflux disease)    Depression with anxiety    Migraine with aura and without status migrainosus, not intractable    Nuclear sclerosis of both eyes    Dermatochalasis    Rosacea    Brow ptosis, bilateral    Retinal detachment of left eye with multiple breaks    Epiretinal membrane, left    Splenic infarction    Heart failure with preserved ejection fraction    Diastolic dysfunction    IFG (impaired fasting glucose)    Class 1 obesity due to excess calories without serious comorbidity with body mass index (BMI) of 31.0 to 31.9 in adult    RBBB    Left atrial enlargement    Left anterior  "fascicular block    Abnormal EKG    Dyspnea on exertion    Multiple risk factors for coronary artery disease    Splenic infarct    History of tobacco abuse    Nuclear sclerotic cataract of left eye    Moderate major depression, single episode    Tobacco dependency    Choledocholithiasis    Epigastric pain    History of laparoscopic cholecystectomy    Gastric outlet obstruction    SBO (small bowel obstruction)    Hypokalemia     Past Medical History:   Diagnosis Date    Allergy     Anxiety     Arthritis     Cataract     Depressive disorder, not elsewhere classified     Esophageal reflux     Hypertension     Impaired fasting glucose     Joint pain     Obesity, unspecified     Other and unspecified hyperlipidemia        General Information Comments:   6/16/25: Patient is currently NPO for SBO.  Labs reviewed.  NKFA.  LBM: 6/13/25.  Post op on 6/14/25.  No significant weight loss found in medical chart weight history within the last 1 year.  UBW: 130lbs.  RD to continue to monitor npo status and nutrition therapies.    6/24/25:  Patient is now on a regular oral diet.  Meal consumption documented between %.  No nausea and vomiting reported.  Abdominal pain reported.  Labs reviewed.  NKFA.  LBM: 6/24/25.  RD recommends to continue with commercial beverages: Boost daily and encourage po intake.  PO intake and tolerance to be monitoring at each follow up along with nutritional risks.     Nutrition/Diet History    Spiritual, Cultural Beliefs, Latter day Practices, Values that Affect Care: no  Food Allergies: NKFA  Factors Affecting Nutritional Intake: abdominal distension, abdominal pain, altered gastrointestinal function, decreased appetite  Nutrition Related Social Determinants of Health: SDOH: Adequate food in home environment and None Identified    Anthropometrics    Height: 5' 8" (172.7 cm)  Height (inches): 68 in  Height Method: Stated  Weight: 64.4 kg (141 lb 15.6 oz)  Weight (lb): 141.98 lb  Weight Method: Bed " Scale  Ideal Body Weight (IBW), Female: 140 lb  % Ideal Body Weight, Female (lb): 101.41 %  BMI (Calculated): 21.6  BMI Grade: 18.5-24.9 - normal       Lab/Procedures/Meds    Pertinent Labs Reviewed: reviewed  BMP  Lab Results   Component Value Date     06/24/2025    K 4.0 06/24/2025     06/24/2025    CO2 28 06/24/2025    BUN 10 06/24/2025    CREATININE 0.4 (L) 06/24/2025    CALCIUM 7.5 (L) 06/24/2025    ANIONGAP 10 06/24/2025    EGFRNORACEVR >60 06/24/2025     Lab Results   Component Value Date    HGBA1C 5.3 03/10/2025     Lab Results   Component Value Date    CALCIUM 7.5 (L) 06/24/2025    PHOS 3.2 06/24/2025       Pertinent Medications Reviewed: reviewed  Scheduled Meds:   acetaminophen  1,000 mg Oral TID    atorvastatin  40 mg Oral QHS    busPIRone  15 mg Oral TID    citalopram  20 mg Oral Daily    [START ON 6/25/2025] enoxparin  40 mg Subcutaneous Q24H (prophylaxis, 1700)    LIDOcaine  2 patch Transdermal Q24H    pantoprazole  40 mg Oral Daily    piperacillin-tazobactam (Zosyn) IV (PEDS and ADULTS) (extended infusion is not appropriate)  4.5 g Intravenous Q8H    simethicone  1 tablet Oral QID (PC + HS)    tamsulosin  0.4 mg Oral Daily     Continuous Infusions:      PRN Meds:.  Current Facility-Administered Medications:     aluminum-magnesium hydroxide-simethicone, 30 mL, Oral, QID PRN    glucagon (human recombinant), 1 mg, Intramuscular, PRN    glucose, 16 g, Oral, PRN    glucose, 24 g, Oral, PRN    hydrALAZINE, 10 mg, Intravenous, Q8H PRN    HYDROmorphone, 0.5 mg, Intravenous, Q4H PRN    magnesium oxide, 800 mg, Oral, PRN    magnesium oxide, 800 mg, Oral, PRN    melatonin, 6 mg, Oral, Nightly PRN    naloxone, 0.02 mg, Intravenous, PRN    ondansetron, 4 mg, Intravenous, Q8H PRN    oxyCODONE, 10 mg, Oral, Q6H PRN    oxyCODONE, 5 mg, Oral, Q6H PRN    potassium bicarbonate, 35 mEq, Oral, PRN    potassium bicarbonate, 50 mEq, Oral, PRN    potassium bicarbonate, 60 mEq, Oral, PRN    potassium, sodium  phosphates, 2 packet, Oral, PRN    potassium, sodium phosphates, 2 packet, Oral, PRN    potassium, sodium phosphates, 2 packet, Oral, PRN    sodium chloride 0.9%, 10 mL, Intravenous, Q12H PRN    Flushing PICC/Midline Protocol, , , Until Discontinued **AND** sodium chloride 0.9%, 10 mL, Intravenous, Q12H PRN    Estimated/Assessed Needs    Weight Used For Calorie Calculations: 60.5 kg (133 lb 6.1 oz)  Energy Calorie Requirements (kcal): 25-35kcals/kg (1512-2117kcals/day)  Energy Need Method: Kcal/kg  Protein Requirements: 1.2-1.5g/kg (70-89g/day)  Weight Used For Protein Calculations: 59 kg (130 lb 1.1 oz)  Fluid Requirements (mL): 1ml/kcal  Estimated Fluid Requirement Method: RDA Method  RDA Method (mL): 25  CHO Requirement: 225-250      Nutrition Prescription Ordered    Current Diet Order: Regular  Oral Nutrition Supplement: Boost    Evaluation of Received Nutrient/Fluid Intake    % Kcal Needs: 25-75%  % Protein Needs: 25-75%  I/O: 6/24/25: +1805ml since admit  Energy Calories Required: not meeting needs  Protein Required: not meeting needs  Fluid Required: not meeting needs  Comments: LBM: 6/24/25  Tolerance: tolerating (abdominal pain remains present)  % Intake of Estimated Energy Needs: Other: 25-75%  % Meal Intake: Other: 25-75%    PES Statement  Inadequate oral intake related to Altered GI function as evidenced by Intake <75% estimated needs  Status: Improving    Nutrition Risk    Level of Risk/Frequency of Follow-up: moderate (Follow up: 2x per week)     Monitor and Evaluation    Monitor and Evaluation: Diet order, Energy intake, Protein intake, Weight, Beliefs and attitudes, Electrolyte and renal panel, Gastrointestinal profile, Glucose/endocrine profile, Inflammatory profile, Lipid profile, Nutrition focused physical findings     Nutrition Follow-Up    RD Follow-up?: Yes  Ashly Rea, MS, RDN, LDN

## 2025-06-25 PROBLEM — L89.156 PRESSURE INJURY OF DEEP TISSUE OF SACRAL REGION: Status: ACTIVE | Noted: 2025-06-25

## 2025-06-25 LAB
ABSOLUTE EOSINOPHIL (OHS): 0.06 K/UL
ABSOLUTE MONOCYTE (OHS): 1.44 K/UL (ref 0.3–1)
ABSOLUTE NEUTROPHIL COUNT (OHS): 20.49 K/UL (ref 1.8–7.7)
ALBUMIN SERPL BCP-MCNC: 1.5 G/DL (ref 3.5–5.2)
ALP SERPL-CCNC: 71 UNIT/L (ref 40–150)
ALT SERPL W/O P-5'-P-CCNC: 9 UNIT/L (ref 10–44)
ANION GAP (OHS): 9 MMOL/L (ref 8–16)
AST SERPL-CCNC: 12 UNIT/L (ref 11–45)
BASOPHILS # BLD AUTO: 0.05 K/UL
BASOPHILS NFR BLD AUTO: 0.2 %
BILIRUB SERPL-MCNC: 0.4 MG/DL (ref 0.1–1)
BUN SERPL-MCNC: 11 MG/DL (ref 8–23)
CALCIUM SERPL-MCNC: 7.4 MG/DL (ref 8.7–10.5)
CHLORIDE SERPL-SCNC: 103 MMOL/L (ref 95–110)
CO2 SERPL-SCNC: 26 MMOL/L (ref 23–29)
CREAT SERPL-MCNC: 0.4 MG/DL (ref 0.5–1.4)
ERYTHROCYTE [DISTWIDTH] IN BLOOD BY AUTOMATED COUNT: 13.3 % (ref 11.5–14.5)
GFR SERPLBLD CREATININE-BSD FMLA CKD-EPI: >60 ML/MIN/1.73/M2
GLUCOSE SERPL-MCNC: 115 MG/DL (ref 70–110)
HCT VFR BLD AUTO: 36 % (ref 37–48.5)
HGB BLD-MCNC: 12.1 GM/DL (ref 12–16)
IMM GRANULOCYTES # BLD AUTO: 0.23 K/UL (ref 0–0.04)
IMM GRANULOCYTES NFR BLD AUTO: 1 % (ref 0–0.5)
LYMPHOCYTES # BLD AUTO: 1.09 K/UL (ref 1–4.8)
MAGNESIUM SERPL-MCNC: 1.8 MG/DL (ref 1.6–2.6)
MCH RBC QN AUTO: 32.1 PG (ref 27–31)
MCHC RBC AUTO-ENTMCNC: 33.6 G/DL (ref 32–36)
MCV RBC AUTO: 96 FL (ref 82–98)
NUCLEATED RBC (/100WBC) (OHS): 0 /100 WBC
PHOSPHATE SERPL-MCNC: 3.4 MG/DL (ref 2.7–4.5)
PLATELET # BLD AUTO: 332 K/UL (ref 150–450)
PMV BLD AUTO: 9.1 FL (ref 9.2–12.9)
POTASSIUM SERPL-SCNC: 4.3 MMOL/L (ref 3.5–5.1)
PROT SERPL-MCNC: 4.8 GM/DL (ref 6–8.4)
RBC # BLD AUTO: 3.77 M/UL (ref 4–5.4)
RELATIVE EOSINOPHIL (OHS): 0.3 %
RELATIVE LYMPHOCYTE (OHS): 4.7 % (ref 18–48)
RELATIVE MONOCYTE (OHS): 6.2 % (ref 4–15)
RELATIVE NEUTROPHIL (OHS): 87.6 % (ref 38–73)
SODIUM SERPL-SCNC: 138 MMOL/L (ref 136–145)
WBC # BLD AUTO: 23.36 K/UL (ref 3.9–12.7)

## 2025-06-25 PROCEDURE — 85025 COMPLETE CBC W/AUTO DIFF WBC: CPT

## 2025-06-25 PROCEDURE — 87070 CULTURE OTHR SPECIMN AEROBIC: CPT

## 2025-06-25 PROCEDURE — 63600175 PHARM REV CODE 636 W HCPCS

## 2025-06-25 PROCEDURE — 63600175 PHARM REV CODE 636 W HCPCS: Performed by: STUDENT IN AN ORGANIZED HEALTH CARE EDUCATION/TRAINING PROGRAM

## 2025-06-25 PROCEDURE — 87205 SMEAR GRAM STAIN: CPT

## 2025-06-25 PROCEDURE — 25000003 PHARM REV CODE 250

## 2025-06-25 PROCEDURE — 83735 ASSAY OF MAGNESIUM: CPT

## 2025-06-25 PROCEDURE — 63600175 PHARM REV CODE 636 W HCPCS: Performed by: INTERNAL MEDICINE

## 2025-06-25 PROCEDURE — 63600175 PHARM REV CODE 636 W HCPCS: Performed by: NURSE PRACTITIONER

## 2025-06-25 PROCEDURE — 97530 THERAPEUTIC ACTIVITIES: CPT

## 2025-06-25 PROCEDURE — 87076 CULTURE ANAEROBE IDENT EACH: CPT

## 2025-06-25 PROCEDURE — 80053 COMPREHEN METABOLIC PANEL: CPT

## 2025-06-25 PROCEDURE — 25000003 PHARM REV CODE 250: Performed by: STUDENT IN AN ORGANIZED HEALTH CARE EDUCATION/TRAINING PROGRAM

## 2025-06-25 PROCEDURE — 94760 N-INVAS EAR/PLS OXIMETRY 1: CPT

## 2025-06-25 PROCEDURE — 99223 1ST HOSP IP/OBS HIGH 75: CPT | Mod: ,,,

## 2025-06-25 PROCEDURE — 84100 ASSAY OF PHOSPHORUS: CPT

## 2025-06-25 PROCEDURE — 36415 COLL VENOUS BLD VENIPUNCTURE: CPT

## 2025-06-25 PROCEDURE — 11000001 HC ACUTE MED/SURG PRIVATE ROOM

## 2025-06-25 PROCEDURE — 94799 UNLISTED PULMONARY SVC/PX: CPT

## 2025-06-25 PROCEDURE — 0W9J30Z DRAINAGE OF PELVIC CAVITY WITH DRAINAGE DEVICE, PERCUTANEOUS APPROACH: ICD-10-PCS | Performed by: INTERNAL MEDICINE

## 2025-06-25 RX ORDER — FENTANYL CITRATE 50 UG/ML
INJECTION, SOLUTION INTRAMUSCULAR; INTRAVENOUS
Status: COMPLETED | OUTPATIENT
Start: 2025-06-25 | End: 2025-06-25

## 2025-06-25 RX ORDER — POLYETHYLENE GLYCOL 3350 17 G/17G
17 POWDER, FOR SOLUTION ORAL DAILY
Status: DISCONTINUED | OUTPATIENT
Start: 2025-06-25 | End: 2025-07-08 | Stop reason: HOSPADM

## 2025-06-25 RX ORDER — LIDOCAINE HYDROCHLORIDE 10 MG/ML
INJECTION, SOLUTION INFILTRATION; PERINEURAL
Status: COMPLETED | OUTPATIENT
Start: 2025-06-25 | End: 2025-06-25

## 2025-06-25 RX ORDER — BISACODYL 10 MG/1
10 SUPPOSITORY RECTAL DAILY PRN
Status: DISCONTINUED | OUTPATIENT
Start: 2025-06-25 | End: 2025-07-08 | Stop reason: HOSPADM

## 2025-06-25 RX ORDER — SENNOSIDES 8.6 MG/1
8.6 TABLET ORAL DAILY PRN
Status: DISCONTINUED | OUTPATIENT
Start: 2025-06-25 | End: 2025-07-08 | Stop reason: HOSPADM

## 2025-06-25 RX ORDER — MIDAZOLAM HYDROCHLORIDE 1 MG/ML
INJECTION, SOLUTION INTRAMUSCULAR; INTRAVENOUS
Status: COMPLETED | OUTPATIENT
Start: 2025-06-25 | End: 2025-06-25

## 2025-06-25 RX ADMIN — ACETAMINOPHEN 1000 MG: 500 TABLET ORAL at 08:06

## 2025-06-25 RX ADMIN — SIMETHICONE 80 MG: 80 TABLET, CHEWABLE ORAL at 08:06

## 2025-06-25 RX ADMIN — ACETAMINOPHEN 1000 MG: 500 TABLET ORAL at 09:06

## 2025-06-25 RX ADMIN — PANTOPRAZOLE SODIUM 40 MG: 40 TABLET, DELAYED RELEASE ORAL at 08:06

## 2025-06-25 RX ADMIN — ENOXAPARIN SODIUM 40 MG: 40 INJECTION SUBCUTANEOUS at 04:06

## 2025-06-25 RX ADMIN — SIMETHICONE 80 MG: 80 TABLET, CHEWABLE ORAL at 06:06

## 2025-06-25 RX ADMIN — HYDROMORPHONE HYDROCHLORIDE 0.5 MG: 1 INJECTION, SOLUTION INTRAMUSCULAR; INTRAVENOUS; SUBCUTANEOUS at 06:06

## 2025-06-25 RX ADMIN — SIMETHICONE 80 MG: 80 TABLET, CHEWABLE ORAL at 09:06

## 2025-06-25 RX ADMIN — BUSPIRONE HYDROCHLORIDE 15 MG: 5 TABLET ORAL at 09:06

## 2025-06-25 RX ADMIN — CITALOPRAM HYDROBROMIDE 20 MG: 20 TABLET ORAL at 08:06

## 2025-06-25 RX ADMIN — PIPERACILLIN SODIUM AND TAZOBACTAM SODIUM 4.5 G: 4; .5 INJECTION, POWDER, LYOPHILIZED, FOR SOLUTION INTRAVENOUS at 04:06

## 2025-06-25 RX ADMIN — ATORVASTATIN CALCIUM 40 MG: 40 TABLET, FILM COATED ORAL at 09:06

## 2025-06-25 RX ADMIN — OXYCODONE HYDROCHLORIDE 10 MG: 5 TABLET ORAL at 09:06

## 2025-06-25 RX ADMIN — BUSPIRONE HYDROCHLORIDE 15 MG: 5 TABLET ORAL at 08:06

## 2025-06-25 RX ADMIN — LIDOCAINE HYDROCHLORIDE 10 ML: 10 INJECTION, SOLUTION INFILTRATION; PERINEURAL at 03:06

## 2025-06-25 RX ADMIN — MIDAZOLAM 1 MG: 1 INJECTION INTRAMUSCULAR; INTRAVENOUS at 03:06

## 2025-06-25 RX ADMIN — ACETAMINOPHEN 1000 MG: 500 TABLET ORAL at 04:06

## 2025-06-25 RX ADMIN — PIPERACILLIN SODIUM AND TAZOBACTAM SODIUM 4.5 G: 4; .5 INJECTION, POWDER, LYOPHILIZED, FOR SOLUTION INTRAVENOUS at 09:06

## 2025-06-25 RX ADMIN — PIPERACILLIN SODIUM AND TAZOBACTAM SODIUM 4.5 G: 4; .5 INJECTION, POWDER, LYOPHILIZED, FOR SOLUTION INTRAVENOUS at 11:06

## 2025-06-25 RX ADMIN — FENTANYL CITRATE 50 MCG: 50 INJECTION INTRAMUSCULAR; INTRAVENOUS at 03:06

## 2025-06-25 RX ADMIN — SIMETHICONE 80 MG: 80 TABLET, CHEWABLE ORAL at 04:06

## 2025-06-25 RX ADMIN — BUSPIRONE HYDROCHLORIDE 15 MG: 5 TABLET ORAL at 04:06

## 2025-06-25 RX ADMIN — OXYCODONE HYDROCHLORIDE 10 MG: 5 TABLET ORAL at 01:06

## 2025-06-25 RX ADMIN — TAMSULOSIN HYDROCHLORIDE 0.4 MG: 0.4 CAPSULE ORAL at 08:06

## 2025-06-25 NOTE — PT/OT/SLP PROGRESS
Physical Therapy      Patient Name:  Patricia Ramirez   MRN:  [id number]    Patient not seen today secondary to Patient unwilling to participate. Will follow-up tomorrow.

## 2025-06-25 NOTE — PT/OT/SLP PROGRESS
Occupational Therapy   Treatment    Name: Patricia Ramirez  MRN: [id number]  Admitting Diagnosis:  SBO (small bowel obstruction)  11 Days Post-Op    Recommendations:     Discharge Recommendations: Low Intensity Therapy  Discharge Equipment Recommendations:  walker, rolling, hospital bed  Barriers to discharge:  None    Assessment:     Patricia Ramirez is a 69 y.o. female with a medical diagnosis of SBO (small bowel obstruction).  She presents with HOB elevated and c/o abdominal pain. Performance deficits affecting function are weakness, impaired endurance, impaired self care skills, impaired functional mobility, gait instability, impaired balance, decreased safety awareness, impaired skin. RN is concerned about her skin, so educated her about importance of OOB mobility to prevent skin alteration and weakness with prolonged hospital stay. She agreed to walk in room, but declined needing to use bathroom or to go to sink for grooming. She c/o B arm edema, but she had more left arm edema> right, so reminded her to do bed exercises to pump fluid.     Rehab Prognosis:  Good; patient would benefit from acute skilled OT services to address these deficits and reach maximum level of function.       Plan:     Patient to be seen 3 x/week to address the above listed problems via self-care/home management, therapeutic activities, therapeutic exercises  Plan of Care Expires: 07/05/25  Plan of Care Reviewed with: patient    Subjective     Chief Complaint: abdominal pain   Patient/Family Comments/goals: patient said she was surprised that she may have to have another procedure   Pain/Comfort:  Pain Rating 1: 8/10  Location 1: abdomen  Pain Addressed 1: Reposition    Objective:     Communicated with: RN,  prior to session.  Patient found HOB elevated with peripheral IV, telemetry upon OT entry to room.    General Precautions: Standard, fall    Orthopedic Precautions:N/A  Braces: N/A  Respiratory Status: Room air     Occupational  "Performance:     Bed Mobility:    Mod independence      Functional Mobility/Transfers:  Patient completed Sit <> Stand Transfer with modified independence  with  no assistive device   Functional Mobility: patient walked in room 3x without AD from bed to bathroom door, but did not want to go out of room with RW or in w/c for "fresh air"    Activities of Daily Living:  Upper Body Dressing: independence to don outer gown       [facility] 6 Click ADL: 18    Treatment & Education:  Occupational therapy educated patient re: positioning arms above heart to reduce B arm edema and to continue doing UB therex and she agreed she would do them     Patient left HOB elevated with all lines intact, call button in reach, and RN  notified    GOALS:   Multidisciplinary Problems       Occupational Therapy Goals          Problem: Occupational Therapy    Goal Priority Disciplines Outcome Interventions   Occupational Therapy Goal     OT, PT/OT Progressing    Description: Goals to be met by: 7/5/2025     Patient will increase functional independence with ADLs by performing:    UE Dressing with Set-up Assistance.  LE Dressing with Stand-by Assistance.  Grooming while seated with Modified Wright.  Toileting from bedside commode with Supervision for hygiene and clothing management.   Bathing seated UB and andreia care sponge  with Stand-by Assistance.  Toilet transfer to bedside commode with Contact Guard Assistance.  Upper extremity exercise program x7-10 reps no resistance, and per handout, with assistance as needed.                         DME Justifications:  Patricia requires a hospital bed due to her requiring positioning of the body in ways not feasible with an ordinary bed to alleviate pain and due to limited ability and cannot independently make changes in body position without the use of the bed.The positioning of the body cannot be sufficiently resolved by the use of pillows and wedges.    Time Tracking:     OT Date of Treatment: " 06/25/25  OT Start Time: 1015  OT Stop Time: 1035  OT Total Time (min): 20 min    Billable Minutes:Therapeutic Activity 20     OT/CHANELL: OT          6/25/2025

## 2025-06-25 NOTE — NURSING
Upon departure from floor to IR: cardiac monitor in progress, patient awake, alert, and oriented x 4. No apparent distress noted at this time.

## 2025-06-25 NOTE — PROGRESS NOTES
Surgery Progress Note    Patricia Ramirez is a 69 y.o. year old female in hospital for recurrent SBO (failed conservative management) s/p diagnostic laparoscopy converted exploratory laparotomy, SBR and anastomosis, appendectomy, diverticulectomy 6/14    NAEON AF VSS  NPO for IR drainage of pelvic fluid today  No f/c/n/v/sob/cp    Persistent leukocytosis today 23.36 from 20.84  Prealbumin noted to be 7    ROS:  Negative except above    PE:  Vitals:    06/25/25 1518 06/25/25 1522 06/25/25 1527 06/25/25 1540   BP: 131/66 128/62 (!) 120/55 118/63   BP Location: Right arm Right arm Right arm Right arm   Patient Position:    Lying   Pulse: 76 80 80 85   Resp: 18 17 16 17   Temp:       TempSrc:       SpO2: 98% 97% 98% 98%   Weight:       Height:           NAD, resting  No belabored breathing  No skin changes  Abd soft mildy distended appropriately TTP around midline incision, port site incisions c/d/I      Significant Diagnostic Studies: Labs: BMP:   Recent Labs   Lab 06/24/25  0530 06/25/25  0522   * 115*    138   K 4.0 4.3    103   CO2 28 26   BUN 10 11   CREATININE 0.4* 0.4*   CALCIUM 7.5* 7.4*   MG 1.9 1.8   , CBC   Recent Labs   Lab 06/24/25  0530 06/25/25  0522   WBC 20.84* 23.36*   HGB 12.2 12.1   HCT 37.5 36.0*    332   , INR   Lab Results   Component Value Date    INR 0.9 06/24/2025    INR 1.0 07/28/2022    PROTIME 10.4 06/24/2025   , and All labs within the past 24 hours have been reviewed    A/P:  Patricia Ramirez is a 69 y.o. year old female  in hospital for recurrent SBO (failed conservative management), POD#1 from diagnostic laparoscopy converted exploratory laparotomy, SBR and anastomosis, appendectomy, diverticulectomy 6/14. Persistent leukocytosis. Prealbumin 7    - IR drainage today, appreciate assistance  - Follow up IR cultures  - Continue zosyn  - Regular diet, boost plus supplements  - Nutrition consulted for recs given malnutrition  - Monitor for n/v and bowel function  -  [facility]  - Pulm toilet, encourage IS 10x/hour  - PT/OT, encourage up to chair and ambulation   - Abd binder for comfort, ok to take off or have breaks if needed  - Replete electrolytes to maintain K > 4, Phos > 3, Mg > 2  - DVT ppx     Phokoffi Lin  General Surgery - [facility]  6/25/2025

## 2025-06-25 NOTE — CONSULTS
[facility] [place] - Telemetry  Wound Care  WOC YOLI    Patient Name:  Patricia Ramirez   MRN:  [id number]  Date: 6/25/2025  Diagnosis: SBO (small bowel obstruction)    History:     Past Medical History:   Diagnosis Date    Allergy     Anxiety     Arthritis     Cataract     Depressive disorder, not elsewhere classified     Esophageal reflux     Hypertension     Impaired fasting glucose     Joint pain     Obesity, unspecified     Other and unspecified hyperlipidemia        Social History[1]    Precautions:     Allergies as of 06/14/2025 - Reviewed 06/14/2025   Allergen Reaction Noted    Sulfa (sulfonamide antibiotics) Hives 05/08/2013    Ace inhibitors Other (See Comments) 10/29/2014       [facility] Assessment Details/Treatment     Active Problem List with Overview Notes    Diagnosis Date Noted    Hypokalemia 06/11/2025    SBO (small bowel obstruction) 06/09/2025    Gastric outlet obstruction 05/21/2025    Epigastric pain 11/20/2024    History of laparoscopic cholecystectomy 11/20/2024    Moderate major depression, single episode 10/06/2024    Tobacco dependency 10/06/2024    Choledocholithiasis 10/06/2024    Nuclear sclerotic cataract of left eye 10/27/2022    Heart failure with preserved ejection fraction 09/26/2022     ECHO (7/29/22) EF 65% LV grade 2 diastolic dysfunction.      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    Epiretinal membrane, left 04/12/2022    Retinal detachment of left eye with multiple breaks  02/28/2022    Rosacea 03/03/2021    Brow ptosis, bilateral     Nuclear sclerosis of both eyes 10/26/2020    Dermatochalasis 10/26/2020    Migraine with aura and without status migrainosus, not intractable 09/21/2016    Essential hypertension 12/29/2014    Hyperlipidemia 12/29/2014    GERD (gastroesophageal reflux disease) 12/29/2014    Depression with anxiety 12/29/2014      Nursing consult for altered skin integrity sacrum  A 69 year old female admitted 6/14/25 from home with complaint of abdominal pain with constipation.   6/14 S/P Exploratory laparotomy; appendectomy; SB resection; diverticulum resection per Dr. Ponce for SBO  6/25 WBC 23.36 Hgb 12.1 Hct 36.0 Alb 1.5 Weight 64.4 kg   On Isoflex mattress; Shay score 19  6/14 7:00 pm 4 Eyes Skin Assessment- No Pressure Injuries Present  6/24 12:30 pm 4 Eyes Skin Assessment- Pressure Injury Previously documented- LDA for pressure injury added  Photodocumentation (from Media 6/24)    Sacrum  Assessment:  Patient turning in bed with minimal assistance. Buttocks with wasting of muscle/fat padding.   DTPI bilateral buttocks and sacrum with epidermis intact. Each area of DTPI 1 cm dark purple areas. Erythema surrounding areas of DTPI clearing.   Patient complaining of abdominal pain when turned on side.   Treatment/Plan:  Reposition patient side to side and avoid pressure on sacral area. Continue protection of bony prominences with Mepilex sacral dressing.   Allow areas of DTPI to evolve.   Instructed patient and daughter on importance of repositioning and avoiding pressure on sacrum.   Recommendations made to primary team. Orders placed.     06/25/2025         [1]   Social History  Socioeconomic History    Marital status:     Years of education: 12   Tobacco Use    Smoking status: Every Day     Current packs/day: 0.75     Average packs/day: 0.8 packs/day for 40.0 years (30.0 ttl pk-yrs)     Types: Cigarettes    Smokeless tobacco: Never   Substance and Sexual  Activity    Alcohol use: Yes     Comment: rarely    Drug use: Yes     Frequency: 14.0 times per week     Types: Marijuana    Sexual activity: Yes     Partners: Male     Birth control/protection: See Surgical Hx   Other Topics Concern    Are you pregnant or think you may be? No    Breast-feeding No     Social Drivers of Health     Financial Resource Strain: Low Risk  (6/14/2025)    Overall Financial Resource Strain (CARDIA)     Difficulty of Paying Living Expenses: Not very hard   Food Insecurity: No Food Insecurity (6/14/2025)    Hunger Vital Sign     Worried About Running Out of Food in the Last Year: Never true     Ran Out of Food in the Last Year: Never true   Transportation Needs: No Transportation Needs (6/14/2025)    PRAPARE - Transportation     Lack of Transportation (Medical): No     Lack of Transportation (Non-Medical): No   Physical Activity: Inactive (6/9/2025)    Exercise Vital Sign     Days of Exercise per Week: 0 days     Minutes of Exercise per Session: 0 min   Stress: Stress Concern Present (6/14/2025)    [employer] - Occupational Stress Questionnaire     Feeling of Stress : Very much   Housing Stability: Low Risk  (6/14/2025)    Housing Stability Vital Sign     Unable to Pay for Housing in the Last Year: No     Number of Times Moved in the Last Year: 0     Homeless in the Last Year: No

## 2025-06-25 NOTE — PLAN OF CARE
Problem: Adult Inpatient Plan of Care  Goal: Plan of Care Review  6/25/2025 1219 by Kim Trent RN  Outcome: Progressing  Flowsheets (Taken 6/25/2025 0815)  Plan of Care Reviewed With: patient  6/25/2025 1218 by Kim Trent RN  Outcome: Progressing  Goal: Patient-Specific Goal (Individualized)  Outcome: Progressing     Problem: Pain Acute  Goal: Optimal Pain Control and Function  Outcome: Progressing  Intervention: Develop Pain Management Plan  Flowsheets (Taken 6/25/2025 0815)  Pain Management Interventions:   around-the-clock dosing utilized   pillow support provided   quiet environment facilitated     Problem: Adult Inpatient Plan of Care  Goal: Plan of Care Review  6/25/2025 1219 by Kim Trent RN  Outcome: Progressing  Flowsheets (Taken 6/25/2025 0815)  Plan of Care Reviewed With: patient  6/25/2025 1218 by Kim Trent RN  Outcome: Progressing     Problem: Adult Inpatient Plan of Care  Goal: Plan of Care Review  6/25/2025 1219 by Kmi Trent RN  Outcome: Progressing  Flowsheets (Taken 6/25/2025 0815)  Plan of Care Reviewed With: patient     Problem: Adult Inpatient Plan of Care  Goal: Plan of Care Review  6/25/2025 1218 by Kim Trent RN  Outcome: Progressing     Problem: Adult Inpatient Plan of Care  Goal: Patient-Specific Goal (Individualized)  Outcome: Progressing     Problem: Adult Inpatient Plan of Care  Goal: Patient-Specific Goal (Individualized)  Outcome: Progressing     Problem: Pain Acute  Goal: Optimal Pain Control and Function  Outcome: Progressing  Intervention: Develop Pain Management Plan  Flowsheets (Taken 6/25/2025 0815)  Pain Management Interventions:   around-the-clock dosing utilized   pillow support provided   quiet environment facilitated     Problem: Pain Acute  Goal: Optimal Pain Control and Function  Outcome: Progressing     Problem: Pain Acute  Goal: Optimal Pain Control and Function  Intervention: Develop Pain Management Plan  Flowsheets (Taken 6/25/2025 0815)  Pain  Management Interventions:   around-the-clock dosing utilized   pillow support provided   quiet environment facilitated     Problem: Pain Acute  Goal: Optimal Pain Control and Function  Intervention: Develop Pain Management Plan  Flowsheets (Taken 6/25/2025 0815)  Pain Management Interventions:   around-the-clock dosing utilized   pillow support provided   quiet environment facilitated

## 2025-06-25 NOTE — PLAN OF CARE
Problem: Adult Inpatient Plan of Care  Goal: Plan of Care Review  Outcome: Progressing  Goal: Patient-Specific Goal (Individualized)  Outcome: Progressing  Goal: Absence of Hospital-Acquired Illness or Injury  Outcome: Progressing  Goal: Optimal Comfort and Wellbeing  Outcome: Progressing  Goal: Readiness for Transition of Care  Outcome: Progressing     Problem: Infection  Goal: Absence of Infection Signs and Symptoms  Outcome: Progressing     Problem: Wound  Goal: Optimal Coping  Outcome: Progressing  Goal: Optimal Functional Ability  Outcome: Progressing  Goal: Absence of Infection Signs and Symptoms  Outcome: Progressing  Goal: Improved Oral Intake  Outcome: Progressing  Goal: Optimal Pain Control and Function  Outcome: Progressing  Goal: Skin Health and Integrity  Outcome: Progressing  Goal: Optimal Wound Healing  Outcome: Progressing     Problem: Fall Injury Risk  Goal: Absence of Fall and Fall-Related Injury  Outcome: Progressing     Problem: Skin Injury Risk Increased  Goal: Skin Health and Integrity  Outcome: Progressing     Problem: Pain Acute  Goal: Optimal Pain Control and Function  Outcome: Progressing     Problem: Intestinal Obstruction  Goal: Optimal Bowel Function  Outcome: Progressing  Goal: Fluid and Electrolyte Balance  Outcome: Progressing  Goal: Absence of Infection Signs and Symptoms  Outcome: Progressing  Goal: Optimize Nutrition Status  Outcome: Progressing  Goal: Optimal Pain Control and Function  Outcome: Progressing     Problem: Bowel Resection  Goal: Absence of Bleeding  Outcome: Progressing  Goal: Effective Bowel Elimination  Outcome: Progressing  Goal: Fluid and Electrolyte Balance  Outcome: Progressing  Goal: Absence of Infection Signs and Symptoms  Outcome: Progressing  Goal: Optimal Nutrition Delivery  Outcome: Progressing  Goal: Anesthesia/Sedation Recovery  Outcome: Progressing  Goal: Optimal Pain Control and Function  Outcome: Progressing  Goal: Nausea and Vomiting  Relief  Outcome: Progressing  Goal: Effective Urinary Elimination  Outcome: Progressing  Goal: Effective Oxygenation and Ventilation  Outcome: Progressing     Problem: Comorbidity Management  Goal: Blood Pressure in Desired Range  Outcome: Progressing

## 2025-06-25 NOTE — PLAN OF CARE
IR procedure completed for Abscess drainage. Pt well tolerated without any s/sx of complications. 150 ml was drained during the procedure from abscess site. CDI. Dressing intact. Phone report given to RN. Pt transferred back to floor.   PRIMARY CARE VISIT        Patient name : David Hagan   MRN number: [id number]   YOB: 1961       Reason for visit:   Chief Complaint   Patient presents with   • Follow-up     [place] Check up- Covid positive 05/18   • Medication Refill          Quality    Quality reviewed     History of Present Illness  Mr. David Hagan 61 years old gentleman came in for general evaluation patient recently got COVID last month tested positive at [facility] pharmacy on June 16, feels okay and denies any symptoms now denies any chest pain or shortness of breath no dyspnea on exertion no new GI or  concerns.  Patient says he is getting on and off rectal bleed with hemorrhoids unable to see the gastroenterology need new referral for follow-up evaluation..    HPI  Review of Systems       Review of Systems   Constitutional: Negative for activity change, appetite change, chills, diaphoresis, fatigue, fever and unexpected weight change.   HENT: Negative for congestion, dental problem, drooling, ear discharge, ear pain, facial swelling, hearing loss, mouth sores, nosebleeds, postnasal drip, rhinorrhea, sinus pressure, sinus pain, sneezing, sore throat, tinnitus, trouble swallowing and voice change.    Eyes: Negative for photophobia, pain, discharge, redness, itching and visual disturbance.   Respiratory: Negative for apnea, cough, choking, chest tightness, shortness of breath, wheezing and stridor.    Cardiovascular: Negative for chest pain, palpitations and leg swelling.   Gastrointestinal: Positive for anal bleeding. Negative for abdominal distention, abdominal pain, blood in stool, constipation, diarrhea, nausea, rectal pain and vomiting.   Endocrine: Negative for cold intolerance, heat intolerance, polydipsia, polyphagia and polyuria.   Genitourinary: Negative for decreased urine volume, difficulty urinating, dysuria, enuresis, flank pain, frequency, genital sores, hematuria, penile discharge, penile pain, penile swelling,  scrotal swelling, testicular pain and urgency.   Musculoskeletal: Negative for arthralgias, back pain, gait problem, joint swelling, myalgias, neck pain and neck stiffness.   Skin: Negative for color change, pallor, rash and wound.   Allergic/Immunologic: Negative for environmental allergies, food allergies and immunocompromised state.   Neurological: Negative for dizziness, tremors, seizures, syncope, facial asymmetry, speech difficulty, weakness, light-headedness, numbness and headaches.   Hematological: Negative for adenopathy. Does not bruise/bleed easily.   Psychiatric/Behavioral: Negative for agitation, behavioral problems, confusion, decreased concentration, dysphoric mood, hallucinations, self-injury, sleep disturbance and suicidal ideas. The patient is not nervous/anxious and is not hyperactive.         Allergies  ALLERGIES:  No Known Allergies    Current Meds  Current Outpatient Medications   Medication Sig Dispense Refill   • Vitamin D, Ergocalciferol, 83739 units capsule TAKE 1 CAPSULE WEEKLY for 6 weeks     • atorvastatin (LIPITOR) 20 MG tablet TAKE 1 TABLET BY MOUTH EVERY DAY       No current facility-administered medications for this visit.     .       Past Medical History  History reviewed. No pertinent past medical history.    Surgical History  History reviewed. No pertinent surgical history.    Family History  History reviewed. No pertinent family history.    Social History  Social History     Tobacco Use   • Smoking status: Never Smoker   • Smokeless tobacco: Never Used   Substance Use Topics   • Alcohol use: Yes     Alcohol/week: 2.0 standard drinks     Types: 2 Cans of beer per week     Comment: 1-2 cans during the weekends   • Drug use: No     Types: Other               Vitals  Visit Vitals  /90 (BP Location: RUE - Right upper extremity, Patient Position: Sitting, Cuff Size: Large Adult)   Pulse 62   Temp 96.8 °F (36 °C) (Tympanic)   Resp 18   Ht 5' 10\" (1.778 m)   Wt 90.2 kg (198 lb 13.7  oz)   SpO2 98%   BMI 28.53 kg/m²              Physical Exam  Vitals and nursing note reviewed.   Constitutional:       General: He is not in acute distress.     Appearance: Normal appearance. He is well-developed and normal weight. He is not ill-appearing, toxic-appearing or diaphoretic.   HENT:      Head: Normocephalic and atraumatic.      Right Ear: External ear normal.      Left Ear: External ear normal.      Nose: Nose normal. No congestion or rhinorrhea.      Mouth/Throat:      Mouth: Mucous membranes are moist.      Pharynx: Oropharynx is clear. No oropharyngeal exudate or posterior oropharyngeal erythema.   Eyes:      General: No scleral icterus.        Right eye: No discharge.         Left eye: No discharge.      Extraocular Movements: Extraocular movements intact.      Conjunctiva/sclera: Conjunctivae normal.      Pupils: Pupils are equal, round, and reactive to light.   Neck:      Thyroid: No thyromegaly.      Vascular: No carotid bruit or JVD.      Trachea: No tracheal deviation.   Cardiovascular:      Rate and Rhythm: Normal rate and regular rhythm.      Pulses: Normal pulses.      Heart sounds: Normal heart sounds. No murmur heard.    No friction rub. No gallop.   Pulmonary:      Effort: Pulmonary effort is normal. No respiratory distress.      Breath sounds: Normal breath sounds. No stridor. No wheezing, rhonchi or rales.   Chest:      Chest wall: No tenderness.   Abdominal:      General: Bowel sounds are normal. There is no distension.      Palpations: Abdomen is soft. There is no mass.      Tenderness: There is no abdominal tenderness. There is no guarding or rebound.      Hernia: No hernia is present.   Musculoskeletal:         General: No swelling, tenderness, deformity or signs of injury. Normal range of motion.      Cervical back: Normal range of motion and neck supple. No rigidity or tenderness.      Right lower leg: No edema.      Left lower leg: No edema.   Lymphadenopathy:      Cervical: No  cervical adenopathy.   Skin:     General: Skin is dry.      Capillary Refill: Capillary refill takes less than 2 seconds.      Coloration: Skin is not jaundiced or pale.      Findings: No bruising, erythema, lesion or rash.   Neurological:      General: No focal deficit present.      Mental Status: He is alert and oriented to person, place, and time. Mental status is at baseline.      Cranial Nerves: No cranial nerve deficit.      Sensory: No sensory deficit.      Motor: No weakness or abnormal muscle tone.      Coordination: Coordination normal.      Gait: Gait normal.      Deep Tendon Reflexes: Reflexes are normal and symmetric.   Psychiatric:         Mood and Affect: Mood normal.         Behavior: Behavior normal.         Thought Content: Thought content normal.         Judgment: Judgment normal.          Results/Data    No visits with results within 4 Week(s) from this visit.   Latest known visit with results is:   Lab Services on 02/28/2022   Component Date Value Ref Range Status   • Sodium 02/28/2022 142  135 - 145 mmol/L Final   • Potassium 02/28/2022 4.3  3.4 - 5.1 mmol/L Final   • Chloride 02/28/2022 110 (A) 98 - 107 mmol/L Final   • Carbon Dioxide 02/28/2022 25  21 - 32 mmol/L Final   • Anion Gap 02/28/2022 11  10 - 20 mmol/L Final   • Glucose 02/28/2022 100 (A) 70 - 99 mg/dL Final   • BUN 02/28/2022 16  6 - 20 mg/dL Final   • Creatinine 02/28/2022 0.89  0.67 - 1.17 mg/dL Final   • Glomerular Filtration Rate 02/28/2022 >90  >=60 Final    eGFR results = or >60 mL/min/1.73m2 = Normal kidney function. Estimated GFR calculated using the 2009 CKD-EPI creatinine equation.     • BUN/ Creatinine Ratio 02/28/2022 18  7 - 25 Final   • Calcium 02/28/2022 9.6  8.4 - 10.2 mg/dL Final   • Bilirubin, Total 02/28/2022 0.7  0.2 - 1.0 mg/dL Final   • GOT/AST 02/28/2022 16  <=37 Units/L Final   • GPT/ALT 02/28/2022 22  <64 Units/L Final   • Alkaline Phosphatase 02/28/2022 54  45 - 117 Units/L Final   • Albumin 02/28/2022 4.1   3.6 - 5.1 g/dL Final   • Protein, Total 02/28/2022 7.1  6.4 - 8.2 g/dL Final   • Globulin 02/28/2022 3.0  2.0 - 4.0 g/dL Final   • A/G Ratio 02/28/2022 1.4  1.0 - 2.4 Final   • Cholesterol 02/28/2022 255 (A) <=199 mg/dL Final    Desirable         <200  Borderline High   200 to 239  High              >=240   • Triglycerides 02/28/2022 105  <=149 mg/dL Final    Normal            <150  Borderline High   150 to 199  High              200 to 499  Very High         >=500   • HDL 02/28/2022 67  >=40 mg/dL Final    Low              <40  Borderline Low   40 to 49  Near Optimal     50 to 59  Optimal          >=60   • LDL 02/28/2022 167 (A) <=129 mg/dL Final    OPTIMAL           <100  NEAR OPTIMAL      100 to 129  BORDERLINE HIGH   130 to 159  HIGH              160 to 189  VERY HIGH         >=190   • Non-HDL Cholesterol 02/28/2022 188  mg/dL Final    Therapeutic Target:  CHD and risk equivalents  <130  Multiple risk factors     <160  0 to 1 risk factor        <190   • Cholesterol/ HDL Ratio 02/28/2022 3.8  <=4.4 Final   • Prostate Specific Antigen 02/28/2022 6.03 (A) <=4.50 ng/mL Final    [employer] Chemiluminescence   • Hepatitis C Antibody 02/28/2022 Negative  Negative Final   • WBC 02/28/2022 7.8  4.2 - 11.0 K/mcL Final   • RBC 02/28/2022 5.20  4.50 - 5.90 mil/mcL Final   • HGB 02/28/2022 13.8  13.0 - 17.0 g/dL Final   • HCT 02/28/2022 39.8  39.0 - 51.0 % Final   • MCV 02/28/2022 76.5 (A) 78.0 - 100.0 fl Final   • MCH 02/28/2022 26.5  26.0 - 34.0 pg Final   • MCHC 02/28/2022 34.7  32.0 - 36.5 g/dL Final   • RDW-CV 02/28/2022 14.1  11.0 - 15.0 % Final   • RDW-SD 02/28/2022 38.6 (A) 39.0 - 50.0 fL Final   • PLT 02/28/2022 280  140 - 450 K/mcL Final   • NRBC 02/28/2022 0  <=0 /100 WBC Final   • Neutrophil, Percent 02/28/2022 51  % Final   • Lymphocytes, Percent 02/28/2022 33  % Final   • Mono, Percent 02/28/2022 13  % Final   • Eosinophils, Percent 02/28/2022 2  % Final   • Basophils, Percent 02/28/2022 1  % Final   •  Immature Granulocytes 02/28/2022 0  % Final   • Absolute Neutrophils 02/28/2022 4.1  1.8 - 7.7 K/mcL Final   • Absolute Lymphocytes 02/28/2022 2.6  1.0 - 4.0 K/mcL Final   • Absolute Monocytes 02/28/2022 1.0 (A) 0.3 - 0.9 K/mcL Final   • Absolute Eosinophils  02/28/2022 0.1  0.0 - 0.5 K/mcL Final   • Absolute Basophils 02/28/2022 0.1  0.0 - 0.3 K/mcL Final   • Absolute Immmature Granulocytes 02/28/2022 0.0  0.0 - 0.2 K/mcL Final       Immunizations  Advised to get second COVID booster at the end of June 2022         Assessment/ PLAN  1. Mixed hyperlipidemia  Check the labs LDL needs to be less than 100 medication compliance stressed low-cholesterol diet and exercise to continue  - LIPID PANEL WITH REFLEX; Future  - BASIC METABOLIC PANEL; Future  - PSA; Future  - VITAMIN D -25 HYDROXY; Future    2. Elevated PSA measurement  Recheck the PSA before next visit if any changes will refer to the urology  - LIPID PANEL WITH REFLEX; Future  - BASIC METABOLIC PANEL; Future  - PSA; Future  - VITAMIN D -25 HYDROXY; Future    3. Testicular hypofunction  Supportive care follow with urology consult  - LIPID PANEL WITH REFLEX; Future  - BASIC METABOLIC PANEL; Future  - PSA; Future  - VITAMIN D -25 HYDROXY; Future    4. Vitamin D deficiency  Continue vitamin D3 supplements, check labs  - LIPID PANEL WITH REFLEX; Future  - BASIC METABOLIC PANEL; Future  - PSA; Future  - VITAMIN D -25 HYDROXY; Future    Referred to gastroenterology for evaluation advised on rectal hemorrhoidal bleeding and also for colonoscopy as recommended.       RETURN TO OFFICE  Return in about 3 months (around 8/31/2022), or if symptoms worsen or fail to improve.        Sheldon Degn MD

## 2025-06-25 NOTE — NURSING
[facility] [place]  Nurses Note -- 4 Eyes      6/25/2025       Skin assessed on: Q Shift      [] No Pressure Injuries Present    []Prevention Measures Documented    [x] Yes LDA  for Pressure Injury Previously documented     [] Yes New Pressure Injury Discovered   [] LDA for New Pressure Injury Added      Attending RN:  Kim Trent RN     Second LPN: Rogelio

## 2025-06-25 NOTE — PROCEDURES
VIR Post-Procedure Note      Pre Op Diagnosis:  Pelvic fluid collection    Post Op Diagnosis:  same    Procedure:  Image Guided Drain Placement    Procedure performed by:  Lan Galan MD     Written Informed Consent Obtained: Yes    Specimen Removed:  Yes; aspirate from fluid collection    Estimated Blood Loss: minimal    Findings:    CT was used for localization of abnormal fluid collection.     Successful placement of 14 [place] all-purpose drainage catheter in the pelvic fluid collection.  Approximately  150 mL of bloody purulent fluid was removed. A specimen was sent to the lab for further analysis and culture.    Plan:    - Connect tube to suction bulb. Flush drain with 10 cc normal saline daily.     - Consider drain removal once drain output is < 10 cc daily for 2 consecutive days & there is clinical improvement. Otherwise consider further imaging to confirm resolution of collection before drain removal.         Lan Galan MD  VIR

## 2025-06-25 NOTE — NURSING
Upon arrival to floor from IR: cardiac monitoring in progress, patient oriented to room, call bell in reach and bed in lowest position. No apparent distress noted.

## 2025-06-26 LAB
ABSOLUTE EOSINOPHIL (OHS): 0.1 K/UL
ABSOLUTE MONOCYTE (OHS): 0.82 K/UL (ref 0.3–1)
ABSOLUTE NEUTROPHIL COUNT (OHS): 13.77 K/UL (ref 1.8–7.7)
ALBUMIN SERPL BCP-MCNC: 1.4 G/DL (ref 3.5–5.2)
ALP SERPL-CCNC: 74 UNIT/L (ref 40–150)
ALT SERPL W/O P-5'-P-CCNC: 9 UNIT/L (ref 10–44)
ANION GAP (OHS): 8 MMOL/L (ref 8–16)
AST SERPL-CCNC: 20 UNIT/L (ref 11–45)
BASOPHILS # BLD AUTO: 0.04 K/UL
BASOPHILS NFR BLD AUTO: 0.3 %
BILIRUB SERPL-MCNC: 0.4 MG/DL (ref 0.1–1)
BUN SERPL-MCNC: 12 MG/DL (ref 8–23)
CALCIUM SERPL-MCNC: 7.4 MG/DL (ref 8.7–10.5)
CHLORIDE SERPL-SCNC: 102 MMOL/L (ref 95–110)
CO2 SERPL-SCNC: 27 MMOL/L (ref 23–29)
CREAT SERPL-MCNC: 0.5 MG/DL (ref 0.5–1.4)
ERYTHROCYTE [DISTWIDTH] IN BLOOD BY AUTOMATED COUNT: 13.3 % (ref 11.5–14.5)
GFR SERPLBLD CREATININE-BSD FMLA CKD-EPI: >60 ML/MIN/1.73/M2
GLUCOSE SERPL-MCNC: 101 MG/DL (ref 70–110)
GRAM STN SPEC: NORMAL
GRAM STN SPEC: NORMAL
HCT VFR BLD AUTO: 35.2 % (ref 37–48.5)
HGB BLD-MCNC: 11.2 GM/DL (ref 12–16)
IMM GRANULOCYTES # BLD AUTO: 0.11 K/UL (ref 0–0.04)
IMM GRANULOCYTES NFR BLD AUTO: 0.7 % (ref 0–0.5)
LYMPHOCYTES # BLD AUTO: 0.96 K/UL (ref 1–4.8)
MCH RBC QN AUTO: 31.6 PG (ref 27–31)
MCHC RBC AUTO-ENTMCNC: 31.8 G/DL (ref 32–36)
MCV RBC AUTO: 99 FL (ref 82–98)
NUCLEATED RBC (/100WBC) (OHS): 0 /100 WBC
PHOSPHATE SERPL-MCNC: 3.4 MG/DL (ref 2.7–4.5)
PLATELET # BLD AUTO: 274 K/UL (ref 150–450)
PMV BLD AUTO: 9.7 FL (ref 9.2–12.9)
POCT GLUCOSE: 113 MG/DL (ref 70–110)
POCT GLUCOSE: 142 MG/DL (ref 70–110)
POTASSIUM SERPL-SCNC: 3.6 MMOL/L (ref 3.5–5.1)
PROT SERPL-MCNC: 4.9 GM/DL (ref 6–8.4)
RBC # BLD AUTO: 3.54 M/UL (ref 4–5.4)
RELATIVE EOSINOPHIL (OHS): 0.6 %
RELATIVE LYMPHOCYTE (OHS): 6.1 % (ref 18–48)
RELATIVE MONOCYTE (OHS): 5.2 % (ref 4–15)
RELATIVE NEUTROPHIL (OHS): 87.1 % (ref 38–73)
SODIUM SERPL-SCNC: 137 MMOL/L (ref 136–145)
WBC # BLD AUTO: 15.8 K/UL (ref 3.9–12.7)

## 2025-06-26 PROCEDURE — 94761 N-INVAS EAR/PLS OXIMETRY MLT: CPT

## 2025-06-26 PROCEDURE — 25000003 PHARM REV CODE 250: Performed by: STUDENT IN AN ORGANIZED HEALTH CARE EDUCATION/TRAINING PROGRAM

## 2025-06-26 PROCEDURE — 99232 SBSQ HOSP IP/OBS MODERATE 35: CPT | Mod: ,,, | Performed by: NURSE PRACTITIONER

## 2025-06-26 PROCEDURE — 94799 UNLISTED PULMONARY SVC/PX: CPT

## 2025-06-26 PROCEDURE — 27000221 HC OXYGEN, UP TO 24 HOURS

## 2025-06-26 PROCEDURE — 63600175 PHARM REV CODE 636 W HCPCS: Performed by: NURSE PRACTITIONER

## 2025-06-26 PROCEDURE — 36415 COLL VENOUS BLD VENIPUNCTURE: CPT

## 2025-06-26 PROCEDURE — 82040 ASSAY OF SERUM ALBUMIN: CPT

## 2025-06-26 PROCEDURE — 97530 THERAPEUTIC ACTIVITIES: CPT

## 2025-06-26 PROCEDURE — 63600175 PHARM REV CODE 636 W HCPCS: Performed by: STUDENT IN AN ORGANIZED HEALTH CARE EDUCATION/TRAINING PROGRAM

## 2025-06-26 PROCEDURE — 85025 COMPLETE CBC W/AUTO DIFF WBC: CPT

## 2025-06-26 PROCEDURE — 99900035 HC TECH TIME PER 15 MIN (STAT)

## 2025-06-26 PROCEDURE — 63600175 PHARM REV CODE 636 W HCPCS

## 2025-06-26 PROCEDURE — 11000001 HC ACUTE MED/SURG PRIVATE ROOM

## 2025-06-26 PROCEDURE — 84100 ASSAY OF PHOSPHORUS: CPT

## 2025-06-26 PROCEDURE — 25000003 PHARM REV CODE 250

## 2025-06-26 RX ADMIN — ASCORBIC ACID, VITAMIN A PALMITATE, CHOLECALCIFEROL, THIAMINE HYDROCHLORIDE, RIBOFLAVIN-5 PHOSPHATE SODIUM, PYRIDOXINE HYDROCHLORIDE, NIACINAMIDE, DEXPANTHENOL, ALPHA-TOCOPHEROL ACETATE, VITAMIN K1, FOLIC ACID, BIOTIN, CYANOCOBALAMIN: 200; 3300; 200; 6; 3.6; 6; 40; 15; 10; 150; 600; 60; 5 INJECTION, SOLUTION INTRAVENOUS at 01:06

## 2025-06-26 RX ADMIN — Medication 6 MG: at 09:06

## 2025-06-26 RX ADMIN — BUSPIRONE HYDROCHLORIDE 15 MG: 5 TABLET ORAL at 08:06

## 2025-06-26 RX ADMIN — PIPERACILLIN SODIUM AND TAZOBACTAM SODIUM 4.5 G: 4; .5 INJECTION, POWDER, LYOPHILIZED, FOR SOLUTION INTRAVENOUS at 07:06

## 2025-06-26 RX ADMIN — PIPERACILLIN SODIUM AND TAZOBACTAM SODIUM 4.5 G: 4; .5 INJECTION, POWDER, LYOPHILIZED, FOR SOLUTION INTRAVENOUS at 02:06

## 2025-06-26 RX ADMIN — HYDROMORPHONE HYDROCHLORIDE 0.5 MG: 1 INJECTION, SOLUTION INTRAMUSCULAR; INTRAVENOUS; SUBCUTANEOUS at 05:06

## 2025-06-26 RX ADMIN — HYDROMORPHONE HYDROCHLORIDE 0.5 MG: 1 INJECTION, SOLUTION INTRAMUSCULAR; INTRAVENOUS; SUBCUTANEOUS at 08:06

## 2025-06-26 RX ADMIN — HYDROMORPHONE HYDROCHLORIDE 0.5 MG: 1 INJECTION, SOLUTION INTRAMUSCULAR; INTRAVENOUS; SUBCUTANEOUS at 12:06

## 2025-06-26 RX ADMIN — POLYETHYLENE GLYCOL 3350 17 G: 17 POWDER, FOR SOLUTION ORAL at 08:06

## 2025-06-26 RX ADMIN — ATORVASTATIN CALCIUM 40 MG: 40 TABLET, FILM COATED ORAL at 09:06

## 2025-06-26 RX ADMIN — SIMETHICONE 80 MG: 80 TABLET, CHEWABLE ORAL at 08:06

## 2025-06-26 RX ADMIN — BUSPIRONE HYDROCHLORIDE 15 MG: 5 TABLET ORAL at 09:06

## 2025-06-26 RX ADMIN — PANTOPRAZOLE SODIUM 40 MG: 40 TABLET, DELAYED RELEASE ORAL at 08:06

## 2025-06-26 RX ADMIN — PIPERACILLIN SODIUM AND TAZOBACTAM SODIUM 4.5 G: 4; .5 INJECTION, POWDER, LYOPHILIZED, FOR SOLUTION INTRAVENOUS at 09:06

## 2025-06-26 RX ADMIN — LIDOCAINE 2 PATCH: 50 PATCH TOPICAL at 02:06

## 2025-06-26 RX ADMIN — SIMETHICONE 80 MG: 80 TABLET, CHEWABLE ORAL at 09:06

## 2025-06-26 RX ADMIN — ENOXAPARIN SODIUM 40 MG: 40 INJECTION SUBCUTANEOUS at 04:06

## 2025-06-26 RX ADMIN — SIMETHICONE 80 MG: 80 TABLET, CHEWABLE ORAL at 02:06

## 2025-06-26 RX ADMIN — ACETAMINOPHEN 1000 MG: 500 TABLET ORAL at 02:06

## 2025-06-26 RX ADMIN — ACETAMINOPHEN 1000 MG: 500 TABLET ORAL at 08:06

## 2025-06-26 RX ADMIN — ACETAMINOPHEN 1000 MG: 500 TABLET ORAL at 09:06

## 2025-06-26 RX ADMIN — CITALOPRAM HYDROBROMIDE 20 MG: 20 TABLET ORAL at 08:06

## 2025-06-26 RX ADMIN — BUSPIRONE HYDROCHLORIDE 15 MG: 5 TABLET ORAL at 02:06

## 2025-06-26 RX ADMIN — OXYCODONE HYDROCHLORIDE 10 MG: 5 TABLET ORAL at 09:06

## 2025-06-26 RX ADMIN — TAMSULOSIN HYDROCHLORIDE 0.4 MG: 0.4 CAPSULE ORAL at 08:06

## 2025-06-26 NOTE — PROGRESS NOTES
Surgery Progress Note    Patricia Ramirez is a 69 y.o. year old female in hospital for recurrent SBO (failed conservative management) s/p diagnostic laparoscopy converted exploratory laparotomy, SBR and anastomosis, appendectomy, diverticulectomy 6/14    NAEON AF VSS  S/p IR drainage of pelvic fluid with 150cc bloody purulent fluid obtained  Reports minimal abd pain, feels that suprapubic and rectal pressure have improved since drainage  Complaining of significant pain from IR drain site  Very anxious about going home with drain  No f/c/n/v/sob/cp    Leukocytosis improved 15.8 from 23.4  Prealbumin noted to be 7, started on PPN 6/25    ROS:  Negative except above    PE:  Vitals:    06/26/25 1129 06/26/25 1228 06/26/25 1229 06/26/25 1446   BP:  107/62     BP Location:  Left arm     Patient Position:  Lying     Pulse: 73 75  80   Resp:   17    Temp:       TempSrc:       SpO2:       Weight:       Height:           NAD, resting  No belabored breathing  No skin changes  Abd soft mildy distended appropriately TTP around midline incision, port site incisions c/d/I  IR drain with minimal sanguinous fluid in bag      Significant Diagnostic Studies: Labs: BMP:   Recent Labs   Lab 06/25/25  0522 06/26/25  0409   * 101    137   K 4.3 3.6    102   CO2 26 27   BUN 11 12   CREATININE 0.4* 0.5   CALCIUM 7.4* 7.4*   MG 1.8  --    , CBC   Recent Labs   Lab 06/25/25  0522 06/26/25  0409   WBC 23.36* 15.80*   HGB 12.1 11.2*   HCT 36.0* 35.2*    274   , INR   Lab Results   Component Value Date    INR 0.9 06/24/2025    INR 1.0 07/28/2022    PROTIME 10.4 06/24/2025   , and All labs within the past 24 hours have been reviewed    A/P:  Patricia Ramirez is a 69 y.o. year old female  in hospital for recurrent SBO (failed conservative management), POD#1 from diagnostic laparoscopy converted exploratory laparotomy, SBR and anastomosis, appendectomy, diverticulectomy 6/14. CT A/P with pelvic fluid collection. Persistent  leukocytosis, improved s/p IR drain 6/25. Prealbumin 7, started on PPN 6/25    - IR drain care, appreciate assistance  - Follow up IR cultures  - Continue zosyn until cx back   - Regular diet, boost plus supplements, PPN  - Nutrition consulted for recs given malnutrition  - MMPC  - Pulm toilet, encourage IS 10x/hour  - PT/OT, encourage up to chair and ambulation   - Replete electrolytes to maintain K > 4, Phos > 3, Mg > 2  - DVT ppx     Roxann Lin  General Surgery - [facility]  6/26/2025

## 2025-06-26 NOTE — NURSING
PER handoff received from RIMA Mercado     Pt resting in bed quietly. NAD noted. No c/o pain.     Fall and safety precautions maintained. Bed alarm activated and audible.. Bed locked in lowest position, with side rails up x2. Call bell and personal items within reach

## 2025-06-26 NOTE — PROGRESS NOTES
Interventional Radiology   Progress Note      SUBJECTIVE:     History of Present Illness:  Patricia Ramirez is a 69 y.o. female with anxiety, depression and HTN who was admitted on 6/14 for recurrent small bowel obstruction. Pt underwent  diagnostic laparoscopy converted exploratory laparotomy, SBR and anastomosis, appendectomy and diverticulectomy on 6/14 with General Surgery. Pt initially doing well post op, however over the last 5 days has had a rising leukocytosis (WBC 20.8 today). CT a/p on 6/23 showed perihepatic and pelvic free fluid.      IR has been consulted by General Surgery for percutaneous drain placement for management of pelvic free fluid. Pt underwent placement of 14 F drain into pelvic fluid collection with IR on 6/25. Pt tolerated procedure well.     Interval History:  Pelvic fluid cx growing GNR. Discussed drain care with patient this am. She is extremely hesitant to leave hospital with drain in place.     Review of Systems   Constitutional:  Negative for fever.   Respiratory:  Negative for shortness of breath.    Cardiovascular:  Negative for chest pain.   Gastrointestinal:  Positive for abdominal pain. Negative for vomiting.   Neurological:  Negative for headaches.       Scheduled Meds:   acetaminophen  1,000 mg Oral TID    atorvastatin  40 mg Oral QHS    busPIRone  15 mg Oral TID    citalopram  20 mg Oral Daily    enoxparin  40 mg Subcutaneous Q24H (prophylaxis, 1700)    LIDOcaine  2 patch Transdermal Q24H    pantoprazole  40 mg Oral Daily    piperacillin-tazobactam (Zosyn) IV (PEDS and ADULTS) (extended infusion is not appropriate)  4.5 g Intravenous Q8H    polyethylene glycol  17 g Oral Daily    simethicone  1 tablet Oral QID (PC + HS)    tamsulosin  0.4 mg Oral Daily     Continuous Infusions:   Amino acid 4.25% - dextrose 5% (CLINIMIX-E) solution with additives (1L provides 42.5 gm AA, 50 gm CHO (170 kcal/L dextrose), Na 35, K 30, Mg 5, Ca 4.5, Acetate 70, Cl 39, Phos 15)   Intravenous  Continuous 65 mL/hr at 25 0129 New Bag at 25 0129     PRN Meds:  Current Facility-Administered Medications:     aluminum-magnesium hydroxide-simethicone, 30 mL, Oral, QID PRN    bisacodyL, 10 mg, Rectal, Daily PRN    glucagon (human recombinant), 1 mg, Intramuscular, PRN    glucose, 16 g, Oral, PRN    glucose, 24 g, Oral, PRN    hydrALAZINE, 10 mg, Intravenous, Q8H PRN    HYDROmorphone, 0.5 mg, Intravenous, Q4H PRN    magnesium oxide, 800 mg, Oral, PRN    magnesium oxide, 800 mg, Oral, PRN    melatonin, 6 mg, Oral, Nightly PRN    naloxone, 0.02 mg, Intravenous, PRN    ondansetron, 4 mg, Intravenous, Q8H PRN    oxyCODONE, 10 mg, Oral, Q6H PRN    oxyCODONE, 5 mg, Oral, Q6H PRN    potassium bicarbonate, 35 mEq, Oral, PRN    potassium bicarbonate, 50 mEq, Oral, PRN    potassium bicarbonate, 60 mEq, Oral, PRN    potassium, sodium phosphates, 2 packet, Oral, PRN    potassium, sodium phosphates, 2 packet, Oral, PRN    potassium, sodium phosphates, 2 packet, Oral, PRN    senna, 8.6 mg, Oral, Daily PRN    sodium chloride 0.9%, 10 mL, Intravenous, Q12H PRN    Flushing PICC/Midline Protocol, , , Until Discontinued **AND** sodium chloride 0.9%, 10 mL, Intravenous, Q12H PRN    Review of patient's allergies indicates:   Allergen Reactions    Sulfa (sulfonamide antibiotics) Hives    Ace inhibitors Other (See Comments)     Cough         Past Medical History:   Diagnosis Date    Allergy     Anxiety     Arthritis     Cataract     Depressive disorder, not elsewhere classified     Esophageal reflux     Hypertension     Impaired fasting glucose     Joint pain     Obesity, unspecified     Other and unspecified hyperlipidemia      Past Surgical History:   Procedure Laterality Date    BLEPHAROPLASTY Bilateral 2021    Procedure: BLEPHAROPLASTY;  Surgeon: Maite Acuna MD;  Location: [facility];  Service: Ophthalmology;  Laterality: Bilateral;     SECTION      CHOLECYSTECTOMY      COLONOSCOPY N/A 2023     Procedure: COLONOSCOPY;  Surgeon: Briana Sterling MD;  Location: [facility] ENDO;  Service: Endoscopy;  Laterality: N/A;    DIAGNOSTIC LAPAROSCOPY N/A 6/14/2025    Procedure: LAPAROSCOPY, DIAGNOSTIC;  Surgeon: Rigoberto Ponce MD;  Location: [facility] OR;  Service: General;  Laterality: N/A;    ERCP N/A 10/07/2024    Procedure: ERCP (ENDOSCOPIC RETROGRADE CHOLANGIOPANCREATOGRAPHY);  Surgeon: Catracho Mitchell MD;  Location: [facility] ENDO (2ND FLR);  Service: Endoscopy;  Laterality: N/A;    ERCP N/A 12/09/2024    Procedure: ERCP (ENDOSCOPIC RETROGRADE CHOLANGIOPANCREATOGRAPHY);  Surgeon: Romeo Roger MD;  Location: [facility] ENDO;  Service: Endoscopy;  Laterality: N/A;  12/3/24: instructions sent via email-GD  12/5 SWP PRECALL COMPLETED-RT    ERCP N/A 01/29/2025    Procedure: ERCP (ENDOSCOPIC RETROGRADE CHOLANGIOPANCREATOGRAPHY);  Surgeon: Shannon Chaney MD;  Location: [facility];  Service: Endoscopy;  Laterality: N/A;  1/6 portal-Repeat ERCP in 6 weeks to remove stent.nicola -tt  1/14 SWP-PRECALL COMPLETE-RT  1/23 r/s updated portal instr-pt stated any MD-tt    ERCP N/A 04/28/2025    Procedure: ERCP (ENDOSCOPIC RETROGRADE CHOLANGIOPANCREATOGRAPHY);  Surgeon: Shannon Chaney MD;  Location: [facility] ENDO;  Service: Endoscopy;  Laterality: N/A;  Per Dr. Chaney- Repeat ERCP in 3 months to remove covered metal                          biliary stent and hopefully for final clearance of                          the CBD.     3/21/25-Pt no longer taking Eliquis, instr portal-DS  4/24/25-LVM     ESOPHAGOGASTRODUODENOSCOPY N/A 11/21/2024    Procedure: EGD (ESOPHAGOGASTRODUODENOSCOPY);  Surgeon: Angie Alatorre MD;  Location: [facility] ENDO;  Service: Gastroenterology;  Laterality: N/A;    ESOPHAGOGASTRODUODENOSCOPY N/A 5/23/2025    Procedure: EGD (ESOPHAGOGASTRODUODENOSCOPY);  Surgeon: Mak Michael MD;  Location: [facility];  Service: Gastroenterology;  Laterality: N/A;    HYSTERECTOMY      without BSO    INTRAOCULAR PROSTHESES INSERTION  Left 10/27/2022    Procedure: INSERTION, IOL PROSTHESIS;  Surgeon: Palmer Berrios MD;  Location: [facility] OR;  Service: Ophthalmology;  Laterality: Left;    LAPAROTOMY, EXPLORATORY N/A 6/14/2025    Procedure: LAPAROTOMY, EXPLORATORY; APPENDECTOMY; SMALL BOWEL RESECTION; DIVERTICULUM RESECTION;  Surgeon: Rigoberto Ponce MD;  Location: [facility] OR;  Service: General;  Laterality: N/A;    PHACOEMULSIFICATION OF CATARACT Left 10/27/2022    Procedure: PHACOEMULSIFICATION, CATARACT;  Surgeon: Palmer Berrios MD;  Location: [facility] OR;  Service: Ophthalmology;  Laterality: Left;  RN PHONE PREOP 10/21/2022   ARRIVAL 9:00 AM    R knee replacement      REPAIR OF RETINAL DETACHMENT WITH VITRECTOMY Left 02/28/2022    Procedure: REPAIR, RETINAL DETACHMENT, WITH VITRECTOMY;  Surgeon: MINO Martinez MD;  Location: [address];  Service: Ophthalmology;  Laterality: Left;  Spoke with SID Garcia. Pt was instructed nothing to eat after 8am and to arrive at 2pm for preop. 430pm case start request.    right  arm  surgery      ROBOT-ASSISTED CHOLECYSTECTOMY USING DA GENO XI N/A 10/09/2024    Procedure: XI ROBOTIC SUB TOTAL CHOLECYSTECTOMY; DRAIN PLACEMENT;  Surgeon: Rigoberto Ponce MD;  Location: [facility];  Service: General;  Laterality: N/A;     Family History   Problem Relation Name Age of Onset    Cancer Mother          lung    Liver disease Father      Thyroid disease Sister      Cervical cancer Sister      Tremor Sister       Social History[1]    OBJECTIVE:     Vital Signs (Most Recent)  Temp: 98.8 °F (37.1 °C) (06/26/25 0713)  Pulse: 84 (06/26/25 0739)  Resp: 19 (06/26/25 0828)  BP: 120/78 (06/26/25 0713)  SpO2: 95 % (06/26/25 0731)    Physical Exam:  Physical Exam  Vitals and nursing note reviewed.   HENT:      Head: Normocephalic and atraumatic.      Mouth/Throat:      Pharynx: Oropharynx is clear.   Cardiovascular:      Rate and Rhythm: Normal rate.   Pulmonary:      Effort: Pulmonary effort is normal. No  respiratory distress.   Abdominal:      General: Abdomen is flat.   Musculoskeletal:         General: Normal range of motion.   Skin:     Comments: L transgluteal percutaneous drain with bloody/purulent drainage in collection bag. Insertion site c/d/I.    Neurological:      Mental Status: She is alert.         Laboratory  I have reviewed all pertinent lab results within the past 24 hours.        ASSESSMENT/PLAN:     Assessment:  69 y.o. female with with anxiety, depression and HTN who was admitted on 6/14 for recurrent small bowel obstruction. Now s/p diagnostic laparoscopy converted exploratory laparotomy, SBR and anastomosis, appendectomy and diverticulectomy on 6/14 with General Surgery. Pt underwent 14 F drain into pelvic fluid collection with IR on 6/25.    Plan:  Pt tolerated procedure well, no acute complications noted  Drain appears in good position and is functioning well  Flush drain with 10 cc NS q24  Record and measure drain output including NS flushes  Fluid cultures growing GNR. Speciation and sensitivities pending.   Consider drain removal when output < 10 cc for 2 consecutive days. Also consider if repeat imaging is needed prior to drain removal- defer timing and necessity of repeat imaging to ID/surgery  Please have nursing provide patient and family with drain care education & return demonstration of flushing drain prior to discharge if patient discharges with drain.    Pt will need prescription for NS flushes at time of discharge if she discharges home with drain.   If pt discharges home with drain in place, plan for follow up in Surgery clinic for drain removal.   Recommend not letting drainage bag hang to prevent drain dislodgement and changing dressing over insertion site q3days or sooner if dressing becomes wet/dirty. Do not submerge drain in water (no baths, pools, etc).   Consider home health for drain care (to be ordered by primary team)  IR will sign off at this time. Please contact with  questions via [employer] secure chat.     Daija Alarcon NP  Interventional Radiology           [1]   Social History  Tobacco Use    Smoking status: Every Day     Current packs/day: 0.75     Average packs/day: 0.8 packs/day for 40.0 years (30.0 ttl pk-yrs)     Types: Cigarettes    Smokeless tobacco: Never   Substance Use Topics    Alcohol use: Yes     Comment: rarely    Drug use: Yes     Frequency: 14.0 times per week     Types: Marijuana

## 2025-06-26 NOTE — PLAN OF CARE
Changes in medical condition or discharge plan: No    Does patient need new DME? Hospital bed and rolling walker    Follow up appts needed: General Surgery and PCP    Medically stable: No    Estimated Discharge Date: TBD    Per attending, patient not medically stable for discharge.  Per chart review, patient underwent drain placement with Interventional Radiology on 6/25; pelvic fluid cx growing GNR.  When medically stable, patient to discharge with home health from [facility], [phone].  Patient confirmed hospital bed has been delivered to home.  CM to continue to assess for and until discharge.    06/26/25 1441   Final Note   Assessment Type Discharge Planning Reassessment   Hospital Resources/Appts/Education Provided Provided patient/caregiver with written discharge plan information;Post-Acute resouces added to AVS   Post-Acute Status   Post-Acute Authorization Home Health   Home Health Status Set-up Complete/Auth obtained   Discharge Delays None known at this time

## 2025-06-26 NOTE — NURSING
[facility], [facility]  Nurses Note -- 4 Eyes      6/26/2025       Skin assessed on: Q Shift      [] No Pressure Injuries Present    []Prevention Measures Documented    [x] Yes LDA  for Pressure Injury Previously documented     [] Yes New Pressure Injury Discovered   [] LDA for New Pressure Injury Added      Attending RN:  Maggie Casillas RN     Second RN:  Quincy Mercado

## 2025-06-26 NOTE — PT/OT/SLP PROGRESS
Physical Therapy Treatment    Patient Name:  Patricia Ramirez   MRN:  [id number]    Recommendations:     Discharge Recommendations: Low Intensity Therapy  Discharge Equipment Recommendations: walker, rolling  Barriers to discharge: None    Assessment:     Patricia Ramirez is a 69 y.o. female admitted with a medical diagnosis of SBO (small bowel obstruction).  She presents with the following impairments/functional limitations: weakness, impaired endurance, impaired self care skills, impaired functional mobility, gait instability, impaired balance, pain.    Pt with tearful affect upon therapy entry in the room, reports of 10/10 pain in abdomen. Pt states she has one foot in the grave, denies needing to see a professional to speak with concerning these feelings.     Pt requested assist to the commode, set-upA for hygiene following voiding.   Pt ambulated 250ft in hallway with CGA, RW, and peripheral IV following. Pt required one standing RB during ambulation, able to continue walking back to room following RB.       Rehab Prognosis: Good; patient would benefit from acute skilled PT services to address these deficits and reach maximum level of function.    Recent Surgery: Procedure(s) (LRB):  LAPAROSCOPY, DIAGNOSTIC (N/A)  LAPAROTOMY, EXPLORATORY; APPENDECTOMY; SMALL BOWEL RESECTION; DIVERTICULUM RESECTION (N/A) 12 Days Post-Op    Plan:     During this hospitalization, patient to be seen 5 x/week to address the identified rehab impairments via gait training, therapeutic activities, therapeutic exercises, neuromuscular re-education and progress toward the following goals:    Plan of Care Expires:  07/06/25    Subjective     Chief Complaint: Pt feeling very down today, seems to be perseverating on death and concepts of dying   Patient/Family Comments/goals: Return home to son   Pain/Comfort:  Pain Rating 1: 10/10  Location 1: abdomen      Objective:     Communicated with nursing prior to session.  Patient found HOB elevated with  peripheral IV, telemetry upon PT entry to room.     General Precautions: Standard, fall  Orthopedic Precautions: N/A  Braces: N/A  Respiratory Status: Room air     Functional Mobility:  Bed Mobility:     Rolling Right: stand by assistance  Supine to Sit: stand by assistance  Sit to Supine: stand by assistance  Transfers:     Sit to Stand:  contact guard assistance with rolling walker  Gait: Ambulated 250ft CGA and RW  Balance: Static sit: Good. Static stand: Good.       AM-PAC 6 CLICK MOBILITY  Turning over in bed (including adjusting bedclothes, sheets and blankets)?: 4  Sitting down on and standing up from a chair with arms (e.g., wheelchair, bedside commode, etc.): 4  Moving from lying on back to sitting on the side of the bed?: 4  Moving to and from a bed to a chair (including a wheelchair)?: 4  Need to walk in hospital room?: 4  Climbing 3-5 steps with a railing?: 3  Basic Mobility Total Score: 23       Treatment & Education:  TA 1:1 x26 minutes >> bed mobility. Ambulation. Repositioning to L side to relieve pressure from sacrum, using pillows under hip, shoulder, and between legs.       Patient left right sidelying with all lines intact, call button in reach, and nursing notified..    GOALS:   Multidisciplinary Problems       Physical Therapy Goals          Problem: Physical Therapy    Goal Priority Disciplines Outcome Interventions   Physical Therapy Goal     PT, PT/OT Progressing    Description: Goals to be met by: 25     Patient will increase functional independence with mobility by performin. Supine to sit with Modified Jenkins  2. Sit to supine with Modified Jenkins  3. Sit to stand transfer with Modified Jenkins  4. Gait  x 50 feet with Modified Jenkins using Rolling Walker.   5. Stand for 5 minutes with Modified Jenkins using Rolling Walker  6. Lower extremity exercise program x30 reps per handout, with independence                         DME Justifications:  No DME  recommended requiring DME justifications    Time Tracking:     PT Received On: 06/26/25  PT Start Time: 0940     PT Stop Time: 1006  PT Total Time (min): 26 min     Billable Minutes: Therapeutic Activity 2    Treatment Type: Treatment  PT/PTA: PT     Number of PTA visits since last PT visit: 0     06/26/2025

## 2025-06-27 LAB
ABSOLUTE EOSINOPHIL (OHS): 0.12 K/UL
ABSOLUTE MONOCYTE (OHS): 0.73 K/UL (ref 0.3–1)
ABSOLUTE NEUTROPHIL COUNT (OHS): 13.09 K/UL (ref 1.8–7.7)
ALBUMIN SERPL BCP-MCNC: 1.1 G/DL (ref 3.5–5.2)
ALP SERPL-CCNC: 66 UNIT/L (ref 40–150)
ALT SERPL W/O P-5'-P-CCNC: 6 UNIT/L (ref 10–44)
ANION GAP (OHS): 5 MMOL/L (ref 8–16)
AST SERPL-CCNC: 12 UNIT/L (ref 11–45)
BACTERIA SPEC AEROBE CULT: ABNORMAL
BACTERIA SPEC AEROBE CULT: ABNORMAL
BASOPHILS # BLD AUTO: 0.04 K/UL
BASOPHILS NFR BLD AUTO: 0.3 %
BILIRUB SERPL-MCNC: 0.2 MG/DL (ref 0.1–1)
BUN SERPL-MCNC: 12 MG/DL (ref 8–23)
CALCIUM SERPL-MCNC: 7 MG/DL (ref 8.7–10.5)
CHLORIDE SERPL-SCNC: 104 MMOL/L (ref 95–110)
CO2 SERPL-SCNC: 30 MMOL/L (ref 23–29)
CREAT SERPL-MCNC: 0.4 MG/DL (ref 0.5–1.4)
ERYTHROCYTE [DISTWIDTH] IN BLOOD BY AUTOMATED COUNT: 13.4 % (ref 11.5–14.5)
GFR SERPLBLD CREATININE-BSD FMLA CKD-EPI: >60 ML/MIN/1.73/M2
GLUCOSE SERPL-MCNC: 155 MG/DL (ref 70–110)
HCT VFR BLD AUTO: 32 % (ref 37–48.5)
HGB BLD-MCNC: 10.6 GM/DL (ref 12–16)
IMM GRANULOCYTES # BLD AUTO: 0.08 K/UL (ref 0–0.04)
IMM GRANULOCYTES NFR BLD AUTO: 0.5 % (ref 0–0.5)
LYMPHOCYTES # BLD AUTO: 1.05 K/UL (ref 1–4.8)
MAGNESIUM SERPL-MCNC: 1.9 MG/DL (ref 1.6–2.6)
MCH RBC QN AUTO: 32.6 PG (ref 27–31)
MCHC RBC AUTO-ENTMCNC: 33.1 G/DL (ref 32–36)
MCV RBC AUTO: 99 FL (ref 82–98)
NUCLEATED RBC (/100WBC) (OHS): 0 /100 WBC
PHOSPHATE SERPL-MCNC: 3 MG/DL (ref 2.7–4.5)
PLATELET # BLD AUTO: 353 K/UL (ref 150–450)
PMV BLD AUTO: 8.8 FL (ref 9.2–12.9)
POCT GLUCOSE: 103 MG/DL (ref 70–110)
POCT GLUCOSE: 116 MG/DL (ref 70–110)
POCT GLUCOSE: 119 MG/DL (ref 70–110)
POCT GLUCOSE: 136 MG/DL (ref 70–110)
POTASSIUM SERPL-SCNC: 3.4 MMOL/L (ref 3.5–5.1)
PROT SERPL-MCNC: 4.5 GM/DL (ref 6–8.4)
RBC # BLD AUTO: 3.25 M/UL (ref 4–5.4)
RELATIVE EOSINOPHIL (OHS): 0.8 %
RELATIVE LYMPHOCYTE (OHS): 6.9 % (ref 18–48)
RELATIVE MONOCYTE (OHS): 4.8 % (ref 4–15)
RELATIVE NEUTROPHIL (OHS): 86.7 % (ref 38–73)
SODIUM SERPL-SCNC: 139 MMOL/L (ref 136–145)
WBC # BLD AUTO: 15.11 K/UL (ref 3.9–12.7)

## 2025-06-27 PROCEDURE — 83735 ASSAY OF MAGNESIUM: CPT

## 2025-06-27 PROCEDURE — 11000001 HC ACUTE MED/SURG PRIVATE ROOM

## 2025-06-27 PROCEDURE — 25000003 PHARM REV CODE 250

## 2025-06-27 PROCEDURE — 25000003 PHARM REV CODE 250: Performed by: STUDENT IN AN ORGANIZED HEALTH CARE EDUCATION/TRAINING PROGRAM

## 2025-06-27 PROCEDURE — 99900035 HC TECH TIME PER 15 MIN (STAT)

## 2025-06-27 PROCEDURE — 80053 COMPREHEN METABOLIC PANEL: CPT

## 2025-06-27 PROCEDURE — 63600175 PHARM REV CODE 636 W HCPCS: Performed by: STUDENT IN AN ORGANIZED HEALTH CARE EDUCATION/TRAINING PROGRAM

## 2025-06-27 PROCEDURE — 97535 SELF CARE MNGMENT TRAINING: CPT

## 2025-06-27 PROCEDURE — 94799 UNLISTED PULMONARY SVC/PX: CPT

## 2025-06-27 PROCEDURE — 63600175 PHARM REV CODE 636 W HCPCS: Performed by: NURSE PRACTITIONER

## 2025-06-27 PROCEDURE — 97530 THERAPEUTIC ACTIVITIES: CPT

## 2025-06-27 PROCEDURE — 36415 COLL VENOUS BLD VENIPUNCTURE: CPT

## 2025-06-27 PROCEDURE — 63600175 PHARM REV CODE 636 W HCPCS

## 2025-06-27 PROCEDURE — 85025 COMPLETE CBC W/AUTO DIFF WBC: CPT

## 2025-06-27 PROCEDURE — 84100 ASSAY OF PHOSPHORUS: CPT

## 2025-06-27 RX ORDER — POTASSIUM CHLORIDE 20 MEQ/1
40 TABLET, EXTENDED RELEASE ORAL ONCE
Status: COMPLETED | OUTPATIENT
Start: 2025-06-27 | End: 2025-06-27

## 2025-06-27 RX ADMIN — CITALOPRAM HYDROBROMIDE 20 MG: 20 TABLET ORAL at 08:06

## 2025-06-27 RX ADMIN — POTASSIUM CHLORIDE 40 MEQ: 1500 TABLET, FILM COATED, EXTENDED RELEASE ORAL at 11:06

## 2025-06-27 RX ADMIN — ACETAMINOPHEN 1000 MG: 500 TABLET ORAL at 02:06

## 2025-06-27 RX ADMIN — SIMETHICONE 80 MG: 80 TABLET, CHEWABLE ORAL at 08:06

## 2025-06-27 RX ADMIN — OXYCODONE HYDROCHLORIDE 10 MG: 5 TABLET ORAL at 06:06

## 2025-06-27 RX ADMIN — HYDROMORPHONE HYDROCHLORIDE 0.5 MG: 1 INJECTION, SOLUTION INTRAMUSCULAR; INTRAVENOUS; SUBCUTANEOUS at 07:06

## 2025-06-27 RX ADMIN — ASCORBIC ACID, VITAMIN A PALMITATE, CHOLECALCIFEROL, THIAMINE HYDROCHLORIDE, RIBOFLAVIN-5 PHOSPHATE SODIUM, PYRIDOXINE HYDROCHLORIDE, NIACINAMIDE, DEXPANTHENOL, ALPHA-TOCOPHEROL ACETATE, VITAMIN K1, FOLIC ACID, BIOTIN, CYANOCOBALAMIN: 200; 3300; 200; 6; 3.6; 6; 40; 15; 10; 150; 600; 60; 5 INJECTION, SOLUTION INTRAVENOUS at 01:06

## 2025-06-27 RX ADMIN — BUSPIRONE HYDROCHLORIDE 15 MG: 5 TABLET ORAL at 08:06

## 2025-06-27 RX ADMIN — PIPERACILLIN SODIUM AND TAZOBACTAM SODIUM 4.5 G: 4; .5 INJECTION, POWDER, LYOPHILIZED, FOR SOLUTION INTRAVENOUS at 09:06

## 2025-06-27 RX ADMIN — Medication 6 MG: at 08:06

## 2025-06-27 RX ADMIN — OXYCODONE HYDROCHLORIDE 10 MG: 5 TABLET ORAL at 01:06

## 2025-06-27 RX ADMIN — ASCORBIC ACID, VITAMIN A PALMITATE, CHOLECALCIFEROL, THIAMINE HYDROCHLORIDE, RIBOFLAVIN-5 PHOSPHATE SODIUM, PYRIDOXINE HYDROCHLORIDE, NIACINAMIDE, DEXPANTHENOL, ALPHA-TOCOPHEROL ACETATE, VITAMIN K1, FOLIC ACID, BIOTIN, CYANOCOBALAMIN: 200; 3300; 200; 6; 3.6; 6; 40; 15; 10; 150; 600; 60; 5 INJECTION, SOLUTION INTRAVENOUS at 11:06

## 2025-06-27 RX ADMIN — PIPERACILLIN SODIUM AND TAZOBACTAM SODIUM 4.5 G: 4; .5 INJECTION, POWDER, LYOPHILIZED, FOR SOLUTION INTRAVENOUS at 02:06

## 2025-06-27 RX ADMIN — BUSPIRONE HYDROCHLORIDE 15 MG: 5 TABLET ORAL at 02:06

## 2025-06-27 RX ADMIN — TAMSULOSIN HYDROCHLORIDE 0.4 MG: 0.4 CAPSULE ORAL at 08:06

## 2025-06-27 RX ADMIN — ACETAMINOPHEN 1000 MG: 500 TABLET ORAL at 08:06

## 2025-06-27 RX ADMIN — ENOXAPARIN SODIUM 40 MG: 40 INJECTION SUBCUTANEOUS at 04:06

## 2025-06-27 RX ADMIN — LIDOCAINE 2 PATCH: 50 PATCH TOPICAL at 02:06

## 2025-06-27 RX ADMIN — PIPERACILLIN SODIUM AND TAZOBACTAM SODIUM 4.5 G: 4; .5 INJECTION, POWDER, LYOPHILIZED, FOR SOLUTION INTRAVENOUS at 05:06

## 2025-06-27 RX ADMIN — PANTOPRAZOLE SODIUM 40 MG: 40 TABLET, DELAYED RELEASE ORAL at 08:06

## 2025-06-27 RX ADMIN — ATORVASTATIN CALCIUM 40 MG: 40 TABLET, FILM COATED ORAL at 08:06

## 2025-06-27 RX ADMIN — SIMETHICONE 80 MG: 80 TABLET, CHEWABLE ORAL at 02:06

## 2025-06-27 RX ADMIN — SIMETHICONE 80 MG: 80 TABLET, CHEWABLE ORAL at 07:06

## 2025-06-27 NOTE — PT/OT/SLP PROGRESS
Physical Therapy Treatment    Patient Name:  Patricia Ramirez   MRN:  [id number]    Recommendations:     Discharge Recommendations: Low Intensity Therapy  Discharge Equipment Recommendations: walker, rolling  Barriers to discharge: None    Assessment:     Patricia Ramirez is a 69 y.o. female admitted with a medical diagnosis of SBO (small bowel obstruction).  She presents with the following impairments/functional limitations: weakness, impaired endurance, impaired functional mobility, pain.    Pt ambulated 434ft in hallway with CGA, RW, and peripheral IV following, demonstrating marked improvement from previous sessions. Pt required three standing RBs during ambulation, able to continue walking back to room following RBs.     Pt educated on importance of weightshifting, as she is high risk for sacral pressure wound development. Pt verbalized and demonstrated understanding of weightshifting techniques and timeline for pressure relief.       Rehab Prognosis: Good; patient would benefit from acute skilled PT services to address these deficits and reach maximum level of function.    Recent Surgery: Procedure(s) (LRB):  LAPAROSCOPY, DIAGNOSTIC (N/A)  LAPAROTOMY, EXPLORATORY; APPENDECTOMY; SMALL BOWEL RESECTION; DIVERTICULUM RESECTION (N/A) 13 Days Post-Op    Plan:     During this hospitalization, patient to be seen 5 x/week to address the identified rehab impairments via gait training, therapeutic activities, therapeutic exercises, neuromuscular re-education and progress toward the following goals:    Plan of Care Expires:  07/06/25    Subjective     Chief Complaint: Pt more motivated to participate in therapy today  Patient/Family Comments/goals: Return to PLOF  Pain/Comfort:  Pain Rating 1: 7/10  Location 1: abdomen      Objective:     Communicated with nursing prior to session.  Patient found HOB elevated with peripheral IV, telemetry upon PT entry to room.     General Precautions: Standard, fall  Orthopedic Precautions:  N/A  Braces: N/A  Respiratory Status: Room air     Functional Mobility:  Bed Mobility:     Rolling Right: modified independence  Supine to Sit: modified independence  Transfers:     Sit to Stand:  contact guard assistance with rolling walker  Gait: Ambulated 434ft CGA and RW, with IV following.   Balance: Static sit: Good. Stand: Good.       AM-PAC 6 CLICK MOBILITY  Turning over in bed (including adjusting bedclothes, sheets and blankets)?: 4  Sitting down on and standing up from a chair with arms (e.g., wheelchair, bedside commode, etc.): 4  Moving from lying on back to sitting on the side of the bed?: 4  Moving to and from a bed to a chair (including a wheelchair)?: 4  Need to walk in hospital room?: 4  Climbing 3-5 steps with a railing?: 3  Basic Mobility Total Score: 23       Treatment & Education:  TA 1:1 x32 minutes >> bed mobility. Toileting. Hygiene following. Ambulation. Education concerning weightshifting techniques. Demonstrated understanding.       Patient left up in chair with all lines intact, call button in reach, and nursing notified..    GOALS:   Multidisciplinary Problems       Physical Therapy Goals          Problem: Physical Therapy    Goal Priority Disciplines Outcome Interventions   Physical Therapy Goal     PT, PT/OT Progressing    Description: Goals to be met by: 25     Patient will increase functional independence with mobility by performin. Supine to sit with Modified [place]  2. Sit to supine with Modified [place]  3. Sit to stand transfer with Modified [place]  4. Gait  x 50 feet with Modified [place] using Rolling Walker.   5. Stand for 5 minutes with Modified [place] using Rolling Walker  6. Lower extremity exercise program x30 reps per handout, with independence                         DME Justifications:  No DME recommended requiring DME justifications    Time Tracking:     PT Received On: 25  PT Start Time: 1013     PT Stop Time: 1045  PT  Total Time (min): 32 min     Billable Minutes: Therapeutic Activity 2    Treatment Type: Treatment  PT/PTA: PT     Number of PTA visits since last PT visit: 0     06/27/2025

## 2025-06-27 NOTE — NURSING
[facility] [place]  Nurses Note -- 4 Eyes      6/27/2025       Skin assessed on: Q Shift      [] No Pressure Injuries Present    [x]Prevention Measures Documented    [x] Yes LDA  for Pressure Injury Previously documented     [] Yes New Pressure Injury Discovered   [] LDA for New Pressure Injury Added      Attending RN:  Christiana Ackerman RN     Second RN:  RIMA Bang

## 2025-06-27 NOTE — PT/OT/SLP PROGRESS
Occupational Therapy   Treatment    Name: Patricia Ramirez  MRN: [id number]  Admitting Diagnosis:  SBO (small bowel obstruction)  13 Days Post-Op    Recommendations:     Discharge Recommendations: Low Intensity Therapy  Discharge Equipment Recommendations:  walker, rolling, hospital bed  Barriers to discharge:  None    Assessment:     Patricia Ramirez is a 69 y.o. female with a medical diagnosis of SBO (small bowel obstruction).  She presents with HOB elevated and needing to use the bathroom. Performance deficits affecting function are weakness, impaired endurance, impaired self care skills, impaired functional mobility, pain. PT student and occupational therapy re-educated patient re: purpose of OOB mobility due to new sacral pressure area developing and to increase endurance following her latest ID secondary to abscess.     Rehab Prognosis:  Good; patient would benefit from acute skilled OT services to address these deficits and reach maximum level of function.       Plan:     Patient to be seen 3 x/week to address the above listed problems via self-care/home management, therapeutic activities, therapeutic exercises  Plan of Care Expires: 07/05/25  Plan of Care Reviewed with: patient    Subjective     Chief Complaint: abdominal pain   Patient/Family Comments/goals: patient   Pain/Comfort:  Pain Rating 1: 7/10  Location 1: abdomen    Objective:     Communicated with: RN, April, prior to session.  Patient found HOB elevated with peripheral IV, telemetry upon OT entry to room.    General Precautions: Standard, fall    Orthopedic Precautions:N/A  Braces: N/A  Respiratory Status: Room air     Occupational Performance:     Bed Mobility:    Patient completed Rolling/Turning to Left with  contact guard assistance  Patient completed Scooting/Bridging with contact guard assistance  Patient completed Supine to Sit with contact guard assistance     Functional Mobility/Transfers:  Patient completed Sit <> Stand Transfer with contact  "guard assistance  with  rolling walker   Patient completed Bed <> Chair Transfer using Step Transfer technique with contact guard assistance with rolling walker  Patient completed Toilet Transfer Step Transfer technique with contact guard assistance with  rolling walker  Functional Mobility: Patient walked with RW in room to bathroom then into hallway to get some "fresh air"    Activities of Daily Living:  Grooming: independence    Toileting: stand by assistance /setup due to line management and abdominal pain       [facility] 6 Click ADL: 18    Treatment & Education:  Occupational therapy educated patient re: purpose of pressure relief to offload bottom      Patient left up in chair with all lines intact, call button in reach, and RN  notified    GOALS:   Multidisciplinary Problems       Occupational Therapy Goals          Problem: Occupational Therapy    Goal Priority Disciplines Outcome Interventions   Occupational Therapy Goal     OT, PT/OT Progressing    Description: Goals to be met by: 7/5/2025     Patient will increase functional independence with ADLs by performing:    UE Dressing with Set-up Assistance.  LE Dressing with Stand-by Assistance.  Grooming while seated with Modified [place].  Toileting from bedside commode with Supervision for hygiene and clothing management.   Bathing seated UB and andreia care sponge  with Stand-by Assistance.  Toilet transfer to bedside commode with Contact Guard Assistance.  Upper extremity exercise program x7-10 reps no resistance, and per handout, with assistance as needed.                         DME Justifications:  Patricia requires a hospital bed due to her requiring positioning of the body in ways not feasible with an ordinary bed to alleviate pain and due to limited ability and cannot independently make changes in body position without the use of the bed.The positioning of the body cannot be sufficiently resolved by the use of pillows and wedges.    Time Tracking:     OT " Date of Treatment: 06/27/25  OT Start Time: 1000  OT Stop Time: 1030  OT Total Time (min): 30 min    Billable Minutes:Self Care/Home Management 30 co-treat with PTS    OT/CHANELL: OT          6/27/2025   Home

## 2025-06-27 NOTE — PLAN OF CARE
06/27/25 1238   Medicare Message   Important Message from Medicare regarding Discharge Appeal Rights Given to patient/caregiver;Explained to patient/caregiver;Signed/date by patient/caregiver   Date IMM was signed 06/27/25   Time IMM was signed 5001

## 2025-06-27 NOTE — PROGRESS NOTES
Surgery Progress Note    Particia Ramirez is a 69 y.o. year old female in hospital for recurrent SBO (failed conservative management) s/p diagnostic laparoscopy converted exploratory laparotomy, SBR and anastomosis, appendectomy, diverticulectomy 6/14    NAEON AF VSS  S/p IR drainage of pelvic fluid with 150cc bloody purulent fluid obtained, additional 100cc output. Cx growing E coli and klebsiella (sensitive to zosyn)  Reports minimal abd pain, feels that suprapubic and rectal pressure have improved since drainage  Pain at IR drain site better controlled  Tolerating diet, no n/v, passing flatus and BM  No f/c/n/v/sob/cp    Leukocytosis 15.11 from 15.8  Continues on PPN since 6/25    ROS:  Negative except above    PE:  Vitals:    06/27/25 0706 06/27/25 0715 06/27/25 1103 06/27/25 1118   BP:  120/60 (!) 104/59    BP Location:       Patient Position:       Pulse: 72 76 77 78   Resp:  18 18    Temp:  97.9 °F (36.6 °C) 98.7 °F (37.1 °C)    TempSrc:       SpO2:  (!) 93% (!) 94%    Weight:       Height:           NAD, resting  No belabored breathing  No skin changes  Abd soft mildy distended appropriately TTP around midline incision, port site incisions c/d/I  IR drain with minimal sanguinous fluid in bag      Significant Diagnostic Studies: Labs: BMP:   Recent Labs   Lab 06/26/25  0409 06/27/25  1003    155*    139   K 3.6 3.4*    104   CO2 27 30*   BUN 12 12   CREATININE 0.5 0.4*   CALCIUM 7.4* 7.0*   MG  --  1.9   , CBC   Recent Labs   Lab 06/26/25  0409 06/27/25  1003   WBC 15.80* 15.11*   HGB 11.2* 10.6*   HCT 35.2* 32.0*    353   , INR   Lab Results   Component Value Date    INR 0.9 06/24/2025    INR 1.0 07/28/2022    PROTIME 10.4 06/24/2025   , and All labs within the past 24 hours have been reviewed    A/P:  Patricia Ramirez is a 69 y.o. year old female  in hospital for recurrent SBO (failed conservative management), POD#1 from diagnostic laparoscopy converted exploratory laparotomy, SBR and  anastomosis, appendectomy, diverticulectomy 6/14. CT A/P with pelvic fluid collection. Persistent leukocytosis, improved s/p IR drain 6/25. Prealbumin 7, started on PPN 6/25    - IR drain care, appreciate assistance  - Follow up IR cultures - cx growing E coli and klebsiella (sensitive to zosyn)  - Continue zosyn until 6/29 tentative EOT   - Regular diet, boost plus supplements, PPN  - [facility] 6[facility]30  [facility]  - Pulm toilet, encourage IS 10x/hour  - PT/OT, encourage up to chair and ambulation   - Replete electrolytes to maintain K > 4, Phos > 3, Mg > 2  - DVT ppx     Phokoffi Lin  General Surgery - [facility]  6/27/2025

## 2025-06-27 NOTE — NURSING
Bedside handoff report received from off going nurse. Pt lying in bed, NAD noted. Fall/safety precautions maintained. Call light and personal belongings within reach. Communication board updated         [facility] [place] Nurses Note -- 4 Eyes      6/26/2025       Skin assessed on: Q Shift      [x] No Pressure Injuries Present    [x]Prevention Measures Documented    [] Yes LDA  for Pressure Injury Previously documented     [] Yes New Pressure Injury Discovered   [] LDA for New Pressure Injury Added      Attending RN:  Beti Wilde LPN     Second RN:  Maggie CasillasRN

## 2025-06-28 LAB
ABSOLUTE EOSINOPHIL (OHS): 0.19 K/UL
ABSOLUTE MONOCYTE (OHS): 0.87 K/UL (ref 0.3–1)
ABSOLUTE NEUTROPHIL COUNT (OHS): 12.64 K/UL (ref 1.8–7.7)
ALBUMIN SERPL BCP-MCNC: 1.4 G/DL (ref 3.5–5.2)
ALP SERPL-CCNC: 66 UNIT/L (ref 40–150)
ALT SERPL W/O P-5'-P-CCNC: 9 UNIT/L (ref 10–44)
ANION GAP (OHS): 8 MMOL/L (ref 8–16)
AST SERPL-CCNC: 22 UNIT/L (ref 11–45)
BACTERIA SPEC ANAEROBE CULT: ABNORMAL
BASOPHILS # BLD AUTO: 0.06 K/UL
BASOPHILS NFR BLD AUTO: 0.4 %
BILIRUB SERPL-MCNC: 0.3 MG/DL (ref 0.1–1)
BUN SERPL-MCNC: 13 MG/DL (ref 8–23)
CALCIUM SERPL-MCNC: 7.7 MG/DL (ref 8.7–10.5)
CHLORIDE SERPL-SCNC: 104 MMOL/L (ref 95–110)
CO2 SERPL-SCNC: 25 MMOL/L (ref 23–29)
CREAT SERPL-MCNC: 0.4 MG/DL (ref 0.5–1.4)
ERYTHROCYTE [DISTWIDTH] IN BLOOD BY AUTOMATED COUNT: 13.5 % (ref 11.5–14.5)
GFR SERPLBLD CREATININE-BSD FMLA CKD-EPI: >60 ML/MIN/1.73/M2
GLUCOSE SERPL-MCNC: 96 MG/DL (ref 70–110)
GRAM STN SPEC: NORMAL
GRAM STN SPEC: NORMAL
HCT VFR BLD AUTO: 33.3 % (ref 37–48.5)
HGB BLD-MCNC: 10.7 GM/DL (ref 12–16)
IMM GRANULOCYTES # BLD AUTO: 0.11 K/UL (ref 0–0.04)
IMM GRANULOCYTES NFR BLD AUTO: 0.7 % (ref 0–0.5)
LYMPHOCYTES # BLD AUTO: 1.4 K/UL (ref 1–4.8)
MAGNESIUM SERPL-MCNC: 1.9 MG/DL (ref 1.6–2.6)
MCH RBC QN AUTO: 32.3 PG (ref 27–31)
MCHC RBC AUTO-ENTMCNC: 32.1 G/DL (ref 32–36)
MCV RBC AUTO: 101 FL (ref 82–98)
NUCLEATED RBC (/100WBC) (OHS): 0 /100 WBC
PHOSPHATE SERPL-MCNC: 3.2 MG/DL (ref 2.7–4.5)
PLATELET # BLD AUTO: 331 K/UL (ref 150–450)
PMV BLD AUTO: 9.5 FL (ref 9.2–12.9)
POCT GLUCOSE: 108 MG/DL (ref 70–110)
POCT GLUCOSE: 110 MG/DL (ref 70–110)
POCT GLUCOSE: 115 MG/DL (ref 70–110)
POCT GLUCOSE: 119 MG/DL (ref 70–110)
POCT GLUCOSE: 123 MG/DL (ref 70–110)
POCT GLUCOSE: 138 MG/DL (ref 70–110)
POCT GLUCOSE: 141 MG/DL (ref 70–110)
POTASSIUM SERPL-SCNC: 3.9 MMOL/L (ref 3.5–5.1)
PROT SERPL-MCNC: 5.3 GM/DL (ref 6–8.4)
RBC # BLD AUTO: 3.31 M/UL (ref 4–5.4)
RELATIVE EOSINOPHIL (OHS): 1.2 %
RELATIVE LYMPHOCYTE (OHS): 9.2 % (ref 18–48)
RELATIVE MONOCYTE (OHS): 5.7 % (ref 4–15)
RELATIVE NEUTROPHIL (OHS): 82.8 % (ref 38–73)
SODIUM SERPL-SCNC: 137 MMOL/L (ref 136–145)
WBC # BLD AUTO: 15.27 K/UL (ref 3.9–12.7)

## 2025-06-28 PROCEDURE — 11000001 HC ACUTE MED/SURG PRIVATE ROOM

## 2025-06-28 PROCEDURE — 25000003 PHARM REV CODE 250: Performed by: STUDENT IN AN ORGANIZED HEALTH CARE EDUCATION/TRAINING PROGRAM

## 2025-06-28 PROCEDURE — 80053 COMPREHEN METABOLIC PANEL: CPT

## 2025-06-28 PROCEDURE — 63600175 PHARM REV CODE 636 W HCPCS: Performed by: STUDENT IN AN ORGANIZED HEALTH CARE EDUCATION/TRAINING PROGRAM

## 2025-06-28 PROCEDURE — 63600175 PHARM REV CODE 636 W HCPCS: Performed by: NURSE PRACTITIONER

## 2025-06-28 PROCEDURE — 82247 BILIRUBIN TOTAL: CPT

## 2025-06-28 PROCEDURE — 84100 ASSAY OF PHOSPHORUS: CPT

## 2025-06-28 PROCEDURE — 36415 COLL VENOUS BLD VENIPUNCTURE: CPT

## 2025-06-28 PROCEDURE — 87205 SMEAR GRAM STAIN: CPT

## 2025-06-28 PROCEDURE — 25000003 PHARM REV CODE 250

## 2025-06-28 PROCEDURE — 87186 SC STD MICRODIL/AGAR DIL: CPT

## 2025-06-28 PROCEDURE — 85025 COMPLETE CBC W/AUTO DIFF WBC: CPT

## 2025-06-28 PROCEDURE — 63600175 PHARM REV CODE 636 W HCPCS

## 2025-06-28 PROCEDURE — 87075 CULTR BACTERIA EXCEPT BLOOD: CPT

## 2025-06-28 PROCEDURE — 83735 ASSAY OF MAGNESIUM: CPT

## 2025-06-28 RX ADMIN — PIPERACILLIN SODIUM AND TAZOBACTAM SODIUM 4.5 G: 4; .5 INJECTION, POWDER, LYOPHILIZED, FOR SOLUTION INTRAVENOUS at 05:06

## 2025-06-28 RX ADMIN — SIMETHICONE 80 MG: 80 TABLET, CHEWABLE ORAL at 06:06

## 2025-06-28 RX ADMIN — BUSPIRONE HYDROCHLORIDE 15 MG: 5 TABLET ORAL at 09:06

## 2025-06-28 RX ADMIN — HYDROMORPHONE HYDROCHLORIDE 0.5 MG: 1 INJECTION, SOLUTION INTRAMUSCULAR; INTRAVENOUS; SUBCUTANEOUS at 06:06

## 2025-06-28 RX ADMIN — OXYCODONE HYDROCHLORIDE 10 MG: 5 TABLET ORAL at 06:06

## 2025-06-28 RX ADMIN — HYDROMORPHONE HYDROCHLORIDE 0.5 MG: 1 INJECTION, SOLUTION INTRAMUSCULAR; INTRAVENOUS; SUBCUTANEOUS at 09:06

## 2025-06-28 RX ADMIN — ENOXAPARIN SODIUM 40 MG: 40 INJECTION SUBCUTANEOUS at 04:06

## 2025-06-28 RX ADMIN — SIMETHICONE 80 MG: 80 TABLET, CHEWABLE ORAL at 09:06

## 2025-06-28 RX ADMIN — BUSPIRONE HYDROCHLORIDE 15 MG: 5 TABLET ORAL at 02:06

## 2025-06-28 RX ADMIN — LIDOCAINE 2 PATCH: 50 PATCH TOPICAL at 02:06

## 2025-06-28 RX ADMIN — ACETAMINOPHEN 1000 MG: 500 TABLET ORAL at 09:06

## 2025-06-28 RX ADMIN — ACETAMINOPHEN 1000 MG: 500 TABLET ORAL at 02:06

## 2025-06-28 RX ADMIN — PANTOPRAZOLE SODIUM 40 MG: 40 TABLET, DELAYED RELEASE ORAL at 09:06

## 2025-06-28 RX ADMIN — SIMETHICONE 80 MG: 80 TABLET, CHEWABLE ORAL at 02:06

## 2025-06-28 RX ADMIN — ASCORBIC ACID, VITAMIN A PALMITATE, CHOLECALCIFEROL, THIAMINE HYDROCHLORIDE, RIBOFLAVIN-5 PHOSPHATE SODIUM, PYRIDOXINE HYDROCHLORIDE, NIACINAMIDE, DEXPANTHENOL, ALPHA-TOCOPHEROL ACETATE, VITAMIN K1, FOLIC ACID, BIOTIN, CYANOCOBALAMIN: 200; 3300; 200; 6; 3.6; 6; 40; 15; 10; 150; 600; 60; 5 INJECTION, SOLUTION INTRAVENOUS at 11:06

## 2025-06-28 RX ADMIN — TAMSULOSIN HYDROCHLORIDE 0.4 MG: 0.4 CAPSULE ORAL at 09:06

## 2025-06-28 RX ADMIN — OXYCODONE HYDROCHLORIDE 10 MG: 5 TABLET ORAL at 09:06

## 2025-06-28 RX ADMIN — PIPERACILLIN SODIUM AND TAZOBACTAM SODIUM 4.5 G: 4; .5 INJECTION, POWDER, LYOPHILIZED, FOR SOLUTION INTRAVENOUS at 02:06

## 2025-06-28 RX ADMIN — ATORVASTATIN CALCIUM 40 MG: 40 TABLET, FILM COATED ORAL at 09:06

## 2025-06-28 RX ADMIN — CITALOPRAM HYDROBROMIDE 20 MG: 20 TABLET ORAL at 09:06

## 2025-06-28 RX ADMIN — PIPERACILLIN SODIUM AND TAZOBACTAM SODIUM 4.5 G: 4; .5 INJECTION, POWDER, LYOPHILIZED, FOR SOLUTION INTRAVENOUS at 09:06

## 2025-06-28 RX ADMIN — OXYCODONE HYDROCHLORIDE 10 MG: 5 TABLET ORAL at 12:06

## 2025-06-28 NOTE — NURSING
After ambulating to the bathroom the patient noticed bleeding from her abdominal incision. During cleaning of the blood it was noted that the bottom of the incision was draining bloody, purulent drainage. All staples intact. Dressing applied and MD notified.

## 2025-06-28 NOTE — NURSING
Unable to collect abdominal fluid as of yet. Dressing and packing removed, but no drainage present. Pressed the area but still no drainage. Riley SANTANA, notified.

## 2025-06-28 NOTE — PLAN OF CARE
Problem: Wound  Goal: Optimal Functional Ability  Outcome: Progressing  Intervention: Optimize Functional Ability  Flowsheets (Taken 6/28/2025 1718)  Activity Management: Ambulated to bathroom - L4  Activity Assistance Provided: assistance, 1 person  Goal: Skin Health and Integrity  Outcome: Progressing  Intervention: Optimize Skin Protection  Flowsheets (Taken 6/28/2025 1718)  Pressure Reduction Techniques:   frequent weight shift encouraged   weight shift assistance provided   positioned off wounds  Skin Protection: silicone foam dressing in place  Activity Management: Ambulated to bathroom - L4

## 2025-06-28 NOTE — NURSING
Bedside handoff report received from off going nurse. Pt lying in bed, NAD noted. Fall/safety precautions maintained. Call light and personal belongings within reach. Communication board updated         [facility] Nurses Note -- 4 Eyes      6/27/2025       Skin assessed on: Q Shift      [] No Pressure Injuries Present    [x]Prevention Measures Documented    [x] Yes LDA  for Pressure Injury Previously documented     [] Yes New Pressure Injury Discovered   [] LDA for New Pressure Injury Added      Attending RN:  Beti Wilde LPN     Second RN:  Christiana Ackerman RN

## 2025-06-28 NOTE — NURSING
[facility] [place]  Nurses Note -- 4 Eyes      6/28/2025       Skin assessed on: Q Shift      [] No Pressure Injuries Present    [x]Prevention Measures Documented    [x] Yes LDA  for Pressure Injury Previously documented     [] Yes New Pressure Injury Discovered   [] LDA for New Pressure Injury Added      Attending RN:  Christiana Ackerman RN     Second RN:  RIMA Bang

## 2025-06-28 NOTE — PLAN OF CARE
Problem: Adult Inpatient Plan of Care  Goal: Patient-Specific Goal (Individualized)  Outcome: Ongoing  Goal: Absence of Hospital-Acquired Illness or Injury  Outcome: Ongoing  Goal: Optimal Comfort and Wellbeing  Outcome: Ongoing  Goal: Readiness for Transition of Care  Outcome: Ongoing     Problem: Infection  Goal: Absence of Infection Signs and Symptoms  Outcome: Ongoing     Problem: Wound  Goal: Optimal Coping  Outcome: Ongoing  Goal: Optimal Functional Ability  Outcome: Ongoing  Goal: Absence of Infection Signs and Symptoms  Outcome: Ongoing  Goal: Improved Oral Intake  Outcome: Ongoing  Goal: Optimal Pain Control and Function  Outcome: Ongoing  Goal: Skin Health and Integrity  Outcome: Ongoing  Goal: Optimal Wound Healing  Outcome: Ongoing     Problem: Fall Injury Risk  Goal: Absence of Fall and Fall-Related Injury  Outcome: Ongoing     Problem: Skin Injury Risk Increased  Goal: Skin Health and Integrity  Outcome: Ongoing     Problem: Pain Acute  Goal: Optimal Pain Control and Function  Outcome: Ongoing     Problem: Intestinal Obstruction  Goal: Optimal Bowel Function  Outcome: Ongoing  Goal: Fluid and Electrolyte Balance  Outcome: Ongoing  Goal: Absence of Infection Signs and Symptoms  Outcome: Ongoing  Goal: Optimize Nutrition Status  Outcome: Ongoing  Goal: Optimal Pain Control and Function  Outcome: Ongoing     Problem: Bowel Resection  Goal: Absence of Bleeding  Outcome: Ongoing  Goal: Effective Bowel Elimination  Outcome: Ongoing  Goal: Fluid and Electrolyte Balance  Outcome: Ongoing  Goal: Absence of Infection Signs and Symptoms  Outcome: Ongoing  Goal: Optimal Nutrition Delivery  Outcome: Ongoing  Goal: Anesthesia/Sedation Recovery  Outcome: Ongoing  Goal: Optimal Pain Control and Function  Outcome: Ongoing  Goal: Nausea and Vomiting Relief  Outcome: Ongoing  Goal: Effective Urinary Elimination  Outcome: Ongoing  Goal: Effective Oxygenation and Ventilation  Outcome: Ongoing     Problem: Comorbidity  Management  Goal: Blood Pressure in Desired Range  Outcome: Ongoing

## 2025-06-28 NOTE — PT/OT/SLP PROGRESS
"Physical Therapy      Patient Name:  Patricia Ramirez   MRN:  [id number]    Patient not seen today secondary to upon arrival, pt sitting up in bed eating breakfast, pt stated "Not today" when walking in room, pt stated that she is leaking from incision and is dealing with a lot right now, therapy offered to sit pt up in chair for breakfast, pt declined, nursing aware. Will follow-up as able.    "

## 2025-06-28 NOTE — PROGRESS NOTES
Surgery Progress Note    Patricia Rmairez is a 69 y.o. year old female in hospital for recurrent SBO (failed conservative management) s/p diagnostic laparoscopy converted exploratory laparotomy, SBR and anastomosis, appendectomy, diverticulectomy 6/14, IR abscess drainage 6/24    NAEON AF VSS  Overnight patient noticed some drainage from inferior aspect of midline incision. This was opened at bedside this morning with removal of bottom 3 staples, with copious drainage of purulent fluid with foul odor about 40cc  Reports minimal abd pain or complaints otherwise  Tolerating diet, no n/v, passing flatus and BM  No f/c/n/v/sob/cp    Drain output 60cc (100cc)  Leukocytosis 15.3 from 15.1  Continues on PPN since 6/25    ROS:  Negative except above    PE:  Vitals:    06/28/25 0600 06/28/25 0654 06/28/25 0727 06/28/25 0740   BP:   (!) 146/63    BP Location:   Left arm    Patient Position:   Lying    Pulse:   72 68   Resp:  18 16    Temp:   99.3 °F (37.4 °C)    TempSrc:       SpO2:   95%    Weight: 62.6 kg (138 lb)      Height:           NAD, resting  No belabored breathing  No skin changes  Abd soft mildy distended appropriately TTP around midline incision, bottom staples removed with wet to dry packing in place, port site incisions c/d/I  IR drain with minimal serosanguinous fluid in bag      Significant Diagnostic Studies: Labs: BMP:   Recent Labs   Lab 06/27/25  1003 06/28/25  0531   * 96    137   K 3.4* 3.9    104   CO2 30* 25   BUN 12 13   CREATININE 0.4* 0.4*   CALCIUM 7.0* 7.7*   MG 1.9 1.9   , CBC   Recent Labs   Lab 06/27/25  1003 06/28/25  0530   WBC 15.11* 15.27*   HGB 10.6* 10.7*   HCT 32.0* 33.3*    331   , INR   Lab Results   Component Value Date    INR 0.9 06/24/2025    INR 1.0 07/28/2022    PROTIME 10.4 06/24/2025   , and All labs within the past 24 hours have been reviewed    A/P:  Patricia Ramirez is a 69 y.o. year old female  in hospital for recurrent SBO (failed conservative  management), POD#1 from diagnostic laparoscopy converted exploratory laparotomy, SBR and anastomosis, appendectomy, diverticulectomy 6/14. CT A/P with pelvic fluid collection. Persistent leukocytosis, improved s/p IR drain 6/25. Prealbumin 7, started on PPN 6/25. Purulent drainage from inferior aspect of midline incision 6/28, pending cx    - IR drain care, appreciate assistance  - IR cx growing E coli and klebsiella (sensitive to zosyn)  - Abdominal abscess cx sent today, pending  - Continue zosyn, EOT pending new cx   - Regular diet, boost plus supplements, PPN  - Next prealbumin check on 6/30  - [facility]  - Pulm toilet, encourage IS 10x/hour  - PT/OT, encourage up to chair and ambulation   - Replete electrolytes to maintain K > 4, Phos > 3, Mg > 2  - DVT ppx     Phoebmarilu Lin  General Surgery - [facility]  6/28/2025

## 2025-06-29 LAB
ABSOLUTE EOSINOPHIL (OHS): 0.22 K/UL
ABSOLUTE MONOCYTE (OHS): 1.17 K/UL (ref 0.3–1)
ABSOLUTE NEUTROPHIL COUNT (OHS): 12.07 K/UL (ref 1.8–7.7)
ALBUMIN SERPL BCP-MCNC: 1.3 G/DL (ref 3.5–5.2)
ALP SERPL-CCNC: 59 UNIT/L (ref 40–150)
ALT SERPL W/O P-5'-P-CCNC: 11 UNIT/L (ref 10–44)
ANION GAP (OHS): 7 MMOL/L (ref 8–16)
AST SERPL-CCNC: 16 UNIT/L (ref 11–45)
BASOPHILS # BLD AUTO: 0.07 K/UL
BASOPHILS NFR BLD AUTO: 0.5 %
BILIRUB FLD-MCNC: 0.6 MG/DL
BILIRUB SERPL-MCNC: 0.2 MG/DL (ref 0.1–1)
BUN SERPL-MCNC: 13 MG/DL (ref 8–23)
CALCIUM SERPL-MCNC: 7.4 MG/DL (ref 8.7–10.5)
CHLORIDE SERPL-SCNC: 106 MMOL/L (ref 95–110)
CO2 SERPL-SCNC: 25 MMOL/L (ref 23–29)
CREAT SERPL-MCNC: 0.4 MG/DL (ref 0.5–1.4)
ERYTHROCYTE [DISTWIDTH] IN BLOOD BY AUTOMATED COUNT: 13.4 % (ref 11.5–14.5)
GFR SERPLBLD CREATININE-BSD FMLA CKD-EPI: >60 ML/MIN/1.73/M2
GLUCOSE SERPL-MCNC: 105 MG/DL (ref 70–110)
HCT VFR BLD AUTO: 32.2 % (ref 37–48.5)
HGB BLD-MCNC: 10.5 GM/DL (ref 12–16)
IMM GRANULOCYTES # BLD AUTO: 0.16 K/UL (ref 0–0.04)
IMM GRANULOCYTES NFR BLD AUTO: 1.1 % (ref 0–0.5)
LYMPHOCYTES # BLD AUTO: 1.5 K/UL (ref 1–4.8)
MAGNESIUM SERPL-MCNC: 2 MG/DL (ref 1.6–2.6)
MCH RBC QN AUTO: 32.4 PG (ref 27–31)
MCHC RBC AUTO-ENTMCNC: 32.6 G/DL (ref 32–36)
MCV RBC AUTO: 99 FL (ref 82–98)
NUCLEATED RBC (/100WBC) (OHS): 0 /100 WBC
PHOSPHATE SERPL-MCNC: 3.4 MG/DL (ref 2.7–4.5)
PLATELET # BLD AUTO: 359 K/UL (ref 150–450)
PMV BLD AUTO: 8.7 FL (ref 9.2–12.9)
POCT GLUCOSE: 115 MG/DL (ref 70–110)
POCT GLUCOSE: 123 MG/DL (ref 70–110)
POTASSIUM SERPL-SCNC: 4 MMOL/L (ref 3.5–5.1)
PROT SERPL-MCNC: 5 GM/DL (ref 6–8.4)
RBC # BLD AUTO: 3.24 M/UL (ref 4–5.4)
RELATIVE EOSINOPHIL (OHS): 1.4 %
RELATIVE LYMPHOCYTE (OHS): 9.9 % (ref 18–48)
RELATIVE MONOCYTE (OHS): 7.7 % (ref 4–15)
RELATIVE NEUTROPHIL (OHS): 79.4 % (ref 38–73)
SODIUM SERPL-SCNC: 138 MMOL/L (ref 136–145)
WBC # BLD AUTO: 15.19 K/UL (ref 3.9–12.7)

## 2025-06-29 PROCEDURE — 25000003 PHARM REV CODE 250

## 2025-06-29 PROCEDURE — 25000003 PHARM REV CODE 250: Performed by: STUDENT IN AN ORGANIZED HEALTH CARE EDUCATION/TRAINING PROGRAM

## 2025-06-29 PROCEDURE — 36415 COLL VENOUS BLD VENIPUNCTURE: CPT

## 2025-06-29 PROCEDURE — 80053 COMPREHEN METABOLIC PANEL: CPT

## 2025-06-29 PROCEDURE — 84100 ASSAY OF PHOSPHORUS: CPT

## 2025-06-29 PROCEDURE — 83735 ASSAY OF MAGNESIUM: CPT

## 2025-06-29 PROCEDURE — 63600175 PHARM REV CODE 636 W HCPCS: Performed by: NURSE PRACTITIONER

## 2025-06-29 PROCEDURE — 63600175 PHARM REV CODE 636 W HCPCS

## 2025-06-29 PROCEDURE — 63600175 PHARM REV CODE 636 W HCPCS: Performed by: STUDENT IN AN ORGANIZED HEALTH CARE EDUCATION/TRAINING PROGRAM

## 2025-06-29 PROCEDURE — 11000001 HC ACUTE MED/SURG PRIVATE ROOM

## 2025-06-29 PROCEDURE — 85025 COMPLETE CBC W/AUTO DIFF WBC: CPT

## 2025-06-29 RX ADMIN — SIMETHICONE 80 MG: 80 TABLET, CHEWABLE ORAL at 02:06

## 2025-06-29 RX ADMIN — OXYCODONE HYDROCHLORIDE 10 MG: 5 TABLET ORAL at 01:06

## 2025-06-29 RX ADMIN — HYDROMORPHONE HYDROCHLORIDE 0.5 MG: 1 INJECTION, SOLUTION INTRAMUSCULAR; INTRAVENOUS; SUBCUTANEOUS at 02:06

## 2025-06-29 RX ADMIN — SIMETHICONE 80 MG: 80 TABLET, CHEWABLE ORAL at 08:06

## 2025-06-29 RX ADMIN — ASCORBIC ACID, VITAMIN A PALMITATE, CHOLECALCIFEROL, THIAMINE HYDROCHLORIDE, RIBOFLAVIN-5 PHOSPHATE SODIUM, PYRIDOXINE HYDROCHLORIDE, NIACINAMIDE, DEXPANTHENOL, ALPHA-TOCOPHEROL ACETATE, VITAMIN K1, FOLIC ACID, BIOTIN, CYANOCOBALAMIN: 200; 3300; 200; 6; 3.6; 6; 40; 15; 10; 150; 600; 60; 5 INJECTION, SOLUTION INTRAVENOUS at 11:06

## 2025-06-29 RX ADMIN — ACETAMINOPHEN 1000 MG: 500 TABLET ORAL at 08:06

## 2025-06-29 RX ADMIN — BUSPIRONE HYDROCHLORIDE 15 MG: 5 TABLET ORAL at 08:06

## 2025-06-29 RX ADMIN — OXYCODONE HYDROCHLORIDE 10 MG: 5 TABLET ORAL at 08:06

## 2025-06-29 RX ADMIN — ENOXAPARIN SODIUM 40 MG: 40 INJECTION SUBCUTANEOUS at 04:06

## 2025-06-29 RX ADMIN — TAMSULOSIN HYDROCHLORIDE 0.4 MG: 0.4 CAPSULE ORAL at 08:06

## 2025-06-29 RX ADMIN — ATORVASTATIN CALCIUM 40 MG: 40 TABLET, FILM COATED ORAL at 08:06

## 2025-06-29 RX ADMIN — PIPERACILLIN SODIUM AND TAZOBACTAM SODIUM 4.5 G: 4; .5 INJECTION, POWDER, LYOPHILIZED, FOR SOLUTION INTRAVENOUS at 05:06

## 2025-06-29 RX ADMIN — ACETAMINOPHEN 1000 MG: 500 TABLET ORAL at 02:06

## 2025-06-29 RX ADMIN — CITALOPRAM HYDROBROMIDE 20 MG: 20 TABLET ORAL at 08:06

## 2025-06-29 RX ADMIN — PIPERACILLIN SODIUM AND TAZOBACTAM SODIUM 4.5 G: 4; .5 INJECTION, POWDER, LYOPHILIZED, FOR SOLUTION INTRAVENOUS at 09:06

## 2025-06-29 RX ADMIN — PANTOPRAZOLE SODIUM 40 MG: 40 TABLET, DELAYED RELEASE ORAL at 08:06

## 2025-06-29 RX ADMIN — PIPERACILLIN SODIUM AND TAZOBACTAM SODIUM 4.5 G: 4; .5 INJECTION, POWDER, LYOPHILIZED, FOR SOLUTION INTRAVENOUS at 02:06

## 2025-06-29 RX ADMIN — BUSPIRONE HYDROCHLORIDE 15 MG: 5 TABLET ORAL at 02:06

## 2025-06-29 RX ADMIN — LIDOCAINE 2 PATCH: 50 PATCH TOPICAL at 02:06

## 2025-06-29 NOTE — NURSING
PER handoff given by VALERIE Villeda      Pt resting in bed quietly. NAD noted. Complained of abdominal pain. PRN pain med given. Abdominal dressing is soaked, dressing is changed. Patient tolerated procedure well.      Fall and safety precautions maintained. Bed alarm activated and audible.. Bed locked in lowest position, with side rails up x2. Call bell and personal items within reach    [facility] [place]  Nurses Note -- 4 Eyes      6/28/2025       Skin assessed on: Q Shift      [] No Pressure Injuries Present    []Prevention Measures Documented    [x] Yes LDA  for Pressure Injury Previously documented     [] Yes New Pressure Injury Discovered   [] LDA for New Pressure Injury Added      Attending RN:  Christine Maria RN     Second RN:  Christiana Ackerman RN

## 2025-06-29 NOTE — NURSING
[facility] [place]  Nurses Note -- 4 Eyes      6/29/2025       Skin assessed on: Q Shift      [] No Pressure Injuries Present    []Prevention Measures Documented    [x] Yes LDA  for Pressure Injury Previously documented     [] Yes New Pressure Injury Discovered   [] LDA for New Pressure Injury Added      Attending RN:  Jennifer Carney LPN     Second RN:  Christine Maria RN

## 2025-06-29 NOTE — PLAN OF CARE
Problem: Adult Inpatient Plan of Care  Goal: Optimal Comfort and Wellbeing  Outcome: Progressing     Problem: Wound  Goal: Optimal Coping  Outcome: Progressing     Problem: Fall Injury Risk  Goal: Absence of Fall and Fall-Related Injury  Outcome: Progressing     Problem: Intestinal Obstruction  Goal: Optimal Bowel Function  Outcome: Progressing

## 2025-06-29 NOTE — PLAN OF CARE
Problem: Fall Injury Risk  Goal: Absence of Fall and Fall-Related Injury  Outcome: Progressing  Intervention: Identify and Manage Contributors  Flowsheets (Taken 6/29/2025 0601)  Medication Review/Management: medications reviewed     Problem: Pain Acute  Goal: Optimal Pain Control and Function  Outcome: Progressing  Intervention: Develop Pain Management Plan  Flowsheets (Taken 6/29/2025 0601)  Pain Management Interventions:   around-the-clock dosing utilized   medication offered   pain management plan reviewed with patient/caregiver    Pt c/o pain twice overnight. PRN pain med given. Abdominal dressing changed once. Drain has total output of 10 ml overnight. Pt had 1 BM.

## 2025-06-29 NOTE — PROGRESS NOTES
Surgery Progress Note    Patricia STEPHAN Ramirez is a 69 y.o. year old female in hospital for recurrent SBO (failed conservative management) s/p diagnostic laparoscopy converted exploratory laparotomy, SBR and anastomosis, appendectomy, diverticulectomy 6/14, IR abscess drainage 6/24    NAEON AF VSS  No further drainage from midline incision, packed with wet to dry dressing, changed this morning  Reports minimal abd pain or complaints otherwise  Tolerating diet, no n/v, passing flatus and BM  Refused PT/OT yesterday, encouraged to participate   No f/c/n/v/sob/cp    Drain output 40cc (60cc)  Leukocytosis 15.19 from 15.27  Continues on PPN since 6/25    ROS:  Negative except above    PE:  Vitals:    06/29/25 0300 06/29/25 0400 06/29/25 0724 06/29/25 0745   BP:  137/73 133/76    BP Location:   Left arm    Patient Position:   Lying    Pulse:  70 70 75   Resp:  18 18    Temp:  98.4 °F (36.9 °C) 98.4 °F (36.9 °C)    TempSrc:   Oral    SpO2: 98% 97% 96%    Weight:       Height:           NAD, resting  No belabored breathing  No skin changes  Abd soft mildy distended appropriately TTP around midline incision, bottom staples removed with wet to dry packing in place, port site incisions c/d/I  IR drain with minimal serosanguinous fluid in bag      Significant Diagnostic Studies: Labs: BMP:   Recent Labs   Lab 06/27/25  1003 06/28/25  0531 06/29/25  0507   * 96 105    137 138   K 3.4* 3.9 4.0    104 106   CO2 30* 25 25   BUN 12 13 13   CREATININE 0.4* 0.4* 0.4*   CALCIUM 7.0* 7.7* 7.4*   MG 1.9 1.9 2.0   , CBC   Recent Labs   Lab 06/27/25  1003 06/28/25  0530 06/29/25  0507   WBC 15.11* 15.27* 15.19*   HGB 10.6* 10.7* 10.5*   HCT 32.0* 33.3* 32.2*    331 359   , INR   Lab Results   Component Value Date    INR 0.9 06/24/2025    INR 1.0 07/28/2022    PROTIME 10.4 06/24/2025   , and All labs within the past 24 hours have been reviewed    A/P:  Patricia Ramirez is a 69 y.o. year old female  in hospital for recurrent  SBO (failed conservative management), POD#1 from diagnostic laparoscopy converted exploratory laparotomy, SBR and anastomosis, appendectomy, diverticulectomy 6/14. CT A/P with pelvic fluid collection. Persistent leukocytosis, improved s/p IR drain 6/25. Prealbumin 7, started on PPN 6/25. Purulent drainage from inferior aspect of midline incision 6/28, pending cx    - IR drain care, appreciate assistance  - IR cx 6/25 growing E coli, klebsiella, bacteroides (sensitive to zosyn)  - Abdominal abscess cx sent 6/28, pending  - Continue zosyn, EOT pending new cx   - Regular diet, boost plus supplements, PPN  - [facility] 6[facility]30  [facility]  - Pulm toilet, encourage IS 10x/hour  - PT/OT, encourage up to chair and ambulation   - Replete electrolytes to maintain K > 4, Phos > 3, Mg > 2  - DVT ppx     Phoebmarilu Lin  General Surgery - [facility]  6/29/2025

## 2025-06-30 LAB
ABSOLUTE EOSINOPHIL (OHS): 0.29 K/UL
ABSOLUTE MONOCYTE (OHS): 1.18 K/UL (ref 0.3–1)
ABSOLUTE NEUTROPHIL COUNT (OHS): 12.1 K/UL (ref 1.8–7.7)
ALBUMIN SERPL BCP-MCNC: 1.3 G/DL (ref 3.5–5.2)
ALP SERPL-CCNC: 60 UNIT/L (ref 40–150)
ALT SERPL W/O P-5'-P-CCNC: 16 UNIT/L (ref 10–44)
ANION GAP (OHS): 8 MMOL/L (ref 8–16)
AST SERPL-CCNC: 21 UNIT/L (ref 11–45)
BACTERIA SPEC AEROBE CULT: ABNORMAL
BASOPHILS # BLD AUTO: 0.09 K/UL
BASOPHILS NFR BLD AUTO: 0.6 %
BILIRUB SERPL-MCNC: 0.1 MG/DL (ref 0.1–1)
BILIRUB UR QL STRIP.AUTO: NEGATIVE
BUN SERPL-MCNC: 14 MG/DL (ref 8–23)
CALCIUM SERPL-MCNC: 7.3 MG/DL (ref 8.7–10.5)
CHLORIDE SERPL-SCNC: 104 MMOL/L (ref 95–110)
CLARITY UR: CLEAR
CO2 SERPL-SCNC: 26 MMOL/L (ref 23–29)
COLOR UR AUTO: YELLOW
CREAT SERPL-MCNC: 0.4 MG/DL (ref 0.5–1.4)
ERYTHROCYTE [DISTWIDTH] IN BLOOD BY AUTOMATED COUNT: 13.3 % (ref 11.5–14.5)
GFR SERPLBLD CREATININE-BSD FMLA CKD-EPI: >60 ML/MIN/1.73/M2
GLUCOSE SERPL-MCNC: 111 MG/DL (ref 70–110)
GLUCOSE UR QL STRIP: NEGATIVE
HCT VFR BLD AUTO: 29.2 % (ref 37–48.5)
HGB BLD-MCNC: 9.5 GM/DL (ref 12–16)
HGB UR QL STRIP: NEGATIVE
IMM GRANULOCYTES # BLD AUTO: 0.23 K/UL (ref 0–0.04)
IMM GRANULOCYTES NFR BLD AUTO: 1.5 % (ref 0–0.5)
KETONES UR QL STRIP: NEGATIVE
LEUKOCYTE ESTERASE UR QL STRIP: NEGATIVE
LYMPHOCYTES # BLD AUTO: 1.35 K/UL (ref 1–4.8)
MAGNESIUM SERPL-MCNC: 1.9 MG/DL (ref 1.6–2.6)
MCH RBC QN AUTO: 32.1 PG (ref 27–31)
MCHC RBC AUTO-ENTMCNC: 32.5 G/DL (ref 32–36)
MCV RBC AUTO: 99 FL (ref 82–98)
NITRITE UR QL STRIP: NEGATIVE
NUCLEATED RBC (/100WBC) (OHS): 0 /100 WBC
PH UR STRIP: 7 [PH]
PHOSPHATE SERPL-MCNC: 3.3 MG/DL (ref 2.7–4.5)
PLATELET # BLD AUTO: 407 K/UL (ref 150–450)
PMV BLD AUTO: 8.4 FL (ref 9.2–12.9)
POCT GLUCOSE: 106 MG/DL (ref 70–110)
POCT GLUCOSE: 110 MG/DL (ref 70–110)
POCT GLUCOSE: 135 MG/DL (ref 70–110)
POCT GLUCOSE: 160 MG/DL (ref 70–110)
POCT GLUCOSE: 97 MG/DL (ref 70–110)
POTASSIUM SERPL-SCNC: 4.1 MMOL/L (ref 3.5–5.1)
PREALB SERPL-MCNC: 11 MG/DL (ref 20–43)
PROT SERPL-MCNC: 4.8 GM/DL (ref 6–8.4)
PROT UR QL STRIP: NEGATIVE
RBC # BLD AUTO: 2.96 M/UL (ref 4–5.4)
RELATIVE EOSINOPHIL (OHS): 1.9 %
RELATIVE LYMPHOCYTE (OHS): 8.9 % (ref 18–48)
RELATIVE MONOCYTE (OHS): 7.7 % (ref 4–15)
RELATIVE NEUTROPHIL (OHS): 79.4 % (ref 38–73)
SODIUM SERPL-SCNC: 138 MMOL/L (ref 136–145)
SP GR UR STRIP: 1.01
UROBILINOGEN UR STRIP-ACNC: NEGATIVE EU/DL
WBC # BLD AUTO: 15.24 K/UL (ref 3.9–12.7)

## 2025-06-30 PROCEDURE — 83735 ASSAY OF MAGNESIUM: CPT

## 2025-06-30 PROCEDURE — 63600175 PHARM REV CODE 636 W HCPCS

## 2025-06-30 PROCEDURE — 25000003 PHARM REV CODE 250: Performed by: STUDENT IN AN ORGANIZED HEALTH CARE EDUCATION/TRAINING PROGRAM

## 2025-06-30 PROCEDURE — 63600175 PHARM REV CODE 636 W HCPCS: Performed by: STUDENT IN AN ORGANIZED HEALTH CARE EDUCATION/TRAINING PROGRAM

## 2025-06-30 PROCEDURE — 97116 GAIT TRAINING THERAPY: CPT | Mod: CQ

## 2025-06-30 PROCEDURE — 81003 URINALYSIS AUTO W/O SCOPE: CPT

## 2025-06-30 PROCEDURE — 84100 ASSAY OF PHOSPHORUS: CPT

## 2025-06-30 PROCEDURE — 99900035 HC TECH TIME PER 15 MIN (STAT)

## 2025-06-30 PROCEDURE — 94761 N-INVAS EAR/PLS OXIMETRY MLT: CPT

## 2025-06-30 PROCEDURE — 25500020 PHARM REV CODE 255: Performed by: STUDENT IN AN ORGANIZED HEALTH CARE EDUCATION/TRAINING PROGRAM

## 2025-06-30 PROCEDURE — 80053 COMPREHEN METABOLIC PANEL: CPT

## 2025-06-30 PROCEDURE — 36415 COLL VENOUS BLD VENIPUNCTURE: CPT

## 2025-06-30 PROCEDURE — 25000003 PHARM REV CODE 250

## 2025-06-30 PROCEDURE — 85025 COMPLETE CBC W/AUTO DIFF WBC: CPT

## 2025-06-30 PROCEDURE — 11000001 HC ACUTE MED/SURG PRIVATE ROOM

## 2025-06-30 PROCEDURE — 84134 ASSAY OF PREALBUMIN: CPT

## 2025-06-30 PROCEDURE — 63600175 PHARM REV CODE 636 W HCPCS: Performed by: NURSE PRACTITIONER

## 2025-06-30 PROCEDURE — 97530 THERAPEUTIC ACTIVITIES: CPT | Mod: CQ

## 2025-06-30 RX ADMIN — PIPERACILLIN SODIUM AND TAZOBACTAM SODIUM 4.5 G: 4; .5 INJECTION, POWDER, LYOPHILIZED, FOR SOLUTION INTRAVENOUS at 02:06

## 2025-06-30 RX ADMIN — BUSPIRONE HYDROCHLORIDE 15 MG: 5 TABLET ORAL at 02:06

## 2025-06-30 RX ADMIN — OXYCODONE HYDROCHLORIDE 10 MG: 5 TABLET ORAL at 02:06

## 2025-06-30 RX ADMIN — ATORVASTATIN CALCIUM 40 MG: 40 TABLET, FILM COATED ORAL at 08:06

## 2025-06-30 RX ADMIN — HYDROMORPHONE HYDROCHLORIDE 0.5 MG: 1 INJECTION, SOLUTION INTRAMUSCULAR; INTRAVENOUS; SUBCUTANEOUS at 07:06

## 2025-06-30 RX ADMIN — SIMETHICONE 80 MG: 80 TABLET, CHEWABLE ORAL at 06:06

## 2025-06-30 RX ADMIN — HYDROMORPHONE HYDROCHLORIDE 0.5 MG: 1 INJECTION, SOLUTION INTRAMUSCULAR; INTRAVENOUS; SUBCUTANEOUS at 01:06

## 2025-06-30 RX ADMIN — IOHEXOL 80 ML: 350 INJECTION, SOLUTION INTRAVENOUS at 12:06

## 2025-06-30 RX ADMIN — SIMETHICONE 80 MG: 80 TABLET, CHEWABLE ORAL at 02:06

## 2025-06-30 RX ADMIN — PANTOPRAZOLE SODIUM 40 MG: 40 TABLET, DELAYED RELEASE ORAL at 08:06

## 2025-06-30 RX ADMIN — ACETAMINOPHEN 1000 MG: 500 TABLET ORAL at 08:06

## 2025-06-30 RX ADMIN — ACETAMINOPHEN 1000 MG: 500 TABLET ORAL at 02:06

## 2025-06-30 RX ADMIN — HYDROMORPHONE HYDROCHLORIDE 0.5 MG: 1 INJECTION, SOLUTION INTRAMUSCULAR; INTRAVENOUS; SUBCUTANEOUS at 12:06

## 2025-06-30 RX ADMIN — BUSPIRONE HYDROCHLORIDE 15 MG: 5 TABLET ORAL at 08:06

## 2025-06-30 RX ADMIN — SIMETHICONE 80 MG: 80 TABLET, CHEWABLE ORAL at 08:06

## 2025-06-30 RX ADMIN — LIDOCAINE 2 PATCH: 50 PATCH TOPICAL at 02:06

## 2025-06-30 RX ADMIN — TAMSULOSIN HYDROCHLORIDE 0.4 MG: 0.4 CAPSULE ORAL at 08:06

## 2025-06-30 RX ADMIN — ENOXAPARIN SODIUM 40 MG: 40 INJECTION SUBCUTANEOUS at 04:06

## 2025-06-30 RX ADMIN — OXYCODONE HYDROCHLORIDE 10 MG: 5 TABLET ORAL at 06:06

## 2025-06-30 RX ADMIN — CITALOPRAM HYDROBROMIDE 20 MG: 20 TABLET ORAL at 08:06

## 2025-06-30 RX ADMIN — OXYCODONE HYDROCHLORIDE 10 MG: 5 TABLET ORAL at 09:06

## 2025-06-30 RX ADMIN — HYDROMORPHONE HYDROCHLORIDE 0.5 MG: 1 INJECTION, SOLUTION INTRAMUSCULAR; INTRAVENOUS; SUBCUTANEOUS at 08:06

## 2025-06-30 RX ADMIN — IOHEXOL 15 ML: 300 INJECTION, SOLUTION INTRAVENOUS at 12:06

## 2025-06-30 RX ADMIN — Medication 6 MG: at 08:06

## 2025-06-30 RX ADMIN — PIPERACILLIN SODIUM AND TAZOBACTAM SODIUM 4.5 G: 4; .5 INJECTION, POWDER, LYOPHILIZED, FOR SOLUTION INTRAVENOUS at 05:06

## 2025-06-30 NOTE — NURSING
PER handoff given by RIMA Rodriguez      Pt resting in bed quietly. NAD noted. Complained of abdominal pain 10/10, PRN pain med given      Fall and safety precautions maintained. Bed alarm activated and audible.. Bed locked in lowest position, with side rails up x2. Call bell and personal items within reach    [facility] [place]  Nurses Note -- 4 Eyes      6/29/2025       Skin assessed on: Q Shift      [] No Pressure Injuries Present    []Prevention Measures Documented    [x] Yes LDA  for Pressure Injury Previously documented     [] Yes New Pressure Injury Discovered   [] LDA for New Pressure Injury Added      Attending RN:  Christine Maria, VALEIRE     Second RN:  Jennifer Carney LPN

## 2025-06-30 NOTE — PT/OT/SLP PROGRESS
Physical Therapy Treatment    Patient Name:  Patricia Ramirez   MRN:  [id number]    Recommendations:     Discharge Recommendations: Low Intensity Therapy  Discharge Equipment Recommendations: walker, rolling  Barriers to discharge: None    Assessment:     Patricia Ramirez is a 69 y.o. female admitted with a medical diagnosis of SBO (small bowel obstruction).  She presents with the following impairments/functional limitations: weakness, impaired endurance, impaired functional mobility, pain.    Rehab Prognosis: Good; patient would benefit from acute skilled PT services to address these deficits and reach maximum level of function.    Recent Surgery: Procedure(s) (LRB):  LAPAROSCOPY, DIAGNOSTIC (N/A)  LAPAROTOMY, EXPLORATORY; APPENDECTOMY; SMALL BOWEL RESECTION; DIVERTICULUM RESECTION (N/A) 16 Days Post-Op    Plan:     During this hospitalization, patient to be seen 5 x/week to address the identified rehab impairments via gait training, therapeutic activities, therapeutic exercises, neuromuscular re-education and progress toward the following goals:    Plan of Care Expires:  07/06/25    Subjective     Chief Complaint: none stated  Patient/Family Comments/goals: pt agreed to therapy  Pain/Comfort:  Pain Rating 1: 0/10      Objective:     Communicated with nursing prior to session.  Patient found HOB elevated with bed alarm, peripheral IV upon PT entry to room.     General Precautions: Standard, fall  Orthopedic Precautions: N/A  Braces: N/A  Respiratory Status: Room air     Functional Mobility:  Bed Mobility:     Supine to Sit: stand by assistance  Transfers: Gait belt donned, x1 trial from EOB,x1 trial from toilet  Toilet transfer: Stand by Assistance using RW and step transfer technique    Sit to Stand:  contact guard assistance with rolling walker  Gait: Pt ambulated ~250' using RW and SBA, pt with decreased leonides and step length, pt required one standing rest break d/t fatigue, pt required cues for AD management and  proper sequencing  Balance: Pt with Fair- standing balance      AM-PAC 6 CLICK MOBILITY  Turning over in bed (including adjusting bedclothes, sheets and blankets)?: 3  Sitting down on and standing up from a chair with arms (e.g., wheelchair, bedside commode, etc.): 3  Moving from lying on back to sitting on the side of the bed?: 3  Moving to and from a bed to a chair (including a wheelchair)?: 3  Need to walk in hospital room?: 3  Climbing 3-5 steps with a railing?: 3  Basic Mobility Total Score: 18       Treatment & Education:  Pt performed dynamic standing ~5 minutes standing at sink to perform personal hygiene with supervision    Patient left up in chair with all lines intact, call button in reach, and nursing notified..    GOALS:   Multidisciplinary Problems       Physical Therapy Goals          Problem: Physical Therapy    Goal Priority Disciplines Outcome Interventions   Physical Therapy Goal     PT, PT/OT Progressing    Description: Goals to be met by: 25     Patient will increase functional independence with mobility by performin. Supine to sit with Modified [place]  2. Sit to supine with Modified [place]  3. Sit to stand transfer with Modified [place]  4. Gait  x 50 feet with Modified [place] using Rolling Walker.   5. Stand for 5 minutes with Modified [place] using Rolling Walker  6. Lower extremity exercise program x30 reps per handout, with independence                         DME Justifications:      Time Tracking:     PT Received On: 25  PT Start Time: 905     PT Stop Time: 931  PT Total Time (min): 26 min     Billable Minutes: Gait Training 15 and Therapeutic Activity 11    Treatment Type: Treatment  PT/PTA: PTA     Number of PTA visits since last PT visit: 2025

## 2025-06-30 NOTE — PLAN OF CARE
Problem: Adult Inpatient Plan of Care  Goal: Optimal Comfort and Wellbeing  Outcome: Progressing     Problem: Wound  Goal: Optimal Functional Ability  Outcome: Progressing     Problem: Fall Injury Risk  Goal: Absence of Fall and Fall-Related Injury  Outcome: Progressing     Problem: Pain Acute  Goal: Optimal Pain Control and Function  Outcome: Progressing

## 2025-06-30 NOTE — PLAN OF CARE
Problem: Adult Inpatient Plan of Care  Goal: Patient-Specific Goal (Individualized)  Outcome: Progressing  Goal: Absence of Hospital-Acquired Illness or Injury  Outcome: Progressing  Goal: Optimal Comfort and Wellbeing  Outcome: Progressing  Goal: Readiness for Transition of Care  Outcome: Progressing     Problem: Infection  Goal: Absence of Infection Signs and Symptoms  Outcome: Progressing     Problem: Wound  Goal: Optimal Coping  Outcome: Progressing  Goal: Optimal Functional Ability  Outcome: Progressing  Goal: Absence of Infection Signs and Symptoms  Outcome: Progressing  Goal: Improved Oral Intake  Outcome: Progressing  Goal: Optimal Pain Control and Function  Outcome: Progressing  Goal: Skin Health and Integrity  Outcome: Progressing  Goal: Optimal Wound Healing  Outcome: Progressing     Problem: Fall Injury Risk  Goal: Absence of Fall and Fall-Related Injury  Outcome: Progressing     Problem: Skin Injury Risk Increased  Goal: Skin Health and Integrity  Outcome: Progressing     Problem: Pain Acute  Goal: Optimal Pain Control and Function  Outcome: Progressing     Problem: Intestinal Obstruction  Goal: Optimal Bowel Function  Outcome: Progressing  Goal: Fluid and Electrolyte Balance  Outcome: Progressing  Goal: Absence of Infection Signs and Symptoms  Outcome: Progressing  Goal: Optimize Nutrition Status  Outcome: Progressing  Goal: Optimal Pain Control and Function  Outcome: Progressing     Problem: Bowel Resection  Goal: Absence of Bleeding  Outcome: Progressing  Goal: Effective Bowel Elimination  Outcome: Progressing  Goal: Fluid and Electrolyte Balance  Outcome: Progressing  Goal: Absence of Infection Signs and Symptoms  Outcome: Progressing  Goal: Optimal Nutrition Delivery  Outcome: Progressing  Goal: Anesthesia/Sedation Recovery  Outcome: Progressing  Goal: Optimal Pain Control and Function  Outcome: Progressing  Goal: Nausea and Vomiting Relief  Outcome: Progressing  Goal: Effective Urinary  Elimination  Outcome: Progressing  Goal: Effective Oxygenation and Ventilation  Outcome: Progressing     Problem: Comorbidity Management  Goal: Blood Pressure in Desired Range  Outcome: Progressing

## 2025-06-30 NOTE — NURSING
[facility] [place]  Nurses Note -- 4 Eyes      6/30/2025       Skin assessed on: Q Shift      [] No Pressure Injuries Present    []Prevention Measures Documented    [x] Yes LDA  for Pressure Injury Previously documented     [] Yes New Pressure Injury Discovered   [] LDA for New Pressure Injury Added      Attending RN:  Amalia Langston RN     Second RN:  Christine ZAMARRIPA RN

## 2025-06-30 NOTE — PLAN OF CARE
Changes in medical condition or discharge plan: Patient s/p drain placement with Interventional Radiology on 6/25; Per surgery, pt has persistent leukocytosis and IR drain remains in place for a pelvic abscess, monitoring output. Continues on PPN. Plan to discharge with home with [facility].     Does patient need new DME?  Patient confirmed hospital bed has been delivered to home. Recommendation for RW.    Follow up appts needed: Gen Surgery     Medically stable: not at this time.     Estimated Discharge Date:  2 days   06/30/25 0942   Discharge Reassessment   Assessment Type Discharge Planning Reassessment   Did the patient's condition or plan change since previous assessment? No   Communicated STUART with patient/caregiver Date not available/Unable to determine   Discharge Plan A Home Health   Discharge Plan B Home with family   DME Needed Upon Discharge  walker, rolling;hospital bed   Transition of Care Barriers None   Why the patient remains in the hospital Requires continued medical care   Post-Acute Status   Post-Acute Authorization Home Health   Home Health Status Pending medical clearance/testing   Discharge Delays None known at this time

## 2025-06-30 NOTE — PROGRESS NOTES
Surgery Progress Note    Patricia Ramirez is a 69 y.o. year old female in hospital for recurrent SBO (failed conservative management) s/p diagnostic laparoscopy converted exploratory laparotomy, SBR and anastomosis, appendectomy, diverticulectomy 6/14, IR abscess drainage 6/24    NAEON AF VSS  Stable complaint of pain at IR drain site, controlled with medications, able to move and sit up  Tolerating diet, no n/v, passing flatus and BM  No f/c/n/v/sob/cp    Drain output 35cc (40cc)  Leukocytosis 15.24 from 15.19  Prealbumin 11 from 7  Continues on PPN since 6/25    ROS:  Negative except above    PE:  Vitals:    06/30/25 1034 06/30/25 1110 06/30/25 1349 06/30/25 1450   BP:  116/62     Pulse: 73 71  73   Resp:  18 18    Temp:  97.6 °F (36.4 °C)     TempSrc:  Oral     SpO2:  95%     Weight:       Height:           NAD, resting  No belabored breathing  No skin changes  Abd soft mildy distended appropriately TTP around midline incision, bottom staples removed with wet to dry packing in place, port site incisions c/d/I  IR drain with minimal serosanguinous fluid in bag      Significant Diagnostic Studies: Labs: BMP:   Recent Labs   Lab 06/29/25  0507 06/30/25  0438    111*    138   K 4.0 4.1    104   CO2 25 26   BUN 13 14   CREATININE 0.4* 0.4*   CALCIUM 7.4* 7.3*   MG 2.0 1.9   , CBC   Recent Labs   Lab 06/29/25  0507 06/30/25  0438   WBC 15.19* 15.24*   HGB 10.5* 9.5*   HCT 32.2* 29.2*    407   , INR   Lab Results   Component Value Date    INR 0.9 06/24/2025    INR 1.0 07/28/2022    PROTIME 10.4 06/24/2025   , and All labs within the past 24 hours have been reviewed    A/P:  Patricia Ramirez is a 69 y.o. year old female  in hospital for recurrent SBO (failed conservative management), POD#1 from diagnostic laparoscopy converted exploratory laparotomy, SBR and anastomosis, appendectomy, diverticulectomy 6/14. CT A/P with pelvic fluid collection. Persistent leukocytosis, improved s/p IR drain 6/25.  Prealbumin 7, started on PPN 6/25. Purulent drainage from inferior aspect of midline incision 6/28, pending cx. Repeat prealbumin 11 on 6/30, continues on PPN    - CT A/P with PO contrast today given persistent leukocytosis  - IR drain care, appreciate assistance  - IR cx 6/25 growing E coli, klebsiella, bacteroides (sensitive to zosyn)  - Abdominal abscess cx sent 6/28, cx with klebsiella (sensitive to zosyn)   - Continue zosyn, EOT tentatively 7/2  - Regular diet, boost plus supplements, PPN  - Next prealbumin check on 7/3  - [facility]  - Pulm toilet, encourage IS 10x/hour  - PT/OT, encourage up to chair and ambulation   - Replete electrolytes to maintain K > 4, Phos > 3, Mg > 2  - DVT ppx     Phokoffi Lin  General Surgery - [facility]  6/30/2025

## 2025-07-01 LAB
ABSOLUTE EOSINOPHIL (OHS): 0.23 K/UL
ABSOLUTE MONOCYTE (OHS): 1.06 K/UL (ref 0.3–1)
ABSOLUTE NEUTROPHIL COUNT (OHS): 10.36 K/UL (ref 1.8–7.7)
ALBUMIN SERPL BCP-MCNC: 1.4 G/DL (ref 3.5–5.2)
ALP SERPL-CCNC: 67 UNIT/L (ref 40–150)
ALT SERPL W/O P-5'-P-CCNC: 19 UNIT/L (ref 10–44)
ANION GAP (OHS): 12 MMOL/L (ref 8–16)
AST SERPL-CCNC: 28 UNIT/L (ref 11–45)
BACTERIA SPEC ANAEROBE CULT: ABNORMAL
BASOPHILS # BLD AUTO: 0.12 K/UL
BASOPHILS NFR BLD AUTO: 0.9 %
BILIRUB SERPL-MCNC: 0.2 MG/DL (ref 0.1–1)
BUN SERPL-MCNC: 13 MG/DL (ref 8–23)
CALCIUM SERPL-MCNC: 7.6 MG/DL (ref 8.7–10.5)
CHLORIDE SERPL-SCNC: 104 MMOL/L (ref 95–110)
CO2 SERPL-SCNC: 22 MMOL/L (ref 23–29)
CREAT SERPL-MCNC: 0.5 MG/DL (ref 0.5–1.4)
ERYTHROCYTE [DISTWIDTH] IN BLOOD BY AUTOMATED COUNT: 13.6 % (ref 11.5–14.5)
GFR SERPLBLD CREATININE-BSD FMLA CKD-EPI: >60 ML/MIN/1.73/M2
GLUCOSE SERPL-MCNC: 105 MG/DL (ref 70–110)
HCT VFR BLD AUTO: 33.1 % (ref 37–48.5)
HGB BLD-MCNC: 10.3 GM/DL (ref 12–16)
HOLD SPECIMEN: NORMAL
IMM GRANULOCYTES # BLD AUTO: 0.22 K/UL (ref 0–0.04)
IMM GRANULOCYTES NFR BLD AUTO: 1.6 % (ref 0–0.5)
LYMPHOCYTES # BLD AUTO: 1.61 K/UL (ref 1–4.8)
MAGNESIUM SERPL-MCNC: 2 MG/DL (ref 1.6–2.6)
MCH RBC QN AUTO: 31.6 PG (ref 27–31)
MCHC RBC AUTO-ENTMCNC: 31.1 G/DL (ref 32–36)
MCV RBC AUTO: 102 FL (ref 82–98)
NUCLEATED RBC (/100WBC) (OHS): 0 /100 WBC
PHOSPHATE SERPL-MCNC: 3.7 MG/DL (ref 2.7–4.5)
PLATELET # BLD AUTO: 274 K/UL (ref 150–450)
PMV BLD AUTO: 10.9 FL (ref 9.2–12.9)
POCT GLUCOSE: 110 MG/DL (ref 70–110)
POCT GLUCOSE: 110 MG/DL (ref 70–110)
POCT GLUCOSE: 120 MG/DL (ref 70–110)
POCT GLUCOSE: 130 MG/DL (ref 70–110)
POTASSIUM SERPL-SCNC: 4.5 MMOL/L (ref 3.5–5.1)
PROT SERPL-MCNC: 5.5 GM/DL (ref 6–8.4)
RBC # BLD AUTO: 3.26 M/UL (ref 4–5.4)
RELATIVE EOSINOPHIL (OHS): 1.7 %
RELATIVE LYMPHOCYTE (OHS): 11.8 % (ref 18–48)
RELATIVE MONOCYTE (OHS): 7.8 % (ref 4–15)
RELATIVE NEUTROPHIL (OHS): 76.2 % (ref 38–73)
SODIUM SERPL-SCNC: 138 MMOL/L (ref 136–145)
WBC # BLD AUTO: 13.6 K/UL (ref 3.9–12.7)

## 2025-07-01 PROCEDURE — 11000001 HC ACUTE MED/SURG PRIVATE ROOM

## 2025-07-01 PROCEDURE — 99900035 HC TECH TIME PER 15 MIN (STAT)

## 2025-07-01 PROCEDURE — 63600175 PHARM REV CODE 636 W HCPCS: Performed by: INTERNAL MEDICINE

## 2025-07-01 PROCEDURE — 63600175 PHARM REV CODE 636 W HCPCS: Performed by: NURSE PRACTITIONER

## 2025-07-01 PROCEDURE — 83735 ASSAY OF MAGNESIUM: CPT

## 2025-07-01 PROCEDURE — 84100 ASSAY OF PHOSPHORUS: CPT

## 2025-07-01 PROCEDURE — 97110 THERAPEUTIC EXERCISES: CPT | Mod: CQ

## 2025-07-01 PROCEDURE — 25000003 PHARM REV CODE 250: Performed by: STUDENT IN AN ORGANIZED HEALTH CARE EDUCATION/TRAINING PROGRAM

## 2025-07-01 PROCEDURE — 25000003 PHARM REV CODE 250

## 2025-07-01 PROCEDURE — 97116 GAIT TRAINING THERAPY: CPT | Mod: CQ

## 2025-07-01 PROCEDURE — 85025 COMPLETE CBC W/AUTO DIFF WBC: CPT

## 2025-07-01 PROCEDURE — 82040 ASSAY OF SERUM ALBUMIN: CPT

## 2025-07-01 PROCEDURE — 36415 COLL VENOUS BLD VENIPUNCTURE: CPT

## 2025-07-01 PROCEDURE — 25000003 PHARM REV CODE 250: Performed by: INTERNAL MEDICINE

## 2025-07-01 PROCEDURE — 63600175 PHARM REV CODE 636 W HCPCS: Performed by: STUDENT IN AN ORGANIZED HEALTH CARE EDUCATION/TRAINING PROGRAM

## 2025-07-01 RX ADMIN — OXYCODONE HYDROCHLORIDE 10 MG: 5 TABLET ORAL at 01:07

## 2025-07-01 RX ADMIN — ACETAMINOPHEN 1000 MG: 500 TABLET ORAL at 02:07

## 2025-07-01 RX ADMIN — ACETAMINOPHEN 1000 MG: 500 TABLET ORAL at 08:07

## 2025-07-01 RX ADMIN — SIMETHICONE 80 MG: 80 TABLET, CHEWABLE ORAL at 08:07

## 2025-07-01 RX ADMIN — HYDROMORPHONE HYDROCHLORIDE 0.5 MG: 1 INJECTION, SOLUTION INTRAMUSCULAR; INTRAVENOUS; SUBCUTANEOUS at 06:07

## 2025-07-01 RX ADMIN — ACETAMINOPHEN 1000 MG: 500 TABLET ORAL at 09:07

## 2025-07-01 RX ADMIN — SIMETHICONE 80 MG: 80 TABLET, CHEWABLE ORAL at 09:07

## 2025-07-01 RX ADMIN — LIDOCAINE 2 PATCH: 50 PATCH TOPICAL at 02:07

## 2025-07-01 RX ADMIN — OXYCODONE HYDROCHLORIDE 10 MG: 5 TABLET ORAL at 12:07

## 2025-07-01 RX ADMIN — ASCORBIC ACID, VITAMIN A PALMITATE, CHOLECALCIFEROL, THIAMINE HYDROCHLORIDE, RIBOFLAVIN-5 PHOSPHATE SODIUM, PYRIDOXINE HYDROCHLORIDE, NIACINAMIDE, DEXPANTHENOL, ALPHA-TOCOPHEROL ACETATE, VITAMIN K1, FOLIC ACID, BIOTIN, CYANOCOBALAMIN: 200; 3300; 200; 6; 3.6; 6; 40; 15; 10; 150; 600; 60; 5 INJECTION, SOLUTION INTRAVENOUS at 03:07

## 2025-07-01 RX ADMIN — SIMETHICONE 80 MG: 80 TABLET, CHEWABLE ORAL at 06:07

## 2025-07-01 RX ADMIN — HYDROMORPHONE HYDROCHLORIDE 0.5 MG: 1 INJECTION, SOLUTION INTRAMUSCULAR; INTRAVENOUS; SUBCUTANEOUS at 09:07

## 2025-07-01 RX ADMIN — CITALOPRAM HYDROBROMIDE 20 MG: 20 TABLET ORAL at 08:07

## 2025-07-01 RX ADMIN — PIPERACILLIN SODIUM AND TAZOBACTAM SODIUM 4.5 G: 4; .5 INJECTION, POWDER, FOR SOLUTION INTRAVENOUS at 09:07

## 2025-07-01 RX ADMIN — PIPERACILLIN SODIUM AND TAZOBACTAM SODIUM 4.5 G: 4; .5 INJECTION, POWDER, FOR SOLUTION INTRAVENOUS at 12:07

## 2025-07-01 RX ADMIN — OXYCODONE HYDROCHLORIDE 10 MG: 5 TABLET ORAL at 07:07

## 2025-07-01 RX ADMIN — BUSPIRONE HYDROCHLORIDE 15 MG: 5 TABLET ORAL at 02:07

## 2025-07-01 RX ADMIN — OXYCODONE HYDROCHLORIDE 10 MG: 5 TABLET ORAL at 10:07

## 2025-07-01 RX ADMIN — SIMETHICONE 80 MG: 80 TABLET, CHEWABLE ORAL at 02:07

## 2025-07-01 RX ADMIN — POLYETHYLENE GLYCOL 3350 17 G: 17 POWDER, FOR SOLUTION ORAL at 08:07

## 2025-07-01 RX ADMIN — PANTOPRAZOLE SODIUM 40 MG: 40 TABLET, DELAYED RELEASE ORAL at 08:07

## 2025-07-01 RX ADMIN — PIPERACILLIN SODIUM AND TAZOBACTAM SODIUM 4.5 G: 4; .5 INJECTION, POWDER, FOR SOLUTION INTRAVENOUS at 04:07

## 2025-07-01 RX ADMIN — ATORVASTATIN CALCIUM 40 MG: 40 TABLET, FILM COATED ORAL at 09:07

## 2025-07-01 RX ADMIN — ASCORBIC ACID, VITAMIN A PALMITATE, CHOLECALCIFEROL, THIAMINE HYDROCHLORIDE, RIBOFLAVIN-5 PHOSPHATE SODIUM, PYRIDOXINE HYDROCHLORIDE, NIACINAMIDE, DEXPANTHENOL, ALPHA-TOCOPHEROL ACETATE, VITAMIN K1, FOLIC ACID, BIOTIN, CYANOCOBALAMIN: 200; 3300; 200; 6; 3.6; 6; 40; 15; 10; 150; 600; 60; 5 INJECTION, SOLUTION INTRAVENOUS at 10:07

## 2025-07-01 RX ADMIN — TAMSULOSIN HYDROCHLORIDE 0.4 MG: 0.4 CAPSULE ORAL at 08:07

## 2025-07-01 RX ADMIN — BUSPIRONE HYDROCHLORIDE 15 MG: 5 TABLET ORAL at 08:07

## 2025-07-01 RX ADMIN — HYDROMORPHONE HYDROCHLORIDE 0.5 MG: 1 INJECTION, SOLUTION INTRAMUSCULAR; INTRAVENOUS; SUBCUTANEOUS at 02:07

## 2025-07-01 RX ADMIN — BUSPIRONE HYDROCHLORIDE 15 MG: 5 TABLET ORAL at 09:07

## 2025-07-01 RX ADMIN — ENOXAPARIN SODIUM 40 MG: 40 INJECTION SUBCUTANEOUS at 04:07

## 2025-07-01 NOTE — PT/OT/SLP PROGRESS
Occupational Therapy      Patient Name:  Patricia Ramirez   MRN:  [id number]    Patient not seen today secondary to Other (Comment) (patient eating lunch when OT checked on her while seated  in chair). Will follow-up later if available.    7/1/2025

## 2025-07-01 NOTE — PT/OT/SLP PROGRESS
Physical Therapy Treatment    Patient Name:  Patricia Ramirez   MRN:  [id number]    Recommendations:     Discharge Recommendations: Low Intensity Therapy  Discharge Equipment Recommendations: walker, rolling  Barriers to discharge: None    Assessment:     Patricia Ramirez is a 69 y.o. female admitted with a medical diagnosis of SBO (small bowel obstruction).  She presents with the following impairments/functional limitations: weakness, impaired endurance, impaired functional mobility, pain.    Rehab Prognosis: Good; patient would benefit from acute skilled PT services to address these deficits and reach maximum level of function.    Recent Surgery: Procedure(s) (LRB):  LAPAROSCOPY, DIAGNOSTIC (N/A)  LAPAROTOMY, EXPLORATORY; APPENDECTOMY; SMALL BOWEL RESECTION; DIVERTICULUM RESECTION (N/A) 17 Days Post-Op    Plan:     During this hospitalization, patient to be seen 5 x/week to address the identified rehab impairments via gait training, therapeutic activities, therapeutic exercises, neuromuscular re-education and progress toward the following goals:    Plan of Care Expires:  07/06/25    Subjective     Chief Complaint: pt feeling better  Patient/Family Comments/goals: Pt agreed to therapy session  Pain/Comfort:  Pain Rating 1: 8/10  Location 1: abdomen  Pain Addressed 1: Reposition, Distraction, Nurse notified      Objective:     Communicated with nursing prior to session.  Patient found left sidelying with peripheral IV, telemetry upon PT entry to room.     General Precautions: Standard, fall  Orthopedic Precautions: N/A  Braces: N/A  Respiratory Status: Room air     Functional Mobility:  Bed Mobility:     Supine to Sit: supervision  Transfers: Gait belt donned prior to transfer     Sit to Stand:  supervision with no AD  Gait: Pt ambulated ~400ft using RW and supervision, pt with decreased leonides and step length, required cues for proper sequencing and pacing, pt required one standing rest break d/t pain and  fatigue  Balance: Pt with Fair standing balance      AM-PAC 6 CLICK MOBILITY  Turning over in bed (including adjusting bedclothes, sheets and blankets)?: 4  Sitting down on and standing up from a chair with arms (e.g., wheelchair, bedside commode, etc.): 4  Moving from lying on back to sitting on the side of the bed?: 4  Moving to and from a bed to a chair (including a wheelchair)?: 4  Need to walk in hospital room?: 3  Climbing 3-5 steps with a railing?: 3  Basic Mobility Total Score: 22       Treatment & Education:  Pt instructed to perform seated LAQ, HR, hip flexion and pillow squeezes 2x10    Patient left up in chair with all lines intact, call button in reach, and nursing notified..    GOALS:   Multidisciplinary Problems       Physical Therapy Goals          Problem: Physical Therapy    Goal Priority Disciplines Outcome Interventions   Physical Therapy Goal     PT, PT/OT Progressing    Description: Goals to be met by: 25     Patient will increase functional independence with mobility by performin. Supine to sit with Modified [place]  2. Sit to supine with Modified [place]  3. Sit to stand transfer with Modified [place]  4. Gait  x 50 feet with Modified [place] using Rolling Walker.   5. Stand for 5 minutes with Modified [place] using Rolling Walker  6. Lower extremity exercise program x30 reps per handout, with independence                         DME Justifications:      Time Tracking:     PT Received On: 25  PT Start Time: 911     PT Stop Time: 934  PT Total Time (min): 23 min     Billable Minutes: Gait Training 15 and Therapeutic Exercise 8    Treatment Type: Treatment  PT/PTA: PTA     Number of PTA visits since last PT visit: 2     2025

## 2025-07-01 NOTE — NURSING
Patient was on TPN with only one midline access on right upper arm, with due zosyn at 2200H patient refuses another try for IV insertion after 1st attempt that failed, Notified virtual MD Dr Carmona and pharmacy for the adjusted time and both agreed for antibiotic to be infused for 3 hrs and to hold for TPN during infusion as patient had only one IV access.

## 2025-07-01 NOTE — NURSING
[facility] [place]  Nurses Note -- 4 Eyes      7/1/2025       Skin assessed on: Q Shift      [] No Pressure Injuries Present    []Prevention Measures Documented    [x] Yes LDA  for Pressure Injury Previously documented     [] Yes New Pressure Injury Discovered   [] LDA for New Pressure Injury Added      Attending RN:  Amalia Langston RN     Second RN:  Leoncio ANTHONY RN

## 2025-07-01 NOTE — NURSING
[facility] [place]  Nurses Note -- 4 Eyes      6/30/2025       Skin assessed on: Q Shift      [] No Pressure Injuries Present    [x]Prevention Measures Documented    [x] Yes LDA  for Pressure Injury Previously documented     [] Yes New Pressure Injury Discovered   [] LDA for New Pressure Injury Added      Attending RN:  Leoncio Polk RN     Second RN:  VALERIE Holland

## 2025-07-01 NOTE — PLAN OF CARE
Problem: Adult Inpatient Plan of Care  Goal: Patient-Specific Goal (Individualized)  Outcome: Progressing     Problem: Adult Inpatient Plan of Care  Goal: Optimal Comfort and Wellbeing  Outcome: Progressing     Problem: Infection  Goal: Absence of Infection Signs and Symptoms  Outcome: Progressing     Problem: Wound  Goal: Optimal Coping  Outcome: Progressing     Problem: Pain Acute  Goal: Optimal Pain Control and Function  Outcome: Progressing     Problem: Bowel Resection  Goal: Absence of Bleeding  Outcome: Progressing

## 2025-07-01 NOTE — PROGRESS NOTES
Surgery Progress Note    Patricia Ramirez is a 69 y.o. year old female in hospital for recurrent SBO (failed conservative management) s/p diagnostic laparoscopy converted exploratory laparotomy, SBR and anastomosis, appendectomy, diverticulectomy 6/14, IR abscess drainage 6/24    NAEON AF VSS  Stable complaint of pain at IR drain site, controlled with medications, able to move and sit up, ambulated with PT/OT yesterday  Tolerating diet, no n/v, passing flatus and BM  No f/c/n/v/sob/cp      ROS:  Negative except above    PE:  Vitals:    07/01/25 0456 07/01/25 0702 07/01/25 0710 07/01/25 0723   BP: 117/76   114/64   BP Location:       Patient Position:       Pulse: 73  79 66   Resp: 17 18  18   Temp: 98 °F (36.7 °C)   98 °F (36.7 °C)   TempSrc:    Oral   SpO2: 99%   97%   Weight:       Height:           NAD, resting  No belabored breathing  No skin changes  Abd soft mildy distended appropriately TTP around midline incision, bottom staples removed with wet to dry packing in place, port site incisions c/d/I  IR drain with minimal serosanguinous fluid in bag      Significant Diagnostic Studies: Labs: BMP:   Recent Labs   Lab 06/30/25  0438 07/01/25  0404   * 105    138   K 4.1 4.5    104   CO2 26 22*   BUN 14 13   CREATININE 0.4* 0.5   CALCIUM 7.3* 7.6*   MG 1.9 2.0   , CBC   Recent Labs   Lab 06/30/25  0438 07/01/25  0404   WBC 15.24* 13.60*   HGB 9.5* 10.3*   HCT 29.2* 33.1*    274   , INR   Lab Results   Component Value Date    INR 0.9 06/24/2025    INR 1.0 07/28/2022    PROTIME 10.4 06/24/2025   , and All labs within the past 24 hours have been reviewed    A/P:  Patricia Ramirez is a 69 y.o. year old female  in hospital for recurrent SBO (failed conservative management), POD#1 from diagnostic laparoscopy converted exploratory laparotomy, SBR and anastomosis, appendectomy, diverticulectomy 6/14. CT A/P with pelvic fluid collection. Persistent leukocytosis, improved s/p IR drain 6/25. Prealbumin  7, started on PPN 6/25. Purulent drainage from inferior aspect of midline incision 6/28, pending cx. Repeat prealbumin 11 on 6/30, continues on PPN    - WBC downtrending 13.6 from 15.2  - CT A/P 6/30 without new fluid collection, decreased size of pelvic fluid collection with IR drain in place, stable left abdominal fluid collection without window for perc drainage on personal review  - IR drain care, appreciate assistance  - IR cx 6/25 growing E coli, klebsiella, bacteroides (sensitive to zosyn)  - Abdominal abscess cx sent 6/28, cx with klebsiella (sensitive to zosyn)   - Continue zosyn, EOT tentatively 7/2  - Regular diet, boost plus supplements, PPN  - Next prealbumin check on 7/3  - [facility]  - Pulm toilet, encourage IS 10x/hour  - PT/OT, encourage up to chair and ambulation   - Replete electrolytes to maintain K > 4, Phos > 3, Mg > 2  - DVT ppx     Phokoffi Lin  General Surgery - [facility]  7/1/2025

## 2025-07-02 LAB
ABSOLUTE EOSINOPHIL (OHS): 0.33 K/UL
ABSOLUTE MONOCYTE (OHS): 1.24 K/UL (ref 0.3–1)
ABSOLUTE NEUTROPHIL COUNT (OHS): 10.36 K/UL (ref 1.8–7.7)
ALBUMIN SERPL BCP-MCNC: 1.4 G/DL (ref 3.5–5.2)
ALP SERPL-CCNC: 64 UNIT/L (ref 40–150)
ALT SERPL W/O P-5'-P-CCNC: 29 UNIT/L (ref 10–44)
ANION GAP (OHS): 8 MMOL/L (ref 8–16)
AST SERPL-CCNC: 37 UNIT/L (ref 11–45)
BASOPHILS # BLD AUTO: 0.1 K/UL
BASOPHILS NFR BLD AUTO: 0.7 %
BILIRUB SERPL-MCNC: 0.2 MG/DL (ref 0.1–1)
BUN SERPL-MCNC: 16 MG/DL (ref 8–23)
CALCIUM SERPL-MCNC: 7.6 MG/DL (ref 8.7–10.5)
CHLORIDE SERPL-SCNC: 105 MMOL/L (ref 95–110)
CO2 SERPL-SCNC: 27 MMOL/L (ref 23–29)
CREAT SERPL-MCNC: 0.5 MG/DL (ref 0.5–1.4)
ERYTHROCYTE [DISTWIDTH] IN BLOOD BY AUTOMATED COUNT: 13.6 % (ref 11.5–14.5)
GFR SERPLBLD CREATININE-BSD FMLA CKD-EPI: >60 ML/MIN/1.73/M2
GLUCOSE SERPL-MCNC: 97 MG/DL (ref 70–110)
HCT VFR BLD AUTO: 28.7 % (ref 37–48.5)
HGB BLD-MCNC: 9.2 GM/DL (ref 12–16)
IMM GRANULOCYTES # BLD AUTO: 0.21 K/UL (ref 0–0.04)
IMM GRANULOCYTES NFR BLD AUTO: 1.5 % (ref 0–0.5)
LYMPHOCYTES # BLD AUTO: 1.76 K/UL (ref 1–4.8)
MAGNESIUM SERPL-MCNC: 2 MG/DL (ref 1.6–2.6)
MCH RBC QN AUTO: 32.3 PG (ref 27–31)
MCHC RBC AUTO-ENTMCNC: 32.1 G/DL (ref 32–36)
MCV RBC AUTO: 101 FL (ref 82–98)
NUCLEATED RBC (/100WBC) (OHS): 0 /100 WBC
PHOSPHATE SERPL-MCNC: 3.7 MG/DL (ref 2.7–4.5)
PLATELET # BLD AUTO: 404 K/UL (ref 150–450)
PLATELET BLD QL SMEAR: ABNORMAL
PMV BLD AUTO: 8.8 FL (ref 9.2–12.9)
POCT GLUCOSE: 102 MG/DL (ref 70–110)
POCT GLUCOSE: 112 MG/DL (ref 70–110)
POCT GLUCOSE: 116 MG/DL (ref 70–110)
POCT GLUCOSE: 120 MG/DL (ref 70–110)
POTASSIUM SERPL-SCNC: 4.4 MMOL/L (ref 3.5–5.1)
PROT SERPL-MCNC: 5.3 GM/DL (ref 6–8.4)
RBC # BLD AUTO: 2.85 M/UL (ref 4–5.4)
RELATIVE EOSINOPHIL (OHS): 2.4 %
RELATIVE LYMPHOCYTE (OHS): 12.6 % (ref 18–48)
RELATIVE MONOCYTE (OHS): 8.9 % (ref 4–15)
RELATIVE NEUTROPHIL (OHS): 73.9 % (ref 38–73)
SODIUM SERPL-SCNC: 140 MMOL/L (ref 136–145)
WBC # BLD AUTO: 14 K/UL (ref 3.9–12.7)

## 2025-07-02 PROCEDURE — 25000003 PHARM REV CODE 250: Performed by: STUDENT IN AN ORGANIZED HEALTH CARE EDUCATION/TRAINING PROGRAM

## 2025-07-02 PROCEDURE — 94761 N-INVAS EAR/PLS OXIMETRY MLT: CPT

## 2025-07-02 PROCEDURE — 63600175 PHARM REV CODE 636 W HCPCS: Performed by: STUDENT IN AN ORGANIZED HEALTH CARE EDUCATION/TRAINING PROGRAM

## 2025-07-02 PROCEDURE — 97116 GAIT TRAINING THERAPY: CPT | Mod: CQ

## 2025-07-02 PROCEDURE — 36415 COLL VENOUS BLD VENIPUNCTURE: CPT

## 2025-07-02 PROCEDURE — 25000003 PHARM REV CODE 250

## 2025-07-02 PROCEDURE — 99024 POSTOP FOLLOW-UP VISIT: CPT | Mod: ,,, | Performed by: STUDENT IN AN ORGANIZED HEALTH CARE EDUCATION/TRAINING PROGRAM

## 2025-07-02 PROCEDURE — 63600175 PHARM REV CODE 636 W HCPCS: Performed by: INTERNAL MEDICINE

## 2025-07-02 PROCEDURE — 63600175 PHARM REV CODE 636 W HCPCS: Performed by: NURSE PRACTITIONER

## 2025-07-02 PROCEDURE — 85025 COMPLETE CBC W/AUTO DIFF WBC: CPT

## 2025-07-02 PROCEDURE — 84100 ASSAY OF PHOSPHORUS: CPT

## 2025-07-02 PROCEDURE — 80053 COMPREHEN METABOLIC PANEL: CPT

## 2025-07-02 PROCEDURE — 25000003 PHARM REV CODE 250: Performed by: INTERNAL MEDICINE

## 2025-07-02 PROCEDURE — 97530 THERAPEUTIC ACTIVITIES: CPT | Mod: CQ

## 2025-07-02 PROCEDURE — 83735 ASSAY OF MAGNESIUM: CPT

## 2025-07-02 PROCEDURE — 11000001 HC ACUTE MED/SURG PRIVATE ROOM

## 2025-07-02 RX ADMIN — PANTOPRAZOLE SODIUM 40 MG: 40 TABLET, DELAYED RELEASE ORAL at 09:07

## 2025-07-02 RX ADMIN — ACETAMINOPHEN 1000 MG: 500 TABLET ORAL at 08:07

## 2025-07-02 RX ADMIN — OXYCODONE HYDROCHLORIDE 10 MG: 5 TABLET ORAL at 04:07

## 2025-07-02 RX ADMIN — CITALOPRAM HYDROBROMIDE 20 MG: 20 TABLET ORAL at 09:07

## 2025-07-02 RX ADMIN — SIMETHICONE 80 MG: 80 TABLET, CHEWABLE ORAL at 05:07

## 2025-07-02 RX ADMIN — PIPERACILLIN SODIUM AND TAZOBACTAM SODIUM 4.5 G: 4; .5 INJECTION, POWDER, FOR SOLUTION INTRAVENOUS at 01:07

## 2025-07-02 RX ADMIN — OXYCODONE HYDROCHLORIDE 10 MG: 5 TABLET ORAL at 01:07

## 2025-07-02 RX ADMIN — HYDROMORPHONE HYDROCHLORIDE 0.5 MG: 1 INJECTION, SOLUTION INTRAMUSCULAR; INTRAVENOUS; SUBCUTANEOUS at 10:07

## 2025-07-02 RX ADMIN — ATORVASTATIN CALCIUM 40 MG: 40 TABLET, FILM COATED ORAL at 08:07

## 2025-07-02 RX ADMIN — LIDOCAINE 2 PATCH: 50 PATCH TOPICAL at 02:07

## 2025-07-02 RX ADMIN — BUSPIRONE HYDROCHLORIDE 15 MG: 5 TABLET ORAL at 02:07

## 2025-07-02 RX ADMIN — OXYCODONE HYDROCHLORIDE 5 MG: 5 TABLET ORAL at 07:07

## 2025-07-02 RX ADMIN — TAMSULOSIN HYDROCHLORIDE 0.4 MG: 0.4 CAPSULE ORAL at 09:07

## 2025-07-02 RX ADMIN — PIPERACILLIN SODIUM AND TAZOBACTAM SODIUM 4.5 G: 4; .5 INJECTION, POWDER, FOR SOLUTION INTRAVENOUS at 09:07

## 2025-07-02 RX ADMIN — POLYETHYLENE GLYCOL 3350 17 G: 17 POWDER, FOR SOLUTION ORAL at 09:07

## 2025-07-02 RX ADMIN — HYDROMORPHONE HYDROCHLORIDE 0.5 MG: 1 INJECTION, SOLUTION INTRAMUSCULAR; INTRAVENOUS; SUBCUTANEOUS at 01:07

## 2025-07-02 RX ADMIN — HYDROMORPHONE HYDROCHLORIDE 0.5 MG: 1 INJECTION, SOLUTION INTRAMUSCULAR; INTRAVENOUS; SUBCUTANEOUS at 09:07

## 2025-07-02 RX ADMIN — SIMETHICONE 80 MG: 80 TABLET, CHEWABLE ORAL at 02:07

## 2025-07-02 RX ADMIN — HYDROMORPHONE HYDROCHLORIDE 0.5 MG: 1 INJECTION, SOLUTION INTRAMUSCULAR; INTRAVENOUS; SUBCUTANEOUS at 05:07

## 2025-07-02 RX ADMIN — ACETAMINOPHEN 1000 MG: 500 TABLET ORAL at 09:07

## 2025-07-02 RX ADMIN — SIMETHICONE 80 MG: 80 TABLET, CHEWABLE ORAL at 09:07

## 2025-07-02 RX ADMIN — PIPERACILLIN SODIUM AND TAZOBACTAM SODIUM 4.5 G: 4; .5 INJECTION, POWDER, FOR SOLUTION INTRAVENOUS at 04:07

## 2025-07-02 RX ADMIN — ASCORBIC ACID, VITAMIN A PALMITATE, CHOLECALCIFEROL, THIAMINE HYDROCHLORIDE, RIBOFLAVIN-5 PHOSPHATE SODIUM, PYRIDOXINE HYDROCHLORIDE, NIACINAMIDE, DEXPANTHENOL, ALPHA-TOCOPHEROL ACETATE, VITAMIN K1, FOLIC ACID, BIOTIN, CYANOCOBALAMIN: 200; 3300; 200; 6; 3.6; 6; 40; 15; 10; 150; 600; 60; 5 INJECTION, SOLUTION INTRAVENOUS at 10:07

## 2025-07-02 RX ADMIN — BUSPIRONE HYDROCHLORIDE 15 MG: 5 TABLET ORAL at 08:07

## 2025-07-02 RX ADMIN — BUSPIRONE HYDROCHLORIDE 15 MG: 5 TABLET ORAL at 09:07

## 2025-07-02 RX ADMIN — ACETAMINOPHEN 1000 MG: 500 TABLET ORAL at 02:07

## 2025-07-02 RX ADMIN — ENOXAPARIN SODIUM 40 MG: 40 INJECTION SUBCUTANEOUS at 04:07

## 2025-07-02 RX ADMIN — SIMETHICONE 80 MG: 80 TABLET, CHEWABLE ORAL at 08:07

## 2025-07-02 NOTE — PROGRESS NOTES
Surgery Progress Note    Patricia ROTHMAN James is a 69 y.o. year old female in hospital for recurrent SBO (failed conservative management) s/p diagnostic laparoscopy converted exploratory laparotomy, SBR and anastomosis, appendectomy, diverticulectomy 6/14, IR abscess drainage 6/24    NAEON AF VSS  Pt states she feels well this morning. No issues with drain currently.  Denies worsening pain at IR drain site, states pain is controlled with medications.  Reports she is able to move and sit up, ambulating regularly with PT/OT.  Tolerating diet, no n/v, passing flatus and has had multiple BMs since surgery.  No f/c/n/v/sob/cp.    ROS:  Negative except above    PE:  Vitals:    07/02/25 0258 07/02/25 0355 07/02/25 0430 07/02/25 0532   BP:  133/71     BP Location:       Patient Position:       Pulse: 72 76     Resp:  18 18    Temp:  97.8 °F (36.6 °C)     TempSrc:  Oral     SpO2:  99%     Weight:    71.1 kg (156 lb 12 oz)   Height:         Intake/Output - Last 3 Shifts         06/30 0700  07/01 0659 07/01 0700  07/02 0659    P.O. 120 0    Total Intake(mL/kg) 120 (1.9) 0 (0)    Urine (mL/kg/hr) 0 (0) 0 (0)    Other 5 10    Stool  0    Total Output 5 10    Net +115 -10          Unmeasured Urine Occurrence 7 x 4 x    Unmeasured Stool Occurrence 0 x 0 x          NAD, resting  No belabored breathing  No skin changes  Abd soft mildy distended appropriately TTP around midline incision, bottom staples removed with wet to dry packing in place, port site incisions c/d/I  IR drain with minimal serosanguinous fluid in bag    Significant Diagnostic Studies: Labs: BMP:   Recent Labs   Lab 07/01/25  0404         K 4.5      CO2 22*   BUN 13   CREATININE 0.5   CALCIUM 7.6*   MG 2.0   , CBC   Recent Labs   Lab 07/01/25  0404 07/02/25  0423   WBC 13.60* 14.00*   HGB 10.3* 9.2*   HCT 33.1* 28.7*    404   , INR   Lab Results   Component Value Date    INR 0.9 06/24/2025    INR 1.0 07/28/2022    PROTIME 10.4 06/24/2025   , and  All labs within the past 24 hours have been reviewed    A/P:  Patricia Ramirez is a 69 y.o. year old female  in hospital for recurrent SBO (failed conservative management), POD#1 from diagnostic laparoscopy converted exploratory laparotomy, SBR and anastomosis, appendectomy, diverticulectomy 6/14. CT A/P with pelvic fluid collection. Persistent leukocytosis, improved s/p IR drain 6/25. Prealbumin 7, started on PPN 6/25. Purulent drainage from inferior aspect of midline incision 6/28, pending cx. Repeat prealbumin 11 on 6/30, continues on PPN. WBC remains mildly elevated with IR cx 6/25 growing E coli, klebsiella, bacteroides (sensitive to zosyn) and abdominal abscess cx sent 6/28, cx with klebsiella (sensitive to zosyn) which is most likely source. CT A/P 6/30 without new fluid collection, decreased size of pelvic fluid collection with IR drain in place, stable left abdominal fluid collection without window for perc drainage on personal review. Pt has remains afebrile with downtrending output from IR drain.    - IR drain care, to discuss possible removal today  - Continue zosyn, EOT tentatively 7/2  - Regular diet, boost plus supplements, PPN  - Ordered calorie count; will DC supplemental IV nutrition if pt tolerating 70+ % goal  - Next prealbumin check on 7/3  - [facility]  - Pulm toilet, encourage IS 10x/hour  - PT/OT, encourage up to chair and ambulation   - Replete electrolytes to maintain K > 4, Phos > 3, Mg > 2  - DVT ppx     [facility] - [facility]  7/2/2025

## 2025-07-02 NOTE — PT/OT/SLP PROGRESS
Physical Therapy Treatment    Patient Name:  Patricia Ramirez   MRN:  [id number]    Recommendations:     Discharge Recommendations: Low Intensity Therapy  Discharge Equipment Recommendations: walker, rolling  Barriers to discharge: None    Assessment:     Patricia Ramirez is a 69 y.o. female admitted with a medical diagnosis of SBO (small bowel obstruction).  She presents with the following impairments/functional limitations: weakness, impaired endurance, gait instability, impaired balance, decreased coordination, decreased safety awareness, pain, decreased ROM .    Rehab Prognosis: Good; patient would benefit from acute skilled PT services to address these deficits and reach maximum level of function.    Recent Surgery: Procedure(s) (LRB):  LAPAROSCOPY, DIAGNOSTIC (N/A)  LAPAROTOMY, EXPLORATORY; APPENDECTOMY; SMALL BOWEL RESECTION; DIVERTICULUM RESECTION (N/A) 18 Days Post-Op    Plan:     During this hospitalization, patient to be seen 5 x/week to address the identified rehab impairments via gait training, therapeutic activities, therapeutic exercises, neuromuscular re-education and progress toward the following goals:    Plan of Care Expires:  07/06/25    Subjective     Chief Complaint: weakness and not feeling well   Patient/Family Comments/goals: pt is pleasant and agreeable to therapy  Pain/Comfort:  Pain Rating 1: 5/10  Location 1: abdomen  Pain Addressed 1: Pre-medicate for activity, Nurse notified, Cessation of Activity  Pain Rating Post-Intervention 1: 5/10      Objective:     Communicated with nurse prior to session.  Patient found right sidelying with telemetry, PICC line, bed alarm, PureWick upon PT entry to room.     General Precautions: Standard, fall, respiratory  Orthopedic Precautions: N/A  Braces: N/A  Respiratory Status: Room air     Functional Mobility:  Bed Mobility:     Rolling Left:  supervision  Rolling Right: supervision  Scooting: supervision  Supine to Sit: supervision, HOB elevated, bedside rail    Sit to Supine: supervision  Transfers: V/T cues for safety, proper technique and walker management      Sit <> Stand: x 5 trials   supervision with no AD  Toilet Transfer: stand by assistance with  no AD  using  Step Transfer  Gait: pt ambulated 5 ft x 2 and 500 ft pt pushing IV pole, SBA/CGA. Pt required 2 standing rest breaks 2* to fatigue. Noted with decreased leonides,decreased step length, no LOB. VC's for safety technique and walker management.    Balance: good in sitting, fair+ in standing.        AM-PAC 6 CLICK MOBILITY  Turning over in bed (including adjusting bedclothes, sheets and blankets)?: 4  Sitting down on and standing up from a chair with arms (e.g., wheelchair, bedside commode, etc.): 4  Moving from lying on back to sitting on the side of the bed?: 4  Moving to and from a bed to a chair (including a wheelchair)?: 3  Need to walk in hospital room?: 3  Climbing 3-5 steps with a railing?: 3  Basic Mobility Total Score: 21       Treatment & Education:  Pt performed bed mobility, transfer and gait training as above.   Pt perform pericare in sitting with set up assistance .   Encouraged pt to perform BLE AP throughout the day and to encourage pt to sit up in chair for meals . Pt verbalized understanding.     Patient left HOB elevated with pillow to offload L side, heels offloading (pt declined to sit up in chair today ) all lines intact, call button in reach, bed alarm on, and nurse notified..    GOALS:   Multidisciplinary Problems       Physical Therapy Goals          Problem: Physical Therapy    Goal Priority Disciplines Outcome Interventions   Physical Therapy Goal     PT, PT/OT Progressing    Description: Goals to be met by: 25     Patient will increase functional independence with mobility by performin. Supine to sit with Modified [place]  2. Sit to supine with Modified [place]  3. Sit to stand transfer with Modified [place]  4. Gait  x 50 feet with Modified [place]  using Rolling Walker.   5. Stand for 5 minutes with Modified [place] using Rolling Walker  6. Lower extremity exercise program x30 reps per handout, with independence                         DME Justifications:      Time Tracking:     PT Received On: 07/02/25  PT Start Time: 1521     PT Stop Time: 1551  PT Total Time (min): 30 min     Billable Minutes: Gait Training 20 and Therapeutic Activity 10    Treatment Type: Treatment  PT/PTA: PTA     Number of PTA visits since last PT visit: 3     07/02/2025

## 2025-07-02 NOTE — CONSULTS
RD consulted for Calorie Count. Calorie count hung and discussed with RN.     RD will follow and monitor pt's intake.     Please re-consult as needed.     Thank you.

## 2025-07-02 NOTE — NURSING
[facility] - [place]  Nurses Note -- 4 Eyes      7/2/2025       Skin assessed on: Q Shift      [] No Pressure Injuries Present    []Prevention Measures Documented    [x] Yes LDA  for Pressure Injury Previously documented     [] Yes New Pressure Injury Discovered   [] LDA for New Pressure Injury Added      Attending RN:  Amalia Langston RN     Second RN:  Enrrique MEJIA RN

## 2025-07-02 NOTE — NURSING
Received handoff report from morning shift. Patient lying on bed, no acute distress noted, breathing even and unlabored. With ongoing PPN, drain in placed, intact. Safety precautions maintained. Care assumed at this time.       [facility]  Nurses Note -- 4 Eyes      7/2/2025       Skin assessed on: Q Shift      [] No Pressure Injuries Present    []Prevention Measures Documented    [x] Yes LDA  for Pressure Injury Previously documented     [] Yes New Pressure Injury Discovered   [] LDA for New Pressure Injury Added      Attending RN:  Enrrique Lopez RN     Second RN:  VALERIE Holland

## 2025-07-02 NOTE — PLAN OF CARE
Problem: Pain Acute  Goal: Optimal Pain Control and Function  Intervention: Develop Pain Management Plan  Flowsheets (Taken 7/2/2025 0321)  Pain Management Interventions:   medication offered   pillow support provided   position adjusted   warm blanket provided     Problem: Bowel Resection  Goal: Absence of Infection Signs and Symptoms  Intervention: Prevent or Manage Infection  Flowsheets (Taken 7/2/2025 0321)  Infection Management: (on IV antibiotic)   aseptic technique maintained   other (see comments)     Problem: Wound  Goal: Optimal Wound Healing  Intervention: Promote Wound Healing  Flowsheets (Taken 7/2/2025 0322)  Sleep/Rest Enhancement: (wound care done)   regular sleep/rest pattern promoted   noise level reduced   room darkened   other (see comments)       C/o abdominal pain, PRN medication given. Little to no drainage output to drain overnight. Passing gas but no BM overnight. NAD noted

## 2025-07-02 NOTE — PHYSICIAN QUERY
Please clarify Abdominal abscess as it relates to the procedure LAPAROTOMY, EXPLORATORY; APPENDECTOMY; SMALL BOWEL RESECTION; DIVERTICULUM RESECTION .  Clinically undetermined

## 2025-07-03 LAB
ABSOLUTE EOSINOPHIL (OHS): 0.31 K/UL
ABSOLUTE MONOCYTE (OHS): 1.19 K/UL (ref 0.3–1)
ABSOLUTE NEUTROPHIL COUNT (OHS): 10.94 K/UL (ref 1.8–7.7)
ALBUMIN SERPL BCP-MCNC: 1.5 G/DL (ref 3.5–5.2)
ALP SERPL-CCNC: 67 UNIT/L (ref 40–150)
ALT SERPL W/O P-5'-P-CCNC: 50 UNIT/L (ref 10–44)
ANION GAP (OHS): 5 MMOL/L (ref 8–16)
AST SERPL-CCNC: 50 UNIT/L (ref 11–45)
BASOPHILS # BLD AUTO: 0.12 K/UL
BASOPHILS NFR BLD AUTO: 0.8 %
BILIRUB SERPL-MCNC: 0.2 MG/DL (ref 0.1–1)
BUN SERPL-MCNC: 15 MG/DL (ref 8–23)
CALCIUM SERPL-MCNC: 7.6 MG/DL (ref 8.7–10.5)
CHLORIDE SERPL-SCNC: 104 MMOL/L (ref 95–110)
CO2 SERPL-SCNC: 29 MMOL/L (ref 23–29)
CREAT SERPL-MCNC: 0.5 MG/DL (ref 0.5–1.4)
ERYTHROCYTE [DISTWIDTH] IN BLOOD BY AUTOMATED COUNT: 13.5 % (ref 11.5–14.5)
GFR SERPLBLD CREATININE-BSD FMLA CKD-EPI: >60 ML/MIN/1.73/M2
GLUCOSE SERPL-MCNC: 109 MG/DL (ref 70–110)
HCT VFR BLD AUTO: 29.3 % (ref 37–48.5)
HGB BLD-MCNC: 9.4 GM/DL (ref 12–16)
IMM GRANULOCYTES # BLD AUTO: 0.25 K/UL (ref 0–0.04)
IMM GRANULOCYTES NFR BLD AUTO: 1.8 % (ref 0–0.5)
LYMPHOCYTES # BLD AUTO: 1.31 K/UL (ref 1–4.8)
MCH RBC QN AUTO: 32.2 PG (ref 27–31)
MCHC RBC AUTO-ENTMCNC: 32.1 G/DL (ref 32–36)
MCV RBC AUTO: 100 FL (ref 82–98)
NUCLEATED RBC (/100WBC) (OHS): 0 /100 WBC
PLATELET # BLD AUTO: 384 K/UL (ref 150–450)
PMV BLD AUTO: 8.4 FL (ref 9.2–12.9)
POCT GLUCOSE: 108 MG/DL (ref 70–110)
POCT GLUCOSE: 123 MG/DL (ref 70–110)
POCT GLUCOSE: 124 MG/DL (ref 70–110)
POCT GLUCOSE: 143 MG/DL (ref 70–110)
POCT GLUCOSE: 160 MG/DL (ref 70–110)
POTASSIUM SERPL-SCNC: 4.3 MMOL/L (ref 3.5–5.1)
PREALB SERPL-MCNC: 13 MG/DL (ref 20–43)
PROT SERPL-MCNC: 5.5 GM/DL (ref 6–8.4)
RBC # BLD AUTO: 2.92 M/UL (ref 4–5.4)
RELATIVE EOSINOPHIL (OHS): 2.2 %
RELATIVE LYMPHOCYTE (OHS): 9.3 % (ref 18–48)
RELATIVE MONOCYTE (OHS): 8.4 % (ref 4–15)
RELATIVE NEUTROPHIL (OHS): 77.5 % (ref 38–73)
SODIUM SERPL-SCNC: 138 MMOL/L (ref 136–145)
WBC # BLD AUTO: 14.12 K/UL (ref 3.9–12.7)

## 2025-07-03 PROCEDURE — 11000001 HC ACUTE MED/SURG PRIVATE ROOM

## 2025-07-03 PROCEDURE — 25000003 PHARM REV CODE 250

## 2025-07-03 PROCEDURE — 25000003 PHARM REV CODE 250: Performed by: STUDENT IN AN ORGANIZED HEALTH CARE EDUCATION/TRAINING PROGRAM

## 2025-07-03 PROCEDURE — 97110 THERAPEUTIC EXERCISES: CPT

## 2025-07-03 PROCEDURE — 63600175 PHARM REV CODE 636 W HCPCS: Performed by: INTERNAL MEDICINE

## 2025-07-03 PROCEDURE — 63600175 PHARM REV CODE 636 W HCPCS: Performed by: NURSE PRACTITIONER

## 2025-07-03 PROCEDURE — 63600175 PHARM REV CODE 636 W HCPCS: Performed by: STUDENT IN AN ORGANIZED HEALTH CARE EDUCATION/TRAINING PROGRAM

## 2025-07-03 PROCEDURE — 85025 COMPLETE CBC W/AUTO DIFF WBC: CPT

## 2025-07-03 PROCEDURE — 25000003 PHARM REV CODE 250: Performed by: INTERNAL MEDICINE

## 2025-07-03 PROCEDURE — 80053 COMPREHEN METABOLIC PANEL: CPT

## 2025-07-03 PROCEDURE — 36415 COLL VENOUS BLD VENIPUNCTURE: CPT

## 2025-07-03 PROCEDURE — 84134 ASSAY OF PREALBUMIN: CPT

## 2025-07-03 RX ADMIN — ASCORBIC ACID, VITAMIN A PALMITATE, CHOLECALCIFEROL, THIAMINE HYDROCHLORIDE, RIBOFLAVIN-5 PHOSPHATE SODIUM, PYRIDOXINE HYDROCHLORIDE, NIACINAMIDE, DEXPANTHENOL, ALPHA-TOCOPHEROL ACETATE, VITAMIN K1, FOLIC ACID, BIOTIN, CYANOCOBALAMIN: 200; 3300; 200; 6; 3.6; 6; 40; 15; 10; 150; 600; 60; 5 INJECTION, SOLUTION INTRAVENOUS at 10:07

## 2025-07-03 RX ADMIN — OXYCODONE HYDROCHLORIDE 10 MG: 5 TABLET ORAL at 08:07

## 2025-07-03 RX ADMIN — OXYCODONE HYDROCHLORIDE 10 MG: 5 TABLET ORAL at 02:07

## 2025-07-03 RX ADMIN — LIDOCAINE 2 PATCH: 50 PATCH TOPICAL at 02:07

## 2025-07-03 RX ADMIN — POLYETHYLENE GLYCOL 3350 17 G: 17 POWDER, FOR SOLUTION ORAL at 09:07

## 2025-07-03 RX ADMIN — BUSPIRONE HYDROCHLORIDE 15 MG: 5 TABLET ORAL at 02:07

## 2025-07-03 RX ADMIN — ATORVASTATIN CALCIUM 40 MG: 40 TABLET, FILM COATED ORAL at 09:07

## 2025-07-03 RX ADMIN — BUSPIRONE HYDROCHLORIDE 15 MG: 5 TABLET ORAL at 09:07

## 2025-07-03 RX ADMIN — ASCORBIC ACID, VITAMIN A PALMITATE, CHOLECALCIFEROL, THIAMINE HYDROCHLORIDE, RIBOFLAVIN-5 PHOSPHATE SODIUM, PYRIDOXINE HYDROCHLORIDE, NIACINAMIDE, DEXPANTHENOL, ALPHA-TOCOPHEROL ACETATE, VITAMIN K1, FOLIC ACID, BIOTIN, CYANOCOBALAMIN: 200; 3300; 200; 6; 3.6; 6; 40; 15; 10; 150; 600; 60; 5 INJECTION, SOLUTION INTRAVENOUS at 12:07

## 2025-07-03 RX ADMIN — HYDROMORPHONE HYDROCHLORIDE 0.5 MG: 1 INJECTION, SOLUTION INTRAMUSCULAR; INTRAVENOUS; SUBCUTANEOUS at 06:07

## 2025-07-03 RX ADMIN — ACETAMINOPHEN 1000 MG: 500 TABLET ORAL at 09:07

## 2025-07-03 RX ADMIN — PANTOPRAZOLE SODIUM 40 MG: 40 TABLET, DELAYED RELEASE ORAL at 09:07

## 2025-07-03 RX ADMIN — ENOXAPARIN SODIUM 40 MG: 40 INJECTION SUBCUTANEOUS at 04:07

## 2025-07-03 RX ADMIN — OXYCODONE HYDROCHLORIDE 10 MG: 5 TABLET ORAL at 11:07

## 2025-07-03 RX ADMIN — SIMETHICONE 80 MG: 80 TABLET, CHEWABLE ORAL at 09:07

## 2025-07-03 RX ADMIN — PIPERACILLIN SODIUM AND TAZOBACTAM SODIUM 4.5 G: 4; .5 INJECTION, POWDER, FOR SOLUTION INTRAVENOUS at 01:07

## 2025-07-03 RX ADMIN — PIPERACILLIN SODIUM AND TAZOBACTAM SODIUM 4.5 G: 4; .5 INJECTION, POWDER, FOR SOLUTION INTRAVENOUS at 04:07

## 2025-07-03 RX ADMIN — ACETAMINOPHEN 1000 MG: 500 TABLET ORAL at 02:07

## 2025-07-03 RX ADMIN — HYDROMORPHONE HYDROCHLORIDE 0.5 MG: 1 INJECTION, SOLUTION INTRAMUSCULAR; INTRAVENOUS; SUBCUTANEOUS at 01:07

## 2025-07-03 RX ADMIN — CITALOPRAM HYDROBROMIDE 20 MG: 20 TABLET ORAL at 09:07

## 2025-07-03 RX ADMIN — PIPERACILLIN SODIUM AND TAZOBACTAM SODIUM 4.5 G: 4; .5 INJECTION, POWDER, FOR SOLUTION INTRAVENOUS at 09:07

## 2025-07-03 RX ADMIN — TAMSULOSIN HYDROCHLORIDE 0.4 MG: 0.4 CAPSULE ORAL at 09:07

## 2025-07-03 RX ADMIN — SIMETHICONE 80 MG: 80 TABLET, CHEWABLE ORAL at 02:07

## 2025-07-03 NOTE — NURSING
[facility], [facility] - [place], [place]  Nurses Note -- 4 Eyes  Report received, pt tilted in bed on RA c/o 9/10 pain, will cont to monitor    7/3/2025       Skin assessed on: Q Shift      [] No Pressure Injuries Present    []Prevention Measures Documented    [x] Yes LDA  for Pressure Injury Previously documented     [] Yes New Pressure Injury Discovered   [] LDA for New Pressure Injury Added      Attending RN:  Adriana Zelaya LPN     Second RN:  VALERIE Cain

## 2025-07-03 NOTE — PT/OT/SLP PROGRESS
Occupational Therapy   Treatment    Name: Patricia Ramirez  MRN: [id number]  Admitting Diagnosis:  SBO (small bowel obstruction)  19 Days Post-Op    Recommendations:     Discharge Recommendations: Low Intensity Therapy  Discharge Equipment Recommendations:  walker, rolling, hospital bed  Barriers to discharge:  None    Assessment:     Patricia Ramirez is a 69 y.o. female with a medical diagnosis of SBO (small bowel obstruction).  She presents with up in chair and lower extremities elevated and with IV running. Performance deficits affecting function are weakness, impaired endurance, impaired sensation, impaired self care skills, impaired functional mobility, gait instability, impaired balance, decreased ROM, decreased safety awareness, decreased lower extremity function, other (comment) (abdominal). She did not want to get out of chair, but she engaged in UB therex seated in chair for triceps.     Rehab Prognosis:  Good; patient would benefit from acute skilled OT services to address these deficits and reach maximum level of function.       Plan:     Patient to be seen 3 x/week to address the above listed problems via self-care/home management, therapeutic activities, therapeutic exercises  Plan of Care Expires: 07/05/25  Plan of Care Reviewed with: patient    Subjective     Chief Complaint: abdominal pain mild today   Patient/Family Comments/goals: she is not sure when she is leaving, but has home health set up and hospital bed delivered already   Pain/Comfort:  Pain Rating 1: 0/10    Objective:     Communicated with: RN,  prior to session.  Patient found up in chair with telemetry, PICC line, bed alarm, PureWick upon OT entry to room.    General Precautions: Standard, fall, respiratory    Orthopedic Precautions:N/A  Braces: N/A  Respiratory Status: Room air     Occupational Performance:     Bed Mobility:    NT because in chair      Functional Mobility/Transfers:  NT    Activities of Daily Living:  Grooming: independence  seated in chair to wash face       [facility] 6 Click ADL: 18    Treatment & Education:  Occupational therapy re-educated re: pressure reliefs with both arms while seated to unweight sacrum   She demonstrated  2 sets of tricep strengthening exercises x 10 reps with encouragement     Patient left up in chair with all lines intact, call button in reach, and RN  notified    GOALS:   Multidisciplinary Problems       Occupational Therapy Goals          Problem: Occupational Therapy    Goal Priority Disciplines Outcome Interventions   Occupational Therapy Goal     OT, PT/OT Progressing    Description: Goals to be met by: 7/5/2025     Patient will increase functional independence with ADLs by performing:    UE Dressing with Set-up Assistance.  LE Dressing with Stand-by Assistance.  Grooming while seated with Modified [place].  Toileting from bedside commode with Supervision for hygiene and clothing management.   Bathing seated UB and andreia care sponge  with Stand-by Assistance.  Toilet transfer to bedside commode with Contact Guard Assistance.  Upper extremity exercise program x7-10 reps no resistance, and per handout, with assistance as needed.                         DME Justifications:  Patricia requires a hospital bed due to her requiring positioning of the body in ways not feasible with an ordinary bed due to limited ability and cannot independently make changes in body position without the use of the bed.The positioning of the body cannot be sufficiently resolved by the use of pillows and wedges.   Patricia's mobility limitation cannot be sufficiently resolved by the use of a cane. Her functional mobility deficit can be sufficiently resolved with the use of a Rolling Walker. Patient's mobility limitation significantly impairs their ability to participate in one of more activities of daily living.  The use of a RW will significantly improve the patient's ability to participate in MRADLS and the patient will use it on regular  basis in the home.    Time Tracking:     OT Date of Treatment: 07/03/25  OT Start Time: 1139  OT Stop Time: 1200  OT Total Time (min): 21 min    Billable Minutes:Therapeutic Exercise 21     OT/CHANELL: OT          7/3/2025

## 2025-07-03 NOTE — PLAN OF CARE
Changes in medical condition or discharge plan: No    Does patient need new DME? Hospital bed and rolling walker    Follow up appts needed: General Surgery    Medically stable: No    Estimated Discharge Date: TBD    Per attending, patient not medically stable for discharge. Per chart review, pt with drain in place for pelvic abscess; continue drain care, monitoring output, and bid flushes.  When medically stable, patient to discharge with home health from [facility], [phone].  Patient confirmed hospital bed has been delivered to home and rolling will be delivered to bedside when discharging.  CM to continue to assess for and until discharge.    07/03/25 6676   Discharge Reassessment   Assessment Type Discharge Planning Reassessment   Did the patient's condition or plan change since previous assessment? No   Communicated STUART with patient/caregiver Date not available/Unable to determine   Discharge Plan A Home Health   Discharge Plan B Home with family   DME Needed Upon Discharge  hospital bed;walker, rolling   Transition of Care Barriers None   Why the patient remains in the hospital Requires continued medical care   Post-Acute Status   Post-Acute Authorization Home Health   Home Health Status Pending medical clearance/testing   Hospital Resources/Appts/Education Provided Provided patient/caregiver with written discharge plan information   Discharge Delays None known at this time

## 2025-07-03 NOTE — PROGRESS NOTES
Surgery Progress Note    Patricia ROTHMAN James is a 69 y.o. year old female in hospital for recurrent SBO (failed conservative management) s/p diagnostic laparoscopy converted exploratory laparotomy, SBR and anastomosis, appendectomy, diverticulectomy 6/14, IR abscess drainage 6/24    NAEON AF VSS  Pt denies issues with drain or incision.  Undergoing calorie count with nutrition. Tolerating regular diet, denies N/V.  Denies worsening pain at IR drain site, states pain is controlled with medications.  Reports she is ambulating regularly with PT/OT.  Passing flatus regularly and had Bmx1 last night.  No f/c/sob/cp.    ROS:  Negative except above    PE:  Vitals:    07/02/25 2220 07/02/25 2312 07/03/25 0223 07/03/25 0522   BP:  (!) 107/55  (!) 108/56   BP Location:  Left arm  Left arm   Patient Position:  Lying  Lying   Pulse:  78  70   Resp: 18 17 18 19   Temp:  98.6 °F (37 °C)  98.5 °F (36.9 °C)   TempSrc:  Oral  Oral   SpO2:  96%  95%   Weight:       Height:         Intake/Output - Last 3 Shifts         07/01 0700  07/02 0659 07/02 0700 07/03 0659    P.O. 0 240    Total Intake(mL/kg) 0 (0) 240 (3.4)    Urine (mL/kg/hr) 0 (0) 1600 (0.9)    Other 10     Stool 0     Total Output 10 1600    Net -10 -1360          Unmeasured Urine Occurrence 4 x 6 x    Unmeasured Stool Occurrence 0 x           NAD, resting  No belabored breathing  No skin changes  Abd soft mildy distended appropriately TTP around midline incision, bottom staples removed with wet to dry packing in place, port site incisions c/d/I  IR drain with minimal serosanguinous fluid in bag    Significant Diagnostic Studies: Labs: BMP:   Recent Labs   Lab 07/02/25  0423 07/03/25  0507   GLU 97 109    138   K 4.4 4.3    104   CO2 27 29   BUN 16 15   CREATININE 0.5 0.5   CALCIUM 7.6* 7.6*   MG 2.0  --    , CBC   Recent Labs   Lab 07/02/25  0423 07/03/25  0507   WBC 14.00* 14.12*   HGB 9.2* 9.4*   HCT 28.7* 29.3*    384   , INR   Lab Results   Component  Value Date    INR 0.9 06/24/2025    INR 1.0 07/28/2022    PROTIME 10.4 06/24/2025   , and All labs within the past 24 hours have been reviewed    A/P:  Patricia Ramirez is a 69 y.o. year old female  in hospital for recurrent SBO (failed conservative management), POD#1 from diagnostic laparoscopy converted exploratory laparotomy, SBR and anastomosis, appendectomy, diverticulectomy 6/14. CT A/P with pelvic fluid collection. Persistent leukocytosis, improved s/p IR drain 6/25. Prealbumin 7, started on PPN 6/25. Purulent drainage from inferior aspect of midline incision 6/28, pending cx. Repeat prealbumin 11 on 6/30, continues on PPN. WBC remains mildly elevated with IR cx 6/25 growing E coli, klebsiella, bacteroides (sensitive to zosyn) and abdominal abscess cx sent 6/28, cx with klebsiella (sensitive to zosyn) which is most likely source. CT A/P 6/30 without new fluid collection, decreased size of pelvic fluid collection with IR drain in place, stable left abdominal fluid collection without window for perc drainage on personal review. Pt has remains afebrile with downtrending output from IR drain.    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     Per IR, recent CT findings remain without window for perc drainage left abdominal fluid collection, with pelvic fluid collection communicating with area which drain sits and recommend device to stay in place with emphasis on flushes bid.    - IR drain care, please record bid flushes and output total to I/O report  - Continue zosyn, overall downtrend to WBC ct, EOT TBD  - Regular diet, boost plus supplements, PPN  - Calorie count ongoing; will DC supplemental IV nutrition if pt tolerating 70+ % goal  - pAb 13 today; continue M/Th checks while inpatient  - [facility]  - Pulm toilet, encourage IS 10x/hour  - PT/OT, ordered RW to bedside per  recs; encourage up to chair and ambulation  - Replete electrolytes to maintain K > 4, Phos > 3, Mg > 2  - DVT ppx     [facility] Surgery - [facility]  7/3/2025

## 2025-07-03 NOTE — PLAN OF CARE
Problem: Fall Injury Risk  Goal: Absence of Fall and Fall-Related Injury  Outcome: Met  Intervention: Identify and Manage Contributors  Flowsheets (Taken 7/3/2025 1641)  Self-Care Promotion: meal set-up provided  Medication Review/Management: medications reviewed  Intervention: Promote Injury-Free Environment  Flowsheets (Taken 7/3/2025 1641)  Safety Promotion/Fall Prevention:   assistive device/personal item within reach   instructed to call staff for mobility   in recliner, wheels locked   room near unit station     Problem: Pain Acute  Goal: Optimal Pain Control and Function  Outcome: Not Progressing  Intervention: Prevent or Manage Pain  Flowsheets (Taken 7/3/2025 1641)  Sleep/Rest Enhancement: relaxation techniques promoted  Sensory Stimulation Regulation: television on  Bowel Elimination Promotion:   adequate fluid intake promoted   ambulation promoted   commode/bedpan at bedside   privacy promoted  Medication Review/Management: medications reviewed

## 2025-07-03 NOTE — PROGRESS NOTES
Calorie Count Day 1 Results:    Total Calories Consumed: 0673-5109 kcal  Total Protein Consumed: 60-75 gm of protein    % amounts not recorded for lunch and dinner estimating based on pt's response     Pt currently meeting low end of EEN/EPN on Day 1 (7/2/25)    RD will continue to monitor intake and provide full assessment tomorrow 7/4/25.    Please re-consult as needed.

## 2025-07-03 NOTE — PT/OT/SLP PROGRESS
Physical Therapy Treatment    Patient Name:  Patricia Ramirez   MRN:  [id number]    Recommendations:     Discharge Recommendations: Low Intensity Therapy  Discharge Equipment Recommendations: walker, rolling  Barriers to discharge: None    Assessment:     Patricia Ramirez is a 69 y.o. female admitted with a medical diagnosis of SBO (small bowel obstruction).  She presents with the following impairments/functional limitations: weakness, impaired endurance, decreased safety awareness, gait instability, pain, decreased ROM .    Rehab Prognosis: Good; patient would benefit from acute skilled PT services to address these deficits and reach maximum level of function.    Recent Surgery: Procedure(s) (LRB):  LAPAROSCOPY, DIAGNOSTIC (N/A)  LAPAROTOMY, EXPLORATORY; APPENDECTOMY; SMALL BOWEL RESECTION; DIVERTICULUM RESECTION (N/A) 19 Days Post-Op    Plan:     During this hospitalization, patient to be seen 5 x/week to address the identified rehab impairments via gait training, therapeutic activities, therapeutic exercises, neuromuscular re-education and progress toward the following goals:    Plan of Care Expires:  07/06/25    Subjective     Chief Complaint: pain   Patient/Family Comments/goals: pt is pleasant and agreeable to therapy  Pain/Comfort:  Pain Rating 1: 9/10  Location 1: abdomen  Pain Addressed 1: Pre-medicate for activity, Nurse notified  Pain Rating Post-Intervention 1: 9/10      Objective:     Communicated with nurse prior to session.  Patient found up in chair with telemetry, peripheral IV, PICC line, AMBAR drain upon PT entry to room.     General Precautions: Standard, fall  Orthopedic Precautions: N/A  Braces: N/A  Respiratory Status: Room air     Functional Mobility:  Transfers: V/T cues for safety, proper technique and walker management      Sit <> Stand: from chair and toilet seat  supervision with no AD  Toilet Transfer: stand by assistance with  no AD  using  Step Transfer  Gait: pt ambulated 10 ft x 2, 250 ft and  220 ft with RW, SBA. Pt required 2 standing rest breaks 2* to fatigue. Noted with decreased leonides,decreased step length, no LOB. VC's for safety technique and walker management.    Balance: good in sitting, fair+ in standing.        AM-PAC 6 CLICK MOBILITY  Turning over in bed (including adjusting bedclothes, sheets and blankets)?: 4  Sitting down on and standing up from a chair with arms (e.g., wheelchair, bedside commode, etc.): 4  Moving from lying on back to sitting on the side of the bed?: 4  Moving to and from a bed to a chair (including a wheelchair)?: 4  Need to walk in hospital room?: 3  Climbing 3-5 steps with a railing?: 3  Basic Mobility Total Score: 22       Treatment & Education:  Instructed pt to perform BLE AP x 20 reps . Encouraged to perform BLE AP throughout he day.     Patient left up in chair on ai cushion with all lines intact, call button in reach, and nurse notified..    GOALS:   Multidisciplinary Problems       Physical Therapy Goals          Problem: Physical Therapy    Goal Priority Disciplines Outcome Interventions   Physical Therapy Goal     PT, PT/OT Progressing    Description: Goals to be met by: 25     Patient will increase functional independence with mobility by performin. Supine to sit with Modified [place]  2. Sit to supine with Modified [place]  3. Sit to stand transfer with Modified [place]  4. Gait  x 50 feet with Modified [place] using Rolling Walker.   5. Stand for 5 minutes with Modified [place] using Rolling Walker  6. Lower extremity exercise program x30 reps per handout, with independence                         DME Justifications:      Time Tracking:     PT Received On: 25  PT Start Time: 1410     PT Stop Time: 1438  PT Total Time (min): 28 min     Billable Minutes: Gait Training 18 and Therapeutic Activity 10    Treatment Type: Treatment  PT/PTA: PTA     Number of PTA visits since last PT visit: 4     2025

## 2025-07-04 LAB
ABSOLUTE EOSINOPHIL (OHS): 0.43 K/UL
ABSOLUTE MONOCYTE (OHS): 1.13 K/UL (ref 0.3–1)
ABSOLUTE NEUTROPHIL COUNT (OHS): 10.5 K/UL (ref 1.8–7.7)
ALBUMIN SERPL BCP-MCNC: 1.6 G/DL (ref 3.5–5.2)
ALP SERPL-CCNC: 71 UNIT/L (ref 40–150)
ALT SERPL W/O P-5'-P-CCNC: 59 UNIT/L (ref 10–44)
ANION GAP (OHS): 9 MMOL/L (ref 8–16)
AST SERPL-CCNC: 60 UNIT/L (ref 11–45)
BASOPHILS # BLD AUTO: 0.1 K/UL
BASOPHILS NFR BLD AUTO: 0.7 %
BILIRUB SERPL-MCNC: 0.2 MG/DL (ref 0.1–1)
BUN SERPL-MCNC: 13 MG/DL (ref 8–23)
CALCIUM SERPL-MCNC: 8.1 MG/DL (ref 8.7–10.5)
CHLORIDE SERPL-SCNC: 104 MMOL/L (ref 95–110)
CO2 SERPL-SCNC: 27 MMOL/L (ref 23–29)
CREAT SERPL-MCNC: 0.5 MG/DL (ref 0.5–1.4)
ERYTHROCYTE [DISTWIDTH] IN BLOOD BY AUTOMATED COUNT: 13.6 % (ref 11.5–14.5)
GFR SERPLBLD CREATININE-BSD FMLA CKD-EPI: >60 ML/MIN/1.73/M2
GLUCOSE SERPL-MCNC: 110 MG/DL (ref 70–110)
HCT VFR BLD AUTO: 30.6 % (ref 37–48.5)
HGB BLD-MCNC: 9.8 GM/DL (ref 12–16)
IMM GRANULOCYTES # BLD AUTO: 0.23 K/UL (ref 0–0.04)
IMM GRANULOCYTES NFR BLD AUTO: 1.6 % (ref 0–0.5)
LYMPHOCYTES # BLD AUTO: 1.55 K/UL (ref 1–4.8)
MCH RBC QN AUTO: 32.1 PG (ref 27–31)
MCHC RBC AUTO-ENTMCNC: 32 G/DL (ref 32–36)
MCV RBC AUTO: 100 FL (ref 82–98)
NUCLEATED RBC (/100WBC) (OHS): 0 /100 WBC
PLATELET # BLD AUTO: 402 K/UL (ref 150–450)
PMV BLD AUTO: 8.5 FL (ref 9.2–12.9)
POCT GLUCOSE: 116 MG/DL (ref 70–110)
POCT GLUCOSE: 125 MG/DL (ref 70–110)
POCT GLUCOSE: 134 MG/DL (ref 70–110)
POCT GLUCOSE: 150 MG/DL (ref 70–110)
POTASSIUM SERPL-SCNC: 4.4 MMOL/L (ref 3.5–5.1)
PROT SERPL-MCNC: 5.8 GM/DL (ref 6–8.4)
RBC # BLD AUTO: 3.05 M/UL (ref 4–5.4)
RELATIVE EOSINOPHIL (OHS): 3.1 %
RELATIVE LYMPHOCYTE (OHS): 11.1 % (ref 18–48)
RELATIVE MONOCYTE (OHS): 8.1 % (ref 4–15)
RELATIVE NEUTROPHIL (OHS): 75.4 % (ref 38–73)
SODIUM SERPL-SCNC: 140 MMOL/L (ref 136–145)
WBC # BLD AUTO: 13.94 K/UL (ref 3.9–12.7)

## 2025-07-04 PROCEDURE — 25000003 PHARM REV CODE 250

## 2025-07-04 PROCEDURE — 80053 COMPREHEN METABOLIC PANEL: CPT

## 2025-07-04 PROCEDURE — 11000001 HC ACUTE MED/SURG PRIVATE ROOM

## 2025-07-04 PROCEDURE — 25000003 PHARM REV CODE 250: Performed by: STUDENT IN AN ORGANIZED HEALTH CARE EDUCATION/TRAINING PROGRAM

## 2025-07-04 PROCEDURE — 97530 THERAPEUTIC ACTIVITIES: CPT | Mod: CQ

## 2025-07-04 PROCEDURE — 85025 COMPLETE CBC W/AUTO DIFF WBC: CPT

## 2025-07-04 PROCEDURE — 97116 GAIT TRAINING THERAPY: CPT | Mod: CQ

## 2025-07-04 PROCEDURE — 63600175 PHARM REV CODE 636 W HCPCS: Performed by: NURSE PRACTITIONER

## 2025-07-04 PROCEDURE — 25000003 PHARM REV CODE 250: Performed by: INTERNAL MEDICINE

## 2025-07-04 PROCEDURE — 94761 N-INVAS EAR/PLS OXIMETRY MLT: CPT | Mod: ER

## 2025-07-04 PROCEDURE — 99024 POSTOP FOLLOW-UP VISIT: CPT | Mod: ,,, | Performed by: STUDENT IN AN ORGANIZED HEALTH CARE EDUCATION/TRAINING PROGRAM

## 2025-07-04 PROCEDURE — 36415 COLL VENOUS BLD VENIPUNCTURE: CPT

## 2025-07-04 PROCEDURE — 63600175 PHARM REV CODE 636 W HCPCS: Performed by: INTERNAL MEDICINE

## 2025-07-04 PROCEDURE — 63600175 PHARM REV CODE 636 W HCPCS: Performed by: STUDENT IN AN ORGANIZED HEALTH CARE EDUCATION/TRAINING PROGRAM

## 2025-07-04 RX ADMIN — ASCORBIC ACID, VITAMIN A PALMITATE, CHOLECALCIFEROL, THIAMINE HYDROCHLORIDE, RIBOFLAVIN-5 PHOSPHATE SODIUM, PYRIDOXINE HYDROCHLORIDE, NIACINAMIDE, DEXPANTHENOL, ALPHA-TOCOPHEROL ACETATE, VITAMIN K1, FOLIC ACID, BIOTIN, CYANOCOBALAMIN: 200; 3300; 200; 6; 3.6; 6; 40; 15; 10; 150; 600; 60; 5 INJECTION, SOLUTION INTRAVENOUS at 11:07

## 2025-07-04 RX ADMIN — PIPERACILLIN SODIUM AND TAZOBACTAM SODIUM 4.5 G: 4; .5 INJECTION, POWDER, FOR SOLUTION INTRAVENOUS at 01:07

## 2025-07-04 RX ADMIN — ACETAMINOPHEN 1000 MG: 500 TABLET ORAL at 02:07

## 2025-07-04 RX ADMIN — SIMETHICONE 80 MG: 80 TABLET, CHEWABLE ORAL at 02:07

## 2025-07-04 RX ADMIN — OXYCODONE HYDROCHLORIDE 10 MG: 5 TABLET ORAL at 12:07

## 2025-07-04 RX ADMIN — BUSPIRONE HYDROCHLORIDE 15 MG: 5 TABLET ORAL at 02:07

## 2025-07-04 RX ADMIN — PIPERACILLIN SODIUM AND TAZOBACTAM SODIUM 4.5 G: 4; .5 INJECTION, POWDER, FOR SOLUTION INTRAVENOUS at 08:07

## 2025-07-04 RX ADMIN — ENOXAPARIN SODIUM 40 MG: 40 INJECTION SUBCUTANEOUS at 04:07

## 2025-07-04 RX ADMIN — OXYCODONE HYDROCHLORIDE 10 MG: 5 TABLET ORAL at 11:07

## 2025-07-04 RX ADMIN — HYDROMORPHONE HYDROCHLORIDE 0.5 MG: 1 INJECTION, SOLUTION INTRAMUSCULAR; INTRAVENOUS; SUBCUTANEOUS at 01:07

## 2025-07-04 RX ADMIN — CITALOPRAM HYDROBROMIDE 20 MG: 20 TABLET ORAL at 08:07

## 2025-07-04 RX ADMIN — ATORVASTATIN CALCIUM 40 MG: 40 TABLET, FILM COATED ORAL at 08:07

## 2025-07-04 RX ADMIN — ACETAMINOPHEN 1000 MG: 500 TABLET ORAL at 08:07

## 2025-07-04 RX ADMIN — HYDROMORPHONE HYDROCHLORIDE 0.5 MG: 1 INJECTION, SOLUTION INTRAMUSCULAR; INTRAVENOUS; SUBCUTANEOUS at 05:07

## 2025-07-04 RX ADMIN — BUSPIRONE HYDROCHLORIDE 15 MG: 5 TABLET ORAL at 08:07

## 2025-07-04 RX ADMIN — PIPERACILLIN SODIUM AND TAZOBACTAM SODIUM 4.5 G: 4; .5 INJECTION, POWDER, FOR SOLUTION INTRAVENOUS at 04:07

## 2025-07-04 RX ADMIN — SIMETHICONE 80 MG: 80 TABLET, CHEWABLE ORAL at 08:07

## 2025-07-04 RX ADMIN — OXYCODONE HYDROCHLORIDE 10 MG: 5 TABLET ORAL at 06:07

## 2025-07-04 RX ADMIN — PANTOPRAZOLE SODIUM 40 MG: 40 TABLET, DELAYED RELEASE ORAL at 08:07

## 2025-07-04 RX ADMIN — TAMSULOSIN HYDROCHLORIDE 0.4 MG: 0.4 CAPSULE ORAL at 08:07

## 2025-07-04 RX ADMIN — LIDOCAINE 2 PATCH: 50 PATCH TOPICAL at 02:07

## 2025-07-04 RX ADMIN — HYDROMORPHONE HYDROCHLORIDE 0.5 MG: 1 INJECTION, SOLUTION INTRAMUSCULAR; INTRAVENOUS; SUBCUTANEOUS at 08:07

## 2025-07-04 NOTE — NURSING
Patient has ongoing ppn , has scheduled of antibiotic medication. Informed patient that I need to place another IV. Patient refused despite health teaching. MD wagner aware

## 2025-07-04 NOTE — NURSING
Pt resting in bed quietly. NAD noted. No c/o pain. With ongoing Iv antibiotic, drain in placed.intact.    Fall and safety precautions maintained. Bed alarm activated and audible.. Bed locked in lowest position, with side rails up x2. Call bell and personal items within reach  [facility]  Nurses Note -- 4 Eyes      7/3/2025       Skin assessed on: Q Shift      [] No Pressure Injuries Present    []Prevention Measures Documented    [x] Yes LDA  for Pressure Injury Previously documented     [] Yes New Pressure Injury Discovered   [] LDA for New Pressure Injury Added      Attending RN:  Everardo Delgado, RN     Second RN:  Adriana ABARCA

## 2025-07-04 NOTE — PROGRESS NOTES
Surgery Progress Note    Patricia ROTHMAN James is a 69 y.o. year old female in hospital for recurrent SBO (failed conservative management) s/p diagnostic laparoscopy converted exploratory laparotomy, SBR and anastomosis, appendectomy, diverticulectomy 6/14, IR abscess drainage 6/24    NAEON AF VSS  Pt denies issues with drain or incision.  Tolerating regular diet with adequate PO intake, denies N/V.  Denies worsening pain at IR drain site, states pain is controlled with medications.  Reports she is ambulating regularly with PT/OT.  Passing flatus regularly and had Bmx1 last night.  No f/c/sob/cp.    ROS:  Negative except above    PE:  Vitals:    07/04/25 0449 07/04/25 0600 07/04/25 0601 07/04/25 0635   BP: 116/64      BP Location: Left arm      Patient Position: Lying      Pulse: 66      Resp: 18  19    Temp: 98.4 °F (36.9 °C)      TempSrc: Oral      SpO2: 99%   98%   Weight:  68.6 kg (151 lb 3.8 oz)     Height:         Intake/Output - Last 3 Shifts         07/02 0700 07/03 0659 07/03 0700 07/04 0659 07/04 0700  07/05 0659    P.O. 240 960     Other  40     Total Intake(mL/kg) 240 (3.4) 1000 (14.6)     Urine (mL/kg/hr) 1600 (0.9) 0 (0)     Other  55     Stool  0     Total Output 1600 55     Net -1360 +945            Unmeasured Urine Occurrence 6 x 9 x     Unmeasured Stool Occurrence  1 x           NAD, resting  No belabored breathing  No skin changes  Abd soft non- distended appropriately TTP around midline incision, bottom staples removed with wet to dry packing in place, port site incisions c/d/I  IR drain with minimal serosanguinous fluid in bag    Significant Diagnostic Studies: Labs: BMP:   Recent Labs   Lab 07/03/25  0507 07/04/25  0526    110    140   K 4.3 4.4    104   CO2 29 27   BUN 15 13   CREATININE 0.5 0.5   CALCIUM 7.6* 8.1*   , CBC   Recent Labs   Lab 07/03/25  0507 07/04/25  0526   WBC 14.12* 13.94*   HGB 9.4* 9.8*   HCT 29.3* 30.6*    402   , INR   Lab Results   Component Value  Date    INR 0.9 06/24/2025    INR 1.0 07/28/2022    PROTIME 10.4 06/24/2025   , and All labs within the past 24 hours have been reviewed    A/P:  Patricia Ramirez is a 69 y.o. year old female  in hospital for recurrent SBO (failed conservative management), POD#1 from diagnostic laparoscopy converted exploratory laparotomy, SBR and anastomosis, appendectomy, diverticulectomy 6/14. CT A/P with pelvic fluid collection. Persistent leukocytosis, improved s/p IR drain 6/25. Prealbumin 7, started on PPN 6/25. Purulent drainage from inferior aspect of midline incision 6/28, pending cx. Repeat prealbumin 11 on 6/30, continues on PPN. WBC remains mildly elevated with IR cx 6/25 growing E coli, klebsiella, bacteroides (sensitive to zosyn) and abdominal abscess cx sent 6/28, cx with klebsiella (sensitive to zosyn) which is most likely source. CT A/P 6/30 without new fluid collection, decreased size of pelvic fluid collection with IR drain in place, stable left abdominal fluid collection without window for perc drainage on personal review. Pt has remains afebrile with downtrending output from IR drain.    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     Per IR, recent CT findings remain without window for perc drainage left abdominal fluid collection, with pelvic fluid collection communicating with area which drain sits and recommend device to stay in place with emphasis on flushes bid.    - IR drain care, please record bid flushes and output total to I/O report  - Continue zosyn, overall downtrend to WBC ct, EOT TBD  - Regular diet, boost plus supplements  - DC / last dose supplemental PPN today with adequate nutrition PO  - pAb 13 7/3; continue M/Th checks while inpatient  - MMPC  - Pulm toilet, encourage IS 10x/hour  - PT/OT, ordered RW to bedside per recs; encourage up to chair and  ambulation  - Replete electrolytes to maintain K > 4, Phos > 3, Mg > 2  - DVT ppx     Norma Cruz  General Surgery - [facility]  7/4/2025      I have reviewed the notes, assessments, and/or procedures performed by Dr. Cruz, I concur with her/his documentation of Patricia Ramirez.     Ms. Ramirez is tolerating diet, abdominal pain well controlled. Stopping TPN today. She does not want to be discharged with drain. Pending repeat CT scan on Monday to determine drain and antibiotics plan.    Chiki Cummings MD   General Surgery  [facility]

## 2025-07-04 NOTE — PT/OT/SLP PROGRESS
Physical Therapy Treatment    Patient Name:  Patricia Ramirez   MRN:  [id number]    Recommendations:     Discharge Recommendations: Low Intensity Therapy  Discharge Equipment Recommendations: walker, rolling  Barriers to discharge: None    Assessment:     Patricia Ramirez is a 69 y.o. female admitted with a medical diagnosis of SBO (small bowel obstruction).  She presents with the following impairments/functional limitations: weakness, impaired endurance, impaired self care skills, impaired functional mobility, gait instability, impaired balance, pain, impaired skin.    Rehab Prognosis: Good; patient would benefit from acute skilled PT services to address these deficits and reach maximum level of function.    Recent Surgery: Procedure(s) (LRB):  LAPAROSCOPY, DIAGNOSTIC (N/A)  LAPAROTOMY, EXPLORATORY; APPENDECTOMY; SMALL BOWEL RESECTION; DIVERTICULUM RESECTION (N/A) 20 Days Post-Op    Plan:     During this hospitalization, patient to be seen 5 x/week to address the identified rehab impairments via gait training, therapeutic activities, therapeutic exercises, neuromuscular re-education and progress toward the following goals:    Plan of Care Expires:  07/06/25    Subjective     Chief Complaint: at end of session, packing coming out of incision  Patient/Family Comments/goals: Pt agreeable to work with therapy.   Pain/Comfort:  Pain Rating 1: 8/10  Location 1: abdomen  Pain Addressed 1: Nurse notified, Reposition, Distraction      Objective:     Communicated with pt's nurseAdriana, prior to session.  Patient found reclined in BSchair with pressure cushion with telemetry, peripheral IV, PICC line, AMBAR drain upon PT entry to room.     General Precautions: Standard, fall  Orthopedic Precautions: N/A, Abdominal precautions  Braces: N/A  Respiratory Status: Room air   Donned back gown, non-skid socks and gait belt prior to OOB activity.    Functional Mobility:   Transfers: from BSChair x1 trial, from toilet x1 trial     Sit to  Stand:  stand by assistance with no AD and rolling walker  Gait: Pt ambulated ~260ft with RW, Supervision and then ~10ft<>~10ft to and from bathroom with no AD, SBA. Demonstrates decreased leonides and endurance. VC's for RW mgt/safety.   Balance: good sitting and fair + standing.  Pt experienced packing of abdominal incision coming out while toileting. Pt's nurse notified.       AM-PAC 6 CLICK MOBILITY  Turning over in bed (including adjusting bedclothes, sheets and blankets)?: 4  Sitting down on and standing up from a chair with arms (e.g., wheelchair, bedside commode, etc.): 4  Moving from lying on back to sitting on the side of the bed?: 4  Moving to and from a bed to a chair (including a wheelchair)?: 4  Need to walk in hospital room?: 4  Climbing 3-5 steps with a railing?: 3  Basic Mobility Total Score: 23       Treatment & Education:  Pt performed transfers, gait training, and toileting. Able to perform perineal care with supervision.     Patient left reclined in BSchair with pressure cushion, B heels elevated, tray table, cell phone and all needs within reach with all lines intact, call button in reach, and pt's nurse, Adriana,  notified..    GOALS:   Multidisciplinary Problems       Physical Therapy Goals          Problem: Physical Therapy    Goal Priority Disciplines Outcome Interventions   Physical Therapy Goal     PT, PT/OT Progressing    Description: Goals to be met by: 25     Patient will increase functional independence with mobility by performin. Supine to sit with Modified [place]  2. Sit to supine with Modified [place]  3. Sit to stand transfer with Modified [place]  4. Gait  x 50 feet with Modified [place] using Rolling Walker.   5. Stand for 5 minutes with Modified [place] using Rolling Walker  6. Lower extremity exercise program x30 reps per handout, with independence                         DME Justifications:  No DME recommended requiring DME  justifications    Time Tracking:     PT Received On: 07/04/25  PT Start Time: 1122     PT Stop Time: 1145  PT Total Time (min): 23 min     Billable Minutes: Gait Training 13 and Therapeutic Activity 10    Treatment Type: Treatment  PT/PTA: PTA     Number of PTA visits since last PT visit: 5 07/04/2025

## 2025-07-05 LAB
ABSOLUTE EOSINOPHIL (OHS): 0.43 K/UL
ABSOLUTE MONOCYTE (OHS): 1.26 K/UL (ref 0.3–1)
ABSOLUTE NEUTROPHIL COUNT (OHS): 11.22 K/UL (ref 1.8–7.7)
ALBUMIN SERPL BCP-MCNC: 1.7 G/DL (ref 3.5–5.2)
ALP SERPL-CCNC: 73 UNIT/L (ref 40–150)
ALT SERPL W/O P-5'-P-CCNC: 54 UNIT/L (ref 10–44)
ANION GAP (OHS): 8 MMOL/L (ref 8–16)
AST SERPL-CCNC: 45 UNIT/L (ref 11–45)
BASOPHILS # BLD AUTO: 0.11 K/UL
BASOPHILS NFR BLD AUTO: 0.7 %
BILIRUB SERPL-MCNC: 0.2 MG/DL (ref 0.1–1)
BUN SERPL-MCNC: 14 MG/DL (ref 8–23)
CALCIUM SERPL-MCNC: 8 MG/DL (ref 8.7–10.5)
CHLORIDE SERPL-SCNC: 103 MMOL/L (ref 95–110)
CO2 SERPL-SCNC: 28 MMOL/L (ref 23–29)
CREAT SERPL-MCNC: 0.5 MG/DL (ref 0.5–1.4)
ERYTHROCYTE [DISTWIDTH] IN BLOOD BY AUTOMATED COUNT: 14 % (ref 11.5–14.5)
GFR SERPLBLD CREATININE-BSD FMLA CKD-EPI: >60 ML/MIN/1.73/M2
GLUCOSE SERPL-MCNC: 97 MG/DL (ref 70–110)
HCT VFR BLD AUTO: 29.3 % (ref 37–48.5)
HGB BLD-MCNC: 9.3 GM/DL (ref 12–16)
IMM GRANULOCYTES # BLD AUTO: 0.18 K/UL (ref 0–0.04)
IMM GRANULOCYTES NFR BLD AUTO: 1.2 % (ref 0–0.5)
LYMPHOCYTES # BLD AUTO: 1.77 K/UL (ref 1–4.8)
MCH RBC QN AUTO: 32.2 PG (ref 27–31)
MCHC RBC AUTO-ENTMCNC: 31.7 G/DL (ref 32–36)
MCV RBC AUTO: 101 FL (ref 82–98)
NUCLEATED RBC (/100WBC) (OHS): 0 /100 WBC
PLATELET # BLD AUTO: 422 K/UL (ref 150–450)
PMV BLD AUTO: 8.8 FL (ref 9.2–12.9)
POCT GLUCOSE: 105 MG/DL (ref 70–110)
POCT GLUCOSE: 110 MG/DL (ref 70–110)
POCT GLUCOSE: 116 MG/DL (ref 70–110)
POCT GLUCOSE: 173 MG/DL (ref 70–110)
POCT GLUCOSE: 89 MG/DL (ref 70–110)
POTASSIUM SERPL-SCNC: 4.5 MMOL/L (ref 3.5–5.1)
PROT SERPL-MCNC: 5.9 GM/DL (ref 6–8.4)
RBC # BLD AUTO: 2.89 M/UL (ref 4–5.4)
RELATIVE EOSINOPHIL (OHS): 2.9 %
RELATIVE LYMPHOCYTE (OHS): 11.8 % (ref 18–48)
RELATIVE MONOCYTE (OHS): 8.4 % (ref 4–15)
RELATIVE NEUTROPHIL (OHS): 75 % (ref 38–73)
SODIUM SERPL-SCNC: 139 MMOL/L (ref 136–145)
WBC # BLD AUTO: 14.97 K/UL (ref 3.9–12.7)

## 2025-07-05 PROCEDURE — 36415 COLL VENOUS BLD VENIPUNCTURE: CPT

## 2025-07-05 PROCEDURE — 25000003 PHARM REV CODE 250: Performed by: STUDENT IN AN ORGANIZED HEALTH CARE EDUCATION/TRAINING PROGRAM

## 2025-07-05 PROCEDURE — 63600175 PHARM REV CODE 636 W HCPCS: Performed by: NURSE PRACTITIONER

## 2025-07-05 PROCEDURE — 97530 THERAPEUTIC ACTIVITIES: CPT

## 2025-07-05 PROCEDURE — 25000003 PHARM REV CODE 250: Performed by: INTERNAL MEDICINE

## 2025-07-05 PROCEDURE — 80053 COMPREHEN METABOLIC PANEL: CPT

## 2025-07-05 PROCEDURE — 85025 COMPLETE CBC W/AUTO DIFF WBC: CPT

## 2025-07-05 PROCEDURE — 11000001 HC ACUTE MED/SURG PRIVATE ROOM

## 2025-07-05 PROCEDURE — 25000003 PHARM REV CODE 250

## 2025-07-05 PROCEDURE — 99024 POSTOP FOLLOW-UP VISIT: CPT | Mod: ,,, | Performed by: STUDENT IN AN ORGANIZED HEALTH CARE EDUCATION/TRAINING PROGRAM

## 2025-07-05 PROCEDURE — 63600175 PHARM REV CODE 636 W HCPCS: Performed by: INTERNAL MEDICINE

## 2025-07-05 PROCEDURE — 63600175 PHARM REV CODE 636 W HCPCS: Performed by: STUDENT IN AN ORGANIZED HEALTH CARE EDUCATION/TRAINING PROGRAM

## 2025-07-05 RX ADMIN — SIMETHICONE 80 MG: 80 TABLET, CHEWABLE ORAL at 02:07

## 2025-07-05 RX ADMIN — OXYCODONE HYDROCHLORIDE 10 MG: 5 TABLET ORAL at 10:07

## 2025-07-05 RX ADMIN — ACETAMINOPHEN 1000 MG: 500 TABLET ORAL at 02:07

## 2025-07-05 RX ADMIN — PANTOPRAZOLE SODIUM 40 MG: 40 TABLET, DELAYED RELEASE ORAL at 08:07

## 2025-07-05 RX ADMIN — TAMSULOSIN HYDROCHLORIDE 0.4 MG: 0.4 CAPSULE ORAL at 08:07

## 2025-07-05 RX ADMIN — PIPERACILLIN SODIUM AND TAZOBACTAM SODIUM 4.5 G: 4; .5 INJECTION, POWDER, FOR SOLUTION INTRAVENOUS at 08:07

## 2025-07-05 RX ADMIN — BUSPIRONE HYDROCHLORIDE 15 MG: 5 TABLET ORAL at 08:07

## 2025-07-05 RX ADMIN — HYDROMORPHONE HYDROCHLORIDE 0.5 MG: 1 INJECTION, SOLUTION INTRAMUSCULAR; INTRAVENOUS; SUBCUTANEOUS at 01:07

## 2025-07-05 RX ADMIN — CITALOPRAM HYDROBROMIDE 20 MG: 20 TABLET ORAL at 08:07

## 2025-07-05 RX ADMIN — LIDOCAINE 2 PATCH: 50 PATCH TOPICAL at 02:07

## 2025-07-05 RX ADMIN — OXYCODONE HYDROCHLORIDE 10 MG: 5 TABLET ORAL at 05:07

## 2025-07-05 RX ADMIN — HYDROMORPHONE HYDROCHLORIDE 0.5 MG: 1 INJECTION, SOLUTION INTRAMUSCULAR; INTRAVENOUS; SUBCUTANEOUS at 12:07

## 2025-07-05 RX ADMIN — PIPERACILLIN SODIUM AND TAZOBACTAM SODIUM 4.5 G: 4; .5 INJECTION, POWDER, FOR SOLUTION INTRAVENOUS at 04:07

## 2025-07-05 RX ADMIN — BUSPIRONE HYDROCHLORIDE 15 MG: 5 TABLET ORAL at 10:07

## 2025-07-05 RX ADMIN — ACETAMINOPHEN 1000 MG: 500 TABLET ORAL at 08:07

## 2025-07-05 RX ADMIN — POLYETHYLENE GLYCOL 3350 17 G: 17 POWDER, FOR SOLUTION ORAL at 08:07

## 2025-07-05 RX ADMIN — BUSPIRONE HYDROCHLORIDE 15 MG: 5 TABLET ORAL at 02:07

## 2025-07-05 RX ADMIN — ENOXAPARIN SODIUM 40 MG: 40 INJECTION SUBCUTANEOUS at 04:07

## 2025-07-05 RX ADMIN — SIMETHICONE 80 MG: 80 TABLET, CHEWABLE ORAL at 10:07

## 2025-07-05 RX ADMIN — PIPERACILLIN SODIUM AND TAZOBACTAM SODIUM 4.5 G: 4; .5 INJECTION, POWDER, FOR SOLUTION INTRAVENOUS at 12:07

## 2025-07-05 RX ADMIN — SIMETHICONE 80 MG: 80 TABLET, CHEWABLE ORAL at 08:07

## 2025-07-05 RX ADMIN — ATORVASTATIN CALCIUM 40 MG: 40 TABLET, FILM COATED ORAL at 10:07

## 2025-07-05 RX ADMIN — ACETAMINOPHEN 1000 MG: 500 TABLET ORAL at 10:07

## 2025-07-05 NOTE — PROGRESS NOTES
Surgery Progress Note    Patricia Ramirez is a 69 y.o. year old female in hospital for recurrent SBO (failed conservative management) s/p diagnostic laparoscopy converted exploratory laparotomy, SBR and anastomosis, appendectomy, diverticulectomy 6/14, IR abscess drainage 6/24    NAEON. Afebrile, HDS.   Tolerating regular diet without nausea/vomiting, having bowel movements. Stable back and andreia-incisional pain. Voiding spontaneously. OOB to chair this morning.     PE:  Vitals:    07/05/25 0027 07/05/25 0430 07/05/25 0539 07/05/25 0724   BP:  121/66  120/64   BP Location:  Right arm     Patient Position:  Lying     Pulse:  64  67   Resp: 17 18 17 18   Temp:  98.5 °F (36.9 °C)  98.4 °F (36.9 °C)   TempSrc:  Oral  Oral   SpO2:  99%  99%   Weight:       Height:         Date 07/05/25 0700 - 07/06/25 0659   Shift 3231-9732 1696-3665 8408-9051 24 Hour Total   INTAKE   Shift Total(mL/kg)       OUTPUT   Urine(mL/kg/hr) 650   650   Shift Total(mL/kg) 650(9.5)   650(9.5)   Weight (kg) 68.6 68.6 68.6 68.6     Intake/Output - Last 3 Shifts         07/03 0700 07/04 0659 07/04 0700 07/05 0659 07/05 0700  07/06 0659    P.O. 960      Other 40 10     Total Intake(mL/kg) 1000 (14.6) 10 (0.1)     Urine (mL/kg/hr) 0 (0) 1000 (0.6) 650 (4.2)    Other 55 0     Stool 0      Total Output 55 1000 650    Net +945 -990 -650           Unmeasured Urine Occurrence 9 x 6 x     Unmeasured Stool Occurrence 1 x            General: Awake, alert, no acute distress  Resp: Non-labored breathing  CV: Warm and well perfused, regular rate  Abdomen: Soft, appropriate andreia-incisional tenderness to palpation, nondistended without rebound or guarding. Midline incision with bottom staples removed, wet-to-dry packing in place. IR drain with scant serosanguinous fluid in bag.   MSK: No obvious abnormalities  Skin: Warm and well-perfused  Neuro: No focal neurological deficits       Significant Diagnostic Studies: Labs: BMP:   Recent Labs   Lab 07/04/25  5522  07/05/25  0447    97    139   K 4.4 4.5    103   CO2 27 28   BUN 13 14   CREATININE 0.5 0.5   CALCIUM 8.1* 8.0*   , CBC   Recent Labs   Lab 07/04/25  0526 07/05/25  0447   WBC 13.94* 14.97*   HGB 9.8* 9.3*   HCT 30.6* 29.3*    422   , INR   Lab Results   Component Value Date    INR 0.9 06/24/2025    INR 1.0 07/28/2022    PROTIME 10.4 06/24/2025   , and All labs within the past 24 hours have been reviewed    A/P:  Patricia Ramirez is a 69 y.o. year old female  in hospital for recurrent SBO (failed conservative management), POD#1 from diagnostic laparoscopy converted exploratory laparotomy, SBR and anastomosis, appendectomy, diverticulectomy 6/14. CT A/P with pelvic fluid collection. Persistent leukocytosis, improved s/p IR drain 6/25. Prealbumin 7, started on PPN 6/25. Purulent drainage from inferior aspect of midline incision 6/28, pending cx. Repeat prealbumin 11 on 6/30, continues on PPN. WBC remains mildly elevated with IR cx 6/25 growing E coli, klebsiella, bacteroides (sensitive to zosyn) and abdominal abscess cx sent 6/28, cx with klebsiella (sensitive to zosyn) which is most likely source. CT A/P 6/30 without new fluid collection, decreased size of pelvic fluid collection with IR drain in place, stable left abdominal fluid collection without window for perc drainage on personal review. Pt has remains afebrile with downtrending output from IR drain.      - IR drain care, please record bid flushes and output total to I/O report. Plan to re-image tomorrow to evaluate for fluid collection resolution   - Continue zosyn, EOT TBD. Afebrile with stable mild leukocytosis.    - Regular diet, boost plus supplements  - pAb 13 7/3; continue M/Th checks while inpatient  - MMPC  - Pulm toilet, encourage IS 10x/hour  - PT/OT, ordered RW to bedside per recs; encourage up to chair and ambulation  - Replete electrolytes to maintain K > 4, Phos > 3, Mg > 2  - DVT ppx     Sharon Darwin, MD  [facility]  Surgery Resident  7/5/2025 9:18 AM

## 2025-07-05 NOTE — PT/OT/SLP PROGRESS
"Occupational Therapy   Treatment    Name: Patricia Ramirez  MRN: [id number]  Admitting Diagnosis:  SBO (small bowel obstruction)  21 Days Post-Op    Recommendations:     Discharge Recommendations: Low Intensity Therapy  Discharge Equipment Recommendations:  walker, rolling, hospital bed  Barriers to discharge:       Assessment:     Patricia Ramirez is a 69 y.o. female with a medical diagnosis of SBO (small bowel obstruction). Performance deficits affecting function are weakness, impaired endurance, impaired sensation, impaired self care skills, impaired functional mobility, gait instability, impaired balance, decreased ROM, decreased safety awareness, decreased lower extremity function, other (comment) (abdominal).     Pt seated in chair agreeable to OT treatment. Pt reporting "I'm getting better...last week I couldn't lift my legs. Pt SBA to come to stand from chair. Pt performed functional mob to sink for g/h task with hand held assistance and tolerated 2 mins in stand with Fair/Fair- balance noted. Pt sat in chair and performed UB ROM ex x10 with rest breaks provided. Good effort noted with pt motivated to improve mobility and strength and return to PLOF.    Rehab Prognosis:  Good; patient would benefit from acute skilled OT services to address these deficits and reach maximum level of function.       Plan:     Patient to be seen 3 x/week to address the above listed problems via self-care/home management, therapeutic activities, therapeutic exercises  Plan of Care Expires: 07/05/25  Plan of Care Reviewed with: patient    Subjective     Chief Complaint: abdomen pain  Patient/Family Comments/goals: Improve mobility and strength to return to PLOF  Pain/Comfort:  Pain Rating 1: 9/10  Location 1: abdomen  Pain Addressed 1: Reposition, Nurse notified    Objective:     Communicated with: Nurse prior to session.  Patient found up in chair with PICC line, telemetry, peripheral IV, AMBAR drain upon OT entry to room.    General " Precautions: Standard, fall    Orthopedic Precautions:N/A  Braces: N/A  Respiratory Status: Room air     Occupational Performance:     Bed Mobility:    NT this visit    Functional Mobility/Transfers:  Patient completed Sit <> Stand Transfer with stand by assistance  with  no assistive device   Functional Mobility: Pt performed functional mob from bedside chair ><sink with SBA using no AD.    Activities of Daily Living:  Grooming: independence with Fair balance demonstrated in stand as pt performed oral care and face washing in stand.       [facility] 6 Click ADL: 21    Treatment & Education:  UE ROM HEP [id number] to improve UE mobility and endurance needed to improve ADL performance. OT provided visual cues to perform task correctly for maximum benefit.   Self Care Mngmt: techniques to increase safety for ADLs in standing, safety training during functional mobility for ADLs with no AD, and safety training during transitional movements. Therapeutic Activities: transfer training to increase functional task performance, standing balance to maximize independence with functional tasks.     Patient left up in chair in reclined position with all lines intact, call button in reach, and nurse notified    GOALS:   Multidisciplinary Problems       Occupational Therapy Goals          Problem: Occupational Therapy    Goal Priority Disciplines Outcome Interventions   Occupational Therapy Goal     OT, PT/OT Progressing    Description: Goals to be met by: 7/5/2025     Patient will increase functional independence with ADLs by performing:    UE Dressing with Set-up Assistance.  LE Dressing with Stand-by Assistance.  Grooming while seated with Modified [place].  Toileting from bedside commode with Supervision for hygiene and clothing management.   Bathing seated UB and andreia care sponge  with Stand-by Assistance.  Toilet transfer to bedside commode with Contact Guard Assistance.  Upper extremity exercise program x7-10 reps no  resistance, and per handout, with assistance as needed.                         DME Justifications:      Time Tracking:     OT Date of Treatment: 07/05/25  OT Start Time: 1437  OT Stop Time: 1455  OT Total Time (min): 18 min    Billable Minutes:Therapeutic Activity 18 7/5/2025

## 2025-07-05 NOTE — PLAN OF CARE
Problem: Adult Inpatient Plan of Care  Goal: Optimal Comfort and Wellbeing  Outcome: Progressing     Problem: Infection  Goal: Absence of Infection Signs and Symptoms  Outcome: Progressing     Problem: Wound  Goal: Optimal Pain Control and Function  Outcome: Progressing     Problem: Pain Acute  Goal: Optimal Pain Control and Function  Outcome: Progressing

## 2025-07-05 NOTE — NURSING
[facility]  Nurses Note -- 4 Eyes      7/5/2025       Skin assessed on: Q Shift      [x] No Pressure Injuries Present    [x]Prevention Measures Documented    [] Yes LDA  for Pressure Injury Previously documented     [] Yes New Pressure Injury Discovered   [] LDA for New Pressure Injury Added      Attending RN:  Leti Cordova LPN     Second RN:  ashley guerrero

## 2025-07-05 NOTE — NURSING
Pt resting in bed quietly. NAD noted. Pt Complain of pain      Fall and safety precautions maintained. Bed alarm activated and audible.. Bed locked in lowest position, with side rails up x2. Call bell and personal items within reach    [facility]  Nurses Note -- 4 Eyes      7/4/2025       Skin assessed on: Q Shift      [] No Pressure Injuries Present    []Prevention Measures Documented    [x] Yes LDA  for Pressure Injury Previously documented     [] Yes New Pressure Injury Discovered   [] LDA for New Pressure Injury Added      Attending RN:  Everardo Delgado, VALERIE     Second RN:  gerald ABARCA

## 2025-07-06 LAB
ABSOLUTE EOSINOPHIL (OHS): 0.46 K/UL
ABSOLUTE MONOCYTE (OHS): 0.91 K/UL (ref 0.3–1)
ABSOLUTE NEUTROPHIL COUNT (OHS): 6.91 K/UL (ref 1.8–7.7)
ANION GAP (OHS): 9 MMOL/L (ref 8–16)
BASOPHILS # BLD AUTO: 0.14 K/UL
BASOPHILS NFR BLD AUTO: 1.3 %
BUN SERPL-MCNC: 15 MG/DL (ref 8–23)
CALCIUM SERPL-MCNC: 8 MG/DL (ref 8.7–10.5)
CHLORIDE SERPL-SCNC: 105 MMOL/L (ref 95–110)
CO2 SERPL-SCNC: 25 MMOL/L (ref 23–29)
CREAT SERPL-MCNC: 0.5 MG/DL (ref 0.5–1.4)
ERYTHROCYTE [DISTWIDTH] IN BLOOD BY AUTOMATED COUNT: 14.1 % (ref 11.5–14.5)
GFR SERPLBLD CREATININE-BSD FMLA CKD-EPI: >60 ML/MIN/1.73/M2
GLUCOSE SERPL-MCNC: 102 MG/DL (ref 70–110)
HCT VFR BLD AUTO: 33.2 % (ref 37–48.5)
HGB BLD-MCNC: 10.3 GM/DL (ref 12–16)
IMM GRANULOCYTES # BLD AUTO: 0.26 K/UL (ref 0–0.04)
IMM GRANULOCYTES NFR BLD AUTO: 2.4 % (ref 0–0.5)
LYMPHOCYTES # BLD AUTO: 2.21 K/UL (ref 1–4.8)
MAGNESIUM SERPL-MCNC: 2.1 MG/DL (ref 1.6–2.6)
MCH RBC QN AUTO: 31.3 PG (ref 27–31)
MCHC RBC AUTO-ENTMCNC: 31 G/DL (ref 32–36)
MCV RBC AUTO: 101 FL (ref 82–98)
NUCLEATED RBC (/100WBC) (OHS): 0 /100 WBC
PHOSPHATE SERPL-MCNC: 3.5 MG/DL (ref 2.7–4.5)
PLATELET # BLD AUTO: 417 K/UL (ref 150–450)
PMV BLD AUTO: 8.9 FL (ref 9.2–12.9)
POCT GLUCOSE: 111 MG/DL (ref 70–110)
POCT GLUCOSE: 150 MG/DL (ref 70–110)
POCT GLUCOSE: 81 MG/DL (ref 70–110)
POCT GLUCOSE: 97 MG/DL (ref 70–110)
POCT GLUCOSE: 97 MG/DL (ref 70–110)
POTASSIUM SERPL-SCNC: 4.5 MMOL/L (ref 3.5–5.1)
RBC # BLD AUTO: 3.29 M/UL (ref 4–5.4)
RELATIVE EOSINOPHIL (OHS): 4.2 %
RELATIVE LYMPHOCYTE (OHS): 20.3 % (ref 18–48)
RELATIVE MONOCYTE (OHS): 8.4 % (ref 4–15)
RELATIVE NEUTROPHIL (OHS): 63.4 % (ref 38–73)
SODIUM SERPL-SCNC: 139 MMOL/L (ref 136–145)
WBC # BLD AUTO: 10.89 K/UL (ref 3.9–12.7)

## 2025-07-06 PROCEDURE — 85025 COMPLETE CBC W/AUTO DIFF WBC: CPT

## 2025-07-06 PROCEDURE — 99024 POSTOP FOLLOW-UP VISIT: CPT | Mod: ,,, | Performed by: STUDENT IN AN ORGANIZED HEALTH CARE EDUCATION/TRAINING PROGRAM

## 2025-07-06 PROCEDURE — 11000001 HC ACUTE MED/SURG PRIVATE ROOM

## 2025-07-06 PROCEDURE — 25000003 PHARM REV CODE 250: Performed by: INTERNAL MEDICINE

## 2025-07-06 PROCEDURE — 25000003 PHARM REV CODE 250: Performed by: STUDENT IN AN ORGANIZED HEALTH CARE EDUCATION/TRAINING PROGRAM

## 2025-07-06 PROCEDURE — 63600175 PHARM REV CODE 636 W HCPCS: Performed by: STUDENT IN AN ORGANIZED HEALTH CARE EDUCATION/TRAINING PROGRAM

## 2025-07-06 PROCEDURE — 83735 ASSAY OF MAGNESIUM: CPT

## 2025-07-06 PROCEDURE — 99900035 HC TECH TIME PER 15 MIN (STAT)

## 2025-07-06 PROCEDURE — 25500020 PHARM REV CODE 255: Performed by: STUDENT IN AN ORGANIZED HEALTH CARE EDUCATION/TRAINING PROGRAM

## 2025-07-06 PROCEDURE — 63600175 PHARM REV CODE 636 W HCPCS: Performed by: NURSE PRACTITIONER

## 2025-07-06 PROCEDURE — 63600175 PHARM REV CODE 636 W HCPCS: Performed by: INTERNAL MEDICINE

## 2025-07-06 PROCEDURE — 36415 COLL VENOUS BLD VENIPUNCTURE: CPT

## 2025-07-06 PROCEDURE — 80048 BASIC METABOLIC PNL TOTAL CA: CPT

## 2025-07-06 PROCEDURE — 25000003 PHARM REV CODE 250

## 2025-07-06 PROCEDURE — 84100 ASSAY OF PHOSPHORUS: CPT

## 2025-07-06 PROCEDURE — 94761 N-INVAS EAR/PLS OXIMETRY MLT: CPT

## 2025-07-06 RX ADMIN — PIPERACILLIN SODIUM AND TAZOBACTAM SODIUM 4.5 G: 4; .5 INJECTION, POWDER, FOR SOLUTION INTRAVENOUS at 08:07

## 2025-07-06 RX ADMIN — PIPERACILLIN SODIUM AND TAZOBACTAM SODIUM 4.5 G: 4; .5 INJECTION, POWDER, FOR SOLUTION INTRAVENOUS at 05:07

## 2025-07-06 RX ADMIN — BUSPIRONE HYDROCHLORIDE 15 MG: 5 TABLET ORAL at 08:07

## 2025-07-06 RX ADMIN — CITALOPRAM HYDROBROMIDE 20 MG: 20 TABLET ORAL at 08:07

## 2025-07-06 RX ADMIN — PANTOPRAZOLE SODIUM 40 MG: 40 TABLET, DELAYED RELEASE ORAL at 08:07

## 2025-07-06 RX ADMIN — BUSPIRONE HYDROCHLORIDE 15 MG: 5 TABLET ORAL at 09:07

## 2025-07-06 RX ADMIN — SIMETHICONE 80 MG: 80 TABLET, CHEWABLE ORAL at 02:07

## 2025-07-06 RX ADMIN — ACETAMINOPHEN 1000 MG: 500 TABLET ORAL at 08:07

## 2025-07-06 RX ADMIN — SIMETHICONE 80 MG: 80 TABLET, CHEWABLE ORAL at 07:07

## 2025-07-06 RX ADMIN — OXYCODONE HYDROCHLORIDE 10 MG: 5 TABLET ORAL at 06:07

## 2025-07-06 RX ADMIN — LIDOCAINE 2 PATCH: 50 PATCH TOPICAL at 02:07

## 2025-07-06 RX ADMIN — IOHEXOL 75 ML: 350 INJECTION, SOLUTION INTRAVENOUS at 09:07

## 2025-07-06 RX ADMIN — HYDROMORPHONE HYDROCHLORIDE 0.5 MG: 1 INJECTION, SOLUTION INTRAMUSCULAR; INTRAVENOUS; SUBCUTANEOUS at 07:07

## 2025-07-06 RX ADMIN — ATORVASTATIN CALCIUM 40 MG: 40 TABLET, FILM COATED ORAL at 09:07

## 2025-07-06 RX ADMIN — HYDROMORPHONE HYDROCHLORIDE 0.5 MG: 1 INJECTION, SOLUTION INTRAMUSCULAR; INTRAVENOUS; SUBCUTANEOUS at 12:07

## 2025-07-06 RX ADMIN — ACETAMINOPHEN 1000 MG: 500 TABLET ORAL at 09:07

## 2025-07-06 RX ADMIN — BUSPIRONE HYDROCHLORIDE 15 MG: 5 TABLET ORAL at 02:07

## 2025-07-06 RX ADMIN — SIMETHICONE 80 MG: 80 TABLET, CHEWABLE ORAL at 08:07

## 2025-07-06 RX ADMIN — TAMSULOSIN HYDROCHLORIDE 0.4 MG: 0.4 CAPSULE ORAL at 08:07

## 2025-07-06 RX ADMIN — PIPERACILLIN SODIUM AND TAZOBACTAM SODIUM 4.5 G: 4; .5 INJECTION, POWDER, FOR SOLUTION INTRAVENOUS at 12:07

## 2025-07-06 RX ADMIN — HYDROMORPHONE HYDROCHLORIDE 0.5 MG: 1 INJECTION, SOLUTION INTRAMUSCULAR; INTRAVENOUS; SUBCUTANEOUS at 09:07

## 2025-07-06 RX ADMIN — ACETAMINOPHEN 1000 MG: 500 TABLET ORAL at 02:07

## 2025-07-06 RX ADMIN — ENOXAPARIN SODIUM 40 MG: 40 INJECTION SUBCUTANEOUS at 05:07

## 2025-07-06 RX ADMIN — OXYCODONE HYDROCHLORIDE 10 MG: 5 TABLET ORAL at 04:07

## 2025-07-06 NOTE — NURSING
[facility], [facility]  Nurses Note -- 4 Eyes      7/6/2025       Skin assessed on: Q Shift      [] No Pressure Injuries Present    [x]Prevention Measures Documented    [x] Yes LDA  for Pressure Injury Previously documented     [] Yes New Pressure Injury Discovered   [] LDA for New Pressure Injury Added      Attending RN:  Mak Ross RN     Second RN:  Lillian PADILLA

## 2025-07-06 NOTE — PLAN OF CARE
Problem: Adult Inpatient Plan of Care  Goal: Optimal Comfort and Wellbeing  Outcome: Progressing     Problem: Wound  Goal: Optimal Wound Healing  Outcome: Progressing     Problem: Skin Injury Risk Increased  Goal: Skin Health and Integrity  Outcome: Progressing     Problem: Pain Acute  Goal: Optimal Pain Control and Function  Outcome: Progressing

## 2025-07-06 NOTE — NURSING
[facility]  Nurses Note -- 4 Eyes      7/6/2025       Skin assessed on: Q Shift      [] No Pressure Injuries Present    [x]Prevention Measures Documented    [x] Yes LDA  for Pressure Injury Previously documented     [] Yes New Pressure Injury Discovered   [] LDA for New Pressure Injury Added      Attending RN:  Lillian Combs RN     Second RN:  RIMA Landeros

## 2025-07-06 NOTE — PROGRESS NOTES
Surgery Progress Note    Patricia Ramirez is a 69 y.o. year old female in hospital for recurrent SBO (failed conservative management) s/p diagnostic laparoscopy converted exploratory laparotomy, SBR and anastomosis, appendectomy, diverticulectomy 6/14, IR abscess drainage 6/24    NAEON. Afebrile, HDS.   Tolerating regular diet without nausea/vomiting. Passing gas.   Stable back pain and andreia-incisional tenderness. OOB to chair this morning.       PE:  Vitals:    07/06/25 0716 07/06/25 0731 07/06/25 0935 07/06/25 1042   BP:  128/63  (!) 110/58   BP Location:       Patient Position:       Pulse:  66  68   Resp: 16 18 16 18   Temp:  98.5 °F (36.9 °C)  98.1 °F (36.7 °C)   TempSrc:  Oral  Oral   SpO2: 98% 98%  96%   Weight:       Height:             Intake/Output - Last 3 Shifts         07/04 0700 07/05 0659 07/05 0700 07/06 0659 07/06 0700 07/07 0659    P.O.       Other 10 20     Total Intake(mL/kg) 10 (0.1) 20 (0.3)     Urine (mL/kg/hr) 1000 (0.6) 1050 (0.6)     Other 0 15     Stool  0     Total Output 1000 1065     Net -990 -1045            Unmeasured Urine Occurrence 6 x 2 x 1 x    Unmeasured Stool Occurrence  0 x 1 x          General: Awake, alert, no acute distress  Resp: Non-labored breathing  CV: Warm and well perfused, regular rate  Abdomen: Soft, appropriate andreia-incisional tenderness to palpation, nondistended without rebound or guarding. Midline incision with bottom staples removed, wet-to-dry packing in place. IR drain with scant serosanguinous fluid in bag.   MSK: No obvious abnormalities  Skin: Warm and well-perfused  Neuro: No focal neurological deficits       Significant Diagnostic Studies: Labs: BMP:   Recent Labs   Lab 07/05/25 0447 07/06/25 0459   GLU 97 102    139   K 4.5 4.5    105   CO2 28 25   BUN 14 15   CREATININE 0.5 0.5   CALCIUM 8.0* 8.0*   MG  --  2.1   , CBC   Recent Labs   Lab 07/05/25 0447 07/06/25  0459   WBC 14.97* 10.89   HGB 9.3* 10.3*   HCT 29.3* 33.2*    417    , INR   Lab Results   Component Value Date    INR 0.9 06/24/2025    INR 1.0 07/28/2022    PROTIME 10.4 06/24/2025   , and All labs within the past 24 hours have been reviewed    A/P:  Patricia Ramirez is a 69 y.o. year old female  in hospital for recurrent SBO (failed conservative management), POD#1 from diagnostic laparoscopy converted exploratory laparotomy, SBR and anastomosis, appendectomy, diverticulectomy 6/14. CT A/P with pelvic fluid collection. Persistent leukocytosis, improved s/p IR drain 6/25. Prealbumin 7, started on PPN 6/25. Purulent drainage from inferior aspect of midline incision 6/28, pending cx. Repeat prealbumin 11 on 6/30, continues on PPN. WBC remains mildly elevated with IR cx 6/25 growing E coli, klebsiella, bacteroides (sensitive to zosyn) and abdominal abscess cx sent 6/28, cx with klebsiella (sensitive to zosyn) which is most likely source. CT A/P 6/30 without new fluid collection, decreased size of pelvic fluid collection with IR drain in place, stable left abdominal fluid collection without window for perc drainage on personal review. Pt has remains afebrile with minimal output from IR drain.    - IR drain care, please record bid flushes and output total to I/O report.   - CT A/P today pending to evaluate for possible drain removal   - Continue zosyn, EOT TBD. Afebrile with resolved leukocytosis today   - Regular diet, boost plus supplements  - pAb 13 7/3; continue M/Th checks while inpatient  [facility]  - Pulm toilet, encourage IS 10x/hour  - PT/OT, ordered RW to bedside per recs; encourage up to chair and ambulation  - Replete electrolytes to maintain K > 4, Phos > 3, Mg > 2  - DVT ppx     Sharon Granados MD  [facility] General Surgery Resident  7/6/2025 9:18 AM

## 2025-07-07 LAB
ABSOLUTE EOSINOPHIL (OHS): 0.53 K/UL
ABSOLUTE MONOCYTE (OHS): 0.86 K/UL (ref 0.3–1)
ABSOLUTE NEUTROPHIL COUNT (OHS): 6.3 K/UL (ref 1.8–7.7)
ANION GAP (OHS): 8 MMOL/L (ref 8–16)
BASOPHILS # BLD AUTO: 0.15 K/UL
BASOPHILS NFR BLD AUTO: 1.5 %
BUN SERPL-MCNC: 12 MG/DL (ref 8–23)
CALCIUM SERPL-MCNC: 7.9 MG/DL (ref 8.7–10.5)
CHLORIDE SERPL-SCNC: 104 MMOL/L (ref 95–110)
CO2 SERPL-SCNC: 29 MMOL/L (ref 23–29)
CREAT SERPL-MCNC: 0.5 MG/DL (ref 0.5–1.4)
ERYTHROCYTE [DISTWIDTH] IN BLOOD BY AUTOMATED COUNT: 14.4 % (ref 11.5–14.5)
GFR SERPLBLD CREATININE-BSD FMLA CKD-EPI: >60 ML/MIN/1.73/M2
GLUCOSE SERPL-MCNC: 97 MG/DL (ref 70–110)
HCT VFR BLD AUTO: 31.1 % (ref 37–48.5)
HGB BLD-MCNC: 9.7 GM/DL (ref 12–16)
IMM GRANULOCYTES # BLD AUTO: 0.19 K/UL (ref 0–0.04)
IMM GRANULOCYTES NFR BLD AUTO: 1.9 % (ref 0–0.5)
LYMPHOCYTES # BLD AUTO: 2.22 K/UL (ref 1–4.8)
MAGNESIUM SERPL-MCNC: 2 MG/DL (ref 1.6–2.6)
MCH RBC QN AUTO: 31.6 PG (ref 27–31)
MCHC RBC AUTO-ENTMCNC: 31.2 G/DL (ref 32–36)
MCV RBC AUTO: 101 FL (ref 82–98)
NUCLEATED RBC (/100WBC) (OHS): 0 /100 WBC
PHOSPHATE SERPL-MCNC: 3.7 MG/DL (ref 2.7–4.5)
PLATELET # BLD AUTO: 442 K/UL (ref 150–450)
PMV BLD AUTO: 8.6 FL (ref 9.2–12.9)
POCT GLUCOSE: 104 MG/DL (ref 70–110)
POCT GLUCOSE: 105 MG/DL (ref 70–110)
POCT GLUCOSE: 110 MG/DL (ref 70–110)
POTASSIUM SERPL-SCNC: 4.2 MMOL/L (ref 3.5–5.1)
PREALB SERPL-MCNC: 19 MG/DL (ref 20–43)
RBC # BLD AUTO: 3.07 M/UL (ref 4–5.4)
RELATIVE EOSINOPHIL (OHS): 5.2 %
RELATIVE LYMPHOCYTE (OHS): 21.7 % (ref 18–48)
RELATIVE MONOCYTE (OHS): 8.4 % (ref 4–15)
RELATIVE NEUTROPHIL (OHS): 61.3 % (ref 38–73)
SODIUM SERPL-SCNC: 141 MMOL/L (ref 136–145)
WBC # BLD AUTO: 10.25 K/UL (ref 3.9–12.7)

## 2025-07-07 PROCEDURE — 25000003 PHARM REV CODE 250: Performed by: STUDENT IN AN ORGANIZED HEALTH CARE EDUCATION/TRAINING PROGRAM

## 2025-07-07 PROCEDURE — 94761 N-INVAS EAR/PLS OXIMETRY MLT: CPT

## 2025-07-07 PROCEDURE — 63600175 PHARM REV CODE 636 W HCPCS

## 2025-07-07 PROCEDURE — 25000003 PHARM REV CODE 250: Performed by: INTERNAL MEDICINE

## 2025-07-07 PROCEDURE — 83735 ASSAY OF MAGNESIUM: CPT

## 2025-07-07 PROCEDURE — 25000003 PHARM REV CODE 250

## 2025-07-07 PROCEDURE — 63600175 PHARM REV CODE 636 W HCPCS: Performed by: STUDENT IN AN ORGANIZED HEALTH CARE EDUCATION/TRAINING PROGRAM

## 2025-07-07 PROCEDURE — 97530 THERAPEUTIC ACTIVITIES: CPT

## 2025-07-07 PROCEDURE — 97110 THERAPEUTIC EXERCISES: CPT

## 2025-07-07 PROCEDURE — 63600175 PHARM REV CODE 636 W HCPCS: Performed by: NURSE PRACTITIONER

## 2025-07-07 PROCEDURE — 36415 COLL VENOUS BLD VENIPUNCTURE: CPT

## 2025-07-07 PROCEDURE — 99900035 HC TECH TIME PER 15 MIN (STAT)

## 2025-07-07 PROCEDURE — 84134 ASSAY OF PREALBUMIN: CPT

## 2025-07-07 PROCEDURE — 85025 COMPLETE CBC W/AUTO DIFF WBC: CPT

## 2025-07-07 PROCEDURE — 63600175 PHARM REV CODE 636 W HCPCS: Performed by: INTERNAL MEDICINE

## 2025-07-07 PROCEDURE — 11000001 HC ACUTE MED/SURG PRIVATE ROOM

## 2025-07-07 PROCEDURE — 97535 SELF CARE MNGMENT TRAINING: CPT

## 2025-07-07 PROCEDURE — 82374 ASSAY BLOOD CARBON DIOXIDE: CPT

## 2025-07-07 PROCEDURE — 84100 ASSAY OF PHOSPHORUS: CPT

## 2025-07-07 RX ORDER — ONDANSETRON HYDROCHLORIDE 2 MG/ML
4 INJECTION, SOLUTION INTRAVENOUS EVERY 8 HOURS PRN
Status: DISCONTINUED | OUTPATIENT
Start: 2025-07-07 | End: 2025-07-08 | Stop reason: HOSPADM

## 2025-07-07 RX ORDER — ONDANSETRON 4 MG/1
4 TABLET, ORALLY DISINTEGRATING ORAL EVERY 6 HOURS PRN
Status: DISCONTINUED | OUTPATIENT
Start: 2025-07-07 | End: 2025-07-08 | Stop reason: HOSPADM

## 2025-07-07 RX ORDER — HYDROMORPHONE HYDROCHLORIDE 1 MG/ML
0.2 INJECTION, SOLUTION INTRAMUSCULAR; INTRAVENOUS; SUBCUTANEOUS EVERY 4 HOURS PRN
Status: DISCONTINUED | OUTPATIENT
Start: 2025-07-07 | End: 2025-07-08 | Stop reason: HOSPADM

## 2025-07-07 RX ORDER — METHOCARBAMOL 500 MG/1
500 TABLET, FILM COATED ORAL 4 TIMES DAILY
Status: DISCONTINUED | OUTPATIENT
Start: 2025-07-07 | End: 2025-07-08 | Stop reason: HOSPADM

## 2025-07-07 RX ADMIN — OXYCODONE HYDROCHLORIDE 10 MG: 5 TABLET ORAL at 02:07

## 2025-07-07 RX ADMIN — PIPERACILLIN SODIUM AND TAZOBACTAM SODIUM 4.5 G: 4; .5 INJECTION, POWDER, FOR SOLUTION INTRAVENOUS at 08:07

## 2025-07-07 RX ADMIN — OXYCODONE HYDROCHLORIDE 10 MG: 5 TABLET ORAL at 08:07

## 2025-07-07 RX ADMIN — METHOCARBAMOL 500 MG: 500 TABLET ORAL at 08:07

## 2025-07-07 RX ADMIN — SIMETHICONE 80 MG: 80 TABLET, CHEWABLE ORAL at 08:07

## 2025-07-07 RX ADMIN — Medication 6 MG: at 08:07

## 2025-07-07 RX ADMIN — ACETAMINOPHEN 1000 MG: 500 TABLET ORAL at 08:07

## 2025-07-07 RX ADMIN — POLYETHYLENE GLYCOL 3350 17 G: 17 POWDER, FOR SOLUTION ORAL at 08:07

## 2025-07-07 RX ADMIN — CITALOPRAM HYDROBROMIDE 20 MG: 20 TABLET ORAL at 08:07

## 2025-07-07 RX ADMIN — BUSPIRONE HYDROCHLORIDE 15 MG: 5 TABLET ORAL at 08:07

## 2025-07-07 RX ADMIN — HYDROMORPHONE HYDROCHLORIDE 0.5 MG: 1 INJECTION, SOLUTION INTRAMUSCULAR; INTRAVENOUS; SUBCUTANEOUS at 09:07

## 2025-07-07 RX ADMIN — HYDROMORPHONE HYDROCHLORIDE 0.5 MG: 1 INJECTION, SOLUTION INTRAMUSCULAR; INTRAVENOUS; SUBCUTANEOUS at 02:07

## 2025-07-07 RX ADMIN — HYDROMORPHONE HYDROCHLORIDE 0.2 MG: 1 INJECTION, SOLUTION INTRAMUSCULAR; INTRAVENOUS; SUBCUTANEOUS at 11:07

## 2025-07-07 RX ADMIN — OXYCODONE HYDROCHLORIDE 10 MG: 5 TABLET ORAL at 07:07

## 2025-07-07 RX ADMIN — METHOCARBAMOL 500 MG: 500 TABLET ORAL at 04:07

## 2025-07-07 RX ADMIN — LIDOCAINE 2 PATCH: 50 PATCH TOPICAL at 02:07

## 2025-07-07 RX ADMIN — OXYCODONE HYDROCHLORIDE 10 MG: 5 TABLET ORAL at 12:07

## 2025-07-07 RX ADMIN — ACETAMINOPHEN 1000 MG: 500 TABLET ORAL at 02:07

## 2025-07-07 RX ADMIN — ENOXAPARIN SODIUM 40 MG: 40 INJECTION SUBCUTANEOUS at 04:07

## 2025-07-07 RX ADMIN — PANTOPRAZOLE SODIUM 40 MG: 40 TABLET, DELAYED RELEASE ORAL at 08:07

## 2025-07-07 RX ADMIN — TAMSULOSIN HYDROCHLORIDE 0.4 MG: 0.4 CAPSULE ORAL at 08:07

## 2025-07-07 RX ADMIN — SIMETHICONE 80 MG: 80 TABLET, CHEWABLE ORAL at 02:07

## 2025-07-07 RX ADMIN — PIPERACILLIN SODIUM AND TAZOBACTAM SODIUM 4.5 G: 4; .5 INJECTION, POWDER, FOR SOLUTION INTRAVENOUS at 12:07

## 2025-07-07 RX ADMIN — BUSPIRONE HYDROCHLORIDE 15 MG: 5 TABLET ORAL at 02:07

## 2025-07-07 RX ADMIN — ATORVASTATIN CALCIUM 40 MG: 40 TABLET, FILM COATED ORAL at 08:07

## 2025-07-07 NOTE — PT/OT/SLP PROGRESS
Occupational Therapy   Treatment    Name: Patricia Ramirez  MRN: [id number]  Admitting Diagnosis:  SBO (small bowel obstruction)  23 Days Post-Op    Recommendations:     Discharge Recommendations: Low Intensity Therapy  Discharge Equipment Recommendations:  hospital bed, walker, rolling  Barriers to discharge:  None    Assessment:     Patricia Ramirez is a 69 y.o. female with a medical diagnosis of SBO (small bowel obstruction).  She presents with HOB elevated and with IV attached. Performance deficits affecting function are impaired endurance, decreased safety awareness, impaired skin. She said her AMBAR drain has been removed and that she may discharge tomorrow. She walked in the room and hallway and used bathroom with help needed only for IV pole management.     Rehab Prognosis:  Good; patient would benefit from acute skilled OT services to address these deficits and reach maximum level of function.       Plan:     Patient to be seen 3 x/week to address the above listed problems via self-care/home management, therapeutic activities, therapeutic exercises  Plan of Care Expires: 07/14/25  Plan of Care Reviewed with: patient    Subjective     Chief Complaint: she said her bottom is also getting better   Patient/Family Comments/goals: she has the hospital bed at home   Pain/Comfort:  Pain Rating 1: 0/10    Objective:     Communicated with: RNNicole,  prior to session.  Patient found HOB elevated with PICC line, telemetry, peripheral IV upon OT entry to room.    General Precautions: Standard, fall    Orthopedic Precautions:N/A  Braces: N/A  Respiratory Status: Room air     Occupational Performance:     Bed Mobility:    Patient completed Rolling/Turning to Left with  modified independence  Patient completed Scooting/Bridging with modified independence  Patient completed Supine to Sit with modified independence     Functional Mobility/Transfers:  Patient completed Sit <> Stand Transfer with modified independence  with  rolling  walker   Patient completed Bed <> Chair Transfer using Step Transfer technique with modified independence with rolling walker  Functional Mobility: Patient walked in room and hallway with RW with SBA and only assistance needed for IV pole management     Activities of Daily Living:  Toileting: modified independence sitting on toilet       [facility] 6 Click ADL: 22    Treatment & Education:  She says she will have son to help at home     Patient left up in chair with all lines intact, call button in reach, and RN  notified    GOALS:   Multidisciplinary Problems       Occupational Therapy Goals          Problem: Occupational Therapy    Goal Priority Disciplines Outcome Interventions   Occupational Therapy Goal     OT, PT/OT Progressing    Description: Goals to be met by: 7/5/2025     Patient will increase functional independence with ADLs by performing:    UE Dressing with Set-up Assistance.  LE Dressing with Stand-by Assistance.  Grooming while seated with Modified [place].  Toileting from bedside commode with Supervision for hygiene and clothing management.   Bathing seated UB and andreia care sponge  with Stand-by Assistance.  Toilet transfer to bedside commode with Contact Guard Assistance.  Upper extremity exercise program x7-10 reps no resistance, and per handout, with assistance as needed.                         DME Justifications:  Patricia requires a hospital bed due to her requiring positioning of the body in ways not feasible with an ordinary bed to alleviate pain and due to limited ability and cannot independently make changes in body position without the use of the bed.The positioning of the body cannot be sufficiently resolved by the use of pillows and wedges.   Patricia's mobility limitation cannot be sufficiently resolved by the use of a cane. Her functional mobility deficit can be sufficiently resolved with the use of a Rolling Walker. Patient's mobility limitation significantly impairs their ability to  participate in one of more activities of daily living.  The use of a RW will significantly improve the patient's ability to participate in MRADLS and the patient will use it on regular basis in the home.    Time Tracking:     OT Date of Treatment: 07/07/25  OT Start Time: 1145  OT Stop Time: 1200  OT Total Time (min): 15 min    Billable Minutes:Self Care/Home Management 15     OT/CHANELL: OT          7/7/2025

## 2025-07-07 NOTE — PROGRESS NOTES
[facility] [place] [facility]  Adult Nutrition  Progress Note    SUMMARY       Recommendations    Recommendation:   1. Continue current regular diet as tolerated.   2. Continue to provide Boost Plus TID.   3. Monitor weight/labs.   4. RD to follow and monitor intake    Goals:  Pt intake to remain >/= 75% EEN/EPN by RD follow up    Nutrition Goal Status: new  Communication of RD Recs: other (comment) (POC)    Nutrition Discharge Planning    Nutrition Discharge Planning: Oral supplement regimen (comments)  Oral supplement regimen (comments): Boost Plus TID    Assessment and Plan    GI  * SBO (small bowel obstruction)  Nutrition Problem  Inadequate energy intake    Related to (etiology):   Physiological state    Signs and Symptoms (as evidenced by):   Pt with altered GI track, fluctuating appetite. Calorie count showing 50-75% intake of meals with Boost Plus averaging BID for intake.    Admitted for recurrent SBO (failed conservative management) s/p diagnostic laparoscopy converted exploratory laparotomy, SBR and anastomosis, appendectomy, diverticulectomy 6/14, IR abscess drainage 6/24.     Interventions:  Collaboration with other providers  [employer]- Boost Plus TID    Nutrition Diagnosis Status:   Improving             Reason for Assessment    Reason For Assessment: RD follow-up  Diagnosis:  (SBO)  General Information Comments: Pt receiving Regular diet with Boost Plus TID. Pt with % intake of meals recorded in chart. Per calorie count pt is consuming about 50% of meals. Clinimix 4.25/5 discontinued. Midline cath, drain. Shay Score: 16 incision abd, pressure injudry sacal spine NFPE not completed 2/2 pt declined. Pt has gained weight during stay per chart has gone from ~60 kg to ~ 64-68 kg    Nutrition/Diet History    Food Preferences: No cultural, spiritual or relgious food preferences identified  Spiritual, Cultural Beliefs, [employer]ian Practices, Values that Affect Care: no  Food Allergies: NKFA  Factors  "Affecting Nutritional Intake: decreased appetite, pain  Nutrition-related SDOH: None Identified    Anthropometrics    Height: 5' 8" (172.7 cm)  Height (inches): 68 in  Height Method: Stated  Weight: 68.6 kg (151 lb 3.8 oz)  Weight (lb): 151.24 lb  Weight Method: Bed Scale  Ideal Body Weight (IBW), Female: 140 lb  % Ideal Body Weight, Female (lb): 101.41 %  BMI (Calculated): 23  BMI Grade: 18.5-24.9 - normal       Lab/Procedures/Meds    Pertinent Labs Reviewed: reviewed  Pertinent Labs Comments: Ca 7.9, Prealb 19  Pertinent Medications Reviewed: reviewed  Pertinent Medications Comments: acetaminophen, statin, buspirone, citalopram, enoxaparin, pantoprazole, poly glycol, simethicone, tamsulosin    Estimated/Assessed Needs    Weight Used For Calorie Calculations: 68.6 kg (151 lb 3.8 oz)  Energy Calorie Requirements (kcal): 2058  Energy Need Method: Kcal/kg (30 kcal/kg)  Protein Requirements: 83-96 (1.2-1.4 gm/kg)  Weight Used For Protein Calculations: 68.6 kg (151 lb 3.8 oz)  Fluid Requirements (mL): 1ml/kcal  Estimated Fluid Requirement Method: RDA Method (or PER MD)  RDA Method (mL): 2058  CHO Requirement: 225 gm/day      Nutrition Prescription Ordered    Current Diet Order: Regular  Oral Nutrition Supplement: Boost Plus TID    Evaluation of Received Nutrient/Fluid Intake    % Kcal Needs: -  % Protein Needs: -  I/O: +720  Energy Calories Required: not meeting needs  Protein Required: not meeting needs  Fluid Required: meeting needs  Comments: LBM 7/4  Tolerance: tolerating (abdominal pain remains present)  % Intake of Estimated Energy Needs: 50 - 75 %  % Meal Intake: 50 - 75 %        Nutrition Risk    Level of Risk/Frequency of Follow-up:  (2x/week)     Monitor and Evaluation    Monitor and Evaluation: Diet order, Energy intake, Protein intake, Weight, Beliefs and attitudes, Electrolyte and renal panel, Gastrointestinal profile, Glucose/endocrine profile, Inflammatory profile, Lipid profile, Nutrition focused " physical findings     Nutrition Follow-Up    RD Follow-up?: Yes

## 2025-07-07 NOTE — PROGRESS NOTES
Surgery Progress Note    Patricia Ramirez is a 69 y.o. year old female in hospital for recurrent SBO (failed conservative management) s/p diagnostic laparoscopy converted exploratory laparotomy, SBR and anastomosis, appendectomy, diverticulectomy 6/14, IR abscess drainage 6/24    NAEON. Afebrile, HDS.   Pt reports pain is persistent to lower abdomen and at drain site. Denies incisional issues.  Tolerating regular diet without nausea/vomiting. Passing gas and having regular BM.   States she gets OOB independently and is completing ADLs on her own at this point although expresses concern to return home given need for assistance with packing changes and overall feeling that she is not getting better.    PE:  Vitals:    07/07/25 0431 07/07/25 0715 07/07/25 0742 07/07/25 0756   BP: 114/70  128/75    BP Location: Left arm  Left arm    Patient Position: Lying  Lying    Pulse: 62  62    Resp: 17 18 18 17   Temp: 98.2 °F (36.8 °C)  98.2 °F (36.8 °C)    TempSrc: Oral  Oral    SpO2: 99%  99%    Weight:       Height:         Intake/Output - Last 3 Shifts         07/05 0700  07/06 0659 07/06 0700 07/07 0659 07/07 0700  07/08 0659    Other 20      Total Intake(mL/kg) 20 (0.3)      Urine (mL/kg/hr) 1050 (0.6)      Other 15      Stool 0      Total Output 1065      Net -1045             Unmeasured Urine Occurrence 2 x 4 x     Unmeasured Stool Occurrence 0 x 1 x           General: Awake, alert, no acute distress  Resp: Non-labored breathing  CV: Warm and well perfused, regular rate  Abdomen: Soft, mildly TTP lower quadrants, appropriate andreia-incisional tenderness to palpation, nondistended without rebound or guarding. Midline incision with bottom staples removed, wet-to-dry packing in place. IR drain with scant serosanguinous fluid in bag.   MSK: No obvious abnormalities  Skin: Warm and well-perfused  Neuro: No focal neurological deficits     Significant Diagnostic Studies:   Recent Labs   Lab 07/06/25  0459         K 4.5       CO2 25   BUN 15   CREATININE 0.5   CALCIUM 8.0*   MG 2.1     Recent Labs   Lab 07/06/25  0459 07/07/25  0559   WBC 10.89 10.25   HGB 10.3* 9.7*   HCT 33.2* 31.1*    442     Lab Results   Component Value Date    INR 0.9 06/24/2025    INR 1.0 07/28/2022    PROTIME 10.4 06/24/2025     A/P:  Patricia Ramirez is a 69 y.o. year old female  in hospital for recurrent SBO (failed conservative management), POD#1 from diagnostic laparoscopy converted exploratory laparotomy, SBR and anastomosis, appendectomy, diverticulectomy 6/14. CT A/P with pelvic fluid collection. Persistent leukocytosis, improved s/p IR drain 6/25. Prealbumin 7, started on PPN 6/25. Purulent drainage from inferior aspect of midline incision 6/28, pending cx. Repeat prealbumin 11 on 6/30, continues on PPN. WBC remains mildly elevated with IR cx 6/25 growing E coli, klebsiella, bacteroides (sensitive to zosyn) and abdominal abscess cx sent 6/28, cx with klebsiella (sensitive to zosyn) which is most likely source. CT A/P 6/30 without new fluid collection, decreased size of pelvic fluid collection with IR drain in place, stable left abdominal fluid collection without window for perc drainage on personal review. Pt has remains afebrile with minimal output from IR drain.    - IR drain care, please record bid flushes and output total to I/O report. Discussing with IR re: CT results from 7/6 with findings stable to slight decrease in size lower abdominal fluid collections and minimal drain output  - Continue zosyn, EOT TBD. Afebrile with resolved leukocytosis 7/6   - Regular diet, boost plus supplements  - MMPC  - PT/OT, dispo recs appreciated  - Replete electrolytes to maintain K > 4, Phos > 3, Mg > 2  - Consult wound care for assistance with packing changes, pt teaching, appreciate recs  - DVT ppx     *Plan discussed with staff, Dr. Kris Cruz MD  [facility] General Surgery Resident  7/7/2025 9:18 AM

## 2025-07-07 NOTE — NURSING
PER handoff received from VALERIE Simon. Pt calmly awake in bed. NAD noted. Reports 9/10 pain. Given PRN oxy. Fall and safety precautions maintained. Bed locked in lowest position, with side rails up x2. Call bell and personal items within reach.    [facility] [place], [place]  Nurses Note -- 4 Eyes      7/7/2025       Skin assessed on: Q Shift      [] No Pressure Injuries Present    [x]Prevention Measures Documented    [x] Yes LDA  for Pressure Injury Previously documented     [] Yes New Pressure Injury Discovered   [] LDA for New Pressure Injury Added      Attending RN:  Nicole Durán RN     Second RN:  VALERIE Simon

## 2025-07-07 NOTE — PLAN OF CARE
Changes in medical condition or discharge plan: No    Does patient need new DME? Hospital bed and rolling walker    Follow up appts needed: General surgery    Medically stable: No    Estimated Discharge Date: TBD    Per attending, patient not medically stable for discharge.  Per chart review, patient complaining of persistent in lower abdomen and at drain site - placed 6/25.  Continue drain care and Zosyn, EOT TBD.  When medically stable, patient to discharge with home health from [facility], [phone].  Patient confirmed hospital bed has been delivered to home and rolling will be delivered to bedside when discharging.  CM to continue to assess for and until discharge.    07/07/25 0929   Discharge Reassessment   Assessment Type Discharge Planning Reassessment   Did the patient's condition or plan change since previous assessment? No   Communicated STUART with patient/caregiver Date not available/Unable to determine   Discharge Plan A Home Health   Discharge Plan B Home with family   DME Needed Upon Discharge  hospital bed;walker, rolling   Transition of Care Barriers None   Why the patient remains in the hospital Requires continued medical care   Post-Acute Status   Post-Acute Authorization Home Health   Home Health Status Set-up Complete/Auth obtained   Hospital Resources/Appts/Education Provided Provided patient/caregiver with written discharge plan information   Discharge Delays None known at this time

## 2025-07-07 NOTE — PLAN OF CARE
Problem: Adult Inpatient Plan of Care  Goal: Patient-Specific Goal (Individualized)  Outcome: Progressing     Problem: Wound  Goal: Optimal Coping  Outcome: Progressing  Goal: Optimal Functional Ability  Outcome: Progressing  Goal: Absence of Infection Signs and Symptoms  Outcome: Progressing  Goal: Optimal Pain Control and Function  Outcome: Progressing     Problem: Skin Injury Risk Increased  Goal: Skin Health and Integrity  Outcome: Progressing     Problem: Pain Acute  Goal: Optimal Pain Control and Function  Outcome: Progressing     Problem: Intestinal Obstruction  Goal: Absence of Infection Signs and Symptoms  Outcome: Progressing  Goal: Optimize Nutrition Status  Outcome: Progressing     Problem: Bowel Resection  Goal: Effective Bowel Elimination  Outcome: Progressing  Goal: Absence of Infection Signs and Symptoms  Outcome: Progressing  Goal: Optimal Pain Control and Function  Outcome: Progressing     Problem: Comorbidity Management  Goal: Blood Pressure in Desired Range  Outcome: Progressing

## 2025-07-07 NOTE — CONSULTS
[facility] - [place], [place] - Telemetry  Wound Care  WOC YOLI    Patient Name:  Patricia Ramirez   MRN:  [id number]  Date: 7/7/2025  Diagnosis: SBO (small bowel obstruction)    History:     Past Medical History:   Diagnosis Date    Allergy     Anxiety     Arthritis     Cataract     Depressive disorder, not elsewhere classified     Esophageal reflux     Hypertension     Impaired fasting glucose     Joint pain     Obesity, unspecified     Other and unspecified hyperlipidemia        Social History[1]    Precautions:     Allergies as of 06/14/2025 - Reviewed 06/14/2025   Allergen Reaction Noted    Sulfa (sulfonamide antibiotics) Hives 05/08/2013    Ace inhibitors Other (See Comments) 10/29/2014       [facility] Assessment Details/Treatment     Active Problem List with Overview Notes    Diagnosis Date Noted    Pressure injury of deep tissue of sacral region 06/25/2025    Hypokalemia 06/11/2025    SBO (small bowel obstruction) 06/09/2025    Gastric outlet obstruction 05/21/2025    Epigastric pain 11/20/2024    History of laparoscopic cholecystectomy 11/20/2024    Moderate major depression, single episode 10/06/2024    Tobacco dependency 10/06/2024    Choledocholithiasis 10/06/2024    Nuclear sclerotic cataract of left eye 10/27/2022    Heart failure with preserved ejection fraction 09/26/2022     ECHO (7/29/22) EF 65% LV grade 2 diastolic dysfunction.      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    Epiretinal membrane, left 04/12/2022     Retinal detachment of left eye with multiple breaks 02/28/2022    Rosacea 03/03/2021    Brow ptosis, bilateral     Nuclear sclerosis of both eyes 10/26/2020    Dermatochalasis 10/26/2020    Migraine with aura and without status migrainosus, not intractable 09/21/2016    Essential hypertension 12/29/2014    Hyperlipidemia 12/29/2014    GERD (gastroesophageal reflux disease) 12/29/2014    Depression with anxiety 12/29/2014      Consulted for wounds lower midline  A 69 year old female admitted 6/14/25 from home with complaint of abdominal pain with constipation.   6/14 S/P Exploratory laparotomy; appendectomy; SB resection; diverticulum resection per Dr. Ponce for SBO  7/7 WBC 10.25 Hgb 9.7 Hct 31.1  7/5 Alb 1.7 Weight 68.8 kg   Photodocumentation (from Media 7/7)    Midline abdominal incision  7/1 Surgery ordered daily wet to dry gauze packing with abdominal binder  Assessment:  Nursing recently changed dressing to midline abdominal incision. Reports packing green/red colored, but not saturated. Wound tunnels under staples.  Plan:  Assess abdominal incision 7/8 and begin patient teaching.  Will determine if able to use an advanced wound dressing that can be changed every other day.  Discussed with nursing.     07/07/2025         [1]   Social History  Socioeconomic History    Marital status:     Years of education: 12   Tobacco Use    Smoking status: Every Day     Current packs/day: 0.75     Average packs/day: 0.8 packs/day for 40.0 years (30.0 ttl pk-yrs)     Types: Cigarettes    Smokeless tobacco: Never   Substance and Sexual Activity    Alcohol use: Yes     Comment: rarely    Drug use: Yes     Frequency: 14.0 times per week     Types: Marijuana    Sexual activity: Yes     Partners: Male     Birth control/protection: See Surgical Hx   Other Topics Concern    Are you pregnant or think you may be? No    Breast-feeding No     Social Drivers of Health     Financial Resource Strain: Low Risk  (6/14/2025)     Overall Financial Resource Strain (CARDIA)     Difficulty of Paying Living Expenses: Not very hard   Food Insecurity: No Food Insecurity (6/14/2025)    Hunger Vital Sign     Worried About Running Out of Food in the Last Year: Never true     Ran Out of Food in the Last Year: Never true   Transportation Needs: No Transportation Needs (6/14/2025)    PRAPARE - Transportation     Lack of Transportation (Medical): No     Lack of Transportation (Non-Medical): No   Physical Activity: Inactive (6/9/2025)    Exercise Vital Sign     Days of Exercise per Week: 0 days     Minutes of Exercise per Session: 0 min   Stress: Stress Concern Present (6/14/2025)    [employer] [place] [employer] - Occupational Stress Questionnaire     Feeling of Stress : Very much   Housing Stability: Low Risk  (6/14/2025)    Housing Stability Vital Sign     Unable to Pay for Housing in the Last Year: No     Number of Times Moved in the Last Year: 0     Homeless in the Last Year: No

## 2025-07-07 NOTE — PLAN OF CARE
Recommendation:   1. Continue current regular diet as tolerated.   2. Continue to provide Boost Plus TID.   3. Monitor weight/labs.   4. RD to follow and monitor intake    Goals:  Pt intake to remain >/= 75% EEN/EPN by RD follow up    Nutrition Goal Status: new  Communication of RD Recs: other (comment) (POC)

## 2025-07-07 NOTE — NURSING
Pt lying in bed, reporting 8/10 pain. Noted dressing to abdomen, D/C/I. [place]or[place] drain present to left lower back. Sacral spine wound with mepilex foam present. Bed alarm set, call light in reach, instructed pt to call for assistance.      [facility]  Nurses Note -- 4 Eyes      7/6/2025       Skin assessed on: Q Shift      [] No Pressure Injuries Present    []Prevention Measures Documented    [x] Yes LDA  for Pressure Injury Previously documented     [] Yes New Pressure Injury Discovered   [] LDA for New Pressure Injury Added      Attending RN:  Aurora Botello, VALERIE     Second RN:  Mak RN

## 2025-07-07 NOTE — ASSESSMENT & PLAN NOTE
Nutrition Problem  Inadequate energy intake    Related to (etiology):   Physiological state    Signs and Symptoms (as evidenced by):   Pt with altered GI track, fluctuating appetite. Calorie count showing 50-75% intake of meals with Boost Plus averaging BID for intake.    Admitted for recurrent SBO (failed conservative management) s/p diagnostic laparoscopy converted exploratory laparotomy, SBR and anastomosis, appendectomy, diverticulectomy 6/14, IR abscess drainage 6/24.     Interventions:  Collaboration with other providers  [employer]- Boost Plus TID    Nutrition Diagnosis Status:   Improving

## 2025-07-07 NOTE — PT/OT/SLP PROGRESS
Physical Therapy Treatment/6th visit    Patient Name:  Patricia Ramirez   MRN:  [id number]    Recommendations:     Discharge Recommendations: Low Intensity Therapy  Discharge Equipment Recommendations: walker, rolling  Barriers to discharge: None    Assessment:     Patricia Ramirez is a 69 y.o. female admitted with a medical diagnosis of SBO (small bowel obstruction).  She presents with the following impairments/functional limitations: weakness, impaired endurance, impaired balance, decreased lower extremity function, pain, impaired skin, edema.    Rehab Prognosis: Good; patient would benefit from acute skilled PT services to address these deficits and reach maximum level of function.    Recent Surgery: Procedure(s) (LRB):  LAPAROSCOPY, DIAGNOSTIC (N/A)  LAPAROTOMY, EXPLORATORY; APPENDECTOMY; SMALL BOWEL RESECTION; DIVERTICULUM RESECTION (N/A) 23 Days Post-Op    Plan:     During this hospitalization, patient to be seen 3 x/week to address the identified rehab impairments via gait training, therapeutic activities, therapeutic exercises, neuromuscular re-education and progress toward the following goals:    Plan of Care Expires:  07/21/25    Subjective     Chief Complaint: N/A  Patient/Family Comments/goals: Pt agreeable to therapy.   Pain/Comfort:  Pain Rating 1:  (Pt c/o min pain to abdomen and back.)  Pain Addressed 1: Reposition, Distraction, Cessation of Activity (Pt declined pain meds.)      Objective:     Patient found up in chair on seat air cushion reclined with BLE elevated on pillow with PICC line upon PT entry to room.     General Precautions: Standard, fall, [place], abdominal precautions  Orthopedic Precautions: N/A  Braces: N/A  Respiratory Status: Room air     Functional Mobility:  Pt with minimal edema to BLE (RLE>LLE), encouraged elevation of LE and performance of AP's throughout the day.  Pt feeling better, eager to go home with possible discharge tomorrow.  Pt reported will have assistance from son.    Ramon  Mobility:     Rolling Left: modified independence  Scooting: modified independence  Sit to Supine: modified independence  Transfers:     Sit to Stand: supervision and stand by assistance with no AD  Bed to Chair: supervision and stand by assistance with  no AD  using  Step Transfer  Chair to bed: supervision and stand by assistance with  no AD  using  Step Transfer  Toilet Transfer: supervision and stand by assistance with  no AD  using  Step Transfer; Pt ambulated to the bathroom and performed hand washing at the sink with SBA/S.    Gait: Pt ambulated ~500 ft with CGA-SBA using no AD.  Pt with mild unsteady gait, decreased step length, decreased leonides, and decreased endurance.  Pt required 1 standing rest break.    Balance: Pt with fair dynamic standing balance.      AM-PAC 6 CLICK MOBILITY  Turning over in bed (including adjusting bedclothes, sheets and blankets)?: 4  Sitting down on and standing up from a chair with arms (e.g., wheelchair, bedside commode, etc.): 4  Moving from lying on back to sitting on the side of the bed?: 4  Moving to and from a bed to a chair (including a wheelchair)?: 4  Need to walk in hospital room?: 4  Climbing 3-5 steps with a railing?: 3  Basic Mobility Total Score: 23       Treatment & Education:  BLE standing therex x10 reps with B hand-held assistance: marches, calf raises, hip abd, and hip ext    Pt encouraged OOB>chair and ambulation with family/nursing supervision while in the hospital.  Pt re-educated on abdominal precautions: log roll for bed mobility, no heavy lifting >5-10lbs, and avoid abdominal strain.  Pt educated on taking seat air cushion home upon discharge.  Pt verbalized good understanding.     Patient left left sidelying, HOB elevated, and pillow between LE with all lines intact and call button in reach.  Tray table close by.     GOALS:   Multidisciplinary Problems       Physical Therapy Goals          Problem: Physical Therapy    Goal Priority Disciplines Outcome  Interventions   Physical Therapy Goal     PT, PT/OT Progressing    Description: Goals to be met by: 25     Patient will increase functional independence with mobility by performin. Supine<>sit with Modified [place]  2. Rolling with Modified [place]  3. Sit to stand transfer with Modified [place]  4. Gait x500 feet with Modified [place] using no AD  5. Lower extremity exercise program (within abdominal precautions) x30 reps per handout, with independence                         DME Justifications:  Patricia's mobility limitation cannot be sufficiently resolved by the use of a cane. Her functional mobility deficit can be sufficiently resolved with the use of a Rolling Walker. Patient's mobility limitation significantly impairs their ability to participate in one of more activities of daily living.  The use of a RW will significantly improve the patient's ability to participate in MRADLS and the patient will use it on regular basis in the home.    Time Tracking:     PT Received On: 25  PT Start Time: 1334     PT Stop Time: 1359  PT Total Time (min): 25 min     Billable Minutes: Therapeutic Activity 15 min and Therapeutic Exercise 10 min    Treatment Type: Treatment, 6th Visit  PT/PTA: PT     Number of PTA visits since last PT visit: 0     2025

## 2025-07-08 VITALS
BODY MASS INDEX: 22.92 KG/M2 | WEIGHT: 151.25 LBS | OXYGEN SATURATION: 96 % | HEIGHT: 68 IN | TEMPERATURE: 99 F | DIASTOLIC BLOOD PRESSURE: 63 MMHG | SYSTOLIC BLOOD PRESSURE: 126 MMHG | RESPIRATION RATE: 18 BRPM | HEART RATE: 64 BPM

## 2025-07-08 LAB — POCT GLUCOSE: 124 MG/DL (ref 70–110)

## 2025-07-08 PROCEDURE — 99900035 HC TECH TIME PER 15 MIN (STAT)

## 2025-07-08 PROCEDURE — 25000003 PHARM REV CODE 250

## 2025-07-08 PROCEDURE — 25000003 PHARM REV CODE 250: Performed by: STUDENT IN AN ORGANIZED HEALTH CARE EDUCATION/TRAINING PROGRAM

## 2025-07-08 PROCEDURE — 94761 N-INVAS EAR/PLS OXIMETRY MLT: CPT

## 2025-07-08 PROCEDURE — 99223 1ST HOSP IP/OBS HIGH 75: CPT | Mod: 24,,,

## 2025-07-08 RX ORDER — OXYCODONE HYDROCHLORIDE 5 MG/1
5 TABLET ORAL EVERY 6 HOURS PRN
Qty: 12 TABLET | Refills: 0 | Status: SHIPPED | OUTPATIENT
Start: 2025-07-08 | End: 2025-07-11

## 2025-07-08 RX ADMIN — POLYETHYLENE GLYCOL 3350 17 G: 17 POWDER, FOR SOLUTION ORAL at 08:07

## 2025-07-08 RX ADMIN — METHOCARBAMOL 500 MG: 500 TABLET ORAL at 12:07

## 2025-07-08 RX ADMIN — ACETAMINOPHEN 1000 MG: 500 TABLET ORAL at 08:07

## 2025-07-08 RX ADMIN — METHOCARBAMOL 500 MG: 500 TABLET ORAL at 08:07

## 2025-07-08 RX ADMIN — BUSPIRONE HYDROCHLORIDE 15 MG: 5 TABLET ORAL at 08:07

## 2025-07-08 RX ADMIN — OXYCODONE HYDROCHLORIDE 10 MG: 5 TABLET ORAL at 09:07

## 2025-07-08 RX ADMIN — SIMETHICONE 80 MG: 80 TABLET, CHEWABLE ORAL at 08:07

## 2025-07-08 RX ADMIN — CITALOPRAM HYDROBROMIDE 20 MG: 20 TABLET ORAL at 08:07

## 2025-07-08 RX ADMIN — TAMSULOSIN HYDROCHLORIDE 0.4 MG: 0.4 CAPSULE ORAL at 08:07

## 2025-07-08 RX ADMIN — PANTOPRAZOLE SODIUM 40 MG: 40 TABLET, DELAYED RELEASE ORAL at 08:07

## 2025-07-08 NOTE — DISCHARGE SUMMARY
[facility]     Patient ID:  Patricia Ramirez  [id number]  69 y.o.  1955    Admit date: 6/14/2025    Discharge Date and Time: 07/08/2025 12:53 PM    Admitting Physician: Yuan Washington MD     Discharge Provider: Sharon Jordan    Reason for Admission: Small bowel obstruction [K56.609]  Chest pain [R07.9]    Admission Condition: good    Procedures Performed: Procedure(s) (LRB):  LAPAROSCOPY, DIAGNOSTIC (N/A)  LAPAROTOMY, EXPLORATORY; APPENDECTOMY; SMALL BOWEL RESECTION; DIVERTICULUM RESECTION (N/A)    Hospital Course (synopsis of major diagnoses, care, treatment, and services provided during the course of the hospital stay):   Patricia Ramirez is a 69 y.o. year old female in hospital for recurrent SBO (failed conservative management) s/p diagnostic laparoscopy converted exploratory laparotomy, SBR and anastomosis, appendectomy, diverticulectomy 6/14, IR abscess drainage 6/24. Repeat CT A/P show intra-abdominal fluid collections that have improved in size. Leukocytosis resolved, afebrile and no additional white count off of antibiotics. Meeting all milestones for discharge: tolerating regular diet without nausea/vomiting, having return of bowel function, voiding spontaneously, pain is well-controlled with oral agents. Patient will be discharged with home health for wound care, a commode, a hospital bed, and a rolling walker.      Consults: Wound care, IR, Nutrition, Hospital medicine    Significant Diagnostic Studies:   CT A/P 7/5  Multiple abdominopelvic peripherally enhancing fluid collections have, overall improved. Collection in left mid mesentery region measures 3.9 x 2.4 cm (series 4, image 96), previously 6.8 x 3.9 cm. Similar configuration of peripheral enhancing collection in the pelvis noting decreased size with stable positioning of left posterior approach drainage catheter, again difficult to measure. Component on the right measures 1.2 cm in diameter (series 4, image 139)  previously 1.9 cm. No new collection elsewhere.     Final Diagnoses:    Principal Problem: SBO (small bowel obstruction)   Secondary Diagnoses: Superficial wound dehiscence, intraabdominal abscesses     Discharged Condition: good    Discharge Exam:  General: Awake, alert, no acute distress  Resp: Non-labored breathing  CV: Warm and well perfused, regular rate  Abdomen: Soft, appropriately tender to palpation, nondistended without rebound or guarding. Lower midline laparotomy incision open with packing, scant serosanguinous drainage noted. Prior IR drain site c/d/I   MSK: No obvious abnormalities  Skin: Warm and well-perfused  Neuro: No focal neurological deficits       Disposition: Home or Self Care    Follow Up/Patient Instructions:     Medications:  Reconciled Home Medications:      Medication List        START taking these medications      oxyCODONE 5 MG immediate release tablet  Commonly known as: ROXICODONE  Take 1 tablet (5 mg total) by mouth every 6 (six) hours as needed for Pain (Breakthrough pain, especially for dressing changes).            CONTINUE taking these medications      acetaminophen 500 MG tablet  Commonly known as: TYLENOL  Take 500 mg by mouth every 6 (six) hours as needed for Pain.     ALPRAZolam 0.25 MG tablet  Commonly known as: XANAX  TAKE 1 TABLET BY MOUTH THREE TIMES DAILY     atorvastatin 40 MG tablet  Commonly known as: LIPITOR  Take 1 tablet by mouth once daily     busPIRone 15 MG tablet  Commonly known as: BUSPAR  TAKE 1 TABLET BY MOUTH THREE TIMES DAILY     citalopram 20 MG tablet  Commonly known as: CeleXA  Take 3 tablets (60 mg total) by mouth once daily. Take 1 tablet by mouth once daily     cyclobenzaprine 5 MG tablet  Commonly known as: FLEXERIL  TAKE 1 TABLET BY MOUTH TWICE DAILY AS NEEDED FOR MUSCLE SPASM     ergocalciferol 50,000 unit Cap  Commonly known as: ERGOCALCIFEROL  Take 1 capsule (50,000 Units total) by mouth every 7 days.     olmesartan 20 MG tablet  Commonly known  "as: BENICAR  TAKE 1 TABLET BY MOUTH ONCE DAILY IN THE EVENING     ondansetron 4 MG tablet  Commonly known as: ZOFRAN  Take 1 tablet (4 mg total) by mouth every 8 (eight) hours as needed.     pantoprazole 40 MG tablet  Commonly known as: PROTONIX  Take 1 tablet (40 mg total) by mouth once daily.     polyethylene glycol 17 gram Pwpk  Commonly known as: GLYCOLAX  Take 17 g by mouth once daily.     polyethylene glycol 240-22.72-6.72 -5.84 gram Solr  Commonly known as: COLYTE  Take as directed by provider office     traZODone 100 MG tablet  Commonly known as: DESYREL  Take 100 mg by mouth every evening.            Discharge Procedure Orders   COMMODE FOR HOME USE     Order Specific Question Answer Comments   Type: Standard    Height: 5' 8" (1.727 m)    Weight: 60.3 kg (132 lb 15 oz)    Does patient have medical equipment at home? none    Length of need (1-99 months): 99      HOSPITAL BED FOR HOME USE     Order Specific Question Answer Comments   Type: Semi-electric    Length of need (1-99 months): 3    Does patient have medical equipment at home? none    Height: 5' 8" (1.727 m)    Weight: 60.3 kg (132 lb 15 oz)    Please check all that apply: Patient requires frequent changes in body position and/or has an immediate need for a change in body position.      WALKER FOR HOME USE     Order Specific Question Answer Comments   Type of Walker: Adult (5'4"-6'6")    With wheels? Yes    Height: 5' 8" (1.727 m)    Weight: 64.4 kg (141 lb 15.6 oz)    Length of need (1-99 months): 99    Does patient have medical equipment at home? bath bench    Please check all that apply: Patient's condition impairs ambulation.    Please check all that apply: Patient is unable to safely ambulate without equipment.      Diet Adult Regular     HOME HEALTH ORDERS     Order Specific Question Answer Comments   What Home Health Agency is the patient currently using? Other/External [facility]     Notify your health care provider if you " experience any of the following:  temperature >100.4     Notify your health care provider if you experience any of the following:  persistent nausea and vomiting or diarrhea     Notify your health care provider if you experience any of the following:  severe uncontrolled pain     Notify your health care provider if you experience any of the following:  redness, tenderness, or signs of infection (pain, swelling, redness, odor or green/yellow discharge around incision site)     Notify your health care provider if you experience any of the following:  increased confusion or weakness     Change dressing (specify)   Order Comments: Dressing change: Midline dressing and sacral ulcer dressing every 2 days as directed with Home Health Wound Care     Reason for not Ordering Smoking Cessation Referral     Order Specific Question Answer Comments   Reason for not ordering: Not medically appropriate at this time      Reason for not Prescribing Nicotine Replacement     Order Specific Question Answer Comments   Reason for not Prescribing: Not medically appropriate at this time      Activity as tolerated   Order Comments: OK to shower and let warm, soapy water run over the incision. Pat dry afterwards to keep wound clean and dry. Do not submerge incision including baths, pools, and hottubs.    Resume normal activity and diet as tolerated. It is important to stay active after your surgery to quicken recovery. Try to walk around at least 3 times a day.    However try not to engage in strenuous activity for 4-6 weeks or until cleared from clinic:  - Do not lift anything heavier than 10 lbs, which is about the weight of a jug of milk.   - Increased straining or abdominal pressure may negatively impact wound healing of your incisions and could increase your risk of developing a hernia in the future.    You may alternate taking ibruprofen and acetaminophen every 6 hours for the first few days after surgery to minimize pain and  inflammation. Maximum daily dose of Ibruprofen is 3200 mg, maximum daily dose of acetaminophen is 3000 mg including in other medications.    You may take your prescribed pain medication every 6 hours as needed for pain not managed with ibruprofen and acetaminophen. DO NOT drive while on narcotic/opioid pain medication. Opioid medications may also cause constipation. You may take an over the counter laxative such as Miralax daily in order to keep stools soft and regular while taking pain medications. OK to skip if having diarrhea.    Notify your primary care provider or go to your nearest emergency department if:  - you develop a fever greater than 101.5 degrees  - your incision becomes red, swollen, or begins to drain a large amount of fluid   - you are unable to eat or drink, have intractable nausea or vomiting  - you have new or worsening symptoms    Please follow up in 1-2 weeks with Dr. Ponce in clinic      Contact information for after-discharge care       Home Medical Care       [facility] .    Service: Home Health Services  Contact information:  [address] [place] [address] [id number] [phone]                                 Activity: activity as tolerated  Diet: regular diet  Wound Care: as directed with Home Health Wound Care nursing     Follow-up with Dr. Ponce in 2 weeks.    Signed:  Sharon Jordan  7/8/2025  12:53 PM

## 2025-07-08 NOTE — CLINICAL REVIEW
PT/PTA met face to face to discuss patient's treatment plan and progress towards established goals.  POC updated.  Patient will be seen by physical therapist every sixth visit and minimally once per month.

## 2025-07-08 NOTE — PLAN OF CARE
Problem: Adult Inpatient Plan of Care  Goal: Patient-Specific Goal (Individualized)  Outcome: Met  Goal: Absence of Hospital-Acquired Illness or Injury  Outcome: Met  Goal: Optimal Comfort and Wellbeing  Outcome: Met  Goal: Readiness for Transition of Care  Outcome: Met     Problem: Infection  Goal: Absence of Infection Signs and Symptoms  Outcome: Met     Problem: Wound  Goal: Optimal Coping  Outcome: Met  Goal: Optimal Functional Ability  Outcome: Met  Goal: Absence of Infection Signs and Symptoms  Outcome: Met  Goal: Improved Oral Intake  Outcome: Met  Goal: Optimal Pain Control and Function  Outcome: Met  Goal: Skin Health and Integrity  Outcome: Met  Goal: Optimal Wound Healing  Outcome: Met     Problem: Skin Injury Risk Increased  Goal: Skin Health and Integrity  Outcome: Met     Problem: Pain Acute  Goal: Optimal Pain Control and Function  Outcome: Met     Problem: Intestinal Obstruction  Goal: Optimal Bowel Function  Outcome: Met  Goal: Fluid and Electrolyte Balance  Outcome: Met  Goal: Absence of Infection Signs and Symptoms  Outcome: Met  Goal: Optimize Nutrition Status  Outcome: Met  Goal: Optimal Pain Control and Function  Outcome: Met     Problem: Bowel Resection  Goal: Absence of Bleeding  Outcome: Met  Goal: Effective Bowel Elimination  Outcome: Met  Goal: Fluid and Electrolyte Balance  Outcome: Met  Goal: Absence of Infection Signs and Symptoms  Outcome: Met  Goal: Optimal Nutrition Delivery  Outcome: Met  Goal: Anesthesia/Sedation Recovery  Outcome: Met  Goal: Optimal Pain Control and Function  Outcome: Met  Goal: Nausea and Vomiting Relief  Outcome: Met  Goal: Effective Urinary Elimination  Outcome: Met  Goal: Effective Oxygenation and Ventilation  Outcome: Met     Problem: Comorbidity Management  Goal: Blood Pressure in Desired Range  Outcome: Met

## 2025-07-08 NOTE — DISCHARGE INSTRUCTIONS
Our goal at [facility] is to always give you outstanding care and exceptional service. You may receive a survey from [employer] by mail, text or e-mail in the next 24-48 hours asking about the care you received with us. The survey should only take 5-10 minutes to complete and is very important to us.     Your feedback provides us with a way to recognize our staff who work tirelessly to provide the best care! Also, your responses help us learn how to improve when your experience was below our aspiration of excellence. We are always looking for ways to improve your stay. We WILL use your feedback to continue making improvements to help us provide the highest quality care. We keep your personal information and feedback confidential. We appreciate your time completing this survey and can't wait to hear from you!!!    We look forward to your continued care with us! Thanks so much for choosing [facility] for your healthcare needs!

## 2025-07-08 NOTE — PLAN OF CARE
07/08/25 0932   Medicare Message   Important Message from Medicare regarding Discharge Appeal Rights Given to patient/caregiver;Explained to patient/caregiver;Signed/date by patient/caregiver   Date IMM was signed 07/08/25   Time IMM was signed 0921

## 2025-07-08 NOTE — PLAN OF CARE
Case Management Final Discharge Note    Discharge Disposition: Home health with [facility], [phone]     New DME ordered / company name: [facility] hospital bed delivered to patient home and [facility] rolling walker delivered to patient bedside      Relevant SDOH / Transition of Care Barriers:  None    Person available to provide assistance at home when needed and their contact information: James Strauss (Son)  [phone]       Scheduled followup appointment: Follow up appointments with Gastroenterology and General Surgery scheduled and added to patient AVS    Referrals placed: None    Transportation: Private vehicle/family taking patient home        Patient and family educated on discharge services and updated on DC plan.  [facility]; she lives with her son who is able to assist her as needed.  Bedside RN notified, patient clear to discharge from Case Management Perspective.    07/08/25 1324   Final Note   Assessment Type Final Discharge Note   Anticipated Discharge Disposition [facility]   Hospital Resources/Appts/Education Provided Provided patient/caregiver with written discharge plan information;Appointments scheduled and added to AVS;Post-Acute resouces added to AVS   Post-Acute Status   Post-Acute Authorization Home Health   Home Health Status Set-up Complete/Auth obtained   Discharge Delays None known at this time

## 2025-07-08 NOTE — NURSING
[facility] [place]  Nurses Note -- 4 Eyes      7/7/2025       Skin assessed on: Q Shift      [] No Pressure Injuries Present    []Prevention Measures Documented    [x] Yes LDA  for Pressure Injury Previously documented     [] Yes New Pressure Injury Discovered   [] LDA for New Pressure Injury Added      Attending RN:  Jennifer Carney LPN     Second RN:  Nicole Durán RN

## 2025-07-08 NOTE — CONSULTS
[facility] [place]  [place]  [facility]    Patient Name:  Patricia Ramirez   MRN:  [id number]  Date: 7/8/2025  Diagnosis: SBO (small bowel obstruction)    History:     Past Medical History:   Diagnosis Date    Allergy     Anxiety     Arthritis     Cataract     Depressive disorder, not elsewhere classified     Esophageal reflux     Hypertension     Impaired fasting glucose     Joint pain     Obesity, unspecified     Other and unspecified hyperlipidemia        Social History[1]    Precautions:     Allergies as of 06/14/2025 - Reviewed 06/14/2025   Allergen Reaction Noted    Sulfa (sulfonamide antibiotics) Hives 05/08/2013    Ace inhibitors Other (See Comments) 10/29/2014       [facility] Assessment Details/Treatment     Active Problem List with Overview Notes    Diagnosis Date Noted    Pressure injury of deep tissue of sacral region 06/25/2025    Hypokalemia 06/11/2025    SBO (small bowel obstruction) 06/09/2025    Gastric outlet obstruction 05/21/2025    Epigastric pain 11/20/2024    History of laparoscopic cholecystectomy 11/20/2024    Moderate major depression, single episode 10/06/2024    Tobacco dependency 10/06/2024    Choledocholithiasis 10/06/2024    Nuclear sclerotic cataract of left eye 10/27/2022    Heart failure with preserved ejection fraction 09/26/2022     ECHO (7/29/22) EF 65% LV grade 2 diastolic dysfunction.      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    Epiretinal membrane, left 04/12/2022  "   Retinal detachment of left eye with multiple breaks 02/28/2022    Rosacea 03/03/2021    Brow ptosis, bilateral     Nuclear sclerosis of both eyes 10/26/2020    Dermatochalasis 10/26/2020    Migraine with aura and without status migrainosus, not intractable 09/21/2016    Essential hypertension 12/29/2014    Hyperlipidemia 12/29/2014    GERD (gastroesophageal reflux disease) 12/29/2014    Depression with anxiety 12/29/2014      Consulted for wounds lower midline  Chart reviewed yesterday and discussed with nursing  Assessment:  Midline abdominal incision- Staples removed today with 2.5 cm opening at lower end of incision. Base of opening red in color. Cotton swab tracks under healed skin for 10 cm. Packing removed from incision tinged tan/green. No surrounding erythema/induration.   Upper incision with dried adherent scab/possibly scant amount of eschar along edge of incision.   Sacrum- 1 cm unstageable pressure injury filled with white slough. Base of wound firm. Scant serous drainage without surrounding erythema/induration. Painful when sitting on area. Patient turning side to side and has air cushion in chair.  Patient report not soiling sacral dressing with incontinence. Ambulatory to bathroom  States eating healthy diet.  Treatment/Plan:  Cleansed midline abdominal incision with Vashe. Filled opening with 6" Aquacel Ag ribbon and covered with dry gauze. Nursing to change dressing every other day. Instructed patient on changing cover dressing prn saturation.  Mepilex sacral dressing in place over sacral pressure injury. To add hydrogel to white slough to promote debridement. Continue foam dressing for padding/protection.  Encouraged continued smoking cessation. Informed patient of impact of smoking on wound healing.   Recommendations made to primary team. Orders placed.     07/08/2025         [1]   Social History  Socioeconomic History    Marital status:     Years of education: 12   Tobacco Use    " Smoking status: Every Day     Current packs/day: 0.75     Average packs/day: 0.8 packs/day for 40.0 years (30.0 ttl pk-yrs)     Types: Cigarettes    Smokeless tobacco: Never   Substance and Sexual Activity    Alcohol use: Yes     Comment: rarely    Drug use: Yes     Frequency: 14.0 times per week     Types: Marijuana    Sexual activity: Yes     Partners: Male     Birth control/protection: See Surgical Hx   Other Topics Concern    Are you pregnant or think you may be? No    Breast-feeding No     Social Drivers of Health     Financial Resource Strain: Low Risk  (6/14/2025)    Overall Financial Resource Strain (CARDIA)     Difficulty of Paying Living Expenses: Not very hard   Food Insecurity: No Food Insecurity (6/14/2025)    Hunger Vital Sign     Worried About Running Out of Food in the Last Year: Never true     Ran Out of Food in the Last Year: Never true   Transportation Needs: No Transportation Needs (6/14/2025)    PRAPARE - Transportation     Lack of Transportation (Medical): No     Lack of Transportation (Non-Medical): No   Physical Activity: Inactive (6/9/2025)    Exercise Vital Sign     Days of Exercise per Week: 0 days     Minutes of Exercise per Session: 0 min   Stress: Stress Concern Present (6/14/2025)    [employer] [place] [employer] - Occupational Stress Questionnaire     Feeling of Stress : Very much   Housing Stability: Low Risk  (6/14/2025)    Housing Stability Vital Sign     Unable to Pay for Housing in the Last Year: No     Number of Times Moved in the Last Year: 0     Homeless in the Last Year: No

## 2025-07-08 NOTE — PLAN OF CARE
Problem: Adult Inpatient Plan of Care  Goal: Optimal Comfort and Wellbeing  Outcome: Progressing     Problem: Wound  Goal: Optimal Coping  Outcome: Progressing     Problem: Skin Injury Risk Increased  Goal: Skin Health and Integrity  Outcome: Progressing     Problem: Pain Acute  Goal: Optimal Pain Control and Function  Outcome: Progressing     Problem: Comorbidity Management  Goal: Blood Pressure in Desired Range  Outcome: Progressing

## 2025-07-08 NOTE — NURSING
PER handoff received from RIMA Rodriguez. Pt calmly awake in bed. NAD noted. Reports 5/10 abdominal midline incision pain. Fall and safety precautions maintained. Bed locked in lowest position, with side rails up x2. Call bell and personal items within reach.    [facility] [place]  Nurses Note -- 4 Eyes      7/8/2025       Skin assessed on: Q Shift      [] No Pressure Injuries Present    [x]Prevention Measures Documented    [x] Yes LDA  for Pressure Injury Previously documented     [] Yes New Pressure Injury Discovered   [] LDA for New Pressure Injury Added      Attending RN:  Nicole Durán RN     Second RN:  RIMA Rodriguez

## 2025-07-08 NOTE — PLAN OF CARE
[facility] Telemetry      HOME HEALTH ORDERS  FACE TO FACE ENCOUNTER    Patient Name: Patricia Ramirez  YOB: 1955    PCP: Fabienne Erwin NP   PCP Address: [address] / [place] [address]  PCP Phone Number: [phone]  PCP Fax: [phone]    Encounter Date: 6/14/25    Admit to Home Health    Diagnoses:  Active Hospital Problems    Diagnosis  POA    *SBO (small bowel obstruction) [K56.609]  Yes    Pressure injury of deep tissue of sacral region [L89.156]  No    Hypokalemia [E87.6]  Yes    Heart failure with preserved ejection fraction [I50.30]  Yes     ECHO (7/29/22) EF 65% LV grade 2 diastolic dysfunction.      Essential hypertension [I10]  Yes    Hyperlipidemia [E78.5]  Yes    Depression with anxiety [F41.8]  Yes      Resolved Hospital Problems   No resolved problems to display.       Follow Up Appointments:  Future Appointments   Date Time Provider Department Center   7/17/2025 10:40 AM Lisa Ramos NP [facility] GASTRO [facility]   9/15/2025  7:55 AM [facility] LAB [facility]   9/15/2025 10:00 AM Stevie Dewey MD [facility] DERM [facility]   9/22/2025  8:45 AM Fabienne Erwin NP [facility] MED [facility]       Allergies:  Review of patient's allergies indicates:   Allergen Reactions    Sulfa (sulfonamide antibiotics) Hives    Ace inhibitors Other (See Comments)     Cough         Medications: Review discharge medications with patient and family and provide education.    Current Medications[1]     Medication List        ASK your doctor about these medications      acetaminophen 500 MG tablet  Commonly known as: TYLENOL  Take 500 mg by mouth every 6 (six) hours as needed for Pain.     ALPRAZolam 0.25 MG tablet  Commonly known as: XANAX  TAKE 1 TABLET BY MOUTH THREE TIMES DAILY     atorvastatin 40 MG tablet  Commonly known as: LIPITOR  Take 1 tablet by mouth once daily     busPIRone 15 MG tablet  Commonly known as: BUSPAR  TAKE 1 TABLET BY MOUTH THREE TIMES DAILY     citalopram 20  MG tablet  Commonly known as: CeleXA  Take 3 tablets (60 mg total) by mouth once daily. Take 1 tablet by mouth once daily     cyclobenzaprine 5 MG tablet  Commonly known as: FLEXERIL  TAKE 1 TABLET BY MOUTH TWICE DAILY AS NEEDED FOR MUSCLE SPASM     ergocalciferol 50,000 unit Cap  Commonly known as: ERGOCALCIFEROL  Take 1 capsule (50,000 Units total) by mouth every 7 days.     olmesartan 20 MG tablet  Commonly known as: BENICAR  TAKE 1 TABLET BY MOUTH ONCE DAILY IN THE EVENING     ondansetron 4 MG tablet  Commonly known as: ZOFRAN  Take 1 tablet (4 mg total) by mouth every 8 (eight) hours as needed.     pantoprazole 40 MG tablet  Commonly known as: PROTONIX  Take 1 tablet (40 mg total) by mouth once daily.     polyethylene glycol 17 gram Pwpk  Commonly known as: GLYCOLAX  Take 17 g by mouth once daily.     polyethylene glycol 240-22.72-6.72 -5.84 gram Solr  Commonly known as: COLYTE  Take as directed by provider office     traZODone 100 MG tablet  Commonly known as: DESYREL  Take 100 mg by mouth every evening.                I have seen and examined this patient within the last 30 days. My clinical findings that support the need for the home health skilled services and home bound status are the following:no   Medical restrictions requiring assistance of another human to leave home due to  Wound care needs.     Diet:   regular diet    Labs:  Report Lab results to PCP.    Referrals/ Consults  Aide to provide assistance with personal care, ADLs, and vital signs.    Activities:   activity as tolerated and no lifting for 6 weeks following surgery    Nursing:   Agency to admit patient within 24 hours of hospital discharge unless specified on physician order or at patient request    SN to complete comprehensive assessment including routine vital signs. Instruct on disease process and s/s of complications to report to MD. Review/verify medication list sent home with the patient at time of discharge  and instruct  "patient/caregiver as needed. Frequency may be adjusted depending on start of care date.     Skilled nurse to perform up to 3 visits PRN for symptoms related to diagnosis    Notify MD if SBP > 160 or < 90; DBP > 90 or < 50; HR > 120 or < 50; Temp > 101; O2 < 88%; Other:   Change to drainage of midline wound    Ok to schedule additional visits based on staff availability and patient request on consecutive days within the home health episode.    When multiple disciplines ordered:    Start of Care occurs on Sunday - Wednesday schedule remaining discipline evaluations as ordered on separate consecutive days following the start of care.    Thursday SOC -schedule subsequent evaluations Friday and Monday the following week.     Friday - Saturday SOC - schedule subsequent discipline evaluations on consecutive days starting Monday of the following week.    For all post-discharge communication and subsequent orders please contact patient's primary care physician. If unable to reach primary care physician or do not receive response within 30 minutes, please contact [facility] ([phone]) for clinical staff order clarification    Miscellaneous   None    Home Health Aide:  Nursing Every other day and Home Health Aide Every other day    Wound Care Orders  yes:  Surgical Wound:  Location: Midline abdomen: Local wound care to midline abdominal incision every 2 days and prn- Cleanse with Vashe. Fill wound/undermining under incision with 6" of Aquacel Ag hydrofiber ribbon. Apply 3M Cavilon No Sting Film Barrier to periwound skin. Cover with dry gauze and secure with Medipore tape. May change cover dressing prn saturation with drainage.    Local wound care to sacral pressure injury every 2 days and prn- Cleanse with Vashe. Apply hydrogel into ulceration. Cover with Mepilex sacral dressing. Avoid positioning on sacrum. Air cushion for chair when sitting.    Consult ET nurse        Apply the following to wound:   Other: See below " "-  (frequency)    Location: Midline abdomen: Local wound care to midline abdominal incision every 2 days and prn- Cleanse with Vashe. Fill wound/undermining under incision with 6" of Aquacel Ag hydrofiber ribbon. Apply 3M Cavilon No Sting Film Barrier to periwound skin. Cover with dry gauze and secure with Medipore tape. May change cover dressing prn saturation with drainage.    Local wound care to sacral pressure injury every 2 days and prn- Cleanse with Vashe. Apply hydrogel into ulceration. Cover with Mepilex sacral dressing. Avoid positioning on sacrum. Air cushion for chair when sitting.    I certify that this patient is confined to her home and needs intermittent skilled nursing care.    Sharon Granados MD  [facility] General Surgery Resident  7/8/2025 12:43 PM              [1]   Current Facility-Administered Medications   Medication Dose Route Frequency Provider Last Rate Last Admin    acetaminophen tablet 1,000 mg  1,000 mg Oral TID Moise Collins MD   1,000 mg at 07/08/25 0825    atorvastatin tablet 40 mg  40 mg Oral QHS Moise Collins MD   40 mg at 07/07/25 2040    bisacodyL suppository 10 mg  10 mg Rectal Daily PRN Roxann Lin MD        busPIRone tablet 15 mg  15 mg Oral TID Moise Collins MD   15 mg at 07/08/25 0825    citalopram tablet 20 mg  20 mg Oral Daily Moise Collins MD   20 mg at 07/08/25 0825    enoxaparin injection 40 mg  40 mg Subcutaneous Q24H (prophylaxis, 1700) Daija Alarcon NP   40 mg at 07/07/25 1609    glucagon (human recombinant) injection 1 mg  1 mg Intramuscular PRN Yuan Washington MD        glucose chewable tablet 16 g  16 g Oral PRN Yuan Washington MD        glucose chewable tablet 24 g  24 g Oral PRN Yuan Washington MD        hydrALAZINE injection 10 mg  10 mg Intravenous Q8H PRN Margarita Morris.,         HYDROmorphone injection 0.2 mg  0.2 mg Intravenous Q4H PRN Norma Cruz MD   0.2 mg at 07/07/25 4299    LIDOcaine 5 % patch 2 patch  2 patch Transdermal Q24H Riley, " MD Roxann   2 patch at 07/07/25 1427    magnesium oxide tablet 800 mg  800 mg Oral PRN Yuan Washington MD        magnesium oxide tablet 800 mg  800 mg Oral PRN Yuan Washington MD        melatonin tablet 6 mg  6 mg Oral Nightly PRN Yuan Washington MD   6 mg at 07/07/25 2039    methocarbamoL tablet 500 mg  500 mg Oral QID Norma Cruz MD   500 mg at 07/08/25 0825    naloxone 0.4 mg/mL injection 0.02 mg  0.02 mg Intravenous PRN Yuan Washington MD        ondansetron disintegrating tablet 4 mg  4 mg Oral Q6H PRN Norma Cruz MD        ondansetron injection 4 mg  4 mg Intravenous Q8H PRN Norma Cruz MD        oxyCODONE immediate release tablet 10 mg  10 mg Oral Q6H PRN Moise Collins MD   10 mg at 07/07/25 2039    oxyCODONE immediate release tablet 5 mg  5 mg Oral Q6H PRN Moise Collins MD   5 mg at 07/02/25 1945    pantoprazole EC tablet 40 mg  40 mg Oral Daily Moise Collins MD   40 mg at 07/08/25 0825    polyethylene glycol packet 17 g  17 g Oral Daily Roxann Lin MD   17 g at 07/08/25 0825    potassium, sodium phosphates 280-160-250 mg packet 2 packet  2 packet Oral PRN Yuan Washington MD        potassium, sodium phosphates 280-160-250 mg packet 2 packet  2 packet Oral PRN Yuan Washington MD        potassium, sodium phosphates 280-160-250 mg packet 2 packet  2 packet Oral PRN Yuan Washington MD        senna tablet 8.6 mg  8.6 mg Oral Daily PRN Roxann Lin MD        simethicone chewable tablet 80 mg  1 tablet Oral QID (PC + HS) Moise Collins MD   80 mg at 07/08/25 0825    sodium chloride 0.9% flush 10 mL  10 mL Intravenous Q12H PRN Yuan Washington MD        sodium chloride 0.9% flush 10 mL  10 mL Intravenous Q12H PRN Margarita Morris,         tamsulosin 24 hr capsule 0.4 mg  0.4 mg Oral Daily Roxann Lin MD   0.4 mg at 07/08/25 0825     Facility-Administered Medications Ordered in Other Encounters   Medication Dose Route Frequency Provider Last Rate Last Admin    cyclopentolate 1% ophthalmic solution 1 drop  1  drop Left Eye On Call Procedure Palmer Berrios MD   1 drop at 10/27/22 1003    ofloxacin 0.3 % ophthalmic solution 1 drop  1 drop Left Eye On Call Procedure Palmer Berrios MD   2 drop at 10/27/22 1238    sodium chloride 0.9% flush 10 mL  10 mL Intravenous PRN Palmer Berrios MD

## 2025-07-17 DIAGNOSIS — Z78.0 MENOPAUSE: ICD-10-CM

## 2025-07-22 ENCOUNTER — OFFICE VISIT (OUTPATIENT)
Dept: SURGERY | Facility: CLINIC | Age: 70
End: 2025-07-22
Payer: MEDICARE

## 2025-07-22 VITALS
BODY MASS INDEX: 22.45 KG/M2 | DIASTOLIC BLOOD PRESSURE: 67 MMHG | HEART RATE: 69 BPM | HEIGHT: 68 IN | SYSTOLIC BLOOD PRESSURE: 116 MMHG | WEIGHT: 148.13 LBS

## 2025-07-22 DIAGNOSIS — K56.609 SBO (SMALL BOWEL OBSTRUCTION): Primary | ICD-10-CM

## 2025-07-22 PROCEDURE — 1125F AMNT PAIN NOTED PAIN PRSNT: CPT | Mod: CPTII,S$GLB,, | Performed by: SURGERY

## 2025-07-22 PROCEDURE — 3288F FALL RISK ASSESSMENT DOCD: CPT | Mod: CPTII,S$GLB,, | Performed by: SURGERY

## 2025-07-22 PROCEDURE — 99999 PR PBB SHADOW E&M-EST. PATIENT-LVL III: CPT | Mod: PBBFAC,,, | Performed by: SURGERY

## 2025-07-22 PROCEDURE — 3044F HG A1C LEVEL LT 7.0%: CPT | Mod: CPTII,S$GLB,, | Performed by: SURGERY

## 2025-07-22 PROCEDURE — 3074F SYST BP LT 130 MM HG: CPT | Mod: CPTII,S$GLB,, | Performed by: SURGERY

## 2025-07-22 PROCEDURE — 1101F PT FALLS ASSESS-DOCD LE1/YR: CPT | Mod: CPTII,S$GLB,, | Performed by: SURGERY

## 2025-07-22 PROCEDURE — 1159F MED LIST DOCD IN RCRD: CPT | Mod: CPTII,S$GLB,, | Performed by: SURGERY

## 2025-07-22 PROCEDURE — 99024 POSTOP FOLLOW-UP VISIT: CPT | Mod: S$GLB,,, | Performed by: SURGERY

## 2025-07-22 PROCEDURE — 3078F DIAST BP <80 MM HG: CPT | Mod: CPTII,S$GLB,, | Performed by: SURGERY

## 2025-07-22 PROCEDURE — 4010F ACE/ARB THERAPY RXD/TAKEN: CPT | Mod: CPTII,S$GLB,, | Performed by: SURGERY

## 2025-07-22 NOTE — PROGRESS NOTES
Surgery Clinic Note    Patricia Ramirez is a 69 y.o. year old female in clinic today for follow up of ex lap/small bowel resection and appendectomy, performed for bowel obstruction from an adhesive band/adhesions (appendix removed due to proximity of pathology). She is doing well at home. Staples already removed during inpatient stay. The inferior edge of the incision is still drainage and being packed.  Has generalized abdominal pain, slowly improving. Eating well with normal BM  No f/c/n/v/sob/cp    ROS:  Negative except above    Pathology:  Final Diagnosis   1. Appendix, appendectomy:   Appendix with fibrous obliteration of appendiceal tip     2. Small bowel, resection:   Small intestinal mucosa with stricture on gross examination and associated microscopic surface erosions  Serosal adhesions  Surgical margins viable  Negative for malignancy     3. Small bowel, excision:   Hemorrhagic fibrous and fibroadipose tissue   Negative for malignancy     PE:  Vitals:    07/22/25 0916   BP: 116/67   Pulse: 69     NAD  No belabored breathing  Abd soft nt nd  Inferior 2 cm of incision is open with packing. No foul smell. Tracks a few cm. Seropurulent drainage.    A/P:  Patricia Ramirez is a 69 y.o. year old female s/p ex lap and small bowel resection for obstruction    -continue packing  Rtc 2 wks    Rigoberto Ponce  General Surgery - [facility]  7/22/2025

## 2025-07-25 ENCOUNTER — PATIENT OUTREACH (OUTPATIENT)
Dept: ADMINISTRATIVE | Facility: OTHER | Age: 70
End: 2025-07-25
Payer: MEDICARE

## 2025-07-25 NOTE — PROGRESS NOTES
CHW - Case Closure    This Community Health Worker spoke to patient via telephone today.   Pt reported: Patient states she has no needs, nor request any assistance at this time. Patient has graduated and agreed to case closure.   Pt denied any additional needs at this time and agrees with episode closure at this time.  Provided patient with Community Health Worker's contact information and encouraged her to contact this Community Health Worker if additional needs arise.

## 2025-07-30 ENCOUNTER — OFFICE VISIT (OUTPATIENT)
Dept: SURGERY | Facility: CLINIC | Age: 70
End: 2025-07-30
Payer: MEDICARE

## 2025-07-30 VITALS
WEIGHT: 148.13 LBS | HEART RATE: 59 BPM | DIASTOLIC BLOOD PRESSURE: 69 MMHG | SYSTOLIC BLOOD PRESSURE: 138 MMHG | BODY MASS INDEX: 22.45 KG/M2 | HEIGHT: 68 IN

## 2025-07-30 DIAGNOSIS — K56.609 SBO (SMALL BOWEL OBSTRUCTION): Primary | ICD-10-CM

## 2025-07-30 PROCEDURE — 3078F DIAST BP <80 MM HG: CPT | Mod: CPTII,S$GLB,, | Performed by: SURGERY

## 2025-07-30 PROCEDURE — 99999 PR PBB SHADOW E&M-EST. PATIENT-LVL III: CPT | Mod: PBBFAC,,, | Performed by: SURGERY

## 2025-07-30 PROCEDURE — 3044F HG A1C LEVEL LT 7.0%: CPT | Mod: CPTII,S$GLB,, | Performed by: SURGERY

## 2025-07-30 PROCEDURE — 3075F SYST BP GE 130 - 139MM HG: CPT | Mod: CPTII,S$GLB,, | Performed by: SURGERY

## 2025-07-30 PROCEDURE — 4010F ACE/ARB THERAPY RXD/TAKEN: CPT | Mod: CPTII,S$GLB,, | Performed by: SURGERY

## 2025-07-30 PROCEDURE — 1125F AMNT PAIN NOTED PAIN PRSNT: CPT | Mod: CPTII,S$GLB,, | Performed by: SURGERY

## 2025-07-30 PROCEDURE — 99024 POSTOP FOLLOW-UP VISIT: CPT | Mod: S$GLB,,, | Performed by: SURGERY

## 2025-07-30 PROCEDURE — 1159F MED LIST DOCD IN RCRD: CPT | Mod: CPTII,S$GLB,, | Performed by: SURGERY

## 2025-07-30 PROCEDURE — 3288F FALL RISK ASSESSMENT DOCD: CPT | Mod: CPTII,S$GLB,, | Performed by: SURGERY

## 2025-07-30 PROCEDURE — 1101F PT FALLS ASSESS-DOCD LE1/YR: CPT | Mod: CPTII,S$GLB,, | Performed by: SURGERY

## 2025-07-30 NOTE — PROGRESS NOTES
Surgery Clinic Note    Patricia Ramirez is a 69 y.o. year old female in clinic today for follow up of ex lap/small bowel resection and appendectomy, performed for bowel obstruction from an adhesive band/adhesions (appendix removed due to proximity of pathology).  She is eating well and feels better. She still has an open incision at the inferior edge, with wound care dressing/packing changes a few times per week being performed at home. Still with purulent output. Normal BMs  No f/c/n/v/sob/cp    ROS:  Negative except above    Pathology:  Final Diagnosis   1. Appendix, appendectomy:   Appendix with fibrous obliteration of appendiceal tip     2. Small bowel, resection:   Small intestinal mucosa with stricture on gross examination and associated microscopic surface erosions  Serosal adhesions  Surgical margins viable  Negative for malignancy     3. Small bowel, excision:   Hemorrhagic fibrous and fibroadipose tissue   Negative for malignancy     PE:  Vitals:    07/30/25 1013   BP: 138/69   Pulse: (!) 59     NAD  No belabored breathing  Abd soft nt nd  Lower incision: 2 cm defect: granulation tissue. Packing material with purulent fluid. No foul smell.    A/P:  Patricia Ramirez is a 69 y.o. year old female s/p ex lap for bowel resection, doing well overall with post op wound infection, being packed w dressing changes    -continue iodoform packing; overall condition stable and improving, awaiting healing by secondary intention  -rtc 3 wks    Rigoberto Ponce  General Surgery - [facility]  7/30/2025

## 2025-08-18 ENCOUNTER — OFFICE VISIT (OUTPATIENT)
Dept: SURGERY | Facility: CLINIC | Age: 70
End: 2025-08-18
Payer: MEDICARE

## 2025-08-18 VITALS
WEIGHT: 138.69 LBS | HEIGHT: 68 IN | BODY MASS INDEX: 21.02 KG/M2 | SYSTOLIC BLOOD PRESSURE: 114 MMHG | HEART RATE: 50 BPM | DIASTOLIC BLOOD PRESSURE: 67 MMHG

## 2025-08-18 DIAGNOSIS — K56.609 SBO (SMALL BOWEL OBSTRUCTION): Primary | ICD-10-CM

## 2025-08-18 PROCEDURE — 99999 PR PBB SHADOW E&M-EST. PATIENT-LVL III: CPT | Mod: PBBFAC,,, | Performed by: SURGERY

## 2025-08-18 PROCEDURE — 3288F FALL RISK ASSESSMENT DOCD: CPT | Mod: CPTII,S$GLB,, | Performed by: SURGERY

## 2025-08-18 PROCEDURE — 3074F SYST BP LT 130 MM HG: CPT | Mod: CPTII,S$GLB,, | Performed by: SURGERY

## 2025-08-18 PROCEDURE — 3044F HG A1C LEVEL LT 7.0%: CPT | Mod: CPTII,S$GLB,, | Performed by: SURGERY

## 2025-08-18 PROCEDURE — 1126F AMNT PAIN NOTED NONE PRSNT: CPT | Mod: CPTII,S$GLB,, | Performed by: SURGERY

## 2025-08-18 PROCEDURE — 1101F PT FALLS ASSESS-DOCD LE1/YR: CPT | Mod: CPTII,S$GLB,, | Performed by: SURGERY

## 2025-08-18 PROCEDURE — 1159F MED LIST DOCD IN RCRD: CPT | Mod: CPTII,S$GLB,, | Performed by: SURGERY

## 2025-08-18 PROCEDURE — 3078F DIAST BP <80 MM HG: CPT | Mod: CPTII,S$GLB,, | Performed by: SURGERY

## 2025-08-18 PROCEDURE — 4010F ACE/ARB THERAPY RXD/TAKEN: CPT | Mod: CPTII,S$GLB,, | Performed by: SURGERY

## 2025-08-18 PROCEDURE — 99024 POSTOP FOLLOW-UP VISIT: CPT | Mod: S$GLB,,, | Performed by: SURGERY

## 2025-08-26 ENCOUNTER — OFFICE VISIT (OUTPATIENT)
Dept: CARDIOLOGY | Facility: CLINIC | Age: 70
End: 2025-08-26
Payer: MEDICARE

## 2025-08-26 VITALS
SYSTOLIC BLOOD PRESSURE: 148 MMHG | WEIGHT: 134.69 LBS | BODY MASS INDEX: 20.41 KG/M2 | DIASTOLIC BLOOD PRESSURE: 62 MMHG | HEIGHT: 68 IN | HEART RATE: 72 BPM | RESPIRATION RATE: 15 BRPM | OXYGEN SATURATION: 91 %

## 2025-08-26 DIAGNOSIS — R06.09 DYSPNEA ON EXERTION: ICD-10-CM

## 2025-08-26 DIAGNOSIS — I10 ESSENTIAL HYPERTENSION: Primary | ICD-10-CM

## 2025-08-26 DIAGNOSIS — I51.89 DIASTOLIC DYSFUNCTION: ICD-10-CM

## 2025-08-26 DIAGNOSIS — Z91.89 MULTIPLE RISK FACTORS FOR CORONARY ARTERY DISEASE: ICD-10-CM

## 2025-08-26 DIAGNOSIS — E78.5 HYPERLIPIDEMIA, UNSPECIFIED HYPERLIPIDEMIA TYPE: ICD-10-CM

## 2025-08-26 DIAGNOSIS — I44.4 LEFT ANTERIOR FASCICULAR BLOCK: ICD-10-CM

## 2025-08-26 DIAGNOSIS — I50.30 HEART FAILURE WITH PRESERVED EJECTION FRACTION, UNSPECIFIED HF CHRONICITY: ICD-10-CM

## 2025-08-26 PROCEDURE — 4010F ACE/ARB THERAPY RXD/TAKEN: CPT | Mod: CPTII,S$GLB,, | Performed by: INTERNAL MEDICINE

## 2025-08-26 PROCEDURE — 3044F HG A1C LEVEL LT 7.0%: CPT | Mod: CPTII,S$GLB,, | Performed by: INTERNAL MEDICINE

## 2025-08-26 PROCEDURE — 1126F AMNT PAIN NOTED NONE PRSNT: CPT | Mod: CPTII,S$GLB,, | Performed by: INTERNAL MEDICINE

## 2025-08-26 PROCEDURE — 99204 OFFICE O/P NEW MOD 45 MIN: CPT | Mod: S$GLB,,, | Performed by: INTERNAL MEDICINE

## 2025-08-26 PROCEDURE — 99999 PR PBB SHADOW E&M-EST. PATIENT-LVL IV: CPT | Mod: PBBFAC,,, | Performed by: INTERNAL MEDICINE

## 2025-08-26 PROCEDURE — 3078F DIAST BP <80 MM HG: CPT | Mod: CPTII,S$GLB,, | Performed by: INTERNAL MEDICINE

## 2025-08-26 PROCEDURE — 3008F BODY MASS INDEX DOCD: CPT | Mod: CPTII,S$GLB,, | Performed by: INTERNAL MEDICINE

## 2025-08-26 PROCEDURE — 3077F SYST BP >= 140 MM HG: CPT | Mod: CPTII,S$GLB,, | Performed by: INTERNAL MEDICINE

## 2025-08-26 PROCEDURE — 1159F MED LIST DOCD IN RCRD: CPT | Mod: CPTII,S$GLB,, | Performed by: INTERNAL MEDICINE

## 2025-08-26 PROCEDURE — 1124F ACP DISCUSS-NO DSCNMKR DOCD: CPT | Mod: CPTII,S$GLB,, | Performed by: INTERNAL MEDICINE

## 2025-08-26 PROCEDURE — 1101F PT FALLS ASSESS-DOCD LE1/YR: CPT | Mod: CPTII,S$GLB,, | Performed by: INTERNAL MEDICINE

## 2025-08-26 PROCEDURE — 3288F FALL RISK ASSESSMENT DOCD: CPT | Mod: CPTII,S$GLB,, | Performed by: INTERNAL MEDICINE

## 2025-09-04 ENCOUNTER — HOSPITAL ENCOUNTER (OUTPATIENT)
Dept: CARDIOLOGY | Facility: HOSPITAL | Age: 70
Discharge: HOME | End: 2025-09-04
Attending: INTERNAL MEDICINE
Payer: MEDICARE

## 2025-09-04 DIAGNOSIS — I51.89 DIASTOLIC DYSFUNCTION: ICD-10-CM

## 2025-09-04 DIAGNOSIS — E78.5 HYPERLIPIDEMIA, UNSPECIFIED HYPERLIPIDEMIA TYPE: ICD-10-CM

## 2025-09-04 DIAGNOSIS — R06.09 DYSPNEA ON EXERTION: ICD-10-CM

## 2025-09-04 DIAGNOSIS — Z91.89 MULTIPLE RISK FACTORS FOR CORONARY ARTERY DISEASE: ICD-10-CM

## 2025-09-04 DIAGNOSIS — I44.4 LEFT ANTERIOR FASCICULAR BLOCK: ICD-10-CM

## 2025-09-04 DIAGNOSIS — I50.30 HEART FAILURE WITH PRESERVED EJECTION FRACTION, UNSPECIFIED HF CHRONICITY: ICD-10-CM

## 2025-09-04 DIAGNOSIS — I10 ESSENTIAL HYPERTENSION: ICD-10-CM

## 2025-09-04 LAB
AV INDEX (PROSTH): 0.48
AV MEAN GRADIENT: 12 MMHG
AV PEAK GRADIENT: 23 MMHG
AV VALVE AREA BY VELOCITY RATIO: 1.5 CM²
AV VALVE AREA: 1.4 CM²
AV VELOCITY RATIO: 0.54
CV ECHO LV RWT: 0.44 CM
DOP CALC AO PEAK VEL: 2.4 M/S
DOP CALC AO VTI: 54.6 CM
DOP CALC LVOT AREA: 2.8 CM2
DOP CALC LVOT DIAMETER: 1.9 CM
DOP CALC LVOT PEAK VEL: 1.3 M/S
DOP CALC MV VTI: 52.2 CM
DOP CALCLVOT PEAK VEL VTI: 26.3 CM
E WAVE DECELERATION TIME: 280 MSEC
E/A RATIO: 1.25
E/E' RATIO: 20 M/S
ECHO LV POSTERIOR WALL: 1.1 CM (ref 0.6–1.1)
FRACTIONAL SHORTENING: 30 % (ref 28–44)
INTERVENTRICULAR SEPTUM: 1 CM (ref 0.6–1.1)
IVC DIAMETER: 2.22 CM
LA MAJOR: 5.5 CM
LA MINOR: 5.8 CM
LA WIDTH: 4.9 CM
LEFT ATRIUM SIZE: 4.2 CM
LEFT ATRIUM VOLUME: 99 CM3
LEFT INTERNAL DIMENSION IN SYSTOLE: 3.5 CM (ref 2.1–4)
LEFT VENTRICLE DIASTOLIC VOLUME: 118 ML
LEFT VENTRICLE SYSTOLIC VOLUME: 51 ML
LEFT VENTRICULAR INTERNAL DIMENSION IN DIASTOLE: 5 CM (ref 3.5–6)
LEFT VENTRICULAR MASS: 194.4 G
LV LATERAL E/E' RATIO: 20 M/S
LV SEPTAL E/E' RATIO: 20 M/S
LVED V (TEICH): 117.79 ML
LVES V (TEICH): 51.4 ML
LVOT MG: 2.98 MMHG
LVOT MV: 0.78 CM/S
MV MEAN GRADIENT: 4 MMHG
MV PEAK A VEL: 1.12 M/S
MV PEAK E VEL: 1.4 M/S
MV PEAK GRADIENT: 9 MMHG
MV STENOSIS PRESSURE HALF TIME: 81.27 MS
MV VALVE AREA BY CONTINUITY EQUATION: 1.43 CM2
MV VALVE AREA P 1/2 METHOD: 2.71 CM2
OHS CV RV/LV RATIO: 0.7 CM
PISA TR MAX VEL: 2.5 M/S
PULM VEIN S/D RATIO: 1.18
PV PEAK D VEL: 0.57 M/S
PV PEAK GRADIENT: 6 MMHG
PV PEAK S VEL: 0.67 M/S
PV PEAK VELOCITY: 1.22 M/S
RA MAJOR: 5.42 CM
RA PRESSURE ESTIMATED: 8 MMHG
RA WIDTH: 4.1 CM
RIGHT VENTRICLE DIASTOLIC BASEL DIMENSION: 3.5 CM
RIGHT VENTRICULAR END-DIASTOLIC DIMENSION: 3.54 CM
RV TB RVSP: 11 MMHG
RV TISSUE DOPPLER FREE WALL SYSTOLIC VELOCITY 1 (APICAL 4 CHAMBER VIEW): 14.92 CM/S
SINUS: 3.1 CM
STJ: 2.3 CM
TDI LATERAL: 0.07 M/S
TDI SEPTAL: 0.07 M/S
TDI: 0.07 M/S
TR MAX PG: 25 MMHG
TRICUSPID ANNULAR PLANE SYSTOLIC EXCURSION: 2.1 CM
TV REST PULMONARY ARTERY PRESSURE: 33 MMHG

## 2025-09-04 PROCEDURE — 93306 TTE W/DOPPLER COMPLETE: CPT

## 2025-09-04 PROCEDURE — 93306 TTE W/DOPPLER COMPLETE: CPT | Mod: 26,,, | Performed by: INTERNAL MEDICINE

## (undated) DEVICE — R SEALER LIGASURE IMPACT 18CM

## (undated) DEVICE — ELECTRODE REM PLYHSV RETURN 9

## (undated) DEVICE — KIT ANTIFOG

## (undated) DEVICE — SUT VICRYL PLUS 3-0 SH 18IN

## (undated) DEVICE — NDL 18GA X1 1/2 REG BEVEL

## (undated) DEVICE — APPLICATOR CHLORAPREP ORN 26ML

## (undated) DEVICE — GOWN NONREINF SET-IN SLV XL

## (undated) DEVICE — FORCEP FENESTRATED BIPOLAR

## (undated) DEVICE — TROCAR ENDOPATH XCEL 5X100MM

## (undated) DEVICE — GLOVE SENSICARE PI GRN 7.5

## (undated) DEVICE — NDL HYPO REG 25G X 1 1/2

## (undated) DEVICE — SOL BETADINE 5%

## (undated) DEVICE — CONTAINER SPECIMEN 4OZ

## (undated) DEVICE — SUT ETHILON 3/0 18IN PS-1

## (undated) DEVICE — SUT ETHIBOND EXCEL 0 MO6 18

## (undated) DEVICE — TOWEL OR XRAY BLUE 17X26IN

## (undated) DEVICE — NDL 22GA X1 1/2 REG BEVEL

## (undated) DEVICE — FORCEP GRIESHABER MAXGRIP 25G

## (undated) DEVICE — GOWN SURGICAL X-LARGE

## (undated) DEVICE — SOL BSS BALANCED SALT

## (undated) DEVICE — IRRIGATOR ENDOSCOPY DISP.

## (undated) DEVICE — SYR ONLY LUER LOCK 20CC

## (undated) DEVICE — EVACUATOR WOUND BULB 100CC

## (undated) DEVICE — PAD PINK TRENDELENBURG POS XL

## (undated) DEVICE — SYR 1CC TB SG 27GX1/2

## (undated) DEVICE — KIT EYE PIC PACK WB

## (undated) DEVICE — PACK ENDOSCOPY GENERAL

## (undated) DEVICE — SUT PDS PLUS 1 TP1 96IN

## (undated) DEVICE — OBTURATOR BLADELESS 8MM XI CLR

## (undated) DEVICE — PACK AUTO GAS FILL

## (undated) DEVICE — SHEILD & GARTERS FOX METAL EYE

## (undated) DEVICE — Device

## (undated) DEVICE — GLOVE BIOGEL ECLIPSE SZ 7.5

## (undated) DEVICE — ADHESIVE DERMABOND MINI HV

## (undated) DEVICE — DRAIN SIL FLAT 10MM FULL PERF

## (undated) DEVICE — TROCAR KII BLLN 12MM 10CM

## (undated) DEVICE — COVER TIP CURVED SCISSORS XI

## (undated) DEVICE — SUT MCRYL PLUS 4-0 PS2 27IN

## (undated) DEVICE — SUT VICRYL 3-0 27 SH

## (undated) DEVICE — PACK INSTRUMENT COVER DISPO

## (undated) DEVICE — ELECTRODE EZ CLEAN L-HOOK 13.5

## (undated) DEVICE — GLOVE SIGNATURE ESSNTL LTX 7

## (undated) DEVICE — DRAPE SURG MEDI-SLUSH XL 44X66

## (undated) DEVICE — SYR 3CC LUER LOC

## (undated) DEVICE — DRAPE THREE-QUARTER 53X77IN

## (undated) DEVICE — PROBE ILLUM FLEX CURVE LASER

## (undated) DEVICE — APPLIER MEDIUM LARGE CLIP

## (undated) DEVICE — CORD FOR BIPOLAR FORCEPS 12

## (undated) DEVICE — NDL ECLIPSE SAFETY 18GX1-1/2IN

## (undated) DEVICE — LENS VITRCTMY OPHTH 30DEG 59DE

## (undated) DEVICE — IRRIGATOR ENDOWRIST XI SUCTION

## (undated) DEVICE — HEMOCLIPS GREEN

## (undated) DEVICE — DRAPE COLUMN DAVINCI XI

## (undated) DEVICE — TUBING INSUFFLATION W/LUER LOK

## (undated) DEVICE — HEMOSTAT SURGICEL 4X8IN

## (undated) DEVICE — SOL IRR SOD CHL .9% POUR

## (undated) DEVICE — SYR 10CC LUER LOCK

## (undated) DEVICE — GLOVE BIOGEL ECLIPSE SZ 6.5

## (undated) DEVICE — DRESSING ABSRBNT ISLAND 3.6X8

## (undated) DEVICE — BACKFLUSH 25GA SOFT-TIP DISP

## (undated) DEVICE — SUT MONOCRYL 4.0 PS2 CP496G

## (undated) DEVICE — KNIFE ANGLE 1MM

## (undated) DEVICE — SUT SILK 0 SH 30IN BLK BR

## (undated) DEVICE — SOL BALANCED SALT 500ML

## (undated) DEVICE — PACK TOTAL PLUS 25G VITRECTOMY

## (undated) DEVICE — CONTAINER SPECIMEN STRL 4OZ

## (undated) DEVICE — CLOSURE SKIN STERI STRIP 1/2X4

## (undated) DEVICE — SOL ELECTROLUBE ANTI-STIC

## (undated) DEVICE — KIT CUSTOM BASIC EYE ST / MEA

## (undated) DEVICE — BAG TISSUE RETRIEVAL 5MM

## (undated) DEVICE — HOOK PERM MONOPOLAR CAUTERY

## (undated) DEVICE — GLOVE SURG BIOGEL LATEX SZ 7.5

## (undated) DEVICE — SYRINGE 30CC LL W/O NDL

## (undated) DEVICE — CHLORAPREP W TINT 26ML APPL

## (undated) DEVICE — CONTAINER SPECIMEN OR STER 4OZ

## (undated) DEVICE — FORCEP CADIERE DA VINCI

## (undated) DEVICE — SYR DISP LL 5CC

## (undated) DEVICE — NEEDLE HYPODERMIC HUB LUER LOC

## (undated) DEVICE — CANNULA OPTHALMIC SOF TIP 25G

## (undated) DEVICE — BLANKET UPPER BODY 78.7X29.9IN

## (undated) DEVICE — COVER LIGHT HANDLE

## (undated) DEVICE — RELOAD PROXIMATE CUT BLUE 75MM

## (undated) DEVICE — STAPLER INT PROX TX 60X3.5MM

## (undated) DEVICE — GLOVE BIOGEL 7.0

## (undated) DEVICE — SOL CLEARIFY VISUALIZATION LAP

## (undated) DEVICE — COVER MAYO STAND REINFRCD 30

## (undated) DEVICE — SCISSOR HOT SHEARS XI

## (undated) DEVICE — SUT STRATAFIX SPIRAL 15CM

## (undated) DEVICE — COVER OVERHEAD SURG LT BLUE

## (undated) DEVICE — SEE MEDLINE ITEM 157131

## (undated) DEVICE — CUTTER PROXIMATE BLUE 75MM

## (undated) DEVICE — TRAY MUSCLE LID EYE

## (undated) DEVICE — FORCEP GRASPING 25GA SMOOTH

## (undated) DEVICE — NDL HYPO A BEVEL 30X1/2

## (undated) DEVICE — SPONGE COTTON TRAY 4X4IN

## (undated) DEVICE — SUT 7/0 18IN COATED VICRYL

## (undated) DEVICE — SOL GONAK

## (undated) DEVICE — KNIFE OPHTH MICRO UNITOME 5MM

## (undated) DEVICE — HANDPIECE TRANSFORMER I/A

## (undated) DEVICE — STAPLER SKIN ROTATING HEAD

## (undated) DEVICE — SEE MEDLINE ITEM 146292

## (undated) DEVICE — SOL IRR STRL WATER 500ML

## (undated) DEVICE — KIT PERFLUOROCARBON LIQUID

## (undated) DEVICE — SPONGE LAP 18X18 PREWASHED

## (undated) DEVICE — SEAL UNIVERSAL 5MM-8MM XI

## (undated) DEVICE — SOL WATER STRL IRR 1000ML

## (undated) DEVICE — KIT GREY EYE

## (undated) DEVICE — SUT SILK 2-0 SH 18IN BLACK

## (undated) DEVICE — TRAP FLUID SMOKE EVACUATOR

## (undated) DEVICE — DRAPE ARM DAVINCI XI